# Patient Record
Sex: MALE | Race: ASIAN | NOT HISPANIC OR LATINO
[De-identification: names, ages, dates, MRNs, and addresses within clinical notes are randomized per-mention and may not be internally consistent; named-entity substitution may affect disease eponyms.]

---

## 2019-09-23 ENCOUNTER — APPOINTMENT (OUTPATIENT)
Dept: NEUROSURGERY | Facility: CLINIC | Age: 69
End: 2019-09-23
Payer: COMMERCIAL

## 2019-09-23 ENCOUNTER — APPOINTMENT (OUTPATIENT)
Dept: NEUROLOGY | Facility: CLINIC | Age: 69
End: 2019-09-23
Payer: COMMERCIAL

## 2019-09-23 DIAGNOSIS — C71.9 MALIGNANT NEOPLASM OF BRAIN, UNSPECIFIED: ICD-10-CM

## 2019-09-23 PROCEDURE — 99245 OFF/OP CONSLTJ NEW/EST HI 55: CPT

## 2019-09-23 PROCEDURE — 99203 OFFICE O/P NEW LOW 30 MIN: CPT

## 2019-09-23 NOTE — REVIEW OF SYSTEMS
[As Noted in HPI] : as noted in HPI [Memory Lapses or Loss] : memory loss [Decr. Concentrating Ability] : decreased concentrating ability [Negative] : Heme/Lymph

## 2019-09-23 NOTE — DATA REVIEWED
[de-identified] : I personally reviewed MR imaging dated\par 9/13/19			\par \par In reviewing these images, I find a mass-like hyperintensity involving the splenium of the callosum on the T2 weighted images, with signal change likely representing an admixture of cerebral edema and neoplasm. There is also DWI hyperintensity in this region, which may represent T2 shine through effects.\par I am concerned about subtle confluent hyperintensity extending anteriorly to involve bilateral medial temporal lobes and suggesting tracking along whit matter pathways to both hippocampal structures.\par There are no contrast enhanced images.\par Overall I find the images to be most c/w glioma, but potentially representing PCNSL. \par Selected imaging was provided to the patient, along with a detailed verbal explanation of the issues at hand as outlined above.\par

## 2019-09-23 NOTE — PHYSICAL EXAM
[General Appearance - Alert] : alert [General Appearance - In No Acute Distress] : in no acute distress [Oriented To Time, Place, And Person] : oriented to person, place, and time [Impaired Insight] : insight and judgment were intact [Affect] : the affect was normal [Person] : oriented to person [Place] : oriented to place [Time] : oriented to time [Concentration Intact] : normal concentrating ability [Visual Intact] : visual attention was ~T not ~L decreased [Naming Objects] : no difficulty naming common objects [Repeating Phrases] : no difficulty repeating a phrase [Writing A Sentence] : no difficulty writing a sentence [Fluency] : fluency intact [Comprehension] : comprehension intact [Reading] : reading intact [Past History] : adequate knowledge of personal past history [Cranial Nerves Optic (II)] : visual acuity intact bilaterally,  visual fields full to confrontation, pupils equal round and reactive to light [Cranial Nerves Oculomotor (III)] : extraocular motion intact [Cranial Nerves Trigeminal (V)] : facial sensation intact symmetrically [Cranial Nerves Facial (VII)] : face symmetrical [Cranial Nerves Vestibulocochlear (VIII)] : hearing was intact bilaterally [Cranial Nerves Glossopharyngeal (IX)] : tongue and palate midline [Cranial Nerves Accessory (XI - Cranial And Spinal)] : head turning and shoulder shrug symmetric [Motor Tone] : muscle tone was normal in all four extremities [Cranial Nerves Hypoglossal (XII)] : there was no tongue deviation with protrusion [Motor Strength] : muscle strength was normal in all four extremities [No Muscle Atrophy] : normal bulk in all four extremities [Motor Handedness Right-Handed] : the patient is right hand dominant [Sensation Tactile Decrease] : light touch was intact [Abnormal Walk] : normal gait [Balance] : balance was intact [2+] : Ankle jerk left 2+ [Paresis Pronator Drift Right-Sided] : no pronator drift on the right [Paresis Pronator Drift Left-Sided] : no pronator drift on the left [Motor Strength Upper Extremities Bilaterally] : strength was normal in both upper extremities [Past-pointing] : there was no past-pointing [Motor Strength Lower Extremities Bilaterally] : strength was normal in both lower extremities [Tremor] : no tremor present [Plantar Reflex Right Only] : normal on the right [Plantar Reflex Left Only] : normal on the left [Primitive Reflexes] : primitive reflexes were absent [FreeTextEntry4] : while he is remarkably intact, he is clearly bothered by his cognitive performance, which may well be less than usual. [FreeTextEntry5] : he extinguishes left sided visual stimuli on double simultaneous stimulation.  [FreeTextEntry6] : He does not extinguish left sided tactile stimuli on double simultaneous stimulation.

## 2019-09-23 NOTE — DISCUSSION/SUMMARY
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 I think performing this procedure within the next week or two would be valuable in delineating the underlying etiology, despite his remarkable stability over the last few months. I instructed him to stop taking the baby ASA, which he has not yet taken today.\par \par NEOPLASM -- I encouraged him that brain lymphomas are curable, but cautioned that gliomas are not, although both are treatable. I deferred detailed discussion regarding treatment options until we know what kind of neoplasm this represents.\par \par LOW B12 LEVEL -- I instructed him to take 5000mcg B12 sub-lingual preparation on a daily basis.\par \par MEMORY LOSS -- hopefully related to diffuse brain disease seen in PCNSL. If related to glioma infiltration of hippocampii bilaterally, then likely to get worse over time.\par \par LEFT NEGLECT -- likely parietal deficit. I am not sure why it is confined to visual stimuli, perhaps because it is not severe at this time.\par \par DISPO -- Although I did not arrange for follow-up immediately, I would like to see him after any procedure is performed. 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

## 2019-09-24 PROBLEM — C71.9 MALIGNANT NEOPLASM OF BRAIN, UNSPECIFIED LOCATION: Status: ACTIVE | Noted: 2019-09-24

## 2019-09-25 ENCOUNTER — OUTPATIENT (OUTPATIENT)
Dept: OUTPATIENT SERVICES | Facility: HOSPITAL | Age: 69
LOS: 1 days | End: 2019-09-25
Payer: COMMERCIAL

## 2019-09-25 ENCOUNTER — APPOINTMENT (OUTPATIENT)
Dept: MRI IMAGING | Facility: IMAGING CENTER | Age: 69
End: 2019-09-25
Payer: COMMERCIAL

## 2019-09-25 ENCOUNTER — TRANSCRIPTION ENCOUNTER (OUTPATIENT)
Age: 69
End: 2019-09-25

## 2019-09-25 VITALS
OXYGEN SATURATION: 99 % | DIASTOLIC BLOOD PRESSURE: 76 MMHG | RESPIRATION RATE: 17 BRPM | HEART RATE: 65 BPM | HEIGHT: 70 IN | TEMPERATURE: 98 F | SYSTOLIC BLOOD PRESSURE: 132 MMHG | WEIGHT: 149.03 LBS

## 2019-09-25 DIAGNOSIS — D49.6 NEOPLASM OF UNSPECIFIED BEHAVIOR OF BRAIN: ICD-10-CM

## 2019-09-25 DIAGNOSIS — Z98.890 OTHER SPECIFIED POSTPROCEDURAL STATES: Chronic | ICD-10-CM

## 2019-09-25 DIAGNOSIS — Z01.818 ENCOUNTER FOR OTHER PREPROCEDURAL EXAMINATION: ICD-10-CM

## 2019-09-25 DIAGNOSIS — Z29.9 ENCOUNTER FOR PROPHYLACTIC MEASURES, UNSPECIFIED: ICD-10-CM

## 2019-09-25 DIAGNOSIS — C71.8 MALIGNANT NEOPLASM OF OVERLAPPING SITES OF BRAIN: ICD-10-CM

## 2019-09-25 DIAGNOSIS — I10 ESSENTIAL (PRIMARY) HYPERTENSION: ICD-10-CM

## 2019-09-25 DIAGNOSIS — C71.9 MALIGNANT NEOPLASM OF BRAIN, UNSPECIFIED: ICD-10-CM

## 2019-09-25 LAB
ANION GAP SERPL CALC-SCNC: 10 MMOL/L — SIGNIFICANT CHANGE UP (ref 5–17)
BLD GP AB SCN SERPL QL: NEGATIVE — SIGNIFICANT CHANGE UP
BUN SERPL-MCNC: 22 MG/DL — SIGNIFICANT CHANGE UP (ref 7–23)
CALCIUM SERPL-MCNC: 9.3 MG/DL — SIGNIFICANT CHANGE UP (ref 8.4–10.5)
CHLORIDE SERPL-SCNC: 100 MMOL/L — SIGNIFICANT CHANGE UP (ref 96–108)
CO2 SERPL-SCNC: 29 MMOL/L — SIGNIFICANT CHANGE UP (ref 22–31)
CREAT SERPL-MCNC: 0.88 MG/DL — SIGNIFICANT CHANGE UP (ref 0.5–1.3)
GLUCOSE SERPL-MCNC: 121 MG/DL — HIGH (ref 70–99)
HCT VFR BLD CALC: 41.3 % — SIGNIFICANT CHANGE UP (ref 39–50)
HGB BLD-MCNC: 13.4 G/DL — SIGNIFICANT CHANGE UP (ref 13–17)
MCHC RBC-ENTMCNC: 28.9 PG — SIGNIFICANT CHANGE UP (ref 27–34)
MCHC RBC-ENTMCNC: 32.4 GM/DL — SIGNIFICANT CHANGE UP (ref 32–36)
MCV RBC AUTO: 89 FL — SIGNIFICANT CHANGE UP (ref 80–100)
MRSA PCR RESULT.: SIGNIFICANT CHANGE UP
PLATELET # BLD AUTO: 141 K/UL — LOW (ref 150–400)
POTASSIUM SERPL-MCNC: 4.9 MMOL/L — SIGNIFICANT CHANGE UP (ref 3.5–5.3)
POTASSIUM SERPL-SCNC: 4.9 MMOL/L — SIGNIFICANT CHANGE UP (ref 3.5–5.3)
RBC # BLD: 4.64 M/UL — SIGNIFICANT CHANGE UP (ref 4.2–5.8)
RBC # FLD: 12.8 % — SIGNIFICANT CHANGE UP (ref 10.3–14.5)
RH IG SCN BLD-IMP: POSITIVE — SIGNIFICANT CHANGE UP
S AUREUS DNA NOSE QL NAA+PROBE: SIGNIFICANT CHANGE UP
SODIUM SERPL-SCNC: 139 MMOL/L — SIGNIFICANT CHANGE UP (ref 135–145)
WBC # BLD: 9.1 K/UL — SIGNIFICANT CHANGE UP (ref 3.8–10.5)
WBC # FLD AUTO: 9.1 K/UL — SIGNIFICANT CHANGE UP (ref 3.8–10.5)

## 2019-09-25 PROCEDURE — 87641 MR-STAPH DNA AMP PROBE: CPT

## 2019-09-25 PROCEDURE — 86900 BLOOD TYPING SEROLOGIC ABO: CPT

## 2019-09-25 PROCEDURE — 70553 MRI BRAIN STEM W/O & W/DYE: CPT

## 2019-09-25 PROCEDURE — A9585: CPT

## 2019-09-25 PROCEDURE — G0463: CPT

## 2019-09-25 PROCEDURE — 86850 RBC ANTIBODY SCREEN: CPT

## 2019-09-25 PROCEDURE — 87640 STAPH A DNA AMP PROBE: CPT

## 2019-09-25 PROCEDURE — 70553 MRI BRAIN STEM W/O & W/DYE: CPT | Mod: 26

## 2019-09-25 PROCEDURE — 85027 COMPLETE CBC AUTOMATED: CPT

## 2019-09-25 PROCEDURE — 80048 BASIC METABOLIC PNL TOTAL CA: CPT

## 2019-09-25 PROCEDURE — 86901 BLOOD TYPING SEROLOGIC RH(D): CPT

## 2019-09-25 RX ORDER — CEFAZOLIN SODIUM 1 G
2000 VIAL (EA) INJECTION ONCE
Refills: 0 | Status: DISCONTINUED | OUTPATIENT
Start: 2019-09-27 | End: 2019-09-28

## 2019-09-25 NOTE — H&P PST ADULT - NSICDXPASTMEDICALHX_GEN_ALL_CORE_FT
PAST MEDICAL HISTORY:  Hyperlipidemia     Hypertension     Neoplasm of unspecified behavior of brain

## 2019-09-25 NOTE — H&P PST ADULT - HISTORY OF PRESENT ILLNESS
69 year old male with h/o HTN, hyperlipidemia, presents to preadmission testing for scheduled right occipital stereotactic biopsy for neoplasm of unspecified behavior of brain on 9/27/2019.

## 2019-09-25 NOTE — H&P PST ADULT - ASSESSMENT
CAPRINI SCORE [CLOT updated 18]    AGE RELATED RISK FACTORS                                                       MOBILITY RELATED FACTORS  [ ] Age 41-60 years                                            (1 Point)                    [ ] Bed rest                                                        (1 Point)  [ 2] Age: 61-74 years                                           (2 Points)                  [ ] Plaster cast                                                   (2 Points)  [ ] Age= 75 years                                              (3 Points)                    [ ] Bed bound for more than 72 hours                 (2 Points)    DISEASE RELATED RISK FACTORS                                               GENDER SPECIFIC FACTORS  [ ] Edema in the lower extremities                       (1 Point)              [ ] Pregnancy                                                     (1 Point)  [ ] Varicose veins                                               (1 Point)                     [ ] Post-partum < 6 weeks                                   (1 Point)             [ ] BMI > 25 Kg/m2                                            (1 Point)                     [ ] Hormonal therapy  or oral contraception          (1 Point)                 [ ] Sepsis (in the previous month)                        (1 Point)               [ ] History of pregnancy complications                 (1 point)  [ ] Pneumonia or serious lung disease                                               [ ] Unexplained or recurrent                     (1 Point)           (in the previous month)                               (1 Point)  [ ] Abnormal pulmonary function test                     (1 Point)                 SURGERY RELATED RISK FACTORS  [ ] Acute myocardial infarction                              (1 Point)               [ ]  Section                                             (1 Point)  [ ] Congestive heart failure (in the previous month)  (1 Point)      [ ] Minor surgery                                                  (1 Point)   [ ] Inflammatory bowel disease                             (1 Point)               [ ] Arthroscopic surgery                                        (2 Points)  [ ] Central venous access                                      (2 Points)                [2 ] General surgery lasting more than 45 minutes (2 points)  [ ] Present or previous malignancy                     (2 Points)                [ ] Elective arthroplasty                                         (5 points)    [ ] Stroke (in the previous month)                          (5 Points)                                                                                                                                                           HEMATOLOGY RELATED FACTORS                                                 TRAUMA RELATED RISK FACTORS  [ ] Prior episodes of VTE                                     (3 Points)                [ ] Fracture of the hip, pelvis, or leg                       (5 Points)  [ ] Positive family history for VTE                         (3 Points)             [ ] Acute spinal cord injury (in the previous month)  (5 Points)  [ ] Prothrombin 79045 A                                     (3 Points)               [ ] Paralysis  (less than 1 month)                             (5 Points)  [ ] Factor V Leiden                                             (3 Points)                  [ ] Multiple Trauma within 1 month                        (5 Points)  [ ] Lupus anticoagulants                                     (3 Points)                                                           [ ] Anticardiolipin antibodies                               (3 Points)                                                       [ ] High homocysteine in the blood                      (3 Points)                                             [ ] Other congenital or acquired thrombophilia      (3 Points)                                                [ ] Heparin induced thrombocytopenia                  (3 Points)                                     Total Score [4 ]

## 2019-09-25 NOTE — H&P PST ADULT - ATTENDING COMMENTS
The patient has a heterogeneously enhancing splenial mass. Symptoms: memory loss. DDx: Glioma > lymphoma. Plan: right stereotactic biopsy. I have discussed the risks, benefits, and alternatives to surgery with the patient and his son. In particular, the risks of bleeding, infection, and no diagnosis being made were discussed.

## 2019-09-25 NOTE — H&P PST ADULT - NSANTHOSAYNRD_GEN_A_CORE
No. SUMIT screening performed.  STOP BANG Legend: 0-2 = LOW Risk; 3-4 = INTERMEDIATE Risk; 5-8 = HIGH Risk

## 2019-09-25 NOTE — H&P PST ADULT - NSICDXPROBLEM_GEN_ALL_CORE_FT
PROBLEM DIAGNOSES  Problem: Neoplasm of unspecified behavior of brain  Assessment and Plan: scheduled for right occipital stereotatic biopsy  preop instruction given, all questions answered     Problem: Hypertension  Assessment and Plan: instructed to continue antihypertensive medication pily-op    Problem: Need for prophylactic measure  Assessment and Plan: The Caprini score indicates this patient is at risk for a VTE event (score 3-5).  Most surgical patients in this group would benefit from pharmacologic prophylaxis.  The surgical team will determine the balance between VTE risk and bleeding risk

## 2019-09-26 ENCOUNTER — TRANSCRIPTION ENCOUNTER (OUTPATIENT)
Age: 69
End: 2019-09-26

## 2019-09-27 ENCOUNTER — APPOINTMENT (OUTPATIENT)
Dept: NEUROSURGERY | Facility: HOSPITAL | Age: 69
End: 2019-09-27

## 2019-09-27 ENCOUNTER — INPATIENT (INPATIENT)
Facility: HOSPITAL | Age: 69
LOS: 0 days | Discharge: HOME | DRG: 27 | End: 2019-09-28
Attending: NEUROLOGICAL SURGERY | Admitting: NEUROLOGICAL SURGERY
Payer: COMMERCIAL

## 2019-09-27 ENCOUNTER — RESULT REVIEW (OUTPATIENT)
Age: 69
End: 2019-09-27

## 2019-09-27 VITALS
WEIGHT: 149.03 LBS | DIASTOLIC BLOOD PRESSURE: 77 MMHG | TEMPERATURE: 99 F | HEIGHT: 70 IN | RESPIRATION RATE: 18 BRPM | HEART RATE: 70 BPM | SYSTOLIC BLOOD PRESSURE: 125 MMHG | OXYGEN SATURATION: 100 %

## 2019-09-27 DIAGNOSIS — D49.6 NEOPLASM OF UNSPECIFIED BEHAVIOR OF BRAIN: ICD-10-CM

## 2019-09-27 DIAGNOSIS — Z98.890 OTHER SPECIFIED POSTPROCEDURAL STATES: Chronic | ICD-10-CM

## 2019-09-27 DIAGNOSIS — C71.8 MALIGNANT NEOPLASM OF OVERLAPPING SITES OF BRAIN: ICD-10-CM

## 2019-09-27 LAB
ANION GAP SERPL CALC-SCNC: 10 MMOL/L — SIGNIFICANT CHANGE UP (ref 5–17)
BASOPHILS # BLD AUTO: 0 K/UL — SIGNIFICANT CHANGE UP (ref 0–0.2)
BASOPHILS NFR BLD AUTO: 0.1 % — SIGNIFICANT CHANGE UP (ref 0–2)
BUN SERPL-MCNC: 15 MG/DL — SIGNIFICANT CHANGE UP (ref 7–23)
CALCIUM SERPL-MCNC: 8.5 MG/DL — SIGNIFICANT CHANGE UP (ref 8.4–10.5)
CHLORIDE SERPL-SCNC: 100 MMOL/L — SIGNIFICANT CHANGE UP (ref 96–108)
CO2 SERPL-SCNC: 27 MMOL/L — SIGNIFICANT CHANGE UP (ref 22–31)
CREAT SERPL-MCNC: 0.91 MG/DL — SIGNIFICANT CHANGE UP (ref 0.5–1.3)
EOSINOPHIL # BLD AUTO: 0 K/UL — SIGNIFICANT CHANGE UP (ref 0–0.5)
EOSINOPHIL NFR BLD AUTO: 0.1 % — SIGNIFICANT CHANGE UP (ref 0–6)
GLUCOSE SERPL-MCNC: 187 MG/DL — HIGH (ref 70–99)
HCT VFR BLD CALC: 39.1 % — SIGNIFICANT CHANGE UP (ref 39–50)
HGB BLD-MCNC: 12.9 G/DL — LOW (ref 13–17)
LYMPHOCYTES # BLD AUTO: 1 K/UL — SIGNIFICANT CHANGE UP (ref 1–3.3)
LYMPHOCYTES # BLD AUTO: 10.9 % — LOW (ref 13–44)
MCHC RBC-ENTMCNC: 28.7 PG — SIGNIFICANT CHANGE UP (ref 27–34)
MCHC RBC-ENTMCNC: 33 GM/DL — SIGNIFICANT CHANGE UP (ref 32–36)
MCV RBC AUTO: 87 FL — SIGNIFICANT CHANGE UP (ref 80–100)
MONOCYTES # BLD AUTO: 0.1 K/UL — SIGNIFICANT CHANGE UP (ref 0–0.9)
MONOCYTES NFR BLD AUTO: 0.9 % — LOW (ref 2–14)
NEUTROPHILS # BLD AUTO: 8.3 K/UL — HIGH (ref 1.8–7.4)
NEUTROPHILS NFR BLD AUTO: 87.9 % — HIGH (ref 43–77)
PLATELET # BLD AUTO: 117 K/UL — LOW (ref 150–400)
POTASSIUM SERPL-MCNC: 4 MMOL/L — SIGNIFICANT CHANGE UP (ref 3.5–5.3)
POTASSIUM SERPL-SCNC: 4 MMOL/L — SIGNIFICANT CHANGE UP (ref 3.5–5.3)
RBC # BLD: 4.5 M/UL — SIGNIFICANT CHANGE UP (ref 4.2–5.8)
RBC # FLD: 11.6 % — SIGNIFICANT CHANGE UP (ref 10.3–14.5)
RH IG SCN BLD-IMP: POSITIVE — SIGNIFICANT CHANGE UP
SODIUM SERPL-SCNC: 137 MMOL/L — SIGNIFICANT CHANGE UP (ref 135–145)
WBC # BLD: 9.5 K/UL — SIGNIFICANT CHANGE UP (ref 3.8–10.5)
WBC # FLD AUTO: 9.5 K/UL — SIGNIFICANT CHANGE UP (ref 3.8–10.5)

## 2019-09-27 PROCEDURE — 88342 IMHCHEM/IMCYTCHM 1ST ANTB: CPT | Mod: 26,59

## 2019-09-27 PROCEDURE — 88334 PATH CONSLTJ SURG CYTO XM EA: CPT | Mod: 26,59

## 2019-09-27 PROCEDURE — 88307 TISSUE EXAM BY PATHOLOGIST: CPT | Mod: 26

## 2019-09-27 PROCEDURE — 61510 CRNEC TREPH EXC BRN TUM STTL: CPT

## 2019-09-27 PROCEDURE — 88341 IMHCHEM/IMCYTCHM EA ADD ANTB: CPT | Mod: 26,59

## 2019-09-27 PROCEDURE — 88312 SPECIAL STAINS GROUP 1: CPT | Mod: 26

## 2019-09-27 PROCEDURE — 88360 TUMOR IMMUNOHISTOCHEM/MANUAL: CPT | Mod: 26

## 2019-09-27 PROCEDURE — 70450 CT HEAD/BRAIN W/O DYE: CPT | Mod: 26

## 2019-09-27 PROCEDURE — 88331 PATH CONSLTJ SURG 1 BLK 1SPC: CPT | Mod: 26

## 2019-09-27 RX ORDER — LOSARTAN POTASSIUM 100 MG/1
50 TABLET, FILM COATED ORAL DAILY
Refills: 0 | Status: DISCONTINUED | OUTPATIENT
Start: 2019-09-27 | End: 2019-09-28

## 2019-09-27 RX ORDER — ATENOLOL 25 MG/1
50 TABLET ORAL DAILY
Refills: 0 | Status: DISCONTINUED | OUTPATIENT
Start: 2019-09-27 | End: 2019-09-28

## 2019-09-27 RX ORDER — NAFCILLIN 10 G/100ML
1 INJECTION, POWDER, FOR SOLUTION INTRAVENOUS EVERY 4 HOURS
Refills: 0 | Status: COMPLETED | OUTPATIENT
Start: 2019-09-27 | End: 2019-09-28

## 2019-09-27 RX ORDER — DEXAMETHASONE 0.5 MG/5ML
4 ELIXIR ORAL EVERY 12 HOURS
Refills: 0 | Status: DISCONTINUED | OUTPATIENT
Start: 2019-09-27 | End: 2019-09-28

## 2019-09-27 RX ORDER — LIDOCAINE HCL 20 MG/ML
0.2 VIAL (ML) INJECTION ONCE
Refills: 0 | Status: DISCONTINUED | OUTPATIENT
Start: 2019-09-27 | End: 2019-09-27

## 2019-09-27 RX ORDER — ATORVASTATIN CALCIUM 80 MG/1
40 TABLET, FILM COATED ORAL AT BEDTIME
Refills: 0 | Status: DISCONTINUED | OUTPATIENT
Start: 2019-09-27 | End: 2019-09-28

## 2019-09-27 RX ORDER — HYDROMORPHONE HYDROCHLORIDE 2 MG/ML
0.25 INJECTION INTRAMUSCULAR; INTRAVENOUS; SUBCUTANEOUS
Refills: 0 | Status: DISCONTINUED | OUTPATIENT
Start: 2019-09-27 | End: 2019-09-27

## 2019-09-27 RX ORDER — DEXTROSE MONOHYDRATE, SODIUM CHLORIDE, AND POTASSIUM CHLORIDE 50; .745; 4.5 G/1000ML; G/1000ML; G/1000ML
1000 INJECTION, SOLUTION INTRAVENOUS
Refills: 0 | Status: DISCONTINUED | OUTPATIENT
Start: 2019-09-27 | End: 2019-09-28

## 2019-09-27 RX ORDER — CHLORHEXIDINE GLUCONATE 213 G/1000ML
1 SOLUTION TOPICAL ONCE
Refills: 0 | Status: DISCONTINUED | OUTPATIENT
Start: 2019-09-27 | End: 2019-09-27

## 2019-09-27 RX ORDER — INFLUENZA VIRUS VACCINE 15; 15; 15; 15 UG/.5ML; UG/.5ML; UG/.5ML; UG/.5ML
0.5 SUSPENSION INTRAMUSCULAR ONCE
Refills: 0 | Status: DISCONTINUED | OUTPATIENT
Start: 2019-09-27 | End: 2019-09-28

## 2019-09-27 RX ORDER — ACETAMINOPHEN 500 MG
650 TABLET ORAL EVERY 6 HOURS
Refills: 0 | Status: DISCONTINUED | OUTPATIENT
Start: 2019-09-27 | End: 2019-09-28

## 2019-09-27 RX ORDER — ONDANSETRON 8 MG/1
4 TABLET, FILM COATED ORAL ONCE
Refills: 0 | Status: DISCONTINUED | OUTPATIENT
Start: 2019-09-27 | End: 2019-09-27

## 2019-09-27 RX ORDER — SODIUM CHLORIDE 9 MG/ML
3 INJECTION INTRAMUSCULAR; INTRAVENOUS; SUBCUTANEOUS EVERY 8 HOURS
Refills: 0 | Status: DISCONTINUED | OUTPATIENT
Start: 2019-09-27 | End: 2019-09-27

## 2019-09-27 RX ORDER — ONDANSETRON 8 MG/1
4 TABLET, FILM COATED ORAL EVERY 6 HOURS
Refills: 0 | Status: DISCONTINUED | OUTPATIENT
Start: 2019-09-27 | End: 2019-09-28

## 2019-09-27 RX ORDER — FAMOTIDINE 10 MG/ML
20 INJECTION INTRAVENOUS EVERY 12 HOURS
Refills: 0 | Status: DISCONTINUED | OUTPATIENT
Start: 2019-09-27 | End: 2019-09-28

## 2019-09-27 RX ADMIN — NAFCILLIN 200 GRAM(S): 10 INJECTION, POWDER, FOR SOLUTION INTRAVENOUS at 23:54

## 2019-09-27 RX ADMIN — DEXTROSE MONOHYDRATE, SODIUM CHLORIDE, AND POTASSIUM CHLORIDE 75 MILLILITER(S): 50; .745; 4.5 INJECTION, SOLUTION INTRAVENOUS at 16:36

## 2019-09-27 RX ADMIN — ATORVASTATIN CALCIUM 40 MILLIGRAM(S): 80 TABLET, FILM COATED ORAL at 21:14

## 2019-09-27 RX ADMIN — NAFCILLIN 200 GRAM(S): 10 INJECTION, POWDER, FOR SOLUTION INTRAVENOUS at 20:35

## 2019-09-27 RX ADMIN — NAFCILLIN 200 GRAM(S): 10 INJECTION, POWDER, FOR SOLUTION INTRAVENOUS at 16:38

## 2019-09-27 RX ADMIN — Medication 4 MILLIGRAM(S): at 18:16

## 2019-09-27 NOTE — BRIEF OPERATIVE NOTE - OPERATION/FINDINGS
Stereotactic needle biopsy of right parietal lobe mass, frozen section: high cellularity favor glioma.

## 2019-09-27 NOTE — PROGRESS NOTE ADULT - SUBJECTIVE AND OBJECTIVE BOX
Patient seen and examined at bedside.    --Anticoagulation--    T(C): 36.9 (09-27-19 @ 17:45), Max: 37 (09-27-19 @ 05:38)  HR: 79 (09-27-19 @ 17:45) (70 - 87)  BP: 124/82 (09-27-19 @ 17:45) (117/60 - 130/67)  RR: 16 (09-27-19 @ 17:45) (14 - 18)  SpO2: 99% (09-27-19 @ 17:45) (96% - 100%)  Wt(kg): --    Exam: AAOx3, FC, full strength

## 2019-09-28 ENCOUNTER — TRANSCRIPTION ENCOUNTER (OUTPATIENT)
Age: 69
End: 2019-09-28

## 2019-09-28 VITALS
TEMPERATURE: 99 F | OXYGEN SATURATION: 100 % | RESPIRATION RATE: 18 BRPM | HEART RATE: 87 BPM | DIASTOLIC BLOOD PRESSURE: 69 MMHG | SYSTOLIC BLOOD PRESSURE: 127 MMHG

## 2019-09-28 PROCEDURE — 88307 TISSUE EXAM BY PATHOLOGIST: CPT

## 2019-09-28 PROCEDURE — 88341 IMHCHEM/IMCYTCHM EA ADD ANTB: CPT

## 2019-09-28 PROCEDURE — 85027 COMPLETE CBC AUTOMATED: CPT

## 2019-09-28 PROCEDURE — 88334 PATH CONSLTJ SURG CYTO XM EA: CPT

## 2019-09-28 PROCEDURE — 88333 PATH CONSLTJ SURG CYTO XM 1: CPT

## 2019-09-28 PROCEDURE — C1713: CPT

## 2019-09-28 PROCEDURE — 80048 BASIC METABOLIC PNL TOTAL CA: CPT

## 2019-09-28 PROCEDURE — 70450 CT HEAD/BRAIN W/O DYE: CPT | Mod: 26

## 2019-09-28 PROCEDURE — 88331 PATH CONSLTJ SURG 1 BLK 1SPC: CPT

## 2019-09-28 PROCEDURE — C1889: CPT

## 2019-09-28 PROCEDURE — 86901 BLOOD TYPING SEROLOGIC RH(D): CPT

## 2019-09-28 PROCEDURE — 70450 CT HEAD/BRAIN W/O DYE: CPT

## 2019-09-28 PROCEDURE — 88312 SPECIAL STAINS GROUP 1: CPT

## 2019-09-28 PROCEDURE — 86900 BLOOD TYPING SEROLOGIC ABO: CPT

## 2019-09-28 PROCEDURE — 88360 TUMOR IMMUNOHISTOCHEM/MANUAL: CPT

## 2019-09-28 PROCEDURE — 88342 IMHCHEM/IMCYTCHM 1ST ANTB: CPT

## 2019-09-28 RX ORDER — LEVETIRACETAM 250 MG/1
1 TABLET, FILM COATED ORAL
Qty: 0 | Refills: 0 | DISCHARGE
Start: 2019-09-28

## 2019-09-28 RX ORDER — DEXAMETHASONE 0.5 MG/5ML
2 ELIXIR ORAL
Refills: 0 | Status: DISCONTINUED | OUTPATIENT
Start: 2019-09-28 | End: 2019-09-28

## 2019-09-28 RX ORDER — ACETAMINOPHEN 500 MG
2 TABLET ORAL
Qty: 0 | Refills: 0 | DISCHARGE
Start: 2019-09-28

## 2019-09-28 RX ORDER — LEVETIRACETAM 250 MG/1
500 TABLET, FILM COATED ORAL
Refills: 0 | Status: DISCONTINUED | OUTPATIENT
Start: 2019-09-28 | End: 2019-09-28

## 2019-09-28 RX ORDER — LEVETIRACETAM 250 MG/1
1 TABLET, FILM COATED ORAL
Qty: 60 | Refills: 0
Start: 2019-09-28

## 2019-09-28 RX ORDER — DEXAMETHASONE 0.5 MG/5ML
1 ELIXIR ORAL
Qty: 0 | Refills: 0 | DISCHARGE
Start: 2019-09-28

## 2019-09-28 RX ORDER — DEXAMETHASONE 0.5 MG/5ML
2 ELIXIR ORAL
Qty: 0 | Refills: 0 | DISCHARGE
Start: 2019-09-28

## 2019-09-28 RX ORDER — DEXAMETHASONE 0.5 MG/5ML
1 ELIXIR ORAL
Qty: 60 | Refills: 0
Start: 2019-09-28

## 2019-09-28 RX ADMIN — NAFCILLIN 200 GRAM(S): 10 INJECTION, POWDER, FOR SOLUTION INTRAVENOUS at 10:03

## 2019-09-28 RX ADMIN — Medication 4 MILLIGRAM(S): at 05:10

## 2019-09-28 RX ADMIN — ATENOLOL 50 MILLIGRAM(S): 25 TABLET ORAL at 05:10

## 2019-09-28 RX ADMIN — NAFCILLIN 200 GRAM(S): 10 INJECTION, POWDER, FOR SOLUTION INTRAVENOUS at 05:10

## 2019-09-28 RX ADMIN — LOSARTAN POTASSIUM 50 MILLIGRAM(S): 100 TABLET, FILM COATED ORAL at 05:10

## 2019-09-28 NOTE — DISCHARGE NOTE PROVIDER - CARE PROVIDER_API CALL
Juanito Roy)  Neurological Surgery  [address] [place] [address]  Phone: [phone]  Fax: [phone]  Follow Up Time:

## 2019-09-28 NOTE — DISCHARGE NOTE PROVIDER - NSDCCPCAREPLAN_GEN_ALL_CORE_FT
PRINCIPAL DISCHARGE DIAGNOSIS  Diagnosis: Neoplasm of unspecified behavior of brain  Assessment and Plan of Treatment: Please make follow up appointment with Dr. Roy in office      SECONDARY DISCHARGE DIAGNOSES  Diagnosis: Hypertension  Assessment and Plan of Treatment: Please make follow up appointment with PMD within 2 weeks on discharge

## 2019-09-28 NOTE — CHART NOTE - NSCHARTNOTEFT_GEN_A_CORE
CAPRINI SCORE [CLOT] Score on Admission for     AGE RELATED RISK FACTORS                                                       MOBILITY RELATED FACTORS  [ ] Age 41-60 years                                            (1 Point)                  [ ] Bed rest                                                        (1 Point)  [X ] Age: 61-74 years                                           (2 Points)                 [ ] Plaster cast                                                   (2 Points)  [ ] Age= 75 years                                              (3 Points)                 [ ] Bed bound for more than 72 hours                 (2 Points)    DISEASE RELATED RISK FACTORS                                               GENDER SPECIFIC FACTORS  [ ] Edema in the lower extremities                       (1 Point)                  [ ] Pregnancy                                                     (1 Point)  [ ] Varicose veins                                               (1 Point)                  [ ] Post-partum < 6 weeks                                   (1 Point)             [ X] BMI > 25 Kg/m2                                            (1 Point)                  [ ] Hormonal therapy  or oral contraception          (1 Point)                 [ ] Sepsis (in the previous month)                        (1 Point)                  [ ] History of pregnancy complications                 (1 point)  [ ] Pneumonia or serious lung disease                                               [ ] Unexplained or recurrent                     (1 Point)           (in the previous month)                               (1 Point)  [ ] Abnormal pulmonary function test                     (1 Point)                 SURGERY RELATED RISK FACTORS (include planned surgeries)  [ ] Acute myocardial infarction                              (1 Point)                 [ ]  Section                                             (1 Point)  [ ] Congestive heart failure (in the previous month)  (1 Point)         [ ] Minor surgery                                                  (1 Point)   [ ] Inflammatory bowel disease                             (1 Point)                 [ ] Arthroscopic surgery                                        (2 Points)  [ ] Central venous access                                      (2 Points)                [ X] General surgery lasting more than 45 minutes   (2 Points)       [ ] Stroke (in the previous month)                          (5 Points)               [ ] Elective arthroplasty                                         (5 Points)            [ ] current or past malignancy                              (2 Points)                                                                                                       HEMATOLOGY RELATED FACTORS                                                 TRAUMA RELATED RISK FACTORS  [ ] Prior episodes of VTE                                     (3 Points)                [ ] Fracture of the hip, pelvis, or leg                       (5 Points)  [ ] Positive family history for VTE                         (3 Points)                 [ ] Acute spinal cord injury (in the previous month)  (5 Points)  [ ] Prothrombin 60331 A                                     (3 Points)                 [ ] Paralysis  (less than 1 month)                             (5 Points)  [ ] Factor V Leiden                                             (3 Points)                  [ ] Multiple Trauma within 1 month                        (5 Points)  [ ] Lupus anticoagulants                                     (3 Points)                                                           [ ] Anticardiolipin antibodies                               (3 Points)                                                       [ ] High homocysteine in the blood                      (3 Points)                                             [ ] Other congenital or acquired thrombophilia      (3 Points)                                                [ ] Heparin induced thrombocytopenia                  (3 Points)                                          Total Score [    5      ]    Risk:  Very low 0   Low 1 to 2   Moderate 3 to 4   High =5       VTE Prophylasix Recommednations:  [X ] mechanical pneumatic compression devices                                      [ ] contraindicated: _____________________  [ ] chemo prophylasix                                                                                   [ X] contraindicated __post op heme___________________    **** HIGH LIKELIHOOD DVT PRESENT ON ADMISSION  [ ] (please order LE dopplers within 24 hours of admission)

## 2019-09-28 NOTE — DISCHARGE NOTE NURSING/CASE MANAGEMENT/SOCIAL WORK - PATIENT PORTAL LINK FT
You can access the FollowMyHealth Patient Portal offered by [facility] by registering at the following website: [email or web address]. By joining [facility]’s FollowMyHealth portal, you will also be able to view your health information using other applications (apps) compatible with our system.

## 2019-09-28 NOTE — DISCHARGE NOTE PROVIDER - HOSPITAL COURSE
69 year old male with history of hypertension and hyperlipidemia, presented with several months history of memory loss.  Found to have a heterogeneously enhancing splenial mass on workup.  On 9/27, patient underwent stereotactic biopsy of lesion.  There was a small track hemorrhage noted on post op CT which was stable on repeat CT head.  Will discharge patient home today per Dr. Roy.  Family instructed to make follow up appointment with Dr. Roy in office.

## 2019-09-28 NOTE — DISCHARGE NOTE PROVIDER - CARE PROVIDERS DIRECT ADDRESSES
[email or web address]

## 2019-09-28 NOTE — DISCHARGE NOTE PROVIDER - NSDCACTIVITY_GEN_ALL_CORE
Walking - Outdoors allowed/Stairs allowed/Do not make important decisions/Do not drive or operate machinery/Walking - Indoors allowed/No heavy lifting/straining/Showering allowed

## 2019-09-28 NOTE — PROGRESS NOTE ADULT - ASSESSMENT
69 year old male with h/o HTN, hyperlipidemia, presented with memory loss.  Found to have a heterogeneously enhancing splenial mass.     PROCEDURE: 9/27 S/P Stereotactic needle of lesion     POD# 1    PLAN:  NEURO: Analgesics prn, Rpt CT head with stable heme, will discharge patient home.  Instructed to make f/up appointment with Dr. Roy.  PULM: room air, incentive spirometry  CV: HLD: cont atorvastatin, HTN: cont atenolol and losartan  HEME/ONC:  H/H stable             DVT ppx: [] SQL [] SQH [X] Venodynes bilaterally   RENAL: IVL, voiding  ID: afebrile  GI: regular diet, bowel regimen  DISCHARGE PLANNING: Will discharge patient home today.  To follow up with Dr. Roy in office.      Assessment:  Please Check When Present   []  GCS  E   V  M     Heart Failure: []Acute, [] acute on chronic , []chronic  Heart Failure:  [] Diastolic (HFpEF), [] Systolic (HFrEF), []Combined (HFpEF and HFrEF), [] RHF, [] Pulm HTN, [] Other    [] ANNA, [] ATN, [] AIN, [] other  [] CKD1, [] CKD2, [] CKD 3, [] CKD 4, [] CKD 5, []ESRD    Encephalopathy: [] Metabolic, [] Hepatic, [] toxic, [] Neurological, [] Other    Abnormal Nurtitional Status: [] malnurtition (see nutrition note), [ ]underweight: BMI < 19, [] morbid obesity: BMI >40, [] Cachexia    [] Sepsis  [] hypovolemic shock,[] cardiogenic shock, [] hemorrhagic shock, [] neuogenic shock  [] Acute Respiratory Failure  []Cerebral edema, [] Brain compression/ herniation,   [] Functional quadriplegia  [] Acute blood loss anemia    Will discuss plan with Dr. Roy  Spectralink # [id number]

## 2019-09-28 NOTE — DISCHARGE NOTE PROVIDER - NSDCCPTREATMENT_GEN_ALL_CORE_FT
PRINCIPAL PROCEDURE  Procedure: Needle biopsy, brain, stereotactic  Findings and Treatment: Please keep wound clean and dry  Please make follow up appointment with Dr. Roy in office  Please call Dr. Roy's office if any worsening headaches, nausea or weakness

## 2019-09-28 NOTE — PROGRESS NOTE ADULT - SUBJECTIVE AND OBJECTIVE BOX
SUBJECTIVE: Patient seen and examined at bedside with patient's son present.  Complains of very mild headaches "1/10", no nausea.  Tolerating diet well, ambulating well.    CHIEF COMPLAINT: memory loss, brain mass    OVERNIGHT EVENTS: post op CT head showed small heme    Vital Signs Last 24 Hrs  T(C): 36.8 (28 Sep 2019 05:05), Max: 36.9 (27 Sep 2019 17:45)  T(F): 98.3 (28 Sep 2019 05:05), Max: 98.5 (27 Sep 2019 17:45)  HR: 86 (28 Sep 2019 05:16) (79 - 92)  BP: 125/66 (28 Sep 2019 05:16) (98/54 - 130/67)  BP(mean): 83 (27 Sep 2019 16:00) (82 - 87)  RR: 18 (28 Sep 2019 05:05) (14 - 18)  SpO2: 98% (28 Sep 2019 05:05) (96% - 99%)    PHYSICAL EXAM:    General: No Acute Distress     Neurological: Alert and oriented to self, place and birthday- not today's date.  Speech clear, follows commands, MULLIGAN well.    Pulmonary: Clear to Auscultation, No Rales, No Rhonchi, No Wheezes     Cardiovascular: S1, S2, Regular Rate and Rhythm     Gastrointestinal: Soft, Nontender, Nondistended     Incision: +staples, clean and dry    LABS:                        12.9   9.5   )-----------( 117      ( 27 Sep 2019 13:23 )             39.1    09-27    137  |  100  |  15  ----------------------------<  187<H>  4.0   |  27  |  0.91    Ca    8.5      27 Sep 2019 13:23          09-27 @ 07:01  -  09-28 @ 07:00  --------------------------------------------------------  IN: 835 mL / OUT: 450 mL / NET: 385 mL      MEDICATIONS:  Antibiotics:  ceFAZolin   IVPB 2000 milliGRAM(s) IV Intermittent once    Neuro:  acetaminophen   Tablet .. 650 milliGRAM(s) Oral every 6 hours PRN Temp greater or equal to 38C (100.4F), Mild Pain (1 - 3)  ondansetron Injectable 4 milliGRAM(s) IV Push every 6 hours PRN Nausea    Cardiac:  ATENolol  Tablet 50 milliGRAM(s) Oral daily  losartan 50 milliGRAM(s) Oral daily    Pulm:    GI/:  famotidine    Tablet 20 milliGRAM(s) Oral every 12 hours PRN Dyspepsia    Other:   atorvastatin 40 milliGRAM(s) Oral at bedtime  dexamethasone  Injectable 4 milliGRAM(s) IV Push every 12 hours  influenza   Vaccine 0.5 milliLiter(s) IntraMuscular once  sodium chloride 0.9% with potassium chloride 20 mEq/L 1000 milliLiter(s) IV Continuous <Continuous>    DIET: [X] Regular [] CCD [] Renal [] Puree [] Dysphagia [] Tube Feeds:     IMAGING: < from: CT Head No Cont (09.27.19 @ 15:54) >  There is a small right parietal craniotomy defect. There is a linear   tract of hemorrhage from the calvarium to the splenium of the corpus   callosum where there is a small amount of postoperative air and   hemorrhage related to the previous recent biopsy. There is no change in   mass in the splenium of the corpus callosum splaying the posterior bodies   of the lateral ventricles. A small amount of air is also identified in   the left parietal lobe adjacent to the posterior body of left lateral   ventricle.    IMPRESSION: Postoperative changes with air and a small amount of   hemorrhage in the splenium of the corpus callosum after biopsy of the   splenial corpus callosum mass.    < end of copied text >

## 2019-09-30 PROBLEM — I10 ESSENTIAL (PRIMARY) HYPERTENSION: Chronic | Status: ACTIVE | Noted: 2019-09-25

## 2019-09-30 PROBLEM — E78.5 HYPERLIPIDEMIA, UNSPECIFIED: Chronic | Status: ACTIVE | Noted: 2019-09-25

## 2019-10-03 ENCOUNTER — APPOINTMENT (OUTPATIENT)
Dept: NEUROLOGY | Facility: CLINIC | Age: 69
End: 2019-10-03
Payer: COMMERCIAL

## 2019-10-03 VITALS
HEIGHT: 70 IN | SYSTOLIC BLOOD PRESSURE: 142 MMHG | BODY MASS INDEX: 21.19 KG/M2 | RESPIRATION RATE: 16 BRPM | OXYGEN SATURATION: 99 % | DIASTOLIC BLOOD PRESSURE: 80 MMHG | WEIGHT: 148 LBS | HEART RATE: 63 BPM

## 2019-10-03 DIAGNOSIS — Z86.79 PERSONAL HISTORY OF OTHER DISEASES OF THE CIRCULATORY SYSTEM: ICD-10-CM

## 2019-10-03 PROCEDURE — 99215 OFFICE O/P EST HI 40 MIN: CPT

## 2019-10-03 RX ORDER — ONDANSETRON 4 MG/1
4 TABLET ORAL
Qty: 60 | Refills: 3 | Status: ACTIVE | COMMUNITY
Start: 2019-10-03 | End: 1900-01-01

## 2019-10-03 RX ORDER — CHOLECALCIFEROL (VITAMIN D3) 50 MCG
5000 TABLET ORAL
Refills: 0 | Status: ACTIVE | COMMUNITY
Start: 2019-10-03

## 2019-10-03 RX ORDER — TEMOZOLOMIDE 140 MG/1
140 CAPSULE ORAL
Qty: 42 | Refills: 0 | Status: ACTIVE | COMMUNITY
Start: 2019-10-03 | End: 1900-01-01

## 2019-10-03 RX ORDER — LEVETIRACETAM 500 MG/1
500 TABLET, FILM COATED ORAL
Qty: 60 | Refills: 5 | Status: ACTIVE | COMMUNITY
Start: 2019-10-03

## 2019-10-03 RX ORDER — ATORVASTATIN CALCIUM 40 MG/1
40 TABLET, FILM COATED ORAL
Refills: 0 | Status: ACTIVE | COMMUNITY
Start: 2019-10-03

## 2019-10-03 RX ORDER — DEXAMETHASONE 2 MG/1
2 TABLET ORAL
Qty: 60 | Refills: 2 | Status: ACTIVE | COMMUNITY
Start: 2019-10-03

## 2019-10-03 RX ORDER — LOSARTAN POTASSIUM 50 MG/1
50 TABLET, FILM COATED ORAL
Refills: 0 | Status: ACTIVE | COMMUNITY
Start: 2019-10-03

## 2019-10-03 NOTE — HISTORY OF PRESENT ILLNESS
[FreeTextEntry1] : Ronald Rider is a pleasant 70 year old right handed man with a past medical history of hypertension and hypercholesterolemia, now found to have a mass lesion of the posterior (splenium of) corpus callosum. \par \par Briefly, he presented to neurology on 8/29/19 with complaints of memory loss. An MRI was performed on 9/13/19 demonstrating a 4cm mass involving the splenium of the callosum and bilateral parenchymal cortical involvement.\par \par He underwent stereotactic biopsy by Dr. Roy on 9/27/19. Final pathology pending, likely glioma, does not appear to be a lymphoma on path (or on contrast-enhanced imaging). \par \par He presents today for follow-up after surgery, accompanied by his son. \par \par He recovered well from surgery and is feeling well. \par No headaches, no n/v, no diplopia. \par No seizures or seizure-like symptoms. \par Main complaint is continued short-term memory difficulty, which he feels is the same or slightly worse since surgery. \par Son notes his mood is irritable. \par Currently on keppra 500mg BID and decadron 2mg BID.

## 2019-10-03 NOTE — DATA REVIEWED
[de-identified] : I personally reviewed MR imaging dated\par 9/28/19 (CT)	9/25/19 (MRI +)	9/13/19 (MRI -)	\par \par In reviewing these images, I find no significant change in the splenial hyperintensity on the T2 weighted images, with signal change likely representing neoplasm. \par I am concerned about subtle subependymal or perhaps even intraventricular enhancement which may represent CSF dissemination of neoplasm.\par On the contrast enhanced images, there is heterogenous, mostly necrotic appearing enhancement within the T2 FLAIR hyperintensity that looks more like glioma than lymphoma in character.\par Overall I find the images to be stable.\par The post-biopsy CT demonstrates a tract directly into the neoplasm. \par Selected imaging was provided to the patient, along with a detailed verbal explanation of the issues at hand as outlined above.\par  [de-identified] : I looked at the pathology slides with the neuropathologist. Immunostains are pending. There are not large lymphocytes infiltrating the neoplasm, which appears astrocyti to my eye. There may be small T-cell lymphocytic infiltration, but this does not have the appearance of PCNSL.

## 2019-10-03 NOTE — PHYSICAL EXAM
[General Appearance - Alert] : alert [General Appearance - In No Acute Distress] : in no acute distress [Affect] : the affect was normal [Impaired Insight] : insight and judgment were intact [Person] : oriented to person [Place] : oriented to place [Remote Intact] : remote memory intact [Span Intact] : the attention span was normal [Concentration Intact] : normal concentrating ability [Visual Intact] : visual attention was ~T not ~L decreased [Repeating Phrases] : no difficulty repeating a phrase [Naming Objects] : no difficulty naming common objects [Fluency] : fluency intact [Comprehension] : comprehension intact [Reading] : reading intact [Past History] : adequate knowledge of personal past history [Cranial Nerves Oculomotor (III)] : extraocular motion intact [Cranial Nerves Trigeminal (V)] : facial sensation intact symmetrically [Cranial Nerves Facial (VII)] : face symmetrical [Cranial Nerves Glossopharyngeal (IX)] : tongue and palate midline [Cranial Nerves Vestibulocochlear (VIII)] : hearing was intact bilaterally [Cranial Nerves Accessory (XI - Cranial And Spinal)] : head turning and shoulder shrug symmetric [Cranial Nerves Hypoglossal (XII)] : there was no tongue deviation with protrusion [Motor Tone] : muscle tone was normal in all four extremities [Motor Strength] : muscle strength was normal in all four extremities [Involuntary Movements] : no involuntary movements were seen [No Muscle Atrophy] : normal bulk in all four extremities [Abnormal Walk] : normal gait [Sensation Tactile Decrease] : light touch was intact [Balance] : balance was intact [Sclera] : the sclera and conjunctiva were normal [Extraocular Movements] : extraocular movements were intact [Respiration, Rhythm And Depth] : normal respiratory rhythm and effort [Oropharynx] : the oropharynx was normal [Edema] : there was no peripheral edema [Time] : disoriented to time [Short Term Intact] : short term memory impaired [Registration Intact] : recent registration memory impaired [Past-pointing] : there was no past-pointing [Tremor] : no tremor present [Dysdiadochokinesia Bilaterally] : not present [Coordination - Dysmetria Impaired Finger-to-Nose Bilateral] : not present [FreeTextEntry7] : no tactile extinction [FreeTextEntry5] : visual extinction on left on double simultaneous stimulation

## 2019-10-03 NOTE — DISCUSSION/SUMMARY
[FreeTextEntry1] : Cerebral edema - No s&s. Advised to reduce decadron to 2mg daily x 7 days, then stop if no issues.\par \par Seizures - None. Instructions provided to taper off Keppra. He is not driving. Seizure first aid reviewed. \par \par Brain tumor - Final path is pending, although this is a glioma rather than lymphoma. We reviewed standard of care treatment of 6 weeks of chemotherapy with concurrent radiation. We reviewed med administration and potential side effects, and will review in further detail at a chemo teaching visit. \par They understand that he is not a candidate for surgical resection due to the location of the tumor. \par We discussed clinical trial options, and will set them up to see Dr. Velarde. They will also likely explore trial options at [facility] and [facility].\par Will schedule consult with rad onc.  \par We also discussed treatment with the Optune device, which would begin about 1 month after completion of chemoradiation. \par \par DISPO - All questions answered. Follow-up for chemo teaching visit prior to starting chemoRT.

## 2019-10-04 LAB — SURGICAL PATHOLOGY STUDY: SIGNIFICANT CHANGE UP

## 2019-10-07 ENCOUNTER — APPOINTMENT (OUTPATIENT)
Dept: NEUROSURGERY | Facility: CLINIC | Age: 69
End: 2019-10-07
Payer: COMMERCIAL

## 2019-10-07 PROCEDURE — 99024 POSTOP FOLLOW-UP VISIT: CPT

## 2019-10-08 ENCOUNTER — CHART COPY (OUTPATIENT)
Age: 69
End: 2019-10-08

## 2019-10-09 ENCOUNTER — EMERGENCY (EMERGENCY)
Facility: HOSPITAL | Age: 69
LOS: 1 days | Discharge: HOME | End: 2019-10-09
Attending: EMERGENCY MEDICINE | Admitting: EMERGENCY MEDICINE
Payer: COMMERCIAL

## 2019-10-09 VITALS
SYSTOLIC BLOOD PRESSURE: 110 MMHG | TEMPERATURE: 97 F | HEIGHT: 70 IN | RESPIRATION RATE: 16 BRPM | WEIGHT: 145.95 LBS | HEART RATE: 57 BPM | OXYGEN SATURATION: 100 % | DIASTOLIC BLOOD PRESSURE: 59 MMHG

## 2019-10-09 DIAGNOSIS — Z98.890 OTHER SPECIFIED POSTPROCEDURAL STATES: Chronic | ICD-10-CM

## 2019-10-09 DIAGNOSIS — D49.6 NEOPLASM OF UNSPECIFIED BEHAVIOR OF BRAIN: ICD-10-CM

## 2019-10-09 LAB
ALBUMIN SERPL ELPH-MCNC: 3.9 G/DL — SIGNIFICANT CHANGE UP (ref 3.3–5)
ALP SERPL-CCNC: 46 U/L — SIGNIFICANT CHANGE UP (ref 40–120)
ALT FLD-CCNC: SIGNIFICANT CHANGE UP U/L (ref 4–41)
ANION GAP SERPL CALC-SCNC: 15 MMO/L — HIGH (ref 7–14)
AST SERPL-CCNC: SIGNIFICANT CHANGE UP U/L (ref 4–40)
BASOPHILS # BLD AUTO: 0.01 K/UL — SIGNIFICANT CHANGE UP (ref 0–0.2)
BASOPHILS NFR BLD AUTO: 0.1 % — SIGNIFICANT CHANGE UP (ref 0–2)
BILIRUB SERPL-MCNC: 0.4 MG/DL — SIGNIFICANT CHANGE UP (ref 0.2–1.2)
BUN SERPL-MCNC: 20 MG/DL — SIGNIFICANT CHANGE UP (ref 7–23)
CALCIUM SERPL-MCNC: 9.5 MG/DL — SIGNIFICANT CHANGE UP (ref 8.4–10.5)
CHLORIDE SERPL-SCNC: 98 MMOL/L — SIGNIFICANT CHANGE UP (ref 98–107)
CO2 SERPL-SCNC: 21 MMOL/L — LOW (ref 22–31)
CREAT SERPL-MCNC: 0.75 MG/DL — SIGNIFICANT CHANGE UP (ref 0.5–1.3)
EOSINOPHIL # BLD AUTO: 0 K/UL — SIGNIFICANT CHANGE UP (ref 0–0.5)
EOSINOPHIL NFR BLD AUTO: 0 % — SIGNIFICANT CHANGE UP (ref 0–6)
GLUCOSE SERPL-MCNC: 137 MG/DL — HIGH (ref 70–99)
HCT VFR BLD CALC: 40.8 % — SIGNIFICANT CHANGE UP (ref 39–50)
HGB BLD-MCNC: 13.4 G/DL — SIGNIFICANT CHANGE UP (ref 13–17)
IMM GRANULOCYTES NFR BLD AUTO: 0.4 % — SIGNIFICANT CHANGE UP (ref 0–1.5)
LYMPHOCYTES # BLD AUTO: 1.7 K/UL — SIGNIFICANT CHANGE UP (ref 1–3.3)
LYMPHOCYTES # BLD AUTO: 13.1 % — SIGNIFICANT CHANGE UP (ref 13–44)
MCHC RBC-ENTMCNC: 28 PG — SIGNIFICANT CHANGE UP (ref 27–34)
MCHC RBC-ENTMCNC: 32.8 % — SIGNIFICANT CHANGE UP (ref 32–36)
MCV RBC AUTO: 85.2 FL — SIGNIFICANT CHANGE UP (ref 80–100)
MONOCYTES # BLD AUTO: 0.45 K/UL — SIGNIFICANT CHANGE UP (ref 0–0.9)
MONOCYTES NFR BLD AUTO: 3.5 % — SIGNIFICANT CHANGE UP (ref 2–14)
NEUTROPHILS # BLD AUTO: 10.75 K/UL — HIGH (ref 1.8–7.4)
NEUTROPHILS NFR BLD AUTO: 82.9 % — HIGH (ref 43–77)
NRBC # FLD: 0 K/UL — SIGNIFICANT CHANGE UP (ref 0–0)
PLATELET # BLD AUTO: 168 K/UL — SIGNIFICANT CHANGE UP (ref 150–400)
PMV BLD: 12.9 FL — SIGNIFICANT CHANGE UP (ref 7–13)
POTASSIUM SERPL-MCNC: SIGNIFICANT CHANGE UP MMOL/L (ref 3.5–5.3)
POTASSIUM SERPL-SCNC: SIGNIFICANT CHANGE UP MMOL/L (ref 3.5–5.3)
PROT SERPL-MCNC: SIGNIFICANT CHANGE UP G/DL (ref 6–8.3)
RBC # BLD: 4.79 M/UL — SIGNIFICANT CHANGE UP (ref 4.2–5.8)
RBC # FLD: 12.5 % — SIGNIFICANT CHANGE UP (ref 10.3–14.5)
SODIUM SERPL-SCNC: 134 MMOL/L — LOW (ref 135–145)
WBC # BLD: 12.96 K/UL — HIGH (ref 3.8–10.5)
WBC # FLD AUTO: 12.96 K/UL — HIGH (ref 3.8–10.5)

## 2019-10-09 PROCEDURE — 99218: CPT

## 2019-10-09 PROCEDURE — 99285 EMERGENCY DEPT VISIT HI MDM: CPT

## 2019-10-09 RX ORDER — ATENOLOL 25 MG/1
50 TABLET ORAL DAILY
Refills: 0 | Status: DISCONTINUED | OUTPATIENT
Start: 2019-10-09 | End: 2019-10-20

## 2019-10-09 RX ORDER — LOSARTAN POTASSIUM 100 MG/1
50 TABLET, FILM COATED ORAL DAILY
Refills: 0 | Status: DISCONTINUED | OUTPATIENT
Start: 2019-10-09 | End: 2019-10-20

## 2019-10-09 RX ORDER — ATORVASTATIN CALCIUM 80 MG/1
40 TABLET, FILM COATED ORAL AT BEDTIME
Refills: 0 | Status: DISCONTINUED | OUTPATIENT
Start: 2019-10-09 | End: 2019-10-20

## 2019-10-09 RX ORDER — DEXAMETHASONE 0.5 MG/5ML
4 ELIXIR ORAL THREE TIMES A DAY
Refills: 0 | Status: DISCONTINUED | OUTPATIENT
Start: 2019-10-09 | End: 2019-10-20

## 2019-10-09 RX ORDER — ATORVASTATIN CALCIUM 80 MG/1
1 TABLET, FILM COATED ORAL
Qty: 0 | Refills: 0 | DISCHARGE

## 2019-10-09 RX ADMIN — Medication 4 MILLIGRAM(S): at 21:46

## 2019-10-09 RX ADMIN — ATORVASTATIN CALCIUM 40 MILLIGRAM(S): 80 TABLET, FILM COATED ORAL at 21:46

## 2019-10-09 NOTE — ED ADULT NURSE NOTE - OBJECTIVE STATEMENT
patient alert ox3 came in c/o having afoot drop in left foot since Sunday. denies having head ache/fever. breathing even and unlabored. NAD.. connected  to CM shows NSR. labs done as ordered. awaiting results and re eval.

## 2019-10-09 NOTE — ED PROVIDER NOTE - CARE PLAN
Principal Discharge DX:	Neoplasm of unspecified behavior of brain  Goal:	Evaluate new foot drop  Assessment and plan of treatment:	MRI of cervical, thoracic and lumbar spine

## 2019-10-09 NOTE — ED PROVIDER NOTE - ATTENDING CONTRIBUTION TO CARE
Dr. Barros: I have personally performed a face to face bedside history and physical examination of this patient. I have discussed the history, examination, review of systems, assessment and plan of management with the resident. I have reviewed the electronic medical record and amended it to reflect my history, review of systems, physical exam, assessment and plan.     Attending Exam - Dr. Barros: GEN: well appearing, NAD  HEENT: +PERRL, EOMI  RESP: CTAB, no signs of respiratory distress CV: s1s2 RRR ABD: soft/non tender/non distended  MSK: no deformities / swelling, normal range of motion, spine grossly normal NEURO: alert, L foot drop but otherwise normal neurologic exam, SKIN: normal color / temperature / condition.

## 2019-10-09 NOTE — CONSULT NOTE ADULT - SUBJECTIVE AND OBJECTIVE BOX
NEUROSURGERY CONSULT  ADDIS SCHWAB   10-09-19 @ 16:43    HPI: 69y Male with h/o HTN, hyperlipidemia, s/p Stereotactic needle biopsy of right parietal lobe mass, frozen section: high cellularity favor glioma on 9/27/19 with dr. roy at [facility]. Patient saw Dr. Roy in office 2 days ago and complained of lower extremity weakness. He was given a script for outpatient MRI and instead came to ED today for a lumbar MRI. Per Dr. Roy's note 2 days ago, there will be no further surgical intervention, instead will be treated with radiation.     PHYSICAL EXAM:  Vital Signs Last 24 Hrs  T(C): 36.4 (09 Oct 2019 16:19), Max: 36.4 (09 Oct 2019 16:19)  T(F): 97.6 (09 Oct 2019 16:19), Max: 97.6 (09 Oct 2019 16:19)  HR: 58 (09 Oct 2019 16:19) (57 - 58)  BP: 125/69 (09 Oct 2019 16:19) (110/59 - 125/69)  RR: 17 (09 Oct 2019 16:19) (16 - 17)  SpO2: 100% (09 Oct 2019 16:19) (100% - 100%)    AAOx3  EOMI, PERRL NEUROSURGERY CONSULT  ADDIS SCHWAB   10-09-19 @ 16:43    HPI: 69y Male with h/o HTN, hyperlipidemia, s/p Stereotactic needle biopsy of right parietal lobe mass, frozen section: high cellularity favor glioma on 9/27/19 with dr. roy at [facility]. Patient saw Dr. Roy in office 2 days ago and complained of lower extremity weakness. He was given a script for outpatient MRI and instead came to ED today for a lumbar MRI. Per Dr. Roy's note 2 days ago, there will be no further surgical intervention, instead will be treated with radiation.     PHYSICAL EXAM:  Vital Signs Last 24 Hrs  T(C): 36.4 (09 Oct 2019 16:19), Max: 36.4 (09 Oct 2019 16:19)  T(F): 97.6 (09 Oct 2019 16:19), Max: 97.6 (09 Oct 2019 16:19)  HR: 58 (09 Oct 2019 16:19) (57 - 58)  BP: 125/69 (09 Oct 2019 16:19) (110/59 - 125/69)  RR: 17 (09 Oct 2019 16:19) (16 - 17)  SpO2: 100% (09 Oct 2019 16:19) (100% - 100%)    AAOx3  EOMI, PERRL  MULLIGAN x 4 with good strength, however L dorsi flexion 1/5     < from: MR Lumbar Spine w/wo IV Cont (10.10.19 @ 05:21) >    L1-2: Normal    L2-3: DISC BULGE AND BILATERAL HYPERTROPHIC FACET JOINT CHANGES ARE SEEN.   MODERATE NARROWING of the SPINAL CANAL is SEEN.    L3-4: DISC BULGE AND BILATERAL HYPERTROPHIC FACET JOINT CHANGES ARE SEEN.   MILD NARROWING of the SPINAL CANAL is SEEN.    L4-5: DISC BULGE AND BILATERAL HYPERTROPHIC FACET JOINT CHANGES are SEEN.   HYPERTROPHIC CHANGE is SEEN INVOLVING BOTH LIGAMENTUM FLAVUM. MODERATE TO   SEVERE NARROWING of the SPINAL CANAL.     L5-S1: DISC BULGE AND BILATERAL HYPERTROPHIC FACET JOINT CHANGES are   SEEN. NO SIGNIFICANT COMPROMISE OF THE SPINAL CANAL OR EITHER NEURAL   FORAMEN    THE CONUS ENDS AT L2 AND APPEARS NORMAL    EVALUATION OF THE PARASPINAL SOFT TISSUES APPEAR NORMAL    IMPRESSION: DEGENERATIVE CHANGES AS DESCRIBED ABOVE.

## 2019-10-09 NOTE — ED CDU PROVIDER INITIAL DAY NOTE - OBJECTIVE STATEMENT
ED note: 69yoM PMHx HTN, HLD recent dx of High Grade Glioma p/w new onset left foot drop for the past three days. Patient recent had a brain bx on 9/27 at [facility] which a dx of High Grade Glioma was made. At this time patient denies any headaches, fevers, chills, N/V, CP, SOB, abdominal pain, constipation, diarrhea or dysuria    Nsx note: 69y Male with h/o HTN, hyperlipidemia, s/p Stereotactic needle biopsy of right parietal lobe mass, frozen section: high cellularity favor glioma on 9/27/19 with dr. roy at [facility]. Patient saw Dr. Roy in office 2 days ago and complained of lower extremity weakness. He was given a script for outpatient MRI and instead came to ED today for a lumbar MRI. Per Dr. Roy's note 2 days ago, there will be no further surgical intervention, instead will be treated with radiation.     In the ED, patient was evaluated by neurosurgery who recommended MRI C-T-L spine with and without contrast.

## 2019-10-09 NOTE — ED PROVIDER NOTE - PHYSICAL EXAMINATION
GEN: NAD; well appearing; A+O x3   HEAD: NC/AT   EYES/NOSE: PERRL & EOMI b/l  THROAT: airway patent, moist mucous membranes  NECK: Neck supple, no masses  PULMONARY: CTA b/l, symmetric breath sounds.   CARDIAC: s1s2, regular rhythm, no Murmur  ABDOMEN:  ND, NT, soft, no guarding, no rebound, no masses   BACK: no CVA tenderness, Normal  spine   EXTREMITIES: symmetric pulses, 2+ dp & radial pulses, capillary refill < 2 seconds, no cyanosis, no edema   SKIN: no rash or bruising   NEUROLOGIC: alert, CN 2-12 intact, moves all 4 extremities, Decreased strength of dorsiflexion of left foot.  PSYCH: insight and judgment nl, memory nl, affect nl, thought nl

## 2019-10-09 NOTE — ED PROVIDER NOTE - OBJECTIVE STATEMENT
69yoM PMHx HTN, HLD recent dx of High Grade Glioma p/w new onset left foot drop for the past three days. Patient recent had a brain bx on 9/27 at [facility] which a dx of High Grade Glioma was made. At this time patient denies any headaches, fevers, chills, N/V, CP, SOB, abdominal pain, constipation, diarrhea or dysuria

## 2019-10-09 NOTE — ED PROVIDER NOTE - NS ED ROS FT
GENERAL: no fever, chills  HEENT: no throat pain, cough, congestion, dysphagia  CARDIAC: no chest pain, palpitations  PULM: no shortness of breath, cough, wheezing   GI: no abdominal pain, nausea, vomiting, diarrhea, constipation  : no dysuria, frequency  NEURO: LLE foot drop but no headache, lightheadedness  MSK: no joint or muscle pain  SKIN: no rashes  HEME: no active bleeding

## 2019-10-09 NOTE — ED CDU PROVIDER INITIAL DAY NOTE - MEDICAL DECISION MAKING DETAILS
recent dx of High Grade Glioma, here for left foot drop since Sunday (4 days ago), plan for MRI of C-T-L spine with and without contrast. Neurosurgery following.

## 2019-10-09 NOTE — ED ADULT NURSE NOTE - NSIMPLEMENTINTERV_GEN_ALL_ED
Implemented All Universal Safety Interventions:  [place] to call system. Call bell, personal items and telephone within reach. Instruct patient to call for assistance. Room bathroom lighting operational. Non-slip footwear when patient is off stretcher. Physically safe environment: no spills, clutter or unnecessary equipment. Stretcher in lowest position, wheels locked, appropriate side rails in place.

## 2019-10-10 ENCOUNTER — OUTPATIENT (OUTPATIENT)
Dept: OUTPATIENT SERVICES | Facility: HOSPITAL | Age: 69
LOS: 1 days | Discharge: HOME | End: 2019-10-10

## 2019-10-10 VITALS
TEMPERATURE: 98 F | HEART RATE: 56 BPM | SYSTOLIC BLOOD PRESSURE: 109 MMHG | DIASTOLIC BLOOD PRESSURE: 64 MMHG | OXYGEN SATURATION: 96 % | RESPIRATION RATE: 16 BRPM

## 2019-10-10 DIAGNOSIS — Z98.890 OTHER SPECIFIED POSTPROCEDURAL STATES: Chronic | ICD-10-CM

## 2019-10-10 PROCEDURE — 72158 MRI LUMBAR SPINE W/O & W/DYE: CPT | Mod: 26

## 2019-10-10 PROCEDURE — 99217: CPT

## 2019-10-10 PROCEDURE — 72156 MRI NECK SPINE W/O & W/DYE: CPT | Mod: 26

## 2019-10-10 PROCEDURE — 72157 MRI CHEST SPINE W/O & W/DYE: CPT | Mod: 26

## 2019-10-10 RX ADMIN — Medication 4 MILLIGRAM(S): at 05:53

## 2019-10-10 NOTE — ED CDU PROVIDER SUBSEQUENT DAY NOTE - PHYSICAL EXAMINATION
Gen: AAOx3, non-toxic  Head: NCAT  HEENT: EOMI, oral mucosa moist, normal conjunctiva  Lung: CTAB, no respiratory distress, no wheezes/rhonchi/rales B/L, speaking in full sentences  CV: RRR, no murmurs, rubs or gallops  Abd: soft, NTND, no guarding  MSK: no visible deformities  Neuro: L foot drop, no sensory deficits  Skin: Warm, well perfused, no rash  Psych: normal affect.   ~Edilberto Bailey M.D. Resident

## 2019-10-10 NOTE — ED CDU PROVIDER DISPOSITION NOTE - CARE PROVIDER_API CALL
Juanito Roy)  Neurological Surgery  [address] [place] [address]  Phone: [phone]  Fax: [phone]  Follow Up Time:

## 2019-10-10 NOTE — ED CDU PROVIDER SUBSEQUENT DAY NOTE - HISTORY
70 yo M with recent needle biopsy of brain mass showing high grade glioma p/w 2 days of L lower ext weakness, placed in observation for MRI

## 2019-10-10 NOTE — ED CDU PROVIDER SUBSEQUENT DAY NOTE - ATTENDING CONTRIBUTION TO CARE
I, Jennifer Cabot, MD, have performed a history and physical exam of the patient and discussed their management with the resident. I reviewed the resident's note and agree with the documented findings and plan of care. My medical decision making and observations are found above.    Cabot: 69M with PMH of HTN, hyperlipidemia, s/p stereotactic needle biopsy of right parietal glioma on 9/27/19, who comes in with 3d of L foot drop.  CT head stable from prior, MR spine shows just DJD.  On exam, HDS, NAD, MMM, eyes clear, lungs CTAB, heart sounds normal, abd soft, NT, ND, no CVAT, LEs without edema, wwp, skin normal temperature and color, neuro: AAOx3, PERRLA, EOMIB, CN 2-12 intact, 5/5 strength aside from 4/5 strength with dorsiflexion L foot, SILT.  Pending NS recs.

## 2019-10-10 NOTE — ED CDU PROVIDER SUBSEQUENT DAY NOTE - PROGRESS NOTE DETAILS
Cabot: 69M with PMH of HTN, hyperlipidemia, s/p stereotactic needle biopsy of right parietal glioma on 9/27/19, who comes in with 3d of L foot drop.  CT head stable from prior, MR spine shows just DJD.  On exam, HDS, NAD, MMM, eyes clear, lungs CTAB, heart sounds normal, abd soft, NT, ND, no CVAT, LEs without edema, wwp, skin normal temperature and color, neuro: AAOx3, PERRLA, EOMIB, CN 2-12 intact, 5/5 strength aside from 4/5 strength with dorsiflexion L foot, SILT.  Pending NS recs. [facility] MRI with degenerative joint dz but no signs of spinal cord compression, metastatic dz, sx stable, still with L foot drop but unchanged, no new neuro sx, seen by NSG team who examined pt and reviewed MRI, stable for d/c with f/u with Dr. Roy

## 2019-10-10 NOTE — ED CDU PROVIDER DISPOSITION NOTE - CLINICAL COURSE
Cabot: 69M with PMH of HTN, hyperlipidemia, s/p stereotactic needle biopsy of right parietal glioma on 9/27/19, who comes in with 3d of L foot drop.  CT head stable from prior, MR spine shows just DJD.  NS recs follow up as an outpatient, no intervention acutely.  On exam, HDS, NAD, MMM, eyes clear, lungs CTAB, heart sounds normal, abd soft, NT, ND, no CVAT, LEs without edema, wwp, skin normal temperature and color, neuro: AAOx3, PERRLA, EOMIB, CN 2-12 intact, 5/5 strength aside from 4/5 strength with dorsiflexion L foot, SILT. Will discharge home.

## 2019-10-10 NOTE — ED CDU PROVIDER SUBSEQUENT DAY NOTE - NS ED ROS FT
GENERAL: No fever or chills, EYES: no change in vision, HEENT: no trouble swallowing or speaking, CARDIAC: no chest pain, PULMONARY: no cough or SOB, GI: no abdominal pain, no nausea, no vomiting, no diarrhea or constipation, : No changes in urination, SKIN: no rashes, NEURO: no headache, +L foot weakness, MSK: No joint pain ~Edilberto Bailey M.D. Resident

## 2019-10-10 NOTE — ED CDU PROVIDER DISPOSITION NOTE - ATTENDING CONTRIBUTION TO CARE
I, Jennifer Cabot, MD, have performed a history and physical exam of the patient and discussed their management with the resident. I reviewed the resident's note and agree with the documented findings and plan of care. My medical decision making and observations are found above.    Cabot: 69M with PMH of HTN, hyperlipidemia, s/p stereotactic needle biopsy of right parietal glioma on 9/27/19, who comes in with 3d of L foot drop.  CT head stable from prior, MR spine shows just DJD.  NS recs follow up as an outpatient, no intervention acutely.  On exam, HDS, NAD, MMM, eyes clear, lungs CTAB, heart sounds normal, abd soft, NT, ND, no CVAT, LEs without edema, wwp, skin normal temperature and color, neuro: AAOx3, PERRLA, EOMIB, CN 2-12 intact, 5/5 strength aside from 4/5 strength with dorsiflexion L foot, SILT. Will discharge home.

## 2019-10-10 NOTE — ED CDU PROVIDER DISPOSITION NOTE - PATIENT PORTAL LINK FT
You can access the FollowMyHealth Patient Portal offered by [facility] by registering at the following website: [email or web address]. By joining [employer]’s FollowMyHealth portal, you will also be able to view your health information using other applications (apps) compatible with our system.

## 2019-10-10 NOTE — ED CDU PROVIDER DISPOSITION NOTE - NSFOLLOWUPINSTRUCTIONS_ED_ALL_ED_FT
The MRI did not reveal any cause for your symptoms.     There was arthritis/joint disease but no metastatic lesions or spinal cord compression.    The neurosurgery team would like you to follow with Dr. Roy.    Return to ER for new or concerning symptoms.

## 2019-10-15 ENCOUNTER — APPOINTMENT (OUTPATIENT)
Dept: RADIATION ONCOLOGY | Facility: CLINIC | Age: 69
End: 2019-10-15

## 2019-10-18 ENCOUNTER — INBOUND DOCUMENT (OUTPATIENT)
Age: 69
End: 2019-10-18

## 2019-10-23 ENCOUNTER — APPOINTMENT (OUTPATIENT)
Dept: NEUROLOGY | Facility: CLINIC | Age: 69
End: 2019-10-23

## 2019-11-08 ENCOUNTER — APPOINTMENT (OUTPATIENT)
Dept: MRI IMAGING | Facility: CLINIC | Age: 69
End: 2019-11-08

## 2019-12-16 NOTE — H&P PST ADULT - NSANTHOBSERVEDRD_ENT_A_CORE
No Continue to hold digoxin due to elevated dig level of 2.5  Coumadin dosed daily Remains in afib  Continue to hold digoxin due to elevated dig level of 2.5  Coumadin dosed daily

## 2019-12-28 ENCOUNTER — INPATIENT (INPATIENT)
Facility: HOSPITAL | Age: 69
LOS: 1 days | Discharge: HOME HEALTH | End: 2019-12-30
Attending: HOSPITALIST | Admitting: HOSPITALIST
Payer: COMMERCIAL

## 2019-12-28 VITALS
RESPIRATION RATE: 18 BRPM | HEART RATE: 92 BPM | OXYGEN SATURATION: 100 % | SYSTOLIC BLOOD PRESSURE: 107 MMHG | TEMPERATURE: 99 F | DIASTOLIC BLOOD PRESSURE: 56 MMHG

## 2019-12-28 DIAGNOSIS — I10 ESSENTIAL (PRIMARY) HYPERTENSION: ICD-10-CM

## 2019-12-28 DIAGNOSIS — C71.9 MALIGNANT NEOPLASM OF BRAIN, UNSPECIFIED: ICD-10-CM

## 2019-12-28 DIAGNOSIS — Z29.9 ENCOUNTER FOR PROPHYLACTIC MEASURES, UNSPECIFIED: ICD-10-CM

## 2019-12-28 DIAGNOSIS — F41.9 ANXIETY DISORDER, UNSPECIFIED: ICD-10-CM

## 2019-12-28 DIAGNOSIS — Z98.890 OTHER SPECIFIED POSTPROCEDURAL STATES: Chronic | ICD-10-CM

## 2019-12-28 DIAGNOSIS — E87.1 HYPO-OSMOLALITY AND HYPONATREMIA: ICD-10-CM

## 2019-12-28 DIAGNOSIS — D61.818 OTHER PANCYTOPENIA: ICD-10-CM

## 2019-12-28 LAB
ALBUMIN SERPL ELPH-MCNC: 3 G/DL — LOW (ref 3.3–5)
ALP SERPL-CCNC: 104 U/L — SIGNIFICANT CHANGE UP (ref 40–120)
ALT FLD-CCNC: 156 U/L — HIGH (ref 4–41)
ANION GAP SERPL CALC-SCNC: 14 MMO/L — SIGNIFICANT CHANGE UP (ref 7–14)
ANION GAP SERPL CALC-SCNC: 18 MMO/L — HIGH (ref 7–14)
APPEARANCE UR: CLEAR — SIGNIFICANT CHANGE UP
AST SERPL-CCNC: 43 U/L — HIGH (ref 4–40)
BASOPHILS # BLD AUTO: 0.01 K/UL — SIGNIFICANT CHANGE UP (ref 0–0.2)
BASOPHILS NFR BLD AUTO: 0.6 % — SIGNIFICANT CHANGE UP (ref 0–2)
BILIRUB SERPL-MCNC: 0.4 MG/DL — SIGNIFICANT CHANGE UP (ref 0.2–1.2)
BILIRUB UR-MCNC: NEGATIVE — SIGNIFICANT CHANGE UP
BLOOD UR QL VISUAL: NEGATIVE — SIGNIFICANT CHANGE UP
BUN SERPL-MCNC: 23 MG/DL — SIGNIFICANT CHANGE UP (ref 7–23)
BUN SERPL-MCNC: 23 MG/DL — SIGNIFICANT CHANGE UP (ref 7–23)
CALCIUM SERPL-MCNC: 8 MG/DL — LOW (ref 8.4–10.5)
CALCIUM SERPL-MCNC: 8.2 MG/DL — LOW (ref 8.4–10.5)
CHLORIDE SERPL-SCNC: 89 MMOL/L — LOW (ref 98–107)
CHLORIDE SERPL-SCNC: 91 MMOL/L — LOW (ref 98–107)
CHLORIDE UR-SCNC: 60 MMOL/L — SIGNIFICANT CHANGE UP
CO2 SERPL-SCNC: 20 MMOL/L — LOW (ref 22–31)
CO2 SERPL-SCNC: 24 MMOL/L — SIGNIFICANT CHANGE UP (ref 22–31)
COLOR SPEC: SIGNIFICANT CHANGE UP
CORTIS SERPL-MCNC: 0.3 UG/DL — LOW (ref 2.7–18.4)
CREAT ?TM UR-MCNC: 30.6 MG/DL — SIGNIFICANT CHANGE UP
CREAT SERPL-MCNC: 0.58 MG/DL — SIGNIFICANT CHANGE UP (ref 0.5–1.3)
CREAT SERPL-MCNC: 0.64 MG/DL — SIGNIFICANT CHANGE UP (ref 0.5–1.3)
EOSINOPHIL # BLD AUTO: 0.01 K/UL — SIGNIFICANT CHANGE UP (ref 0–0.5)
EOSINOPHIL NFR BLD AUTO: 0.6 % — SIGNIFICANT CHANGE UP (ref 0–6)
GLUCOSE BLDC GLUCOMTR-MCNC: 330 MG/DL — HIGH (ref 70–99)
GLUCOSE SERPL-MCNC: 225 MG/DL — HIGH (ref 70–99)
GLUCOSE SERPL-MCNC: 255 MG/DL — HIGH (ref 70–99)
GLUCOSE UR-MCNC: 500 — HIGH
HCT VFR BLD CALC: 29 % — LOW (ref 39–50)
HCT VFR BLD CALC: 31.8 % — LOW (ref 39–50)
HCT VFR BLD CALC: 31.8 % — LOW (ref 39–50)
HGB BLD-MCNC: 10.2 G/DL — LOW (ref 13–17)
HGB BLD-MCNC: 11 G/DL — LOW (ref 13–17)
HGB BLD-MCNC: 11 G/DL — LOW (ref 13–17)
IMM GRANULOCYTES NFR BLD AUTO: 1.2 % — SIGNIFICANT CHANGE UP (ref 0–1.5)
KETONES UR-MCNC: SIGNIFICANT CHANGE UP
LEUKOCYTE ESTERASE UR-ACNC: NEGATIVE — SIGNIFICANT CHANGE UP
LYMPHOCYTES # BLD AUTO: 0.66 K/UL — LOW (ref 1–3.3)
LYMPHOCYTES # BLD AUTO: 38.4 % — SIGNIFICANT CHANGE UP (ref 13–44)
MAGNESIUM SERPL-MCNC: 1.9 MG/DL — SIGNIFICANT CHANGE UP (ref 1.6–2.6)
MCHC RBC-ENTMCNC: 31 PG — SIGNIFICANT CHANGE UP (ref 27–34)
MCHC RBC-ENTMCNC: 31 PG — SIGNIFICANT CHANGE UP (ref 27–34)
MCHC RBC-ENTMCNC: 31.2 PG — SIGNIFICANT CHANGE UP (ref 27–34)
MCHC RBC-ENTMCNC: 34.6 % — SIGNIFICANT CHANGE UP (ref 32–36)
MCHC RBC-ENTMCNC: 34.6 % — SIGNIFICANT CHANGE UP (ref 32–36)
MCHC RBC-ENTMCNC: 35.2 % — SIGNIFICANT CHANGE UP (ref 32–36)
MCV RBC AUTO: 88.7 FL — SIGNIFICANT CHANGE UP (ref 80–100)
MCV RBC AUTO: 89.6 FL — SIGNIFICANT CHANGE UP (ref 80–100)
MCV RBC AUTO: 89.6 FL — SIGNIFICANT CHANGE UP (ref 80–100)
MONOCYTES # BLD AUTO: 0.12 K/UL — SIGNIFICANT CHANGE UP (ref 0–0.9)
MONOCYTES NFR BLD AUTO: 7 % — SIGNIFICANT CHANGE UP (ref 2–14)
NEUTROPHILS # BLD AUTO: 0.9 K/UL — LOW (ref 1.8–7.4)
NEUTROPHILS NFR BLD AUTO: 52.2 % — SIGNIFICANT CHANGE UP (ref 43–77)
NITRITE UR-MCNC: NEGATIVE — SIGNIFICANT CHANGE UP
NRBC # FLD: 0 K/UL — SIGNIFICANT CHANGE UP (ref 0–0)
NRBC # FLD: 0 K/UL — SIGNIFICANT CHANGE UP (ref 0–0)
NRBC # FLD: 0.02 K/UL — SIGNIFICANT CHANGE UP (ref 0–0)
NRBC FLD-RTO: 1 — SIGNIFICANT CHANGE UP
OSMOLALITY UR: 436 MOSMO/KG — SIGNIFICANT CHANGE UP (ref 50–1200)
PH UR: 6.5 — SIGNIFICANT CHANGE UP (ref 5–8)
PHOSPHATE SERPL-MCNC: 2.8 MG/DL — SIGNIFICANT CHANGE UP (ref 2.5–4.5)
PLATELET # BLD AUTO: 25 K/UL — LOW (ref 150–400)
PLATELET # BLD AUTO: 28 K/UL — LOW (ref 150–400)
PLATELET # BLD AUTO: 28 K/UL — LOW (ref 150–400)
PMV BLD: 10.8 FL — SIGNIFICANT CHANGE UP (ref 7–13)
PMV BLD: 9.5 FL — SIGNIFICANT CHANGE UP (ref 7–13)
PMV BLD: 9.5 FL — SIGNIFICANT CHANGE UP (ref 7–13)
POTASSIUM SERPL-MCNC: 4.1 MMOL/L — SIGNIFICANT CHANGE UP (ref 3.5–5.3)
POTASSIUM SERPL-MCNC: 4.3 MMOL/L — SIGNIFICANT CHANGE UP (ref 3.5–5.3)
POTASSIUM SERPL-MCNC: 5.4 MMOL/L — HIGH (ref 3.5–5.3)
POTASSIUM SERPL-SCNC: 4.1 MMOL/L — SIGNIFICANT CHANGE UP (ref 3.5–5.3)
POTASSIUM SERPL-SCNC: 4.3 MMOL/L — SIGNIFICANT CHANGE UP (ref 3.5–5.3)
POTASSIUM SERPL-SCNC: 5.4 MMOL/L — HIGH (ref 3.5–5.3)
POTASSIUM UR-SCNC: 16.2 MMOL/L — SIGNIFICANT CHANGE UP
PROT SERPL-MCNC: 5.7 G/DL — LOW (ref 6–8.3)
PROT UR-MCNC: NEGATIVE — SIGNIFICANT CHANGE UP
RBC # BLD: 3.27 M/UL — LOW (ref 4.2–5.8)
RBC # BLD: 3.55 M/UL — LOW (ref 4.2–5.8)
RBC # BLD: 3.55 M/UL — LOW (ref 4.2–5.8)
RBC # FLD: 17.2 % — HIGH (ref 10.3–14.5)
RBC # FLD: 17.4 % — HIGH (ref 10.3–14.5)
RBC # FLD: 17.4 % — HIGH (ref 10.3–14.5)
SODIUM SERPL-SCNC: 127 MMOL/L — LOW (ref 135–145)
SODIUM SERPL-SCNC: 129 MMOL/L — LOW (ref 135–145)
SODIUM UR-SCNC: 73 MMOL/L — SIGNIFICANT CHANGE UP
SP GR SPEC: 1.01 — SIGNIFICANT CHANGE UP (ref 1–1.04)
TSH SERPL-MCNC: 0.5 UIU/ML — SIGNIFICANT CHANGE UP (ref 0.27–4.2)
URATE SERPL-MCNC: 2.6 MG/DL — LOW (ref 3.4–8.8)
UROBILINOGEN FLD QL: NORMAL — SIGNIFICANT CHANGE UP
WBC # BLD: 1.69 K/UL — LOW (ref 3.8–10.5)
WBC # BLD: 1.69 K/UL — LOW (ref 3.8–10.5)
WBC # BLD: 1.96 K/UL — LOW (ref 3.8–10.5)
WBC # FLD AUTO: 1.69 K/UL — LOW (ref 3.8–10.5)
WBC # FLD AUTO: 1.69 K/UL — LOW (ref 3.8–10.5)
WBC # FLD AUTO: 1.96 K/UL — LOW (ref 3.8–10.5)

## 2019-12-28 PROCEDURE — 99223 1ST HOSP IP/OBS HIGH 75: CPT | Mod: GC

## 2019-12-28 PROCEDURE — 99233 SBSQ HOSP IP/OBS HIGH 50: CPT

## 2019-12-28 PROCEDURE — 70450 CT HEAD/BRAIN W/O DYE: CPT | Mod: 26

## 2019-12-28 PROCEDURE — 71046 X-RAY EXAM CHEST 2 VIEWS: CPT | Mod: 26

## 2019-12-28 RX ORDER — DEXTROSE 50 % IN WATER 50 %
15 SYRINGE (ML) INTRAVENOUS ONCE
Refills: 0 | Status: DISCONTINUED | OUTPATIENT
Start: 2019-12-28 | End: 2019-12-30

## 2019-12-28 RX ORDER — PREGABALIN 225 MG/1
1 CAPSULE ORAL
Qty: 0 | Refills: 0 | DISCHARGE

## 2019-12-28 RX ORDER — INFLUENZA VIRUS VACCINE 15; 15; 15; 15 UG/.5ML; UG/.5ML; UG/.5ML; UG/.5ML
0.5 SUSPENSION INTRAMUSCULAR ONCE
Refills: 0 | Status: DISCONTINUED | OUTPATIENT
Start: 2019-12-28 | End: 2019-12-30

## 2019-12-28 RX ORDER — DEXTROSE 50 % IN WATER 50 %
12.5 SYRINGE (ML) INTRAVENOUS ONCE
Refills: 0 | Status: DISCONTINUED | OUTPATIENT
Start: 2019-12-28 | End: 2019-12-30

## 2019-12-28 RX ORDER — DEXTROSE 50 % IN WATER 50 %
25 SYRINGE (ML) INTRAVENOUS ONCE
Refills: 0 | Status: DISCONTINUED | OUTPATIENT
Start: 2019-12-28 | End: 2019-12-30

## 2019-12-28 RX ORDER — DEXAMETHASONE 0.5 MG/5ML
4 ELIXIR ORAL ONCE
Refills: 0 | Status: COMPLETED | OUTPATIENT
Start: 2019-12-28 | End: 2019-12-28

## 2019-12-28 RX ORDER — PANTOPRAZOLE SODIUM 20 MG/1
40 TABLET, DELAYED RELEASE ORAL
Refills: 0 | Status: DISCONTINUED | OUTPATIENT
Start: 2019-12-28 | End: 2019-12-30

## 2019-12-28 RX ORDER — INSULIN LISPRO 100/ML
VIAL (ML) SUBCUTANEOUS
Refills: 0 | Status: DISCONTINUED | OUTPATIENT
Start: 2019-12-28 | End: 2019-12-30

## 2019-12-28 RX ORDER — ALPRAZOLAM 0.25 MG
0.25 TABLET ORAL EVERY 8 HOURS
Refills: 0 | Status: DISCONTINUED | OUTPATIENT
Start: 2019-12-28 | End: 2019-12-30

## 2019-12-28 RX ORDER — ATORVASTATIN CALCIUM 80 MG/1
1 TABLET, FILM COATED ORAL
Qty: 0 | Refills: 0 | DISCHARGE

## 2019-12-28 RX ORDER — DEXAMETHASONE 0.5 MG/5ML
4 ELIXIR ORAL THREE TIMES A DAY
Refills: 0 | Status: DISCONTINUED | OUTPATIENT
Start: 2019-12-28 | End: 2019-12-30

## 2019-12-28 RX ORDER — LOSARTAN POTASSIUM 100 MG/1
1 TABLET, FILM COATED ORAL
Qty: 0 | Refills: 0 | DISCHARGE

## 2019-12-28 RX ORDER — GLUCAGON INJECTION, SOLUTION 0.5 MG/.1ML
1 INJECTION, SOLUTION SUBCUTANEOUS ONCE
Refills: 0 | Status: DISCONTINUED | OUTPATIENT
Start: 2019-12-28 | End: 2019-12-30

## 2019-12-28 RX ORDER — INSULIN LISPRO 100/ML
VIAL (ML) SUBCUTANEOUS AT BEDTIME
Refills: 0 | Status: DISCONTINUED | OUTPATIENT
Start: 2019-12-28 | End: 2019-12-30

## 2019-12-28 RX ORDER — SODIUM CHLORIDE 9 MG/ML
1000 INJECTION, SOLUTION INTRAVENOUS
Refills: 0 | Status: DISCONTINUED | OUTPATIENT
Start: 2019-12-28 | End: 2019-12-30

## 2019-12-28 RX ORDER — ATENOLOL 25 MG/1
1 TABLET ORAL
Qty: 0 | Refills: 0 | DISCHARGE

## 2019-12-28 RX ADMIN — Medication 2: at 22:21

## 2019-12-28 RX ADMIN — Medication 4 MILLIGRAM(S): at 22:22

## 2019-12-28 RX ADMIN — Medication 4 MILLIGRAM(S): at 16:53

## 2019-12-28 NOTE — H&P ADULT - ASSESSMENT
69M with GBM, operated on by Dr Roy in Sept 2019, currently undergoing radiation therapy at [facility] presenting with hyponatremia and lethargy.

## 2019-12-28 NOTE — H&P ADULT - ATTENDING COMMENTS
Patient seen and examined on 12/28 PM. 69M with GBM, operated on by Dr Roy in Sept 2019, currently undergoing radiation therapy at [facility] presenting with hyponatremia and lethargy, found to have a correct Na of 129 on labs. 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

## 2019-12-28 NOTE — CONSULT NOTE ADULT - SUBJECTIVE AND OBJECTIVE BOX
HPI:    69y male operated on by Dr Roy in Sept 2019 biopsy of corpus callosum lesion currently undergoing radiation therapy at [facility] presenting with hyponatremia and lethargy per daughter.   PAtient had chemotherapy last month and could not tolerate it so it was stopped, started radiation treatments. Currently on steroids.     PAST MEDICAL & SURGICAL HISTORY:  Hyperlipidemia  Neoplasm of unspecified behavior of brain  Hypertension  History of laryngoscopy  H/O colonoscopy    Allergies    No Known Allergies        SOCIAL HISTORY:  FAMILY HISTORY:  No pertinent family history in first degree relatives    Vital Signs Last 24 Hrs  T(C): 37.2 (28 Dec 2019 10:36), Max: 37.2 (28 Dec 2019 10:36)  T(F): 99 (28 Dec 2019 10:36), Max: 99 (28 Dec 2019 10:36)  HR: 92 (28 Dec 2019 10:36) (92 - 92)  BP: 107/56 (28 Dec 2019 10:36) (107/56 - 107/56)  RR: 18 (28 Dec 2019 10:36) (18 - 18)  SpO2: 100% (28 Dec 2019 10:36) (100% - 100%)    PHYSICAL EXAM:  Awake Alert Attentive Affect appropriate  PERRL  Motor- Moving all extremities well        LABS:                          11.0   1.69  )-----------( 28       ( 28 Dec 2019 11:30 )             31.8     12-28    127<L>  |  89<L>  |  23  ----------------------------<  225<H>  4.3   |  24  |  0.58    Ca    8.0<L>      28 Dec 2019 11:30    TPro  5.7<L>  /  Alb  3.0<L>  /  TBili  0.4  /  DBili  x   /  AST  43<H>  /  ALT  156<H>  /  AlkPhos  104  12-28          RADIOLOGY & ADDITIONAL STUDIES:    ct head- pending

## 2019-12-28 NOTE — ED PROVIDER NOTE - OBJECTIVE STATEMENT
70 y/o male presents to the ED with several months of workup of glioblastoma. He had a biopsy in October and saw his neurosurgeon and later had radiation which is now finished. Patient has a history of low sodium in the 30s and was seen in [facility] one week ago for low sodium an lethargy. Patient was later discharged with improved symptom. He had a blood test yesterday and was sent to ED today for low sodium. Wife at bedside states the patient is very lethargic and experiences mild seizures with a little memory loss. Daughter also reports the same thing happened at [facility]. Patient takes decadron 3 times daily. Denies fever, chills, vomiting, focal weakness or numbness or any other complaints.

## 2019-12-28 NOTE — ED PROVIDER NOTE - NEUROLOGICAL, MLM
crainal nerves 2-12 intact. Pt has mild short term memory difficulty 2/3 objects recalled. Family states this is not his baseline memory.

## 2019-12-28 NOTE — ED PROVIDER NOTE - CLINICAL SUMMARY MEDICAL DECISION MAKING FREE TEXT BOX
70 y/o male with history of glioblastoma. Repeat soduim, urine lytes and brain imaging. 68 y/o male with history of glioblastoma. Repeat sodium, urine lytes and brain imaging.

## 2019-12-28 NOTE — H&P ADULT - NSHPREVIEWOFSYSTEMS_GEN_ALL_CORE
CONSTITUTIONAL:  see HPI  HEENT:  Eyes:  No visual loss, blurred vision, double vision   Ears, Nose, Throat:  No sneezing, congestion, runny nose or dysphagia.  SKIN:  No rash or itching.  CARDIOVASCULAR:  No chest pain, chest pressure or chest discomfort. No palpitations.  RESPIRATORY:  see HPI  GASTROINTESTINAL:  No anorexia, nausea, vomiting or diarrhea. No abdominal pain or blood.  GENITOURINARY:  No hematuria, dysuria.   NEUROLOGICAL:  No headache, syncope, numbness or tingling in the extremities. No change in bowel or bladder control. +foot drop  MUSCULOSKELETAL:  No muscle, back pain, joint pain or stiffness.  HEMATOLOGIC:  No bleeding or bruising.  PSYCHIATRIC:  +anxiety  ENDOCRINOLOGIC:  No reports of sweating, cold or heat intolerance. No polyuria or polydipsia.

## 2019-12-28 NOTE — H&P ADULT - NSHPLABSRESULTS_GEN_ALL_CORE
LABS:                        11.0   1.69  )-----------( 28       ( 28 Dec 2019 11:30 )             31.8     28 Dec 2019 11:30    127    |  89     |  23     ----------------------------<  225    4.3     |  24     |  0.58     Ca    8.0        28 Dec 2019 11:30    TPro  5.7    /  Alb  3.0    /  TBili  0.4    /  DBili  x      /  AST  43     /  ALT  156    /  AlkPhos  104    28 Dec 2019 11:30      < from: CT Head No Cont (12.28.19 @ 14:41) >    FINDINGS:     Right parietal craniotomy changes are again noted.    Evolving postoperative changes are noted status post biopsy of corpus callosum mass. The high density hemorrhagic products have resolved over the time interval. No new areas of acute hemorrhage are present. A surgical tract is again noted in the right parietal lobe.    An expansile mass with associated hypodensity is again noted involving the corpus callosum with extension into the bilateral parietal and temporal lobes. Evaluation is limited by lack of intravenous contrast. A small hypodensity involves the right frontal periventricular region, corresponding to an enhancing focus on the 09/25/2019 MRI study.    No new areas of edema are noted over the time interval.    The sulci andventricles are slightly larger compared to the prior study which may reflect increasing cerebral volume loss or post steroid change.    IMPRESSION:    Evolving postbiopsy changes are noted in the region of the known corpus callosum mass. No superimposed acute intracranial abnormality is noted. If the patient has new and persistent symptoms, consider follow-up brain MRI with and without contrast for further evaluation if clinically warranted.    < end of copied text >

## 2019-12-28 NOTE — H&P ADULT - PROBLEM SELECTOR PLAN 5
- per daughter, thrombocytopenia is not new  - currently no s/s of bleeding  - will continue to monitor  - also with no active signs of infection, afebrile - per daughter, thrombocytopenia is not new  - currently no s/s of bleeding  - will continue to monitor  - also with no active signs of infection, afebrile  - will send anemia w/u given no labs in system  -f/u iron panel, retic, ferritin, b12, folate

## 2019-12-28 NOTE — ED ADULT NURSE NOTE - CHIEF COMPLAINT QUOTE
Patient has a h/o a Glioblastoma brought in by his daughter to have his sodium checked. As per his daughter the patients sodium was 127 at his last MD visit .

## 2019-12-28 NOTE — H&P ADULT - PROBLEM SELECTOR PLAN 2
- s/p chemotherapy and radiation  - currently on dexamethasone, per family not on keppra  - will continue with decadron at home dose  - Neurosurg evaluated in ED - consider MRI if none done recently  - CTH upon admission largely unremarkable - s/p chemotherapy and radiation  - currently on dexamethasone, per family not on keppra  - will continue with decadron at home dose  - Neurosurg evaluated in ED - can consider MRI if none done recently  - CTH upon admission largely unremarkable, can f/u outpatient as previously scheduled - s/p chemotherapy and radiation  - currently on dexamethasone, per family not on keppra  - will continue with decadron at home dose - ISS and protonix  - Neurosurg evaluated in ED - can consider MRI if none done recently  - CTH upon admission largely unremarkable, can f/u outpatient as previously scheduled

## 2019-12-28 NOTE — ED ADULT TRIAGE NOTE - CHIEF COMPLAINT QUOTE
Patient has a h/o a Glioblastoma brought in by his daughter for a low sodium. Patient has a h/o a Glioblastoma brought in by his daughter to have his sodium checked. As per his daughter the patients sodium was 127 at his last MD visit .

## 2019-12-28 NOTE — H&P ADULT - PROBLEM SELECTOR PLAN 1
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

## 2019-12-28 NOTE — ED ADULT NURSE NOTE - OBJECTIVE STATEMENT
Patient called and stated she is getting a big mask leak in the front of her mask. Patient did notice a part come but she was able to click it back in. Patient works at a new job and is unable to get away. Patient will call tomorrow and try and schedule a time to have her mask looked at.   
Pt brought in by daughter for increased weakness and lethargy for a few days and low sodium level.  Pt on chemo and radiation.  Labs obtained and sent as ordered.  #20g SL R AC placed.  MD at bedside.  Awaiting CT

## 2019-12-28 NOTE — H&P ADULT - HISTORY OF PRESENT ILLNESS
69M with GBM, operated on by Dr Roy in Sept 2019, [place] [facility] [place]on[place]remia and lethargy. 69M with GBM, operated on by Dr Roy in Sept 2019, currently undergoing [facility] presenting with hyponatremia and lethargy. History obtained from patient and daughter at bedside. Daughter states patient was recently in [place] visiting his other daughter and kept falling asleep, was very lethargic. They brought him to the hospital where he was noted to have a sodium level of 121 and was admitted to the ICU. She states they did not tell her the cause of the low sodium level. Yesterday he was noted to have low BP at home to 80s/40s and he went to his radiation oncologist. Labs were drawn there and the daughter reports they called with the results today and again the sodium was low so she brought him here.    He reports he feels lightheaded when he walks, +HOLT, hungry all the time - has been eating a lot and over weak and fatigued. He denies any HA, changes in vision, CP, palpitations, fevers, chills, weight changes, abdominal pain, N/V, changes in urination or BM.     In ED, vitals include 69M with GBM, operated on by Dr Roy in Sept 2019, HTN, HLD currently undergoing radiation therapy at [facility] [place] presenting with hyponatremia and lethargy. History obtained from patient and daughter at bedside. Daughter states patient was recently in [place] visiting his other daughter and kept falling asleep, was very lethargic. They brought him to the hospital where he was noted to have a sodium level of 121 and was admitted to the ICU. She states they did not tell her the cause of the low sodium level. Yesterday he was noted to have low BP at home to 80s/40s and he went to his radiation oncologist. Labs were drawn there and the daughter reports they called with the results today and again the sodium was low so she brought him here.    He reports he feels lightheaded when he walks, +HOLT, hungry all the time - has been eating a lot and over weak and fatigued. He denies any HA, changes in vision, CP, palpitations, fevers, chills, weight changes, abdominal pain, N/V, changes in urination or BM.     In ED, vitals include 107/56, HR

## 2019-12-28 NOTE — H&P ADULT - NSHPPHYSICALEXAM_GEN_ALL_CORE
Vital Signs Last 24 Hrs  T(C): 36.8 (28 Dec 2019 18:14), Max: 37.2 (28 Dec 2019 10:36)  T(F): 98.3 (28 Dec 2019 18:14), Max: 99 (28 Dec 2019 10:36)  HR: 77 (28 Dec 2019 18:14) (77 - 92)  BP: 111/71 (28 Dec 2019 18:14) (107/56 - 125/65)  BP(mean): --  RR: 18 (28 Dec 2019 18:14) (18 - 18)  SpO2: 100% (28 Dec 2019 18:14) (99% - 100%)    GENERAL: NAD, elderly male, appears fatigued  HEAD:  Atraumatic, Normocephalic  EYES: EOMI, PERRLA, conjunctiva and sclera clear  NECK: Supple  CHEST/LUNG: Clear to auscultation bilaterally; No wheezes or rales  HEART: Regular rate and rhythm; No murmurs, rubs, or gallops  ABDOMEN: Soft, Nontender, distended; Bowel sounds present  EXTREMITIES:  2+ Peripheral Pulses, No clubbing, cyanosis, 1+ b/l LE pitting edema to mid shin  PSYCH: AAOx3  NEUROLOGY: non-focal  SKIN: No rashes or lesions

## 2019-12-28 NOTE — ED PROVIDER NOTE - ATTESTATION, MLM
Advance Care Planning       Other Legally Authorized Decision Maker would be wife as SDM     For My patients who has currently great Decision Making Capacity:     Patient is on no presence of family member stated that he wants to be not DNR at this time,  he likes to be a full code individual,  he states that he has a lot of will to live, he wants to be tried very hard to be resuscitated for 1 week , thereafter,  his care should be focused more on comfort and the quality of life than on length of life, if recovery is not expected, after 1-2 weeks of resuscitation. Pt was given the form, he will sign and bring us a copy on the later date.
I have reviewed and confirmed nurses' notes for patient's medications, allergies, medical history, and surgical history.

## 2019-12-28 NOTE — H&P ADULT - PROBLEM SELECTOR PLAN 3
- - on losartan 50mg at home  - not on thiazide diuretic  - will hold for now given reported low BP at home and currently within acceptable range

## 2019-12-28 NOTE — ED ADULT NURSE NOTE - NSIMPLEMENTINTERV_GEN_ALL_ED
Implemented All Fall with Harm Risk Interventions:  [place] to call system. Call bell, personal items and telephone within reach. Instruct patient to call for assistance. Room bathroom lighting operational. Non-slip footwear when patient is off stretcher. Physically safe environment: no spills, clutter or unnecessary equipment. Stretcher in lowest position, wheels locked, appropriate side rails in place. Provide visual cue, wrist band, yellow gown, etc. Monitor gait and stability. Monitor for mental status changes and reorient to person, place, and time. Review medications for side effects contributing to fall risk. Reinforce activity limits and safety measures with patient and family. Provide visual clues: red socks.

## 2019-12-29 ENCOUNTER — TRANSCRIPTION ENCOUNTER (OUTPATIENT)
Age: 69
End: 2019-12-29

## 2019-12-29 DIAGNOSIS — R73.9 HYPERGLYCEMIA, UNSPECIFIED: ICD-10-CM

## 2019-12-29 LAB
ANION GAP SERPL CALC-SCNC: 11 MMO/L — SIGNIFICANT CHANGE UP (ref 7–14)
ANION GAP SERPL CALC-SCNC: 13 MMO/L — SIGNIFICANT CHANGE UP (ref 7–14)
ANISOCYTOSIS BLD QL: SLIGHT — SIGNIFICANT CHANGE UP
ANISOCYTOSIS BLD QL: SLIGHT — SIGNIFICANT CHANGE UP
BASOPHILS # BLD AUTO: 0.01 K/UL — SIGNIFICANT CHANGE UP (ref 0–0.2)
BASOPHILS NFR BLD AUTO: 0.6 % — SIGNIFICANT CHANGE UP (ref 0–2)
BASOPHILS NFR SPEC: 0 % — SIGNIFICANT CHANGE UP (ref 0–2)
BASOPHILS NFR SPEC: 0 % — SIGNIFICANT CHANGE UP (ref 0–2)
BLASTS # FLD: 0 % — SIGNIFICANT CHANGE UP (ref 0–0)
BLASTS # FLD: 0 % — SIGNIFICANT CHANGE UP (ref 0–0)
BUN SERPL-MCNC: 17 MG/DL — SIGNIFICANT CHANGE UP (ref 7–23)
BUN SERPL-MCNC: 20 MG/DL — SIGNIFICANT CHANGE UP (ref 7–23)
CALCIUM SERPL-MCNC: 8 MG/DL — LOW (ref 8.4–10.5)
CALCIUM SERPL-MCNC: 8.2 MG/DL — LOW (ref 8.4–10.5)
CHLORIDE SERPL-SCNC: 90 MMOL/L — LOW (ref 98–107)
CHLORIDE SERPL-SCNC: 93 MMOL/L — LOW (ref 98–107)
CO2 SERPL-SCNC: 22 MMOL/L — SIGNIFICANT CHANGE UP (ref 22–31)
CO2 SERPL-SCNC: 23 MMOL/L — SIGNIFICANT CHANGE UP (ref 22–31)
CREAT SERPL-MCNC: 0.48 MG/DL — LOW (ref 0.5–1.3)
CREAT SERPL-MCNC: 0.55 MG/DL — SIGNIFICANT CHANGE UP (ref 0.5–1.3)
EOSINOPHIL # BLD AUTO: 0 K/UL — SIGNIFICANT CHANGE UP (ref 0–0.5)
EOSINOPHIL NFR BLD AUTO: 0 % — SIGNIFICANT CHANGE UP (ref 0–6)
EOSINOPHIL NFR FLD: 0 % — SIGNIFICANT CHANGE UP (ref 0–6)
EOSINOPHIL NFR FLD: 0 % — SIGNIFICANT CHANGE UP (ref 0–6)
FERRITIN SERPL-MCNC: 2258 NG/ML — HIGH (ref 30–400)
FOLATE SERPL-MCNC: 6.3 NG/ML — SIGNIFICANT CHANGE UP (ref 4.7–20)
GLUCOSE BLDC GLUCOMTR-MCNC: 224 MG/DL — HIGH (ref 70–99)
GLUCOSE BLDC GLUCOMTR-MCNC: 227 MG/DL — HIGH (ref 70–99)
GLUCOSE BLDC GLUCOMTR-MCNC: 250 MG/DL — HIGH (ref 70–99)
GLUCOSE BLDC GLUCOMTR-MCNC: 276 MG/DL — HIGH (ref 70–99)
GLUCOSE SERPL-MCNC: 224 MG/DL — HIGH (ref 70–99)
GLUCOSE SERPL-MCNC: 278 MG/DL — HIGH (ref 70–99)
HBA1C BLD-MCNC: 7.1 % — HIGH (ref 4–5.6)
HCT VFR BLD CALC: 28.3 % — LOW (ref 39–50)
HCT VFR BLD CALC: 28.3 % — LOW (ref 39–50)
HCV AB S/CO SERPL IA: 0.06 S/CO — SIGNIFICANT CHANGE UP (ref 0–0.99)
HCV AB SERPL-IMP: SIGNIFICANT CHANGE UP
HGB BLD-MCNC: 9.8 G/DL — LOW (ref 13–17)
HGB BLD-MCNC: 9.8 G/DL — LOW (ref 13–17)
IMM GRANULOCYTES NFR BLD AUTO: 1.3 % — SIGNIFICANT CHANGE UP (ref 0–1.5)
IRON SATN MFR SERPL: 226 UG/DL — SIGNIFICANT CHANGE UP (ref 155–535)
IRON SATN MFR SERPL: 95 UG/DL — SIGNIFICANT CHANGE UP (ref 45–165)
LYMPHOCYTES # BLD AUTO: 0.59 K/UL — LOW (ref 1–3.3)
LYMPHOCYTES # BLD AUTO: 37.3 % — SIGNIFICANT CHANGE UP (ref 13–44)
LYMPHOCYTES NFR SPEC AUTO: 23.4 % — SIGNIFICANT CHANGE UP (ref 13–44)
LYMPHOCYTES NFR SPEC AUTO: 23.4 % — SIGNIFICANT CHANGE UP (ref 13–44)
MAGNESIUM SERPL-MCNC: 1.9 MG/DL — SIGNIFICANT CHANGE UP (ref 1.6–2.6)
MANUAL SMEAR VERIFICATION: SIGNIFICANT CHANGE UP
MCHC RBC-ENTMCNC: 31.3 PG — SIGNIFICANT CHANGE UP (ref 27–34)
MCHC RBC-ENTMCNC: 31.3 PG — SIGNIFICANT CHANGE UP (ref 27–34)
MCHC RBC-ENTMCNC: 34.6 % — SIGNIFICANT CHANGE UP (ref 32–36)
MCHC RBC-ENTMCNC: 34.6 % — SIGNIFICANT CHANGE UP (ref 32–36)
MCV RBC AUTO: 90.4 FL — SIGNIFICANT CHANGE UP (ref 80–100)
MCV RBC AUTO: 90.4 FL — SIGNIFICANT CHANGE UP (ref 80–100)
METAMYELOCYTES # FLD: 0 % — SIGNIFICANT CHANGE UP (ref 0–1)
METAMYELOCYTES # FLD: 0 % — SIGNIFICANT CHANGE UP (ref 0–1)
MICROCYTES BLD QL: SLIGHT — SIGNIFICANT CHANGE UP
MICROCYTES BLD QL: SLIGHT — SIGNIFICANT CHANGE UP
MONOCYTES # BLD AUTO: 0.11 K/UL — SIGNIFICANT CHANGE UP (ref 0–0.9)
MONOCYTES NFR BLD AUTO: 7 % — SIGNIFICANT CHANGE UP (ref 2–14)
MONOCYTES NFR BLD: 9.3 % — HIGH (ref 2–9)
MONOCYTES NFR BLD: 9.3 % — HIGH (ref 2–9)
MYELOCYTES NFR BLD: 0 % — SIGNIFICANT CHANGE UP (ref 0–0)
MYELOCYTES NFR BLD: 0 % — SIGNIFICANT CHANGE UP (ref 0–0)
NEUTROPHIL AB SER-ACNC: 56.1 % — SIGNIFICANT CHANGE UP (ref 43–77)
NEUTROPHIL AB SER-ACNC: 56.1 % — SIGNIFICANT CHANGE UP (ref 43–77)
NEUTROPHILS # BLD AUTO: 0.85 K/UL — LOW (ref 1.8–7.4)
NEUTROPHILS NFR BLD AUTO: 53.8 % — SIGNIFICANT CHANGE UP (ref 43–77)
NEUTS BAND # BLD: 8.4 % — HIGH (ref 0–6)
NEUTS BAND # BLD: 8.4 % — HIGH (ref 0–6)
NRBC # BLD: 3 /100WBC — SIGNIFICANT CHANGE UP
NRBC # BLD: 3 /100WBC — SIGNIFICANT CHANGE UP
NRBC # FLD: 0 K/UL — SIGNIFICANT CHANGE UP (ref 0–0)
NRBC # FLD: 0 K/UL — SIGNIFICANT CHANGE UP (ref 0–0)
OSMOLALITY UR: 714 MOSMO/KG — SIGNIFICANT CHANGE UP (ref 50–1200)
OTHER - HEMATOLOGY %: 0 — SIGNIFICANT CHANGE UP
OTHER - HEMATOLOGY %: 0 — SIGNIFICANT CHANGE UP
OVALOCYTES BLD QL SMEAR: SLIGHT — SIGNIFICANT CHANGE UP
OVALOCYTES BLD QL SMEAR: SLIGHT — SIGNIFICANT CHANGE UP
PHOSPHATE SERPL-MCNC: 3.4 MG/DL — SIGNIFICANT CHANGE UP (ref 2.5–4.5)
PLATELET # BLD AUTO: 24 K/UL — LOW (ref 150–400)
PLATELET # BLD AUTO: 24 K/UL — LOW (ref 150–400)
PLATELET COUNT - ESTIMATE: SIGNIFICANT CHANGE UP
PLATELET COUNT - ESTIMATE: SIGNIFICANT CHANGE UP
PMV BLD: 11.6 FL — SIGNIFICANT CHANGE UP (ref 7–13)
PMV BLD: 11.6 FL — SIGNIFICANT CHANGE UP (ref 7–13)
POIKILOCYTOSIS BLD QL AUTO: SLIGHT — SIGNIFICANT CHANGE UP
POIKILOCYTOSIS BLD QL AUTO: SLIGHT — SIGNIFICANT CHANGE UP
POTASSIUM SERPL-MCNC: 4.2 MMOL/L — SIGNIFICANT CHANGE UP (ref 3.5–5.3)
POTASSIUM SERPL-MCNC: 4.3 MMOL/L — SIGNIFICANT CHANGE UP (ref 3.5–5.3)
POTASSIUM SERPL-SCNC: 4.2 MMOL/L — SIGNIFICANT CHANGE UP (ref 3.5–5.3)
POTASSIUM SERPL-SCNC: 4.3 MMOL/L — SIGNIFICANT CHANGE UP (ref 3.5–5.3)
PROMYELOCYTES # FLD: 0 % — SIGNIFICANT CHANGE UP (ref 0–0)
PROMYELOCYTES # FLD: 0 % — SIGNIFICANT CHANGE UP (ref 0–0)
RBC # BLD: 3.13 M/UL — LOW (ref 4.2–5.8)
RBC # BLD: 3.13 M/UL — LOW (ref 4.2–5.8)
RBC # FLD: 17.2 % — HIGH (ref 10.3–14.5)
RBC # FLD: 17.2 % — HIGH (ref 10.3–14.5)
RETICS #: 65 K/UL — SIGNIFICANT CHANGE UP (ref 25–125)
RETICS/RBC NFR: 2.1 % — SIGNIFICANT CHANGE UP (ref 0.5–2.5)
SMUDGE CELLS # BLD: PRESENT — SIGNIFICANT CHANGE UP
SMUDGE CELLS # BLD: PRESENT — SIGNIFICANT CHANGE UP
SODIUM SERPL-SCNC: 125 MMOL/L — LOW (ref 135–145)
SODIUM SERPL-SCNC: 127 MMOL/L — LOW (ref 135–145)
SODIUM UR-SCNC: 119 MMOL/L — SIGNIFICANT CHANGE UP
UIBC SERPL-MCNC: 130.8 UG/DL — SIGNIFICANT CHANGE UP (ref 110–370)
VARIANT LYMPHS # BLD: 2.8 % — SIGNIFICANT CHANGE UP
VARIANT LYMPHS # BLD: 2.8 % — SIGNIFICANT CHANGE UP
VIT B12 SERPL-MCNC: 1154 PG/ML — HIGH (ref 200–900)
WBC # BLD: 1.6 K/UL — LOW (ref 3.8–10.5)
WBC # BLD: 1.6 K/UL — LOW (ref 3.8–10.5)
WBC # FLD AUTO: 1.6 K/UL — LOW (ref 3.8–10.5)
WBC # FLD AUTO: 1.6 K/UL — LOW (ref 3.8–10.5)

## 2019-12-29 PROCEDURE — 99233 SBSQ HOSP IP/OBS HIGH 50: CPT

## 2019-12-29 RX ORDER — BENZOCAINE AND MENTHOL 5; 1 G/100ML; G/100ML
1 LIQUID ORAL
Refills: 0 | Status: DISCONTINUED | OUTPATIENT
Start: 2019-12-29 | End: 2019-12-30

## 2019-12-29 RX ORDER — SODIUM CHLORIDE 9 MG/ML
1 INJECTION INTRAMUSCULAR; INTRAVENOUS; SUBCUTANEOUS EVERY 8 HOURS
Refills: 0 | Status: DISCONTINUED | OUTPATIENT
Start: 2019-12-29 | End: 2019-12-30

## 2019-12-29 RX ORDER — SODIUM CHLORIDE 9 MG/ML
1000 INJECTION INTRAMUSCULAR; INTRAVENOUS; SUBCUTANEOUS
Refills: 0 | Status: DISCONTINUED | OUTPATIENT
Start: 2019-12-29 | End: 2019-12-29

## 2019-12-29 RX ORDER — SODIUM CHLORIDE 9 MG/ML
1 INJECTION INTRAMUSCULAR; INTRAVENOUS; SUBCUTANEOUS
Refills: 0 | Status: DISCONTINUED | OUTPATIENT
Start: 2019-12-29 | End: 2019-12-29

## 2019-12-29 RX ADMIN — SODIUM CHLORIDE 1 GRAM(S): 9 INJECTION INTRAMUSCULAR; INTRAVENOUS; SUBCUTANEOUS at 22:14

## 2019-12-29 RX ADMIN — SODIUM CHLORIDE 60 MILLILITER(S): 9 INJECTION INTRAMUSCULAR; INTRAVENOUS; SUBCUTANEOUS at 12:35

## 2019-12-29 RX ADMIN — Medication 2: at 17:39

## 2019-12-29 RX ADMIN — Medication 1: at 22:14

## 2019-12-29 RX ADMIN — Medication 2: at 08:27

## 2019-12-29 RX ADMIN — Medication 4 MILLIGRAM(S): at 05:58

## 2019-12-29 RX ADMIN — Medication 4 MILLIGRAM(S): at 22:14

## 2019-12-29 RX ADMIN — PANTOPRAZOLE SODIUM 40 MILLIGRAM(S): 20 TABLET, DELAYED RELEASE ORAL at 06:00

## 2019-12-29 RX ADMIN — SODIUM CHLORIDE 1 GRAM(S): 9 INJECTION INTRAMUSCULAR; INTRAVENOUS; SUBCUTANEOUS at 14:16

## 2019-12-29 RX ADMIN — Medication 2: at 12:45

## 2019-12-29 RX ADMIN — Medication 4 MILLIGRAM(S): at 13:02

## 2019-12-29 NOTE — PHYSICAL THERAPY INITIAL EVALUATION ADULT - PERTINENT HX OF CURRENT PROBLEM, REHAB EVAL
Patient is 69 year old male admitted with history of GBM, HTN, HLD presents with hyponatremia and lethargy.

## 2019-12-29 NOTE — DISCHARGE NOTE PROVIDER - NSDCCPCAREPLAN_GEN_ALL_CORE_FT
PRINCIPAL DISCHARGE DIAGNOSIS  Diagnosis: Glioblastoma  Assessment and Plan of Treatment: Outpatient follow up with your oncologist and neurosurgery.      SECONDARY DISCHARGE DIAGNOSES  Diagnosis: Hyponatremia  Assessment and Plan of Treatment: Continue salt tablets as prescribed and please have your sodium level checked within 3-4 days of discharged. Your serum sodium was 127 on 12/30. It is important that you follow a fluid restricted diet, 1L per day.    Diagnosis: Hyperglycemia  Assessment and Plan of Treatment: Your A1c is 7.1. You were started on Metformin 500mg twice per day. Continue consistent carbohydrate diet.  Monitor blood glucose levels throughout the day before meals and at bedtime.  Record blood sugars and bring to outpatient providers appointment in order to be reviewed by your doctor for management modifications.  Be aware of diabetes management symptoms including feeling cool and clammy may be related to low glucose levels.  Feeling hot and dry may indicate high glucose levels.  If  you feel these symptoms, check your blood sugar.  Make regular podiatry appointments in order to have feet checked for wounds and toe nails cut by a doctor to prevent infections.    Diagnosis: Hypertension  Assessment and Plan of Treatment: Your blood pressure was within normal range without your home medication. Losartan was discontinued. Monitor for any visual changes, headaches or dizziness.  Monitor blood pressure regularly.  Follow up with your PCP for further management for high blood pressure, please call to make appointment within 1 week of discharge    Diagnosis: Pancytopenia  Assessment and Plan of Treatment: Please have your lab work (complete blood count) checked within 1 week of discharge from hospital. Follow-up with your outpatient provider if further treatment is warranted. Monitor for signs/symptoms indicating worsening of disease, such as, easy bleeding/bruising, pale skin, fatigue, dizziness, increased heart rate, or chest pain.

## 2019-12-29 NOTE — PROGRESS NOTE ADULT - PROBLEM SELECTOR PLAN 1
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

## 2019-12-29 NOTE — PROGRESS NOTE ADULT - PROBLEM SELECTOR PLAN 2
- s/p chemotherapy and radiation  - currently on dexamethasone, per family not on keppra  - will continue with decadron at home dose - ISS and protonix  - Neurosurg evaluated in ED - can consider MRI if none done recently  - CTH upon admission largely unremarkable, can f/u outpatient as previously scheduled - s/p chemotherapy and radiation  - currently on dexamethasone, per family not on keppra  - will continue with decadron at home dose - ISS and protonix  Patient is followed by Dr Lyle Patton a- Onc at [facility] [place]  - Neurosurg evaluated in ED - can consider MRI if none done recently  - CTH upon admission largely unremarkable, can f/u outpatient as previously scheduled

## 2019-12-29 NOTE — DISCHARGE NOTE PROVIDER - CARE PROVIDERS DIRECT ADDRESSES
,DirectAddress_Unknown ,[email or web address],DirectAddress_Unknown,DirectAddress_Unknown

## 2019-12-29 NOTE — PROGRESS NOTE ADULT - PROBLEM SELECTOR PLAN 3
- on losartan 50mg at home  - not on thiazide diuretic  - will hold for now given reported low BP at home and currently within acceptable range - on losartan 50mg at home  - not on thiazide diuretic  - will hold for now given reported low BP at home and currently within acceptable range  Will start at losartan 25 mg qd- hold for SBP <100

## 2019-12-29 NOTE — DISCHARGE NOTE PROVIDER - CARE PROVIDER_API CALL
[facility]  Dr Lyle Krueger, please see on Monday  Phone: (   )    -  Fax: (   )    -  Follow Up Time: Juanito Roy)  Neurological Surgery  [address] [place] [address]  Phone: [phone]  Fax: [phone]  Follow Up Time: Routine    Primary care provider,   within 1 week of discharge from hospital  Phone: (   )    -  Fax: (   )    -  Follow Up Time: 1-3 days    Dr. Cahvis   [facility] oncologist  Phone: (   )    -  Fax: (   )    -  Follow Up Time: 1 week

## 2019-12-29 NOTE — DISCHARGE NOTE PROVIDER - NSDCMRMEDTOKEN_GEN_ALL_CORE_FT
ALPRAZolam 0.5 mg oral tablet: 1 tab(s) orally 3 times a day, As Needed  atorvastatin 10 mg oral tablet: 1 tab(s) orally once a day (FIlled Oct/2019 x 3 months)  dexamethasone 4 mg oral tablet: 1 tab(s) orally 3 times a day  losartan 50 mg oral tablet: 1 tab(s) orally once a day  Protonix 40 mg oral delayed release tablet: 1 tab(s) orally once a day  Vitamin B12 500 mcg oral tablet: 1 tab(s) orally once a day ALPRAZolam 0.5 mg oral tablet: 1 tab(s) orally 3 times a day, As Needed for anxiety   atorvastatin 10 mg oral tablet: 1 tab(s) orally once a day (FIlled Oct/2019 x 3 months)  dexamethasone 4 mg oral tablet: 1 tab(s) orally 3 times a day  metFORMIN 500 mg oral tablet: 1 tab(s) orally 2 times a day   Protonix 40 mg oral delayed release tablet: 1 tab(s) orally once a day  Sodium Chloride 1 g oral tablet: 2 tab(s) orally 3 times a day   Vitamin B12 500 mcg oral tablet: 1 tab(s) orally once a day

## 2019-12-29 NOTE — PROGRESS NOTE ADULT - ASSESSMENT
69M with GBM, operated on by Dr Roy in Sept 2019, currently undergoing radiation therapy at [facility] [place] presenting with hyponatremia and lethargy.   Na 127    Pancytopenic most likely sec to GBM 69M with GBM, operated on by Dr Roy in Sept 2019, HTN, HLD currently undergoing radiation therapy at [facility] [place] presenting with hyponatremia and lethargy. History obtained from patient and daughter at bedside. Daughter states patient was recently in [place] visiting his other daughter and kept falling asleep, was very lethargic. They brought him to the hospital where he was noted to have a sodium level of 121 and was admitted to the ICU. She states they did not tell her the cause of the low sodium level. Yesterday he was noted to have low BP at home to 80s/40s and he went to his radiation oncologist. Labs were drawn there and the daughter reports they called with the results today and again the sodium was low so she brought him here.    He reports he feels lightheaded when he walks, +HOLT, hungry all the time - has been eating a lot and over weak and fatigued. He denies any HA, changes in vision, CP, palpitations, fevers, chills, weight changes, abdominal pain, N/V, changes in urination or BM.  69M with GBM, operated on by Dr Roy in Sept 2019, currently undergoing radiation therapy at [facility] [place] presenting with hyponatremia and lethargy.   Na 127    Pancytopenic most likely sec to GBM 69M with GBM, operated on by Dr Roy in Sept 2019, HTN, HLD currently undergoing radiation therapy at [facility] presenting with hyponatremia and lethargy. History obtained from patient and daughter at bedside. Daughter states patient was recently in [place] visiting his other daughter and kept falling asleep, was very lethargic. They brought him to the hospital where he was noted to have a sodium level of 121 and was admitted to the ICU. She states they did not tell him the cause of the low sodium level. Yesterday he was noted to have low BP at home to 80s/40s and he went to his radiation oncologist. Labs were drawn there and the daughter reports they called with the results today and again the sodium was low so she brought him here.  He reports he feels lightheaded when he walks, +HOLT, hungry all the time - has been eating a lot and over weak and fatigued. He denies any HA, changes in vision, CP, palpitations, fevers, chills, weight changes, abdominal pain, N/V, changes in urination or BM.    69M with GBM, operated on by Dr Roy in Sept 2019, currently undergoing radiation therapy at [facility] presenting with hyponatremia and lethargy.   Na 127- same on Friday labs at [facility]- Hyponatremia is multifactorial but most likely sec to SIADH but with Urine lytes 89 will not resp to fluid restriction IV, consider Nacl tabs- await off Renal consult    Pancytopenic most likely sec to GBM  Gen weakness- ? sec to GBM with hyponatremia- pT eval for ambulation 69M with GBM, operated on by Dr Roy in Sept 2019, HTN, HLD currently undergoing [place] at [facility] [place] with hyponatremia and lethargy. History obtained from patient and daughter at bedside. Daughter states patient was recently in [place] visiting his other daughter and kept falling asleep, was very lethargic. They brought him to the hospital where he was noted to have a sodium level of 121 and was admitted to the ICU. She states they did not tell him the cause of the low sodium level. Yesterday he was noted to have low BP at home to 80s/40s and he went to his radiation oncologist. Labs were drawn there and the daughter reports they called with the results today and again the sodium was low so she brought him here.  He reports he feels lightheaded when he walks, +HOLT, hungry all the time - has been eating a lot and over weak and fatigued. He denies any HA, changes in vision, CP, palpitations, fevers, chills, weight changes, abdominal pain, N/V, changes in urination or BM.    69M with GBM, operated on by Dr Roy in Sept 2019, currently undergoing radiation therapy at [facility] [place] presenting with hyponatremia and lethargy.   Na 127- same on Friday labs at [facility] [place]- Hyponatremia is multifactorial but most likely sec to SIADH but with Urine lytes 89 will not resp to fluid restriction IV, consider Nacl tabs- await off Renal consult    Pancytopenic most likely sec to GBM- platelet 24- no bleeding- observe- [place] for DVT proph  Gen weakness- ? sec to GBM with hyponatremia- pT eval for ambulation

## 2019-12-29 NOTE — CONSULT NOTE ADULT - SUBJECTIVE AND OBJECTIVE BOX
[facility] DIVISION OF KIDNEY DISEASES AND HYPERTENSION -- INITIAL CONSULT NOTE  --------------------------------------------------------------------------------  HPI: 68 yo M with GBM, chronic hyponatremia, and HTN [place] [facility]onatremia and lethargy. [place]. [place] [facility] which showed hyponatremia. Pt. also was more lethargic at home and was brought in by family for evaluation. On admission (12/28/19) serum sodium was low at 127 (corrected 2/2 hyperglycemia to 129). Pt. also found to have urine sodium of 73 and elevated urine osm of 436 yesterday. Pt. with stable serum sodium this AM of 127 (again, 129 after correction). Pt. currently on IVFs and salt tablets. Nephrology team consulted for hyponatremia.    Pt. seen and examined at bedside with son (Hospitalist in [place], [place]) present. Medical information gathered from son. Pt. was first diagnosed with GBM in September 2019 and was found to have hyponatremia starting in October 2019. He had one hospitalization during this time for hyponatremia which was treated in an ICU setting. Pt. has good appetite, and son denies excessive oral hydration. On review of [facility] HIE/Impact, pt. with normal serum sodium of 137 on 9/27/19, with last serum sodium decreased to 134 on 10/9/19. Pt. states that he is able to eat and drink without complaints, and has no urinary complaints (no dysuria or polyuria). Denies thirst. Pt. appears mildly confused, however is at baseline mental function per son. Pt. denies CP, SOB, N/V/F/C.    PAST HISTORY  --------------------------------------------------------------------------------  PAST MEDICAL & SURGICAL HISTORY:  Hyperlipidemia  Neoplasm of unspecified behavior of brain  Hypertension  History of laryngoscopy  H/O colonoscopy    FAMILY HISTORY:  FH: myocardial infarction  Family history of kidney disease and hyponatremia denied    PAST SOCIAL HISTORY:  Denies alcohol/tobacco use    ALLERGIES & MEDICATIONS  --------------------------------------------------------------------------------  Allergies    No Known Allergies    Intolerances    Standing Inpatient Medications  dexAMETHasone     Tablet 4 milliGRAM(s) Oral three times a day  dextrose 5%. 1000 milliLiter(s) IV Continuous <Continuous>  dextrose 50% Injectable 12.5 Gram(s) IV Push once  dextrose 50% Injectable 25 Gram(s) IV Push once  dextrose 50% Injectable 25 Gram(s) IV Push once  influenza   Vaccine 0.5 milliLiter(s) IntraMuscular once  insulin lispro (HumaLOG) corrective regimen sliding scale   SubCutaneous three times a day before meals  insulin lispro (HumaLOG) corrective regimen sliding scale   SubCutaneous at bedtime  pantoprazole    Tablet 40 milliGRAM(s) Oral before breakfast  sodium chloride 1 Gram(s) Oral two times a day  sodium chloride 0.9%. 1000 milliLiter(s) IV Continuous <Continuous>    REVIEW OF SYSTEMS  --------------------------------------------------------------------------------  Gen: + mild fatigue  Respiratory: No dyspnea  CV: No chest pain  GI: No abdominal pain  MSK: No LE edema  Neuro: No dizziness  Heme: No bleeding  Psych: + mildly confused/forgetful  Skin: No rashes    All other systems were reviewed and are negative, except as noted.    VITALS/PHYSICAL EXAM  --------------------------------------------------------------------------------  T(C): 37.2 (12-29-19 @ 06:05), Max: 37.2 (12-29-19 @ 06:05)  HR: 74 (12-29-19 @ 06:05) (74 - 89)  BP: 131/77 (12-29-19 @ 06:05) (106/66 - 131/77)  RR: 18 (12-29-19 @ 06:05) (18 - 18)  SpO2: 100% (12-29-19 @ 06:05) (99% - 100%)  Wt(kg): --  Height (cm): 177.8 (12-28-19 @ 18:14)  Weight (kg): 68.4 (12-28-19 @ 18:14)  BMI (kg/m2): 21.6 (12-28-19 @ 18:14)  BSA (m2): 1.85 (12-28-19 @ 18:14)    12-28-19 @ 07:01  -  12-29-19 @ 07:00  --------------------------------------------------------  IN: 0 mL / OUT: 400 mL / NET: -400 mL    Physical Exam:  	Gen: NAD  	HEENT: supple neck  	Pulm: CTA B/L  	CV: RRR, S1S2; no rub  	Abd: +BS, soft, nontender/nondistended  	: No suprapubic tenderness  	LE: No edema b/l  	Skin: Warm, without rashes  	Psych: + mild confusion (reportedly baseline mental function per son)    LABS/STUDIES  --------------------------------------------------------------------------------              9.8    1.60  >-----------<  24       [12-29-19 @ 06:33]              28.3     127  |  93  |  20  ----------------------------<  224      [12-29-19 @ 06:33]  4.2   |  23  |  0.48        Ca     8.0     [12-29-19 @ 06:33]      Mg     1.9     [12-29-19 @ 06:33]      Phos  3.4     [12-29-19 @ 06:33]    Uric acid 2.6      [12-28-19 @ 11:30]  Serum Osmolality 276      [12-28-19 @ 11:30]    Creatinine Trend:  SCr 0.48 [12-29 @ 06:33]  SCr 0.64 [12-28 @ 18:50]  SCr 0.58 [12-28 @ 11:30]    Urinalysis - [12-28-19 @ 19:00]      Color LIGHT YELLOW / Appearance CLEAR / SG 1.013 / pH 6.5      Gluc 500 / Ketone TRACE  / Bili NEGATIVE / Urobili NORMAL       Blood NEGATIVE / Protein NEGATIVE / Leuk Est NEGATIVE / Nitrite NEGATIVE      RBC  / WBC  / Hyaline  / Gran  / Sq Epi  / Non Sq Epi  / Bacteria     Urine Creatinine 30.60      [12-28-19 @ 19:00]  Urine Sodium 73      [12-28-19 @ 19:00]  Urine Potassium 16.2      [12-28-19 @ 19:00]  Urine Chloride 60      [12-28-19 @ 19:00]  Urine Osmolality 436      [12-28-19 @ 19:00]

## 2019-12-29 NOTE — PROGRESS NOTE ADULT - PROBLEM SELECTOR PLAN 5
- per daughter, thrombocytopenia is not new  - currently no s/s of bleeding  - will continue to monitor  - also with no active signs of infection, afebrile  - will send anemia w/u given no labs in system  -f/u iron panel, retic, ferritin, b12, folate

## 2019-12-29 NOTE — DISCHARGE NOTE PROVIDER - HOSPITAL COURSE
69M with GBM, operated on by Dr Roy in Sept 2019, HTN, HLD currently undergoing radiation therapy at [facility] [place] presenting with hyponatremia and lethargy. History obtained from patient and daughter at bedside. Daughter states patient was recently in [place] visiting his other daughter and kept falling asleep, was very lethargic. They brought him to the hospital where he was noted to have a sodium level of 121 and was admitted to the ICU. She states they did not tell her the cause of the low sodium level. Yesterday he was noted to have low BP at home to 80s/40s and he went to his radiation oncologist. Labs were drawn there and the daughter reports they called with the results today and again the sodium was low so she brought him here.        He reports he feels lightheaded when he walks, +HOLT, hungry all the time - has been eating a lot and over weak and fatigued. He denies any HA, changes in vision, CP, palpitations, fevers, chills, weight changes, abdominal pain, N/V, changes in urination or BM        HOSPITAL COURSE:    Patient was admitted and Na monitored.    Ct of head-    CXR-    LycrP4X 7.1    Weakness- seen by PT-Home with home PT 69M with GBM, operated on by Dr Roy in Sept 2019, HTN, HLD currently [place] [facility] with hyponatremia and lethargy. History obtained from patient and daughter at bedside. Daughter states patient was recently in [place] visiting his other daughter and kept falling asleep, was very lethargic. They brought him to the hospital where he was noted to have a sodium level of 121 and was admitted to the ICU. She states they did not tell her the cause of the low sodium level. Yesterday he was noted to have low BP at home to 80s/40s and he went to his radiation oncologist. Labs were drawn there and the daughter reports they called with the results today and again the sodium was low so she brought him here.        He reports he feels lightheaded when he walks, +HOLT, hungry all the time - has been eating a lot and over weak and fatigued. He denies any HA, changes in vision, CP, palpitations, fevers, chills, weight changes, abdominal pain, N/V, changes in urination or BM        HOSPITAL COURSE:    68 yo with GBM, operated on by Dr Roy in Sept 2019, currently undergoing radiation therapy at [facility] presenting with hyponatremia and lethargy.     Na 127- same on Friday labs at [facility]- Hyponatremia is multifactorial but most likely sec to SIADH but with Urine lytes 89 will not resp to fluid restriction IV, consider Nacl tabs- await off Renal consult- rec Na cl tabs tid and monitor Na         Pancytopenic most likely sec to GBM- platelet 24- no bleeding- observe- urban for DVT proph    Gen weakness- ? sec to GBM with hyponatremia- pT eval for ambulation    Pancytopenia with thrombocytopenia - platelet 24- stable- observe    Ct of head-    CXR-    JzetP3T 7.1- rec start metformin 500 mg bid    Weakness- seen by PT-Home with home PT    rec home when Na >130 with PT at [facility] and out patient f/u with MD at [facility] in 24 hours. 69M with GBM, operated on by Dr Roy in Sept 2019, HTN, HLD currently undergoing [place] [facility] [place] with hyponatremia and lethargy. History obtained from patient and daughter at bedside. Daughter states patient was recently in [place] visiting his other daughter and kept falling asleep, was very lethargic. They brought him to the hospital where he was noted to have a sodium level of 121 and was admitted to the ICU. She states they did not tell her the cause of the low sodium level. Yesterday he was noted to have low BP at home to 80s/40s and he went to his radiation oncologist. Labs were drawn there and the daughter reports they called with the results today and again the sodium was low so she brought him here.        He reports he feels lightheaded when he walks, +HOLT, hungry all the time - has been eating a lot and over weak and fatigued. He denies any HA, changes in vision, CP, palpitations, fevers, chills, weight changes, abdominal pain, N/V, changes in urination or BM        HOSPITAL COURSE:    68 yo with GBM, operated on by Dr Roy in Sept 2019, currently undergoing radiation therapy at [facility] [place] presenting with hyponatremia and lethargy.     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 sec to GBM with hyponatremia- pT eval for ambulation--> rec home PT.     Pancytopenia with thrombocytopenia - platelet 24- stable- observe    Ct of head-Evolving postbiopsy changes are noted in the region of the known corpus callosum mass. No superimposed acute intracranial abnormality is noted.     CXR-Clear lungs. No pleural effusions or pneumothorax.    [id number] 7.1- rec start metformin 500 mg bid    Weakness- seen by PT-Home with home PT    rec home when Na >130 with PT at home and out patient f/u with MD at [facility] in 24 hours.        12/30: Na 127- per nephro rec continuing salt tabs TID and fluid restriction. Pt's family wants him to go home--> Per discussion with Dr. Green will send pt with 1 week supply of salt tablets and rec repeat bmp and cbc some time this week. 69M with GBM, operated on by Dr Roy in Sept 2019, HTN, HLD currently undergoing radiation therapy at [facility] [place] presenting with hyponatremia and lethargy. History obtained from patient and daughter at bedside. Daughter states patient was recently in [place] visiting his other daughter and kept falling asleep, was very lethargic. They brought him to the hospital where he was noted to have a sodium level of 121 and was admitted to the ICU. She states they did not tell her the cause of the low sodium level. Yesterday he was noted to have low BP at home to 80s/40s and he went to his radiation oncologist. Labs were drawn there and the daughter reports they called with the results today and again the sodium was low so she brought him here.        He reports he feels lightheaded when he walks, +HOLT, hungry all the time - has been eating a lot and over weak and fatigued. He denies any HA, changes in vision, CP, palpitations, fevers, chills, weight changes, abdominal pain, N/V, changes in urination or BM        HOSPITAL COURSE:    68 yo with GBM, operated on by Dr Roy in Sept 2019, currently undergoing radiation therapy at [facility] [place] [place] presenting with hyponatremia and lethargy.     Na 127- same on Friday labs at [facility] [place] [place]- Hyponatremia is multifactorial but most likely sec to SIADH but with Urine lytes 89 will not resp to fluid restriction IV, consider Nacl tabs- await off Renal consult- rec Na cl tabs tid and monitor Na         Pancytopenic most likely sec to GBM- platelet 24- no bleeding- observe- urban for DVT proph    Gen weakness- ? sec to GBM with hyponatremia- pT eval for ambulation--> rec home PT.     Pancytopenia with thrombocytopenia - platelet 24- stable- observe    Ct of head-Evolving postbiopsy changes are noted in the region of the known corpus callosum mass. No superimposed acute intracranial abnormality is noted.     CXR-Clear lungs. No pleural effusions or pneumothorax.    [id number] 7.1- rec start metformin 500 mg bid    Weakness- seen by PT-Home with home PT    rec home when Na >130 with PT at home and out patient f/u with MD at [facility] [place] [place] in 24 hours.        12/30: Na 127- per nephro rec continuing salt tabs TID and fluid restriction. Pt's family wants him to go home--> Per discussion with Dr. Green will send pt with 1 week supply of salt tablets and rec repeat bmp and cbc this week.         Dispo: Pt medically stable for d/c home per Dr. Green.

## 2019-12-29 NOTE — PHYSICAL THERAPY INITIAL EVALUATION ADULT - ACTIVE RANGE OF MOTION EXAMINATION, REHAB EVAL
bilateral  lower extremity Active ROM was WFL (within functional limits)/bilateral upper extremity Active ROM was WFL (within functional limits)/except left ankle drop

## 2019-12-29 NOTE — CONSULT NOTE ADULT - ATTENDING COMMENTS
Patient seen and evaluated by me this morning 12/30.  the patient has an asymptomatic euvolemic hyponatremia.  recommend increasing salt tabs to 2 b tid.  fluid restriction at 1 L.  Recommend sodium 130 to 132 prior to discharge.  Discussed with primary team.

## 2019-12-29 NOTE — PROGRESS NOTE ADULT - ATTENDING COMMENTS
renal consult for hyponatremia   Son at bedside is hospitalist and wants patient to go home. renal consult for hyponatremia- Son at bedside is hospitalist and wants patient to go home.  Renal consult called  DM II - steroid induce- start metformin 500 mg bid if son agrees  PT to asses ambulation renal consult for hyponatremia- Son at bedside is hospitalist and wants patient to go home.  Renal consult called  DM II - steroid induce- start metformin 500 mg bid if son agrees  Hyponatremia Na cl tabs bid with monitoring NA at [facility] 12/30/19  PT to asses ambulation- rec home PT- Son will get MD at [facility] to arrange for HOME PT?? renal consult for hyponatremia- Son at bedside is hospitalist and wants patient to go home.  Renal consult called  DM II - steroid induce- start metformin 500 mg bid if son agrees  Hyponatremia Na cl tabs bid with monitoring NA at [facility] 12/30/19  PT to asses ambulation- rec home PT- [place] [facility] [place]??  With pancytopenia at platelet 24 - patient is worried for bleed if he falls- Son who is an MD most consider all     Addendum 3:10 PM  Son not in room  Called son Dr Thomas Rider [phone]- [phone] - no answer  it is optimum if home PT is arranged and Na >130 for home. Also need to monitor platelets- if patient to stay -will get heme consult.

## 2019-12-29 NOTE — CONSULT NOTE ADULT - PROBLEM SELECTOR RECOMMENDATION 9
Pt. 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 On review of [facility] HIE/[place], pt. with normal serum sodium of 137 on 9/27/19, with last serum sodium decreased to 134 on 10/9/19.  Pt. with known history of hyponatremia (per son) since October 2019. On admission (12/28/19) serum sodium was low at 127 (corrected 2/2 hyperglycemia to 129). Pt. also found to have urine sodium of 73 and elevated urine osm of 436 yesterday. Pt. with stable serum sodium this AM of 127 (again, 129 after correction). Pt. currently on IVFs and salt tablets. Recommend fluid restriction to 1.5 L daily. Stop IVFs. Encourage PO intake of protein/solute. Continue salt tablets (can increase to 1 G TID). If patient is to remain admitted, recheck urine sodium and urine osm daily. Monitor serum sodium.    Plan reviewed with on-call nephrology attending, Dr. Akers

## 2019-12-29 NOTE — DISCHARGE NOTE PROVIDER - PROVIDER TOKENS
FREE:[LAST:[[facility]  Dr Lyle Krueger],FIRST:[please see on Monday],PHONE:[(   )    -],FAX:[(   )    -]] PROVIDER:[TOKEN:[[id number]],FOLLOWUP:[Routine]],FREE:[LAST:[Primary care provider],PHONE:[(   )    -],FAX:[(   )    -],ADDRESS:[within 1 week of discharge from hospital],FOLLOWUP:[1-3 days]],FREE:[LAST:[Dr. Chavis],PHONE:[(   )    -],FAX:[(   )    -],ADDRESS:[[facility] oncologist],FOLLOWUP:[1 week]]

## 2019-12-29 NOTE — PROGRESS NOTE ADULT - SUBJECTIVE AND OBJECTIVE BOX
Patient is a 69y old  Male who presents with a chief complaint of sent in for abn labs (28 Dec 2019 17:38)      SUBJECTIVE / OVERNIGHT EVENTS:  Admitted for lethargy and hyponatremia     MEDICATIONS  (STANDING):  dexAMETHasone     Tablet 4 milliGRAM(s) Oral three times a day  dextrose 5%. 1000 milliLiter(s) (50 mL/Hr) IV Continuous <Continuous>  dextrose 50% Injectable 12.5 Gram(s) IV Push once  dextrose 50% Injectable 25 Gram(s) IV Push once  dextrose 50% Injectable 25 Gram(s) IV Push once  influenza   Vaccine 0.5 milliLiter(s) IntraMuscular once  insulin lispro (HumaLOG) corrective regimen sliding scale   SubCutaneous three times a day before meals  insulin lispro (HumaLOG) corrective regimen sliding scale   SubCutaneous at bedtime  pantoprazole    Tablet 40 milliGRAM(s) Oral before breakfast    MEDICATIONS  (PRN):  ALPRAZolam 0.25 milliGRAM(s) Oral every 8 hours PRN anxiety  dextrose 40% Gel 15 Gram(s) Oral once PRN Blood Glucose LESS THAN 70 milliGRAM(s)/deciliter  glucagon  Injectable 1 milliGRAM(s) IntraMuscular once PRN Glucose LESS THAN 70 milligrams/deciliter        CAPILLARY BLOOD GLUCOSE      POCT Blood Glucose.: 224 mg/dL (29 Dec 2019 08:22)  POCT Blood Glucose.: 330 mg/dL (28 Dec 2019 22:18)    I&O's Summary    28 Dec 2019 07:01  -  29 Dec 2019 07:00  --------------------------------------------------------  IN: 0 mL / OUT: 400 mL / NET: -400 mL    Vital Signs Last 24 Hrs  T(C): 37.2 (29 Dec 2019 06:05), Max: 37.2 (29 Dec 2019 06:05)  T(F): 98.9 (29 Dec 2019 06:05), Max: 98.9 (29 Dec 2019 06:05)  HR: 74 (29 Dec 2019 06:05) (74 - 89)  BP: 131/77 (29 Dec 2019 06:05) (106/66 - 131/77)  BP(mean): --  RR: 18 (29 Dec 2019 06:05) (18 - 18)  SpO2: 100% (29 Dec 2019 06:05) (99% - 100%)    PHYSICAL EXAM:  GENERAL: NAD, well-developed  HEAD:  Atraumatic, Normocephalic  EYES: EOMI, PERRLA, conjunctiva and sclera clear  NECK: Supple, No JVD  CHEST/LUNG: Clear to auscultation bilaterally; No wheeze  HEART: Regular rate and rhythm; No murmurs, rubs, or gallops  ABDOMEN: Soft, Nontender, Nondistended; Bowel sounds present  EXTREMITIES:  2+ Peripheral Pulses, No clubbing, cyanosis, or edema  PSYCH: AAOx3  NEUROLOGY: non-focal  SKIN: No rashes or lesions    LABS:                        9.8    1.60  )-----------( 24       ( 29 Dec 2019 06:33 )             28.3     12    127<L>  |  93<L>  |  20  ----------------------------<  224<H>  4.2   |  23  |  0.48<L>    Ca    8.0<L>      29 Dec 2019 06:33  Phos  3.4       Mg     1.9         TPro  5.7<L>  /  Alb  3.0<L>  /  TBili  0.4  /  DBili  x   /  AST  43<H>  /  ALT  156<H>  /  AlkPhos  104            Urinalysis Basic - ( 28 Dec 2019 19:00 )    Color: LIGHT YELLOW / Appearance: CLEAR / S.013 / pH: 6.5  Gluc: 500 / Ketone: TRACE  / Bili: NEGATIVE / Urobili: NORMAL   Blood: NEGATIVE / Protein: NEGATIVE / Nitrite: NEGATIVE   Leuk Esterase: NEGATIVE / RBC: x / WBC x   Sq Epi: x / Non Sq Epi: x / Bacteria: x        RADIOLOGY & ADDITIONAL TESTS:    Imaging Personally Reviewed:    Consultant(s) Notes Reviewed:      Care Discussed with Consultants/Other Providers: Patient is a 69y old  Male who presents with a chief complaint of sent in for abn labs (28 Dec 2019 17:38)      SUBJECTIVE / OVERNIGHT EVENTS:  Admitted for lethargy and hyponatremia     MEDICATIONS  (STANDING):  dexAMETHasone     Tablet 4 milliGRAM(s) Oral three times a day  dextrose 5%. 1000 milliLiter(s) (50 mL/Hr) IV Continuous <Continuous>  dextrose 50% Injectable 12.5 Gram(s) IV Push once  dextrose 50% Injectable 25 Gram(s) IV Push once  dextrose 50% Injectable 25 Gram(s) IV Push once  influenza   Vaccine 0.5 milliLiter(s) IntraMuscular once  insulin lispro (HumaLOG) corrective regimen sliding scale   SubCutaneous three times a day before meals  insulin lispro (HumaLOG) corrective regimen sliding scale   SubCutaneous at bedtime  pantoprazole    Tablet 40 milliGRAM(s) Oral before breakfast    MEDICATIONS  (PRN):  ALPRAZolam 0.25 milliGRAM(s) Oral every 8 hours PRN anxiety  dextrose 40% Gel 15 Gram(s) Oral once PRN Blood Glucose LESS THAN 70 milliGRAM(s)/deciliter  glucagon  Injectable 1 milliGRAM(s) IntraMuscular once PRN Glucose LESS THAN 70 milligrams/deciliter        CAPILLARY BLOOD GLUCOSE      POCT Blood Glucose.: 224 mg/dL (29 Dec 2019 08:22)  POCT Blood Glucose.: 330 mg/dL (28 Dec 2019 22:18)    I&O's Summary    28 Dec 2019 07:01  -  29 Dec 2019 07:00  --------------------------------------------------------  IN: 0 mL / OUT: 400 mL / NET: -400 mL    Vital Signs Last 24 Hrs  T(C): 37.2 (29 Dec 2019 06:05), Max: 37.2 (29 Dec 2019 06:05)  T(F): 98.9 (29 Dec 2019 06:05), Max: 98.9 (29 Dec 2019 06:05)  HR: 74 (29 Dec 2019 06:05) (74 - 89)  BP: 131/77 (29 Dec 2019 06:05) (106/66 - 131/77)  BP(mean): --  RR: 18 (29 Dec 2019 06:05) (18 - 18)  SpO2: 100% (29 Dec 2019 06:05) (99% - 100%)    PHYSICAL EXAM:  GENERAL: NAD, well-developed  HEAD:  Atraumatic, Normocephalic  EYES: EOMI, PERRLA, conjunctiva and sclera clear  NECK: Supple, No JVD  CHEST/LUNG: Clear to auscultation bilaterally; No wheeze  HEART: Regular rate and rhythm; No murmurs, rubs, or gallops  ABDOMEN: Soft, Nontender, Nondistended; Bowel sounds present  EXTREMITIES:  2+ Peripheral Pulses, No clubbing, cyanosis, or edema  PSYCH: AAOx3  NEUROLOGY: non-focal  SKIN: No rashes or lesions    LABS:                        9.8    1.60  )-----------( 24       ( 29 Dec 2019 06:33 )             28.3         127<L>  |  93<L>  |  20  ----------------------------<  224<H>  4.2   |  23  |  0.48<L>    Ca    8.0<L>      29 Dec 2019 06:33  Phos  3.4       Mg     1.9         TPro  5.7<L>  /  Alb  3.0<L>  /  TBili  0.4  /  DBili  x   /  AST  43<H>  /  ALT  156<H>  /  AlkPhos  104            Urinalysis Basic - ( 28 Dec 2019 19:00 )    Color: LIGHT YELLOW / Appearance: CLEAR / S.013 / pH: 6.5  Gluc: 500 / Ketone: TRACE  / Bili: NEGATIVE / Urobili: NORMAL   Blood: NEGATIVE / Protein: NEGATIVE / Nitrite: NEGATIVE   Leuk Esterase: NEGATIVE / RBC: x / WBC x   Sq Epi: x / Non Sq Epi: x / Bacteria: x        RADIOLOGY & ADDITIONAL TESTS:    < from: CT Head No Cont (19 @ 14:41) >  IMPRESSION:  Evolving postbiopsy changes are noted in the region of the known corpus callosum mass. No superimposed acute intracranial abnormality is noted. If the patient has new and persistent symptoms, consider follow-up brain MRI with and without contrast for further evaluation if clinically warranted.    < from: Xray Chest 2 Views PA/Lat (19 @ 13:37) >  IMPRESSION:  Clear lungs. No pleural effusions or pneumothorax.  Cardiac and mediastinal silhouettes within normal limits.  Trachea midline.  Generalized osteopenia. Mild spinal degenerative change.          Care Discussed with Consultants/Other Providers: Patient is a 69y old  Male who presents with a chief complaint of sent in for abn labs (28 Dec 2019 17:38)      SUBJECTIVE / OVERNIGHT EVENTS:  Admitted for lethargy and hyponatremia patient seen with son     MEDICATIONS  (STANDING):  dexAMETHasone     Tablet 4 milliGRAM(s) Oral three times a day  dextrose 5%. 1000 milliLiter(s) (50 mL/Hr) IV Continuous <Continuous>  dextrose 50% Injectable 12.5 Gram(s) IV Push once  dextrose 50% Injectable 25 Gram(s) IV Push once  dextrose 50% Injectable 25 Gram(s) IV Push once  influenza   Vaccine 0.5 milliLiter(s) IntraMuscular once  insulin lispro (HumaLOG) corrective regimen sliding scale   SubCutaneous three times a day before meals  insulin lispro (HumaLOG) corrective regimen sliding scale   SubCutaneous at bedtime  pantoprazole    Tablet 40 milliGRAM(s) Oral before breakfast    MEDICATIONS  (PRN):  ALPRAZolam 0.25 milliGRAM(s) Oral every 8 hours PRN anxiety  dextrose 40% Gel 15 Gram(s) Oral once PRN Blood Glucose LESS THAN 70 milliGRAM(s)/deciliter  glucagon  Injectable 1 milliGRAM(s) IntraMuscular once PRN Glucose LESS THAN 70 milligrams/deciliter        CAPILLARY BLOOD GLUCOSE      POCT Blood Glucose.: 224 mg/dL (29 Dec 2019 08:22)  POCT Blood Glucose.: 330 mg/dL (28 Dec 2019 22:18)    I&O's Summary    28 Dec 2019 07:01  -  29 Dec 2019 07:00  --------------------------------------------------------  IN: 0 mL / OUT: 400 mL / NET: -400 mL    Vital Signs Last 24 Hrs  T(C): 37.2 (29 Dec 2019 06:05), Max: 37.2 (29 Dec 2019 06:05)  T(F): 98.9 (29 Dec 2019 06:05), Max: 98.9 (29 Dec 2019 06:05)  HR: 74 (29 Dec 2019 06:05) (74 - 89)  BP: 131/77 (29 Dec 2019 06:05) (106/66 - 131/77)  BP(mean): --  RR: 18 (29 Dec 2019 06:05) (18 - 18)  SpO2: 100% (29 Dec 2019 06:05) (99% - 100%)    PHYSICAL EXAM:  GENERAL: NAD, well-developed  HEAD:  Atraumatic, Normocephalic  EYES: EOMI, PERRLA, conjunctiva and sclera clear  NECK: Supple, No JVD  CHEST/LUNG: Clear to auscultation bilaterally; No wheeze  HEART: Regular rate and rhythm; No murmurs, rubs, or gallops  ABDOMEN: Soft, Nontender, Nondistended; Bowel sounds present  EXTREMITIES:  2+ Peripheral Pulses, No clubbing, cyanosis, or edema  PSYCH: AAOx3  NEUROLOGY: non-focal  SKIN: No rashes or lesions    LABS:                        9.8    1.60  )-----------( 24       ( 29 Dec 2019 06:33 )             28.3     12    127<L>  |  93<L>  |  20  ----------------------------<  224<H>  4.2   |  23  |  0.48<L>    Ca    8.0<L>      29 Dec 2019 06:33  Phos  3.4       Mg     1.9         TPro  5.7<L>  /  Alb  3.0<L>  /  TBili  0.4  /  DBili  x   /  AST  43<H>  /  ALT  156<H>  /  AlkPhos  104            Urinalysis Basic - ( 28 Dec 2019 19:00 )    Color: LIGHT YELLOW / Appearance: CLEAR / S.013 / pH: 6.5  Gluc: 500 / Ketone: TRACE  / Bili: NEGATIVE / Urobili: NORMAL   Blood: NEGATIVE / Protein: NEGATIVE / Nitrite: NEGATIVE   Leuk Esterase: NEGATIVE / RBC: x / WBC x   Sq Epi: x / Non Sq Epi: x / Bacteria: x        RADIOLOGY & ADDITIONAL TESTS:    < from: CT Head No Cont (19 @ 14:41) >  IMPRESSION:  Evolving postbiopsy changes are noted in the region of the known corpus callosum mass. No superimposed acute intracranial abnormality is noted. If the patient has new and persistent symptoms, consider follow-up brain MRI with and without contrast for further evaluation if clinically warranted.    < from: Xray Chest 2 Views PA/Lat (12.28.19 @ 13:37) >  IMPRESSION:  Clear lungs. No pleural effusions or pneumothorax.  Cardiac and mediastinal silhouettes within normal limits.  Trachea midline.  Generalized osteopenia. Mild spinal degenerative change.          Care Discussed with Consultants/Other Providers: Patient is a 69y old  Male who presents with a chief complaint of sent in for abn labs (28 Dec 2019 17:38)      SUBJECTIVE / OVERNIGHT EVENTS:  Admitted for lethargy and hyponatremia patient seen with son Dr Thomas Rider- a hospitalist  at bedside- he wants to take patient home  Son reported father was hospitalized in another Stated with na 120 and was d/c Na 133.  he had labs at [facility] on Friday Na 127- father came to ED due to low NA.    MEDICATIONS  (STANDING):  dexAMETHasone     Tablet 4 milliGRAM(s) Oral three times a day  dextrose 5%. 1000 milliLiter(s) (50 mL/Hr) IV Continuous <Continuous>  dextrose 50% Injectable 12.5 Gram(s) IV Push once  dextrose 50% Injectable 25 Gram(s) IV Push once  dextrose 50% Injectable 25 Gram(s) IV Push once  influenza   Vaccine 0.5 milliLiter(s) IntraMuscular once  insulin lispro (HumaLOG) corrective regimen sliding scale   SubCutaneous three times a day before meals  insulin lispro (HumaLOG) corrective regimen sliding scale   SubCutaneous at bedtime  pantoprazole    Tablet 40 milliGRAM(s) Oral before breakfast    MEDICATIONS  (PRN):  ALPRAZolam 0.25 milliGRAM(s) Oral every 8 hours PRN anxiety  dextrose 40% Gel 15 Gram(s) Oral once PRN Blood Glucose LESS THAN 70 milliGRAM(s)/deciliter  glucagon  Injectable 1 milliGRAM(s) IntraMuscular once PRN Glucose LESS THAN 70 milligrams/deciliter          POCT Blood Glucose.: 224 mg/dL (29 Dec 2019 08:22)  POCT Blood Glucose.: 330 mg/dL (28 Dec 2019 22:18)    I&O's Summary    28 Dec 2019 07:01  -  29 Dec 2019 07:00  --------------------------------------------------------  IN: 0 mL / OUT: 400 mL / NET: -400 mL    Vital Signs Last 24 Hrs  T(C): 37.2 (29 Dec 2019 06:05), Max: 37.2 (29 Dec 2019 06:05)  T(F): 98.9 (29 Dec 2019 06:05), Max: 98.9 (29 Dec 2019 06:05)  HR: 74 (29 Dec 2019 06:05) (74 - 89)  BP: 131/77 (29 Dec 2019 06:05) (106/66 - 131/77)  BP(mean): --  RR: 18 (29 Dec 2019 06:05) (18 - 18)  SpO2: 100% (29 Dec 2019 06:05) (99% - 100%)    PHYSICAL EXAM:  GENERAL: NAD, well-developed  HEAD:  Atraumatic, Normocephalic  EYES: EOMI, PERRLA, conjunctiva and sclera clear  NECK: Supple, No JVD  CHEST/LUNG: Clear to auscultation bilaterally; No wheeze  HEART: Regular rate and rhythm; No murmurs, rubs, or gallops  ABDOMEN: Soft, Nontender, Nondistended; Bowel sounds present  EXTREMITIES:  2+ Peripheral Pulses, No clubbing, cyanosis, or edema  PSYCH: AAOx3  NEUROLOGY: non-focal  SKIN: No rashes or lesions    LABS:                        9.8    1.60  )-----------( 24       ( 29 Dec 2019 06:33 )             28.3     12    127<L>  |  93<L>  |  20  ----------------------------<  224<H>  4.2   |  23  |  0.48<L>    Ca    8.0<L>      29 Dec 2019 06:33  Phos  3.4       Mg     1.9         TPro  5.7<L>  /  Alb  3.0<L>  /  TBili  0.4  /  DBili  x   /  AST  43<H>  /  ALT  156<H>  /  AlkPhos  104  12          Urinalysis Basic - ( 28 Dec 2019 19:00 )    Color: LIGHT YELLOW / Appearance: CLEAR / S.013 / pH: 6.5  Gluc: 500 / Ketone: TRACE  / Bili: NEGATIVE / Urobili: NORMAL   Blood: NEGATIVE / Protein: NEGATIVE / Nitrite: NEGATIVE   Leuk Esterase: NEGATIVE / RBC: x / WBC x   Sq Epi: x / Non Sq Epi: x / Bacteria: x        RADIOLOGY & ADDITIONAL TESTS:    < from: CT Head No Cont (19 @ 14:41) >  IMPRESSION:  Evolving postbiopsy changes are noted in the region of the known corpus callosum mass. No superimposed acute intracranial abnormality is noted. If the patient has new and persistent symptoms, consider follow-up brain MRI with and without contrast for further evaluation if clinically warranted.    < from: Xray Chest 2 Views PA/Lat (12.28.19 @ 13:37) >  IMPRESSION:  Clear lungs. No pleural effusions or pneumothorax.  Cardiac and mediastinal silhouettes within normal limits.  Trachea midline.  Generalized osteopenia. Mild spinal degenerative change.          Care Discussed with Consultants/Other Providers:  Renal consult called Patient is a 69y old  Male who presents with a chief complaint of sent in for abn labs (28 Dec 2019 17:38)      SUBJECTIVE / OVERNIGHT EVENTS:  Admitted for lethargy and hyponatremia patient seen with son Dr Thomas Rider- a hospitalist  at bedside- he wants to take patient home  Son reported father was hospitalized in another Stated with na 120 and was d/c Na 133.  he had labs at [facility] on Friday Na 127- father came to ED due to low NA.  Patient noted no bleeding despite platelet  of 24- observe    MEDICATIONS  (STANDING):  dexAMETHasone     Tablet 4 milliGRAM(s) Oral three times a day  dextrose 5%. 1000 milliLiter(s) (50 mL/Hr) IV Continuous <Continuous>  dextrose 50% Injectable 12.5 Gram(s) IV Push once  dextrose 50% Injectable 25 Gram(s) IV Push once  dextrose 50% Injectable 25 Gram(s) IV Push once  influenza   Vaccine 0.5 milliLiter(s) IntraMuscular once  insulin lispro (HumaLOG) corrective regimen sliding scale   SubCutaneous three times a day before meals  insulin lispro (HumaLOG) corrective regimen sliding scale   SubCutaneous at bedtime  pantoprazole    Tablet 40 milliGRAM(s) Oral before breakfast    MEDICATIONS  (PRN):  ALPRAZolam 0.25 milliGRAM(s) Oral every 8 hours PRN anxiety  dextrose 40% Gel 15 Gram(s) Oral once PRN Blood Glucose LESS THAN 70 milliGRAM(s)/deciliter  glucagon  Injectable 1 milliGRAM(s) IntraMuscular once PRN Glucose LESS THAN 70 milligrams/deciliter          POCT Blood Glucose.: 224 mg/dL (29 Dec 2019 08:22)  POCT Blood Glucose.: 330 mg/dL (28 Dec 2019 22:18)    I&O's Summary    28 Dec 2019 07:01  -  29 Dec 2019 07:00  --------------------------------------------------------  IN: 0 mL / OUT: 400 mL / NET: -400 mL    Vital Signs Last 24 Hrs  T(C): 37.2 (29 Dec 2019 06:05), Max: 37.2 (29 Dec 2019 06:05)  T(F): 98.9 (29 Dec 2019 06:05), Max: 98.9 (29 Dec 2019 06:05)  HR: 74 (29 Dec 2019 06:05) (74 - 89)  BP: 131/77 (29 Dec 2019 06:05) (106/66 - 131/77)  BP(mean): --  RR: 18 (29 Dec 2019 06:05) (18 - 18)  SpO2: 100% (29 Dec 2019 06:05) (99% - 100%)    PHYSICAL EXAM:  GENERAL: NAD, well-developed  HEAD:  Atraumatic, Normocephalic  EYES: EOMI, PERRLA, conjunctiva and sclera clear  NECK: Supple, No JVD  CHEST/LUNG: Clear to auscultation bilaterally; No wheeze  HEART: Regular rate and rhythm; No murmurs, rubs, or gallops  ABDOMEN: Soft, Nontender, Nondistended; Bowel sounds present  EXTREMITIES:  2+ Peripheral Pulses, No clubbing, cyanosis, or edema  PSYCH: AAOx3  NEUROLOGY: non-focal  SKIN: No rashes or lesions    LABS:                        9.8    1.60  )-----------( 24       ( 29 Dec 2019 06:33 )             28.3     12-    127<L>  |  93<L>  |  20  ----------------------------<  224<H>  4.2   |  23  |  0.48<L>    Ca    8.0<L>      29 Dec 2019 06:33  Phos  3.4       Mg     1.9         TPro  5.7<L>  /  Alb  3.0<L>  /  TBili  0.4  /  DBili  x   /  AST  43<H>  /  ALT  156<H>  /  AlkPhos  104  12          Urinalysis Basic - ( 28 Dec 2019 19:00 )    Color: LIGHT YELLOW / Appearance: CLEAR / S.013 / pH: 6.5  Gluc: 500 / Ketone: TRACE  / Bili: NEGATIVE / Urobili: NORMAL   Blood: NEGATIVE / Protein: NEGATIVE / Nitrite: NEGATIVE   Leuk Esterase: NEGATIVE / RBC: x / WBC x   Sq Epi: x / Non Sq Epi: x / Bacteria: x        RADIOLOGY & ADDITIONAL TESTS:    < from: CT Head No Cont (19 @ 14:41) >  IMPRESSION:  Evolving postbiopsy changes are noted in the region of the known corpus callosum mass. No superimposed acute intracranial abnormality is noted. If the patient has new and persistent symptoms, consider follow-up brain MRI with and without contrast for further evaluation if clinically warranted.    < from: Xray Chest 2 Views PA/Lat (19 @ 13:37) >  IMPRESSION:  Clear lungs. No pleural effusions or pneumothorax.  Cardiac and mediastinal silhouettes within normal limits.  Trachea midline.  Generalized osteopenia. Mild spinal degenerative change.          Care Discussed with Consultants/Other Providers:  Renal consult called

## 2019-12-30 ENCOUNTER — TRANSCRIPTION ENCOUNTER (OUTPATIENT)
Age: 69
End: 2019-12-30

## 2019-12-30 VITALS
HEART RATE: 87 BPM | TEMPERATURE: 99 F | OXYGEN SATURATION: 100 % | RESPIRATION RATE: 18 BRPM | SYSTOLIC BLOOD PRESSURE: 114 MMHG | DIASTOLIC BLOOD PRESSURE: 65 MMHG

## 2019-12-30 LAB
ALBUMIN SERPL ELPH-MCNC: 2.9 G/DL — LOW (ref 3.3–5)
ALP SERPL-CCNC: 94 U/L — SIGNIFICANT CHANGE UP (ref 40–120)
ALT FLD-CCNC: 123 U/L — HIGH (ref 4–41)
ANION GAP SERPL CALC-SCNC: 11 MMO/L — SIGNIFICANT CHANGE UP (ref 7–14)
ANION GAP SERPL CALC-SCNC: 11 MMO/L — SIGNIFICANT CHANGE UP (ref 7–14)
AST SERPL-CCNC: 31 U/L — SIGNIFICANT CHANGE UP (ref 4–40)
BASOPHILS # BLD AUTO: 0 K/UL — SIGNIFICANT CHANGE UP (ref 0–0.2)
BASOPHILS NFR BLD AUTO: 0 % — SIGNIFICANT CHANGE UP (ref 0–2)
BILIRUB SERPL-MCNC: 0.4 MG/DL — SIGNIFICANT CHANGE UP (ref 0.2–1.2)
BUN SERPL-MCNC: 17 MG/DL — SIGNIFICANT CHANGE UP (ref 7–23)
BUN SERPL-MCNC: 17 MG/DL — SIGNIFICANT CHANGE UP (ref 7–23)
CALCIUM SERPL-MCNC: 8.5 MG/DL — SIGNIFICANT CHANGE UP (ref 8.4–10.5)
CALCIUM SERPL-MCNC: 8.5 MG/DL — SIGNIFICANT CHANGE UP (ref 8.4–10.5)
CHLORIDE SERPL-SCNC: 92 MMOL/L — LOW (ref 98–107)
CHLORIDE SERPL-SCNC: 92 MMOL/L — LOW (ref 98–107)
CO2 SERPL-SCNC: 24 MMOL/L — SIGNIFICANT CHANGE UP (ref 22–31)
CO2 SERPL-SCNC: 24 MMOL/L — SIGNIFICANT CHANGE UP (ref 22–31)
CREAT SERPL-MCNC: 0.48 MG/DL — LOW (ref 0.5–1.3)
CREAT SERPL-MCNC: 0.48 MG/DL — LOW (ref 0.5–1.3)
EOSINOPHIL # BLD AUTO: 0 K/UL — SIGNIFICANT CHANGE UP (ref 0–0.5)
EOSINOPHIL NFR BLD AUTO: 0 % — SIGNIFICANT CHANGE UP (ref 0–6)
GLUCOSE BLDC GLUCOMTR-MCNC: 234 MG/DL — HIGH (ref 70–99)
GLUCOSE SERPL-MCNC: 193 MG/DL — HIGH (ref 70–99)
GLUCOSE SERPL-MCNC: 193 MG/DL — HIGH (ref 70–99)
HCT VFR BLD CALC: 32 % — LOW (ref 39–50)
HCT VFR BLD CALC: 32 % — LOW (ref 39–50)
HGB BLD-MCNC: 10.9 G/DL — LOW (ref 13–17)
HGB BLD-MCNC: 10.9 G/DL — LOW (ref 13–17)
IMM GRANULOCYTES NFR BLD AUTO: 1.4 % — SIGNIFICANT CHANGE UP (ref 0–1.5)
LDH SERPL L TO P-CCNC: 334 U/L — HIGH (ref 135–225)
LYMPHOCYTES # BLD AUTO: 0.52 K/UL — LOW (ref 1–3.3)
LYMPHOCYTES # BLD AUTO: 36.4 % — SIGNIFICANT CHANGE UP (ref 13–44)
MAGNESIUM SERPL-MCNC: 1.9 MG/DL — SIGNIFICANT CHANGE UP (ref 1.6–2.6)
MANUAL SMEAR VERIFICATION: SIGNIFICANT CHANGE UP
MANUAL SMEAR VERIFICATION: SIGNIFICANT CHANGE UP
MCHC RBC-ENTMCNC: 30.4 PG — SIGNIFICANT CHANGE UP (ref 27–34)
MCHC RBC-ENTMCNC: 30.4 PG — SIGNIFICANT CHANGE UP (ref 27–34)
MCHC RBC-ENTMCNC: 34.1 % — SIGNIFICANT CHANGE UP (ref 32–36)
MCHC RBC-ENTMCNC: 34.1 % — SIGNIFICANT CHANGE UP (ref 32–36)
MCV RBC AUTO: 89.4 FL — SIGNIFICANT CHANGE UP (ref 80–100)
MCV RBC AUTO: 89.4 FL — SIGNIFICANT CHANGE UP (ref 80–100)
MONOCYTES # BLD AUTO: 0.1 K/UL — SIGNIFICANT CHANGE UP (ref 0–0.9)
MONOCYTES NFR BLD AUTO: 7 % — SIGNIFICANT CHANGE UP (ref 2–14)
NEUTROPHILS # BLD AUTO: 0.79 K/UL — LOW (ref 1.8–7.4)
NEUTROPHILS NFR BLD AUTO: 55.2 % — SIGNIFICANT CHANGE UP (ref 43–77)
NRBC # FLD: 0.03 K/UL — SIGNIFICANT CHANGE UP (ref 0–0)
NRBC # FLD: 0.03 K/UL — SIGNIFICANT CHANGE UP (ref 0–0)
NRBC FLD-RTO: 2.1 — SIGNIFICANT CHANGE UP
NRBC FLD-RTO: 2.1 — SIGNIFICANT CHANGE UP
PHOSPHATE SERPL-MCNC: 3.6 MG/DL — SIGNIFICANT CHANGE UP (ref 2.5–4.5)
PLATELET # BLD AUTO: 31 K/UL — LOW (ref 150–400)
PLATELET # BLD AUTO: 31 K/UL — LOW (ref 150–400)
PMV BLD: 10.9 FL — SIGNIFICANT CHANGE UP (ref 7–13)
PMV BLD: 10.9 FL — SIGNIFICANT CHANGE UP (ref 7–13)
POTASSIUM SERPL-MCNC: 4.5 MMOL/L — SIGNIFICANT CHANGE UP (ref 3.5–5.3)
POTASSIUM SERPL-MCNC: 4.5 MMOL/L — SIGNIFICANT CHANGE UP (ref 3.5–5.3)
POTASSIUM SERPL-SCNC: 4.5 MMOL/L — SIGNIFICANT CHANGE UP (ref 3.5–5.3)
POTASSIUM SERPL-SCNC: 4.5 MMOL/L — SIGNIFICANT CHANGE UP (ref 3.5–5.3)
PROT SERPL-MCNC: 5.7 G/DL — LOW (ref 6–8.3)
RBC # BLD: 3.58 M/UL — LOW (ref 4.2–5.8)
RBC # BLD: 3.58 M/UL — LOW (ref 4.2–5.8)
RBC # FLD: 17.2 % — HIGH (ref 10.3–14.5)
RBC # FLD: 17.2 % — HIGH (ref 10.3–14.5)
SODIUM SERPL-SCNC: 127 MMOL/L — LOW (ref 135–145)
SODIUM SERPL-SCNC: 127 MMOL/L — LOW (ref 135–145)
WBC # BLD: 1.43 K/UL — LOW (ref 3.8–10.5)
WBC # BLD: 1.43 K/UL — LOW (ref 3.8–10.5)
WBC # FLD AUTO: 1.43 K/UL — LOW (ref 3.8–10.5)
WBC # FLD AUTO: 1.43 K/UL — LOW (ref 3.8–10.5)

## 2019-12-30 PROCEDURE — 99239 HOSP IP/OBS DSCHRG MGMT >30: CPT

## 2019-12-30 PROCEDURE — 99233 SBSQ HOSP IP/OBS HIGH 50: CPT

## 2019-12-30 PROCEDURE — 99254 IP/OBS CNSLTJ NEW/EST MOD 60: CPT | Mod: GC

## 2019-12-30 RX ORDER — SODIUM CHLORIDE 9 MG/ML
2 INJECTION INTRAMUSCULAR; INTRAVENOUS; SUBCUTANEOUS
Qty: 42 | Refills: 0
Start: 2019-12-30 | End: 2020-01-05

## 2019-12-30 RX ORDER — METFORMIN HYDROCHLORIDE 850 MG/1
1 TABLET ORAL
Qty: 60 | Refills: 0
Start: 2019-12-30 | End: 2020-01-28

## 2019-12-30 RX ORDER — ALPRAZOLAM 0.25 MG
1 TABLET ORAL
Qty: 0 | Refills: 0 | DISCHARGE

## 2019-12-30 RX ORDER — LOSARTAN POTASSIUM 100 MG/1
1 TABLET, FILM COATED ORAL
Qty: 0 | Refills: 0 | DISCHARGE

## 2019-12-30 RX ADMIN — Medication 3: at 13:17

## 2019-12-30 RX ADMIN — Medication 2: at 08:50

## 2019-12-30 RX ADMIN — Medication 4: at 17:55

## 2019-12-30 RX ADMIN — SODIUM CHLORIDE 1 GRAM(S): 9 INJECTION INTRAMUSCULAR; INTRAVENOUS; SUBCUTANEOUS at 13:16

## 2019-12-30 RX ADMIN — Medication 4 MILLIGRAM(S): at 13:16

## 2019-12-30 RX ADMIN — SODIUM CHLORIDE 1 GRAM(S): 9 INJECTION INTRAMUSCULAR; INTRAVENOUS; SUBCUTANEOUS at 06:15

## 2019-12-30 RX ADMIN — PANTOPRAZOLE SODIUM 40 MILLIGRAM(S): 20 TABLET, DELAYED RELEASE ORAL at 06:15

## 2019-12-30 RX ADMIN — Medication 4 MILLIGRAM(S): at 06:15

## 2019-12-30 NOTE — PROGRESS NOTE ADULT - PROBLEM SELECTOR PLAN 1
Improved/stable on NaCl tabs and fluid restriction. Renal following  ongoing lethargy and fatigue likely 2/2 underlying condition and not mild hyponatremia

## 2019-12-30 NOTE — PROGRESS NOTE ADULT - PROBLEM SELECTOR PLAN 7
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  Start metformin 500 mg bid on DC
Sec to steroids.  Hgb aic 7.1- c/w DM induced by steroids.  Consider metformin 500 mg bid

## 2019-12-30 NOTE — PROGRESS NOTE ADULT - ASSESSMENT
69M with GBM, operated on by Dr Roy in Sept 2019, HTN, HLD [place] [facility] [place] and [place]argy.  Hyponatremia is multifactorial but most likely sec to SIADH

## 2019-12-30 NOTE — PROGRESS NOTE ADULT - SUBJECTIVE AND OBJECTIVE BOX
Imaging reviewed with dr. lizarraga. Stable CT for post op patient. Had recent MRI at [facility] [place] and was stable per daughter. Appears majority of issues was due to hyponatremia. Pt should continue to follow up with Dr. Roy as advised. Cont current tx. No neurosurgical intervention     < from: CT Head No Cont (12.28.19 @ 14:41) >    Right parietal craniotomy changes are again noted.    Evolving postoperative changes are noted status post biopsy of corpus callosum mass. The high density hemorrhagic products have resolved over the time interval. No new areas of acute hemorrhage are present. A surgical tract is again noted in the right parietal lobe.    An expansile mass with associated hypodensity is again noted involving the corpus callosum with extension into the bilateral parietal and temporal lobes. Evaluation is limited by lack of intravenous contrast. A small hypodensity involves the right frontal periventricular region, corresponding to an enhancing focus on the 09/25/2019 MRI study.    No new areas of edema are noted over the time interval.    The sulci andventricles are slightly larger compared to the prior study which may reflect increasing cerebral volume loss or post steroid change.    IMPRESSION:    Evolving postbiopsy changes are noted in the region of the known corpus callosum mass. No superimposed acute intracranial abnormality is noted. If the patient has new and persistent symptoms, consider follow-up brain MRI with and without contrast for further evaluation if clinically warranted.

## 2019-12-30 NOTE — DISCHARGE NOTE NURSING/CASE MANAGEMENT/SOCIAL WORK - NSSCNAMETXT_GEN_ALL_CORE
[facility] . Initial visit will be day after discharge home. A nurse will call prior to home visit

## 2019-12-30 NOTE — PROGRESS NOTE ADULT - PROBLEM SELECTOR PLAN 2
- s/p chemotherapy and radiation on dexamethasone, per family not on keppra  - will continue with decadron at home dose - ISS and protonix  Patient is followed by Dr Lyle Patton a- Onc at [facility] upon admission largely unremarkable, can f/u outpatient as previously scheduled  No neurosurgical intervention per neurosx

## 2019-12-30 NOTE — PROGRESS NOTE ADULT - SUBJECTIVE AND OBJECTIVE BOX
[facility] Medicine  Pati Green DO  Pager (JOSE F-F, 5F-2I): [phone]      Patient is a 69y old  Male who presents with a chief complaint of sent in for abn labs (29 Dec 2019 14:37)      SUBJECTIVE / OVERNIGHT EVENTS: No complaints of pain or SOB. Feels hungry.    MEDICATIONS  (STANDING):  dexAMETHasone     Tablet 4 milliGRAM(s) Oral three times a day  dextrose 5%. 1000 milliLiter(s) (50 mL/Hr) IV Continuous <Continuous>  dextrose 50% Injectable 12.5 Gram(s) IV Push once  dextrose 50% Injectable 25 Gram(s) IV Push once  dextrose 50% Injectable 25 Gram(s) IV Push once  influenza   Vaccine 0.5 milliLiter(s) IntraMuscular once  insulin lispro (HumaLOG) corrective regimen sliding scale   SubCutaneous three times a day before meals  insulin lispro (HumaLOG) corrective regimen sliding scale   SubCutaneous at bedtime  pantoprazole    Tablet 40 milliGRAM(s) Oral before breakfast  sodium chloride 1 Gram(s) Oral every 8 hours    MEDICATIONS  (PRN):  ALPRAZolam 0.25 milliGRAM(s) Oral every 8 hours PRN anxiety  benzocaine 15 mG/menthol 3.6 mG (Sugar-Free) Lozenge 1 Lozenge Oral four times a day PRN Sore Throat  dextrose 40% Gel 15 Gram(s) Oral once PRN Blood Glucose LESS THAN 70 milliGRAM(s)/deciliter  glucagon  Injectable 1 milliGRAM(s) IntraMuscular once PRN Glucose LESS THAN 70 milligrams/deciliter      CAPILLARY BLOOD GLUCOSE      POCT Blood Glucose.: 234 mg/dL (30 Dec 2019 08:45)  POCT Blood Glucose.: 276 mg/dL (29 Dec 2019 22:00)  POCT Blood Glucose.: 250 mg/dL (29 Dec 2019 17:28)  POCT Blood Glucose.: 227 mg/dL (29 Dec 2019 12:43)    I&O's Summary      PHYSICAL EXAM:  Vital Signs Last 24 Hrs  T(C): 36.7 (30 Dec 2019 05:00), Max: 36.9 (29 Dec 2019 21:23)  T(F): 98.1 (30 Dec 2019 05:00), Max: 98.4 (29 Dec 2019 21:23)  HR: 71 (30 Dec 2019 05:00) (71 - 82)  BP: 132/75 (30 Dec 2019 05:00) (125/61 - 134/75)  BP(mean): 98 (29 Dec 2019 14:22) (98 - 98)  RR: 18 (30 Dec 2019 05:00) (17 - 18)  SpO2: 100% (30 Dec 2019 05:00) (100% - 100%)    CONSTITUTIONAL: NAD, on room air, seated in bedside chair  HEENT: sclera clear  RESPIRATORY: Normal respiratory effort; lungs are clear to auscultation bilaterally  CARDIOVASCULAR: S1/S2, Regular rate and rhythm, no murmurs appreciated; No lower extremity edema  ABDOMEN: soft, Nontender, nondistended, normoactive bowel sounds, no rebound/guarding  PSYCH: calm, cooperative  NEUROLOGY: awake, alert, no gross deficit  SKIN: No rashes; no palpable lesions    LABS:                        10.9   1.43  )-----------( 31       ( 30 Dec 2019 07:50 )             32.0     12-30    127<L>  |  92<L>  |  17  ----------------------------<  193<H>  4.5   |  24  |  0.48<L>    Ca    8.5      30 Dec 2019 07:50  Phos  3.6     12-30  Mg     1.9     12-30    TPro  5.7<L>  /  Alb  2.9<L>  /  TBili  0.4  /  DBili  x   /  AST  31  /  ALT  123<H>  /  AlkPhos  94  12-30          Urinalysis Basic - ( 28 Dec 2019 19:00 )    Color: LIGHT YELLOW / Appearance: CLEAR / S.013 / pH: 6.5  Gluc: 500 / Ketone: TRACE  / Bili: NEGATIVE / Urobili: NORMAL   Blood: NEGATIVE / Protein: NEGATIVE / Nitrite: NEGATIVE   Leuk Esterase: NEGATIVE / RBC: x / WBC x   Sq Epi: x / Non Sq Epi: x / Bacteria: x          RADIOLOGY & ADDITIONAL TESTS:  Results Reviewed:   Imaging Personally Reviewed:  Electrocardiogram Personally Reviewed:    COORDINATION OF CARE:  Care Discussed with Consultants/Other Providers [Y/N]:  Prior or Outpatient Records Reviewed [Y/N]:

## 2019-12-30 NOTE — DISCHARGE NOTE NURSING/CASE MANAGEMENT/SOCIAL WORK - PATIENT PORTAL LINK FT
You can access the FollowMyHealth Patient Portal offered by [facility] by registering at the following website: [email or web address]. By joining [employer]’s FollowMyHealth portal, you will also be able to view your health information using other applications (apps) compatible with our system.

## 2019-12-30 NOTE — PROGRESS NOTE ADULT - ATTENDING COMMENTS
Dispo: pending improvement in Na>130 and improvement in platelets    son Dr Thomas Rider [phone]

## 2019-12-30 NOTE — PROGRESS NOTE ADULT - PROBLEM SELECTOR PLAN 5
- per daughter, thrombocytopenia is not new. Platelets stable for now.   Baseline unclear  - currently no s/s of bleeding, no active signs of infection, afebrile  iron panel, retic, ferritin, b12, folate unremarkable. Check LDH/hapto

## 2019-12-30 NOTE — PROGRESS NOTE ADULT - SUBJECTIVE AND OBJECTIVE BOX
[facility] Division of Kidney Diseases & Hypertension  FOLLOW UP NOTE  [phone]--------------------------------------------------------------------------------  Chief Complaint:Malignant neoplasm of brain      24 hour events/subjective:        PAST HISTORY  --------------------------------------------------------------------------------  No significant changes to PMH, PSH, FHx, SHx, unless otherwise noted    ALLERGIES & MEDICATIONS  --------------------------------------------------------------------------------  Allergies    No Known Allergies    Intolerances      Standing Inpatient Medications  dexAMETHasone     Tablet 4 milliGRAM(s) Oral three times a day  dextrose 5%. 1000 milliLiter(s) IV Continuous <Continuous>  dextrose 50% Injectable 12.5 Gram(s) IV Push once  dextrose 50% Injectable 25 Gram(s) IV Push once  dextrose 50% Injectable 25 Gram(s) IV Push once  influenza   Vaccine 0.5 milliLiter(s) IntraMuscular once  insulin lispro (HumaLOG) corrective regimen sliding scale   SubCutaneous three times a day before meals  insulin lispro (HumaLOG) corrective regimen sliding scale   SubCutaneous at bedtime  pantoprazole    Tablet 40 milliGRAM(s) Oral before breakfast  sodium chloride 1 Gram(s) Oral every 8 hours    PRN Inpatient Medications  ALPRAZolam 0.25 milliGRAM(s) Oral every 8 hours PRN  benzocaine 15 mG/menthol 3.6 mG (Sugar-Free) Lozenge 1 Lozenge Oral four times a day PRN  dextrose 40% Gel 15 Gram(s) Oral once PRN  glucagon  Injectable 1 milliGRAM(s) IntraMuscular once PRN      REVIEW OF SYSTEMS  --------------------------------------------------------------------------------  Gen: No  fevers/chills  Skin: No rashes  Head/Eyes/Ears/Mouth: No headache; Normal hearing; Normal vision w/o blurriness  Respiratory: No dyspnea, cough, wheezing, hemoptysis  CV: No chest pain, PND, orthopnea  GI: No abdominal pain, diarrhea, constipation, nausea, vomiting  : No increased frequency, dysuria, hematuria, nocturia  MSK: No joint pain/swelling; no back pain; no edema  Neuro: No dizziness/lightheadedness, weakness, seizures, numbness, tingling      All other systems were reviewed and are negative, except as noted.    VITALS/PHYSICAL EXAM  --------------------------------------------------------------------------------  T(C): 36.7 (12-30-19 @ 05:00), Max: 36.9 (12-29-19 @ 21:23)  HR: 71 (12-30-19 @ 05:00) (71 - 82)  BP: 132/75 (12-30-19 @ 05:00) (125/61 - 134/75)  RR: 18 (12-30-19 @ 05:00) (17 - 18)  SpO2: 100% (12-30-19 @ 05:00) (100% - 100%)  Wt(kg): --  Height (cm): 177.8 (12-28-19 @ 18:14)  Weight (kg): 68.4 (12-28-19 @ 18:14)  BMI (kg/m2): 21.6 (12-28-19 @ 18:14)  BSA (m2): 1.85 (12-28-19 @ 18:14)      Physical Exam:  	Gen: NAD, well-appearing  	HEENT: PERRL, supple neck, clear oropharynx  	Pulm: CTA B/L  	CV: RRR, S1S2;  	Back: No spinal or CVA tenderness  	Abd: +BS, soft, nontender/nondistended  	: No suprapubic tenderness                      Extremities: no bilateral LE edema noted.                       Neuro: No focal deficits, intact gait  	Skin: Warm, without rashes  	Vascular access:    LABS/STUDIES  --------------------------------------------------------------------------------              10.9   1.43  >-----------<  31       [12-30-19 @ 07:50]              32.0     127  |  92  |  17  ----------------------------<  193      [12-30-19 @ 07:50]  4.5   |  24  |  0.48        Ca     8.5     [12-30-19 @ 07:50]      Mg     1.9     [12-30-19 @ 07:50]      Phos  3.6     [12-30-19 @ 07:50]    TPro  5.7  /  Alb  2.9  /  TBili  0.4  /  DBili  x   /  AST  31  /  ALT  123  /  AlkPhos  94  [12-30-19 @ 07:50]          Creatinine Trend:  SCr 0.48 [12-30 @ 07:50]  SCr 0.55 [12-29 @ 16:50]  SCr 0.48 [12-29 @ 06:33]  SCr 0.64 [12-28 @ 18:50]  SCr 0.58 [12-28 @ 11:30]    Urinalysis - [12-28-19 @ 19:00]      Color LIGHT YELLOW / Appearance CLEAR / SG 1.013 / pH 6.5      Gluc 500 / Ketone TRACE  / Bili NEGATIVE / Urobili NORMAL       Blood NEGATIVE / Protein NEGATIVE / Leuk Est NEGATIVE / Nitrite NEGATIVE      RBC  / WBC  / Hyaline  / Gran  / Sq Epi  / Non Sq Epi  / Bacteria     Urine Creatinine 30.60      [12-28-19 @ 19:00]  Urine Sodium 119      [12-29-19 @ 21:00]  Urine Potassium 16.2      [12-28-19 @ 19:00]  Urine Chloride 60      [12-28-19 @ 19:00]  Urine Osmolality 714      [12-29-19 @ 21:00]    Iron 95, TIBC 226, %sat --      [12-29-19 @ 06:33]  Ferritin 2258      [12-29-19 @ 06:33]  HbA1c 7.1      [12-29-19 @ 06:33]  TSH 0.50      [12-28-19 @ 11:30]    HCV 0.06, Nonreactive Hepatitis C AB  S/CO Ratio                        Interpretation  < 1.00                                   Non-Reactive  1.00 - 4.99                         Weakly-Reactive  >= 5.00                                Reactive  Non-Reactive: Aperson with a non-reactive HCV antibody  result is considered uninfected.  No further action is  needed unless recent infection is suspected.  In these  cases, consider repeat testing later to detect  seroconversion..  Weakly-Reactive: HCV antibody test is abnormal, HCV RNA  Qualitative test will follow.  Reactive: HCV antibody test is abnormal, HCV RNA  Qualitative test will follow.  Note: HCV antibody testing is performed on the [facility] system.      [12-29-19 @ 06:33]

## 2020-01-07 ENCOUNTER — INPATIENT (INPATIENT)
Facility: HOSPITAL | Age: 70
LOS: 8 days | Discharge: NURSING, REHAB OR LONG-TERM CARE | End: 2020-01-16
Attending: HOSPITALIST | Admitting: HOSPITALIST
Payer: COMMERCIAL

## 2020-01-07 VITALS
SYSTOLIC BLOOD PRESSURE: 121 MMHG | HEIGHT: 70 IN | DIASTOLIC BLOOD PRESSURE: 75 MMHG | RESPIRATION RATE: 16 BRPM | OXYGEN SATURATION: 99 % | HEART RATE: 82 BPM | TEMPERATURE: 98 F

## 2020-01-07 DIAGNOSIS — E11.65 TYPE 2 DIABETES MELLITUS WITH HYPERGLYCEMIA: ICD-10-CM

## 2020-01-07 DIAGNOSIS — C71.9 MALIGNANT NEOPLASM OF BRAIN, UNSPECIFIED: ICD-10-CM

## 2020-01-07 DIAGNOSIS — Z98.890 OTHER SPECIFIED POSTPROCEDURAL STATES: Chronic | ICD-10-CM

## 2020-01-07 DIAGNOSIS — D61.810 ANTINEOPLASTIC CHEMOTHERAPY INDUCED PANCYTOPENIA: ICD-10-CM

## 2020-01-07 DIAGNOSIS — E86.0 DEHYDRATION: ICD-10-CM

## 2020-01-07 DIAGNOSIS — R56.9 UNSPECIFIED CONVULSIONS: ICD-10-CM

## 2020-01-07 DIAGNOSIS — Z02.9 ENCOUNTER FOR ADMINISTRATIVE EXAMINATIONS, UNSPECIFIED: ICD-10-CM

## 2020-01-07 DIAGNOSIS — E87.1 HYPO-OSMOLALITY AND HYPONATREMIA: ICD-10-CM

## 2020-01-07 DIAGNOSIS — E78.5 HYPERLIPIDEMIA, UNSPECIFIED: ICD-10-CM

## 2020-01-07 DIAGNOSIS — I10 ESSENTIAL (PRIMARY) HYPERTENSION: ICD-10-CM

## 2020-01-07 DIAGNOSIS — Z29.9 ENCOUNTER FOR PROPHYLACTIC MEASURES, UNSPECIFIED: ICD-10-CM

## 2020-01-07 LAB
ALBUMIN SERPL ELPH-MCNC: 3 G/DL — LOW (ref 3.3–5)
ALP SERPL-CCNC: 67 U/L — SIGNIFICANT CHANGE UP (ref 40–120)
ALT FLD-CCNC: 48 U/L — HIGH (ref 4–41)
ANION GAP SERPL CALC-SCNC: 12 MMO/L — SIGNIFICANT CHANGE UP (ref 7–14)
ANION GAP SERPL CALC-SCNC: 13 MMO/L — SIGNIFICANT CHANGE UP (ref 7–14)
ANISOCYTOSIS BLD QL: SIGNIFICANT CHANGE UP
APPEARANCE UR: CLEAR — SIGNIFICANT CHANGE UP
AST SERPL-CCNC: 19 U/L — SIGNIFICANT CHANGE UP (ref 4–40)
BASE EXCESS BLDV CALC-SCNC: -0.1 MMOL/L — SIGNIFICANT CHANGE UP
BASE EXCESS BLDV CALC-SCNC: 0.3 MMOL/L — SIGNIFICANT CHANGE UP
BASOPHILS # BLD AUTO: 0.01 K/UL — SIGNIFICANT CHANGE UP (ref 0–0.2)
BASOPHILS NFR BLD AUTO: 0.5 % — SIGNIFICANT CHANGE UP (ref 0–2)
BASOPHILS NFR SPEC: 0 % — SIGNIFICANT CHANGE UP (ref 0–2)
BILIRUB SERPL-MCNC: 0.4 MG/DL — SIGNIFICANT CHANGE UP (ref 0.2–1.2)
BILIRUB UR-MCNC: NEGATIVE — SIGNIFICANT CHANGE UP
BLASTS # FLD: 0 % — SIGNIFICANT CHANGE UP (ref 0–0)
BLOOD GAS VENOUS - CREATININE: 0.43 MG/DL — LOW (ref 0.5–1.3)
BLOOD GAS VENOUS - CREATININE: < 0.36 MG/DL — LOW (ref 0.5–1.3)
BLOOD GAS VENOUS - FIO2: 21 — SIGNIFICANT CHANGE UP
BLOOD GAS VENOUS - FIO2: 21 — SIGNIFICANT CHANGE UP
BLOOD UR QL VISUAL: NEGATIVE — SIGNIFICANT CHANGE UP
BUN SERPL-MCNC: 18 MG/DL — SIGNIFICANT CHANGE UP (ref 7–23)
BUN SERPL-MCNC: 24 MG/DL — HIGH (ref 7–23)
CALCIUM SERPL-MCNC: 8.5 MG/DL — SIGNIFICANT CHANGE UP (ref 8.4–10.5)
CALCIUM SERPL-MCNC: 8.6 MG/DL — SIGNIFICANT CHANGE UP (ref 8.4–10.5)
CHLORIDE BLDV-SCNC: 100 MMOL/L — SIGNIFICANT CHANGE UP (ref 96–108)
CHLORIDE BLDV-SCNC: 99 MMOL/L — SIGNIFICANT CHANGE UP (ref 96–108)
CHLORIDE SERPL-SCNC: 93 MMOL/L — LOW (ref 98–107)
CHLORIDE SERPL-SCNC: 96 MMOL/L — LOW (ref 98–107)
CO2 SERPL-SCNC: 22 MMOL/L — SIGNIFICANT CHANGE UP (ref 22–31)
CO2 SERPL-SCNC: 23 MMOL/L — SIGNIFICANT CHANGE UP (ref 22–31)
COLOR SPEC: YELLOW — SIGNIFICANT CHANGE UP
CREAT SERPL-MCNC: 0.41 MG/DL — LOW (ref 0.5–1.3)
CREAT SERPL-MCNC: 0.45 MG/DL — LOW (ref 0.5–1.3)
EOSINOPHIL # BLD AUTO: 0 K/UL — SIGNIFICANT CHANGE UP (ref 0–0.5)
EOSINOPHIL NFR BLD AUTO: 0 % — SIGNIFICANT CHANGE UP (ref 0–6)
EOSINOPHIL NFR FLD: 0 % — SIGNIFICANT CHANGE UP (ref 0–6)
GAS PNL BLDV: 129 MMOL/L — LOW (ref 136–146)
GAS PNL BLDV: 130 MMOL/L — LOW (ref 136–146)
GLUCOSE BLDC GLUCOMTR-MCNC: 179 MG/DL — HIGH (ref 70–99)
GLUCOSE BLDV-MCNC: 206 MG/DL — HIGH (ref 70–99)
GLUCOSE BLDV-MCNC: 274 MG/DL — HIGH (ref 70–99)
GLUCOSE SERPL-MCNC: 172 MG/DL — HIGH (ref 70–99)
GLUCOSE SERPL-MCNC: 279 MG/DL — HIGH (ref 70–99)
GLUCOSE UR-MCNC: 500 — HIGH
HCO3 BLDV-SCNC: 24 MMOL/L — SIGNIFICANT CHANGE UP (ref 20–27)
HCO3 BLDV-SCNC: 25 MMOL/L — SIGNIFICANT CHANGE UP (ref 20–27)
HCT VFR BLD CALC: 28.4 % — LOW (ref 39–50)
HCT VFR BLDV CALC: 28.3 % — LOW (ref 39–51)
HCT VFR BLDV CALC: 30.9 % — LOW (ref 39–51)
HGB BLD-MCNC: 9.8 G/DL — LOW (ref 13–17)
HGB BLDV-MCNC: 10 G/DL — LOW (ref 13–17)
HGB BLDV-MCNC: 9.1 G/DL — LOW (ref 13–17)
IMM GRANULOCYTES NFR BLD AUTO: 2.5 % — HIGH (ref 0–1.5)
KETONES UR-MCNC: SIGNIFICANT CHANGE UP
LACTATE BLDV-MCNC: 3.2 MMOL/L — HIGH (ref 0.5–2)
LACTATE BLDV-MCNC: 4 MMOL/L — CRITICAL HIGH (ref 0.5–2)
LACTATE BLDV-MCNC: 4.3 MMOL/L — CRITICAL HIGH (ref 0.5–2)
LEUKOCYTE ESTERASE UR-ACNC: NEGATIVE — SIGNIFICANT CHANGE UP
LYMPHOCYTES # BLD AUTO: 0.68 K/UL — LOW (ref 1–3.3)
LYMPHOCYTES # BLD AUTO: 33.5 % — SIGNIFICANT CHANGE UP (ref 13–44)
LYMPHOCYTES NFR SPEC AUTO: 11.7 % — LOW (ref 13–44)
MACROCYTES BLD QL: SIGNIFICANT CHANGE UP
MCHC RBC-ENTMCNC: 31.5 PG — SIGNIFICANT CHANGE UP (ref 27–34)
MCHC RBC-ENTMCNC: 34.5 % — SIGNIFICANT CHANGE UP (ref 32–36)
MCV RBC AUTO: 91.3 FL — SIGNIFICANT CHANGE UP (ref 80–100)
METAMYELOCYTES # FLD: 0 % — SIGNIFICANT CHANGE UP (ref 0–1)
MICROCYTES BLD QL: SLIGHT — SIGNIFICANT CHANGE UP
MONOCYTES # BLD AUTO: 0.18 K/UL — SIGNIFICANT CHANGE UP (ref 0–0.9)
MONOCYTES NFR BLD AUTO: 8.9 % — SIGNIFICANT CHANGE UP (ref 2–14)
MONOCYTES NFR BLD: 9.9 % — HIGH (ref 2–9)
MYELOCYTES NFR BLD: 0.9 % — HIGH (ref 0–0)
NEUTROPHIL AB SER-ACNC: 58.6 % — SIGNIFICANT CHANGE UP (ref 43–77)
NEUTROPHILS # BLD AUTO: 1.11 K/UL — LOW (ref 1.8–7.4)
NEUTROPHILS NFR BLD AUTO: 54.6 % — SIGNIFICANT CHANGE UP (ref 43–77)
NEUTS BAND # BLD: 3.6 % — SIGNIFICANT CHANGE UP (ref 0–6)
NITRITE UR-MCNC: NEGATIVE — SIGNIFICANT CHANGE UP
NRBC # BLD: 3 /100WBC — SIGNIFICANT CHANGE UP
NRBC # FLD: 0.05 K/UL — SIGNIFICANT CHANGE UP (ref 0–0)
NRBC FLD-RTO: 2.5 — SIGNIFICANT CHANGE UP
OTHER - HEMATOLOGY %: 0 — SIGNIFICANT CHANGE UP
OVALOCYTES BLD QL SMEAR: SLIGHT — SIGNIFICANT CHANGE UP
PCO2 BLDV: 32 MMHG — LOW (ref 41–51)
PCO2 BLDV: 37 MMHG — LOW (ref 41–51)
PH BLDV: 7.43 PH — SIGNIFICANT CHANGE UP (ref 7.32–7.43)
PH BLDV: 7.47 PH — HIGH (ref 7.32–7.43)
PH UR: 6 — SIGNIFICANT CHANGE UP (ref 5–8)
PLATELET # BLD AUTO: 24 K/UL — LOW (ref 150–400)
PLATELET COUNT - ESTIMATE: SIGNIFICANT CHANGE UP
PMV BLD: 10.1 FL — SIGNIFICANT CHANGE UP (ref 7–13)
PO2 BLDV: 36 MMHG — SIGNIFICANT CHANGE UP (ref 35–40)
PO2 BLDV: 59 MMHG — HIGH (ref 35–40)
POIKILOCYTOSIS BLD QL AUTO: SLIGHT — SIGNIFICANT CHANGE UP
POLYCHROMASIA BLD QL SMEAR: SLIGHT — SIGNIFICANT CHANGE UP
POTASSIUM BLDV-SCNC: 3.9 MMOL/L — SIGNIFICANT CHANGE UP (ref 3.4–4.5)
POTASSIUM BLDV-SCNC: 4.4 MMOL/L — SIGNIFICANT CHANGE UP (ref 3.4–4.5)
POTASSIUM SERPL-MCNC: 4.2 MMOL/L — SIGNIFICANT CHANGE UP (ref 3.5–5.3)
POTASSIUM SERPL-MCNC: 4.6 MMOL/L — SIGNIFICANT CHANGE UP (ref 3.5–5.3)
POTASSIUM SERPL-SCNC: 4.2 MMOL/L — SIGNIFICANT CHANGE UP (ref 3.5–5.3)
POTASSIUM SERPL-SCNC: 4.6 MMOL/L — SIGNIFICANT CHANGE UP (ref 3.5–5.3)
PROMYELOCYTES # FLD: 0 % — SIGNIFICANT CHANGE UP (ref 0–0)
PROT SERPL-MCNC: 5.6 G/DL — LOW (ref 6–8.3)
PROT UR-MCNC: NEGATIVE — SIGNIFICANT CHANGE UP
RBC # BLD: 3.11 M/UL — LOW (ref 4.2–5.8)
RBC # FLD: 17.2 % — HIGH (ref 10.3–14.5)
SAO2 % BLDV: 68.8 % — SIGNIFICANT CHANGE UP (ref 60–85)
SAO2 % BLDV: 90.6 % — HIGH (ref 60–85)
SMUDGE CELLS # BLD: PRESENT — SIGNIFICANT CHANGE UP
SODIUM SERPL-SCNC: 129 MMOL/L — LOW (ref 135–145)
SODIUM SERPL-SCNC: 130 MMOL/L — LOW (ref 135–145)
SODIUM SERPL-SCNC: 131 MMOL/L — LOW (ref 135–145)
SP GR SPEC: 1.03 — SIGNIFICANT CHANGE UP (ref 1–1.04)
UROBILINOGEN FLD QL: NORMAL — SIGNIFICANT CHANGE UP
VARIANT LYMPHS # BLD: 15.3 % — SIGNIFICANT CHANGE UP
WBC # BLD: 2.03 K/UL — LOW (ref 3.8–10.5)
WBC # FLD AUTO: 2.03 K/UL — LOW (ref 3.8–10.5)

## 2020-01-07 PROCEDURE — 71045 X-RAY EXAM CHEST 1 VIEW: CPT | Mod: 26

## 2020-01-07 PROCEDURE — 99223 1ST HOSP IP/OBS HIGH 75: CPT | Mod: GC

## 2020-01-07 PROCEDURE — 93010 ELECTROCARDIOGRAM REPORT: CPT

## 2020-01-07 PROCEDURE — 70450 CT HEAD/BRAIN W/O DYE: CPT | Mod: 26

## 2020-01-07 RX ORDER — GLUCAGON INJECTION, SOLUTION 0.5 MG/.1ML
1 INJECTION, SOLUTION SUBCUTANEOUS ONCE
Refills: 0 | Status: DISCONTINUED | OUTPATIENT
Start: 2020-01-07 | End: 2020-01-14

## 2020-01-07 RX ORDER — LEVETIRACETAM 250 MG/1
500 TABLET, FILM COATED ORAL EVERY 12 HOURS
Refills: 0 | Status: DISCONTINUED | OUTPATIENT
Start: 2020-01-07 | End: 2020-01-10

## 2020-01-07 RX ORDER — LEVETIRACETAM 250 MG/1
1000 TABLET, FILM COATED ORAL ONCE
Refills: 0 | Status: COMPLETED | OUTPATIENT
Start: 2020-01-07 | End: 2020-01-07

## 2020-01-07 RX ORDER — SODIUM CHLORIDE 9 MG/ML
1000 INJECTION INTRAMUSCULAR; INTRAVENOUS; SUBCUTANEOUS ONCE
Refills: 0 | Status: COMPLETED | OUTPATIENT
Start: 2020-01-07 | End: 2020-01-07

## 2020-01-07 RX ORDER — DEXAMETHASONE 0.5 MG/5ML
4 ELIXIR ORAL THREE TIMES A DAY
Refills: 0 | Status: DISCONTINUED | OUTPATIENT
Start: 2020-01-07 | End: 2020-01-10

## 2020-01-07 RX ORDER — SODIUM CHLORIDE 9 MG/ML
500 INJECTION INTRAMUSCULAR; INTRAVENOUS; SUBCUTANEOUS ONCE
Refills: 0 | Status: COMPLETED | OUTPATIENT
Start: 2020-01-07 | End: 2020-01-07

## 2020-01-07 RX ORDER — DEXTROSE 50 % IN WATER 50 %
15 SYRINGE (ML) INTRAVENOUS ONCE
Refills: 0 | Status: DISCONTINUED | OUTPATIENT
Start: 2020-01-07 | End: 2020-01-16

## 2020-01-07 RX ORDER — INSULIN LISPRO 100/ML
VIAL (ML) SUBCUTANEOUS EVERY 6 HOURS
Refills: 0 | Status: DISCONTINUED | OUTPATIENT
Start: 2020-01-07 | End: 2020-01-08

## 2020-01-07 RX ORDER — DEXTROSE 50 % IN WATER 50 %
25 SYRINGE (ML) INTRAVENOUS ONCE
Refills: 0 | Status: DISCONTINUED | OUTPATIENT
Start: 2020-01-07 | End: 2020-01-16

## 2020-01-07 RX ORDER — DEXTROSE 50 % IN WATER 50 %
12.5 SYRINGE (ML) INTRAVENOUS ONCE
Refills: 0 | Status: DISCONTINUED | OUTPATIENT
Start: 2020-01-07 | End: 2020-01-16

## 2020-01-07 RX ORDER — SODIUM CHLORIDE 9 MG/ML
1000 INJECTION, SOLUTION INTRAVENOUS
Refills: 0 | Status: DISCONTINUED | OUTPATIENT
Start: 2020-01-07 | End: 2020-01-14

## 2020-01-07 RX ORDER — PANTOPRAZOLE SODIUM 20 MG/1
40 TABLET, DELAYED RELEASE ORAL DAILY
Refills: 0 | Status: DISCONTINUED | OUTPATIENT
Start: 2020-01-07 | End: 2020-01-13

## 2020-01-07 RX ORDER — INSULIN GLARGINE 100 [IU]/ML
8 INJECTION, SOLUTION SUBCUTANEOUS ONCE
Refills: 0 | Status: COMPLETED | OUTPATIENT
Start: 2020-01-07 | End: 2020-01-07

## 2020-01-07 RX ADMIN — Medication 1 MILLIGRAM(S): at 18:00

## 2020-01-07 RX ADMIN — Medication 2 MILLIGRAM(S): at 13:23

## 2020-01-07 RX ADMIN — Medication 4 MILLIGRAM(S): at 22:14

## 2020-01-07 RX ADMIN — SODIUM CHLORIDE 500 MILLILITER(S): 9 INJECTION INTRAMUSCULAR; INTRAVENOUS; SUBCUTANEOUS at 12:20

## 2020-01-07 RX ADMIN — LEVETIRACETAM 400 MILLIGRAM(S): 250 TABLET, FILM COATED ORAL at 13:30

## 2020-01-07 RX ADMIN — LEVETIRACETAM 1000 MILLIGRAM(S): 250 TABLET, FILM COATED ORAL at 13:45

## 2020-01-07 RX ADMIN — SODIUM CHLORIDE 1000 MILLILITER(S): 9 INJECTION INTRAMUSCULAR; INTRAVENOUS; SUBCUTANEOUS at 04:21

## 2020-01-07 RX ADMIN — Medication 1: at 22:20

## 2020-01-07 RX ADMIN — SODIUM CHLORIDE 1000 MILLILITER(S): 9 INJECTION INTRAMUSCULAR; INTRAVENOUS; SUBCUTANEOUS at 03:24

## 2020-01-07 RX ADMIN — SODIUM CHLORIDE 500 MILLILITER(S): 9 INJECTION INTRAMUSCULAR; INTRAVENOUS; SUBCUTANEOUS at 11:19

## 2020-01-07 RX ADMIN — INSULIN GLARGINE 8 UNIT(S): 100 INJECTION, SOLUTION SUBCUTANEOUS at 22:14

## 2020-01-07 NOTE — H&P ADULT - PROBLEM SELECTOR PLAN 5
As per wife patient on RT and chemo. Chemo has been on hold due to thrombocytopenia last chemo session about 3 weeks ago.  - Continue Decadron 4mg TID IV  - Follow up with neurology recs  - Consider Speech and swallow evaluation As per wife patient on RT and chemo. Chemo has been on hold due to thrombocytopenia last chemo session about 3 weeks ago.  - Continue Decadron 4mg TID IV  - Follow up with neurology recs  - Consider Speech and swallow evaluation once patient's mental status improves and can cooperate BUN elevated with a lactate elevation to 4.3 -> 3.2 s/p 2.5L of NS  - Holding off on IVF hydration will check BMP for Na level

## 2020-01-07 NOTE — H&P ADULT - PROBLEM SELECTOR PLAN 2
Hyperglycemia 200-300s. On Metformin at home. Last A1C in 12/19 7.1. Hyperglycemia likely in the setting of steroids.  - Low ISS Q6H while NPO  - If fingersticks continue to be elevated may need to use steroid induced hyperglycemia protocol to adjust basal and bolus coverage Hyperglycemia 200-300s. On Metformin at home. Last A1C in 12/19 7.1. Hyperglycemia likely in the setting of steroids.  - Low ISS Q6H while NPO  - Goal fs 120 - 180   - If fingersticks continue to be elevated may need to use steroid induced hyperglycemia protocol to adjust basal and bolus coverage Hyperglycemia 200-300s. On Metformin at home. Last A1C in 12/19 7.1. Hyperglycemia likely in the setting of steroids.  - Low ISS Q6H while NPO  - Will give 8U of Lantus now   - Goal fs 120 - 180   - If fingersticks continue to be elevated may need to use steroid induced hyperglycemia protocol to adjust basal and bolus coverage Hyperglycemia 200-300s. On Metformin at home. Last A1C in 12/19 7.1. Hyperglycemia likely in the setting of steroids.  - Low ISS Q6H while NPO  - Will give 8U of Lantus now   - May need to be on Lantus while taking steroids   - Goal fs 120 - 180   - If fingersticks continue to be elevated may need to use steroid induced hyperglycemia protocol to adjust basal and bolus coverage

## 2020-01-07 NOTE — ED PROVIDER NOTE - ATTENDING CONTRIBUTION TO CARE
Attending Attestation: Dr. Kenney  I have personally performed a history and physical examination of the patient and discussed management with the resident as well as the patient.  I reviewed the resident's note and agree with the documented findings and plan of care.  I have authored and modified critical sections of the Provider Note, including but not limited to HPI, Physical Exam and MDM. 69M hx of GBM, on chronic dexamethasone, HTN HLD recent admission for hyponatremia presents with a cc of elevated blood sugar per family at bedside had elevated episodes of blood sugar today otherwise is acting as normally, no other complaints exam vss non toxic non focal exam will check labs eval for dka/hhs hydrate as indicated, reassess.

## 2020-01-07 NOTE — H&P ADULT - PROBLEM SELECTOR PLAN 10
#10 Prophylactic Measure  DVT PPx: Thrombocytopenic so SCDs  Diet: NPO    PT eval    Transitions of Care Status:  1.  Name of PCP: Dr. Hinson  2.  PCP Contacted on Admission: [ ] Y    [X ] N    3.  PCP contacted at Discharge: [ ] Y    [ ] N    [ ] N/A  4.  Post-Discharge Appointment Date and Location:  5.  Summary of Handoff given to PCP:

## 2020-01-07 NOTE — H&P ADULT - PROBLEM SELECTOR PLAN 6
- Not on any current BP meds, SBPs mostly in 110s to 120s had an episode in 180s possibly in the setting of seizure? - Consider Speech and swallow evaluation once patient's mental status improves (post-ictal state resolves) and can cooperate

## 2020-01-07 NOTE — H&P ADULT - PROBLEM SELECTOR PLAN 3
Likely in the setting of chemo and malignancy  - Plt 24 transfuse with Plt < 10  - H/H 9.8, continue to monitor with daily CBCs

## 2020-01-07 NOTE — H&P ADULT - HISTORY OF PRESENT ILLNESS
Mr. Rider is a 69 year old Ukrainian-speaking male with a PMH of GBM diagnosed in 9/19, HTN, HLD [place] [facility] [place].    Of note patient was hospitalized between 12/28/19 to 12/29/19 for hyponatremia. Renal was consulted with plan for fluid restriction and salt tabs. Was discharged with home PT.    In the ED:  Vitals: 98.2F /75 HR 82 RR 16 SpO2 99% on RA  Events: Had a witnessed GTC seizure   Labs: WBC 2.03 H/H 9.8/28.4 Plt 24 Na 130 BUN 24 ALT 48 Lactate 4.3  glu I  Imaging: CTH shows decreased attenuation, heterogeneity along the corpus callosum splenium and biparietal and right temporal lobes and left frontal lobe. CXR negative.   Interventions: 2.5L of NS, Ativan 2mg x 1, Ativan 1mg x 1 Mr. Rider is a 69 year old Czech-speaking male with a PMH of GBM (chemo on hold due to low platelets) diagnosed in 9/19, HTN, HLD currently undergoing radiation therapy at [facility] [place] presenting with hyperglycemia. Patient is A/O x 2 and drowsy from Ativan medication in the ER. Wife at bedside who is able to provide history. Reports for the past 2 weeks patient has been hyperglycemic between 200s to 300s which she believes its due to the Decadron. Reports has been on Decadron for some time. Most recent fingerstick was 352 which urged them to come to the ER last night. Otherwise has had no fevers/chills, cough, rhinorrhea, abdominal pain, nausea, vomiting or diarrhea. Has been physically declining the last couple weeks requiring to use his cane. Wife reports while in the ED had an episode of eyes rolling in the back of his head and generalized tonic-clonic like movements and was told patient had a seizure. Of note as per [employer] review patient was on Keppra 500mg BID but patient's neurologist recommended tapering off Keppra in October 2019. Patient eats regular food however sometimes when he drinks water as per wife patient says sometimes it gets "stuck in his throat".     Of note patient was hospitalized between 12/28/19 to 12/29/19 for hyponatremia. Renal was consulted with plan for fluid restriction and salt tabs. Was discharged with home PT.     In the ED:  Vitals: 98.2F /75 HR 82 RR 16 SpO2 99% on RA  Events: Had a witnessed GTC seizure given Ativan and then a few hours later had another brief episode of seizure requiring Ativan    Labs: WBC 2.03 H/H 9.8/28.4 Plt 24 Na 130 BUN 24 ALT 48 Lactate 4.3  glu I  Imaging: CTH shows decreased attenuation, heterogeneity along the corpus callosum splenium and biparietal and right temporal lobes and left frontal lobe. CXR negative.   Interventions: 2.5L of NS, Ativan 2mg x 1, Ativan 1mg x 1 Mr. Rider is a 69 year old Bengali-speaking male with MHx of GBM (chemo on hold due to low platelets) diagnosed in 9/19, HTN, HLD currently undergoing radiation therapy at [facility] presenting with hyperglycemia. Patient is A/O x 2 and drowsy from Ativan medication in the ER. Wife at bedside who is able to provide history. Reports for the past 2 weeks patient has been hyperglycemic between 200s to 300s which she believes its due to the Decadron. Reports has been on Decadron for some time. Most recent fingerstick was 352 which urged them to come to the ER last night. Otherwise has had no fevers/chills, cough, rhinorrhea, abdominal pain, nausea, vomiting or diarrhea. Has been physically declining the last couple weeks requiring to use his cane. Wife reports while in the ED had an episode of eyes rolling in the back of his head and generalized tonic-clonic like movements and was told patient had a seizure. Of note as per [employer] review patient was on Keppra 500mg BID but patient's neurologist recommended tapering off Keppra in October 2019. Patient eats regular food however sometimes when he drinks water as per wife patient says sometimes it gets "stuck in his throat".     Of note patient was hospitalized between 12/28/19 to 12/29/19 for hyponatremia. Renal was consulted with plan for fluid restriction and salt tabs. Was discharged with home PT.     In the ED:  Vitals: 98.2F /75 HR 82 RR 16 SpO2 99% on RA  Events: Had a witnessed GTC seizure given Ativan and then a few hours later had another brief episode of seizure requiring Ativan    Labs: WBC 2.03 H/H 9.8/28.4 Plt 24 Na 130 BUN 24 ALT 48 Lactate 4.3  glu I  Imaging: CTH shows decreased attenuation, heterogeneity along the corpus callosum splenium and biparietal and right temporal lobes and left frontal lobe. CXR negative.   Interventions: 2.5L of NS, Ativan 2mg x 1, Ativan 1mg x 1

## 2020-01-07 NOTE — ED PROVIDER NOTE - CLINICAL SUMMARY MEDICAL DECISION MAKING FREE TEXT BOX
Feroz PGY3: 69M hx of GBM HTN HLD recent admission for hyponatremia presents with a cc of elevated blood sugar per family at bedside had elevated episodes of blood sugar today otherwise is acting as normally, no other complaints exam vss non toxic non focal exam will check labs eval for dka/hhs hydrate as indicated, reassess 69M hx of GBM, on chronic dexamethasone, HTN HLD recent admission for hyponatremia presents with a cc of elevated blood sugar per family at bedside had elevated episodes of blood sugar today otherwise is acting as normally, no other complaints exam vss non toxic non focal exam will check labs eval for dka/hhs hydrate as indicated, reassess.

## 2020-01-07 NOTE — H&P ADULT - NSHPLABSRESULTS_GEN_ALL_CORE
EKG, 1/7, nsr 74bpm, qtc 439, occasional PACs, isolated Tw in III, no acute Tw or ST changes - my reading EKG, 1/7, nsr 74bpm, qtc 439, occasional PACs, isolated Tw in III, no acute Tw or ST changes - my reading    CXR -  clear lungs, no pleural effusions  - my reading     CT BRAIN   PROCEDURE DATE: Jan 7 2020   COMPARISON: Prior head CT dated 12/28/2019, MRI brain 9/25/2019, head CT,   9/28/2019.   FINDINGS:   Limited study secondary to motion artifact. Redemonstration right parietal   teri hole and right parietal surgical tract unchanged. There is   redemonstration of an expansile low attenuation mass mass in the corpus   callosum predominantly the posterior body and splenium with heterogeneity   extending to the ventricular ependymal margins. There is slight increased   attenuation extending toward the right hippocampal tail body and head,   redemonstration of foci of increased attenuation in the left periatrial   white matter. In patient with known GBM and seizures, MRI with gadolinium   may be more sensitive.   Additional foci of decreased attenuation is noted in the left left frontal   white matter, (5:10), unchanged from prior, (2:11) although new compared to   more remote study of 9/28/2019. Incidental kesha-cisterna magna unchanged.   Redemonstration of mild enlargement of ventricles and sulci consistent with   volume loss left interval change. There is no new large extra-axial   hemorrhage or midline shift. Basal cisterns remain unchanged.     Absent orbital lenses with previous cataract surgery, sinuses and mastoids   remain unremarkable.     IMPRESSION: Motion limited study, with redemonstration sequela previous   right parietal biopsy, and continued decreased attenuation, heterogeneity   along the corpus callosum splenium and biparietal and right temporal lobes   and left frontal lobe, with GBM and seizures, MRI with gadolinium may be   more sensitive and obtained for improved assessment as clinically warranted,   and no contraindications.

## 2020-01-07 NOTE — ED ADULT NURSE NOTE - OBJECTIVE STATEMENT
Patient is awake, A&O x 3, NAD, presents to ED for hyperglycemia.   at home and 299 in triage.  Daughter states, she brought the patient to the hospital to start him on insulin.  He currently doesn't take insulin.  Denies abdominal pain, nausea and vomiting.  History of brain tumor and memory loss.  20g IV right AC, labs drawn and sent, vitals taken and will continue to monitor patient.

## 2020-01-07 NOTE — H&P ADULT - ASSESSMENT
69 year old Thai-speaking male with a PMH of GBM diagnosed in 9/19, HTN, HLD currently undergoing radiation therapy at [facility] [place] presenting with hyperglycemia. 69 year old Tajik-speaking male with a PMH of GBM diagnosed in 9/19, HTN, HLD currently undergoing radiation therapy at [facility] [place] presenting with hyperglycemia likely 2/2 to steroid-induced from Decadron. Found to have two episodes of seizures. 69 year old Khmer-speaking male with a PMH of GBM diagnosed in 9/19, HTN, HLD currently undergoing [place] [facility] [place] hyperglycemia likely 2/2 to steroid-induced from Decadron. Found to have two episodes of seizures.

## 2020-01-07 NOTE — H&P ADULT - LYMPHATIC
posterior cervical L/supraclavicular R/anterior cervical R/anterior cervical L/supraclavicular L/posterior cervical R

## 2020-01-07 NOTE — H&P ADULT - PROBLEM SELECTOR PLAN 1
Likely 2/2 to hx of GBM. On Keppra in the past but was tapered. Seen by Neurology. CTH no significant changes shows post-biopsy changes.  - Continue Keppra IV 500mg BID  - MRI head w/w/o  - VEEG   - Further neuro recs  - Aspiration precautions  - Seizure precautions  - Keep NPO given mental status   - EKG ordered Likely 2/2 to hx of GBM. On Keppra in the past but was tapered. Seen by Neurology. CTH no significant changes shows post-biopsy changes. Likely cause of lactate elevation  - Continue Keppra IV 500mg BID  - MRI head w/w/o  - VEEG   - Further neuro recs  - Aspiration precautions  - Seizure precautions  - Keep NPO given mental status   - EKG ordered  - Will check lactate in AM Likely 2/2 to hx of GBM. On Keppra in the past but was tapered. Seen by Neurology. CTH no significant changes shows post-biopsy changes. Likely cause of lactate elevation  - Continue Keppra IV 500mg BID  - MRI head w/w/o  - Neuro checks Q4H   - VEEG   - Further neuro recs  - Aspiration precautions  - Seizure precautions  - Keep NPO given mental status   - EKG ordered  - Will check lactate in AM Likely 2/2 to hx of GBM and possibly hyperglycemic state. On Keppra in the past but was tapered. Seen by Neurology. CTH no significant changes shows post-biopsy changes. Likely cause of lactate elevation  - Continue Keppra IV 500mg BID  - MRI head w/w/o  - Neuro checks Q4H   - VEEG   - Further neuro recs  - Aspiration precautions  - Seizure precautions  - Keep NPO given mental status   - EKG: no acute changes   - Will check lactate in AM

## 2020-01-07 NOTE — H&P ADULT - PROBLEM SELECTOR PLAN 4
Na 130 -> 131. 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 Recently admitted for hyponatremia with renal recommending fluid restriction and salt tabs   - Will check BMP now  - S/p 2.5L of NS

## 2020-01-07 NOTE — ED ADULT NURSE REASSESSMENT NOTE - NS ED NURSE REASSESS COMMENT FT1
Pt postictal resting comfortably.  Pt family at bedside.  Pt placed on 3 L O2 NC. Vital signs noted.  Will continue to monitor

## 2020-01-07 NOTE — H&P ADULT - PROBLEM SELECTOR PLAN 7
- On Lipitor 20mg QD holding in the setting of NPO As per wife patient on RT and chemo. Chemo has been on hold due to thrombocytopenia last chemo session about 3 weeks ago.  - Continue Decadron 4mg TID IV  - Follow up with neurology recs As per wife patient on RT and chemo. Chemo has been on hold due to thrombocytopenia last chemo session about 3 weeks ago.  - Continue Decadron 4mg TID IV  - 40 IV Protonix QD for GI PPx   - Follow up with neurology recs

## 2020-01-07 NOTE — H&P ADULT - NSICDXPASTSURGICALHX_GEN_ALL_CORE_FT
PAST SURGICAL HISTORY:  H/O colonoscopy     History of laryngoscopy PAST SURGICAL HISTORY:  H/O colonoscopy     History of laryngoscopy     Status post stereotactic brain biopsy

## 2020-01-07 NOTE — H&P ADULT - PROBLEM SELECTOR PLAN 8
DVT PPx: Thrombocytopenic so SCDs  Diet: NPO    PT eval - Not on any current BP meds, SBPs mostly in 110s to 120s had an episode in 180s possibly in the setting of seizure? - Not on any current BP meds, SBPs mostly in 110s-120s mmHg, had an episode in 180s possibly in the setting of seizure?

## 2020-01-07 NOTE — H&P ADULT - NSHPPHYSICALEXAM_GEN_ALL_CORE
PHYSICAL EXAM:  GENERAL: NAD, well-groomed, well-developed  HEAD: Atraumatic, Normocephalic  EYES: EOMI, PERRLA, conjunctiva and sclera clear  ENMT: No tonsillar erythema, exudates, or enlargement; Moist mucous membranes  NECK: Supple, No JVD, Normal Thyroid  NERVOUS SYSTEM: Alert & Oriented X3, Good concentration; Motor Strength 5/5 B/L upper and lower extremities  CHEST/LUNG: Clear to auscultation bilaterally; No rales, rhonchi, wheezing, or rubs  HEART: Regular rate and rhythm; S1 and S2; No murmurs, rubs, or gallops  ABDOMEN: Soft, Nontender, Nondistended: Bowel sounds present  EXTREMITIES: 2+ Peripheral Pulses, No clubbing, cyanosis, or edema  LYMPH: No lymphadenopathy noted  SKIN: No rashes or lesions PHYSICAL EXAM:  GENERAL: NAD, somnolent A/O x 2  HEAD: Atraumatic, Normocephalic  EYES: EOMI, PERRLA, conjunctiva and sclera clear  ENMT: No tonsillar erythema, exudates, or enlargement; Dry mucous membranes  NECK: Supple  NERVOUS SYSTEM: Alert & Oriented X2 (name and place), somnolent 5/5 strength in UE bilaterally and 4/5 strength in LE bilaterally  CHEST/LUNG: Clear to auscultation bilaterally; No rales, rhonchi, wheezing  HEART: Regular rate and rhythm; S1 and S2; No murmurs  ABDOMEN: Soft, Nontender, distended: Bowel sounds present  EXTREMITIES: 2+ Peripheral Pulses, No edema  LYMPH: No lymphadenopathy noted  SKIN: Dry scaly skin in LE bilaterally  PSYCH: Calm

## 2020-01-07 NOTE — ED PROVIDER NOTE - SHIFT CHANGE DETAILS
I have signed over this patient to the above attending physician. Pertinent history, physical exam findings and workup thus far in the ED have been discussed. The pending tests and plan, including CT head were signed over.  All questions from the above attending physician have been answered.

## 2020-01-07 NOTE — ED ADULT TRIAGE NOTE - CHIEF COMPLAINT QUOTE
pt arrives w/ c/o elevated GL. pt states readings at home was 352. pt hx of brain tumor and suffers from memory loss. pt denies any abdominal pain, nausea, vomiting.

## 2020-01-07 NOTE — ED PROVIDER NOTE - PROGRESS NOTE DETAILS
Josef: pt sohail dout to me by DR Kenney pending urine and repeat lactate. pt lactate downtrending but then had a witnessed generalized tonic clonic seizure. Pt has a h/o GBM but no seizure history. Pt given ativan and keppra load. CT head ordered. will continue to reassess.

## 2020-01-07 NOTE — ED PROVIDER NOTE - OBJECTIVE STATEMENT
69M hx of GBM HTN HLD recent admission for hyponatremia presents with a cc of elevated blood sugar per family at bedside had elevated episodes of blood sugar today otherwise is acting as normally, no other complaints. Denies n/v/f/c/cp/sob. Denies headache, syncope, lightheadedness, dizziness. Denies chest palpitations, abdominal pain. Denies dysuria, hematuria, hematochezia, BRBPR, tarry stools, diarrhea, constipation. 69M hx of GBM, on dexamethasone, HTN HLD recent admission for hyponatremia presents with a cc of elevated blood sugar per family at bedside had elevated episodes of blood sugar today otherwise is acting as normally, no other complaints. Denies n/v/f/c/cp. Has SOB at baseline, unchanged per daughter. Denies headache, syncope, lightheadedness, dizziness. Denies chest palpitations, abdominal pain. Denies dysuria, hematuria, hematochezia, BRBPR, tarry stools, diarrhea, constipation.    Hx supplemented by daughter Hailey.

## 2020-01-07 NOTE — ED ADULT NURSE REASSESSMENT NOTE - NS ED NURSE REASSESS COMMENT FT1
Pt a&ox4 and ambulatory. During assessment pt states he doesn't feel well and doesn't know why.  Had witnessed seizure.  Eye vesiculations with ridged extremities lasting approximately 1 min. MD Bahena notified and aware and at bedside for eval.  Pt medicated 2 mg IV ativan as per orders.  Pt post ictal with 3 L nasal canula applied O2 at 98%.  Vital signs as noted.  Will continue to monitor.

## 2020-01-07 NOTE — H&P ADULT - PROBLEM SELECTOR PLAN 9
Transitions of Care Status:  1.  Name of PCP: Dr. Hinson  2.  PCP Contacted on Admission: [ ] Y    [X ] N    3.  PCP contacted at Discharge: [ ] Y    [ ] N    [ ] N/A  4.  Post-Discharge Appointment Date and Location:  5.  Summary of Handoff given to PCP: - On Lipitor 20mg QD holding in the setting of NPO

## 2020-01-07 NOTE — H&P ADULT - NSHPREVIEWOFSYSTEMS_GEN_ALL_CORE

## 2020-01-07 NOTE — CONSULT NOTE ADULT - ATTENDING COMMENTS
The patient was presented on rounds and examined at the bedside.  Agree with above history and physical examination.  On today’s examination he is wakeful with clotted consciousness.  His speech is hypophonic and dysarthric.  He can follow simple commands with the assistance of a .  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  His mental status has improved in the last 24 hours and may be a postictal phenomenon.  Suggest video EEG monitoring and continue with Keppra 500 mg twice a day.  We will follow clinically

## 2020-01-07 NOTE — CONSULT NOTE ADULT - SUBJECTIVE AND OBJECTIVE BOX
Neurology  Consult Note  01-07-20    Name:  ADDIS SCHWAB; 69y (1950)    Reason for Admission: Convulsions    Chief Complaint:  Seizures    HPI:  68yo  man with a PMHx of GBM diagnosed 9/19 currently undergoing radiation therapy at [facility] presented to the ED w/ hyperglycemia, no other complaints. In the ED, patient experienced GTC, witnessed by providers, described as patient stating he felt 'strange' before having his eyes roll back, followed by stiffening of the upper extremities, then by generalized convulsion, lasting <30 seconds. Ativan administered. Patient woke, seeming to return to baseline, before experiencing a second episode. Ativan was administered again during aura phase, and GTC was avoided. Patient was loaded with Keppra 1g IV.   On examination, patient was sedated, and difficult to arouse. Primary historian is wife. Translation provided by daughter over the phone who is a physician.     Of note patient was hospitalized between 12/28/19 to 12/29/19 for hyponatremia. Renal was consulted with plan for fluid restriction and salt tabs. Was discharged with home PT.    In the ED:  Vitals: 98.2F /75 HR 82 RR 16 SpO2 99% on RA  Events: Had a witnessed GTC seizure   Labs: WBC 2.03 H/H 9.8/28.4 Plt 24 Na 130 BUN 24 ALT 48 Lactate 4.3  glu I  Imaging: CTH shows decreased attenuation, heterogeneity along the corpus callosum splenium and biparietal and right temporal lobes and left frontal lobe. CXR negative.     Review of Systems:  As states in HPI.    PMHx:   Hyperlipidemia  Neoplasm of unspecified behavior of brain  Hypertension  No pertinent past medical history    PFHx:   FH: myocardial infarction  No pertinent family history in first degree relatives    PSuHx:   History of laryngoscopy  H/O colonoscopy  History of hyperlipidemia    Medications:  MEDICATIONS  (STANDING):    MEDICATIONS  (PRN):    Vitals:  T(C): 36.7 (01-07-20 @ 20:03), Max: 37 (01-07-20 @ 09:16)  HR: 77 (01-07-20 @ 20:03) (62 - 82)  BP: 108/67 (01-07-20 @ 20:03) (108/61 - 180/82)  RR: 15 (01-07-20 @ 20:03) (15 - 18)  SpO2: 100% (01-07-20 @ 20:03) (99% - 100%)    Labs:                        9.8    2.03  )-----------( 24       ( 07 Jan 2020 03:30 )             28.4     01-07    131<L>  |  x   |  x   ----------------------------<  x   x    |  x   |  x     Ca    8.6      07 Jan 2020 03:30    TPro  5.6<L>  /  Alb  3.0<L>  /  TBili  0.4  /  DBili  x   /  AST  19  /  ALT  48<H>  /  AlkPhos  67  01-07    CAPILLARY BLOOD GLUCOSE      POCT Blood Glucose.: 217 mg/dL (07 Jan 2020 13:12)    LIVER FUNCTIONS - ( 07 Jan 2020 03:30 )  Alb: 3.0 g/dL / Pro: 5.6 g/dL / ALK PHOS: 67 u/L / ALT: 48 u/L / AST: 19 u/L / GGT: x           PHYSICAL EXAMINATION:  General: Well-developed, well nourished, in no acute distress.  Eyes: Conjunctiva and sclera clear.  Neurologic:  - Mental Status: Asleep, unable to around w/ verbal stimuli. Arousable briefly to noxious stimuli before promptly falling back asleep.   - Cranial Nerves II-XII:    II:  Visual fields are full to confrontation; Pupils are equal, round, and reactive to light;   III, IV, VI:  Extraocular movements are intact  VII:  Face is symmetric with normal eye closure and smile  - Motor/Sensory: Moves all 4 extremities equally.   - Reflexes:  2+ and symmetric at the biceps, triceps, brachioradialis, knees, and ankles.  Plantar responses flexor.    Radiology:  < from: CT Head No Cont (01.07.20 @ 17:32) >  IMPRESSION: Motion limited study, with redemonstration sequela previous right parietal biopsy, and continued decreased attenuation, heterogeneity along the corpus callosum splenium and biparietal and right temporal lobes and left frontal lobe, with GBM and seizures, MRI with gadolinium may be more sensitive and obtained for improved assessment as clinically warranted, and no contraindications.    MARS RUEDA M.D., RADIOLOGY RESIDENT  This document has been electronically signed.  ISIDRO MATA M.D., ATTENDING RADIOLOGIST  This document has been electronically signed. Jan 7 2020  6:04PM  < end of copied text >

## 2020-01-07 NOTE — ED ADULT NURSE REASSESSMENT NOTE - NS ED NURSE REASSESS COMMENT FT1
Pt resting in stretcher set up by himself. c/o "feeling weird" and would not elaborate. Pt appeared similar to prior episode of seizure.  pt medicated as per orders.  no active seizure witness. Vital signs as noted. Will continue to monitor.

## 2020-01-08 LAB
ALBUMIN SERPL ELPH-MCNC: 2.8 G/DL — LOW (ref 3.3–5)
ALP SERPL-CCNC: 64 U/L — SIGNIFICANT CHANGE UP (ref 40–120)
ALT FLD-CCNC: 42 U/L — HIGH (ref 4–41)
ANION GAP SERPL CALC-SCNC: 14 MMO/L — SIGNIFICANT CHANGE UP (ref 7–14)
APTT BLD: 23 SEC — LOW (ref 27.5–36.3)
AST SERPL-CCNC: 21 U/L — SIGNIFICANT CHANGE UP (ref 4–40)
BACTERIA UR CULT: SIGNIFICANT CHANGE UP
BASOPHILS # BLD AUTO: 0 K/UL — SIGNIFICANT CHANGE UP (ref 0–0.2)
BASOPHILS NFR BLD AUTO: 0 % — SIGNIFICANT CHANGE UP (ref 0–2)
BILIRUB SERPL-MCNC: 0.5 MG/DL — SIGNIFICANT CHANGE UP (ref 0.2–1.2)
BUN SERPL-MCNC: 15 MG/DL — SIGNIFICANT CHANGE UP (ref 7–23)
CALCIUM SERPL-MCNC: 8.6 MG/DL — SIGNIFICANT CHANGE UP (ref 8.4–10.5)
CHLORIDE SERPL-SCNC: 94 MMOL/L — LOW (ref 98–107)
CO2 SERPL-SCNC: 22 MMOL/L — SIGNIFICANT CHANGE UP (ref 22–31)
CREAT SERPL-MCNC: 0.42 MG/DL — LOW (ref 0.5–1.3)
EOSINOPHIL # BLD AUTO: 0 K/UL — SIGNIFICANT CHANGE UP (ref 0–0.5)
EOSINOPHIL NFR BLD AUTO: 0 % — SIGNIFICANT CHANGE UP (ref 0–6)
GLUCOSE BLDC GLUCOMTR-MCNC: 127 MG/DL — HIGH (ref 70–99)
GLUCOSE BLDC GLUCOMTR-MCNC: 145 MG/DL — HIGH (ref 70–99)
GLUCOSE BLDC GLUCOMTR-MCNC: 147 MG/DL — HIGH (ref 70–99)
GLUCOSE BLDC GLUCOMTR-MCNC: 154 MG/DL — HIGH (ref 70–99)
GLUCOSE BLDC GLUCOMTR-MCNC: 186 MG/DL — HIGH (ref 70–99)
GLUCOSE SERPL-MCNC: 148 MG/DL — HIGH (ref 70–99)
HCT VFR BLD CALC: 29.8 % — LOW (ref 39–50)
HGB BLD-MCNC: 10.4 G/DL — LOW (ref 13–17)
IMM GRANULOCYTES NFR BLD AUTO: 1.7 % — HIGH (ref 0–1.5)
INR BLD: 0.93 — SIGNIFICANT CHANGE UP (ref 0.88–1.17)
LACTATE SERPL-SCNC: 2.6 MMOL/L — HIGH (ref 0.5–2)
LYMPHOCYTES # BLD AUTO: 0.57 K/UL — LOW (ref 1–3.3)
LYMPHOCYTES # BLD AUTO: 32.8 % — SIGNIFICANT CHANGE UP (ref 13–44)
MAGNESIUM SERPL-MCNC: 1.8 MG/DL — SIGNIFICANT CHANGE UP (ref 1.6–2.6)
MANUAL SMEAR VERIFICATION: SIGNIFICANT CHANGE UP
MCHC RBC-ENTMCNC: 31.2 PG — SIGNIFICANT CHANGE UP (ref 27–34)
MCHC RBC-ENTMCNC: 34.9 % — SIGNIFICANT CHANGE UP (ref 32–36)
MCV RBC AUTO: 89.5 FL — SIGNIFICANT CHANGE UP (ref 80–100)
MONOCYTES # BLD AUTO: 0.13 K/UL — SIGNIFICANT CHANGE UP (ref 0–0.9)
MONOCYTES NFR BLD AUTO: 7.5 % — SIGNIFICANT CHANGE UP (ref 2–14)
NEUTROPHILS # BLD AUTO: 1.01 K/UL — LOW (ref 1.8–7.4)
NEUTROPHILS NFR BLD AUTO: 58 % — SIGNIFICANT CHANGE UP (ref 43–77)
NRBC # FLD: 0.04 K/UL — SIGNIFICANT CHANGE UP (ref 0–0)
NRBC FLD-RTO: 2.3 — SIGNIFICANT CHANGE UP
PHOSPHATE SERPL-MCNC: 4.1 MG/DL — SIGNIFICANT CHANGE UP (ref 2.5–4.5)
PLATELET # BLD AUTO: 25 K/UL — LOW (ref 150–400)
PMV BLD: 11 FL — SIGNIFICANT CHANGE UP (ref 7–13)
POTASSIUM SERPL-MCNC: 3.8 MMOL/L — SIGNIFICANT CHANGE UP (ref 3.5–5.3)
POTASSIUM SERPL-SCNC: 3.8 MMOL/L — SIGNIFICANT CHANGE UP (ref 3.5–5.3)
PROT SERPL-MCNC: 5.3 G/DL — LOW (ref 6–8.3)
PROTHROM AB SERPL-ACNC: 10.6 SEC — SIGNIFICANT CHANGE UP (ref 9.8–13.1)
RBC # BLD: 3.33 M/UL — LOW (ref 4.2–5.8)
RBC # FLD: 16.9 % — HIGH (ref 10.3–14.5)
SODIUM SERPL-SCNC: 130 MMOL/L — LOW (ref 135–145)
SPECIMEN SOURCE: SIGNIFICANT CHANGE UP
WBC # BLD: 1.74 K/UL — LOW (ref 3.8–10.5)
WBC # FLD AUTO: 1.74 K/UL — LOW (ref 3.8–10.5)

## 2020-01-08 PROCEDURE — 99233 SBSQ HOSP IP/OBS HIGH 50: CPT | Mod: GC

## 2020-01-08 PROCEDURE — 95819 EEG AWAKE AND ASLEEP: CPT | Mod: 26

## 2020-01-08 RX ORDER — INSULIN LISPRO 100/ML
VIAL (ML) SUBCUTANEOUS EVERY 6 HOURS
Refills: 0 | Status: DISCONTINUED | OUTPATIENT
Start: 2020-01-08 | End: 2020-01-09

## 2020-01-08 RX ORDER — SODIUM CHLORIDE 9 MG/ML
1 INJECTION INTRAMUSCULAR; INTRAVENOUS; SUBCUTANEOUS THREE TIMES A DAY
Refills: 0 | Status: DISCONTINUED | OUTPATIENT
Start: 2020-01-08 | End: 2020-01-10

## 2020-01-08 RX ORDER — INSULIN GLARGINE 100 [IU]/ML
6 INJECTION, SOLUTION SUBCUTANEOUS AT BEDTIME
Refills: 0 | Status: DISCONTINUED | OUTPATIENT
Start: 2020-01-08 | End: 2020-01-08

## 2020-01-08 RX ORDER — SODIUM CHLORIDE 9 MG/ML
1000 INJECTION INTRAMUSCULAR; INTRAVENOUS; SUBCUTANEOUS
Refills: 0 | Status: DISCONTINUED | OUTPATIENT
Start: 2020-01-08 | End: 2020-01-09

## 2020-01-08 RX ADMIN — LEVETIRACETAM 400 MILLIGRAM(S): 250 TABLET, FILM COATED ORAL at 12:51

## 2020-01-08 RX ADMIN — Medication 4 MILLIGRAM(S): at 14:03

## 2020-01-08 RX ADMIN — LEVETIRACETAM 400 MILLIGRAM(S): 250 TABLET, FILM COATED ORAL at 01:37

## 2020-01-08 RX ADMIN — Medication 4 MILLIGRAM(S): at 07:10

## 2020-01-08 RX ADMIN — SODIUM CHLORIDE 60 MILLILITER(S): 9 INJECTION INTRAMUSCULAR; INTRAVENOUS; SUBCUTANEOUS at 18:50

## 2020-01-08 RX ADMIN — PANTOPRAZOLE SODIUM 40 MILLIGRAM(S): 20 TABLET, DELAYED RELEASE ORAL at 12:33

## 2020-01-08 RX ADMIN — Medication 1: at 04:09

## 2020-01-08 RX ADMIN — Medication 4 MILLIGRAM(S): at 21:19

## 2020-01-08 NOTE — PROGRESS NOTE ADULT - PROBLEM SELECTOR PLAN 5
BUN elevated with a lactate elevation to 4.3 -> 3.2 s/p 2.5L of NS - Consider Speech and swallow evaluation once patient's mental status improves (post-ictal state resolves) and can cooperate

## 2020-01-08 NOTE — PROGRESS NOTE ADULT - PROBLEM SELECTOR PLAN 10
#10 Prophylactic Measure  DVT PPx: Thrombocytopenic so SCDs  Diet: NPO  Dispo: Pending PT eval and clinical improvement    Transitions of Care Status:  1.  Name of PCP: Dr. Hinson  2.  PCP Contacted on Admission: [ ] Y    [X ] N    3.  PCP contacted at Discharge: [ ] Y    [ ] N    [ ] N/A  4.  Post-Discharge Appointment Date and Location:  5.  Summary of Handoff given to PCP:

## 2020-01-08 NOTE — PROGRESS NOTE ADULT - PROBLEM SELECTOR PLAN 6
- Consider Speech and swallow evaluation once patient's mental status improves (post-ictal state resolves) and can cooperate As per wife patient on RT and chemo. Chemo has been on hold due to thrombocytopenia last chemo session about 3 weeks ago.  - Continue Decadron 4mg TID IV  - 40 IV Protonix QD for GI PPx   - Neuro following, recs appreciated  - Reached out to patient's Neuro-oncologist at [facility]

## 2020-01-08 NOTE — PROGRESS NOTE ADULT - PROBLEM SELECTOR PLAN 4
- Na 130 -> 131. 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 Recently admitted for hyponatremia with renal recommending fluid restriction and salt tabs   - S/p 2.5L of NS  - Fluid restrict 2/2 ?SiADH  - Monitor w/ daily BMP, and consider salt tabs if continues to decrease

## 2020-01-08 NOTE — PROGRESS NOTE ADULT - PROBLEM SELECTOR PLAN 7
As per wife patient on RT and chemo. Chemo has been on hold due to thrombocytopenia last chemo session about 3 weeks ago.  - Continue Decadron 4mg TID IV  - 40 IV Protonix QD for GI PPx   - Neuro following, recs appreciated - Not on any current BP meds, SBPs mostly in 110s-120s mmHg, had an episode in 180s possibly in the setting of ?seizure

## 2020-01-08 NOTE — PROGRESS NOTE ADULT - PROBLEM SELECTOR PLAN 1
- Likely 2/2 to hx of GBM and possibly hyperglycemic state. On Keppra in the past but was tapered  - CTH no significant changes shows post-biopsy changes. Likely cause of lactate elevation  - EKG: no acute changes   - Neuro checks Q4H   - Aspiration precautions  - Seizure precautions  - Keep NPO given mental status   - MRI head w/w/o  - VEEG  - Continue Keppra IV 500mg BID  - Neuro following, recs appreciated - Likely 2/2 to hx of GBM and possibly lowered threshold 2/2 hyperglycemic state. On Keppra in the past but was tapered  - CTH no significant changes shows post-biopsy changes. Likely cause of lactate elevation  - EKG: no acute changes   - Neuro checks Q4H   - Aspiration precautions  - Seizure precautions  - Keep NPO given mental status   - MRI head w/w/o  - VEEG  - Continue Keppra IV 500mg BID  - Neuro following, recs appreciated

## 2020-01-08 NOTE — PROGRESS NOTE ADULT - PROBLEM SELECTOR PLAN 8
- Not on any current BP meds, SBPs mostly in 110s-120s mmHg, had an episode in 180s possibly in the setting of ?seizure - On Lipitor 20mg QD holding in the setting of NPO

## 2020-01-08 NOTE — EEG REPORT - NS EEG TEXT BOX
ADDIS SCHWAB [id number]     Study Date: 		01-08-20    ROUTINE EEG    Technical Information:			  		  Placement and Labeling of Electrodes:  The EEG was performed utilizing 20 channels referential EEG connections (coronal over temporal over parasagittal montage) using all standard 10-20 electrode placements with EKG.  Recording was at a sampling rate of 256 samples per second per channel.  Time synchronized digital video recording was done simultaneously with EEG recording.  A low light infrared camera was used for low light recording.  Luis and seizure detection algorithms were utilized.    CSA Technical Component:  Quantitative EEG analysis using a separate Compressed Spectral Array (CSA) software package was conducted in real-time and run at bedside after set up by the technician, digitally displaying the power of electrographic frequencies included in the 1-30Hz band using a graded color map.  This data was reviewed and interpreted independently, and is reported in a separate section below.    --------------------------------------------------------------------------------------------------  History:  CC/ HPI Patient is a 69y old  Male who presents with a chief complaint of Seizures and Hyperglycemia (08 Jan 2020 11:42)    MEDICATIONS  (STANDING):  dexAMETHasone  Injectable 4 milliGRAM(s) IV Push three times a day  dextrose 5%. 1000 milliLiter(s) (50 mL/Hr) IV Continuous <Continuous>  dextrose 50% Injectable 12.5 Gram(s) IV Push once  dextrose 50% Injectable 25 Gram(s) IV Push once  dextrose 50% Injectable 25 Gram(s) IV Push once  insulin lispro (HumaLOG) corrective regimen sliding scale   SubCutaneous every 6 hours  levETIRAcetam  IVPB 500 milliGRAM(s) IV Intermittent every 12 hours  pantoprazole  Injectable 40 milliGRAM(s) IV Push daily  sodium chloride 1 Gram(s) Oral three times a day  sodium chloride 0.9%. 1000 milliLiter(s) (60 mL/Hr) IV Continuous <Continuous>    --------------------------------------------------------------------------------------------------  Study Interpretation:    [[[Abbreviation Key:  PDR=alpha rhythm/posterior dominant rhythm. A-P=anterior posterior gradient.  Amplitude: ‘very low’:<20; ‘low’:20-50; ‘medium’:; ‘high’:>200uV.  Persistence for periodic/rhythmic patterns (% of epoch) ‘rare’:<1%; ‘occasional’:1-10%; ‘frequent’:10-50%; ‘abundant’:50-90%; ‘continuous’:>90%.  Persistence for sporadic discharges: ‘rare’:<1/hr; ‘occasional’:1/min-1/hr; ‘frequent’:>1/min; ‘abundant’:>1/10 sec.  GRDA=generalized rhythmic delta activity, LRDA=lateralized rhythmic delta activity, TIRDA=temporal intermittent rhythmic delta activity, FIRDA=frontal intermittent rhythmic activity. LPD=PLED=lateralized periodic discharges, GPD=generalized periodic discharges, BiPDs=BiPLEDs=bilateral independent periodic epileptiform discharges, SIRPID=stimulus induced rhythmic, periodic, or ictal appearing discharges.  Modifiers: +F=with fast component, +S=with spike component, +R=with rhythmic component.  S-B=burst suppression pattern.  Max=maximal. N1-drowsy, N2-stage II sleep, N3-slow wave sleep.  HV=hyperventilation, PS=photic stimulation]]]    FINDINGS:  The background was continuous, spontaneously variable and reactive.  During wakefulness, the posteriorly dominant rhythm was near 8-8.5hz  There was diffuse irregular theta and delta activity present.    Sleep Background:  Drowsiness was characterized by fragmentation, attenuation, and slowing of the background activity.    Stage II sleep transients were not recorded.    Epileptiform Activity:   No epileptiform discharges were present.    Events:  No clinical events were recorded.  No seizures were recorded.    Activation Procedures:   -Hyperventilation was not performed.    -Photic stimulation was not performed.    Artifacts:  Intermittent myogenic and movement artifacts were noted.    ECG:  The heart rate on single channel ECG at baseline was predominantly near BPM = 60-70  -----------------------------------------------------------------------------------------------------    EEG Classification / Summary:  Abnormal EEG study  Moderate background slowing    -----------------------------------------------------------------------------------------------------    Clinical Impression:  Mild diffuse or multifocal cerebral dysfunction, not specific as to etiology.  There were no epileptiform abnormalities recorded.      -------------------------------------------------------------------------------------------------------  Mane Collins M.D.   of Neurology, [facility]ile[facility] [place] ADDIS SCHWAB [id number]     Study Date: 		01-08-20    ROUTINE EEG    Technical Information:			  		  Placement and Labeling of Electrodes:  The EEG was performed utilizing 20 channels referential EEG connections (coronal over temporal over parasagittal montage) using all standard 10-20 electrode placements with EKG.  Recording was at a sampling rate of 256 samples per second per channel.  Time synchronized digital video recording was done simultaneously with EEG recording.  A low light infrared camera was used for low light recording.  Luis and seizure detection algorithms were utilized.    CSA Technical Component:  Quantitative EEG analysis using a separate Compressed Spectral Array (CSA) software package was conducted in real-time and run at bedside after set up by the technician, digitally displaying the power of electrographic frequencies included in the 1-30Hz band using a graded color map.  This data was reviewed and interpreted independently, and is reported in a separate section below.    --------------------------------------------------------------------------------------------------  History:  CC/ HPI Patient is a 69y old  Male who presents with a chief complaint of Seizures and Hyperglycemia (08 Jan 2020 11:42)    MEDICATIONS  (STANDING):  dexAMETHasone  Injectable 4 milliGRAM(s) IV Push three times a day  dextrose 5%. 1000 milliLiter(s) (50 mL/Hr) IV Continuous <Continuous>  dextrose 50% Injectable 12.5 Gram(s) IV Push once  dextrose 50% Injectable 25 Gram(s) IV Push once  dextrose 50% Injectable 25 Gram(s) IV Push once  insulin lispro (HumaLOG) corrective regimen sliding scale   SubCutaneous every 6 hours  levETIRAcetam  IVPB 500 milliGRAM(s) IV Intermittent every 12 hours  pantoprazole  Injectable 40 milliGRAM(s) IV Push daily  sodium chloride 1 Gram(s) Oral three times a day  sodium chloride 0.9%. 1000 milliLiter(s) (60 mL/Hr) IV Continuous <Continuous>    --------------------------------------------------------------------------------------------------  Study Interpretation:    [[[Abbreviation Key:  PDR=alpha rhythm/posterior dominant rhythm. A-P=anterior posterior gradient.  Amplitude: ‘very low’:<20; ‘low’:20-50; ‘medium’:; ‘high’:>200uV.  Persistence for periodic/rhythmic patterns (% of epoch) ‘rare’:<1%; ‘occasional’:1-10%; ‘frequent’:10-50%; ‘abundant’:50-90%; ‘continuous’:>90%.  Persistence for sporadic discharges: ‘rare’:<1/hr; ‘occasional’:1/min-1/hr; ‘frequent’:>1/min; ‘abundant’:>1/10 sec.  GRDA=generalized rhythmic delta activity, LRDA=lateralized rhythmic delta activity, TIRDA=temporal intermittent rhythmic delta activity, FIRDA=frontal intermittent rhythmic activity. LPD=PLED=lateralized periodic discharges, GPD=generalized periodic discharges, BiPDs=BiPLEDs=bilateral independent periodic epileptiform discharges, SIRPID=stimulus induced rhythmic, periodic, or ictal appearing discharges.  Modifiers: +F=with fast component, +S=with spike component, +R=with rhythmic component.  S-B=burst suppression pattern.  Max=maximal. N1-drowsy, N2-stage II sleep, N3-slow wave sleep.  HV=hyperventilation, PS=photic stimulation]]]    FINDINGS:  The background was continuous, spontaneously variable and reactive.  During wakefulness, the posteriorly dominant rhythm was near 8-8.5hz  There was diffuse irregular theta and delta activity present.    Sleep Background:  Drowsiness was characterized by fragmentation, attenuation, and slowing of the background activity.    Stage II sleep transients were not recorded.    Epileptiform Activity:   No epileptiform discharges were present.    Events:  No clinical events were recorded.  No seizures were recorded.    Activation Procedures:   -Hyperventilation was not performed.    -Photic stimulation was  performed and did elicit abnormalities.    Artifacts:  Intermittent myogenic and movement artifacts were noted.    ECG:  The heart rate on single channel ECG at baseline was predominantly near BPM = 60-70  -----------------------------------------------------------------------------------------------------    EEG Classification / Summary:  Abnormal EEG study  Mild background slowing    -----------------------------------------------------------------------------------------------------    Clinical Impression:  Mild diffuse or multifocal cerebral dysfunction, not specific as to etiology.  There were no epileptiform abnormalities recorded.      -------------------------------------------------------------------------------------------------------  Mane Collins M.D.   of Neurology, [facility] [place]

## 2020-01-08 NOTE — PROGRESS NOTE ADULT - PROBLEM SELECTOR PLAN 9
- On Lipitor 20mg QD holding in the setting of NPO #10 Prophylactic Measure  DVT PPx: Thrombocytopenic so SCDs  Diet: NPO  Dispo: Pending PT eval and clinical improvement    Transitions of Care Status:  1.  Name of PCP: Dr. Hinson  2.  PCP Contacted on Admission: [ ] Y    [X ] N    3.  PCP contacted at Discharge: [ ] Y    [ ] N    [ ] N/A  4.  Post-Discharge Appointment Date and Location:  5.  Summary of Handoff given to PCP:

## 2020-01-08 NOTE — PROGRESS NOTE ADULT - SUBJECTIVE AND OBJECTIVE BOX
Radha Oshea, PGY-1  Internal Medicine  Pager: [phone] (NS)/ [phone] (FIDE)    PROGRESS NOTE:     Patient is a 69y old Slovak speaking Male who presents with a chief complaint of Seizures and Hyperglycemia (2020 20:17)    SUBJECTIVE / OVERNIGHT EVENTS:    Overnight pt continues to be arousable and able to follow verbal commands but continues to fall asleep during interview.    REVIEW OF SYSTEMS:  Unable to assess 2/2 pt mental status    MEDICATIONS  (STANDING):  dexAMETHasone  Injectable 4 milliGRAM(s) IV Push three times a day  dextrose 5%. 1000 milliLiter(s) (50 mL/Hr) IV Continuous <Continuous>  dextrose 50% Injectable 12.5 Gram(s) IV Push once  dextrose 50% Injectable 25 Gram(s) IV Push once  dextrose 50% Injectable 25 Gram(s) IV Push once  insulin lispro (HumaLOG) corrective regimen sliding scale   SubCutaneous every 6 hours  levETIRAcetam  IVPB 500 milliGRAM(s) IV Intermittent every 12 hours  pantoprazole  Injectable 40 milliGRAM(s) IV Push daily    MEDICATIONS  (PRN):  dextrose 40% Gel 15 Gram(s) Oral once PRN Blood Glucose LESS THAN 70 milliGRAM(s)/deciliter  glucagon  Injectable 1 milliGRAM(s) IntraMuscular once PRN Glucose LESS THAN 70 milligrams/deciliter    CAPILLARY BLOOD GLUCOSE  POCT Blood Glucose.: 127 mg/dL (2020 08:44)  POCT Blood Glucose.: 186 mg/dL (2020 04:07)  POCT Blood Glucose.: 179 mg/dL (2020 22:12)  POCT Blood Glucose.: 217 mg/dL (2020 13:12)    PHYSICAL EXAM:  Vital Signs Last 24 Hrs  T(C): 36.7 (2020 10:51), Max: 36.9 (2020 13:30)  T(F): 98 (2020 10:51), Max: 98.5 (2020 14:18)  HR: 67 (2020 10:51) (62 - 77)  BP: 121/64 (2020 10:51) (108/67 - 180/82)  BP(mean): --  RR: 16 (2020 10:51) (15 - 18)  SpO2: 99% (2020 10:51) (99% - 100%)    GENERAL: NAD, somnolent A/O x 2  HEAD: Atraumatic, Normocephalic  EYES: EOMI, PERRLA, conjunctiva and sclera clear  ENMT: No tonsillar erythema, exudates, or enlargement; Dry mucous membranes  NECK: Supple  NERVOUS SYSTEM: Alert & Oriented X2 (name and place), slightly somnolent 5/5 strength in UE bilaterally and 4/5 strength in LE bilaterally  CHEST/LUNG: CTABL; No rales, rhonchi, wheezing  HEART: RRR, no M/R/G  ABDOMEN: Soft, NT, ND, normoactive bowel sounds  EXTREMITIES: 2+ Peripheral Pulses, No edema  LYMPH: No lymphadenopathy noted  SKIN: Dry scaly skin in LE bilaterally  PSYCH: Slightly somnolent     LABS:             10.4   1.74  )-----------( 25       ( 2020 06:13 )             29.8     130<L>  |  94<L>  |  15  ----------------------------<  148<H>  3.8   |  22  |  0.42<L>    Ca    8.6      2020 06:13  Phos  4.1     01-08  Mg     1.8     01-08    TPro  5.3<L>  /  Alb  2.8<L>  /  TBili  0.5  /  DBili  x   /  AST  21  /  ALT  42<H>  /  AlkPhos  64  01-08    PT/INR - ( 2020 06:13 )   PT: 10.6 SEC;   INR: 0.93     PTT - ( 2020 06:13 )  PTT:23.0 SEC    Urinalysis Basic - ( 2020 09:45 )  Color: YELLOW / Appearance: CLEAR / S.026 / pH: 6.0  Gluc: 500 / Ketone: TRACE  / Bili: NEGATIVE / Urobili: NORMAL   Blood: NEGATIVE / Protein: NEGATIVE / Nitrite: NEGATIVE   Leuk Esterase: NEGATIVE / RBC: x / WBC x   Sq Epi: x / Non Sq Epi: x / Bacteria: x    Culture - Urine (collected 2020 10:53)  Source: URINE MIDSTREAM  Final Report (2020 10:39):    GNR^Gram Neg Rods    COLONY COUNT: LESS THAN 10,000 CFU/ML    CT Head No Cont (20 @ 17:32)  IMPRESSION: Motion limited study, with redemonstration sequela previous right parietal biopsy, and continued decreased attenuation, heterogeneity along the corpus callosum splenium and biparietal and right temporal lobes and left frontal lobe, with GBM and seizures, MRI with gadolinium may be more sensitive and obtained for improved assessment as clinically warranted, and no contraindications.    Neurology notes reviewed

## 2020-01-08 NOTE — PROGRESS NOTE ADULT - ATTENDING COMMENTS
I have personally seen, examined, and participated in the care of this patient, I have reviewed all pertinent clinical information, including history, physical exam, plan, and the above resident's note. The case and plan have been discussed with resident, above note edited where appropriate. Son updated at bedside.   Sarah Church MD

## 2020-01-08 NOTE — PROGRESS NOTE ADULT - PROBLEM SELECTOR PLAN 2
- Hyperglycemia 200-300s. On Metformin at home. Last A1C in 12/19 7.1 (however pt w/ anemia making A1c less reliable)  - s/p 8U Lantus   - May need to be on Lantus while taking steroids   - If fingersticks continue to be elevated may need to use steroid induced hyperglycemia protocol to adjust basal and bolus coverage  - Goal  - 180   - Low ISS Q6H while NPO - Hyperglycemia 200-300s. On Metformin at home. Last A1C in 12/19 7.1 (however pt w/ anemia making A1c less reliable)  - s/p 8U Lantus   - May need to be on Lantus while taking steroids   - If fingersticks continue to be elevated may need to use steroid induced hyperglycemia protocol to adjust basal and bolus coverage  - Goal FS <180   - Low ISS Q6H while NPO

## 2020-01-08 NOTE — PROGRESS NOTE ADULT - ASSESSMENT
70 yo Maltese-speaking M w/ PMH of GBM diagnosed in 9/19, HTN, HLD [place] [facility] [place] likely 2/2 to steroid-induced from Decadron. Found to have two episodes of seizures. 70 yo Maltese-speaking M w/ PMH of GBM diagnosed in 9/19, HTN, HLD currently undergoing radiation therapy at [facility] [place] a/w hyperglycemia likely 2/2 to steroid-induced from Decadron c/b new onset seizures in ED x2.

## 2020-01-09 ENCOUNTER — TRANSCRIPTION ENCOUNTER (OUTPATIENT)
Age: 70
End: 2020-01-09

## 2020-01-09 LAB
ALBUMIN SERPL ELPH-MCNC: 2.9 G/DL — LOW (ref 3.3–5)
ALP SERPL-CCNC: 64 U/L — SIGNIFICANT CHANGE UP (ref 40–120)
ALT FLD-CCNC: 40 U/L — SIGNIFICANT CHANGE UP (ref 4–41)
ANION GAP SERPL CALC-SCNC: 10 MMO/L — SIGNIFICANT CHANGE UP (ref 7–14)
AST SERPL-CCNC: 21 U/L — SIGNIFICANT CHANGE UP (ref 4–40)
BILIRUB SERPL-MCNC: 0.6 MG/DL — SIGNIFICANT CHANGE UP (ref 0.2–1.2)
BLD GP AB SCN SERPL QL: NEGATIVE — SIGNIFICANT CHANGE UP
BUN SERPL-MCNC: 19 MG/DL — SIGNIFICANT CHANGE UP (ref 7–23)
CALCIUM SERPL-MCNC: 8.1 MG/DL — LOW (ref 8.4–10.5)
CHLORIDE SERPL-SCNC: 95 MMOL/L — LOW (ref 98–107)
CO2 SERPL-SCNC: 25 MMOL/L — SIGNIFICANT CHANGE UP (ref 22–31)
CREAT SERPL-MCNC: 0.54 MG/DL — SIGNIFICANT CHANGE UP (ref 0.5–1.3)
GLUCOSE BLDC GLUCOMTR-MCNC: 125 MG/DL — HIGH (ref 70–99)
GLUCOSE BLDC GLUCOMTR-MCNC: 134 MG/DL — HIGH (ref 70–99)
GLUCOSE BLDC GLUCOMTR-MCNC: 145 MG/DL — HIGH (ref 70–99)
GLUCOSE BLDC GLUCOMTR-MCNC: 190 MG/DL — HIGH (ref 70–99)
GLUCOSE BLDC GLUCOMTR-MCNC: 249 MG/DL — HIGH (ref 70–99)
GLUCOSE SERPL-MCNC: 143 MG/DL — HIGH (ref 70–99)
HCT VFR BLD CALC: 28.9 % — LOW (ref 39–50)
HGB BLD-MCNC: 9.9 G/DL — LOW (ref 13–17)
LACTATE SERPL-SCNC: 1.1 MMOL/L — SIGNIFICANT CHANGE UP (ref 0.5–2)
MAGNESIUM SERPL-MCNC: 1.9 MG/DL — SIGNIFICANT CHANGE UP (ref 1.6–2.6)
MCHC RBC-ENTMCNC: 30.7 PG — SIGNIFICANT CHANGE UP (ref 27–34)
MCHC RBC-ENTMCNC: 34.3 % — SIGNIFICANT CHANGE UP (ref 32–36)
MCV RBC AUTO: 89.8 FL — SIGNIFICANT CHANGE UP (ref 80–100)
NRBC # FLD: 0 K/UL — SIGNIFICANT CHANGE UP (ref 0–0)
PHOSPHATE SERPL-MCNC: 4.9 MG/DL — HIGH (ref 2.5–4.5)
PLATELET # BLD AUTO: 24 K/UL — LOW (ref 150–400)
PMV BLD: 10.4 FL — SIGNIFICANT CHANGE UP (ref 7–13)
POTASSIUM SERPL-MCNC: 3.7 MMOL/L — SIGNIFICANT CHANGE UP (ref 3.5–5.3)
POTASSIUM SERPL-SCNC: 3.7 MMOL/L — SIGNIFICANT CHANGE UP (ref 3.5–5.3)
PROT SERPL-MCNC: 5.3 G/DL — LOW (ref 6–8.3)
RBC # BLD: 3.22 M/UL — LOW (ref 4.2–5.8)
RBC # FLD: 16.6 % — HIGH (ref 10.3–14.5)
RH IG SCN BLD-IMP: POSITIVE — SIGNIFICANT CHANGE UP
SODIUM SERPL-SCNC: 130 MMOL/L — LOW (ref 135–145)
WBC # BLD: 1.48 K/UL — LOW (ref 3.8–10.5)
WBC # FLD AUTO: 1.48 K/UL — LOW (ref 3.8–10.5)

## 2020-01-09 PROCEDURE — 99233 SBSQ HOSP IP/OBS HIGH 50: CPT | Mod: GC

## 2020-01-09 PROCEDURE — 95720 EEG PHY/QHP EA INCR W/VEEG: CPT

## 2020-01-09 PROCEDURE — 95718 EEG PHYS/QHP 2-12 HR W/VEEG: CPT

## 2020-01-09 RX ORDER — INSULIN LISPRO 100/ML
VIAL (ML) SUBCUTANEOUS
Refills: 0 | Status: DISCONTINUED | OUTPATIENT
Start: 2020-01-09 | End: 2020-01-16

## 2020-01-09 RX ORDER — INSULIN LISPRO 100/ML
VIAL (ML) SUBCUTANEOUS AT BEDTIME
Refills: 0 | Status: DISCONTINUED | OUTPATIENT
Start: 2020-01-09 | End: 2020-01-16

## 2020-01-09 RX ADMIN — Medication 4 MILLIGRAM(S): at 13:39

## 2020-01-09 RX ADMIN — Medication 2: at 18:26

## 2020-01-09 RX ADMIN — Medication 4 MILLIGRAM(S): at 21:54

## 2020-01-09 RX ADMIN — PANTOPRAZOLE SODIUM 40 MILLIGRAM(S): 20 TABLET, DELAYED RELEASE ORAL at 13:38

## 2020-01-09 RX ADMIN — SODIUM CHLORIDE 1 GRAM(S): 9 INJECTION INTRAMUSCULAR; INTRAVENOUS; SUBCUTANEOUS at 21:54

## 2020-01-09 RX ADMIN — LEVETIRACETAM 400 MILLIGRAM(S): 250 TABLET, FILM COATED ORAL at 13:39

## 2020-01-09 RX ADMIN — LEVETIRACETAM 400 MILLIGRAM(S): 250 TABLET, FILM COATED ORAL at 01:37

## 2020-01-09 RX ADMIN — Medication 4 MILLIGRAM(S): at 05:57

## 2020-01-09 RX ADMIN — SODIUM CHLORIDE 60 MILLILITER(S): 9 INJECTION INTRAMUSCULAR; INTRAVENOUS; SUBCUTANEOUS at 06:00

## 2020-01-09 RX ADMIN — SODIUM CHLORIDE 1 GRAM(S): 9 INJECTION INTRAMUSCULAR; INTRAVENOUS; SUBCUTANEOUS at 13:39

## 2020-01-09 RX ADMIN — SODIUM CHLORIDE 60 MILLILITER(S): 9 INJECTION INTRAMUSCULAR; INTRAVENOUS; SUBCUTANEOUS at 13:40

## 2020-01-09 NOTE — PROGRESS NOTE ADULT - SUBJECTIVE AND OBJECTIVE BOX
SUBJECTIVE: Patient seen and examined at the bedside by the neurology team. Patient had no acute complaints.     PAST MEDICAL & SURGICAL HISTORY:  Hyperlipidemia  Neoplasm of unspecified behavior of brain  Hypertension  Status post stereotactic brain biopsy  History of laryngoscopy  H/O colonoscopy    FAMILY HISTORY:  FH: myocardial infarction    SOCIAL HISTORY:   T/E/D:   Occupation:   Lives with:     MEDICATIONS (HOME):  Home Medications:  ALPRAZolam 0.5 mg oral tablet: 1 tab(s) orally 3 times a day, As Needed for anxiety  (07 Jan 2020 20:56)  atorvastatin 10 mg oral tablet: 1 tab(s) orally once a day (FIlled Oct/2019 x 3 months) (07 Jan 2020 20:56)  dexamethasone 4 mg oral tablet: 1 tab(s) orally 3 times a day (07 Jan 2020 20:56)  Protonix 40 mg oral delayed release tablet: 1 tab(s) orally once a day (07 Jan 2020 20:56)  Vitamin B12 500 mcg oral tablet: 1 tab(s) orally once a day (07 Jan 2020 20:56)    MEDICATIONS  (STANDING):  dexAMETHasone  Injectable 4 milliGRAM(s) IV Push three times a day  dextrose 5%. 1000 milliLiter(s) (50 mL/Hr) IV Continuous <Continuous>  dextrose 50% Injectable 12.5 Gram(s) IV Push once  dextrose 50% Injectable 25 Gram(s) IV Push once  dextrose 50% Injectable 25 Gram(s) IV Push once  insulin lispro (HumaLOG) corrective regimen sliding scale   SubCutaneous every 6 hours  levETIRAcetam  IVPB 500 milliGRAM(s) IV Intermittent every 12 hours  pantoprazole  Injectable 40 milliGRAM(s) IV Push daily  sodium chloride 1 Gram(s) Oral three times a day  sodium chloride 0.9%. 1000 milliLiter(s) (60 mL/Hr) IV Continuous <Continuous>    MEDICATIONS  (PRN):  dextrose 40% Gel 15 Gram(s) Oral once PRN Blood Glucose LESS THAN 70 milliGRAM(s)/deciliter  glucagon  Injectable 1 milliGRAM(s) IntraMuscular once PRN Glucose LESS THAN 70 milligrams/deciliter    ALLERGIES/INTOLERANCES:  Allergies  No Known Allergies    Intolerances    VITALS & EXAMINATION:  Vital Signs Last 24 Hrs  T(C): 36.5 (09 Jan 2020 05:49), Max: 36.8 (08 Jan 2020 15:06)  T(F): 97.7 (09 Jan 2020 05:49), Max: 98.2 (08 Jan 2020 15:06)  HR: 81 (09 Jan 2020 05:49) (64 - 81)  BP: 140/84 (09 Jan 2020 05:49) (113/67 - 140/84)  BP(mean): --  RR: 18 (09 Jan 2020 05:49) (16 - 18)  SpO2: 100% (09 Jan 2020 05:49) (99% - 100%)    General: Male, appears stated age, in no apparent distress including pain    Neurological (>12):  MS: Awake, alert, oriented to person and time only. Follows all commands. (more awake than prior exam)    Language: Speech is very mildly dysarthric, fluent    CNs: EOMI no nystagmus. No facial asymmetry b/l    Fundoscopic: deferred    Motor: No pronator drift  No motor drift in the upper extremities	   RLE: some effort against gravity falls before 5 seconds, but likely effort related  LLE: motor drift, but likely effort related    Sensation: Intact to LT b/l throughout.     Cortical: Extinction on DSS (neglect): deferred    Reflexes: deferred    Coordination: deferred    Gait: deferred     LABORATORY:  CBC                       9.9    1.48  )-----------( 24       ( 09 Jan 2020 06:00 )             28.9     Chem 01-09    130<L>  |  95<L>  |  19  ----------------------------<  143<H>  3.7   |  25  |  0.54    Ca    8.1<L>      09 Jan 2020 06:00  Phos  4.9     01-09  Mg     1.9     01-09    TPro  5.3<L>  /  Alb  2.9<L>  /  TBili  0.6  /  DBili  x   /  AST  21  /  ALT  40  /  AlkPhos  64  01-09    LFTs LIVER FUNCTIONS - ( 09 Jan 2020 06:00 )  Alb: 2.9 g/dL / Pro: 5.3 g/dL / ALK PHOS: 64 u/L / ALT: 40 u/L / AST: 21 u/L / GGT: x           Coagulopathy PT/INR - ( 08 Jan 2020 06:13 )   PT: 10.6 SEC;   INR: 0.93          PTT - ( 08 Jan 2020 06:13 )  PTT:23.0 SEC  Lipid Panel   A1c 12-29 [id number] 7.1    Cardiac enzymes     U/A   CSF  Immunological  Other    STUDIES & IMAGING:  Studies (EKG, EEG, EMG, etc):   EEG Classification / Summary:  Abnormal EEG study  Mild background slowing    -----------------------------------------------------------------------------------------------------    Clinical Impression:  Mild diffuse or multifocal cerebral dysfunction, not specific as to etiology.  There were no epileptiform abnormalities recorded.    Radiology (XR, CT, MR, U/S, TTE/VARINDER):

## 2020-01-09 NOTE — PROGRESS NOTE ADULT - PROBLEM/PLAN-7
DISPLAY PLAN FREE TEXT PROBLEM DIAGNOSES  Problem: Cholecystitis, unspecified  Assessment and Plan: Patient is scheduled for laparoscopic cholecystectomy, possible intraoperative cholangiogram, possible open today with Dr. Patricia. Patient reports last meal at around 1920, she skipped oral hypoglycemics and insulin this mornin. she only took lisinopril 10mg tab with a sip of water this morning

## 2020-01-09 NOTE — PROGRESS NOTE ADULT - ATTENDING COMMENTS
I have personally seen, examined, and participated in the care of this patient, I have reviewed all pertinent clinical information, including history, physical exam, plan, and the above resident's note. The case and plan have been discussed with resident, above note edited where appropriate.   Sarah Church MD

## 2020-01-09 NOTE — DISCHARGE NOTE PROVIDER - PROVIDER TOKENS
PROVIDER:[TOKEN:[[id number]],FOLLOWUP:[1 week],ESTABLISHEDPATIENT:[T]] PROVIDER:[TOKEN:[[id number]],FOLLOWUP:[Routine],ESTABLISHEDPATIENT:[T]],PROVIDER:[TOKEN:[[id number]]],PROVIDER:[TOKEN:[[id number]]]

## 2020-01-09 NOTE — PROGRESS NOTE ADULT - PROBLEM SELECTOR PLAN 4
- Na 130 -> 131. 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

## 2020-01-09 NOTE — PROGRESS NOTE ADULT - PROBLEM SELECTOR PLAN 7
- Not on any current BP meds, SBPs mostly in 110s-120s mmHg, had an episode in 180s possibly in the setting of ?seizure

## 2020-01-09 NOTE — DISCHARGE NOTE PROVIDER - NSDCFUADDAPPT_GEN_ALL_CORE_FT
- Please [place] up w/ neuro[place] [facility] [place]  - Please follow up w/ hematology on Monday 01/13/20 for further pancytopenia workup - Please follow up w/ neuro-oncologist Dr. Lyle Chavis at [facility] [place] after discharge from rehab  - Please follow up w/ hematology after discharge from rehab

## 2020-01-09 NOTE — PHYSICAL THERAPY INITIAL EVALUATION ADULT - GENERAL OBSERVATIONS, REHAB EVAL
Patient found supine in bed in NAD; lethargic; son at bedside; understands some English, prefers Malay that son can translate.

## 2020-01-09 NOTE — PROGRESS NOTE ADULT - PROBLEM SELECTOR PLAN 9
#10 Prophylactic Measure  DVT PPx: Thrombocytopenic so SCDs  Diet: NPO  Dispo: Pending PT eval and clinical improvement    Transitions of Care Status:  1.  Name of PCP: Dr. Hinson  2.  PCP Contacted on Admission: [ ] Y    [X ] N    3.  PCP contacted at Discharge: [ ] Y    [ ] N    [ ] N/A  4.  Post-Discharge Appointment Date and Location:  5.  Summary of Handoff given to PCP: #10 Prophylactic Measure  DVT PPx: Thrombocytopenic so SCDs  Diet: Advance to consistent carb diet as tolerated   Dispo: Pending PT eval and clinical improvement    Transitions of Care Status:  1.  Name of PCP: Dr. Hinson  2.  PCP Contacted on Admission: [ ] Y    [X ] N    3.  PCP contacted at Discharge: [ ] Y    [ ] N    [ ] N/A  4.  Post-Discharge Appointment Date and Location:  5.  Summary of Handoff given to PCP:

## 2020-01-09 NOTE — PROGRESS NOTE ADULT - PROBLEM SELECTOR PLAN 5
- Consider Speech and swallow evaluation once patient's mental status improves (post-ictal state resolves) and can cooperate

## 2020-01-09 NOTE — DISCHARGE NOTE PROVIDER - NSDCFUADDINST_GEN_ALL_CORE_FT
1) Please given next dose of NPH at 6 pm   2) Please recheck CMV PCR on 1/21, last level was 115,000, we anticipate that it will slowly drop    3) Please continue with NPH 5 BID, and taper down as needed

## 2020-01-09 NOTE — DISCHARGE NOTE PROVIDER - HOSPITAL COURSE
70 yo Vietnamese-speaking M w/ PMH of GBM diagnosed in 9/19 s/p chemotherapy which was discontinued on 11/29 2/2 pancytopenia and RT which completed on December 4th, 2019 now on Decadron, HTN, HLD who presented w/ hyperglycemia likely 2/2 to steroid-induced from Decadron c/b new onset seizures in ED x2 s/p Ativan. During his hospitalization, pt was seen by neurology and was restarted on Keppra 500mg BID and was continued on Decadron 4mg TID w/ moderate scale insulin sliding scale with average FS of 140. He underwent a 24 hour video EEG which was negative for any seizure activity and underwent an MRI w/w/o contrast which showed................................................. As per discussion w/ pt primary [place] [facility] [place] was supposed to be on Decadron 2mg BID at home. Pt is thus stable for discharge home on Keppra 500mg BID, Protonix 40mg QD, Salt Tabs of 1g TID, as well as Decadron tapered down to 4mg BID with Metformin 1000mg BID as well as Humalog for sliding scale for elevated blood glucose levels at home while on steroid therapy. He is to follow up w/ [place]ology [place] [facility] [place]/13/20 for pancytopenia despite cessation of chemotherapy and w/ Dr. Chavis early next week for further GBM follow up. 68 yo Chinese-speaking M w/ PMH of GBM diagnosed in 9/19 s/p chemotherapy which was discontinued on 11/29 2/2 pancytopenia and RT which completed on December 4th, 2019 now on Decadron, HTN, HLD who presented w/ hyperglycemia likely 2/2 to steroid-induced from Decadron c/b new onset seizures in ED x2 s/p Ativan. During his hospitalization, pt was seen by neurology and was restarted on Keppra 500mg BID and was continued Decadron was decreased from 4mg TID to 4mg BID as pt is supposed to be on 2mg BID outpatient. He was started on a moderate scale insulin sliding scale with average FS of 140 w/ addition of NPH insulin. He underwent a 24 hour video EEG which was negative for any seizure activity and underwent an MRI w/w/o contrast which showed increased enhancement of the brain tumors likely secondary to cessation of RT (Changes can be seen within 1-3 months after discontinuation of RT). During this hospitalization pt was hyponatremic to 126 at the lowest and was thus continued on his home 2g TID dose of salt tabs and was fluid restricted. He remained hyponatremic during this hospitalization, but however stable. He was also pancytopenic from prior to discharge. As pt is following w/ hematology at [facility] [place], workup was deferred and numbers remained stable during hospitalization.        Pt was seen by PT who recommended restorative rehab. Because of patient's condition and lack of resources at home, family elected to discharge to rehab and to follow up at [facility] [place] for further GBM and pancytopenia workup after discharge from rehab.         Pt is thus stable for discharge on Keppra 500mg BID, Protonix 40mg QD, Salt Tabs of 2g TID, as well as Decadron tapered down to 4mg BID with (SUGAR CONTROL).  He is to follow up w/ hematology at [facility] [place] on Monday 01/13/20 for pancytopenia despite cessation of chemotherapy and w/ Dr. Chavis early next week for further GBM follow up. 70 yo Occitan-speaking M w/ PMH of GBM diagnosed in 9/19 s/p chemotherapy which was discontinued on 11/29 2/2 pancytopenia and RT which completed on December 4th, 2019 now on Decadron, HTN, HLD who presented w/ hyperglycemia likely 2/2 to steroid-induced from Decadron c/b new onset seizures in ED x2 s/p Ativan. During his hospitalization, pt was seen by neurology and was restarted on Keppra 500mg BID and Decadron was decreased to 2mg BID from 4mg TID as per discussion w/ neuro-oncologist at [facility]. His blood glucose levels were managed w/ NPH insulin BID. He underwent a 24 hour video EEG which was negative for any seizure activity and underwent an MRI w/w/o contrast which showed increased enhancement of the brain tumors likely secondary to cessation of RT (Changes can be seen within 1-3 months after discontinuation of RT).         During this hospitalization pt was hyponatremic to 126 at the lowest and was thus restarted on his home 2g TID dose of salt tabs and was fluid restricted to 1.2L w/ resolution of his hyponatremia to 132 upon discharge (his baseline).        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 He was started on Valcyte 900mg BID w/ recheck of CMV PCR on 01/21/20.        Pt was seen by PT who recommended restorative rehab. Because of patient's condition and lack of resources at home, family elected to discharge to rehab and to follow up at [facility] [place] for further management after discharge from rehab.         Pt is thus stable for discharge on Keppra 500mg BID, Decadron 2mg BID, Protonix 40mg QD, Salt Tabs of 2g TID, NPH insulin 5U BID, and Valcyte 900mg BID (until 01/27/20). He is to follow up w/ hematology at [facility] [place] w/ Dr. Chavis for further care once discharged from rehab.

## 2020-01-09 NOTE — PROGRESS NOTE ADULT - ATTENDING COMMENTS
The patient was presented on rounds and examined at the bedside.  The patient is much more awake, alert and cooperative.  The neurological examination is limited however there is no focal weakness, visual field cut or reflex asymmetry.  He has been seizure free while on Keppra 500 mg twice a day.  Video EEG is in progress.  If negative for epileptiform activity, he is neurologically cleared for discharge.

## 2020-01-09 NOTE — PHYSICAL THERAPY INITIAL EVALUATION ADULT - ACTIVE RANGE OF MOTION EXAMINATION, REHAB EVAL
except left ankle df/pf 0-3 degrees/bilateral upper extremity Active ROM was WFL (within functional limits)/bilateral  lower extremity Active ROM was WFL (within functional limits)

## 2020-01-09 NOTE — PHYSICAL THERAPY INITIAL EVALUATION ADULT - PERTINENT HX OF CURRENT PROBLEM, REHAB EVAL
69 year old Italian-speaking male with MHx of GBM, diagnosed in 9/19, currently [place] [facility] [place] with hyperglycemia. Wife reports while in the ED had an episode of eyes rolling in the back of his head and generalized tonic-clonic like movements and was told patient had a seizure. CTH shows decreased attenuation, heterogeneity along the corpus callosum splenium and biparietal and right temporal lobes and left frontal lobe. CXR negative.

## 2020-01-09 NOTE — PROGRESS NOTE ADULT - ASSESSMENT
68 yo Welsh-speaking M w/ PMH of GBM diagnosed in 9/19, HTN, HLD currently undergoing radiation therapy at [facility] [place] a/w hyperglycemia likely 2/2 to steroid-induced from Decadron c/b new onset seizures in ED x2.

## 2020-01-09 NOTE — PROGRESS NOTE ADULT - ASSESSMENT
Assessment: 69 year old M with a PMH of GBM diagnosed 9/19 currently undergoing radiation therapy at [facility] [place] presented to the ED w/ hyperglycemia, no other complaints. In the ED, patient experienced GTC, witnessed by providers, described as patient stating he felt 'strange' before having his eyes roll back, followed by stiffening of the upper extremities, then by generalized convulsion, lasting <30 seconds. Ativan administered. Patient woke, seeming to return to baseline, before experiencing a second episode. Ativan was administered again during aura phase, and GTC was avoided. Patient was loaded with Keppra 1g IV. EEG showed mild diffuse or multifocal cerebral dysfunction. Impression. Seizure was most likely provoked by hyperglycemia in a patient with a known glioma    Plan:  Continue keppra 500 mg BID  No neurologic contraindication to discharge    Case discussed with neurology attending, Dr. Martins.

## 2020-01-09 NOTE — PROGRESS NOTE ADULT - SUBJECTIVE AND OBJECTIVE BOX
Radha Oshea, PGY-1  Internal Medicine  Pager: [phone] (NS)/ [phone] (FIDE)    PROGRESS NOTE:     Patient is a 69y old French speaking Male who presents with a chief complaint of Seizures and Hyperglycemia (07 Jan 2020 20:17)    SUBJECTIVE / OVERNIGHT EVENTS:      REVIEW OF SYSTEMS:  Unable to assess 2/2 pt mental status    MEDICATIONS  (STANDING):  dexAMETHasone  Injectable 4 milliGRAM(s) IV Push three times a day  dextrose 5%. 1000 milliLiter(s) (50 mL/Hr) IV Continuous <Continuous>  dextrose 50% Injectable 12.5 Gram(s) IV Push once  dextrose 50% Injectable 25 Gram(s) IV Push once  dextrose 50% Injectable 25 Gram(s) IV Push once  insulin lispro (HumaLOG) corrective regimen sliding scale   SubCutaneous every 6 hours  levETIRAcetam  IVPB 500 milliGRAM(s) IV Intermittent every 12 hours  pantoprazole  Injectable 40 milliGRAM(s) IV Push daily  sodium chloride 1 Gram(s) Oral three times a day  sodium chloride 0.9%. 1000 milliLiter(s) (60 mL/Hr) IV Continuous <Continuous>    MEDICATIONS  (PRN):  dextrose 40% Gel 15 Gram(s) Oral once PRN Blood Glucose LESS THAN 70 milliGRAM(s)/deciliter  glucagon  Injectable 1 milliGRAM(s) IntraMuscular once PRN Glucose LESS THAN 70 milligrams/deciliter    CAPILLARY BLOOD GLUCOSE  POCT: 145mg/dL 1/9    PHYSICAL EXAM:  Vital Signs Last 24 Hrs  T(C): 36.5 (09 Jan 2020 05:49), Max: 36.8 (08 Jan 2020 15:06)  T(F): 97.7 (09 Jan 2020 05:49), Max: 98.2 (08 Jan 2020 15:06)  HR: 81 (09 Jan 2020 05:49) (64 - 81)  BP: 140/84 (09 Jan 2020 05:49) (113/67 - 140/84)  BP(mean): --  RR: 18 (09 Jan 2020 05:49) (16 - 18)  SpO2: 100% (09 Jan 2020 05:49) (99% - 100%)    GENERAL: NAD, somnolent A/O x 2  HEAD: Atraumatic, Normocephalic  EYES: EOMI, PERRLA, conjunctiva and sclera clear  ENMT: No tonsillar erythema, exudates, or enlargement; Dry mucous membranes  NECK: Supple  NERVOUS SYSTEM: Alert & Oriented X2 (name and place), slightly somnolent 5/5 strength in UE bilaterally and 4/5 strength in LE bilaterally  CHEST/LUNG: CTABL; No rales, rhonchi, wheezing  HEART: RRR, no M/R/G  ABDOMEN: Soft, NT, ND, normoactive bowel sounds  EXTREMITIES: 2+ Peripheral Pulses, No edema  LYMPH: No lymphadenopathy noted  SKIN: Dry scaly skin in LE bilaterally  PSYCH: Slightly somnolent     LABS:        EEG Clinical Impression:  Mild diffuse or multifocal cerebral dysfunction, not specific as to etiology.  There were no epileptiform abnormalities recorded.        CT Head No Cont (01.07.20 @ 17:32)  IMPRESSION: Motion limited study, with redemonstration sequela previous right parietal biopsy, and continued decreased attenuation, heterogeneity along the corpus callosum splenium and biparietal and right temporal lobes and left frontal lobe, with GBM and seizures, MRI with gadolinium may be more sensitive and obtained for improved assessment as clinically warranted, and no contraindications.    Neurology notes reviewed Radha Oshea, PGY-1  Internal Medicine  Pager: [phone] (NS)/ [phone] (FIDE)    PROGRESS NOTE:     Patient is a 69y old Ukrainian speaking Male who presents with a chief complaint of Seizures and Hyperglycemia (07 Jan 2020 20:17)    SUBJECTIVE / OVERNIGHT EVENTS:    Pt is more arousable this AM compared to yesterday. Laying comfortably with EEG leads in place upon encounter. Denies any CP, SOB, Abd pain, N/V, HA.    REVIEW OF SYSTEMS:  Unable to assess 2/2 pt mental status    MEDICATIONS  (STANDING):  dexAMETHasone  Injectable 4 milliGRAM(s) IV Push three times a day  dextrose 5%. 1000 milliLiter(s) (50 mL/Hr) IV Continuous <Continuous>  dextrose 50% Injectable 12.5 Gram(s) IV Push once  dextrose 50% Injectable 25 Gram(s) IV Push once  dextrose 50% Injectable 25 Gram(s) IV Push once  insulin lispro (HumaLOG) corrective regimen sliding scale   SubCutaneous every 6 hours  levETIRAcetam  IVPB 500 milliGRAM(s) IV Intermittent every 12 hours  pantoprazole  Injectable 40 milliGRAM(s) IV Push daily  sodium chloride 1 Gram(s) Oral three times a day  sodium chloride 0.9%. 1000 milliLiter(s) (60 mL/Hr) IV Continuous <Continuous>    MEDICATIONS  (PRN):  dextrose 40% Gel 15 Gram(s) Oral once PRN Blood Glucose LESS THAN 70 milliGRAM(s)/deciliter  glucagon  Injectable 1 milliGRAM(s) IntraMuscular once PRN Glucose LESS THAN 70 milligrams/deciliter    CAPILLARY BLOOD GLUCOSE  POCT: 145mg/dL 1/9    PHYSICAL EXAM:  Vital Signs Last 24 Hrs  T(C): 36.5 (09 Jan 2020 05:49), Max: 36.8 (08 Jan 2020 15:06)  T(F): 97.7 (09 Jan 2020 05:49), Max: 98.2 (08 Jan 2020 15:06)  HR: 81 (09 Jan 2020 05:49) (64 - 81)  BP: 140/84 (09 Jan 2020 05:49) (113/67 - 140/84)  BP(mean): --  RR: 18 (09 Jan 2020 05:49) (16 - 18)  SpO2: 100% (09 Jan 2020 05:49) (99% - 100%)    GENERAL: NAD, somnolent A/O x 2  HEAD: Atraumatic, Normocephalic  EYES: EOMI, PERRLA, conjunctiva and sclera clear  ENMT: No tonsillar erythema, exudates, or enlargement; Dry mucous membranes  NECK: Supple  NERVOUS SYSTEM: Alert & Oriented X2 (name and place), slightly somnolent 5/5 strength in UE bilaterally and 4/5 strength in LE bilaterally  CHEST/LUNG: CTABL; No rales, rhonchi, wheezing  HEART: RRR, no M/R/G  ABDOMEN: Soft, NT, ND, normoactive bowel sounds  EXTREMITIES: 2+ Peripheral Pulses, No edema  LYMPH: No lymphadenopathy noted  SKIN: Dry scaly skin in LE bilaterally  PSYCH: Slightly somnolent     LABS:                  9.9    1.48  )-----------( 24       ( 09 Jan 2020 06:00 )             28.9     01-09    130<L>  |  95<L>  |  19  ----------------------------<  143<H>  3.7   |  25  |  0.54    Ca    8.1<L>      09 Jan 2020 06:00  Phos  4.9     01-09  Mg     1.9     01-09    TPro  5.3<L>  /  Alb  2.9<L>  /  TBili  0.6  /  DBili  x   /  AST  21  /  ALT  40  /  AlkPhos  64  01-09    LIVER FUNCTIONS - ( 09 Jan 2020 06:00 )  Alb: 2.9 g/dL / Pro: 5.3 g/dL / ALK PHOS: 64 u/L / ALT: 40 u/L / AST: 21 u/L / GGT: x           PT/INR - ( 08 Jan 2020 06:13 )   PT: 10.6 SEC;   INR: 0.93     PTT - ( 08 Jan 2020 06:13 )  PTT:23.0 SEC      EEG Clinical Impression:  Mild diffuse or multifocal cerebral dysfunction, not specific as to etiology.  There were no epileptiform abnormalities recorded.        CT Head No Cont (01.07.20 @ 17:32)  IMPRESSION: Motion limited study, with redemonstration sequela previous right parietal biopsy, and continued decreased attenuation, heterogeneity along the corpus callosum splenium and biparietal and right temporal lobes and left frontal lobe, with GBM and seizures, MRI with gadolinium may be more sensitive and obtained for improved assessment as clinically warranted, and no contraindications.    Neurology notes reviewed Radha Oshea, PGY-1  Internal Medicine  Pager: [phone] (NS)/ [phone] (FIDE)    PROGRESS NOTE:     Patient is a 69y old Portuguese speaking Male who presents with a chief complaint of Seizures and Hyperglycemia (07 Jan 2020 20:17)    SUBJECTIVE / OVERNIGHT EVENTS:    Pt is more arousable this AM compared to yesterday. Laying comfortably with EEG leads in place upon encounter. Denies any CP, SOB, Abd pain, N/V, HA.    REVIEW OF SYSTEMS:  Unable to assess 2/2 pt mental status    MEDICATIONS  (STANDING):  dexAMETHasone  Injectable 4 milliGRAM(s) IV Push three times a day  dextrose 5%. 1000 milliLiter(s) (50 mL/Hr) IV Continuous <Continuous>  dextrose 50% Injectable 12.5 Gram(s) IV Push once  dextrose 50% Injectable 25 Gram(s) IV Push once  dextrose 50% Injectable 25 Gram(s) IV Push once  insulin lispro (HumaLOG) corrective regimen sliding scale   SubCutaneous every 6 hours  levETIRAcetam  IVPB 500 milliGRAM(s) IV Intermittent every 12 hours  pantoprazole  Injectable 40 milliGRAM(s) IV Push daily  sodium chloride 1 Gram(s) Oral three times a day  sodium chloride 0.9%. 1000 milliLiter(s) (60 mL/Hr) IV Continuous <Continuous>    MEDICATIONS  (PRN):  dextrose 40% Gel 15 Gram(s) Oral once PRN Blood Glucose LESS THAN 70 milliGRAM(s)/deciliter  glucagon  Injectable 1 milliGRAM(s) IntraMuscular once PRN Glucose LESS THAN 70 milligrams/deciliter    CAPILLARY BLOOD GLUCOSE  POCT: 145mg/dL 1/9    PHYSICAL EXAM:  Vital Signs Last 24 Hrs  T(C): 36.5 (09 Jan 2020 05:49), Max: 36.8 (08 Jan 2020 15:06)  T(F): 97.7 (09 Jan 2020 05:49), Max: 98.2 (08 Jan 2020 15:06)  HR: 81 (09 Jan 2020 05:49) (64 - 81)  BP: 140/84 (09 Jan 2020 05:49) (113/67 - 140/84)  BP(mean): --  RR: 18 (09 Jan 2020 05:49) (16 - 18)  SpO2: 100% (09 Jan 2020 05:49) (99% - 100%)    GENERAL: NAD, arousable, A/O x 2  HEAD: Atraumatic, Normocephalic, EEG leads in place  EYES: EOMI, PERRLA, conjunctiva and sclera clear  ENMT: No tonsillar erythema, exudates, or enlargement; Dry mucous membranes  NECK: Supple  NERVOUS SYSTEM: A&O x2 (name and place), more arousable since yesterday. 5/5 strength in UE bilaterally and 4/5 strength in LE bilaterally  CHEST/LUNG: CTABL; No rales, rhonchi, wheezing  HEART: RRR, no M/R/G  ABDOMEN: Soft, NT, ND, normoactive bowel sounds  EXTREMITIES: 2+ Peripheral Pulses, No edema  LYMPH: No lymphadenopathy noted  SKIN: Dry scaly skin in LE bilaterally  PSYCH: Arousable and communicative    LABS:                  9.9    1.48  )-----------( 24       ( 09 Jan 2020 06:00 )             28.9     01-09    130<L>  |  95<L>  |  19  ----------------------------<  143<H>  3.7   |  25  |  0.54    Ca    8.1<L>      09 Jan 2020 06:00  Phos  4.9     01-09  Mg     1.9     01-09    TPro  5.3<L>  /  Alb  2.9<L>  /  TBili  0.6  /  DBili  x   /  AST  21  /  ALT  40  /  AlkPhos  64  01-09    LIVER FUNCTIONS - ( 09 Jan 2020 06:00 )  Alb: 2.9 g/dL / Pro: 5.3 g/dL / ALK PHOS: 64 u/L / ALT: 40 u/L / AST: 21 u/L / GGT: x           PT/INR - ( 08 Jan 2020 06:13 )   PT: 10.6 SEC;   INR: 0.93     PTT - ( 08 Jan 2020 06:13 )  PTT:23.0 SEC      EEG Clinical Impression:  Mild diffuse or multifocal cerebral dysfunction, not specific as to etiology.  There were no epileptiform abnormalities recorded.        CT Head No Cont (01.07.20 @ 17:32)  IMPRESSION: Motion limited study, with redemonstration sequela previous right parietal biopsy, and continued decreased attenuation, heterogeneity along the corpus callosum splenium and biparietal and right temporal lobes and left frontal lobe, with GBM and seizures, MRI with gadolinium may be more sensitive and obtained for improved assessment as clinically warranted, and no contraindications.    Neurology notes reviewed

## 2020-01-09 NOTE — PROGRESS NOTE ADULT - PROBLEM SELECTOR PLAN 3
- Likely in the setting of chemo and malignancy  - Plt 24 transfuse with Plt < 10  - H/H 9.8, continue to monitor with daily CBCs  - If continues to decrease will consider hematology consult as pt has been off chemotherapy for >2 months w/ no continued decrease in PLT count and WBC

## 2020-01-09 NOTE — DISCHARGE NOTE PROVIDER - NSDCCPCAREPLAN_GEN_ALL_CORE_FT
PRINCIPAL DISCHARGE DIAGNOSIS  Diagnosis: Seizure  Assessment and Plan of Treatment: You presented to the Emergency room w/ elevated blood glucose levels and had 2 seizures while in the ED likely in the setting of hyperglycemia w/ baseline GBM and long awake hours. You were treated w/ Ativan x2 in the ED and started on Keppra 500mg twice a day. You underwent seizure workup with an EEG and MRI which were both unremarkable. You were also seen by neurology who determined Keppra 500mg BID.      SECONDARY DISCHARGE DIAGNOSES  Diagnosis: Hyperglycemia  Assessment and Plan of Treatment: You are on Decadron, a medication that can cause elevated blood glucose levels. Because of this you will be discharged on metformin 1000mg twice a day which will help control your blood sugar levels. You will also be discharged w/ Humalog which is a short acting insulin that will help decrease the blood sugar levels when needed. Please be cautious w/ this medication as it can cause hypoglycemia or low blood sugars which can lead to fainting or seizures. Please take the Insulin 2 units if blood sugar checks are between 151-22, 4 units for glucose 201-250, 6 units for blood glucose 251-300, 8 units for blood glucose 301-350, and 10 units for blood glucose 351-400. If blood glucose 400 or above please take 12 units of insulin and contact your primary care provider for further management. PRINCIPAL DISCHARGE DIAGNOSIS  Diagnosis: Seizure  Assessment and Plan of Treatment: You presented to the Emergency room w/ elevated blood glucose levels and had 2 seizures while in the ED likely in the setting of hyperglycemia w/ baseline GBM and long awake hours. You were treated w/ Ativan x2 in the ED and started on Keppra 500mg twice a day. You underwent seizure workup with an EEG and MRI which were both unremarkable. You were also seen by neurology are to be discharged on Keppra 500mg twice a day. You are to continue w/ this medication indefinetly.      SECONDARY DISCHARGE DIAGNOSES  Diagnosis: Hyperglycemia  Assessment and Plan of Treatment: You are on Decadron, a medication that can cause elevated blood glucose levels. Because of this you will be discharged on NPH insulin injection 5U twice a day which will help control your blood sugar levels.  Please be cautious w/ this medication as it can cause hypoglycemia or low blood sugars which can lead to fainting or seizures.

## 2020-01-09 NOTE — DISCHARGE NOTE PROVIDER - NSDCMRMEDTOKEN_GEN_ALL_CORE_FT
ALPRAZolam 0.5 mg oral tablet: 1 tab(s) orally 3 times a day, As Needed for anxiety   atorvastatin 10 mg oral tablet: 1 tab(s) orally once a day (FIlled Oct/2019 x 3 months)  dexamethasone 4 mg oral tablet: 1 tab(s) orally 3 times a day  metFORMIN 500 mg oral tablet: 1 tab(s) orally 2 times a day   Protonix 40 mg oral delayed release tablet: 1 tab(s) orally once a day  Sodium Chloride 1 g oral tablet: 2 tab(s) orally 3 times a day   Vitamin B12 500 mcg oral tablet: 1 tab(s) orally once a day atorvastatin 10 mg oral tablet: 1 tab(s) orally once a day (FIlled Oct/2019 x 3 months)  bisacodyl 10 mg rectal suppository: 1 suppository(ies) rectal once a day, As needed, Constipation  dexamethasone 2 mg oral tablet: 1 tab(s) orally 2 times a day  HumuLIN N 100 units/mL subcutaneous suspension: 5 unit(s) subcutaneous 2 times a day  levETIRAcetam 500 mg oral tablet: 1 tab(s) orally 2 times a day  melatonin 3 mg oral tablet: 1 tab(s) orally once a day (at bedtime)  polyethylene glycol 3350 oral powder for reconstitution: 17 gram(s) orally once a day  Protonix 40 mg oral delayed release tablet: 1 tab(s) orally once a day  senna oral tablet: 2 tab(s) orally once a day (at bedtime)  Sodium Chloride 1 g oral tablet: 2 tab(s) orally 3 times a day   valGANciclovir 450 mg oral tablet: 2 tab(s) orally 2 times a day  Vitamin B12 500 mcg oral tablet: 1 tab(s) orally once a day

## 2020-01-09 NOTE — EEG REPORT - NS EEG TEXT BOX
ADDIS SCHWAB [id number]     Study Date: 		01-09-20    ROUTINE EEG    Technical Information:			  		  Placement and Labeling of Electrodes:  The EEG was performed utilizing 20 channels referential EEG connections (coronal over temporal over parasagittal montage) using all standard 10-20 electrode placements with EKG.  Recording was at a sampling rate of 256 samples per second per channel.  Time synchronized digital video recording was done simultaneously with EEG recording.  A low light infrared camera was used for low light recording.  Luis and seizure detection algorithms were utilized.    CSA Technical Component:  Quantitative EEG analysis using a separate Compressed Spectral Array (CSA) software package was conducted in real-time and run at bedside after set up by the technician, digitally displaying the power of electrographic frequencies included in the 1-30Hz band using a graded color map.  This data was reviewed and interpreted independently, and is reported in a separate section below.    --------------------------------------------------------------------------------------------------  History:  CC/ HPI Patient is a 69y old  Male who presents with a chief complaint of Seizures and Hyperglycemia (09 Jan 2020 12:53)    MEDICATIONS  (STANDING):  dexAMETHasone  Injectable 4 milliGRAM(s) IV Push three times a day  dextrose 5%. 1000 milliLiter(s) (50 mL/Hr) IV Continuous <Continuous>  dextrose 50% Injectable 12.5 Gram(s) IV Push once  dextrose 50% Injectable 25 Gram(s) IV Push once  dextrose 50% Injectable 25 Gram(s) IV Push once  insulin lispro (HumaLOG) corrective regimen sliding scale   SubCutaneous every 6 hours  levETIRAcetam  IVPB 500 milliGRAM(s) IV Intermittent every 12 hours  pantoprazole  Injectable 40 milliGRAM(s) IV Push daily  sodium chloride 1 Gram(s) Oral three times a day  sodium chloride 0.9%. 1000 milliLiter(s) (60 mL/Hr) IV Continuous <Continuous>    --------------------------------------------------------------------------------------------------  Study Interpretation:    [[[Abbreviation Key:  PDR=alpha rhythm/posterior dominant rhythm. A-P=anterior posterior gradient.  Amplitude: ‘very low’:<20; ‘low’:20-50; ‘medium’:; ‘high’:>200uV.  Persistence for periodic/rhythmic patterns (% of epoch) ‘rare’:<1%; ‘occasional’:1-10%; ‘frequent’:10-50%; ‘abundant’:50-90%; ‘continuous’:>90%.  Persistence for sporadic discharges: ‘rare’:<1/hr; ‘occasional’:1/min-1/hr; ‘frequent’:>1/min; ‘abundant’:>1/10 sec.  GRDA=generalized rhythmic delta activity, LRDA=lateralized rhythmic delta activity, TIRDA=temporal intermittent rhythmic delta activity, FIRDA=frontal intermittent rhythmic activity. LPD=PLED=lateralized periodic discharges, GPD=generalized periodic discharges, BiPDs=BiPLEDs=bilateral independent periodic epileptiform discharges, SIRPID=stimulus induced rhythmic, periodic, or ictal appearing discharges.  Modifiers: +F=with fast component, +S=with spike component, +R=with rhythmic component.  S-B=burst suppression pattern.  Max=maximal. N1-drowsy, N2-stage II sleep, N3-slow wave sleep.  HV=hyperventilation, PS=photic stimulation]]]    FINDINGS:  The background was continuous, spontaneously variable and reactive.  During wakefulness, the posteriorly dominant rhythm was poorly modulated near 7hz 30uV.    There was diffuse irregular theta and delta activity present.    Sleep Background:  Drowsiness was characterized by fragmentation, attenuation, and slowing of the background activity.    Sleep was characterized by the presence of vertex waves, symmetric spindles, and K-complexes.    Epileptiform Activity:   No epileptiform discharges were present.    Events:  No clinical events were recorded.  No seizures were recorded.    Activation Procedures:   -Hyperventilation was not performed.    -Photic stimulation was not performed.    Artifacts:  Intermittent myogenic and movement artifacts were noted.    ECG:  The heart rate on single channel ECG at baseline was predominantly near BPM = 60-70  -----------------------------------------------------------------------------------------------------    EEG Classification / Summary:  Abnormal EEG study  Moderate background slowing    -----------------------------------------------------------------------------------------------------    Clinical Impression:  Moderate diffuse or multifocal cerebral dysfunction, not specific as to etiology.  There were no epileptiform abnormalities recorded.      -------------------------------------------------------------------------------------------------------  Mane Collins M.D.   of Neurology, [facility] [place] ADDIS SCHWAB [id number]     Study Date: 		  01-08-20 16:00  01-09-20 08:00      --------------------------------------------------------------------------------------------------  History:  CC/ HPI Patient is a 69y old  Male who presents with a chief complaint of Seizures and Hyperglycemia (09 Jan 2020 12:53)    MEDICATIONS  (STANDING):  dexAMETHasone  Injectable 4 milliGRAM(s) IV Push three times a day  insulin lispro (HumaLOG) corrective regimen sliding scale   SubCutaneous every 6 hours  levETIRAcetam  IVPB 500 milliGRAM(s) IV Intermittent every 12 hours  pantoprazole  Injectable 40 milliGRAM(s) IV Push daily  sodium chloride 1 Gram(s) Oral three times a day  sodium chloride 0.9%. 1000 milliLiter(s) (60 mL/Hr) IV Continuous <Continuous>    --------------------------------------------------------------------------------------------------  Study Interpretation:    [[[Abbreviation Key:  PDR=alpha rhythm/posterior dominant rhythm. A-P=anterior posterior gradient.  Amplitude: ‘very low’:<20; ‘low’:20-50; ‘medium’:; ‘high’:>200uV.  Persistence for periodic/rhythmic patterns (% of epoch) ‘rare’:<1%; ‘occasional’:1-10%; ‘frequent’:10-50%; ‘abundant’:50-90%; ‘continuous’:>90%.  Persistence for sporadic discharges: ‘rare’:<1/hr; ‘occasional’:1/min-1/hr; ‘frequent’:>1/min; ‘abundant’:>1/10 sec.  GRDA=generalized rhythmic delta activity, LRDA=lateralized rhythmic delta activity, TIRDA=temporal intermittent rhythmic delta activity, FIRDA=frontal intermittent rhythmic activity. LPD=PLED=lateralized periodic discharges, GPD=generalized periodic discharges, BiPDs=BiPLEDs=bilateral independent periodic epileptiform discharges, SIRPID=stimulus induced rhythmic, periodic, or ictal appearing discharges.  Modifiers: +F=with fast component, +S=with spike component, +R=with rhythmic component.  S-B=burst suppression pattern.  Max=maximal. N1-drowsy, N2-stage II sleep, N3-slow wave sleep.  HV=hyperventilation, PS=photic stimulation]]]    FINDINGS:  The background was continuous, spontaneously variable and reactive.  During wakefulness, the posteriorly dominant rhythm was poorly modulated near 7hz 30uV.    There was diffuse irregular theta and delta activity present.    Sleep Background:  Drowsiness was characterized by fragmentation, attenuation, and slowing of the background activity.    Sleep was characterized by the presence of vertex waves, symmetric spindles, and K-complexes.    Epileptiform Activity:   No epileptiform discharges were present.    Events:  No clinical events were recorded.  No seizures were recorded.    Activation Procedures:   -Hyperventilation was not performed.    -Photic stimulation was not performed.    Artifacts:  Intermittent myogenic and movement artifacts were noted.    ECG:  The heart rate on single channel ECG at baseline was predominantly near BPM = 60-70  -----------------------------------------------------------------------------------------------------    EEG Classification / Summary:  Abnormal EEG study  Moderate background slowing    -----------------------------------------------------------------------------------------------------    Clinical Impression:  Moderate diffuse or multifocal cerebral dysfunction, not specific as to etiology.  There were no epileptiform abnormalities recorded.      -------------------------------------------------------------------------------------------------------  Mane Collins M.D.   of Neurology, [facility] [place]

## 2020-01-09 NOTE — DISCHARGE NOTE PROVIDER - CARE PROVIDER_API CALL
Lyle Chavis  Established Patient  Follow Up Time: 1 week Lyle Chavis  Established Patient  Follow Up Time: Camron Felipe; MBBS)  Infectious Disease; Internal Medicine  [address] [place] [id number]  Phone: [phone]  Fax: [phone]  Follow Up Time:     Galdino Brennan)  Hematology; Internal Medicine; Medical Oncology  [address] [place] [address]  Phone: [phone]  Fax: [phone]  Follow Up Time:

## 2020-01-09 NOTE — PROGRESS NOTE ADULT - PROBLEM SELECTOR PLAN 1
- Likely 2/2 to hx of GBM and possibly lowered threshold 2/2 hyperglycemic state. On Keppra in the past but was tapered  - CTH no significant changes shows post-biopsy changes. Likely cause of lactate elevation  - EKG: no acute changes   - Neuro checks Q4H   - Aspiration precautions  - Seizure precautions  - Keep NPO given mental status   - MRI head w/w/o results pending  - VEEG pending  - Continue Keppra IV 500mg BID  - Neuro following, recs appreciated - Likely 2/2 to hx of GBM and possibly lowered threshold 2/2 hyperglycemic state. On Keppra in the past but was tapered  - CTH no significant changes shows post-biopsy changes. Likely cause of lactate elevation  - EKG: no acute changes   - VEEG negative for any seizure activity  - Neuro checks Q4H   - Aspiration precautions  - Seizure precautions  - MRI head w/w/o results pending  - Continue Keppra IV 500mg BID  - Neuro recs appreciated

## 2020-01-09 NOTE — DISCHARGE NOTE PROVIDER - CARE PROVIDERS DIRECT ADDRESSES
,DirectAddress_Unknown ,DirectAddress_Unknown,[email or web address]

## 2020-01-09 NOTE — PROGRESS NOTE ADULT - PROBLEM SELECTOR PLAN 2
- Hyperglycemia 200-300s. On Metformin at home. Last A1C in 12/19 7.1 (however pt w/ anemia making A1c less reliable)  - s/p 8U Lantus in ED  - May need to be on Lantus while taking steroids   - If fingersticks continue to be elevated may need to use steroid induced hyperglycemia protocol to adjust basal and bolus coverage  - Goal FS <180   - Mod ISS Q6H while NPO - Hyperglycemia 200-300s. On Metformin at home. Last A1C in 12/19 7.1 (however pt w/ anemia making A1c less reliable)  - s/p 8U Lantus in ED  - May need to be on Lantus while taking steroids   - If fingersticks continue to be elevated may need to use steroid induced hyperglycemia protocol to adjust basal and bolus coverage  - Goal FS <180   - Mod ISS TID and bedtime  - Will plan to d/c on Metformin 1000mg BID and Humalog mod sliding scale

## 2020-01-09 NOTE — PROGRESS NOTE ADULT - PROBLEM SELECTOR PLAN 6
As per wife patient on RT and chemo. Chemo has been on hold due to thrombocytopenia last chemo session about 3 weeks ago.  - Continue Decadron 4mg TID IV  - 40 IV Protonix QD for GI PPx   - Neuro following, recs appreciated  - Reached out to patient's Neuro-oncologist at [facility] [place] per wife patient on RT and chemo. Chemo has been on hold due to thrombocytopenia last chemo session about 3 weeks ago.  - Per Neruo-oncologist at [facility] pt was supposed to be on Decadron 2mg BID. If MRI unchanged, will decrease to 4mg BID for discharge and follow up at [facility] for further tappering  - Continue Decadron 4mg TID IV  - 40 IV Protonix QD for GI PPx   - Neuro recs appreciated

## 2020-01-09 NOTE — PHYSICAL THERAPY INITIAL EVALUATION ADULT - PLANNED THERAPY INTERVENTIONS, PT EVAL
gait training/strengthening/bed mobility training/balance training/transfer training/Patient left supine in bed in NAD, call bell in reach, all lines intact.

## 2020-01-10 LAB
ANION GAP SERPL CALC-SCNC: 13 MMO/L — SIGNIFICANT CHANGE UP (ref 7–14)
ANION GAP SERPL CALC-SCNC: 9 MMO/L — SIGNIFICANT CHANGE UP (ref 7–14)
BUN SERPL-MCNC: 24 MG/DL — HIGH (ref 7–23)
BUN SERPL-MCNC: 27 MG/DL — HIGH (ref 7–23)
CALCIUM SERPL-MCNC: 7.8 MG/DL — LOW (ref 8.4–10.5)
CALCIUM SERPL-MCNC: 8 MG/DL — LOW (ref 8.4–10.5)
CHLORIDE SERPL-SCNC: 95 MMOL/L — LOW (ref 98–107)
CHLORIDE SERPL-SCNC: 98 MMOL/L — SIGNIFICANT CHANGE UP (ref 98–107)
CO2 SERPL-SCNC: 21 MMOL/L — LOW (ref 22–31)
CO2 SERPL-SCNC: 22 MMOL/L — SIGNIFICANT CHANGE UP (ref 22–31)
CREAT SERPL-MCNC: 0.49 MG/DL — LOW (ref 0.5–1.3)
CREAT SERPL-MCNC: 0.51 MG/DL — SIGNIFICANT CHANGE UP (ref 0.5–1.3)
GLUCOSE BLDC GLUCOMTR-MCNC: 203 MG/DL — HIGH (ref 70–99)
GLUCOSE BLDC GLUCOMTR-MCNC: 220 MG/DL — HIGH (ref 70–99)
GLUCOSE BLDC GLUCOMTR-MCNC: 253 MG/DL — HIGH (ref 70–99)
GLUCOSE BLDC GLUCOMTR-MCNC: 261 MG/DL — HIGH (ref 70–99)
GLUCOSE BLDC GLUCOMTR-MCNC: 266 MG/DL — HIGH (ref 70–99)
GLUCOSE SERPL-MCNC: 224 MG/DL — HIGH (ref 70–99)
GLUCOSE SERPL-MCNC: 306 MG/DL — HIGH (ref 70–99)
HCT VFR BLD CALC: 28.5 % — LOW (ref 39–50)
HGB BLD-MCNC: 9.9 G/DL — LOW (ref 13–17)
MAGNESIUM SERPL-MCNC: 1.9 MG/DL — SIGNIFICANT CHANGE UP (ref 1.6–2.6)
MCHC RBC-ENTMCNC: 31.2 PG — SIGNIFICANT CHANGE UP (ref 27–34)
MCHC RBC-ENTMCNC: 34.7 % — SIGNIFICANT CHANGE UP (ref 32–36)
MCV RBC AUTO: 89.9 FL — SIGNIFICANT CHANGE UP (ref 80–100)
NRBC # FLD: 0 K/UL — SIGNIFICANT CHANGE UP (ref 0–0)
PHOSPHATE SERPL-MCNC: 3.1 MG/DL — SIGNIFICANT CHANGE UP (ref 2.5–4.5)
PLATELET # BLD AUTO: 25 K/UL — LOW (ref 150–400)
PMV BLD: 10.8 FL — SIGNIFICANT CHANGE UP (ref 7–13)
POTASSIUM SERPL-MCNC: 3.9 MMOL/L — SIGNIFICANT CHANGE UP (ref 3.5–5.3)
POTASSIUM SERPL-MCNC: 4 MMOL/L — SIGNIFICANT CHANGE UP (ref 3.5–5.3)
POTASSIUM SERPL-SCNC: 3.9 MMOL/L — SIGNIFICANT CHANGE UP (ref 3.5–5.3)
POTASSIUM SERPL-SCNC: 4 MMOL/L — SIGNIFICANT CHANGE UP (ref 3.5–5.3)
RBC # BLD: 3.17 M/UL — LOW (ref 4.2–5.8)
RBC # FLD: 16.4 % — HIGH (ref 10.3–14.5)
SODIUM SERPL-SCNC: 126 MMOL/L — LOW (ref 135–145)
SODIUM SERPL-SCNC: 132 MMOL/L — LOW (ref 135–145)
WBC # BLD: 1.66 K/UL — LOW (ref 3.8–10.5)
WBC # FLD AUTO: 1.66 K/UL — LOW (ref 3.8–10.5)

## 2020-01-10 PROCEDURE — 99233 SBSQ HOSP IP/OBS HIGH 50: CPT | Mod: GC

## 2020-01-10 PROCEDURE — 70553 MRI BRAIN STEM W/O & W/DYE: CPT | Mod: 26

## 2020-01-10 RX ORDER — LEVETIRACETAM 250 MG/1
500 TABLET, FILM COATED ORAL
Refills: 0 | Status: DISCONTINUED | OUTPATIENT
Start: 2020-01-11 | End: 2020-01-16

## 2020-01-10 RX ORDER — ATORVASTATIN CALCIUM 80 MG/1
10 TABLET, FILM COATED ORAL AT BEDTIME
Refills: 0 | Status: DISCONTINUED | OUTPATIENT
Start: 2020-01-10 | End: 2020-01-16

## 2020-01-10 RX ORDER — DEXAMETHASONE 0.5 MG/5ML
4 ELIXIR ORAL
Refills: 0 | Status: DISCONTINUED | OUTPATIENT
Start: 2020-01-10 | End: 2020-01-11

## 2020-01-10 RX ORDER — HUMAN INSULIN 100 [IU]/ML
5 INJECTION, SUSPENSION SUBCUTANEOUS ONCE
Refills: 0 | Status: COMPLETED | OUTPATIENT
Start: 2020-01-10 | End: 2020-01-10

## 2020-01-10 RX ORDER — SENNA PLUS 8.6 MG/1
2 TABLET ORAL AT BEDTIME
Refills: 0 | Status: DISCONTINUED | OUTPATIENT
Start: 2020-01-10 | End: 2020-01-16

## 2020-01-10 RX ORDER — SODIUM CHLORIDE 9 MG/ML
2 INJECTION INTRAMUSCULAR; INTRAVENOUS; SUBCUTANEOUS THREE TIMES A DAY
Refills: 0 | Status: DISCONTINUED | OUTPATIENT
Start: 2020-01-10 | End: 2020-01-16

## 2020-01-10 RX ORDER — LANOLIN ALCOHOL/MO/W.PET/CERES
3 CREAM (GRAM) TOPICAL AT BEDTIME
Refills: 0 | Status: DISCONTINUED | OUTPATIENT
Start: 2020-01-10 | End: 2020-01-16

## 2020-01-10 RX ORDER — POLYETHYLENE GLYCOL 3350 17 G/17G
17 POWDER, FOR SOLUTION ORAL DAILY
Refills: 0 | Status: DISCONTINUED | OUTPATIENT
Start: 2020-01-10 | End: 2020-01-16

## 2020-01-10 RX ADMIN — POLYETHYLENE GLYCOL 3350 17 GRAM(S): 17 POWDER, FOR SOLUTION ORAL at 22:17

## 2020-01-10 RX ADMIN — Medication 4: at 18:02

## 2020-01-10 RX ADMIN — Medication 6: at 12:08

## 2020-01-10 RX ADMIN — LEVETIRACETAM 400 MILLIGRAM(S): 250 TABLET, FILM COATED ORAL at 12:08

## 2020-01-10 RX ADMIN — SENNA PLUS 2 TABLET(S): 8.6 TABLET ORAL at 21:48

## 2020-01-10 RX ADMIN — Medication 6: at 10:00

## 2020-01-10 RX ADMIN — ATORVASTATIN CALCIUM 10 MILLIGRAM(S): 80 TABLET, FILM COATED ORAL at 21:21

## 2020-01-10 RX ADMIN — SODIUM CHLORIDE 2 GRAM(S): 9 INJECTION INTRAMUSCULAR; INTRAVENOUS; SUBCUTANEOUS at 14:32

## 2020-01-10 RX ADMIN — Medication 3 MILLIGRAM(S): at 21:47

## 2020-01-10 RX ADMIN — Medication 4 MILLIGRAM(S): at 18:02

## 2020-01-10 RX ADMIN — LEVETIRACETAM 400 MILLIGRAM(S): 250 TABLET, FILM COATED ORAL at 02:55

## 2020-01-10 RX ADMIN — HUMAN INSULIN 5 UNIT(S): 100 INJECTION, SUSPENSION SUBCUTANEOUS at 14:32

## 2020-01-10 RX ADMIN — SODIUM CHLORIDE 2 GRAM(S): 9 INJECTION INTRAMUSCULAR; INTRAVENOUS; SUBCUTANEOUS at 21:21

## 2020-01-10 RX ADMIN — PANTOPRAZOLE SODIUM 40 MILLIGRAM(S): 20 TABLET, DELAYED RELEASE ORAL at 12:09

## 2020-01-10 RX ADMIN — Medication 4 MILLIGRAM(S): at 05:21

## 2020-01-10 RX ADMIN — SODIUM CHLORIDE 1 GRAM(S): 9 INJECTION INTRAMUSCULAR; INTRAVENOUS; SUBCUTANEOUS at 05:21

## 2020-01-10 NOTE — PROGRESS NOTE ADULT - PROBLEM SELECTOR PLAN 3
- Likely in the setting of chemo and malignancy  - Plt 24 transfuse with Plt < 10  - H/H 9.8, continue to monitor with daily CBCs  - If continues to decrease will consider hematology consult as pt has been off chemotherapy for >2 months w/ no continued decrease in PLT count and WBC but has however remained relatively steady since admission - Likely in the setting of chemo and malignancy  - Plt 24 transfuse with Plt < 10  - H/H 9.8, continue to monitor with daily CBCs  - off chemotherapy for >2 months w/ no continued decrease in PLT count and WBC but has however remained relatively steady since admission. Findings may relate to genetic variation in pt which causes such a response to his previous chemo therapy. Pancytopenia should continue to improve within next month. Will recommend f/u w/ hematology after rehab if pt decides to discharge to rehab

## 2020-01-10 NOTE — PROGRESS NOTE ADULT - SUBJECTIVE AND OBJECTIVE BOX
Radha Oshea, PGY-1  Internal Medicine  Pager: [phone] (NS)/ [phone] (FIDE)    PROGRESS NOTE:     Patient is a 69y old Kyrgyz speaking Male who presents with a chief complaint of Seizures and Hyperglycemia (07 Jan 2020 20:17)    SUBJECTIVE / OVERNIGHT EVENTS:    Pt is awake upon encounter this AM laying comfortably in bed. Denies any CP, SOB, Abd pain, N/V, HA.    REVIEW OF SYSTEMS:  Unable to assess 2/2 pt mental status    MEDICATIONS  (STANDING):  dexAMETHasone  Injectable 4 milliGRAM(s) IV Push three times a day  dextrose 5%. 1000 milliLiter(s) (50 mL/Hr) IV Continuous <Continuous>  dextrose 50% Injectable 12.5 Gram(s) IV Push once  dextrose 50% Injectable 25 Gram(s) IV Push once  dextrose 50% Injectable 25 Gram(s) IV Push once  insulin lispro (HumaLOG) corrective regimen sliding scale   SubCutaneous three times a day before meals  insulin lispro (HumaLOG) corrective regimen sliding scale   SubCutaneous at bedtime  levETIRAcetam  IVPB 500 milliGRAM(s) IV Intermittent every 12 hours  pantoprazole  Injectable 40 milliGRAM(s) IV Push daily  sodium chloride 1 Gram(s) Oral three times a day    MEDICATIONS  (PRN):  dextrose 40% Gel 15 Gram(s) Oral once PRN Blood Glucose LESS THAN 70 milliGRAM(s)/deciliter  glucagon  Injectable 1 milliGRAM(s) IntraMuscular once PRN Glucose LESS THAN 70 milligrams/deciliter    CAPILLARY BLOOD GLUCOSE LEVELS:  POCT Blood Glucose.: 249: RN Notified Readback mg/dL (01.09.20 @ 22:01)  POCT Blood Glucose.: 190: RN Notified Readback mg/dL (01.09.20 @ 18:12)    PHYSICAL EXAM:  Vital Signs Last 24 Hrs  T(C): 36.7 (10 Charles 2020 05:16), Max: 36.9 (09 Jan 2020 18:30)  T(F): 98 (10 Charles 2020 05:16), Max: 98.4 (09 Jan 2020 18:30)  HR: 98 (10 Charles 2020 05:16) (68 - 98)  BP: 140/70 (10 Charles 2020 05:16) (108/71 - 140/70)  BP(mean): --  RR: 18 (10 Charles 2020 05:16) (17 - 18)  SpO2: 99% (10 Charles 2020 05:16) (98% - 99%)    GENERAL: NAD, arousable, A/O x 2  HEAD: Atraumatic, Normocephalic  EYES: EOMI, PERRLA, conjunctiva and sclera clear  ENMT: No tonsillar erythema, exudates, or enlargement; Dry mucous membranes  NECK: Supple  NERVOUS SYSTEM: A&O x2 (name and place), more arousable since yesterday. 5/5 strength in UE bilaterally and 4/5 strength in LE bilaterally  CHEST/LUNG: CTABL; No rales, rhonchi, wheezing  HEART: RRR, no M/R/G  ABDOMEN: Soft, NT, ND, normoactive bowel sounds  EXTREMITIES: 2+ Peripheral Pulses, No edema  LYMPH: No lymphadenopathy noted  SKIN: Dry scaly skin in LE bilaterally  PSYCH: Arousable and communicative    LABS:                           9.9    1.66  )-----------( 25       ( 10 Charles 2020 06:45 )             28.5     01-10    126<L>  |  95<L>  |  24<H>  ----------------------------<  306<H>  4.0   |  22  |  0.51    Ca    7.8<L>      10 Charles 2020 06:45  Phos  3.1     01-10  Mg     1.9     01-10    TPro  5.3<L>  /  Alb  2.9<L>  /  TBili  0.6  /  DBili  x   /  AST  21  /  ALT  40  /  AlkPhos  64  01-09    LIVER FUNCTIONS - ( 09 Jan 2020 06:00 )  Alb: 2.9 g/dL / Pro: 5.3 g/dL / ALK PHOS: 64 u/L / ALT: 40 u/L / AST: 21 u/L / GGT: x             EEG Clinical Impression:  Mild diffuse or multifocal cerebral dysfunction, not specific as to etiology.  There were no epileptiform abnormalities recorded.        CT Head No Cont (01.07.20 @ 17:32)  IMPRESSION: Motion limited study, with redemonstration sequela previous right parietal biopsy, and continued decreased attenuation, heterogeneity along the corpus callosum splenium and biparietal and right temporal lobes and left frontal lobe, with GBM and seizures, MRI with gadolinium may be more sensitive and obtained for improved assessment as clinically warranted, and no contraindications.      Case discussed w/ pt's neuro-oncologist at [facility] [place] Radha Oshea, PGY-1  Internal Medicine  Pager: [phone] (NS)/ [phone] (FIDE)    PROGRESS NOTE:     Patient is a 69y old Kazakh speaking Male who presents with a chief complaint of Seizures and Hyperglycemia (07 Jan 2020 20:17)    SUBJECTIVE / OVERNIGHT EVENTS:    Pt is awake upon encounter this AM laying comfortably in bed. Denies any CP, SOB, Abd pain, N/V, HA.     REVIEW OF SYSTEMS:  Unable to assess 2/2 pt mental status    MEDICATIONS  (STANDING):  dexAMETHasone  Injectable 4 milliGRAM(s) IV Push three times a day  dextrose 5%. 1000 milliLiter(s) (50 mL/Hr) IV Continuous <Continuous>  dextrose 50% Injectable 12.5 Gram(s) IV Push once  dextrose 50% Injectable 25 Gram(s) IV Push once  dextrose 50% Injectable 25 Gram(s) IV Push once  insulin lispro (HumaLOG) corrective regimen sliding scale   SubCutaneous three times a day before meals  insulin lispro (HumaLOG) corrective regimen sliding scale   SubCutaneous at bedtime  levETIRAcetam  IVPB 500 milliGRAM(s) IV Intermittent every 12 hours  pantoprazole  Injectable 40 milliGRAM(s) IV Push daily  sodium chloride 1 Gram(s) Oral three times a day    MEDICATIONS  (PRN):  dextrose 40% Gel 15 Gram(s) Oral once PRN Blood Glucose LESS THAN 70 milliGRAM(s)/deciliter  glucagon  Injectable 1 milliGRAM(s) IntraMuscular once PRN Glucose LESS THAN 70 milligrams/deciliter    CAPILLARY BLOOD GLUCOSE LEVELS:  POCT Blood Glucose.: 249: RN Notified Readback mg/dL (01.09.20 @ 22:01)  POCT Blood Glucose.: 190: RN Notified Readback mg/dL (01.09.20 @ 18:12)    PHYSICAL EXAM:  Vital Signs Last 24 Hrs  T(C): 36.7 (10 Charles 2020 05:16), Max: 36.9 (09 Jan 2020 18:30)  T(F): 98 (10 Charles 2020 05:16), Max: 98.4 (09 Jan 2020 18:30)  HR: 98 (10 Charles 2020 05:16) (68 - 98)  BP: 140/70 (10 Charles 2020 05:16) (108/71 - 140/70)  BP(mean): --  RR: 18 (10 Charles 2020 05:16) (17 - 18)  SpO2: 99% (10 Charles 2020 05:16) (98% - 99%)    GENERAL: NAD, arousable, A/O x 2  HEAD: Atraumatic, Normocephalic  EYES: EOMI, PERRLA, conjunctiva and sclera clear  ENMT: No tonsillar erythema, exudates, or enlargement; Dry mucous membranes  NECK: Supple  NERVOUS SYSTEM: A&O x2 (name and place), more arousable since yesterday. 5/5 strength in UE bilaterally and 4/5 strength in LE bilaterally  CHEST/LUNG: CTABL; No rales, rhonchi, wheezing  HEART: RRR, no M/R/G  ABDOMEN: Soft, NT, ND, normoactive bowel sounds  EXTREMITIES: 2+ Peripheral Pulses, No edema  LYMPH: No lymphadenopathy noted  SKIN: Dry scaly skin in LE bilaterally  PSYCH: Arousable and communicative    LABS:                           9.9    1.66  )-----------( 25       ( 10 Charles 2020 06:45 )             28.5     01-10    126<L>  |  95<L>  |  24<H>  ----------------------------<  306<H>  4.0   |  22  |  0.51    Ca    7.8<L>      10 Charles 2020 06:45  Phos  3.1     01-10  Mg     1.9     01-10    TPro  5.3<L>  /  Alb  2.9<L>  /  TBili  0.6  /  DBili  x   /  AST  21  /  ALT  40  /  AlkPhos  64  01-09    LIVER FUNCTIONS - ( 09 Jan 2020 06:00 )  Alb: 2.9 g/dL / Pro: 5.3 g/dL / ALK PHOS: 64 u/L / ALT: 40 u/L / AST: 21 u/L / GGT: x             EEG Clinical Impression:  Mild diffuse or multifocal cerebral dysfunction, not specific as to etiology.  There were no epileptiform abnormalities recorded.        CT Head No Cont (01.07.20 @ 17:32)  IMPRESSION: Motion limited study, with redemonstration sequela previous right parietal biopsy, and continued decreased attenuation, heterogeneity along the corpus callosum splenium and biparietal and right temporal lobes and left frontal lobe, with GBM and seizures, MRI with gadolinium may be more sensitive and obtained for improved assessment as clinically warranted, and no contraindications.      Case discussed w/ pt's neuro-oncologist at [facility] [place] Radha Oshea, PGY-1  Internal Medicine  Pager: [phone] (NS)/ [phone] (FIDE)    PROGRESS NOTE:     Patient is a 69y old Albanian speaking Male who presents with a chief complaint of Seizures and Hyperglycemia (07 Jan 2020 20:17)    SUBJECTIVE / OVERNIGHT EVENTS:    Pt is awake upon encounter this AM laying comfortably in bed. Denies any CP, SOB, Abd pain, N/V, HA. Eating well w/ no complaints    MEDICATIONS  (STANDING):  dexAMETHasone  Injectable 4 milliGRAM(s) IV Push two times a day  dextrose 5%. 1000 milliLiter(s) (50 mL/Hr) IV Continuous <Continuous>  dextrose 50% Injectable 12.5 Gram(s) IV Push once  dextrose 50% Injectable 25 Gram(s) IV Push once  dextrose 50% Injectable 25 Gram(s) IV Push once  insulin lispro (HumaLOG) corrective regimen sliding scale   SubCutaneous three times a day before meals  insulin lispro (HumaLOG) corrective regimen sliding scale   SubCutaneous at bedtime  insulin NPH human recombinant 5 Unit(s) SubCutaneous once  pantoprazole  Injectable 40 milliGRAM(s) IV Push daily  sodium chloride 2 Gram(s) Oral three times a day    MEDICATIONS  (PRN):  dextrose 40% Gel 15 Gram(s) Oral once PRN Blood Glucose LESS THAN 70 milliGRAM(s)/deciliter  glucagon  Injectable 1 milliGRAM(s) IntraMuscular once PRN Glucose LESS THAN 70 milligrams/deciliter    CAPILLARY BLOOD GLUCOSE LEVELS:  POCT Blood Glucose.: 249: RN Notified Readback mg/dL (01.09.20 @ 22:01)  POCT Blood Glucose.: 190: RN Notified Readback mg/dL (01.09.20 @ 18:12)    PHYSICAL EXAM:  Vital Signs Last 24 Hrs  T(C): 36.7 (10 Charles 2020 05:16), Max: 36.9 (09 Jan 2020 18:30)  T(F): 98 (10 Charles 2020 05:16), Max: 98.4 (09 Jan 2020 18:30)  HR: 98 (10 Charles 2020 05:16) (68 - 98)  BP: 140/70 (10 Charles 2020 05:16) (108/71 - 140/70)  BP(mean): --  RR: 18 (10 Charles 2020 05:16) (17 - 18)  SpO2: 99% (10 Charles 2020 05:16) (98% - 99%)    GENERAL: NAD, A/O x 2  HEAD: Atraumatic, Normocephalic  EYES: EOMI, PERRLA, conjunctiva and sclera clear  ENMT: No tonsillar erythema, exudates, or enlargement; Moist mucous membranes  NECK: Supple  NERVOUS SYSTEM: A&O x2 (name and place). 5/5 strength in UE bilaterally and 4/5 strength in LE bilaterally  CHEST/LUNG: CTABL; No rales, rhonchi, wheezing  HEART: RRR, no M/R/G  ABDOMEN: Soft, NT, ND, normoactive bowel sounds  EXTREMITIES: 2+ Peripheral Pulses, No edema  LYMPH: No lymphadenopathy noted  SKIN: Dry scaly skin in LE bilaterally  PSYCH: Arousable and communicative    LABS:                           9.9    1.66  )-----------( 25       ( 10 Charles 2020 06:45 )             28.5     01-10    126<L>  |  95<L>  |  24<H>  ----------------------------<  306<H>  4.0   |  22  |  0.51    Ca    7.8<L>      10 Charles 2020 06:45  Phos  3.1     01-10  Mg     1.9     01-10    TPro  5.3<L>  /  Alb  2.9<L>  /  TBili  0.6  /  DBili  x   /  AST  21  /  ALT  40  /  AlkPhos  64  01-09    LIVER FUNCTIONS - ( 09 Jan 2020 06:00 )  Alb: 2.9 g/dL / Pro: 5.3 g/dL / ALK PHOS: 64 u/L / ALT: 40 u/L / AST: 21 u/L / GGT: x           MR Head w/wo IV Cont (01.10.20 @ 09:22)  IMPRESSION:    Heterogeneously enhancing partially necrotic infiltrative mass within the splenium of the corpus callosum without significant interval change from the prior exam with associated subependymoma spread of tumor.    Areas of tumor infiltration above the left temporal horn and within the left frontal lobe which have increased in size. Tumor within the mesial left parietal lobe is slightly more prominent and demonstrates increased enhancement at this time.    EEG Clinical Impression:  Mild diffuse or multifocal cerebral dysfunction, not specific as to etiology.  There were no epileptiform abnormalities recorded.        CT Head No Cont (01.07.20 @ 17:32)  IMPRESSION: Motion limited study, with redemonstration sequela previous right parietal biopsy, and continued decreased attenuation, heterogeneity along the corpus callosum splenium and biparietal and right temporal lobes and left frontal lobe, with GBM and seizures, MRI with gadolinium may be more sensitive and obtained for improved assessment as clinically warranted, and no contraindications.      Case discussed w/ pt's neuro-oncologist at [facility] [place]

## 2020-01-10 NOTE — PROGRESS NOTE ADULT - PROBLEM SELECTOR PLAN 2
- Hyperglycemia 200-300s. On Metformin at home. Last A1C in 12/19 7.1 (however pt w/ anemia making A1c less reliable)  - s/p 8U Lantus in ED  - May need to be on Lantus while taking steroids   - If fingersticks continue to be elevated may need to use steroid induced hyperglycemia protocol to adjust basal and bolus coverage  - Goal FS <180   - Mod ISS TID and bedtime  - Will plan to d/c on Metformin 1000mg BID and Humalog mod sliding scale - Hyperglycemia 200-300s. On Metformin at home. Last A1C in 12/19 7.1 (however pt w/ anemia making A1c less reliable)  - s/p 8U Lantus in ED  - Goal FS <180   - Mod ISS TID and bedtime  - 5U NPH at 13:00 as pt continues to have readings in 200's after initiation of PO intake

## 2020-01-10 NOTE — PROGRESS NOTE ADULT - PROBLEM SELECTOR PLAN 8
- On Lipitor 20mg QD holding in the setting of NPO #10 Prophylactic Measure  DVT PPx: Thrombocytopenic so SCDs  Diet: Advance to consistent carb diet as tolerated   Dispo: PT recommending restorative rehab    Transitions of Care Status:  1.  Name of PCP: Dr. Hinson  2.  PCP Contacted on Admission: [ ] Y    [X ] N    3.  PCP contacted at Discharge: [ ] Y    [ ] N    [ ] N/A  4.  Post-Discharge Appointment Date and Location:  5.  Summary of Handoff given to PCP:

## 2020-01-10 NOTE — PROGRESS NOTE ADULT - PROBLEM SELECTOR PLAN 6
As per wife patient on RT and chemo. Chemo has been on hold due to thrombocytopenia last chemo session about 3 weeks ago.  - Per Neruo-oncologist at [facility] [place] pt was supposed to be on Decadron 2mg BID. If MRI unchanged, will decrease to 4mg BID for discharge and follow up at [facility] [place] for further tappering  - Continue Decadron 4mg TID IV  - 40 IV Protonix QD for GI PPx   - Neuro recs appreciated As per wife patient on RT and chemo. Chemo has been on hold due to thrombocytopenia last chemo session about 3 weeks ago.  - Per Neruo-oncologist at [facility] [place] pt was supposed to be on Decadron 2mg BID  - Decreased to 4mg BID to begin taper down to proper dosage, per [facility]  - 40 IV Protonix QD for GI PPx   - Neuro recs appreciated - Not on any current BP meds, SBPs mostly in 110s-120s mmHg, had an episode in 180s possibly in the setting of ?seizure

## 2020-01-10 NOTE — PROGRESS NOTE ADULT - ASSESSMENT
70 yo Bengali-speaking M w/ PMH of GBM diagnosed in 9/19, HTN, HLD currently undergoing radiation therapy at [facility] [place] a/w hyperglycemia likely 2/2 to steroid-induced from Decadron c/b new onset seizures in ED x2

## 2020-01-10 NOTE — PROGRESS NOTE ADULT - PROBLEM SELECTOR PLAN 5
- Consider Speech and swallow evaluation once patient's mental status improves (post-ictal state resolves) and can cooperate As per wife patient on RT and chemo. Chemo has been on hold due to thrombocytopenia last chemo session about 3 weeks ago.  - Per Neruo-oncologist at [facility] [place] pt was supposed to be on Decadron 2mg BID  - Decreased to 4mg BID to begin taper down to proper dosage, per [facility]  - 40 IV Protonix QD for GI PPx   - Neuro recs appreciated

## 2020-01-10 NOTE — PROGRESS NOTE ADULT - PROBLEM SELECTOR PLAN 9
#10 Prophylactic Measure  DVT PPx: Thrombocytopenic so SCDs  Diet: Advance to consistent carb diet as tolerated   Dispo: Pending PT eval and clinical improvement    Transitions of Care Status:  1.  Name of PCP: Dr. Hinson  2.  PCP Contacted on Admission: [ ] Y    [X ] N    3.  PCP contacted at Discharge: [ ] Y    [ ] N    [ ] N/A  4.  Post-Discharge Appointment Date and Location:  5.  Summary of Handoff given to PCP:

## 2020-01-10 NOTE — PROGRESS NOTE ADULT - PROBLEM SELECTOR PLAN 4
- Na 130 -> 131 now 126. 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

## 2020-01-10 NOTE — PROGRESS NOTE ADULT - PROBLEM SELECTOR PLAN 7
- Not on any current BP meds, SBPs mostly in 110s-120s mmHg, had an episode in 180s possibly in the setting of ?seizure - plan to resume Lipitor 20mg QD (held initially while NPO/somnolent) - Resume Lipitor 10mg qHS

## 2020-01-10 NOTE — PROGRESS NOTE ADULT - PROBLEM SELECTOR PLAN 1
- Likely 2/2 to hx of GBM and possibly lowered threshold 2/2 hyperglycemic state. On Keppra in the past but was tapered  - CTH no significant changes shows post-biopsy changes. Likely cause of lactate elevation  - EKG: no acute changes   - VEEG negative for any seizure activity  - Neuro checks Q4H   - Aspiration precautions  - Seizure precautions  - MRI head w/w/o results pending  - Continue Keppra IV 500mg BID  - Neuro recs appreciated - Likely 2/2 to hx of GBM and possibly lowered threshold 2/2 hyperglycemic state. - - Was previously on Keppra for prophylaxis but was tapered off in Oct  - CTH no significant changes shows post-biopsy changes. Likely cause of lactate elevation  - EKG: no acute changes   - VEEG negative for any seizure activity  - MRI showing some tumor enhancement which is typical change seen on imaging within 2-3 months after stopping RT   - Neuro checks Q4H   - Aspiration precautions  - Seizure precautions  - Continue Keppra IV 500mg BID  - Neuro recs appreciated - Likely 2/2 to hx of GBM and possibly lowered threshold 2/2 hyperglycemic state  - Was previously on Keppra for prophylaxis but was tapered off in Oct  - CTH no significant changes shows post-biopsy changes. Likely cause of lactate elevation  - EKG: no acute changes   - VEEG negative for any seizure activity  - MRI showing some tumor enhancement which is typical change seen on imaging within 2-3 months after stopping RT   - Neuro checks Q4H   - Aspiration precautions  - Seizure precautions  - Continue Keppra (transition IV to PO today) 500mg BID  - Neuro recs appreciated

## 2020-01-11 LAB
ALBUMIN SERPL ELPH-MCNC: 2.8 G/DL — LOW (ref 3.3–5)
ALP SERPL-CCNC: 60 U/L — SIGNIFICANT CHANGE UP (ref 40–120)
ALT FLD-CCNC: 32 U/L — SIGNIFICANT CHANGE UP (ref 4–41)
ANION GAP SERPL CALC-SCNC: 11 MMO/L — SIGNIFICANT CHANGE UP (ref 7–14)
ANISOCYTOSIS BLD QL: SLIGHT — SIGNIFICANT CHANGE UP
AST SERPL-CCNC: 16 U/L — SIGNIFICANT CHANGE UP (ref 4–40)
BASOPHILS # BLD AUTO: 0.01 K/UL — SIGNIFICANT CHANGE UP (ref 0–0.2)
BASOPHILS NFR BLD AUTO: 0.6 % — SIGNIFICANT CHANGE UP (ref 0–2)
BASOPHILS NFR SPEC: 0 % — SIGNIFICANT CHANGE UP (ref 0–2)
BILIRUB SERPL-MCNC: 0.4 MG/DL — SIGNIFICANT CHANGE UP (ref 0.2–1.2)
BUN SERPL-MCNC: 25 MG/DL — HIGH (ref 7–23)
CALCIUM SERPL-MCNC: 8 MG/DL — LOW (ref 8.4–10.5)
CHLORIDE SERPL-SCNC: 97 MMOL/L — LOW (ref 98–107)
CO2 SERPL-SCNC: 22 MMOL/L — SIGNIFICANT CHANGE UP (ref 22–31)
CREAT SERPL-MCNC: 0.46 MG/DL — LOW (ref 0.5–1.3)
EOSINOPHIL # BLD AUTO: 0.01 K/UL — SIGNIFICANT CHANGE UP (ref 0–0.5)
EOSINOPHIL NFR BLD AUTO: 0.6 % — SIGNIFICANT CHANGE UP (ref 0–6)
EOSINOPHIL NFR FLD: 0 % — SIGNIFICANT CHANGE UP (ref 0–6)
GLUCOSE BLDC GLUCOMTR-MCNC: 152 MG/DL — HIGH (ref 70–99)
GLUCOSE BLDC GLUCOMTR-MCNC: 251 MG/DL — HIGH (ref 70–99)
GLUCOSE BLDC GLUCOMTR-MCNC: 278 MG/DL — HIGH (ref 70–99)
GLUCOSE BLDC GLUCOMTR-MCNC: 317 MG/DL — HIGH (ref 70–99)
GLUCOSE SERPL-MCNC: 140 MG/DL — HIGH (ref 70–99)
HCT VFR BLD CALC: 28.5 % — LOW (ref 39–50)
HGB BLD-MCNC: 10 G/DL — LOW (ref 13–17)
IMM GRANULOCYTES NFR BLD AUTO: 1.8 % — HIGH (ref 0–1.5)
LYMPHOCYTES # BLD AUTO: 0.63 K/UL — LOW (ref 1–3.3)
LYMPHOCYTES # BLD AUTO: 36.8 % — SIGNIFICANT CHANGE UP (ref 13–44)
LYMPHOCYTES NFR SPEC AUTO: 38 % — SIGNIFICANT CHANGE UP (ref 13–44)
MAGNESIUM SERPL-MCNC: 1.8 MG/DL — SIGNIFICANT CHANGE UP (ref 1.6–2.6)
MANUAL SMEAR VERIFICATION: SIGNIFICANT CHANGE UP
MCHC RBC-ENTMCNC: 31.6 PG — SIGNIFICANT CHANGE UP (ref 27–34)
MCHC RBC-ENTMCNC: 35.1 % — SIGNIFICANT CHANGE UP (ref 32–36)
MCV RBC AUTO: 90.2 FL — SIGNIFICANT CHANGE UP (ref 80–100)
MONOCYTES # BLD AUTO: 0.2 K/UL — SIGNIFICANT CHANGE UP (ref 0–0.9)
MONOCYTES NFR BLD AUTO: 11.7 % — SIGNIFICANT CHANGE UP (ref 2–14)
MONOCYTES NFR BLD: 8 % — SIGNIFICANT CHANGE UP (ref 2–9)
NEUTROPHIL AB SER-ACNC: 48 % — SIGNIFICANT CHANGE UP (ref 43–77)
NEUTROPHILS # BLD AUTO: 0.83 K/UL — LOW (ref 1.8–7.4)
NEUTROPHILS NFR BLD AUTO: 48.5 % — SIGNIFICANT CHANGE UP (ref 43–77)
NEUTS BAND # BLD: 2 % — SIGNIFICANT CHANGE UP (ref 0–6)
NRBC # BLD: 0 /100WBC — SIGNIFICANT CHANGE UP
NRBC # FLD: 0.02 K/UL — SIGNIFICANT CHANGE UP (ref 0–0)
NRBC FLD-RTO: 1.2 — SIGNIFICANT CHANGE UP
OVALOCYTES BLD QL SMEAR: SLIGHT — SIGNIFICANT CHANGE UP
PHOSPHATE SERPL-MCNC: 2.7 MG/DL — SIGNIFICANT CHANGE UP (ref 2.5–4.5)
PLATELET # BLD AUTO: 20 K/UL — CRITICAL LOW (ref 150–400)
PLATELET COUNT - ESTIMATE: SIGNIFICANT CHANGE UP
PMV BLD: SIGNIFICANT CHANGE UP FL (ref 7–13)
POIKILOCYTOSIS BLD QL AUTO: SLIGHT — SIGNIFICANT CHANGE UP
POTASSIUM SERPL-MCNC: 3.7 MMOL/L — SIGNIFICANT CHANGE UP (ref 3.5–5.3)
POTASSIUM SERPL-SCNC: 3.7 MMOL/L — SIGNIFICANT CHANGE UP (ref 3.5–5.3)
PROT SERPL-MCNC: 5 G/DL — LOW (ref 6–8.3)
RBC # BLD: 3.16 M/UL — LOW (ref 4.2–5.8)
RBC # FLD: 16.5 % — HIGH (ref 10.3–14.5)
REVIEW TO FOLLOW: YES — SIGNIFICANT CHANGE UP
SODIUM SERPL-SCNC: 130 MMOL/L — LOW (ref 135–145)
VARIANT LYMPHS # BLD: 4 % — SIGNIFICANT CHANGE UP
WBC # BLD: 1.71 K/UL — LOW (ref 3.8–10.5)
WBC # FLD AUTO: 1.71 K/UL — LOW (ref 3.8–10.5)

## 2020-01-11 PROCEDURE — 99223 1ST HOSP IP/OBS HIGH 75: CPT | Mod: GC

## 2020-01-11 PROCEDURE — 99233 SBSQ HOSP IP/OBS HIGH 50: CPT | Mod: GC

## 2020-01-11 RX ORDER — LANOLIN ALCOHOL/MO/W.PET/CERES
3 CREAM (GRAM) TOPICAL ONCE
Refills: 0 | Status: COMPLETED | OUTPATIENT
Start: 2020-01-11 | End: 2020-01-11

## 2020-01-11 RX ORDER — DEXAMETHASONE 0.5 MG/5ML
4 ELIXIR ORAL
Refills: 0 | Status: COMPLETED | OUTPATIENT
Start: 2020-01-11 | End: 2020-01-14

## 2020-01-11 RX ORDER — HUMAN INSULIN 100 [IU]/ML
3 INJECTION, SUSPENSION SUBCUTANEOUS
Refills: 0 | Status: DISCONTINUED | OUTPATIENT
Start: 2020-01-11 | End: 2020-01-12

## 2020-01-11 RX ADMIN — SODIUM CHLORIDE 2 GRAM(S): 9 INJECTION INTRAMUSCULAR; INTRAVENOUS; SUBCUTANEOUS at 13:10

## 2020-01-11 RX ADMIN — SENNA PLUS 2 TABLET(S): 8.6 TABLET ORAL at 21:22

## 2020-01-11 RX ADMIN — Medication 4 MILLIGRAM(S): at 05:48

## 2020-01-11 RX ADMIN — LEVETIRACETAM 500 MILLIGRAM(S): 250 TABLET, FILM COATED ORAL at 05:49

## 2020-01-11 RX ADMIN — Medication 3 MILLIGRAM(S): at 21:22

## 2020-01-11 RX ADMIN — HUMAN INSULIN 3 UNIT(S): 100 INJECTION, SUSPENSION SUBCUTANEOUS at 22:12

## 2020-01-11 RX ADMIN — Medication 8: at 13:09

## 2020-01-11 RX ADMIN — Medication 3 MILLIGRAM(S): at 02:32

## 2020-01-11 RX ADMIN — Medication 6: at 18:44

## 2020-01-11 RX ADMIN — POLYETHYLENE GLYCOL 3350 17 GRAM(S): 17 POWDER, FOR SOLUTION ORAL at 12:20

## 2020-01-11 RX ADMIN — ATORVASTATIN CALCIUM 10 MILLIGRAM(S): 80 TABLET, FILM COATED ORAL at 21:22

## 2020-01-11 RX ADMIN — Medication 2: at 09:13

## 2020-01-11 RX ADMIN — PANTOPRAZOLE SODIUM 40 MILLIGRAM(S): 20 TABLET, DELAYED RELEASE ORAL at 12:20

## 2020-01-11 RX ADMIN — Medication 4 MILLIGRAM(S): at 18:50

## 2020-01-11 RX ADMIN — SODIUM CHLORIDE 2 GRAM(S): 9 INJECTION INTRAMUSCULAR; INTRAVENOUS; SUBCUTANEOUS at 21:22

## 2020-01-11 RX ADMIN — Medication 2: at 22:18

## 2020-01-11 RX ADMIN — SODIUM CHLORIDE 2 GRAM(S): 9 INJECTION INTRAMUSCULAR; INTRAVENOUS; SUBCUTANEOUS at 05:49

## 2020-01-11 RX ADMIN — LEVETIRACETAM 500 MILLIGRAM(S): 250 TABLET, FILM COATED ORAL at 18:45

## 2020-01-11 NOTE — PROGRESS NOTE ADULT - PROBLEM SELECTOR PLAN 2
- Hyperglycemia 200-300s. On Metformin at home. Last A1C in 12/19 7.1 (however pt w/ anemia making A1c less reliable)  - s/p 8U Lantus in ED  - Goal FS <180   - Mod ISS TID and bedtime  - 5U NPH at 13:00 as pt continues to have readings in 200's after initiation of PO intake - steroid induced hyperglycemia compounded by T2DM  - will start 3 units NPH BID in addition to SSI given improved response with 5 units of NPH  - as above, will decide final insulin regimen pending steroid taper regimen

## 2020-01-11 NOTE — PROGRESS NOTE ADULT - SUBJECTIVE AND OBJECTIVE BOX
Patient is a 69y old  Male who presents with a chief complaint of Seizures and Hyperglycemia (10 Charles 2020 08:11)        SUBJECTIVE / OVERNIGHT EVENTS:      MEDICATIONS  (STANDING):  atorvastatin 10 milliGRAM(s) Oral at bedtime  dexAMETHasone  Injectable 4 milliGRAM(s) IV Push two times a day  dextrose 5%. 1000 milliLiter(s) (50 mL/Hr) IV Continuous <Continuous>  dextrose 50% Injectable 12.5 Gram(s) IV Push once  dextrose 50% Injectable 25 Gram(s) IV Push once  dextrose 50% Injectable 25 Gram(s) IV Push once  insulin lispro (HumaLOG) corrective regimen sliding scale   SubCutaneous three times a day before meals  insulin lispro (HumaLOG) corrective regimen sliding scale   SubCutaneous at bedtime  levETIRAcetam 500 milliGRAM(s) Oral two times a day  melatonin 3 milliGRAM(s) Oral at bedtime  pantoprazole  Injectable 40 milliGRAM(s) IV Push daily  polyethylene glycol 3350 17 Gram(s) Oral daily  senna 2 Tablet(s) Oral at bedtime  sodium chloride 2 Gram(s) Oral three times a day    MEDICATIONS  (PRN):  bisacodyl Suppository 10 milliGRAM(s) Rectal daily PRN Constipation  dextrose 40% Gel 15 Gram(s) Oral once PRN Blood Glucose LESS THAN 70 milliGRAM(s)/deciliter  glucagon  Injectable 1 milliGRAM(s) IntraMuscular once PRN Glucose LESS THAN 70 milligrams/deciliter        CAPILLARY BLOOD GLUCOSE      POCT Blood Glucose.: 203 mg/dL (10 Charles 2020 21:42)  POCT Blood Glucose.: 220 mg/dL (10 Charles 2020 17:41)  POCT Blood Glucose.: 266 mg/dL (10 Charles 2020 14:29)  POCT Blood Glucose.: 261 mg/dL (10 Charles 2020 11:56)  POCT Blood Glucose.: 253 mg/dL (10 Charles 2020 09:42)    I&O's Summary    10 Charles 2020 07:01  -  11 Jan 2020 07:00  --------------------------------------------------------  IN: 250 mL / OUT: 300 mL / NET: -50 mL        PHYSICAL EXAM  GENERAL: NAD, well-developed  HEAD:  Atraumatic, Normocephalic  EYES: EOMI, PERRLA, conjunctiva and sclera clear  NECK: Supple, No JVD  CHEST/LUNG: Clear to auscultation bilaterally; No wheeze  HEART: Regular rate and rhythm; No murmurs, rubs, or gallops  ABDOMEN: Soft, Nontender, Nondistended; Bowel sounds present  EXTREMITIES:  2+ Peripheral Pulses, No clubbing, cyanosis, or edema  PSYCH: AAOx3  SKIN: No rashes or lesions    LABS:                        9.9    1.66  )-----------( 25       ( 10 Charles 2020 06:45 )             28.5     01-11    130<L>  |  97<L>  |  25<H>  ----------------------------<  140<H>  3.7   |  22  |  0.46<L>    Ca    8.0<L>      11 Jan 2020 06:04  Phos  2.7     01-11  Mg     1.8     01-11    TPro  5.0<L>  /  Alb  2.8<L>  /  TBili  0.4  /  DBili  x   /  AST  16  /  ALT  32  /  AlkPhos  60  01-11              RADIOLOGY & ADDITIONAL TESTS:    Imaging Personally Reviewed:  Consultant(s) Notes Reviewed:    Care Discussed with Consultants/Other Providers: Patient is a 69y old  Male who presents with a chief complaint of Seizures and Hyperglycemia (10 Charles 2020 08:11)    SUBJECTIVE / OVERNIGHT EVENTS:  Overnight, patient was a little disoriented and was complaining of constipation - - was started on bowel regimen. This morning - - no acute complaints or concerns.     MEDICATIONS  (STANDING):  atorvastatin 10 milliGRAM(s) Oral at bedtime  dexAMETHasone  Injectable 4 milliGRAM(s) IV Push two times a day  dextrose 5%. 1000 milliLiter(s) (50 mL/Hr) IV Continuous <Continuous>  dextrose 50% Injectable 12.5 Gram(s) IV Push once  dextrose 50% Injectable 25 Gram(s) IV Push once  dextrose 50% Injectable 25 Gram(s) IV Push once  insulin lispro (HumaLOG) corrective regimen sliding scale   SubCutaneous three times a day before meals  insulin lispro (HumaLOG) corrective regimen sliding scale   SubCutaneous at bedtime  levETIRAcetam 500 milliGRAM(s) Oral two times a day  melatonin 3 milliGRAM(s) Oral at bedtime  pantoprazole  Injectable 40 milliGRAM(s) IV Push daily  polyethylene glycol 3350 17 Gram(s) Oral daily  senna 2 Tablet(s) Oral at bedtime  sodium chloride 2 Gram(s) Oral three times a day    MEDICATIONS  (PRN):  bisacodyl Suppository 10 milliGRAM(s) Rectal daily PRN Constipation  dextrose 40% Gel 15 Gram(s) Oral once PRN Blood Glucose LESS THAN 70 milliGRAM(s)/deciliter  glucagon  Injectable 1 milliGRAM(s) IntraMuscular once PRN Glucose LESS THAN 70 milligrams/deciliter        CAPILLARY BLOOD GLUCOSE      POCT Blood Glucose.: 203 mg/dL (10 Charles 2020 21:42)  POCT Blood Glucose.: 220 mg/dL (10 Charles 2020 17:41)  POCT Blood Glucose.: 266 mg/dL (10 Charles 2020 14:29)  POCT Blood Glucose.: 261 mg/dL (10 Charles 2020 11:56)  POCT Blood Glucose.: 253 mg/dL (10 Charles 2020 09:42)    I&O's Summary    10 Charles 2020 07:01  -  11 Jan 2020 07:00  --------------------------------------------------------  IN: 250 mL / OUT: 300 mL / NET: -50 mL        PHYSICAL EXAM  GENERAL: NAD, well-developed elderly male   HEAD:  Atraumatic, Normocephalic  EYES: EOMI, PERRL  NECK: Supple, No JVD  CHEST/LUNG: Clear to auscultation bilaterally; No wheezes, rales or rhonchi   HEART: Regular rate and rhythm; No murmur  ABDOMEN: Soft, Nontender, Nondistended; Bowel sounds present  EXTREMITIES:  2+ Peripheral Pulses, No clubbing, cyanosis, or edema  PSYCH: AAOx3  SKIN: No rashes or lesions  Neuro: Alert to self and place. pupils are both reactive to light. 5/5 strength in UE and moving lower extremities with ease against gravity -- not entirely cooperative with strength testing.     LABS:                        9.9    1.66  )-----------( 25       ( 10 Charles 2020 06:45 )             28.5     01-11    130<L>  |  97<L>  |  25<H>  ----------------------------<  140<H>  3.7   |  22  |  0.46<L>    Ca    8.0<L>      11 Jan 2020 06:04  Phos  2.7     01-11  Mg     1.8     01-11    TPro  5.0<L>  /  Alb  2.8<L>  /  TBili  0.4  /  DBili  x   /  AST  16  /  ALT  32  /  AlkPhos  60  01-11    RADIOLOGY & ADDITIONAL TESTS:  < from: MR Head w/wo IV Cont (01.10.20 @ 09:22) >  FINDINGS:    VENTRICLES AND SULCI:  Overall increased in size compatible with progression of cerebral volume loss.    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 Surrounding T2 hyperintensity and internal hemorrhage is again seen with increased susceptibility artifact since the prior exam likely due to increased hemosiderin deposition possibly from interval surgery. There is subependymal tumor spread extending along the occipital horn on the right and along the medial aspect of the right temporal horn as seen previously. Enhancement of the lateral wall of the temporal horn and right frontal horn has mildly decreased from the prior exam.    A satellite mass is seen along the superior aspect of the left temporal horn which has enlarged measuring 2 x 1.4 cm as compared with 1.5 x 0.7 cm. A mass within the left frontal lobe below the frontal horn is larger measuring 1.7 x 1.5 cm as compared with 0.5 x 0.5 cm, in retrospect. An area of gyral swelling and increased T2 signal within the mesial left parietal lobe is mildly more prominent likely representing increasing tumor. Increased enhancement is seen in this area this time.    EXTRA-AXIAL:  No mass or collection.  VISUALIZED SINUSES:  Normal.  VISUALIZED MASTOIDS:  Clear.  CALVARIUM:  Postoperative changes on the right.  CAROTID FLOW VOIDS:  Present.  MISCELLANEOUS:  Intraocular lens implants    IMPRESSION:    Heterogeneously enhancing partially necrotic infiltrative mass within the splenium of the corpus callosum without significant interval change from the prior exam with associated subependymoma spread of tumor.    Areas of tumor infiltration above the left temporal horn and within the left frontal lobe which have increased in size. Tumor within the mesial left parietal lobe is slightly more prominent and demonstrates increased enhancement at this time.    < end of copied text >    Imaging Personally Reviewed:  Consultant(s) Notes Reviewed:    Care Discussed with Consultants/Other Providers:

## 2020-01-11 NOTE — PROGRESS NOTE ADULT - PROBLEM SELECTOR PLAN 4
- Na 130 -> 131 now 126. 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

## 2020-01-11 NOTE — PROGRESS NOTE ADULT - PROBLEM SELECTOR PLAN 1
- Likely 2/2 to hx of GBM and possibly lowered threshold 2/2 hyperglycemic state  - Was previously on Keppra for prophylaxis but was tapered off in Oct  - CTH no significant changes shows post-biopsy changes. Likely cause of lactate elevation  - EKG: no acute changes   - VEEG negative for any seizure activity  - MRI showing some tumor enhancement which is typical change seen on imaging within 2-3 months after stopping RT   - Neuro checks Q4H   - Aspiration precautions  - Seizure precautions  - Continue Keppra (transition IV to PO today) 500mg BID  - Neuro recs appreciated - likely related to GBM vs hyperglycemic state further compounded by tapered of AEDs  - VEEG: no epileptogenic foci  - MRI: see report above, some enhancement in certain regions with redemonstration of mass - - > discussed with [facility] neurooncology fellow --> likely related to post radiation  - c/w keppra 500 BID and will transition decadron to oral BID   - re: taper, patient was supposed to be on 2 BID dosing prior to admission -- > will rediscuss with neurooncology regarding taper closer to discharge

## 2020-01-11 NOTE — CONSULT NOTE ADULT - ATTENDING COMMENTS
Patient has prolonged pancytopenia after temozolomide and radiation for GBM.  Of note, patient also was on Bactrim, as per protocol to prevent PCP due to lymphopenia suppression.  Pancytopenia is a relatively uncommon complication of such therapy, but encountered in practice.  Risk factors are not clear.   Although unlikely, it is important to rule out other secondary causes, such as autoimmune, viral, aplastic anemia, drug induced, and marrow failure syndromes (eg MDS or PNH).  We will request to send some peripheral blood tests to rule out some of these.  Peripheral blood morphology appears normal.  A Bone marrow biopsy and aspirate should be done 1/14/20 to more expeditiously and decisively rule out some of the above.   Case reviewed with son, who is a medicine hospitalist. Patient has prolonged pancytopenia after temozolomide and radiation for GBM.  Of note, patient also was on Bactrim, as per protocol to prevent PCP due to lymphopenia suppression.  Pancytopenia is a relatively uncommon complication of such therapy, but encountered in practice.  Risk factors are not clear.   Although unlikely, it is important to rule out other secondary causes, such as autoimmune, viral, aplastic anemia, drug induced, and marrow failure syndromes (eg MDS or PNH).  We will request to send some peripheral blood tests to rule out some of these.  Of note, his RPI (reticulocyte production index) is 0.8.   This is very low (RPI <2) and indicates in inadequate marrow response to anemia.  Peripheral blood morphology appears normal.  A Bone marrow biopsy and aspirate should be done 1/14/20 to more expeditiously and decisively rule out some of the above.   Case reviewed with son, who is a medicine hospitalist.

## 2020-01-11 NOTE — CHART NOTE - NSCHARTNOTEFT_GEN_A_CORE
Notified by RN that patient agitated. Patient seen and evaluated at bedside. Patient lying in bed with lower extremities hanging out over edge of bed, calling out, saying that he wants to wake up, A/o x 1 (to self), moving all extremities spontaneously. Per RN, patient had been sleeping comfortably earlier and then awoke more agitated, not aggressive however keeps placing lower extremities over edge of bed. Patient reportedly constipated x 5 days per family, per RN has not yet had BM here. Attempted to reorient patient however patient still calling out.    Plan:  -Continuing reorienting patient as needed  -Melatonin  -Bowel regimen  -If patient continues to be agitated and/or more aggressive, will consider 1-to-1 or low-dose Seroquel    Holly Pratt, PGY-1  Internal Medicine  Pager #: [phone] / [facility] [phone] Notified by RN that patient agitated. Patient seen and evaluated at bedside. Patient lying in bed with lower extremities hanging out over edge of bed, calling out, saying that he wants to wake up, A/o x 1 (to self), moving all extremities spontaneously. Per RN, patient had been sleeping comfortably earlier and then awoke more agitated, not aggressive however keeps placing lower extremities over edge of bed. Patient reportedly constipated x 5 days per family, per RN has not yet had BM here. Attempted to reorient patient however patient still calling out.    Plan:  -Continuing reorienting patient as needed  -Melatonin  -Bowel regimen  -If patient continues to be agitated and/or more aggressive, consider 1-to-1 or low-dose Seroquel (EKG with QTc 439)    Holly Pratt, PGY-1  Internal Medicine  Pager #: [phone] / [facility] [phone]

## 2020-01-11 NOTE — PROGRESS NOTE ADULT - PROBLEM SELECTOR PLAN 5
As per wife patient on RT and chemo. Chemo has been on hold due to thrombocytopenia last chemo session about 3 weeks ago.  - Per Neruo-oncologist at [facility] [place] was supposed to be on Decadron 2mg BID  - Decreased to 4mg BID to begin taper down to proper dosage, per [facility]  - 40 IV Protonix QD for GI PPx   - Neuro recs appreciated - pt diagnosed in 9/2019 s/p stereotactic biopsy and ?resection at [facility] [place], s/p temodar and radiation which has been finished  - MRI performed on this admission with several areas of enhancement which as per discussion with neuro-onc is likely related to RT    - Per Neruo-oncologist at [facility] [place], patient was to be on Decadron 2mg BID  - c/w 4mg BID to begin taper down to proper dosage, per [facility] [place]  - 40 IV Protonix QD for GI PPx   - Neuro recs appreciated

## 2020-01-11 NOTE — PROGRESS NOTE ADULT - PROBLEM SELECTOR PLAN 8
#10 Prophylactic Measure  DVT PPx: Thrombocytopenic so SCDs  Diet: Advance to consistent carb diet as tolerated   Dispo: PT recommending restorative rehab    Transitions of Care Status:  1.  Name of PCP: Dr. Hinson  2.  PCP Contacted on Admission: [ ] Y    [X ] N    3.  PCP contacted at Discharge: [ ] Y    [ ] N    [ ] N/A  4.  Post-Discharge Appointment Date and Location:  5.  Summary of Handoff given to PCP:

## 2020-01-11 NOTE — PROGRESS NOTE ADULT - ASSESSMENT
70 yo German-speaking M w/ PMH of GBM diagnosed in 9/19, HTN, HLD currently undergoing [place] therapy at [facility] [place] a/w hyperglycemia likely 2/2 to steroid-induced from Decadron c/b new onset seizures in ED x2

## 2020-01-11 NOTE — CONSULT NOTE ADULT - SUBJECTIVE AND OBJECTIVE BOX
HPI:  Mr. Rider is a 69 year old Sami-speaking male with MHx of GBM (Dx 9/19, s/p RT last dose 12/4, and Temozolomide 11/27, follows with neuro/onc in [facility]), chemo on hold due to low platelets, HTN, HLD presented with hyperglycemia. Then had a   GTC in ED, witnessed by providers.  EEG showed mild diffuse or multifocal cerebral dysfunction. Impression. Seizure was most likely provoked by hyperglycemia in a patient with a known glioma, Currently on Keppra, hematology consulted for pancytopenia        REVIEW OF SYSTEMS:    CONSTITUTIONAL: No weakness, fevers or chills  EYES/ENT: No visual changes, no throat pain   RESPIRATORY: No cough, wheezing, hemoptysis; No shortness of breath  CARDIOVASCULAR: No chest pain or palpitations  GASTROINTESTINAL: No abdominal pain, nausea, vomiting, or hematemesis; No diarrhea or constipation. No melena or hematochezia.  GENITOURINARY: No dysuria, frequency or hematuria  MUSCULOSKELETAL: No joint pain.  NEUROLOGICAL: No dizziness, numbness, or weakness  SKIN: No itching, burning, rashes, or lesions   All other review of systems is negative unless indicated above.  Allergies    No Known Allergies    Intolerances        PAST MEDICAL & SURGICAL HISTORY:  Hyperlipidemia  Neoplasm of unspecified behavior of brain  Hypertension  Status post stereotactic brain biopsy  History of laryngoscopy  H/O colonoscopy      FAMILY HISTORY:  FH: myocardial infarction      Social History:          VITAL SIGNS:  Vital Signs Last 24 Hrs  T(C): 36.6 (11 Jan 2020 14:00), Max: 36.7 (11 Jan 2020 01:54)  T(F): 97.8 (11 Jan 2020 14:00), Max: 98.1 (11 Jan 2020 06:00)  HR: 97 (11 Jan 2020 14:00) (75 - 97)  BP: 100/66 (11 Jan 2020 14:00) (100/66 - 119/71)  BP(mean): --  RR: 18 (11 Jan 2020 14:00) (17 - 18)  SpO2: 100% (11 Jan 2020 14:00) (98% - 100%)      PHYSICAL EXAM:     GENERAL: no acute distress  HEENT: EOMI, neck supple  RESPIRATORY: LCTAB/L, no rhonchi, rales, or wheezing  CARDIOVASCULAR: RRR, no murmurs, gallops, rubs  ABDOMINAL: soft, non-tender, non-distended, positive bowel sounds   EXTREMITIES: no clubbing, cyanosis, or edema  NEUROLOGICAL: alert and oriented x 3, non-focal  SKIN: no rashes or lesions   MUSCULOSKELETAL: no gross joint deformity                          10.0   1.71  )-----------( 20       ( 11 Jan 2020 06:04 )             28.5     01-11    130<L>  |  97<L>  |  25<H>  ----------------------------<  140<H>  3.7   |  22  |  0.46<L>    Ca    8.0<L>      11 Jan 2020 06:04  Phos  2.7     01-11  Mg     1.8     01-11    TPro  5.0<L>  /  Alb  2.8<L>  /  TBili  0.4  /  DBili  x   /  AST  16  /  ALT  32  /  AlkPhos  60  01-11      MEDICATIONS  (STANDING):  atorvastatin 10 milliGRAM(s) Oral at bedtime  dexAMETHasone     Tablet 4 milliGRAM(s) Oral two times a day  dextrose 5%. 1000 milliLiter(s) (50 mL/Hr) IV Continuous <Continuous>  dextrose 50% Injectable 12.5 Gram(s) IV Push once  dextrose 50% Injectable 25 Gram(s) IV Push once  dextrose 50% Injectable 25 Gram(s) IV Push once  insulin lispro (HumaLOG) corrective regimen sliding scale   SubCutaneous three times a day before meals  insulin lispro (HumaLOG) corrective regimen sliding scale   SubCutaneous at bedtime  insulin NPH human recombinant 3 Unit(s) SubCutaneous two times a day  levETIRAcetam 500 milliGRAM(s) Oral two times a day  melatonin 3 milliGRAM(s) Oral at bedtime  pantoprazole  Injectable 40 milliGRAM(s) IV Push daily  polyethylene glycol 3350 17 Gram(s) Oral daily  senna 2 Tablet(s) Oral at bedtime  sodium chloride 2 Gram(s) Oral three times a day HPI:  Mr. Rider is a 69 year old Pashto-speaking male with MHx of GBM (Dx 9/19, s/p RT last dose 12/4, and Temozolomide 11/27, follows with neuro/onc in [facility]), chemo on hold due to low platelets, HTN, HLD presented with hyperglycemia. Then had a GTC in ED, witnessed by providers.  EEG showed mild diffuse or multifocal cerebral dysfunction. Seizure was most likely provoked by hyperglycemia in a patient with a known glioma, Currently on Keppra, hematology consulted for pancytopenia, patient WBC 1.7, Hb 10, , PLT 20.  Patient AO x 2 at baseline, reports no acute complaint.        REVIEW OF SYSTEMS:    CONSTITUTIONAL: No weakness, fevers or chills  EYES/ENT: No visual changes, no throat pain   RESPIRATORY: No cough, wheezing, hemoptysis; No shortness of breath  CARDIOVASCULAR: No chest pain or palpitations  GASTROINTESTINAL: No abdominal pain, nausea, vomiting, or hematemesis; No diarrhea or constipation. No melena or hematochezia.  GENITOURINARY: No dysuria, frequency or hematuria  MUSCULOSKELETAL: No joint pain.  NEUROLOGICAL: No dizziness, numbness, or weakness  SKIN: No itching, burning, rashes, or lesions   All other review of systems is negative unless indicated above.  Allergies    No Known Allergies    Intolerances        PAST MEDICAL & SURGICAL HISTORY:  Hyperlipidemia  Neoplasm of unspecified behavior of brain  Hypertension  Status post stereotactic brain biopsy  History of laryngoscopy  H/O colonoscopy      FAMILY HISTORY:  FH: myocardial infarction      VITAL SIGNS:  Vital Signs Last 24 Hrs  T(C): 36.6 (11 Jan 2020 14:00), Max: 36.7 (11 Jan 2020 01:54)  T(F): 97.8 (11 Jan 2020 14:00), Max: 98.1 (11 Jan 2020 06:00)  HR: 97 (11 Jan 2020 14:00) (75 - 97)  BP: 100/66 (11 Jan 2020 14:00) (100/66 - 119/71)  BP(mean): --  RR: 18 (11 Jan 2020 14:00) (17 - 18)  SpO2: 100% (11 Jan 2020 14:00) (98% - 100%)      PHYSICAL EXAM:     GENERAL: NAD, well-developed elderly male   HEAD:  Atraumatic, Normocephalic  EYES: EOMI, PERRL  NECK: Supple, No JVD  CHEST/LUNG: Clear to auscultation bilaterally; No wheezes, rales or rhonchi   HEART: Regular rate and rhythm; No murmur  ABDOMEN: Soft, Nontender, Nondistended; Bowel sounds present  EXTREMITIES:  2+ Peripheral Pulses, No clubbing, cyanosis, or edema  PSYCH: AAOx3  SKIN: No rashes or lesions  Neuro: Alert to self and place. pupils are both reactive to light. 5/5 strength in UE and moving lower extremities with ease against gravity -- not entirely cooperative with strength testing.                         10.0   1.71  )-----------( 20       ( 11 Jan 2020 06:04 )             28.5     01-11    130<L>  |  97<L>  |  25<H>  ----------------------------<  140<H>  3.7   |  22  |  0.46<L>    Ca    8.0<L>      11 Jan 2020 06:04  Phos  2.7     01-11  Mg     1.8     01-11    TPro  5.0<L>  /  Alb  2.8<L>  /  TBili  0.4  /  DBili  x   /  AST  16  /  ALT  32  /  AlkPhos  60  01-11      MEDICATIONS  (STANDING):  atorvastatin 10 milliGRAM(s) Oral at bedtime  dexAMETHasone     Tablet 4 milliGRAM(s) Oral two times a day  dextrose 5%. 1000 milliLiter(s) (50 mL/Hr) IV Continuous <Continuous>  dextrose 50% Injectable 12.5 Gram(s) IV Push once  dextrose 50% Injectable 25 Gram(s) IV Push once  dextrose 50% Injectable 25 Gram(s) IV Push once  insulin lispro (HumaLOG) corrective regimen sliding scale   SubCutaneous three times a day before meals  insulin lispro (HumaLOG) corrective regimen sliding scale   SubCutaneous at bedtime  insulin NPH human recombinant 3 Unit(s) SubCutaneous two times a day  levETIRAcetam 500 milliGRAM(s) Oral two times a day  melatonin 3 milliGRAM(s) Oral at bedtime  pantoprazole  Injectable 40 milliGRAM(s) IV Push daily  polyethylene glycol 3350 17 Gram(s) Oral daily  senna 2 Tablet(s) Oral at bedtime  sodium chloride 2 Gram(s) Oral three times a day

## 2020-01-11 NOTE — PROGRESS NOTE ADULT - PROBLEM SELECTOR PLAN 3
- Likely in the setting of chemo and malignancy  - Plt 24 transfuse with Plt < 10  - H/H 9.8, continue to monitor with daily CBCs  - off chemotherapy for >2 months w/ no continued decrease in PLT count and WBC but has however remained relatively steady since admission. Findings may relate to genetic variation in pt which causes such a response to his previous chemo therapy. Pancytopenia should continue to improve within next month. Will recommend f/u w/ hematology after rehab if pt decides to discharge to rehab - patient with pancytopenia for >6weeks without requiring transfusional support   - as per discussion with son, last dose of temodar was 6 weeks ago, and has been off bactrim and aspirin without response  - patient was supposed to be set up with hematology/oncology as an o/p --> consulted hematology for recommendations (possible BM bx and role of neupogen prior to dc to rehab)

## 2020-01-12 LAB
ALBUMIN SERPL ELPH-MCNC: 2.5 G/DL — LOW (ref 3.3–5)
ALP SERPL-CCNC: 57 U/L — SIGNIFICANT CHANGE UP (ref 40–120)
ALT FLD-CCNC: 32 U/L — SIGNIFICANT CHANGE UP (ref 4–41)
ANION GAP SERPL CALC-SCNC: 12 MMO/L — SIGNIFICANT CHANGE UP (ref 7–14)
APTT BLD: 23.8 SEC — LOW (ref 27.5–36.3)
AST SERPL-CCNC: 16 U/L — SIGNIFICANT CHANGE UP (ref 4–40)
BILIRUB SERPL-MCNC: 0.4 MG/DL — SIGNIFICANT CHANGE UP (ref 0.2–1.2)
BLD GP AB SCN SERPL QL: NEGATIVE — SIGNIFICANT CHANGE UP
BUN SERPL-MCNC: 22 MG/DL — SIGNIFICANT CHANGE UP (ref 7–23)
CALCIUM SERPL-MCNC: 8 MG/DL — LOW (ref 8.4–10.5)
CHLORIDE SERPL-SCNC: 98 MMOL/L — SIGNIFICANT CHANGE UP (ref 98–107)
CO2 SERPL-SCNC: 21 MMOL/L — LOW (ref 22–31)
CREAT SERPL-MCNC: 0.39 MG/DL — LOW (ref 0.5–1.3)
GLUCOSE BLDC GLUCOMTR-MCNC: 182 MG/DL — HIGH (ref 70–99)
GLUCOSE BLDC GLUCOMTR-MCNC: 210 MG/DL — HIGH (ref 70–99)
GLUCOSE BLDC GLUCOMTR-MCNC: 218 MG/DL — HIGH (ref 70–99)
GLUCOSE BLDC GLUCOMTR-MCNC: 255 MG/DL — HIGH (ref 70–99)
GLUCOSE BLDC GLUCOMTR-MCNC: 317 MG/DL — HIGH (ref 70–99)
GLUCOSE SERPL-MCNC: 211 MG/DL — HIGH (ref 70–99)
HAV IGM SER-ACNC: NONREACTIVE — SIGNIFICANT CHANGE UP
HBV CORE IGM SER-ACNC: NONREACTIVE — SIGNIFICANT CHANGE UP
HBV SURFACE AB SER-ACNC: REACTIVE — SIGNIFICANT CHANGE UP
HBV SURFACE AG SER-ACNC: NONREACTIVE — SIGNIFICANT CHANGE UP
HCT VFR BLD CALC: 27 % — LOW (ref 39–50)
HCV AB S/CO SERPL IA: 0.06 S/CO — SIGNIFICANT CHANGE UP (ref 0–0.99)
HCV AB SERPL-IMP: SIGNIFICANT CHANGE UP
HGB BLD-MCNC: 9.3 G/DL — LOW (ref 13–17)
INR BLD: 0.98 — SIGNIFICANT CHANGE UP (ref 0.88–1.17)
MAGNESIUM SERPL-MCNC: 1.9 MG/DL — SIGNIFICANT CHANGE UP (ref 1.6–2.6)
MCHC RBC-ENTMCNC: 31.3 PG — SIGNIFICANT CHANGE UP (ref 27–34)
MCHC RBC-ENTMCNC: 34.4 % — SIGNIFICANT CHANGE UP (ref 32–36)
MCV RBC AUTO: 90.9 FL — SIGNIFICANT CHANGE UP (ref 80–100)
NRBC # FLD: 0 K/UL — SIGNIFICANT CHANGE UP (ref 0–0)
PHOSPHATE SERPL-MCNC: 3.1 MG/DL — SIGNIFICANT CHANGE UP (ref 2.5–4.5)
PLATELET # BLD AUTO: 24 K/UL — LOW (ref 150–400)
PMV BLD: 10.3 FL — SIGNIFICANT CHANGE UP (ref 7–13)
POTASSIUM SERPL-MCNC: 4.1 MMOL/L — SIGNIFICANT CHANGE UP (ref 3.5–5.3)
POTASSIUM SERPL-SCNC: 4.1 MMOL/L — SIGNIFICANT CHANGE UP (ref 3.5–5.3)
PROT SERPL-MCNC: 5 G/DL — LOW (ref 6–8.3)
PROTHROM AB SERPL-ACNC: 11.2 SEC — SIGNIFICANT CHANGE UP (ref 9.8–13.1)
RBC # BLD: 2.97 M/UL — LOW (ref 4.2–5.8)
RBC # FLD: 16.6 % — HIGH (ref 10.3–14.5)
RETICS #: 82 K/UL — SIGNIFICANT CHANGE UP (ref 25–125)
RETICS/RBC NFR: 2.8 % — HIGH (ref 0.5–2.5)
RH IG SCN BLD-IMP: POSITIVE — SIGNIFICANT CHANGE UP
RHEUMATOID FACT SERPL-ACNC: SIGNIFICANT CHANGE UP IU/ML (ref 0–13)
SODIUM SERPL-SCNC: 131 MMOL/L — LOW (ref 135–145)
WBC # BLD: 1.9 K/UL — LOW (ref 3.8–10.5)
WBC # FLD AUTO: 1.9 K/UL — LOW (ref 3.8–10.5)

## 2020-01-12 PROCEDURE — 99233 SBSQ HOSP IP/OBS HIGH 50: CPT | Mod: GC

## 2020-01-12 RX ORDER — HUMAN INSULIN 100 [IU]/ML
5 INJECTION, SUSPENSION SUBCUTANEOUS
Refills: 0 | Status: DISCONTINUED | OUTPATIENT
Start: 2020-01-12 | End: 2020-01-13

## 2020-01-12 RX ORDER — ACETAMINOPHEN 500 MG
650 TABLET ORAL ONCE
Refills: 0 | Status: COMPLETED | OUTPATIENT
Start: 2020-01-12 | End: 2020-01-12

## 2020-01-12 RX ADMIN — SODIUM CHLORIDE 2 GRAM(S): 9 INJECTION INTRAMUSCULAR; INTRAVENOUS; SUBCUTANEOUS at 06:02

## 2020-01-12 RX ADMIN — Medication 3 MILLIGRAM(S): at 21:52

## 2020-01-12 RX ADMIN — Medication 650 MILLIGRAM(S): at 00:56

## 2020-01-12 RX ADMIN — LEVETIRACETAM 500 MILLIGRAM(S): 250 TABLET, FILM COATED ORAL at 18:10

## 2020-01-12 RX ADMIN — HUMAN INSULIN 5 UNIT(S): 100 INJECTION, SUSPENSION SUBCUTANEOUS at 18:10

## 2020-01-12 RX ADMIN — LEVETIRACETAM 500 MILLIGRAM(S): 250 TABLET, FILM COATED ORAL at 06:02

## 2020-01-12 RX ADMIN — Medication 10 MILLIGRAM(S): at 21:53

## 2020-01-12 RX ADMIN — SENNA PLUS 2 TABLET(S): 8.6 TABLET ORAL at 21:52

## 2020-01-12 RX ADMIN — POLYETHYLENE GLYCOL 3350 17 GRAM(S): 17 POWDER, FOR SOLUTION ORAL at 13:09

## 2020-01-12 RX ADMIN — SODIUM CHLORIDE 2 GRAM(S): 9 INJECTION INTRAMUSCULAR; INTRAVENOUS; SUBCUTANEOUS at 13:09

## 2020-01-12 RX ADMIN — Medication 650 MILLIGRAM(S): at 01:26

## 2020-01-12 RX ADMIN — Medication 4 MILLIGRAM(S): at 06:02

## 2020-01-12 RX ADMIN — HUMAN INSULIN 3 UNIT(S): 100 INJECTION, SUSPENSION SUBCUTANEOUS at 09:15

## 2020-01-12 RX ADMIN — Medication 2: at 23:03

## 2020-01-12 RX ADMIN — PANTOPRAZOLE SODIUM 40 MILLIGRAM(S): 20 TABLET, DELAYED RELEASE ORAL at 13:09

## 2020-01-12 RX ADMIN — Medication 2: at 09:15

## 2020-01-12 RX ADMIN — SODIUM CHLORIDE 2 GRAM(S): 9 INJECTION INTRAMUSCULAR; INTRAVENOUS; SUBCUTANEOUS at 21:52

## 2020-01-12 RX ADMIN — Medication 4 MILLIGRAM(S): at 18:10

## 2020-01-12 RX ADMIN — Medication 8: at 13:08

## 2020-01-12 RX ADMIN — ATORVASTATIN CALCIUM 10 MILLIGRAM(S): 80 TABLET, FILM COATED ORAL at 21:52

## 2020-01-12 RX ADMIN — Medication 4: at 18:10

## 2020-01-12 NOTE — PROGRESS NOTE ADULT - ATTENDING COMMENTS
I have personally seen, examined, and participated in the care of this patient, I have reviewed all pertinent clinical information, including history, physical exam, plan, and the above note by Dr. Oshea. The case and plan have been discussed with resident, above note edited where appropriate.   Sarah Church MD

## 2020-01-12 NOTE — PROGRESS NOTE ADULT - SUBJECTIVE AND OBJECTIVE BOX
Radha Oshea, PGY-1  Internal Medicine  Pager: [phone] (NS) / [phone] ([facility])    Patient is a 69y old  Male who presents with a chief complaint of Seizures and Hyperglycemia (10 Charles 2020 08:11)    SUBJECTIVE / OVERNIGHT EVENTS:      MEDICATIONS  (STANDING):  atorvastatin 10 milliGRAM(s) Oral at bedtime  dexAMETHasone     Tablet 4 milliGRAM(s) Oral two times a day  dextrose 5%. 1000 milliLiter(s) (50 mL/Hr) IV Continuous <Continuous>  dextrose 50% Injectable 12.5 Gram(s) IV Push once  dextrose 50% Injectable 25 Gram(s) IV Push once  dextrose 50% Injectable 25 Gram(s) IV Push once  insulin lispro (HumaLOG) corrective regimen sliding scale   SubCutaneous three times a day before meals  insulin lispro (HumaLOG) corrective regimen sliding scale   SubCutaneous at bedtime  insulin NPH human recombinant 3 Unit(s) SubCutaneous two times a day  levETIRAcetam 500 milliGRAM(s) Oral two times a day  melatonin 3 milliGRAM(s) Oral at bedtime  pantoprazole  Injectable 40 milliGRAM(s) IV Push daily  polyethylene glycol 3350 17 Gram(s) Oral daily  senna 2 Tablet(s) Oral at bedtime  sodium chloride 2 Gram(s) Oral three times a day    MEDICATIONS  (PRN):  bisacodyl Suppository 10 milliGRAM(s) Rectal daily PRN Constipation  dextrose 40% Gel 15 Gram(s) Oral once PRN Blood Glucose LESS THAN 70 milliGRAM(s)/deciliter  glucagon  Injectable 1 milliGRAM(s) IntraMuscular once PRN Glucose LESS THAN 70 milligrams/deciliter      CAPILLARY BLOOD GLUCOSE  POCT Blood Glucose.: 210 mg/dL (12 Jan 2020 06:15)  POCT Blood Glucose.: 251 mg/dL (11 Jan 2020 22:02)  POCT Blood Glucose.: 278 mg/dL (11 Jan 2020 17:59)  POCT Blood Glucose.: 317 mg/dL (11 Jan 2020 13:03)  POCT Blood Glucose.: 152 mg/dL (11 Jan 2020 08:48)    I&O's Summary    11 Jan 2020 07:01  -  12 Jan 2020 07:00  --------------------------------------------------------  IN: 520 mL / OUT: 850 mL / NET: -330 mL    PHYSICAL EXAM  GENERAL: NAD, well-developed elderly male   HEAD:  Atraumatic, Normocephalic  EYES: EOMI, PERRL  NECK: Supple, No JVD  CHEST/LUNG: Clear to auscultation bilaterally; No wheezes, rales or rhonchi   HEART: Regular rate and rhythm; No murmur  ABDOMEN: Soft, Nontender, Nondistended; Bowel sounds present  EXTREMITIES:  2+ Peripheral Pulses, No clubbing, cyanosis, or edema  PSYCH: AAOx3  SKIN: No rashes or lesions  Neuro: Alert to self and place. pupils are both reactive to light. 5/5 strength in UE and moving lower extremities with ease against gravity -- not entirely cooperative with strength testing.     LABS:             9.3    1.90  )-----------( 24       ( 12 Jan 2020 06:15 )             27.0     01-12    131<L>  |  98  |  22  ----------------------------<  211<H>  4.1   |  21<L>  |  0.39<L>    Ca    8.0<L>      12 Jan 2020 06:15  Phos  3.1     01-12  Mg     1.9     01-12    TPro  5.0<L>  /  Alb  2.5<L>  /  TBili  0.4  /  DBili  x   /  AST  16  /  ALT  32  /  AlkPhos  57  01-12    LIVER FUNCTIONS - ( 12 Jan 2020 06:15 )  Alb: 2.5 g/dL / Pro: 5.0 g/dL / ALK PHOS: 57 u/L / ALT: 32 u/L / AST: 16 u/L / GGT: x           PT/INR - ( 12 Jan 2020 06:15 )   PT: 11.2 SEC;   INR: 0.98     PTT - ( 12 Jan 2020 06:15 )  PTT:23.8 SEC    RADIOLOGY & ADDITIONAL TESTS:    MR Head w/wo IV Cont (01.10.20 @ 09:22)  FINDINGS:    VENTRICLES AND SULCI:  Overall increased in size compatible with progression of cerebral volume loss.    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 Surrounding T2 hyperintensity and internal hemorrhage is again seen with increased susceptibility artifact since the prior exam likely due to increased hemosiderin deposition possibly from interval surgery. There is subependymal tumor spread extending along the occipital horn on the right and along the medial aspect of the right temporal horn as seen previously. Enhancement of the lateral wall of the temporal horn and right frontal horn has mildly decreased from the prior exam.    A satellite mass is seen along the superior aspect of the left temporal horn which has enlarged measuring 2 x 1.4 cm as compared with 1.5 x 0.7 cm. A mass within the left frontal lobe below the frontal horn is larger measuring 1.7 x 1.5 cm as compared with 0.5 x 0.5 cm, in retrospect. An area of gyral swelling and increased T2 signal within the mesial left parietal lobe is mildly more prominent likely representing increasing tumor. Increased enhancement is seen in this area this time.    EXTRA-AXIAL:  No mass or collection.  VISUALIZED SINUSES:  Normal.  VISUALIZED MASTOIDS:  Clear.  CALVARIUM:  Postoperative changes on the right.  CAROTID FLOW VOIDS:  Present.  MISCELLANEOUS:  Intraocular lens implants    IMPRESSION:    Heterogeneously enhancing partially necrotic infiltrative mass within the splenium of the corpus callosum without significant interval change from the prior exam with associated subependymoma spread of tumor.    Areas of tumor infiltration above the left temporal horn and within the left frontal lobe which have increased in size. Tumor within the mesial left parietal lobe is slightly more prominent and demonstrates increased enhancement at this time.    24 hr VEEG  Clinical Impression:  Moderate diffuse or multifocal cerebral dysfunction, not specific as to etiology.  There were no epileptiform abnormalities recorded. Radha Oshea, PGY-1  Internal Medicine  Pager: [phone] (NS) / [phone] ([facility])    Patient is a 69y old  Male who presents with a chief complaint of Seizures and Hyperglycemia (10 Charles 2020 08:11)    SUBJECTIVE / OVERNIGHT EVENTS:    ON no complaints. Denies any HA, changes in mental status or weakness. No constipation or diarrhea     MEDICATIONS  (STANDING):  atorvastatin 10 milliGRAM(s) Oral at bedtime  dexAMETHasone     Tablet 4 milliGRAM(s) Oral two times a day  dextrose 5%. 1000 milliLiter(s) (50 mL/Hr) IV Continuous <Continuous>  dextrose 50% Injectable 12.5 Gram(s) IV Push once  dextrose 50% Injectable 25 Gram(s) IV Push once  dextrose 50% Injectable 25 Gram(s) IV Push once  insulin lispro (HumaLOG) corrective regimen sliding scale   SubCutaneous three times a day before meals  insulin lispro (HumaLOG) corrective regimen sliding scale   SubCutaneous at bedtime  insulin NPH human recombinant 3 Unit(s) SubCutaneous two times a day  levETIRAcetam 500 milliGRAM(s) Oral two times a day  melatonin 3 milliGRAM(s) Oral at bedtime  pantoprazole  Injectable 40 milliGRAM(s) IV Push daily  polyethylene glycol 3350 17 Gram(s) Oral daily  senna 2 Tablet(s) Oral at bedtime  sodium chloride 2 Gram(s) Oral three times a day    MEDICATIONS  (PRN):  bisacodyl Suppository 10 milliGRAM(s) Rectal daily PRN Constipation  dextrose 40% Gel 15 Gram(s) Oral once PRN Blood Glucose LESS THAN 70 milliGRAM(s)/deciliter  glucagon  Injectable 1 milliGRAM(s) IntraMuscular once PRN Glucose LESS THAN 70 milligrams/deciliter    CAPILLARY BLOOD GLUCOSE  POCT Blood Glucose.: 210 mg/dL (12 Jan 2020 06:15)  POCT Blood Glucose.: 251 mg/dL (11 Jan 2020 22:02)  POCT Blood Glucose.: 278 mg/dL (11 Jan 2020 17:59)  POCT Blood Glucose.: 317 mg/dL (11 Jan 2020 13:03)  POCT Blood Glucose.: 152 mg/dL (11 Jan 2020 08:48)    I&O's Summary    11 Jan 2020 07:01  -  12 Jan 2020 07:00  --------------------------------------------------------  IN: 520 mL / OUT: 850 mL / NET: -330 mL    PHYSICAL EXAM  GENERAL: NAD, well-developed elderly male   HEAD:  Atraumatic, Normocephalic  EYES: EOMI, PERRL  NECK: Supple, No JVD  CHEST/LUNG: Clear to auscultation bilaterally; No wheezes, rales or rhonchi   HEART: Regular rate and rhythm; No murmur  ABDOMEN: Soft, Nontender, Nondistended; Bowel sounds present  EXTREMITIES:  2+ Peripheral Pulses, No clubbing, cyanosis, or edema  PSYCH: AAOx3  SKIN: No rashes or lesions  Neuro: Alert to self and place. pupils are both reactive to light. 5/5 strength in UE and moving lower extremities with ease against gravity -- not entirely cooperative with strength testing.     LABS:             9.3    1.90  )-----------( 24       ( 12 Jan 2020 06:15 )             27.0     01-12    131<L>  |  98  |  22  ----------------------------<  211<H>  4.1   |  21<L>  |  0.39<L>    Ca    8.0<L>      12 Jan 2020 06:15  Phos  3.1     01-12  Mg     1.9     01-12    TPro  5.0<L>  /  Alb  2.5<L>  /  TBili  0.4  /  DBili  x   /  AST  16  /  ALT  32  /  AlkPhos  57  01-12    LIVER FUNCTIONS - ( 12 Jan 2020 06:15 )  Alb: 2.5 g/dL / Pro: 5.0 g/dL / ALK PHOS: 57 u/L / ALT: 32 u/L / AST: 16 u/L / GGT: x           PT/INR - ( 12 Jan 2020 06:15 )   PT: 11.2 SEC;   INR: 0.98     PTT - ( 12 Jan 2020 06:15 )  PTT:23.8 SEC    RADIOLOGY & ADDITIONAL TESTS:    MR Head w/wo IV Cont (01.10.20 @ 09:22)  FINDINGS:    VENTRICLES AND SULCI:  Overall increased in size compatible with progression of cerebral volume loss.    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 Surrounding T2 hyperintensity and internal hemorrhage is again seen with increased susceptibility artifact since the prior exam likely due to increased hemosiderin deposition possibly from interval surgery. There is subependymal tumor spread extending along the occipital horn on the right and along the medial aspect of the right temporal horn as seen previously. Enhancement of the lateral wall of the temporal horn and right frontal horn has mildly decreased from the prior exam.    A satellite mass is seen along the superior aspect of the left temporal horn which has enlarged measuring 2 x 1.4 cm as compared with 1.5 x 0.7 cm. A mass within the left frontal lobe below the frontal horn is larger measuring 1.7 x 1.5 cm as compared with 0.5 x 0.5 cm, in retrospect. An area of gyral swelling and increased T2 signal within the mesial left parietal lobe is mildly more prominent likely representing increasing tumor. Increased enhancement is seen in this area this time.    EXTRA-AXIAL:  No mass or collection.  VISUALIZED SINUSES:  Normal.  VISUALIZED MASTOIDS:  Clear.  CALVARIUM:  Postoperative changes on the right.  CAROTID FLOW VOIDS:  Present.  MISCELLANEOUS:  Intraocular lens implants    IMPRESSION:    Heterogeneously enhancing partially necrotic infiltrative mass within the splenium of the corpus callosum without significant interval change from the prior exam with associated subependymoma spread of tumor.    Areas of tumor infiltration above the left temporal horn and within the left frontal lobe which have increased in size. Tumor within the mesial left parietal lobe is slightly more prominent and demonstrates increased enhancement at this time.    24 hr VEEG  Clinical Impression:  Moderate diffuse or multifocal cerebral dysfunction, not specific as to etiology.  There were no epileptiform abnormalities recorded.

## 2020-01-12 NOTE — PROGRESS NOTE ADULT - PROBLEM SELECTOR PLAN 3
- Pancytopenia for >6weeks without requiring transfusional support   - As per discussion with son, last dose of Temodar was 6 weeks ago, and has been off bactrim and ASA w/out response  - Retic elevated indicating BM response  - Pt was planned for hematology/oncology as an o/p -> in house heme consulted, recs appreciated  - No indication for Neupogen at this point  - PNH, CMV PCR, HIV, Hepatitis panel, KIKI, RF pending  -If all the workups negative next step will be doing bone marrow biopsy - Pancytopenia for >6weeks without requiring transfusional support   - As per discussion with son, last dose of Temodar was 6 weeks ago, and has been off bactrim and ASA w/out response  - Retic elevated indicating BM response  - Pt was planned for hematology/oncology as an o/p -> in house heme consulted, recs appreciated  - No indication for Neupogen at this point  - PNH, CMV PCR, HIV, Hepatitis panel, KIKI, RF pending  - If all the workups negative next step will be doing bone marrow biopsy, tentatively this week, f/u lab results and heme recs

## 2020-01-12 NOTE — PROGRESS NOTE ADULT - PROBLEM SELECTOR PLAN 2
- steroid induced hyperglycemia compounded by T2DM  - c/w 3U NPH BID in addition to SSI given improved response with 5 units of NPH  - As above, will decide final insulin regimen pending steroid taper regimen - steroid induced hyperglycemia compounded by T2DM  - Uptitrated NPH from 3U to 5U BID in addition to SSI given improved response with 5 units of NPH  - As above, will decide final insulin regimen pending steroid taper regimen

## 2020-01-12 NOTE — PROGRESS NOTE ADULT - PROBLEM SELECTOR PLAN 4
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

## 2020-01-12 NOTE — PROGRESS NOTE ADULT - PROBLEM SELECTOR PLAN 1
- likely related to GBM vs hyperglycemic state further compounded by tapered of AEDs  - VEEG: no epileptogenic foci  - MRI: see report above, some enhancement in certain regions with re-demonstration of mass -> discussed with [facility] neurooncology fellow -> likely related to post radiation therapy  - c/w keppra 500 BID   - c/w Decadron 4mg PO BID   - re: taper, patient was supposed to be on 2 BID dosing prior to admission -> will re-discuss with neurooncology regarding taper closer to discharge

## 2020-01-12 NOTE — PROGRESS NOTE ADULT - PROBLEM SELECTOR PLAN 5
- Pt diagnosed in 9/2019 s/p stereotactic biopsy and ?resection at [facility] [place], s/p Temodar and RT which has been finished  - MRI performed on this admission with several areas of enhancement which as per discussion with neuro-onc is likely related to RT    - Per Neruo-oncologist at [facility] [place], patient was to be on Decadron 2mg BID  - c/w 4mg BID to begin taper down to proper dosage, per [facility] [place]  - 40 IV Protonix QD for GI PPx   - Neuro recs appreciated

## 2020-01-12 NOTE — PROGRESS NOTE ADULT - ASSESSMENT
68 yo Bulgarian-speaking M w/ PMH of GBM diagnosed in 9/19, HTN, HLD currently undergoing radiation therapy at [facility] [place] a/w hyperglycemia likely 2/2 to steroid-induced from Decadron c/b new onset seizures in ED x2

## 2020-01-13 LAB
ALBUMIN SERPL ELPH-MCNC: 2.7 G/DL — LOW (ref 3.3–5)
ALP SERPL-CCNC: 58 U/L — SIGNIFICANT CHANGE UP (ref 40–120)
ALT FLD-CCNC: 31 U/L — SIGNIFICANT CHANGE UP (ref 4–41)
ANION GAP SERPL CALC-SCNC: 12 MMO/L — SIGNIFICANT CHANGE UP (ref 7–14)
AST SERPL-CCNC: 17 U/L — SIGNIFICANT CHANGE UP (ref 4–40)
BASOPHILS # BLD AUTO: 0.01 K/UL — SIGNIFICANT CHANGE UP (ref 0–0.2)
BASOPHILS NFR BLD AUTO: 0.5 % — SIGNIFICANT CHANGE UP (ref 0–2)
BILIRUB SERPL-MCNC: 0.4 MG/DL — SIGNIFICANT CHANGE UP (ref 0.2–1.2)
BUN SERPL-MCNC: 20 MG/DL — SIGNIFICANT CHANGE UP (ref 7–23)
CALCIUM SERPL-MCNC: 8.3 MG/DL — LOW (ref 8.4–10.5)
CHLORIDE SERPL-SCNC: 98 MMOL/L — SIGNIFICANT CHANGE UP (ref 98–107)
CMV DNA CSF QL NAA+PROBE: HIGH IU/ML
CMV DNA SPEC NAA+PROBE-LOG#: 5.06 — HIGH
CO2 SERPL-SCNC: 22 MMOL/L — SIGNIFICANT CHANGE UP (ref 22–31)
CREAT SERPL-MCNC: 0.42 MG/DL — LOW (ref 0.5–1.3)
EOSINOPHIL # BLD AUTO: 0 K/UL — SIGNIFICANT CHANGE UP (ref 0–0.5)
EOSINOPHIL NFR BLD AUTO: 0 % — SIGNIFICANT CHANGE UP (ref 0–6)
GLUCOSE BLDC GLUCOMTR-MCNC: 171 MG/DL — HIGH (ref 70–99)
GLUCOSE BLDC GLUCOMTR-MCNC: 230 MG/DL — HIGH (ref 70–99)
GLUCOSE SERPL-MCNC: 186 MG/DL — HIGH (ref 70–99)
HCT VFR BLD CALC: 26.7 % — LOW (ref 39–50)
HGB BLD-MCNC: 9.5 G/DL — LOW (ref 13–17)
IMM GRANULOCYTES NFR BLD AUTO: 2.9 % — HIGH (ref 0–1.5)
LYMPHOCYTES # BLD AUTO: 0.77 K/UL — LOW (ref 1–3.3)
LYMPHOCYTES # BLD AUTO: 36.7 % — SIGNIFICANT CHANGE UP (ref 13–44)
MAGNESIUM SERPL-MCNC: 1.8 MG/DL — SIGNIFICANT CHANGE UP (ref 1.6–2.6)
MANUAL SMEAR VERIFICATION: SIGNIFICANT CHANGE UP
MCHC RBC-ENTMCNC: 31.9 PG — SIGNIFICANT CHANGE UP (ref 27–34)
MCHC RBC-ENTMCNC: 35.6 % — SIGNIFICANT CHANGE UP (ref 32–36)
MCV RBC AUTO: 89.6 FL — SIGNIFICANT CHANGE UP (ref 80–100)
MONOCYTES # BLD AUTO: 0.27 K/UL — SIGNIFICANT CHANGE UP (ref 0–0.9)
MONOCYTES NFR BLD AUTO: 12.9 % — SIGNIFICANT CHANGE UP (ref 2–14)
NEUTROPHILS # BLD AUTO: 0.99 K/UL — LOW (ref 1.8–7.4)
NEUTROPHILS NFR BLD AUTO: 47 % — SIGNIFICANT CHANGE UP (ref 43–77)
NRBC # FLD: 0.02 K/UL — SIGNIFICANT CHANGE UP (ref 0–0)
NRBC FLD-RTO: 1 — SIGNIFICANT CHANGE UP
PHOSPHATE SERPL-MCNC: 3 MG/DL — SIGNIFICANT CHANGE UP (ref 2.5–4.5)
PLATELET # BLD AUTO: 28 K/UL — LOW (ref 150–400)
PMV BLD: 10.2 FL — SIGNIFICANT CHANGE UP (ref 7–13)
POTASSIUM SERPL-MCNC: 4 MMOL/L — SIGNIFICANT CHANGE UP (ref 3.5–5.3)
POTASSIUM SERPL-SCNC: 4 MMOL/L — SIGNIFICANT CHANGE UP (ref 3.5–5.3)
PROT SERPL-MCNC: 5.2 G/DL — LOW (ref 6–8.3)
RBC # BLD: 2.98 M/UL — LOW (ref 4.2–5.8)
RBC # FLD: 16.5 % — HIGH (ref 10.3–14.5)
SODIUM SERPL-SCNC: 132 MMOL/L — LOW (ref 135–145)
WBC # BLD: 2.1 K/UL — LOW (ref 3.8–10.5)
WBC # FLD AUTO: 2.1 K/UL — LOW (ref 3.8–10.5)

## 2020-01-13 PROCEDURE — 99233 SBSQ HOSP IP/OBS HIGH 50: CPT | Mod: GC

## 2020-01-13 RX ORDER — PANTOPRAZOLE SODIUM 20 MG/1
40 TABLET, DELAYED RELEASE ORAL
Refills: 0 | Status: DISCONTINUED | OUTPATIENT
Start: 2020-01-13 | End: 2020-01-16

## 2020-01-13 RX ORDER — HUMAN INSULIN 100 [IU]/ML
7 INJECTION, SUSPENSION SUBCUTANEOUS
Refills: 0 | Status: DISCONTINUED | OUTPATIENT
Start: 2020-01-13 | End: 2020-01-16

## 2020-01-13 RX ADMIN — Medication 2: at 22:29

## 2020-01-13 RX ADMIN — Medication 2: at 09:16

## 2020-01-13 RX ADMIN — ATORVASTATIN CALCIUM 10 MILLIGRAM(S): 80 TABLET, FILM COATED ORAL at 21:31

## 2020-01-13 RX ADMIN — Medication 6: at 13:11

## 2020-01-13 RX ADMIN — HUMAN INSULIN 5 UNIT(S): 100 INJECTION, SUSPENSION SUBCUTANEOUS at 05:51

## 2020-01-13 RX ADMIN — Medication 3 MILLIGRAM(S): at 21:31

## 2020-01-13 RX ADMIN — Medication 4: at 18:50

## 2020-01-13 RX ADMIN — SODIUM CHLORIDE 2 GRAM(S): 9 INJECTION INTRAMUSCULAR; INTRAVENOUS; SUBCUTANEOUS at 21:31

## 2020-01-13 RX ADMIN — PANTOPRAZOLE SODIUM 40 MILLIGRAM(S): 20 TABLET, DELAYED RELEASE ORAL at 14:56

## 2020-01-13 RX ADMIN — SODIUM CHLORIDE 2 GRAM(S): 9 INJECTION INTRAMUSCULAR; INTRAVENOUS; SUBCUTANEOUS at 05:49

## 2020-01-13 RX ADMIN — LEVETIRACETAM 500 MILLIGRAM(S): 250 TABLET, FILM COATED ORAL at 18:51

## 2020-01-13 RX ADMIN — LEVETIRACETAM 500 MILLIGRAM(S): 250 TABLET, FILM COATED ORAL at 05:49

## 2020-01-13 RX ADMIN — Medication 4 MILLIGRAM(S): at 05:48

## 2020-01-13 RX ADMIN — HUMAN INSULIN 7 UNIT(S): 100 INJECTION, SUSPENSION SUBCUTANEOUS at 18:51

## 2020-01-13 RX ADMIN — Medication 4 MILLIGRAM(S): at 18:51

## 2020-01-13 RX ADMIN — SODIUM CHLORIDE 2 GRAM(S): 9 INJECTION INTRAMUSCULAR; INTRAVENOUS; SUBCUTANEOUS at 14:56

## 2020-01-13 NOTE — PROGRESS NOTE ADULT - SUBJECTIVE AND OBJECTIVE BOX
Radha Oshea, PGY-1  Internal Medicine  Pager: [phone] (NS) / [phone] (FIDE)    Patient is a 69y old  Male who presents with a chief complaint of Seizures and Hyperglycemia (10 Charles 2020 08:11)    SUBJECTIVE / OVERNIGHT EVENTS:      MEDICATIONS  (STANDING):  atorvastatin 10 milliGRAM(s) Oral at bedtime  dexAMETHasone     Tablet 4 milliGRAM(s) Oral two times a day  dextrose 5%. 1000 milliLiter(s) (50 mL/Hr) IV Continuous <Continuous>  dextrose 50% Injectable 12.5 Gram(s) IV Push once  dextrose 50% Injectable 25 Gram(s) IV Push once  dextrose 50% Injectable 25 Gram(s) IV Push once  insulin lispro (HumaLOG) corrective regimen sliding scale   SubCutaneous three times a day before meals  insulin lispro (HumaLOG) corrective regimen sliding scale   SubCutaneous at bedtime  insulin NPH human recombinant 5 Unit(s) SubCutaneous two times a day  levETIRAcetam 500 milliGRAM(s) Oral two times a day  melatonin 3 milliGRAM(s) Oral at bedtime  pantoprazole  Injectable 40 milliGRAM(s) IV Push daily  polyethylene glycol 3350 17 Gram(s) Oral daily  senna 2 Tablet(s) Oral at bedtime  sodium chloride 2 Gram(s) Oral three times a day    MEDICATIONS  (PRN):  bisacodyl Suppository 10 milliGRAM(s) Rectal daily PRN Constipation  dextrose 40% Gel 15 Gram(s) Oral once PRN Blood Glucose LESS THAN 70 milliGRAM(s)/deciliter  glucagon  Injectable 1 milliGRAM(s) IntraMuscular once PRN Glucose LESS THAN 70 milligrams/deciliter    CAPILLARY BLOOD GLUCOSE  POCT Blood Glucose.: 230 mg/dL (13 Jan 2020 02:06)  POCT Blood Glucose.: 255 mg/dL (12 Jan 2020 22:31)  POCT Blood Glucose.: 218 mg/dL (12 Jan 2020 17:57)  POCT Blood Glucose.: 317 mg/dL (12 Jan 2020 13:00)  POCT Blood Glucose.: 182 mg/dL (12 Jan 2020 09:00)    I&O's Summary  12 Jan 2020 07:01  -  13 Jan 2020 07:00  --------------------------------------------------------  IN: 1150 mL / OUT: 1500 mL / NET: -350 mL    PHYSICAL EXAM  GENERAL: NAD, well-developed elderly male   HEAD:  Atraumatic, Normocephalic  EYES: EOMI, PERRL  NECK: Supple, No JVD  CHEST/LUNG: Clear to auscultation bilaterally; No wheezes, rales or rhonchi   HEART: Regular rate and rhythm; No murmur  ABDOMEN: Soft, Nontender, Nondistended; Bowel sounds present  EXTREMITIES:  2+ Peripheral Pulses, No clubbing, cyanosis, or edema  PSYCH: AAOx3  SKIN: No rashes or lesions  Neuro: Alert to self and place. pupils are both reactive to light. 5/5 strength in UE and moving lower extremities with ease against gravity -- not entirely cooperative with strength testing.     LABS:      RADIOLOGY & ADDITIONAL TESTS:    MR Head w/wo IV Cont (01.10.20 @ 09:22)  FINDINGS:    VENTRICLES AND SULCI:  Overall increased in size compatible with progression of cerebral volume loss.    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 Surrounding T2 hyperintensity and internal hemorrhage is again seen with increased susceptibility artifact since the prior exam likely due to increased hemosiderin deposition possibly from interval surgery. There is subependymal tumor spread extending along the occipital horn on the right and along the medial aspect of the right temporal horn as seen previously. Enhancement of the lateral wall of the temporal horn and right frontal horn has mildly decreased from the prior exam.    A satellite mass is seen along the superior aspect of the left temporal horn which has enlarged measuring 2 x 1.4 cm as compared with 1.5 x 0.7 cm. A mass within the left frontal lobe below the frontal horn is larger measuring 1.7 x 1.5 cm as compared with 0.5 x 0.5 cm, in retrospect. An area of gyral swelling and increased T2 signal within the mesial left parietal lobe is mildly more prominent likely representing increasing tumor. Increased enhancement is seen in this area this time.    EXTRA-AXIAL:  No mass or collection.  VISUALIZED SINUSES:  Normal.  VISUALIZED MASTOIDS:  Clear.  CALVARIUM:  Postoperative changes on the right.  CAROTID FLOW VOIDS:  Present.  MISCELLANEOUS:  Intraocular lens implants    IMPRESSION:    Heterogeneously enhancing partially necrotic infiltrative mass within the splenium of the corpus callosum without significant interval change from the prior exam with associated subependymoma spread of tumor.    Areas of tumor infiltration above the left temporal horn and within the left frontal lobe which have increased in size. Tumor within the mesial left parietal lobe is slightly more prominent and demonstrates increased enhancement at this time.    24 hr VEEG  Clinical Impression:  Moderate diffuse or multifocal cerebral dysfunction, not specific as to etiology.  There were no epileptiform abnormalities recorded. Radha Oshea, PGY-1  Internal Medicine  Pager: [phone] (NS) / [phone] (FIDE)    Patient is a 69y old  Male who presents with a chief complaint of Seizures and Hyperglycemia (10 Charles 2020 08:11)    SUBJECTIVE / OVERNIGHT EVENTS:    This AM pt seen and examined at bedside. Denies any HA, changes in mental status or abd pain. Had a BM on Saturday.    MEDICATIONS  (STANDING):  atorvastatin 10 milliGRAM(s) Oral at bedtime  dexAMETHasone     Tablet 4 milliGRAM(s) Oral two times a day  dextrose 5%. 1000 milliLiter(s) (50 mL/Hr) IV Continuous <Continuous>  dextrose 50% Injectable 12.5 Gram(s) IV Push once  dextrose 50% Injectable 25 Gram(s) IV Push once  dextrose 50% Injectable 25 Gram(s) IV Push once  insulin lispro (HumaLOG) corrective regimen sliding scale   SubCutaneous three times a day before meals  insulin lispro (HumaLOG) corrective regimen sliding scale   SubCutaneous at bedtime  insulin NPH human recombinant 5 Unit(s) SubCutaneous two times a day  levETIRAcetam 500 milliGRAM(s) Oral two times a day  melatonin 3 milliGRAM(s) Oral at bedtime  pantoprazole  Injectable 40 milliGRAM(s) IV Push daily  polyethylene glycol 3350 17 Gram(s) Oral daily  senna 2 Tablet(s) Oral at bedtime  sodium chloride 2 Gram(s) Oral three times a day    MEDICATIONS  (PRN):  bisacodyl Suppository 10 milliGRAM(s) Rectal daily PRN Constipation  dextrose 40% Gel 15 Gram(s) Oral once PRN Blood Glucose LESS THAN 70 milliGRAM(s)/deciliter  glucagon  Injectable 1 milliGRAM(s) IntraMuscular once PRN Glucose LESS THAN 70 milligrams/deciliter    CAPILLARY BLOOD GLUCOSE  POCT Blood Glucose.: 230 mg/dL (13 Jan 2020 02:06)  POCT Blood Glucose.: 255 mg/dL (12 Jan 2020 22:31)  POCT Blood Glucose.: 218 mg/dL (12 Jan 2020 17:57)  POCT Blood Glucose.: 317 mg/dL (12 Jan 2020 13:00)  POCT Blood Glucose.: 182 mg/dL (12 Jan 2020 09:00)    I&O's Summary  12 Jan 2020 07:01  -  13 Jan 2020 07:00  --------------------------------------------------------  IN: 1150 mL / OUT: 1500 mL / NET: -350 mL    PHYSICAL EXAM  GENERAL: NAD, well-developed elderly male   HEAD:  Atraumatic, Normocephalic  EYES: EOMI, PERRL  NECK: Supple, No JVD  CHEST/LUNG: Clear to auscultation bilaterally; No wheezes, rales or rhonchi   HEART: Regular rate and rhythm; No murmur  ABDOMEN: Soft, Nontender, Nondistended; Bowel sounds present  EXTREMITIES:  2+ Peripheral Pulses, No clubbing, cyanosis, or edema  PSYCH: AAOx3  SKIN: No rashes or lesions  Neuro: Alert to self and place. pupils are both reactive to light. 5/5 strength in UE and moving lower extremities with ease against gravity -- not entirely cooperative with strength testing.     LABS:             9.5    2.10  )-----------( 28       ( 13 Jan 2020 06:38 )             26.7     01-13    132<L>  |  98  |  20  ----------------------------<  186<H>  4.0   |  22  |  0.42<L>    Ca    8.3<L>      13 Jan 2020 06:38  Phos  3.0     01-13  Mg     1.8     01-13    TPro  5.2<L>  /  Alb  2.7<L>  /  TBili  0.4  /  DBili  x   /  AST  17  /  ALT  31  /  AlkPhos  58  01-13    LIVER FUNCTIONS - ( 13 Jan 2020 06:38 )  Alb: 2.7 g/dL / Pro: 5.2 g/dL / ALK PHOS: 58 u/L / ALT: 31 u/L / AST: 17 u/L / GGT: x           PT/INR - ( 12 Jan 2020 06:15 )   PT: 11.2 SEC;   INR: 0.98     PTT - ( 12 Jan 2020 06:15 )  PTT:23.8 SEC    RADIOLOGY & ADDITIONAL TESTS:    MR Head w/wo IV Cont (01.10.20 @ 09:22)  FINDINGS:    VENTRICLES AND SULCI:  Overall increased in size compatible with progression of cerebral volume loss.    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 Surrounding T2 hyperintensity and internal hemorrhage is again seen with increased susceptibility artifact since the prior exam likely due to increased hemosiderin deposition possibly from interval surgery. There is subependymal tumor spread extending along the occipital horn on the right and along the medial aspect of the right temporal horn as seen previously. Enhancement of the lateral wall of the temporal horn and right frontal horn has mildly decreased from the prior exam.    A satellite mass is seen along the superior aspect of the left temporal horn which has enlarged measuring 2 x 1.4 cm as compared with 1.5 x 0.7 cm. A mass within the left frontal lobe below the frontal horn is larger measuring 1.7 x 1.5 cm as compared with 0.5 x 0.5 cm, in retrospect. An area of gyral swelling and increased T2 signal within the mesial left parietal lobe is mildly more prominent likely representing increasing tumor. Increased enhancement is seen in this area this time.    EXTRA-AXIAL:  No mass or collection.  VISUALIZED SINUSES:  Normal.  VISUALIZED MASTOIDS:  Clear.  CALVARIUM:  Postoperative changes on the right.  CAROTID FLOW VOIDS:  Present.  MISCELLANEOUS:  Intraocular lens implants    IMPRESSION:    Heterogeneously enhancing partially necrotic infiltrative mass within the splenium of the corpus callosum without significant interval change from the prior exam with associated subependymoma spread of tumor.    Areas of tumor infiltration above the left temporal horn and within the left frontal lobe which have increased in size. Tumor within the mesial left parietal lobe is slightly more prominent and demonstrates increased enhancement at this time.    24 hr VEEG  Clinical Impression:  Moderate diffuse or multifocal cerebral dysfunction, not specific as to etiology.  There were no epileptiform abnormalities recorded.

## 2020-01-13 NOTE — PROGRESS NOTE ADULT - PROBLEM SELECTOR PLAN 8
#10 Prophylactic Measure  DVT PPx: Thrombocytopenic so SCDs  Diet: Advance to consistent carb diet as tolerated   Dispo: PT recommending restorative rehab    Transitions of Care Status:  1.  Name of PCP: Dr. Hinson  2.  PCP Contacted on Admission: [ ] Y    [X ] N    3.  PCP contacted at Discharge: [ ] Y    [ ] N    [ ] N/A  4.  Post-Discharge Appointment Date and Location:  5.  Summary of Handoff given to PCP: #10 Prophylactic Measure  DVT PPx: Thrombocytopenic so SCDs  Diet: consistent carb diet as tolerated   Dispo: PT recommending restorative rehab    Transitions of Care Status:  1.  Name of PCP: Dr. Hinson  2.  PCP Contacted on Admission: [ ] Y    [X ] N    3.  PCP contacted at Discharge: [ ] Y    [ ] N    [ ] N/A  4.  Post-Discharge Appointment Date and Location:  5.  Summary of Handoff given to PCP:

## 2020-01-13 NOTE — PROGRESS NOTE ADULT - PROBLEM SELECTOR PLAN 3
- Pancytopenia for >6weeks without requiring transfusional support   - As per discussion with son, last dose of Temodar was 6 weeks ago, and has been off bactrim and ASA w/out response  - Retic elevated indicating BM response  - Pt was planned for hematology/oncology as an o/p -> in house heme consulted, recs appreciated  - No indication for Neupogen at this point  - PNH, CMV PCR, HIV, Hepatitis panel, KIKI, RF pending  - If all the workups negative next step will be doing bone marrow biopsy, tentatively this week, f/u lab results and heme recs - likely related to GBM vs hyperglycemic state further compounded by tapered of AEDs  - VEEG: no epileptogenic foci  - MRI: see report above, some enhancement in certain regions with re-demonstration of mass -> discussed with [facility] neurooncology fellow -> likely related to post radiation therapy  - c/w keppra 500 BID   - c/w Decadron 4mg PO BID   - re: taper, patient was supposed to be on 2 BID dosing prior to admission -> will re-discuss with neurooncology regarding taper closer to discharge

## 2020-01-13 NOTE — PROGRESS NOTE ADULT - PROBLEM SELECTOR PLAN 5
- Pt diagnosed in 9/2019 [place] [facility] [place]p [place]ar and RT which has been finished  - MRI performed on this admission with several areas of enhancement which as per discussion with neuro-onc is likely related to RT    - Per [place] [facility], [place] was to be on Decadron 2mg BID  - c/w 4mg BID to begin taper down to proper dosage, per [facility]  - 40 IV Protonix QD for GI PPx   - Neuro recs appreciated

## 2020-01-13 NOTE — PROGRESS NOTE ADULT - PROBLEM SELECTOR PLAN 1
- likely related to GBM vs hyperglycemic state further compounded by tapered of AEDs  - VEEG: no epileptogenic foci  - MRI: see report above, some enhancement in certain regions with re-demonstration of mass -> discussed with [facility] neurooncology fellow -> likely related to post radiation therapy  - c/w keppra 500 BID   - c/w Decadron 4mg PO BID   - re: taper, patient was supposed to be on 2 BID dosing prior to admission -> will re-discuss with neurooncology regarding taper closer to discharge - Steroid induced hyperglycemia compounded by T2DM  - Uptitrated NPH from 3U to 5U BID in addition to mod SSI. Will plan to increase NPH dose as pt continuing to have FS in the mid 200s w/ goal <180  - As below, will decide final insulin regimen pending steroid taper regimen

## 2020-01-13 NOTE — PROGRESS NOTE ADULT - ATTENDING COMMENTS
Patient seen and examined, care d/w residents    70 yo M with GBM s/p chemo and RT, RT completed, could not tolerate chemo 2/2 cytopenias now held for some time with persistent cytopenia, no fevers, no bleeding etc, no need for transfusion.  Son reports [place] [facility] [place] amber erwin, amber saw here and were planning for BM bx on 1/14 however son now stating doesn't want to do that if it "wont  and only cause pain".   Son reports wants father to go to [facility] and has given 2 choices.   Advanced care planning discussed given diagnosis and prognosis given underlying malignancy, son states father expressed wishes to "do everything" and they have no rediscussed and do not have plans re: overall plans re: feeding tubes etc should need arise, currently want father to be full code.  17 min spent at bedside with patient and son.   Also requesting to d/w amber why he cannot just get neupogen as patients brother is a hematologist and that's what he would recommend.

## 2020-01-13 NOTE — CHART NOTE - NSCHARTNOTEFT_GEN_A_CORE
Discussed with son at bedside regarding blood counts.  WBC slightly improving, hgb stable and thrombocytopenia is chronic.  Unclear if this is a medication side effect from Bactrim or Temodar vs underlying BM disease.  Viral studies pending.  Son (hospitalist) would like to hold off on BM biopsy until all other workup is negative.  Will review counts tomorrow and if downtrending, will rediscuss BM biopsy.      Maryann Medel  Hematology Fellow  [phone]

## 2020-01-13 NOTE — PROGRESS NOTE ADULT - ASSESSMENT
68 yo Nepali-speaking M w/ PMH of GBM diagnosed in 9/19, HTN, HLD currently undergoing radiation therapy at [facility] a/w hyperglycemia likely 2/2 to steroid-induced from Decadron c/b new onset seizures in ED x2

## 2020-01-13 NOTE — PROGRESS NOTE ADULT - PROBLEM SELECTOR PLAN 2
- Steroid induced hyperglycemia compounded by T2DM  - Uptitrated NPH from 3U to 5U BID in addition to mod SSI. Will plan to increase NPH dose as pt continuing to have FS in the mid 200s w/ goal <180  - As above, will decide final insulin regimen pending steroid taper regimen - Pancytopenia for >6weeks without requiring transfusional support   - As per discussion with son, last dose of Temodar was 6 weeks ago, and has been off bactrim and ASA w/out response  - Retic elevated indicating BM response  - Pt was planned for hematology/oncology as an o/p -> in house heme consulted, recs appreciated  - No indication for Neupogen at this point  - Hepatitis panel negative  - KIKI, RF, PNH, CMV PCR, HIV pending   - If all the workups negative next step will be doing bone marrow biopsy, tentatively this week, f/u lab results and heme recs

## 2020-01-14 DIAGNOSIS — D72.820 LYMPHOCYTOSIS (SYMPTOMATIC): ICD-10-CM

## 2020-01-14 DIAGNOSIS — B25.9 CYTOMEGALOVIRAL DISEASE, UNSPECIFIED: ICD-10-CM

## 2020-01-14 DIAGNOSIS — D61.818 OTHER PANCYTOPENIA: ICD-10-CM

## 2020-01-14 LAB
ANION GAP SERPL CALC-SCNC: 11 MMO/L — SIGNIFICANT CHANGE UP (ref 7–14)
ANISOCYTOSIS BLD QL: SIGNIFICANT CHANGE UP
BASOPHILS # BLD AUTO: 0.01 K/UL — SIGNIFICANT CHANGE UP (ref 0–0.2)
BASOPHILS NFR BLD AUTO: 0.5 % — SIGNIFICANT CHANGE UP (ref 0–2)
BASOPHILS NFR SPEC: 0 % — SIGNIFICANT CHANGE UP (ref 0–2)
BLASTS # FLD: 0 % — SIGNIFICANT CHANGE UP (ref 0–0)
BUN SERPL-MCNC: 20 MG/DL — SIGNIFICANT CHANGE UP (ref 7–23)
CALCIUM SERPL-MCNC: 8.1 MG/DL — LOW (ref 8.4–10.5)
CHLORIDE SERPL-SCNC: 98 MMOL/L — SIGNIFICANT CHANGE UP (ref 98–107)
CO2 SERPL-SCNC: 23 MMOL/L — SIGNIFICANT CHANGE UP (ref 22–31)
CREAT SERPL-MCNC: 0.41 MG/DL — LOW (ref 0.5–1.3)
EOSINOPHIL # BLD AUTO: 0 K/UL — SIGNIFICANT CHANGE UP (ref 0–0.5)
EOSINOPHIL NFR BLD AUTO: 0 % — SIGNIFICANT CHANGE UP (ref 0–6)
EOSINOPHIL NFR FLD: 0 % — SIGNIFICANT CHANGE UP (ref 0–6)
GLUCOSE SERPL-MCNC: 228 MG/DL — HIGH (ref 70–99)
HCT VFR BLD CALC: 28.1 % — LOW (ref 39–50)
HGB BLD-MCNC: 9.3 G/DL — LOW (ref 13–17)
HIV COMBO RESULT: SIGNIFICANT CHANGE UP
HIV1+2 AB SPEC QL: SIGNIFICANT CHANGE UP
IMM GRANULOCYTES NFR BLD AUTO: 4.2 % — HIGH (ref 0–1.5)
LYMPHOCYTES # BLD AUTO: 0.62 K/UL — LOW (ref 1–3.3)
LYMPHOCYTES # BLD AUTO: 29.1 % — SIGNIFICANT CHANGE UP (ref 13–44)
LYMPHOCYTES NFR SPEC AUTO: 19.5 % — SIGNIFICANT CHANGE UP (ref 13–44)
MAGNESIUM SERPL-MCNC: 1.8 MG/DL — SIGNIFICANT CHANGE UP (ref 1.6–2.6)
MCHC RBC-ENTMCNC: 30.9 PG — SIGNIFICANT CHANGE UP (ref 27–34)
MCHC RBC-ENTMCNC: 33.1 % — SIGNIFICANT CHANGE UP (ref 32–36)
MCV RBC AUTO: 93.4 FL — SIGNIFICANT CHANGE UP (ref 80–100)
METAMYELOCYTES # FLD: 1.8 % — HIGH (ref 0–1)
MICROCYTES BLD QL: SIGNIFICANT CHANGE UP
MONOCYTES # BLD AUTO: 0.25 K/UL — SIGNIFICANT CHANGE UP (ref 0–0.9)
MONOCYTES NFR BLD AUTO: 11.7 % — SIGNIFICANT CHANGE UP (ref 2–14)
MONOCYTES NFR BLD: 7.1 % — SIGNIFICANT CHANGE UP (ref 2–9)
MYELOCYTES NFR BLD: 1.8 % — HIGH (ref 0–0)
NEUTROPHIL AB SER-ACNC: 56.6 % — SIGNIFICANT CHANGE UP (ref 43–77)
NEUTROPHILS # BLD AUTO: 1.16 K/UL — LOW (ref 1.8–7.4)
NEUTROPHILS NFR BLD AUTO: 54.5 % — SIGNIFICANT CHANGE UP (ref 43–77)
NEUTS BAND # BLD: 8.8 % — HIGH (ref 0–6)
NRBC # FLD: 0.03 K/UL — SIGNIFICANT CHANGE UP (ref 0–0)
NRBC FLD-RTO: 1.4 — SIGNIFICANT CHANGE UP
OTHER - HEMATOLOGY %: 0 — SIGNIFICANT CHANGE UP
OVALOCYTES BLD QL SMEAR: SLIGHT — SIGNIFICANT CHANGE UP
PHOSPHATE SERPL-MCNC: 3.2 MG/DL — SIGNIFICANT CHANGE UP (ref 2.5–4.5)
PLATELET # BLD AUTO: 33 K/UL — LOW (ref 150–400)
PLATELET COUNT - ESTIMATE: SIGNIFICANT CHANGE UP
PMV BLD: 10.3 FL — SIGNIFICANT CHANGE UP (ref 7–13)
POIKILOCYTOSIS BLD QL AUTO: SLIGHT — SIGNIFICANT CHANGE UP
POLYCHROMASIA BLD QL SMEAR: SIGNIFICANT CHANGE UP
POTASSIUM SERPL-MCNC: 3.9 MMOL/L — SIGNIFICANT CHANGE UP (ref 3.5–5.3)
POTASSIUM SERPL-SCNC: 3.9 MMOL/L — SIGNIFICANT CHANGE UP (ref 3.5–5.3)
PROMYELOCYTES # FLD: 0 % — SIGNIFICANT CHANGE UP (ref 0–0)
RBC # BLD: 3.01 M/UL — LOW (ref 4.2–5.8)
RBC # FLD: 16.8 % — HIGH (ref 10.3–14.5)
SMUDGE CELLS # BLD: PRESENT — SIGNIFICANT CHANGE UP
SODIUM SERPL-SCNC: 132 MMOL/L — LOW (ref 135–145)
VARIANT LYMPHS # BLD: 4.4 % — SIGNIFICANT CHANGE UP
WBC # BLD: 2.13 K/UL — LOW (ref 3.8–10.5)
WBC # FLD AUTO: 2.13 K/UL — LOW (ref 3.8–10.5)

## 2020-01-14 PROCEDURE — 99223 1ST HOSP IP/OBS HIGH 75: CPT

## 2020-01-14 PROCEDURE — 99233 SBSQ HOSP IP/OBS HIGH 50: CPT | Mod: GC

## 2020-01-14 RX ORDER — DEXAMETHASONE 0.5 MG/5ML
2 ELIXIR ORAL
Refills: 0 | Status: DISCONTINUED | OUTPATIENT
Start: 2020-01-14 | End: 2020-01-16

## 2020-01-14 RX ORDER — DEXAMETHASONE 0.5 MG/5ML
2 ELIXIR ORAL
Refills: 0 | Status: DISCONTINUED | OUTPATIENT
Start: 2020-01-14 | End: 2020-01-14

## 2020-01-14 RX ORDER — VALGANCICLOVIR 450 MG/1
900 TABLET, FILM COATED ORAL
Refills: 0 | Status: DISCONTINUED | OUTPATIENT
Start: 2020-01-14 | End: 2020-01-16

## 2020-01-14 RX ORDER — DEXAMETHASONE 0.5 MG/5ML
4 ELIXIR ORAL
Refills: 0 | Status: DISCONTINUED | OUTPATIENT
Start: 2020-01-15 | End: 2020-01-16

## 2020-01-14 RX ADMIN — SENNA PLUS 2 TABLET(S): 8.6 TABLET ORAL at 22:55

## 2020-01-14 RX ADMIN — LEVETIRACETAM 500 MILLIGRAM(S): 250 TABLET, FILM COATED ORAL at 06:03

## 2020-01-14 RX ADMIN — HUMAN INSULIN 7 UNIT(S): 100 INJECTION, SUSPENSION SUBCUTANEOUS at 18:07

## 2020-01-14 RX ADMIN — SODIUM CHLORIDE 2 GRAM(S): 9 INJECTION INTRAMUSCULAR; INTRAVENOUS; SUBCUTANEOUS at 06:02

## 2020-01-14 RX ADMIN — SODIUM CHLORIDE 2 GRAM(S): 9 INJECTION INTRAMUSCULAR; INTRAVENOUS; SUBCUTANEOUS at 13:16

## 2020-01-14 RX ADMIN — Medication 4 MILLIGRAM(S): at 18:06

## 2020-01-14 RX ADMIN — Medication 6: at 13:16

## 2020-01-14 RX ADMIN — Medication 3 MILLIGRAM(S): at 22:55

## 2020-01-14 RX ADMIN — VALGANCICLOVIR 900 MILLIGRAM(S): 450 TABLET, FILM COATED ORAL at 18:06

## 2020-01-14 RX ADMIN — Medication 2: at 09:26

## 2020-01-14 RX ADMIN — Medication 4: at 18:35

## 2020-01-14 RX ADMIN — ATORVASTATIN CALCIUM 10 MILLIGRAM(S): 80 TABLET, FILM COATED ORAL at 22:55

## 2020-01-14 RX ADMIN — Medication 4 MILLIGRAM(S): at 06:02

## 2020-01-14 RX ADMIN — SODIUM CHLORIDE 2 GRAM(S): 9 INJECTION INTRAMUSCULAR; INTRAVENOUS; SUBCUTANEOUS at 22:56

## 2020-01-14 RX ADMIN — POLYETHYLENE GLYCOL 3350 17 GRAM(S): 17 POWDER, FOR SOLUTION ORAL at 13:15

## 2020-01-14 RX ADMIN — PANTOPRAZOLE SODIUM 40 MILLIGRAM(S): 20 TABLET, DELAYED RELEASE ORAL at 06:02

## 2020-01-14 RX ADMIN — HUMAN INSULIN 7 UNIT(S): 100 INJECTION, SUSPENSION SUBCUTANEOUS at 06:02

## 2020-01-14 RX ADMIN — LEVETIRACETAM 500 MILLIGRAM(S): 250 TABLET, FILM COATED ORAL at 18:06

## 2020-01-14 NOTE — PROGRESS NOTE ADULT - PROBLEM SELECTOR PLAN 1
- Steroid induced hyperglycemia compounded by T2DM  - Uptitrated NPH to 7U in addition to mod SSI w/ goal <180  - As below, will decide final insulin regimen pending steroid taper regimen

## 2020-01-14 NOTE — PROGRESS NOTE ADULT - PROBLEM SELECTOR PLAN 2
- Pancytopenia for >6weeks without requiring transfusional support   - As per discussion with son, last dose of Temodar was 6 weeks ago, and has been off bactrim and ASA w/out response  - Retic elevated indicating BM response  - Pt was planned for hematology/oncology as an o/p -> in house heme consulted, recs appreciated  - No indication for Neupogen at this point  - Hepatitis panel negative  - KIKI, RF, PNH, CMV PCR, HIV pending   - If all the workups negative next step will be doing bone marrow biopsy, tentatively this week, f/u lab results and heme recs - Pancytopenia for >6weeks without requiring transfusional support   - As per discussion with son, last dose of Temodar was 6 weeks ago, and has been off bactrim and ASA w/out response  - Retic elevated indicating BM response  - Pt was planned for hematology/oncology as an o/p -> in house heme consulted, recs appreciated  - No indication for Neupogen at this point  - Hepatitis panel negative  - KIKI, RF pending   - CMV PCR elevated (Will contact ID for management)   - If all the workups negative next step will be doing bone marrow biopsy, tentatively this week, f/u lab results and heme recs

## 2020-01-14 NOTE — PROGRESS NOTE ADULT - PROBLEM SELECTOR PLAN 7
- c/w Lipitor 10mg qHS - Not on any current BP meds, SBPs mostly in 110s-120s mmHg, had an episode in 180s possibly in the setting of ?seizure

## 2020-01-14 NOTE — CONSULT NOTE ADULT - ASSESSMENT
Impression:    Seizures likely 2/2 GBM    Plan:  - Keppra 500mg BID   - EEG  - MRI Brain w/wo  - Continue to monitor for return to baseline     Follow-up  [] f/u outpatient within 1 week of discharge.    - [facility] @ [address] [place] [address], [address] [place]; [phone]
Mr. Rider is a 69 year old Setswana-speaking male with MHx of GBM (Dx 9/19, s/p RT last dose 12/4, and Temozolomide 11/27, follows with neuro/onc in [facility]), chemo on hold due to low platelets, HTN, HLD presented with hyperglycemia. Then had a GTC in ED, witnessed by providers.  EEG showed mild diffuse or multifocal cerebral dysfunction. Seizure was most likely provoked by hyperglycemia in a patient with a known glioma, Currently on Keppra, hematology consulted for pancytopenia, patient WBC 1.7, Hb 10, , PLT 20. Patient off Bactrim and ASA. Etiology can be aplastic anemia vs autoimmune vs drug reaction.    -Blood smear showed leukopenia, and thrombocytopenia but no other abnormalities.  -Please check retic count, PNH, CMV PCR, HIV, Hepatitis panel, KIKI, RF  -No indication for Neupogen at this point  -If all the workups negative next step will be doing bone marrow biopsy  -Hematology will follow    Candi Edouard PGY4  Heme/Onc fellow
70 yo Polish-speaking M w/ PMH of GBM diagnosed in 9/19, HTN, HLD [place] [facility] [place] likely 2/2 to steroid-induced from Decadron c/b new onset seizures in ED x2 with pancytopenia, CMV viremia, reactive lymphocytosis

## 2020-01-14 NOTE — DIETITIAN INITIAL EVALUATION ADULT. - RD TO REMAIN AVAILABLE
2. Recommend bedside swallow evaluation by SLP to determine the appropriate food and beverage consistency. In the interim, continue Consistent Carbohydrate w/ evening snack, Halal diet. Fluid Restriction per MD discretion. 3. Continue Glucerna Therapeutic Nutrition Shake 240mls 1x daily (220kcals, 10g protein). 4. Please Encourage po intake, assist with meals and menu selections, provide alternatives PRN.

## 2020-01-14 NOTE — PROGRESS NOTE ADULT - PROBLEM SELECTOR PLAN 9
#10 Prophylactic Measure  DVT PPx: Thrombocytopenic so SCDs  Diet: consistent carb diet as tolerated   Dispo: PT recommending restorative rehab    Transitions of Care Status:  1.  Name of PCP: Dr. Hinson  2.  PCP Contacted on Admission: [ ] Y    [X ] N    3.  PCP contacted at Discharge: [ ] Y    [ ] N    [ ] N/A  4.  Post-Discharge Appointment Date and Location:  5.  Summary of Handoff given to PCP:

## 2020-01-14 NOTE — PROGRESS NOTE ADULT - PROBLEM SELECTOR PLAN 8
#10 Prophylactic Measure  DVT PPx: Thrombocytopenic so SCDs  Diet: consistent carb diet as tolerated   Dispo: PT recommending restorative rehab    Transitions of Care Status:  1.  Name of PCP: Dr. Hinson  2.  PCP Contacted on Admission: [ ] Y    [X ] N    3.  PCP contacted at Discharge: [ ] Y    [ ] N    [ ] N/A  4.  Post-Discharge Appointment Date and Location:  5.  Summary of Handoff given to PCP: - c/w Lipitor 10mg qHS

## 2020-01-14 NOTE — CONSULT NOTE ADULT - PROBLEM SELECTOR RECOMMENDATION 9
-elevated cmv noted  -did have reactive lymphocytosis and elevated LFTs on admission   -he has some immunosuppression given his steroid use and uncontrolled DM  -could be causing the pancytopenia  -favor treating given above, Valcyte 900 mg po bid x 2 weeks   -check CMV PCR next week and the following week to document improvement  -DM control  -taper steroids if possible

## 2020-01-14 NOTE — PROGRESS NOTE ADULT - ASSESSMENT
70 yo Kiswahili-speaking M w/ PMH of GBM diagnosed in 9/19, HTN, HLD currently undergoing radiation therapy at [facility] [place] a/w hyperglycemia likely 2/2 to steroid-induced from Decadron c/b new onset seizures in ED x2

## 2020-01-14 NOTE — DIETITIAN INITIAL EVALUATION ADULT. - PHYSICAL APPEARANCE
No pressure ulcers/DTI noted in flowsheets. No edema noted./other (specify) Nutrition focused physical exam conducted - found signs of malnutrition [ ]absent [x]present   Subcutaneous fat loss: [MODERATE] Orbital fat pads region, [MILD]Triceps region, Muscle wasting: [MODERATE]Shoulder region, [MODERATE]thigh region, [MODERATE]Calf region

## 2020-01-14 NOTE — DIETITIAN INITIAL EVALUATION ADULT. - PERTINENT LABORATORY DATA
01-14 Na 132 mmol/L<L> Glu 228 mg/dL<H> K+ 3.9 mmol/L Cr 0.41 mg/dL<L> BUN 20 mg/dL Phos 3.2 mg/dL Alb n/a   PAB n/a   Hgb 9.3 g/dL<L> Hct 28.1 %<L> Hemoglobin A1C, Whole Blood: 7.1 % (12-29-19 @ 06:33)  Glucose, Serum: 228 mg/dL <H>   24Hr FS:279 mg/dL  179 mg/dL  225 mg/dL  299 mg/dL  244 mg/dL

## 2020-01-14 NOTE — CHART NOTE - NSCHARTNOTEFT_GEN_A_CORE
As per discussion with [facility] Physician    -- patient will go on steroid regimen with no planned chemotherapy or radiation therapy while in rehabilitation for his GBM

## 2020-01-14 NOTE — PROGRESS NOTE ADULT - PROBLEM SELECTOR PLAN 5
- Pt diagnosed in 9/2019 s/p stereotactic biopsy and ?resection at [facility] [place], s/p Temodar and RT which has been finished  - MRI performed on this admission with several areas of enhancement which as per discussion with neuro-onc is likely related to RT    - Per Neruo-oncologist at [facility], patient was to be on Decadron 2mg BID  - c/w 4mg BID to begin taper down to proper dosage, per [facility]  - 40 IV Protonix QD for GI PPx   - Neuro recs appreciated - Pt diagnosed in 9/2019 s/p stereotactic biopsy and ?resection at [facility] [place], s/p Temodar and RT which has been finished  - MRI performed on this admission with several areas of enhancement which as per discussion with neuro-onc is likely related to RT    - Per Neruo-oncologist at [facility] [place], patient was to be on Decadron 2mg BID  - Will decrease Decadron to 2mg in AM and 4mg in PM x3 days followed by 2mg BID for duration of rehab until seen outpt at [facility] [place]   - 40 IV Protonix QD for GI PPx   - Neuro recs appreciated - 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

## 2020-01-14 NOTE — PROGRESS NOTE ADULT - ATTENDING COMMENTS
Patient seen and examined, care d/w residents    70 yo M with GBM s/p chemo and RT, RT completed, could not tolerate chemo 2/2 cytopenias now held for some time with persistent cytopenia, no fevers, no bleeding etc, no need for transfusion.  [place] [facility] amber erwin, amber saw here and were planning for BM bx on 1/14 however son now stating doesn't want to do that if it "wont  and only cause pain".     - CMV pcr +, ID yaima appreciated, unclear if playing a role but plan to tx with Valcyte x 2 wks and monitor pcr weekly as OP  - HCP, pt designating Son as primary and Daughter as secondary  - son still wants BRADY  - team called [facility] and decadron to be tapered to 2 mg BID until f/u with them    Now with insurance issue/change  stable for BRADY once insurance confirmed   dispo planning

## 2020-01-14 NOTE — PROGRESS NOTE ADULT - PROBLEM SELECTOR PLAN 6
- Not on any current BP meds, SBPs mostly in 110s-120s mmHg, had an episode in 180s possibly in the setting of ?seizure - Pt diagnosed in 9/2019 s/p stereotactic biopsy and ?resection at [facility] [place], s/p Temodar and RT which has been finished  - MRI performed on this admission with several areas of enhancement which as per discussion with neuro-onc is likely related to RT    - Per Neruo-oncologist at [facility] [place], patient was to be on Decadron 2mg BID  - Will decrease Decadron to 2mg in AM and 4mg in PM x3 days followed by 2mg BID for duration of rehab until seen outpt at [facility] [place]   - 40 IV Protonix QD for GI PPx   - Neuro recs appreciated

## 2020-01-14 NOTE — PROGRESS NOTE ADULT - PROBLEM SELECTOR PLAN 3
- likely related to GBM vs hyperglycemic state further compounded by tapered of AEDs  - VEEG: no epileptogenic foci  - MRI: see report above, some enhancement in certain regions with re-demonstration of mass -> discussed with [facility] neurooncology fellow -> likely related to post radiation therapy  - c/w keppra 500 BID   - c/w Decadron 4mg PO BID   - re: taper, patient was supposed to be on 2 BID dosing prior to admission -> will re-discuss with neurooncology regarding taper closer to discharge - In setting of recent chemo and RT w/ Decadron pt immunocompromised w/ reactive lymphocytes on initial presentation  -  Because pt pancytopenic w/ likely cause as recent chemo, CMV can also cause this decrease  - Will treat w/ Valcyte 900mg BID for 14 days (01/14/20 as day #1)   - Recheck CMV PCR in one week

## 2020-01-14 NOTE — PROGRESS NOTE ADULT - PROBLEM SELECTOR PLAN 4
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

## 2020-01-14 NOTE — CHART NOTE - NSCHARTNOTEFT_GEN_A_CORE
NUTRITION SERVICES     Upon Nutritional Assessment by the Registered Dietitian your patient was determined to meet criteria/ has evidence of the following diagnosis/diagnoses:  [x] Moderate Protein Calorie Malnutrition     Findings as based on:  •  Comprehensive nutritional assessment and consultation    Please refer to Initial Dietitian Evaluation or Nutrition Follow-up via documents section of [facility] for further recommendations. NUTRITION SERVICES     Upon Nutritional Assessment by the Registered Dietitian your patient was determined to meet criteria/ has evidence of the following diagnosis/diagnoses:    [x] Moderate Protein Calorie Malnutrition     Findings as based on:  •  Comprehensive nutritional assessment and consultation    Please refer to Initial Dietitian Evaluation or Nutrition Follow-up via documents section of [facility] for further recommendations.

## 2020-01-14 NOTE — CONSULT NOTE ADULT - ATTENDING COMMENTS
Camron Mcmahon  Attending Physician   Division of Infectious Disease  Pager #[phone]  After 5pm/weekend or no response, call #[phone]

## 2020-01-14 NOTE — DIETITIAN INITIAL EVALUATION ADULT. - OTHER INFO
Per chart review patient with medical history of GBM (chemo on hold due to low platelets) diagnosed in 9/19, HTN, HLD currently undergoing radiation therapy at [facility] [place] presenting with hyperglycemia. Patient noted to be Tajik speaking however, patient declined  service when offered and reports ability to communicate in English. Patient provided limited information, noted to be confused at times. No family at bedside. Spoke to RN. Per chart review patient with medical history of GBM (chemo on hold due to low platelets) diagnosed in 9/19, HTN, HLD currently undergoing radiation therapy at [facility] [place] presenting with hyperglycemia. Patient noted to be Faroese speaking however, patient declined  service when offered and reports ability to communicate in English. Patient provided limited information, noted to be confused at times. No family at bedside. Spoke to RN.    Patient denies any decreased PO intake/appetite prior to admission. NKFA. Per chart, Patient's wife mentioned patient sometimes reports water feels "stuck in throat." Patient denied any current chewing/swallowing issues. RN reports patient ate well at breakfast and lunch and had no swallowing difficulty.     Patient reports possible change in weight however unable to recall UBW. Per EMR patient with weight of 67.7# (9/27/19). Current admit weight 65.1kg (1/07/19). Indicates non-significant 4% weight loss in 4 months. No GI distress (nausea/vomiting/diarrhea/constipation) noted at this time. Patient with elevated Finger sticks in setting of steroid treatment + T2DM. Encouraged PO intake.

## 2020-01-14 NOTE — CONSULT NOTE ADULT - SUBJECTIVE AND OBJECTIVE BOX
ADDIS RIDER 69y Male  MRN-[id number]    Patient is a 69y old  Male who presents with a chief complaint of Seizures and Hyperglycemia (2020 07:56)      HPI:  Mr. Rider is a 69 year old English-speaking male with MHx of GBM (chemo on hold due to low platelets) diagnosed in , HTN, HLD currently [place] [facility] [place] with [place]cemia. Patient is A/O x 2 and drowsy from Ativan medication in the ER. Wife at bedside who is able to provide history. Reports for the past 2 weeks patient has been hyperglycemic between 200s to 300s which she believes its due to the Decadron. Reports has been on Decadron for some time. Most recent fingerstick was 352 which urged them to come to the ER last night. Otherwise has had no fevers/chills, cough, rhinorrhea, abdominal pain, nausea, vomiting or diarrhea. Has been physically declining the last couple weeks requiring to use his cane. Wife reports while in the ED had an episode of eyes rolling in the back of his head and generalized tonic-clonic like movements and was told patient had a seizure. Of note as per [employer] review patient was on Keppra 500mg BID but patient's neurologist recommended tapering off Keppra in 2019. Patient eats regular food however sometimes when he drinks water as per wife patient says sometimes it gets "stuck in his throat".     Of note patient was hospitalized between 19 to 19 for hyponatremia. Renal was consulted with plan for fluid restriction and salt tabs. Was discharged with home PT.     In the ED:  Vitals: 98.2F /75 HR 82 RR 16 SpO2 99% on RA  Events: Had a witnessed GTC seizure given Ativan and then a few hours later had another brief episode of seizure requiring Ativan    Labs: WBC 2.03 H/H 9.8/28.4 Plt 24 Na 130 BUN 24 ALT 48 Lactate 4.3  glu I  Imaging: CTH shows decreased attenuation, heterogeneity along the corpus callosum splenium and biparietal and right temporal lobes and left frontal lobe. CXR negative.   Interventions: 2.5L of NS, Ativan 2mg x 1, Ativan 1mg x 1 (2020 19:15)    Pt was seen by [facility] for pancytopenia issues and CMV PCR sent was positive     PAST MEDICAL & SURGICAL HISTORY:  Hyperlipidemia  Neoplasm of unspecified behavior of brain  Hypertension  Status post stereotactic brain biopsy  History of laryngoscopy  H/O colonoscopy      Allergies    No Known Allergies    Intolerances        ANTIMICROBIALS:  valGANciclovir 900 two times a day      MEDICATIONS  (STANDING):  atorvastatin 10 milliGRAM(s) Oral at bedtime  dexAMETHasone     Tablet 4 milliGRAM(s) Oral two times a day  dextrose 50% Injectable 12.5 Gram(s) IV Push once  dextrose 50% Injectable 25 Gram(s) IV Push once  dextrose 50% Injectable 25 Gram(s) IV Push once  insulin lispro (HumaLOG) corrective regimen sliding scale   SubCutaneous three times a day before meals  insulin lispro (HumaLOG) corrective regimen sliding scale   SubCutaneous at bedtime  insulin NPH human recombinant 7 Unit(s) SubCutaneous two times a day  levETIRAcetam 500 milliGRAM(s) Oral two times a day  melatonin 3 milliGRAM(s) Oral at bedtime  pantoprazole    Tablet 40 milliGRAM(s) Oral before breakfast  polyethylene glycol 3350 17 Gram(s) Oral daily  senna 2 Tablet(s) Oral at bedtime  sodium chloride 2 Gram(s) Oral three times a day  valGANciclovir 900 milliGRAM(s) Oral two times a day      Social History  Smoking: no  Etoh: no  Drug use: no      FAMILY HISTORY:  FH: myocardial infarction      Vital Signs Last 24 Hrs  T(C): 36.5 (2020 15:17), Max: 36.8 (2020 03:20)  T(F): 97.7 (2020 15:17), Max: 98.2 (2020 03:20)  HR: 89 (2020 15:17) (71 - 92)  BP: 113/66 (2020 15:17) (110/70 - 125/80)  BP(mean): --  RR: 17 (2020 15:17) (16 - 18)  SpO2: 100% (2020 15:17) (99% - 100%)    CBC Full  -  ( 2020 06:25 )  WBC Count : 2.13 K/uL  RBC Count : 3.01 M/uL  Hemoglobin : 9.3 g/dL  Hematocrit : 28.1 %  Platelet Count - Automated : 33 K/uL  Mean Cell Volume : 93.4 fL  Mean Cell Hemoglobin : 30.9 pg  Mean Cell Hemoglobin Concentration : 33.1 %  Auto Neutrophil # : 1.16 K/uL  Auto Lymphocyte # : 0.62 K/uL  Auto Monocyte # : 0.25 K/uL  Auto Eosinophil # : 0.00 K/uL  Auto Basophil # : 0.01 K/uL  Auto Neutrophil % : 54.5 %  Auto Lymphocyte % : 29.1 %  Auto Monocyte % : 11.7 %  Auto Eosinophil % : 0.0 %  Auto Basophil % : 0.5 %        132<L>  |  98  |  20  ----------------------------<  228<H>  3.9   |  23  |  0.41<L>    Ca    8.1<L>      2020 06:25  Phos  3.2       Mg     1.8         TPro  5.2<L>  /  Alb  2.7<L>  /  TBili  0.4  /  DBili  x   /  AST  17  /  ALT  31  /  AlkPhos  58      LIVER FUNCTIONS - ( 2020 06:38 )  Alb: 2.7 g/dL / Pro: 5.2 g/dL / ALK PHOS: 58 u/L / ALT: 31 u/L / AST: 17 u/L / GGT: x               MICROBIOLOGY:  URINE MIDSTREAM  20 --  --  --    CMVPCR Lo.06 ( @ 06:15)    HIV-1/2 Ag/Ab Combo (20 @ 06:25)    HIV Combo Result: NonReact: If patient is suspected to be at risk and/or in the  diagnostic window period, then consider subsequent HIV  retesting with new sample.    Cytomegalovirus By PCR (20 @ 06:15)    CMVPCR Lo.06: INFCE Result Units: Mdk32GA/mL  Assay lower limit of detection (LOD):  31.2 IU/mL (1.49 log10/mL)  Assay dynamic range:  50 to 156,000,000 (156M) CMV DNA IU/mL (1.70 log10/mL –  8.19 log10/mL)  Plasma CMV DNA Quantification by PCR using Abbott m2000:  The results of this test should be interpreted with  consideration of all clinical and laboratory findings. In  particular, caution should be used when interpreting low  level positive results when the test is used for  diagnostic purposes. Repeat testing on an additional  sample is recommended to confirm low level positive  results. A result of "Not Detected" does not preclude the  possibility of infection with CMV.  CMV DNA may be present  below the detection limit of assay.      RADIOLOGY  MR Head w/wo IV Cont (01.10.20 @ 09:22) >  Heterogeneously enhancing partially necrotic infiltrative mass within the splenium of the corpus callosum without significant interval change from the prior exam with associated subependymoma spread of tumor.    Areas of tumor infiltration above the left temporal horn and within the left frontal lobe which have increased in size. Tumor within the mesial left parietal lobe is slightly more prominent and demonstrates increased enhancement at this time.      Xray Chest 1 View- PORTABLE-Urgent (20 @ 02:55) >  Suboptimally inflated but otherwise clear lungs. No pleural effusions or pneumothorax.    Heart size inaccurately assessed on this projection. Scant aortic arch calcifications again noted.     Trachea midline.    Unremarkable osseous structures.

## 2020-01-14 NOTE — DIETITIAN INITIAL EVALUATION ADULT. - PERTINENT MEDS FT
atorvastatin  bisacodyl Suppository PRN  dexAMETHasone     Tablet  insulin lispro (HumaLOG) corrective regimen sliding scale  insulin NPH human recombinant  levETIRAcetam  melatonin  pantoprazole    Tablet  polyethylene glycol 3350  senna  sodium chloride  valGANciclovir

## 2020-01-14 NOTE — PROGRESS NOTE ADULT - SUBJECTIVE AND OBJECTIVE BOX
Radha Oshea, PGY-1  Internal Medicine  Pager: [phone] (NS) / [phone] ([facility])    Patient is a 69y old  Male who presents with a chief complaint of Seizures and Hyperglycemia (10 Charles 2020 08:11)    SUBJECTIVE / OVERNIGHT EVENTS:        MEDICATIONS  (STANDING):  atorvastatin 10 milliGRAM(s) Oral at bedtime  dexAMETHasone     Tablet 4 milliGRAM(s) Oral two times a day  dextrose 5%. 1000 milliLiter(s) (50 mL/Hr) IV Continuous <Continuous>  dextrose 50% Injectable 12.5 Gram(s) IV Push once  dextrose 50% Injectable 25 Gram(s) IV Push once  dextrose 50% Injectable 25 Gram(s) IV Push once  insulin lispro (HumaLOG) corrective regimen sliding scale   SubCutaneous three times a day before meals  insulin lispro (HumaLOG) corrective regimen sliding scale   SubCutaneous at bedtime  insulin NPH human recombinant 5 Unit(s) SubCutaneous two times a day  levETIRAcetam 500 milliGRAM(s) Oral two times a day  melatonin 3 milliGRAM(s) Oral at bedtime  pantoprazole  Injectable 40 milliGRAM(s) IV Push daily  polyethylene glycol 3350 17 Gram(s) Oral daily  senna 2 Tablet(s) Oral at bedtime  sodium chloride 2 Gram(s) Oral three times a day    MEDICATIONS  (PRN):  bisacodyl Suppository 10 milliGRAM(s) Rectal daily PRN Constipation  dextrose 40% Gel 15 Gram(s) Oral once PRN Blood Glucose LESS THAN 70 milliGRAM(s)/deciliter  glucagon  Injectable 1 milliGRAM(s) IntraMuscular once PRN Glucose LESS THAN 70 milligrams/deciliter    CAPILLARY BLOOD GLUCOSE  POCT Blood Glucose.: 225 mg/dL (14 Jan 2020 05:49)  POCT Blood Glucose.: 299 mg/dL (13 Jan 2020 22:18)  POCT Blood Glucose.: 244 mg/dL (13 Jan 2020 17:56)  POCT Blood Glucose.: 268 mg/dL (13 Jan 2020 13:06)  POCT Blood Glucose.: 171 mg/dL (13 Jan 2020 09:01)    I&O's Summary  13 Jan 2020 07:01  -  14 Jan 2020 07:00  --------------------------------------------------------  IN: 350 mL / OUT: 350 mL / NET: 0 mL    PHYSICAL EXAM  GENERAL: NAD, well-developed elderly male   HEAD:  Atraumatic, Normocephalic  EYES: EOMI, PERRL  NECK: Supple, No JVD  CHEST/LUNG: Clear to auscultation bilaterally; No wheezes, rales or rhonchi   HEART: Regular rate and rhythm; No murmur  ABDOMEN: Soft, Nontender, Nondistended; Bowel sounds present  EXTREMITIES:  2+ Peripheral Pulses, No clubbing, cyanosis, or edema  PSYCH: AAOx3  SKIN: No rashes or lesions  Neuro: Alert to self and place. pupils are both reactive to light. 5/5 strength in UE and moving lower extremities with ease against gravity -- not entirely cooperative with strength testing.     LABS:      RADIOLOGY & ADDITIONAL TESTS:    MR Head w/wo IV Cont (01.10.20 @ 09:22)  FINDINGS:    VENTRICLES AND SULCI:  Overall increased in size compatible with progression of cerebral volume loss.    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 Surrounding T2 hyperintensity and internal hemorrhage is again seen with increased susceptibility artifact since the prior exam likely due to increased hemosiderin deposition possibly from interval surgery. There is subependymal tumor spread extending along the occipital horn on the right and along the medial aspect of the right temporal horn as seen previously. Enhancement of the lateral wall of the temporal horn and right frontal horn has mildly decreased from the prior exam.    A satellite mass is seen along the superior aspect of the left temporal horn which has enlarged measuring 2 x 1.4 cm as compared with 1.5 x 0.7 cm. A mass within the left frontal lobe below the frontal horn is larger measuring 1.7 x 1.5 cm as compared with 0.5 x 0.5 cm, in retrospect. An area of gyral swelling and increased T2 signal within the mesial left parietal lobe is mildly more prominent likely representing increasing tumor. Increased enhancement is seen in this area this time.    EXTRA-AXIAL:  No mass or collection.  VISUALIZED SINUSES:  Normal.  VISUALIZED MASTOIDS:  Clear.  CALVARIUM:  Postoperative changes on the right.  CAROTID FLOW VOIDS:  Present.  MISCELLANEOUS:  Intraocular lens implants    IMPRESSION:    Heterogeneously enhancing partially necrotic infiltrative mass within the splenium of the corpus callosum without significant interval change from the prior exam with associated subependymoma spread of tumor.    Areas of tumor infiltration above the left temporal horn and within the left frontal lobe which have increased in size. Tumor within the mesial left parietal lobe is slightly more prominent and demonstrates increased enhancement at this time.    24 hr VEEG  Clinical Impression:  Moderate diffuse or multifocal cerebral dysfunction, not specific as to etiology.  There were no epileptiform abnormalities recorded. Radha Oshea, PGY-1  Internal Medicine  Pager: [phone] (NS) / [phone] ([facility])    Patient is a 69y old  Male who presents with a chief complaint of Seizures and Hyperglycemia (10 Charles 2020 08:11)    SUBJECTIVE / OVERNIGHT EVENTS:        MEDICATIONS  (STANDING):  atorvastatin 10 milliGRAM(s) Oral at bedtime  dexAMETHasone     Tablet 4 milliGRAM(s) Oral two times a day  dextrose 5%. 1000 milliLiter(s) (50 mL/Hr) IV Continuous <Continuous>  dextrose 50% Injectable 12.5 Gram(s) IV Push once  dextrose 50% Injectable 25 Gram(s) IV Push once  dextrose 50% Injectable 25 Gram(s) IV Push once  insulin lispro (HumaLOG) corrective regimen sliding scale   SubCutaneous three times a day before meals  insulin lispro (HumaLOG) corrective regimen sliding scale   SubCutaneous at bedtime  insulin NPH human recombinant 5 Unit(s) SubCutaneous two times a day  levETIRAcetam 500 milliGRAM(s) Oral two times a day  melatonin 3 milliGRAM(s) Oral at bedtime  pantoprazole  Injectable 40 milliGRAM(s) IV Push daily  polyethylene glycol 3350 17 Gram(s) Oral daily  senna 2 Tablet(s) Oral at bedtime  sodium chloride 2 Gram(s) Oral three times a day    MEDICATIONS  (PRN):  bisacodyl Suppository 10 milliGRAM(s) Rectal daily PRN Constipation  dextrose 40% Gel 15 Gram(s) Oral once PRN Blood Glucose LESS THAN 70 milliGRAM(s)/deciliter  glucagon  Injectable 1 milliGRAM(s) IntraMuscular once PRN Glucose LESS THAN 70 milligrams/deciliter    CAPILLARY BLOOD GLUCOSE  POCT Blood Glucose.: 225 mg/dL (14 Jan 2020 05:49)  POCT Blood Glucose.: 299 mg/dL (13 Jan 2020 22:18)  POCT Blood Glucose.: 244 mg/dL (13 Jan 2020 17:56)  POCT Blood Glucose.: 268 mg/dL (13 Jan 2020 13:06)  POCT Blood Glucose.: 171 mg/dL (13 Jan 2020 09:01)    I&O's Summary  13 Jan 2020 07:01  -  14 Jan 2020 07:00  --------------------------------------------------------  IN: 350 mL / OUT: 350 mL / NET: 0 mL    PHYSICAL EXAM  GENERAL: NAD, well-developed elderly male   HEAD:  Atraumatic, Normocephalic  EYES: EOMI, PERRL  NECK: Supple, No JVD  CHEST/LUNG: Clear to auscultation bilaterally; No wheezes, rales or rhonchi   HEART: Regular rate and rhythm; No murmur  ABDOMEN: Soft, Nontender, Nondistended; Bowel sounds present  EXTREMITIES:  2+ Peripheral Pulses, No clubbing, cyanosis, or edema  PSYCH: AAOx3  SKIN: No rashes or lesions  Neuro: Alert to self and place. pupils are both reactive to light. 5/5 strength in UE and moving lower extremities with ease against gravity -- not entirely cooperative with strength testing.     LABS:             9.3    2.13  )-----------( 33       ( 14 Jan 2020 06:25 )             28.1     01-14    132<L>  |  98  |  20  ----------------------------<  228<H>  3.9   |  23  |  0.41<L>    Ca    8.1<L>      14 Jan 2020 06:25  Phos  3.2     01-14  Mg     1.8     01-14    TPro  5.2<L>  /  Alb  2.7<L>  /  TBili  0.4  /  DBili  x   /  AST  17  /  ALT  31  /  AlkPhos  58  01-13    LIVER FUNCTIONS - ( 13 Jan 2020 06:38 )  Alb: 2.7 g/dL / Pro: 5.2 g/dL / ALK PHOS: 58 u/L / ALT: 31 u/L / AST: 17 u/L / GGT: x           RADIOLOGY & ADDITIONAL TESTS:    MR Head w/wo IV Cont (01.10.20 @ 09:22)  FINDINGS:    VENTRICLES AND SULCI:  Overall increased in size compatible with progression of cerebral volume loss.    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 Surrounding T2 hyperintensity and internal hemorrhage is again seen with increased susceptibility artifact since the prior exam likely due to increased hemosiderin deposition possibly from interval surgery. There is subependymal tumor spread extending along the occipital horn on the right and along the medial aspect of the right temporal horn as seen previously. Enhancement of the lateral wall of the temporal horn and right frontal horn has mildly decreased from the prior exam.    A satellite mass is seen along the superior aspect of the left temporal horn which has enlarged measuring 2 x 1.4 cm as compared with 1.5 x 0.7 cm. A mass within the left frontal lobe below the frontal horn is larger measuring 1.7 x 1.5 cm as compared with 0.5 x 0.5 cm, in retrospect. An area of gyral swelling and increased T2 signal within the mesial left parietal lobe is mildly more prominent likely representing increasing tumor. Increased enhancement is seen in this area this time.    EXTRA-AXIAL:  No mass or collection.  VISUALIZED SINUSES:  Normal.  VISUALIZED MASTOIDS:  Clear.  CALVARIUM:  Postoperative changes on the right.  CAROTID FLOW VOIDS:  Present.  MISCELLANEOUS:  Intraocular lens implants    IMPRESSION:    Heterogeneously enhancing partially necrotic infiltrative mass within the splenium of the corpus callosum without significant interval change from the prior exam with associated subependymoma spread of tumor.    Areas of tumor infiltration above the left temporal horn and within the left frontal lobe which have increased in size. Tumor within the mesial left parietal lobe is slightly more prominent and demonstrates increased enhancement at this time.    24 hr VEEG  Clinical Impression:  Moderate diffuse or multifocal cerebral dysfunction, not specific as to etiology.  There were no epileptiform abnormalities recorded. Radha Oshea, PGY-1  Internal Medicine  Pager: [phone] (NS) / [phone] ([facility])    Patient is a 69y old  Male who presents with a chief complaint of Seizures and Hyperglycemia (10 Charles 2020 08:11)    SUBJECTIVE / OVERNIGHT EVENTS:    Patient seen and examined at bedside. Denies any c/o HA, abd pain, N/V, constipation or diarrhea. A&Ox2.     MEDICATIONS  (STANDING):  atorvastatin 10 milliGRAM(s) Oral at bedtime  dexAMETHasone     Tablet 4 milliGRAM(s) Oral two times a day  dextrose 5%. 1000 milliLiter(s) (50 mL/Hr) IV Continuous <Continuous>  dextrose 50% Injectable 12.5 Gram(s) IV Push once  dextrose 50% Injectable 25 Gram(s) IV Push once  dextrose 50% Injectable 25 Gram(s) IV Push once  insulin lispro (HumaLOG) corrective regimen sliding scale   SubCutaneous three times a day before meals  insulin lispro (HumaLOG) corrective regimen sliding scale   SubCutaneous at bedtime  insulin NPH human recombinant 5 Unit(s) SubCutaneous two times a day  levETIRAcetam 500 milliGRAM(s) Oral two times a day  melatonin 3 milliGRAM(s) Oral at bedtime  pantoprazole  Injectable 40 milliGRAM(s) IV Push daily  polyethylene glycol 3350 17 Gram(s) Oral daily  senna 2 Tablet(s) Oral at bedtime  sodium chloride 2 Gram(s) Oral three times a day    MEDICATIONS  (PRN):  bisacodyl Suppository 10 milliGRAM(s) Rectal daily PRN Constipation  dextrose 40% Gel 15 Gram(s) Oral once PRN Blood Glucose LESS THAN 70 milliGRAM(s)/deciliter  glucagon  Injectable 1 milliGRAM(s) IntraMuscular once PRN Glucose LESS THAN 70 milligrams/deciliter    CAPILLARY BLOOD GLUCOSE  POCT Blood Glucose.: 225 mg/dL (14 Jan 2020 05:49)  POCT Blood Glucose.: 299 mg/dL (13 Jan 2020 22:18)  POCT Blood Glucose.: 244 mg/dL (13 Jan 2020 17:56)  POCT Blood Glucose.: 268 mg/dL (13 Jan 2020 13:06)  POCT Blood Glucose.: 171 mg/dL (13 Jan 2020 09:01)    I&O's Summary  13 Jan 2020 07:01  -  14 Jan 2020 07:00  --------------------------------------------------------  IN: 350 mL / OUT: 350 mL / NET: 0 mL    PHYSICAL EXAM  GENERAL: NAD, well-developed elderly male   HEAD:  Atraumatic, Normocephalic  EYES: EOMI, PERRL  NECK: Supple, No JVD  CHEST/LUNG: Clear to auscultation bilaterally; No wheezes, rales or rhonchi   HEART: Regular rate and rhythm; No murmur  ABDOMEN: Soft, Nontender, Nondistended; Bowel sounds present  EXTREMITIES:  2+ Peripheral Pulses, No clubbing, cyanosis, or edema  PSYCH: AAOx3  SKIN: No rashes or lesions  Neuro: Alert to self and place. pupils are both reactive to light. 5/5 strength in UE and moving lower extremities with ease against gravity -- not entirely cooperative with strength testing.     LABS:             9.3    2.13  )-----------( 33       ( 14 Jan 2020 06:25 )             28.1     01-14    132<L>  |  98  |  20  ----------------------------<  228<H>  3.9   |  23  |  0.41<L>    Ca    8.1<L>      14 Jan 2020 06:25  Phos  3.2     01-14  Mg     1.8     01-14    TPro  5.2<L>  /  Alb  2.7<L>  /  TBili  0.4  /  DBili  x   /  AST  17  /  ALT  31  /  AlkPhos  58  01-13    LIVER FUNCTIONS - ( 13 Jan 2020 06:38 )  Alb: 2.7 g/dL / Pro: 5.2 g/dL / ALK PHOS: 58 u/L / ALT: 31 u/L / AST: 17 u/L / GGT: x           RADIOLOGY & ADDITIONAL TESTS:    MR Head w/wo IV Cont (01.10.20 @ 09:22)  FINDINGS:    VENTRICLES AND SULCI:  Overall increased in size compatible with progression of cerebral volume loss.    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 Surrounding T2 hyperintensity and internal hemorrhage is again seen with increased susceptibility artifact since the prior exam likely due to increased hemosiderin deposition possibly from interval surgery. There is subependymal tumor spread extending along the occipital horn on the right and along the medial aspect of the right temporal horn as seen previously. Enhancement of the lateral wall of the temporal horn and right frontal horn has mildly decreased from the prior exam.    A satellite mass is seen along the superior aspect of the left temporal horn which has enlarged measuring 2 x 1.4 cm as compared with 1.5 x 0.7 cm. A mass within the left frontal lobe below the frontal horn is larger measuring 1.7 x 1.5 cm as compared with 0.5 x 0.5 cm, in retrospect. An area of gyral swelling and increased T2 signal within the mesial left parietal lobe is mildly more prominent likely representing increasing tumor. Increased enhancement is seen in this area this time.    EXTRA-AXIAL:  No mass or collection.  VISUALIZED SINUSES:  Normal.  VISUALIZED MASTOIDS:  Clear.  CALVARIUM:  Postoperative changes on the right.  CAROTID FLOW VOIDS:  Present.  MISCELLANEOUS:  Intraocular lens implants    IMPRESSION:    Heterogeneously enhancing partially necrotic infiltrative mass within the splenium of the corpus callosum without significant interval change from the prior exam with associated subependymoma spread of tumor.    Areas of tumor infiltration above the left temporal horn and within the left frontal lobe which have increased in size. Tumor within the mesial left parietal lobe is slightly more prominent and demonstrates increased enhancement at this time.    24 hr VEEG  Clinical Impression:  Moderate diffuse or multifocal cerebral dysfunction, not specific as to etiology.  There were no epileptiform abnormalities recorded. Radha Oshea, PGY-1  Internal Medicine  Pager: [phone] (NS) / [phone] ([facility])    Patient is a 69y old  Male who presents with a chief complaint of Seizures and Hyperglycemia (10 Charles 2020 08:11)    SUBJECTIVE / OVERNIGHT EVENTS:    Patient seen and examined at bedside. Denies any c/o HA, abd pain, N/V, constipation or diarrhea. A&Ox2.     MEDICATIONS  (STANDING):  atorvastatin 10 milliGRAM(s) Oral at bedtime  dexAMETHasone     Tablet 4 milliGRAM(s) Oral two times a day  dextrose 5%. 1000 milliLiter(s) (50 mL/Hr) IV Continuous <Continuous>  dextrose 50% Injectable 12.5 Gram(s) IV Push once  dextrose 50% Injectable 25 Gram(s) IV Push once  dextrose 50% Injectable 25 Gram(s) IV Push once  insulin lispro (HumaLOG) corrective regimen sliding scale   SubCutaneous three times a day before meals  insulin lispro (HumaLOG) corrective regimen sliding scale   SubCutaneous at bedtime  insulin NPH human recombinant 5 Unit(s) SubCutaneous two times a day  levETIRAcetam 500 milliGRAM(s) Oral two times a day  melatonin 3 milliGRAM(s) Oral at bedtime  pantoprazole  Injectable 40 milliGRAM(s) IV Push daily  polyethylene glycol 3350 17 Gram(s) Oral daily  senna 2 Tablet(s) Oral at bedtime  sodium chloride 2 Gram(s) Oral three times a day    MEDICATIONS  (PRN):  bisacodyl Suppository 10 milliGRAM(s) Rectal daily PRN Constipation  dextrose 40% Gel 15 Gram(s) Oral once PRN Blood Glucose LESS THAN 70 milliGRAM(s)/deciliter  glucagon  Injectable 1 milliGRAM(s) IntraMuscular once PRN Glucose LESS THAN 70 milligrams/deciliter    CAPILLARY BLOOD GLUCOSE  POCT Blood Glucose.: 225 mg/dL (2020 05:49)  POCT Blood Glucose.: 299 mg/dL (2020 22:18)  POCT Blood Glucose.: 244 mg/dL (2020 17:56)  POCT Blood Glucose.: 268 mg/dL (2020 13:06)  POCT Blood Glucose.: 171 mg/dL (2020 09:01)    I&O's Summary  2020 07:01  -  2020 07:00  --------------------------------------------------------  IN: 350 mL / OUT: 350 mL / NET: 0 mL    PHYSICAL EXAM  GENERAL: NAD, well-developed elderly male   HEAD:  Atraumatic, Normocephalic  EYES: EOMI, PERRL  NECK: Supple, No JVD  CHEST/LUNG: Clear to auscultation bilaterally; No wheezes, rales or rhonchi   HEART: Regular rate and rhythm; No murmur  ABDOMEN: Soft, Nontender, Nondistended; Bowel sounds present  EXTREMITIES:  2+ Peripheral Pulses, No clubbing, cyanosis, or edema  PSYCH: AAOx3  SKIN: No rashes or lesions  Neuro: Alert to self and place. pupils are both reactive to light. 5/5 strength in UE and moving lower extremities with ease against gravity -- not entirely cooperative with strength testing.     LABS:             9.3    2.13  )-----------( 33       ( 2020 06:25 )             28.1         132<L>  |  98  |  20  ----------------------------<  228<H>  3.9   |  23  |  0.41<L>    Ca    8.1<L>      2020 06:25  Phos  3.2       Mg     1.8         TPro  5.2<L>  /  Alb  2.7<L>  /  TBili  0.4  /  DBili  x   /  AST  17  /  ALT  31  /  AlkPhos  58  01-13    LIVER FUNCTIONS - ( 2020 06:38 )  Alb: 2.7 g/dL / Pro: 5.2 g/dL / ALK PHOS: 58 u/L / ALT: 31 u/L / AST: 17 u/L / GGT: x           Cytomegalovirus By PCR (20 @ 06:15)    CMVPCR Lo.06: INFCE Result Units: [id number]/mL  Assay lower limit of detection (LOD):  31.2 IU/mL (1.49 log10/mL)  Assay dynamic range:  50 to 156,000,000 (156M) CMV DNA IU/mL (1.70 log10/mL –  8.19 log10/mL)  Plasma CMV DNA Quantification by PCR using Abbott m2000:  The results of this test should be interpreted with  consideration of all clinical and laboratory findings. In  particular, caution should be used when interpreting low  level positive results when the test is used for  diagnostic purposes. Repeat testing on an additional  sample is recommended to confirm low level positive  results. A result of "Not Detected" does not preclude the  possibility of infection with CMV.  CMV DNA may be present  below the detection limit of assay.    RADIOLOGY & ADDITIONAL TESTS:    MR Head w/wo IV Cont (01.10.20 @ 09:22)  FINDINGS:    VENTRICLES AND SULCI:  Overall increased in size compatible with progression of cerebral volume loss.    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 Surrounding T2 hyperintensity and internal hemorrhage is again seen with increased susceptibility artifact since the prior exam likely due to increased hemosiderin deposition possibly from interval surgery. There is subependymal tumor spread extending along the occipital horn on the right and along the medial aspect of the right temporal horn as seen previously. Enhancement of the lateral wall of the temporal horn and right frontal horn has mildly decreased from the prior exam.    A satellite mass is seen along the superior aspect of the left temporal horn which has enlarged measuring 2 x 1.4 cm as compared with 1.5 x 0.7 cm. A mass within the left frontal lobe below the frontal horn is larger measuring 1.7 x 1.5 cm as compared with 0.5 x 0.5 cm, in retrospect. An area of gyral swelling and increased T2 signal within the mesial left parietal lobe is mildly more prominent likely representing increasing tumor. Increased enhancement is seen in this area this time.    EXTRA-AXIAL:  No mass or collection.  VISUALIZED SINUSES:  Normal.  VISUALIZED MASTOIDS:  Clear.  CALVARIUM:  Postoperative changes on the right.  CAROTID FLOW VOIDS:  Present.  MISCELLANEOUS:  Intraocular lens implants    IMPRESSION:    Heterogeneously enhancing partially necrotic infiltrative mass within the splenium of the corpus callosum without significant interval change from the prior exam with associated subependymoma spread of tumor.    Areas of tumor infiltration above the left temporal horn and within the left frontal lobe which have increased in size. Tumor within the mesial left parietal lobe is slightly more prominent and demonstrates increased enhancement at this time.    24 hr VEEG  Clinical Impression:  Moderate diffuse or multifocal cerebral dysfunction, not specific as to etiology.  There were no epileptiform abnormalities recorded.

## 2020-01-15 LAB
ALBUMIN SERPL ELPH-MCNC: 2.8 G/DL — LOW (ref 3.3–5)
ALP SERPL-CCNC: 60 U/L — SIGNIFICANT CHANGE UP (ref 40–120)
ALT FLD-CCNC: 29 U/L — SIGNIFICANT CHANGE UP (ref 4–41)
ANA TITR SER: NEGATIVE — SIGNIFICANT CHANGE UP
ANION GAP SERPL CALC-SCNC: 12 MMO/L — SIGNIFICANT CHANGE UP (ref 7–14)
AST SERPL-CCNC: 13 U/L — SIGNIFICANT CHANGE UP (ref 4–40)
BASOPHILS # BLD AUTO: 0.01 K/UL — SIGNIFICANT CHANGE UP (ref 0–0.2)
BASOPHILS NFR BLD AUTO: 0.4 % — SIGNIFICANT CHANGE UP (ref 0–2)
BILIRUB SERPL-MCNC: 0.4 MG/DL — SIGNIFICANT CHANGE UP (ref 0.2–1.2)
BUN SERPL-MCNC: 23 MG/DL — SIGNIFICANT CHANGE UP (ref 7–23)
CALCIUM SERPL-MCNC: 8.3 MG/DL — LOW (ref 8.4–10.5)
CHLORIDE SERPL-SCNC: 97 MMOL/L — LOW (ref 98–107)
CO2 SERPL-SCNC: 22 MMOL/L — SIGNIFICANT CHANGE UP (ref 22–31)
CREAT SERPL-MCNC: 0.43 MG/DL — LOW (ref 0.5–1.3)
EOSINOPHIL # BLD AUTO: 0 K/UL — SIGNIFICANT CHANGE UP (ref 0–0.5)
EOSINOPHIL NFR BLD AUTO: 0 % — SIGNIFICANT CHANGE UP (ref 0–6)
GLUCOSE SERPL-MCNC: 202 MG/DL — HIGH (ref 70–99)
HCT VFR BLD CALC: 27.7 % — LOW (ref 39–50)
HGB BLD-MCNC: 9.4 G/DL — LOW (ref 13–17)
IMM GRANULOCYTES NFR BLD AUTO: 6.1 % — HIGH (ref 0–1.5)
LYMPHOCYTES # BLD AUTO: 0.74 K/UL — LOW (ref 1–3.3)
LYMPHOCYTES # BLD AUTO: 30.1 % — SIGNIFICANT CHANGE UP (ref 13–44)
MAGNESIUM SERPL-MCNC: 1.8 MG/DL — SIGNIFICANT CHANGE UP (ref 1.6–2.6)
MANUAL SMEAR VERIFICATION: SIGNIFICANT CHANGE UP
MCHC RBC-ENTMCNC: 31.4 PG — SIGNIFICANT CHANGE UP (ref 27–34)
MCHC RBC-ENTMCNC: 33.9 % — SIGNIFICANT CHANGE UP (ref 32–36)
MCV RBC AUTO: 92.6 FL — SIGNIFICANT CHANGE UP (ref 80–100)
MONOCYTES # BLD AUTO: 0.31 K/UL — SIGNIFICANT CHANGE UP (ref 0–0.9)
MONOCYTES NFR BLD AUTO: 12.6 % — SIGNIFICANT CHANGE UP (ref 2–14)
NEUTROPHILS # BLD AUTO: 1.25 K/UL — LOW (ref 1.8–7.4)
NEUTROPHILS NFR BLD AUTO: 50.8 % — SIGNIFICANT CHANGE UP (ref 43–77)
NRBC # FLD: 0.06 K/UL — SIGNIFICANT CHANGE UP (ref 0–0)
NRBC FLD-RTO: 2.4 — SIGNIFICANT CHANGE UP
PHOSPHATE SERPL-MCNC: 3.4 MG/DL — SIGNIFICANT CHANGE UP (ref 2.5–4.5)
PLATELET # BLD AUTO: 32 K/UL — LOW (ref 150–400)
PMV BLD: 10.3 FL — SIGNIFICANT CHANGE UP (ref 7–13)
POTASSIUM SERPL-MCNC: 4 MMOL/L — SIGNIFICANT CHANGE UP (ref 3.5–5.3)
POTASSIUM SERPL-SCNC: 4 MMOL/L — SIGNIFICANT CHANGE UP (ref 3.5–5.3)
PROT SERPL-MCNC: 5.4 G/DL — LOW (ref 6–8.3)
RBC # BLD: 2.99 M/UL — LOW (ref 4.2–5.8)
RBC # FLD: 16.7 % — HIGH (ref 10.3–14.5)
SODIUM SERPL-SCNC: 131 MMOL/L — LOW (ref 135–145)
WBC # BLD: 2.46 K/UL — LOW (ref 3.8–10.5)
WBC # FLD AUTO: 2.46 K/UL — LOW (ref 3.8–10.5)

## 2020-01-15 PROCEDURE — 99232 SBSQ HOSP IP/OBS MODERATE 35: CPT

## 2020-01-15 PROCEDURE — 99232 SBSQ HOSP IP/OBS MODERATE 35: CPT | Mod: GC

## 2020-01-15 RX ADMIN — LEVETIRACETAM 500 MILLIGRAM(S): 250 TABLET, FILM COATED ORAL at 06:15

## 2020-01-15 RX ADMIN — Medication 3 MILLIGRAM(S): at 21:45

## 2020-01-15 RX ADMIN — HUMAN INSULIN 7 UNIT(S): 100 INJECTION, SUSPENSION SUBCUTANEOUS at 06:15

## 2020-01-15 RX ADMIN — LEVETIRACETAM 500 MILLIGRAM(S): 250 TABLET, FILM COATED ORAL at 18:34

## 2020-01-15 RX ADMIN — VALGANCICLOVIR 900 MILLIGRAM(S): 450 TABLET, FILM COATED ORAL at 06:15

## 2020-01-15 RX ADMIN — HUMAN INSULIN 7 UNIT(S): 100 INJECTION, SUSPENSION SUBCUTANEOUS at 18:36

## 2020-01-15 RX ADMIN — SODIUM CHLORIDE 2 GRAM(S): 9 INJECTION INTRAMUSCULAR; INTRAVENOUS; SUBCUTANEOUS at 13:19

## 2020-01-15 RX ADMIN — PANTOPRAZOLE SODIUM 40 MILLIGRAM(S): 20 TABLET, DELAYED RELEASE ORAL at 06:15

## 2020-01-15 RX ADMIN — VALGANCICLOVIR 900 MILLIGRAM(S): 450 TABLET, FILM COATED ORAL at 18:34

## 2020-01-15 RX ADMIN — Medication 6: at 13:17

## 2020-01-15 RX ADMIN — Medication 6: at 18:37

## 2020-01-15 RX ADMIN — ATORVASTATIN CALCIUM 10 MILLIGRAM(S): 80 TABLET, FILM COATED ORAL at 21:45

## 2020-01-15 RX ADMIN — SODIUM CHLORIDE 2 GRAM(S): 9 INJECTION INTRAMUSCULAR; INTRAVENOUS; SUBCUTANEOUS at 21:45

## 2020-01-15 RX ADMIN — SENNA PLUS 2 TABLET(S): 8.6 TABLET ORAL at 21:45

## 2020-01-15 RX ADMIN — Medication 4 MILLIGRAM(S): at 06:15

## 2020-01-15 RX ADMIN — SODIUM CHLORIDE 2 GRAM(S): 9 INJECTION INTRAMUSCULAR; INTRAVENOUS; SUBCUTANEOUS at 06:15

## 2020-01-15 RX ADMIN — Medication 2 MILLIGRAM(S): at 18:34

## 2020-01-15 NOTE — PROGRESS NOTE ADULT - PROBLEM SELECTOR PLAN 3
- In setting of recent chemo and RT w/ Decadron pt immunocompromised w/ reactive lymphocytes on initial presentation  -  Because pt pancytopenic w/ likely cause as recent chemo, CMV can also cause this decrease  - Will treat w/ Valcyte 900mg BID for 14 days (01/14/20 as day #1)   - Recheck CMV PCR in one week - In setting of recent chemo and RT w/ Decadron pt immunocompromised w/ reactive lymphocytes on initial presentation  - May cause pancytopenia (though pancytopenia may likely relate to chemo)  - c/w Valcyte 900mg BID for 14 days (01/14/20 as day #1)   - Recheck CMV PCR in one week

## 2020-01-15 NOTE — PROGRESS NOTE ADULT - ASSESSMENT
70 yo Serbian-speaking M w/ PMH of GBM diagnosed in 9/19, HTN, HLD currently undergoing radiation therapy at [facility] a/w hyperglycemia likely 2/2 to steroid-induced from Decadron c/b new onset seizures in ED x2

## 2020-01-15 NOTE — PROGRESS NOTE ADULT - PROBLEM SELECTOR PLAN 9
#10 Prophylactic Measure  DVT PPx: Thrombocytopenic so SCDs  Diet: consistent carb diet as tolerated   Dispo: PT recommending rest[facility]ative rehab    Transitions of Care Status:  1.  Name of PCP: Dr. Hinson  2.  PCP Contacted on Admission: [ ] Y    [X ] N    3.  PCP contacted at Discharge: [ ] Y    [ ] N    [ ] N/A  4.  Post-Discharge Appointment Date and Location:  5.  Summary of Handoff given to PCP: #10 Prophylactic Measure  DVT PPx: Thrombocytopenic so SCDs  Diet: consistent carb diet as tolerated   Dispo: [facility] once stays 3 night in hospital as pt was just transitioned to Medicare    Transitions of Care Status:  1.  Name of PCP: Dr. Hinson  2.  PCP Contacted on Admission: [ ] Y    [X ] N    3.  PCP contacted at Discharge: [ ] Y    [ ] N    [ ] N/A  4.  Post-Discharge Appointment Date and Location:  5.  Summary of Handoff given to PCP:

## 2020-01-15 NOTE — PROGRESS NOTE ADULT - SUBJECTIVE AND OBJECTIVE BOX
ADDIS SCHWAB 69y [id number]    Patient is a 69y old  Male who presents with a chief complaint of Seizures and Hyperglycemia (15 Charles 2020 08:29)      Follow Up/CC:  ID following for cmv    Interval History/ROS: no fever    Allergies    No Known Allergies    Intolerances        ANTIMICROBIALS:  valGANciclovir 900 two times a day      MEDICATIONS  (STANDING):  atorvastatin 10 milliGRAM(s) Oral at bedtime  dexAMETHasone     Tablet 4 milliGRAM(s) Oral <User Schedule>  dexAMETHasone     Tablet 2 milliGRAM(s) Oral <User Schedule>  dextrose 50% Injectable 12.5 Gram(s) IV Push once  dextrose 50% Injectable 25 Gram(s) IV Push once  dextrose 50% Injectable 25 Gram(s) IV Push once  insulin lispro (HumaLOG) corrective regimen sliding scale   SubCutaneous three times a day before meals  insulin lispro (HumaLOG) corrective regimen sliding scale   SubCutaneous at bedtime  insulin NPH human recombinant 7 Unit(s) SubCutaneous two times a day  levETIRAcetam 500 milliGRAM(s) Oral two times a day  melatonin 3 milliGRAM(s) Oral at bedtime  pantoprazole    Tablet 40 milliGRAM(s) Oral before breakfast  polyethylene glycol 3350 17 Gram(s) Oral daily  senna 2 Tablet(s) Oral at bedtime  sodium chloride 2 Gram(s) Oral three times a day  valGANciclovir 900 milliGRAM(s) Oral two times a day    MEDICATIONS  (PRN):  bisacodyl Suppository 10 milliGRAM(s) Rectal daily PRN Constipation  dextrose 40% Gel 15 Gram(s) Oral once PRN Blood Glucose LESS THAN 70 milliGRAM(s)/deciliter        Vital Signs Last 24 Hrs  T(C): 36.4 (15 Charles 2020 06:00), Max: 36.6 (2020 18:51)  T(F): 97.5 (15 Charles 2020 06:00), Max: 97.9 (2020 18:51)  HR: 72 (15 Charles 2020 06:00) (72 - 97)  BP: 132/78 (15 Charles 2020 06:00) (102/72 - 132/78)  BP(mean): --  RR: 16 (15 Charles 2020 06:00) (16 - 17)  SpO2: 100% (15 Charles 2020 06:00) (98% - 100%)    CBC Full  -  ( 15 Charles 2020 05:45 )  WBC Count : 2.46 K/uL  RBC Count : 2.99 M/uL  Hemoglobin : 9.4 g/dL  Hematocrit : 27.7 %  Platelet Count - Automated : 32 K/uL  Mean Cell Volume : 92.6 fL  Mean Cell Hemoglobin : 31.4 pg  Mean Cell Hemoglobin Concentration : 33.9 %  Auto Neutrophil # : 1.25 K/uL  Auto Lymphocyte # : 0.74 K/uL  Auto Monocyte # : 0.31 K/uL  Auto Eosinophil # : 0.00 K/uL  Auto Basophil # : 0.01 K/uL  Auto Neutrophil % : 50.8 %  Auto Lymphocyte % : 30.1 %  Auto Monocyte % : 12.6 %  Auto Eosinophil % : 0.0 %  Auto Basophil % : 0.4 %    01-15    131<L>  |  97<L>  |  23  ----------------------------<  202<H>  4.0   |  22  |  0.43<L>    Ca    8.3<L>      15 Charles 2020 05:45  Phos  3.4     01-15  Mg     1.8     01-15    TPro  5.4<L>  /  Alb  2.8<L>  /  TBili  0.4  /  DBili  x   /  AST  13  /  ALT  29  /  AlkPhos  60  01-15    LIVER FUNCTIONS - ( 15 Charles 2020 05:45 )  Alb: 2.8 g/dL / Pro: 5.4 g/dL / ALK PHOS: 60 u/L / ALT: 29 u/L / AST: 13 u/L / GGT: x               MICROBIOLOGY:  URINE MIDSTREAM  20 --  --  --    CMVPCR Lo.06 ( @ 06:15)          RADIOLOGY    < from: MR Head w/wo IV Cont (01.10.20 @ 09:22) >  Heterogeneously enhancing partially necrotic infiltrative mass within the splenium of the corpus callosum without significant interval change from the prior exam with associated subependymoma spread of tumor.    Areas of tumor infiltration above the left temporal horn and within the left frontal lobe which have increased in size. Tumor within the mesial left parietal lobe is slightly more prominent and demonstrates increased enhancement at this time.    < end of copied text >

## 2020-01-15 NOTE — PROGRESS NOTE ADULT - PROBLEM SELECTOR PLAN 4
- likely related to GBM vs hyperglycemic state further compounded by tapered of AEDs  - VEEG: no epileptogenic foci  - MRI: see report above, some enhancement in certain regions with re-demonstration of mass -> discussed with [facility] neurooncology fellow -> likely related to post radiation therapy  - c/w keppra 500 BID   - c/w Decadron 4mg PO BID   - re: taper, patient was supposed to be on 2 BID dosing prior to admission -> will re-discuss with neurooncology regarding taper closer to discharge - likely related to GBM vs hyperglycemic state further compounded by tapered of AEDs  - VEEG: no epileptogenic foci  - MRI: see report above, some enhancement in certain regions with re-demonstration of mass -> discussed with [facility] neurooncology fellow -> likely related to post radiation therapy  - c/w keppra 500 BID   - Pt was supposed to be on Decadron 2 BID dosing prior to admission  - Will taper from 4mg BID to 2mg BID over a three day period (2mg BID on 01/18/20)

## 2020-01-15 NOTE — PROGRESS NOTE ADULT - ATTENDING COMMENTS
Patient seen and examined, care d/w residents    68 yo M with GBM s/p chemo and RT, RT completed, could not tolerate chemo 2/2 cytopenias now held for some time with persistent cytopenia, no fevers, no bleeding etc, no need for transfusion.  Son reports [place] [facility] [place] and was pending amber erwin, amber saw here and were planning for BM bx on 1/14 however son now stating doesn't want to do that if it "wont  and only cause pain".     - CMV pcr +, ID yaima appreciated, unclear if playing a role but plan to tx with Valcyte x 2 wks and monitor pcr weekly as OP  - team called [facility] and decadron to be tapered to 2 mg BID until f/u with them    Now with insurance issue/change, may need 3 night stay under new insurance, SW aware   stable for BRADY once insurance confirmed   care d/w daughter Amparo as son is now working  dispo planning

## 2020-01-15 NOTE — PROGRESS NOTE ADULT - ASSESSMENT
70 yo Occitan-speaking M w/ PMH of GBM diagnosed in 9/19, HTN, HLD currently [place] [facility] [place] a/w hyperglycemia likely 2/2 to steroid-induced from Decadron c/b new onset seizures in ED x2 with pancytopenia, CMV viremia, reactive lymphocytosis

## 2020-01-15 NOTE — PROGRESS NOTE ADULT - PROBLEM SELECTOR PLAN 6
- Pt diagnosed in 9/2019 s/p stereotactic biopsy and [place] [facility] [place], s/p Temodar and RT which has been finished  - MRI performed on this admission with several areas of enhancement which as per discussion with neuro-onc is likely related to RT    - Per Neruo-oncologist at [facility] [place], patient was to be on Decadron 2mg BID  - Will decrease Decadron to 2mg in AM and 4mg in PM x3 days followed by 2mg BID for duration of rehab until seen outpt [place] [facility]   - 40 IV Protonix QD for GI PPx   - Neuro recs appreciated

## 2020-01-15 NOTE — PROGRESS NOTE ADULT - PROBLEM SELECTOR PLAN 2
- Pancytopenia for >6weeks without requiring transfusional support   - As per discussion with son, last dose of Temodar was 6 weeks ago, and has been off bactrim and ASA w/out response  - Retic elevated indicating BM response  - Pt was planned for hematology/oncology as an o/p -> in house heme consulted, recs appreciated  - No indication for Neupogen at this point  - Hepatitis panel negative  - KIKI, RF pending   - If all the workups negative next step will be doing bone marrow biopsy, tentatively this week, f/u lab results and heme recs - Pancytopenia for >6weeks off Temodar w/out requiring transfusional support   - Has been off bactrim and ASA w/out response  - Retic elevated indicating BM response  - No indication for Neupogen at this point  - Hepatitis panel negative  - No indication for bone marrow bx as numbers continue to improve

## 2020-01-15 NOTE — PROGRESS NOTE ADULT - PROBLEM SELECTOR PROBLEM 3
Problem: Potential for Falls  Goal: Patient will remain free of falls  INTERVENTIONS:  - Assess patient frequently for physical needs  -  Identify cognitive and physical deficits and behaviors that affect risk of falls    -  [place] fall precautions as indicated by assessment   - Educate patient/family on patient safety including physical limitations  - Instruct patient to call for assistance with activity based on assessment  - Modify environment to reduce risk of injury  - Consider OT/PT consult to assist with strengthening/mobility   Outcome: Progressing CMV (cytomegalovirus infection)

## 2020-01-15 NOTE — PROGRESS NOTE ADULT - PROBLEM SELECTOR PLAN 5
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

## 2020-01-15 NOTE — PROGRESS NOTE ADULT - PROBLEM SELECTOR PLAN 1
- Steroid induced hyperglycemia compounded by T2DM  - C/w NPH to 7U in addition to mod SSI w/ goal <180  - As below, will decide final insulin regimen pending steroid taper regimen - Steroid induced hyperglycemia compounded by T2DM  - C/w NPH to 7U in addition to mod SSI w/ goal <180 as pt continues to be tapered down on Decadron  - Final insulin regimen dependent on pt FS on 2mg Decadron BID once tapered

## 2020-01-15 NOTE — PROGRESS NOTE ADULT - SUBJECTIVE AND OBJECTIVE BOX
Radha Oshea, PGY-1  Internal Medicine  Pager: [phone] (NS) / [phone] (FIDE)    Patient is a 69y old  Male who presents with a chief complaint of Seizures and Hyperglycemia (10 Charles 2020 08:11)    SUBJECTIVE / OVERNIGHT EVENTS:        MEDICATIONS  (STANDING):  atorvastatin 10 milliGRAM(s) Oral at bedtime  dexAMETHasone     Tablet 4 milliGRAM(s) Oral <User Schedule>  dexAMETHasone     Tablet 2 milliGRAM(s) Oral <User Schedule>  dextrose 50% Injectable 12.5 Gram(s) IV Push once  dextrose 50% Injectable 25 Gram(s) IV Push once  dextrose 50% Injectable 25 Gram(s) IV Push once  insulin lispro (HumaLOG) corrective regimen sliding scale   SubCutaneous three times a day before meals  insulin lispro (HumaLOG) corrective regimen sliding scale   SubCutaneous at bedtime  insulin NPH human recombinant 7 Unit(s) SubCutaneous two times a day  levETIRAcetam 500 milliGRAM(s) Oral two times a day  melatonin 3 milliGRAM(s) Oral at bedtime  pantoprazole    Tablet 40 milliGRAM(s) Oral before breakfast  polyethylene glycol 3350 17 Gram(s) Oral daily  senna 2 Tablet(s) Oral at bedtime  sodium chloride 2 Gram(s) Oral three times a day  valGANciclovir 900 milliGRAM(s) Oral two times a day    MEDICATIONS  (PRN):  bisacodyl Suppository 10 milliGRAM(s) Rectal daily PRN Constipation  dextrose 40% Gel 15 Gram(s) Oral once PRN Blood Glucose LESS THAN 70 milliGRAM(s)/deciliter    CAPILLARY BLOOD GLUCOSE  POCT Blood Glucose.: 192 mg/dL (15 Charles 2020 06:04)  POCT Blood Glucose.: 211 mg/dL (2020 22:23)  POCT Blood Glucose.: 222 mg/dL (2020 18:33)  POCT Blood Glucose.: 279 mg/dL (2020 13:10)  POCT Blood Glucose.: 179 mg/dL (2020 09:10)  IN: 350 mL / OUT: 350 mL / NET: 0 mL    I&O's Summary    2020 07:01  -  15 Charles 2020 07:00  --------------------------------------------------------  IN: 200 mL / OUT: 300 mL / NET: -100 mL    PHYSICAL EXAM  GENERAL: NAD, well-developed elderly male   HEAD:  Atraumatic, Normocephalic  EYES: EOMI, PERRL  NECK: Supple, No JVD  CHEST/LUNG: Clear to auscultation bilaterally; No wheezes, rales or rhonchi   HEART: Regular rate and rhythm; No murmur  ABDOMEN: Soft, Nontender, Nondistended; Bowel sounds present  EXTREMITIES:  2+ Peripheral Pulses, No clubbing, cyanosis, or edema  PSYCH: AAOx3  SKIN: No rashes or lesions  Neuro: Alert to self and place. pupils are both reactive to light. 5/5 strength in UE and moving lower extremities with ease against gravity -- not entirely cooperative with strength testing.     LABS:             9.4    2.46  )-----------( 32       ( 15 Charles 2020 05:45 )             27.7     01-15    131<L>  |  97<L>  |  23  ----------------------------<  202<H>  4.0   |  22  |  0.43<L>    Ca    8.3<L>      15 Charles 2020 05:45  Phos  3.4     01-15  Mg     1.8     01-15    TPro  5.4<L>  /  Alb  2.8<L>  /  TBili  0.4  /  DBili  x   /  AST  13  /  ALT  29  /  AlkPhos  60  01-15    LIVER FUNCTIONS - ( 15 Chrales 2020 05:45 )  Alb: 2.8 g/dL / Pro: 5.4 g/dL / ALK PHOS: 60 u/L / ALT: 29 u/L / AST: 13 u/L / GGT: x           Cytomegalovirus By PCR (20 @ 06:15)    CMVPCR Lo.06: INFCE Result Units: [id number]/mL  Assay lower limit of detection (LOD):  31.2 IU/mL (1.49 log10/mL)  Assay dynamic range:  50 to 156,000,000 (156M) CMV DNA IU/mL (1.70 log10/mL –  8.19 log10/mL)  Plasma CMV DNA Quantification by PCR using Abbott m2000:  The results of this test should be interpreted with  consideration of all clinical and laboratory findings. In  particular, caution should be used when interpreting low  level positive results when the test is used for  diagnostic purposes. Repeat testing on an additional  sample is recommended to confirm low level positive  results. A result of "Not Detected" does not preclude the  possibility of infection with CMV.  CMV DNA may be present  below the detection limit of assay.      RADIOLOGY & ADDITIONAL TESTS:    MR Head w/wo IV Cont (01.10.20 @ 09:22)  FINDINGS:    VENTRICLES AND SULCI:  Overall increased in size compatible with progression of cerebral volume loss.    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 Surrounding T2 hyperintensity and internal hemorrhage is again seen with increased susceptibility artifact since the prior exam likely due to increased hemosiderin deposition possibly from interval surgery. There is subependymal tumor spread extending along the occipital horn on the right and along the medial aspect of the right temporal horn as seen previously. Enhancement of the lateral wall of the temporal horn and right frontal horn has mildly decreased from the prior exam.    A satellite mass is seen along the superior aspect of the left temporal horn which has enlarged measuring 2 x 1.4 cm as compared with 1.5 x 0.7 cm. A mass within the left frontal lobe below the frontal horn is larger measuring 1.7 x 1.5 cm as compared with 0.5 x 0.5 cm, in retrospect. An area of gyral swelling and increased T2 signal within the mesial left parietal lobe is mildly more prominent likely representing increasing tumor. Increased enhancement is seen in this area this time.    EXTRA-AXIAL:  No mass or collection.  VISUALIZED SINUSES:  Normal.  VISUALIZED MASTOIDS:  Clear.  CALVARIUM:  Postoperative changes on the right.  CAROTID FLOW VOIDS:  Present.  MISCELLANEOUS:  Intraocular lens implants    IMPRESSION:    Heterogeneously enhancing partially necrotic infiltrative mass within the splenium of the corpus callosum without significant interval change from the prior exam with associated subependymoma spread of tumor.    Areas of tumor infiltration above the left temporal horn and within the left frontal lobe which have increased in size. Tumor within the mesial left parietal lobe is slightly more prominent and demonstrates increased enhancement at this time.    24 hr VEEG  Clinical Impression:  Moderate diffuse or multifocal cerebral dysfunction, not specific as to etiology.  There were no epileptiform abnormalities recorded. Radha Oshea, PGY-1  Internal Medicine  Pager: [phone] (NS) / [phone] (FIDE)    Patient is a 69y old  Male who presents with a chief complaint of Seizures and Hyperglycemia (10 Charles 2020 08:11)    SUBJECTIVE / OVERNIGHT EVENTS:    Pt seen and examined at bedside. Denies any HA, mental status changes, abd pain, N/V, constipation or diarrhea.     MEDICATIONS  (STANDING):  atorvastatin 10 milliGRAM(s) Oral at bedtime  dexAMETHasone     Tablet 4 milliGRAM(s) Oral <User Schedule>  dexAMETHasone     Tablet 2 milliGRAM(s) Oral <User Schedule>  dextrose 50% Injectable 12.5 Gram(s) IV Push once  dextrose 50% Injectable 25 Gram(s) IV Push once  dextrose 50% Injectable 25 Gram(s) IV Push once  insulin lispro (HumaLOG) corrective regimen sliding scale   SubCutaneous three times a day before meals  insulin lispro (HumaLOG) corrective regimen sliding scale   SubCutaneous at bedtime  insulin NPH human recombinant 7 Unit(s) SubCutaneous two times a day  levETIRAcetam 500 milliGRAM(s) Oral two times a day  melatonin 3 milliGRAM(s) Oral at bedtime  pantoprazole    Tablet 40 milliGRAM(s) Oral before breakfast  polyethylene glycol 3350 17 Gram(s) Oral daily  senna 2 Tablet(s) Oral at bedtime  sodium chloride 2 Gram(s) Oral three times a day  valGANciclovir 900 milliGRAM(s) Oral two times a day    MEDICATIONS  (PRN):  bisacodyl Suppository 10 milliGRAM(s) Rectal daily PRN Constipation  dextrose 40% Gel 15 Gram(s) Oral once PRN Blood Glucose LESS THAN 70 milliGRAM(s)/deciliter    CAPILLARY BLOOD GLUCOSE  POCT Blood Glucose.: 192 mg/dL (15 Charles 2020 06:04)  POCT Blood Glucose.: 211 mg/dL (2020 22:23)  POCT Blood Glucose.: 222 mg/dL (2020 18:33)  POCT Blood Glucose.: 279 mg/dL (2020 13:10)  POCT Blood Glucose.: 179 mg/dL (2020 09:10)  IN: 350 mL / OUT: 350 mL / NET: 0 mL    I&O's Summary  2020 07:01  -  15 Charles 2020 07:00  --------------------------------------------------------  IN: 200 mL / OUT: 300 mL / NET: -100 mL    PHYSICAL EXAM  GENERAL: NAD, well-developed elderly male   HEAD:  Atraumatic, Normocephalic  EYES: EOMI, PERRL  NECK: Supple, No JVD  CHEST/LUNG: Clear to auscultation bilaterally; No wheezes, rales or rhonchi   HEART: Regular rate and rhythm; No murmur  ABDOMEN: Soft, Nontender, Nondistended; Bowel sounds present  EXTREMITIES:  2+ Peripheral Pulses, No clubbing, cyanosis, or edema  PSYCH: AAOx3  SKIN: No rashes or lesions  Neuro: Alert to self and place. pupils are both reactive to light. 5/5 strength in UE and moving lower extremities with ease against gravity -- not entirely cooperative with strength testing.     LABS:             9.4    2.46  )-----------( 32       ( 15 Charles 2020 05:45 )             27.7     01-15    131<L>  |  97<L>  |  23  ----------------------------<  202<H>  4.0   |  22  |  0.43<L>    Ca    8.3<L>      15 Charles 2020 05:45  Phos  3.4     01-15  Mg     1.8     01-15    TPro  5.4<L>  /  Alb  2.8<L>  /  TBili  0.4  /  DBili  x   /  AST  13  /  ALT  29  /  AlkPhos  60  -15    LIVER FUNCTIONS - ( 15 Charles 2020 05:45 )  Alb: 2.8 g/dL / Pro: 5.4 g/dL / ALK PHOS: 60 u/L / ALT: 29 u/L / AST: 13 u/L / GGT: x           Cytomegalovirus By PCR (20 @ 06:15)    CMVPCR Lo.06: INFCE Result Units: Ham44CX/mL  Assay lower limit of detection (LOD):  31.2 IU/mL (1.49 log10/mL)  Assay dynamic range:  50 to 156,000,000 (156M) CMV DNA IU/mL (1.70 log10/mL –  8.19 log10/mL)  Plasma CMV DNA Quantification by PCR using Abbott m2000:  The results of this test should be interpreted with  consideration of all clinical and laboratory findings. In  particular, caution should be used when interpreting low  level positive results when the test is used for  diagnostic purposes. Repeat testing on an additional  sample is recommended to confirm low level positive  results. A result of "Not Detected" does not preclude the  possibility of infection with CMV.  CMV DNA may be present  below the detection limit of assay.      RADIOLOGY & ADDITIONAL TESTS:    MR Head w/wo IV Cont (01.10.20 @ 09:22)  FINDINGS:    VENTRICLES AND SULCI:  Overall increased in size compatible with progression of cerebral volume loss.    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 Surrounding T2 hyperintensity and internal hemorrhage is again seen with increased susceptibility artifact since the prior exam likely due to increased hemosiderin deposition possibly from interval surgery. There is subependymal tumor spread extending along the occipital horn on the right and along the medial aspect of the right temporal horn as seen previously. Enhancement of the lateral wall of the temporal horn and right frontal horn has mildly decreased from the prior exam.    A satellite mass is seen along the superior aspect of the left temporal horn which has enlarged measuring 2 x 1.4 cm as compared with 1.5 x 0.7 cm. A mass within the left frontal lobe below the frontal horn is larger measuring 1.7 x 1.5 cm as compared with 0.5 x 0.5 cm, in retrospect. An area of gyral swelling and increased T2 signal within the mesial left parietal lobe is mildly more prominent likely representing increasing tumor. Increased enhancement is seen in this area this time.    EXTRA-AXIAL:  No mass or collection.  VISUALIZED SINUSES:  Normal.  VISUALIZED MASTOIDS:  Clear.  CALVARIUM:  Postoperative changes on the right.  CAROTID FLOW VOIDS:  Present.  MISCELLANEOUS:  Intraocular lens implants    IMPRESSION:    Heterogeneously enhancing partially necrotic infiltrative mass within the splenium of the corpus callosum without significant interval change from the prior exam with associated subependymoma spread of tumor.    Areas of tumor infiltration above the left temporal horn and within the left frontal lobe which have increased in size. Tumor within the mesial left parietal lobe is slightly more prominent and demonstrates increased enhancement at this time.    24 hr VEEG  Clinical Impression:  Moderate diffuse or multifocal cerebral dysfunction, not specific as to etiology.  There were no epileptiform abnormalities recorded.

## 2020-01-15 NOTE — PROGRESS NOTE ADULT - ATTENDING COMMENTS
Camron Mcmahon  Attending Physician   Division of Infectious Disease  Pager #[phone]  After 5pm/weekend or no response, call #[phone]

## 2020-01-16 ENCOUNTER — TRANSCRIPTION ENCOUNTER (OUTPATIENT)
Age: 70
End: 2020-01-16

## 2020-01-16 ENCOUNTER — INPATIENT (INPATIENT)
Facility: HOSPITAL | Age: 70
LOS: 16 days | Discharge: HOME | End: 2020-02-02
Attending: STUDENT IN AN ORGANIZED HEALTH CARE EDUCATION/TRAINING PROGRAM | Admitting: STUDENT IN AN ORGANIZED HEALTH CARE EDUCATION/TRAINING PROGRAM
Payer: COMMERCIAL

## 2020-01-16 VITALS
OXYGEN SATURATION: 99 % | HEART RATE: 80 BPM | RESPIRATION RATE: 17 BRPM | SYSTOLIC BLOOD PRESSURE: 128 MMHG | DIASTOLIC BLOOD PRESSURE: 78 MMHG | TEMPERATURE: 98 F

## 2020-01-16 VITALS
HEART RATE: 90 BPM | SYSTOLIC BLOOD PRESSURE: 100 MMHG | TEMPERATURE: 97 F | RESPIRATION RATE: 16 BRPM | OXYGEN SATURATION: 98 % | DIASTOLIC BLOOD PRESSURE: 55 MMHG | HEIGHT: 71 IN

## 2020-01-16 DIAGNOSIS — Z98.890 OTHER SPECIFIED POSTPROCEDURAL STATES: Chronic | ICD-10-CM

## 2020-01-16 LAB
ALBUMIN SERPL ELPH-MCNC: 2.7 G/DL — LOW (ref 3.3–5)
ALP SERPL-CCNC: 57 U/L — SIGNIFICANT CHANGE UP (ref 40–120)
ALT FLD-CCNC: 31 U/L — SIGNIFICANT CHANGE UP (ref 4–41)
ANION GAP SERPL CALC-SCNC: 12 MMO/L — SIGNIFICANT CHANGE UP (ref 7–14)
AST SERPL-CCNC: 19 U/L — SIGNIFICANT CHANGE UP (ref 4–40)
BASOPHILS # BLD AUTO: 0.01 K/UL — SIGNIFICANT CHANGE UP (ref 0–0.2)
BASOPHILS NFR BLD AUTO: 0.4 % — SIGNIFICANT CHANGE UP (ref 0–2)
BILIRUB SERPL-MCNC: 0.5 MG/DL — SIGNIFICANT CHANGE UP (ref 0.2–1.2)
BUN SERPL-MCNC: 22 MG/DL — SIGNIFICANT CHANGE UP (ref 7–23)
CALCIUM SERPL-MCNC: 8 MG/DL — LOW (ref 8.4–10.5)
CHLORIDE SERPL-SCNC: 99 MMOL/L — SIGNIFICANT CHANGE UP (ref 98–107)
CO2 SERPL-SCNC: 21 MMOL/L — LOW (ref 22–31)
CREAT SERPL-MCNC: 0.4 MG/DL — LOW (ref 0.5–1.3)
EOSINOPHIL # BLD AUTO: 0.02 K/UL — SIGNIFICANT CHANGE UP (ref 0–0.5)
EOSINOPHIL NFR BLD AUTO: 0.9 % — SIGNIFICANT CHANGE UP (ref 0–6)
GLUCOSE SERPL-MCNC: 131 MG/DL — HIGH (ref 70–99)
HCT VFR BLD CALC: 26.6 % — LOW (ref 39–50)
HGB BLD-MCNC: 9.1 G/DL — LOW (ref 13–17)
IMM GRANULOCYTES NFR BLD AUTO: 6.2 % — HIGH (ref 0–1.5)
LYMPHOCYTES # BLD AUTO: 0.77 K/UL — LOW (ref 1–3.3)
LYMPHOCYTES # BLD AUTO: 34.1 % — SIGNIFICANT CHANGE UP (ref 13–44)
MAGNESIUM SERPL-MCNC: 1.8 MG/DL — SIGNIFICANT CHANGE UP (ref 1.6–2.6)
MANUAL SMEAR VERIFICATION: SIGNIFICANT CHANGE UP
MCHC RBC-ENTMCNC: 31.4 PG — SIGNIFICANT CHANGE UP (ref 27–34)
MCHC RBC-ENTMCNC: 34.2 % — SIGNIFICANT CHANGE UP (ref 32–36)
MCV RBC AUTO: 91.7 FL — SIGNIFICANT CHANGE UP (ref 80–100)
MONOCYTES # BLD AUTO: 0.27 K/UL — SIGNIFICANT CHANGE UP (ref 0–0.9)
MONOCYTES NFR BLD AUTO: 11.9 % — SIGNIFICANT CHANGE UP (ref 2–14)
NEUTROPHILS # BLD AUTO: 1.05 K/UL — LOW (ref 1.8–7.4)
NEUTROPHILS NFR BLD AUTO: 46.5 % — SIGNIFICANT CHANGE UP (ref 43–77)
NRBC # FLD: 0.08 K/UL — SIGNIFICANT CHANGE UP (ref 0–0)
NRBC FLD-RTO: 3.5 — SIGNIFICANT CHANGE UP
PHOSPHATE SERPL-MCNC: 3.4 MG/DL — SIGNIFICANT CHANGE UP (ref 2.5–4.5)
PLATELET # BLD AUTO: 38 K/UL — LOW (ref 150–400)
PMV BLD: 10.6 FL — SIGNIFICANT CHANGE UP (ref 7–13)
POTASSIUM SERPL-MCNC: 3.9 MMOL/L — SIGNIFICANT CHANGE UP (ref 3.5–5.3)
POTASSIUM SERPL-SCNC: 3.9 MMOL/L — SIGNIFICANT CHANGE UP (ref 3.5–5.3)
PROT SERPL-MCNC: 5.2 G/DL — LOW (ref 6–8.3)
RBC # BLD: 2.9 M/UL — LOW (ref 4.2–5.8)
RBC # FLD: 16.7 % — HIGH (ref 10.3–14.5)
SODIUM SERPL-SCNC: 132 MMOL/L — LOW (ref 135–145)
WBC # BLD: 2.26 K/UL — LOW (ref 3.8–10.5)
WBC # FLD AUTO: 2.26 K/UL — LOW (ref 3.8–10.5)

## 2020-01-16 PROCEDURE — 99239 HOSP IP/OBS DSCHRG MGMT >30: CPT | Mod: GC

## 2020-01-16 RX ORDER — SENNA PLUS 8.6 MG/1
2 TABLET ORAL
Qty: 0 | Refills: 0 | DISCHARGE
Start: 2020-01-16

## 2020-01-16 RX ORDER — DEXAMETHASONE 0.5 MG/5ML
2 ELIXIR ORAL
Qty: 0 | Refills: 0 | DISCHARGE
Start: 2020-01-16

## 2020-01-16 RX ORDER — VALGANCICLOVIR 450 MG/1
2 TABLET, FILM COATED ORAL
Qty: 0 | Refills: 0 | DISCHARGE
Start: 2020-01-16

## 2020-01-16 RX ORDER — HUMAN INSULIN 100 [IU]/ML
5 INJECTION, SUSPENSION SUBCUTANEOUS
Qty: 0 | Refills: 0 | DISCHARGE
Start: 2020-01-16

## 2020-01-16 RX ORDER — POLYETHYLENE GLYCOL 3350 17 G/17G
17 POWDER, FOR SOLUTION ORAL
Qty: 0 | Refills: 0 | DISCHARGE
Start: 2020-01-16

## 2020-01-16 RX ORDER — HUMAN INSULIN 100 [IU]/ML
5 INJECTION, SUSPENSION SUBCUTANEOUS
Refills: 0 | Status: DISCONTINUED | OUTPATIENT
Start: 2020-01-16 | End: 2020-01-16

## 2020-01-16 RX ORDER — ALPRAZOLAM 0.25 MG
1 TABLET ORAL
Qty: 0 | Refills: 0 | DISCHARGE

## 2020-01-16 RX ORDER — DEXAMETHASONE 0.5 MG/5ML
2 ELIXIR ORAL
Refills: 0 | Status: DISCONTINUED | OUTPATIENT
Start: 2020-01-16 | End: 2020-01-16

## 2020-01-16 RX ORDER — DEXAMETHASONE 0.5 MG/5ML
1 ELIXIR ORAL
Qty: 0 | Refills: 0 | DISCHARGE
Start: 2020-01-16

## 2020-01-16 RX ORDER — LANOLIN ALCOHOL/MO/W.PET/CERES
1 CREAM (GRAM) TOPICAL
Qty: 0 | Refills: 0 | DISCHARGE
Start: 2020-01-16

## 2020-01-16 RX ORDER — LEVETIRACETAM 250 MG/1
1 TABLET, FILM COATED ORAL
Qty: 0 | Refills: 0 | DISCHARGE
Start: 2020-01-16

## 2020-01-16 RX ADMIN — LEVETIRACETAM 500 MILLIGRAM(S): 250 TABLET, FILM COATED ORAL at 05:36

## 2020-01-16 RX ADMIN — SODIUM CHLORIDE 2 GRAM(S): 9 INJECTION INTRAMUSCULAR; INTRAVENOUS; SUBCUTANEOUS at 05:35

## 2020-01-16 RX ADMIN — VALGANCICLOVIR 900 MILLIGRAM(S): 450 TABLET, FILM COATED ORAL at 05:35

## 2020-01-16 RX ADMIN — Medication 4 MILLIGRAM(S): at 05:35

## 2020-01-16 RX ADMIN — PANTOPRAZOLE SODIUM 40 MILLIGRAM(S): 20 TABLET, DELAYED RELEASE ORAL at 06:20

## 2020-01-16 RX ADMIN — HUMAN INSULIN 7 UNIT(S): 100 INJECTION, SUSPENSION SUBCUTANEOUS at 09:46

## 2020-01-16 NOTE — ED PROVIDER NOTE - ATTENDING CONTRIBUTION TO CARE
69M hx GBM last chemo/radiation early december recently hospitalized and found to have CMV. d/c'd to rehab/nursing home today for persistent weakness although sent back to [facility] because patient was "refusing to wear his mask" Patient is mildly demented but not endorsing any new complaints. 69M hx GBM last chemo/radiation early december recently hospitalized and found to have CMV. d/c'd to rehab/nursing home today for persistent weakness although sent back to [facility] because patient was "refusing to wear his mask" Patient is mildly demented but not endorsing any new complaints. On exam: lethargic, afebrile, lungs clear heart sounds normal abdomen soft, moves all limbs. IMP: Returned after rejection by nursing home/rehab facility. In discussion with hospitalist and  it was determined that patient does not need isolation,  will be placed in CDU, continue DC meds, and send to rehab tomorrow.

## 2020-01-16 NOTE — ED PROVIDER NOTE - OBJECTIVE STATEMENT
69M hx GBM last chemo/radiation early december recently hospitalized and found to have CMV. d/c'd to rehab today for persistent weakness although sent back to [facility] because patient was "refusing to wear his mask" Patient is mildly demented but not endorsing any new complaints. 69M hx GBM last chemo/radiation early december recently hospitalized and found to have CMV. d/c'd to rehab/nursing home today for persistent weakness although sent back to [facility] because patient was "refusing to wear his mask" Patient is mildly demented but not endorsing any new complaints. 69M hx GBM last chemo/radiation early december recently hospitalized and found to have CMV. DM SZ,  d/c'd to rehab/nursing home today for persistent weakness although sent back to [facility] because patient was "refusing to wear his mask" and they thought he needed isolation. Patient is mildly demented but not endorsing any new complaints.  Meds: Keppra 500mg BID, Decadron 2mg BID, Protonix 40mg QD, Salt Tabs of 2g TID, NPH insulin 5U BID, and Valcyte 900mg BID (until 01/27/20).

## 2020-01-16 NOTE — ED PROVIDER NOTE - CLINICAL SUMMARY MEDICAL DECISION MAKING FREE TEXT BOX
Patient is non toxic appearing and vSS. will speak with social work to determine placement requirements. no acute complaints.

## 2020-01-16 NOTE — PROGRESS NOTE ADULT - ASSESSMENT
68 yo Wolof-speaking M w/ PMH of GBM diagnosed in 9/19, HTN, HLD currently undergoing radiation therapy at [facility] [place] a/w hyperglycemia likely 2/2 to steroid-induced from Decadron c/b new onset seizures in ED x2

## 2020-01-16 NOTE — ED ADULT TRIAGE NOTE - CHIEF COMPLAINT QUOTE
Pt discharged this afternoon from this facility to [facility], upon arrival pt was refused admission and sent back to hospital, because pt had CMV.  Past medical history: glioblastoma
Ischemic colitis

## 2020-01-16 NOTE — ED PROVIDER NOTE - CONSTITUTIONAL, MLM
normal... Well appearing, awake, alert, oriented to person, place, but not time and in no apparent distress.

## 2020-01-16 NOTE — PROGRESS NOTE ADULT - ATTENDING COMMENTS
Patient seen and examined, care d/w residents    68 yo M with GBM s/p chemo and RT, RT completed, could not tolerate chemo 2/2 cytopenias now held for some time with persistent cytopenia, no fevers, no bleeding etc, no need for transfusion.  Son reports followed at [facility] and was pending amber erwin, amber saw here and were planning for BM bx on 1/14 however son now stating doesn't want to do that if it "wont  and only cause pain".     - CMV pcr +, ID yaima appreciated, unclear if playing a role but plan to tx with Valcyte x 2 wks and monitor pcr weekly as OP  - c/w decadron 2 mg bID  - c/w insulin     For BRADY today  dispo planning, dispo time 35 min Patient seen and examined, care d/w residents    70 yo M with GBM s/p chemo and RT, RT completed, could not tolerate chemo 2/2 cytopenias now held for some time with persistent cytopenia, no fevers, no bleeding etc, no need for transfusion.  Son reports followed at [facility] and was pending amber erwin, amber saw here and were planning for BM bx on 1/14 however son now stating doesn't want to do that if it "wont  and only cause pain".     - CMV pcr +, ID yaima appreciated, unclear if playing a role but plan to tx with Valcyte x 2 wks and monitor pcr weekly as OP  - cytopenias stable to improved   - c/w decadron 2 mg bID  - c/w insulin     GOC had during admission, HCP is son primary and daughter secondary, they wanted full code not ready to discuss futher  will f/u with [facility] re: GBM    For BRADY today  dispo planning, dispo time 35 min

## 2020-01-16 NOTE — PROGRESS NOTE ADULT - PROBLEM SELECTOR PLAN 6
- Pt diagnosed in 9/2019 s/p stereotactic biopsy and ?resection at [facility], s/p Temodar and RT which has been finished  - MRI performed on this admission with several areas of enhancement which as per discussion with neuro-onc is likely related to RT    - Per Neruo-oncologist at [facility], patient was to be on Decadron 2mg BID  - Will decrease Decadron to 2mg in AM and 4mg in PM x3 days followed by 2mg BID for duration of rehab until seen outpt at [facility]   - 40 IV Protonix QD for GI PPx   - Neuro recs appreciated - Pt diagnosed in 9/2019 s/p [place]otactic [place] [facility] Temodar and RT which has been finished  - MRI performed on this admission with several areas of enhancement which as per discussion with neuro-onc is likely related to RT    - D/c on Decadron 2mg BID throughout rehab until seen by neuro[place]cologist [place] [facility]   - 40 Protonix QD for GI PPx   - Neuro recs appreciated

## 2020-01-16 NOTE — ED ADULT NURSE NOTE - OBJECTIVE STATEMENT
pt send from this facility this afternoon to SNF, refused admission in SNF as pt has CMV and non compliant with wearing mask, pt denies any complaints at this time, spouse at bedside.

## 2020-01-16 NOTE — PROGRESS NOTE ADULT - REASON FOR ADMISSION
Seizures and Hyperglycemia

## 2020-01-16 NOTE — PROGRESS NOTE ADULT - PROBLEM SELECTOR PLAN 4
- likely related to GBM vs hyperglycemic state further compounded by tapered of AEDs  - VEEG: no epileptogenic foci  - MRI: see report above, some enhancement in certain regions with re-demonstration of mass -> discussed with [facility] neurooncology fellow -> likely related to post radiation therapy  - c/w keppra 500 BID   - Pt was supposed to be on Decadron 2 BID dosing prior to admission  - Will taper from 4mg BID to 2mg BID over a three day period (2mg BID on 01/18/20) - likely related to GBM vs hyperglycemic state further compounded by tapered of AEDs  - VEEG: no epileptogenic foci  - MRI: see report above, some enhancement in certain regions with re-demonstration of mass -> discussed with [facility] neurooncology fellow -> likely related to post radiation therapy  - c/w keppra 500 BID   - Decrease Decadron dose to 2mg BID as per [facility] regimen  - continue current dose until seen by neuro-oncologist

## 2020-01-16 NOTE — SWALLOW BEDSIDE ASSESSMENT ADULT - COMMENTS
Patient is a "68 yo M w/ PMH of GBM diagnosed in 9/19, HTN, HLD currently [place] [facility] [place]w hyperglycemia likely 2/2 to steroid-induced from Decadron c/b new onset seizures in ED x2". CXR completed on 1/7 reported "suboptimally inflated but otherwise clear lungs"/ MRI completed on 1/10 reported "Heterogeneously enhancing partially necrotic infiltrative mass within the splenium of the corpus callosum without significant interval change from the prior exam with associated subependymoma spread of tumor. Areas of tumor infiltration above the left temporal horn and within the left frontal lobe which have increased in size. Tumor within the mesial left parietal lobe is slightly more prominent and demonstrates increased enhancement at this time".     Patient seen upright at bedside. Patient was alert/awake and verbally responsive to simple questions. Patient able to follow some simple directions. Patient denies dysphagia symptoms at this time.

## 2020-01-16 NOTE — ED PROVIDER NOTE - PROGRESS NOTE DETAILS
Patient does not require iso precautions on cmv. will touch base with social and rehab facility Rec'd signout on this pt from Dr. Cruz:  Pending CDU eval for obs.  -Dr. Cota spoke with veronique from rehab center - cannot accept patient back tonight 2/2 staffing. will cdu until morning when patient will be able to go back to rehab

## 2020-01-16 NOTE — ED ADULT NURSE NOTE - CHIEF COMPLAINT QUOTE
Pt discharged this afternoon from this facility to [facility], upon arrival pt was refused admission and sent back to hospital, because pt had CMV.  Past medical history: glioblastoma

## 2020-01-16 NOTE — DISCHARGE NOTE NURSING/CASE MANAGEMENT/SOCIAL WORK - PATIENT PORTAL LINK FT
You can access the FollowMyHealth Patient Portal offered by [facility] by registering at the following website: [email or web address]. By joining [employer]’s FollowMyHealth portal, you will also be able to view your health information using other applications (apps) compatible with our system.

## 2020-01-16 NOTE — PROGRESS NOTE ADULT - PROBLEM SELECTOR PLAN 2
- Pancytopenia for >6weeks off Temodar w/out requiring transfusional support   - Has been off bactrim and ASA w/out response  - Retic elevated indicating BM response  - No indication for Neupogen at this point  - Hepatitis panel negative  - No indication for bone marrow bx as numbers continue to improve

## 2020-01-16 NOTE — PROGRESS NOTE ADULT - PROBLEM SELECTOR PLAN 3
- In setting of recent chemo and RT w/ Decadron pt immunocompromised w/ reactive lymphocytes on initial presentation  - May cause pancytopenia (though pancytopenia may likely relate to chemo)  - c/w Valcyte 900mg BID for 14 days (01/14/20 as day #1)   - Recheck CMV PCR in one week - In setting of recent chemo and RT w/ Decadron pt immunocompromised w/ reactive lymphocytes on initial presentation  - May cause pancytopenia (though pancytopenia may likely relate to chemo)  - c/w Valcyte 900mg BID for 14 days (until 01/27/20)   - Recheck CMV PCR in one week (01/21/20)

## 2020-01-16 NOTE — PROGRESS NOTE ADULT - PROBLEM SELECTOR PLAN 9
#10 Prophylactic Measure  DVT PPx: Thrombocytopenic so SCDs  Diet: consistent carb diet as tolerated   [facility] once stays 3 night in hospital as pt was just transitioned to Medicare    Transitions of Care Status:  1.  Name of PCP: Dr. Hinson  2.  PCP Contacted on Admission: [ ] Y    [X ] N    3.  PCP contacted at Discharge: [ ] Y    [ ] N    [ ] N/A  4.  Post-Discharge Appointment Date and Location:  5.  Summary of Handoff given to PCP: #10 Prophylactic Measure  DVT PPx: Thrombocytopenic so SCDs  Diet: consistent carb diet as tolerated   [facility]: [facility]    Transitions of Care Status:  1.  Name of PCP: Dr. Hinson  2.  PCP Contacted on Admission: [ ] Y    [X ] N    3.  PCP contacted at Discharge: [ ] Y    [ ] N    [ ] N/A  4.  Post-Discharge Appointment Date and Location:  5.  Summary of Handoff given to PCP:

## 2020-01-16 NOTE — SWALLOW BEDSIDE ASSESSMENT ADULT - SWALLOW EVAL: DIAGNOSIS
1. Functional oral phase for puree consistency, regular solids and thin liquids marked by adequate mastication for solids, adequate bolus manipulation and functional oral transit time 2. Functional pharyngeal phase for all consistencies presented marked by adequate laryngeal elevation upon digital palpation with NO overt s/s of laryngeal penetration/aspiration noted.

## 2020-01-16 NOTE — DISCHARGE NOTE NURSING/CASE MANAGEMENT/SOCIAL WORK - NSDCFUADDAPPT_GEN_ALL_CORE_FT
- Please follow up w/ neuro-oncologist Dr. Lyle Chavis at [facility] next week  - Please follow up w/ hematology on Monday 01/13/20 for further pancytopenia workup

## 2020-01-16 NOTE — PROGRESS NOTE ADULT - SUBJECTIVE AND OBJECTIVE BOX
Radha Oshea, PGY-1  Internal Medicine  Pager: [phone] (NS) / [phone] (FIDE)    Patient is a 69y old  Male who presents with a chief complaint of Seizures and Hyperglycemia (10 Charles 2020 08:11)    SUBJECTIVE / OVERNIGHT EVENTS:         MEDICATIONS  (STANDING):  atorvastatin 10 milliGRAM(s) Oral at bedtime  dexAMETHasone     Tablet 4 milliGRAM(s) Oral <User Schedule>  dexAMETHasone     Tablet 2 milliGRAM(s) Oral <User Schedule>  dextrose 50% Injectable 12.5 Gram(s) IV Push once  dextrose 50% Injectable 25 Gram(s) IV Push once  dextrose 50% Injectable 25 Gram(s) IV Push once  insulin lispro (HumaLOG) corrective regimen sliding scale   SubCutaneous three times a day before meals  insulin lispro (HumaLOG) corrective regimen sliding scale   SubCutaneous at bedtime  insulin NPH human recombinant 7 Unit(s) SubCutaneous two times a day  levETIRAcetam 500 milliGRAM(s) Oral two times a day  melatonin 3 milliGRAM(s) Oral at bedtime  pantoprazole    Tablet 40 milliGRAM(s) Oral before breakfast  polyethylene glycol 3350 17 Gram(s) Oral daily  senna 2 Tablet(s) Oral at bedtime  sodium chloride 2 Gram(s) Oral three times a day  valGANciclovir 900 milliGRAM(s) Oral two times a day    MEDICATIONS  (PRN):  bisacodyl Suppository 10 milliGRAM(s) Rectal daily PRN Constipation  dextrose 40% Gel 15 Gram(s) Oral once PRN Blood Glucose LESS THAN 70 milliGRAM(s)/deciliter    CAPILLARY BLOOD GLUCOSE  POCT Blood Glucose.: 156 mg/dL (15 Charles 2020 22:17)  POCT Blood Glucose.: 252 mg/dL (15 Charles 2020 18:02)  POCT Blood Glucose.: 283 mg/dL (15 Charles 2020 12:56)  POCT Blood Glucose.: 142 mg/dL (15 Charles 2020 08:50)    I&O's Summary  15 Charles 2020 07:01  -  2020 07:00  --------------------------------------------------------  IN: 0 mL / OUT: 300 mL / NET: -300 mL    PHYSICAL EXAM  GENERAL: NAD, well-developed elderly male   HEAD:  Atraumatic, Normocephalic  EYES: EOMI, PERRL  NECK: Supple, No JVD  CHEST/LUNG: Clear to auscultation bilaterally; No wheezes, rales or rhonchi   HEART: Regular rate and rhythm; No murmur  ABDOMEN: Soft, Nontender, Nondistended; Bowel sounds present  EXTREMITIES:  2+ Peripheral Pulses, No clubbing, cyanosis, or edema  PSYCH: AAOx3  SKIN: No rashes or lesions  Neuro: Alert to self and place. pupils are both reactive to light. 5/5 strength in UE and moving lower extremities with ease against gravity -- not entirely cooperative with strength testing.     LABS:             9.1    2.26  )-----------( 38       ( 2020 06:15 )             26.6     01-    132<L>  |  99  |  22  ----------------------------<  131<H>  3.9   |  21<L>  |  0.40<L>    Ca    8.0<L>      2020 06:15  Phos  3.4     -16  Mg     1.8     -16    TPro  5.2<L>  /  Alb  2.7<L>  /  TBili  0.5  /  DBili  x   /  AST  19  /  ALT  31  /  AlkPhos  57  -16    LIVER FUNCTIONS - ( 2020 06:15 )  Alb: 2.7 g/dL / Pro: 5.2 g/dL / ALK PHOS: 57 u/L / ALT: 31 u/L / AST: 19 u/L / GGT: x           Cytomegalovirus By PCR (20 @ 06:15)    CMVPCR Lo.06: INFCE Result Units: [id number]/mL  Assay lower limit of detection (LOD):  31.2 IU/mL (1.49 log10/mL)  Assay dynamic range:  50 to 156,000,000 (156M) CMV DNA IU/mL (1.70 log10/mL –  8.19 log10/mL)  Plasma CMV DNA Quantification by PCR using Abbott m2000:  The results of this test should be interpreted with  consideration of all clinical and laboratory findings. In  particular, caution should be used when interpreting low  level positive results when the test is used for  diagnostic purposes. Repeat testing on an additional  sample is recommended to confirm low level positive  results. A result of "Not Detected" does not preclude the  possibility of infection with CMV.  CMV DNA may be present  below the detection limit of assay.      RADIOLOGY & ADDITIONAL TESTS:    MR Head w/wo IV Cont (01.10.20 @ 09:22)  FINDINGS:    VENTRICLES AND SULCI:  Overall increased in size compatible with progression of cerebral volume loss.    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 Surrounding T2 hyperintensity and internal hemorrhage is again seen with increased susceptibility artifact since the prior exam likely due to increased hemosiderin deposition possibly from interval surgery. There is subependymal tumor spread extending along the occipital horn on the right and along the medial aspect of the right temporal horn as seen previously. Enhancement of the lateral wall of the temporal horn and right frontal horn has mildly decreased from the prior exam.    A satellite mass is seen along the superior aspect of the left temporal horn which has enlarged measuring 2 x 1.4 cm as compared with 1.5 x 0.7 cm. A mass within the left frontal lobe below the frontal horn is larger measuring 1.7 x 1.5 cm as compared with 0.5 x 0.5 cm, in retrospect. An area of gyral swelling and increased T2 signal within the mesial left parietal lobe is mildly more prominent likely representing increasing tumor. Increased enhancement is seen in this area this time.    EXTRA-AXIAL:  No mass or collection.  VISUALIZED SINUSES:  Normal.  VISUALIZED MASTOIDS:  Clear.  CALVARIUM:  Postoperative changes on the right.  CAROTID FLOW VOIDS:  Present.  MISCELLANEOUS:  Intraocular lens implants    IMPRESSION:    Heterogeneously enhancing partially necrotic infiltrative mass within the splenium of the corpus callosum without significant interval change from the prior exam with associated subependymoma spread of tumor.    Areas of tumor infiltration above the left temporal horn and within the left frontal lobe which have increased in size. Tumor within the mesial left parietal lobe is slightly more prominent and demonstrates increased enhancement at this time.    24 hr VEEG  Clinical Impression:  Moderate diffuse or multifocal cerebral dysfunction, not specific as to etiology.  There were no epileptiform abnormalities recorded. Radha Oshea, PGY-1  Internal Medicine  Pager: [phone] (NS) / [phone] (FIDE)    Patient is a 69y old  Male who presents with a chief complaint of Seizures and Hyperglycemia (10 Charles 2020 08:11)    SUBJECTIVE / OVERNIGHT EVENTS:     Pt seen and examined at bedside. Denies any HA, changes in mental status, CP, SOB, abd pain, constipation or diarrhea. No urinary symptoms    MEDICATIONS  (STANDING):  atorvastatin 10 milliGRAM(s) Oral at bedtime  dexAMETHasone     Tablet 4 milliGRAM(s) Oral <User Schedule>  dexAMETHasone     Tablet 2 milliGRAM(s) Oral <User Schedule>  dextrose 50% Injectable 12.5 Gram(s) IV Push once  dextrose 50% Injectable 25 Gram(s) IV Push once  dextrose 50% Injectable 25 Gram(s) IV Push once  insulin lispro (HumaLOG) corrective regimen sliding scale   SubCutaneous three times a day before meals  insulin lispro (HumaLOG) corrective regimen sliding scale   SubCutaneous at bedtime  insulin NPH human recombinant 7 Unit(s) SubCutaneous two times a day  levETIRAcetam 500 milliGRAM(s) Oral two times a day  melatonin 3 milliGRAM(s) Oral at bedtime  pantoprazole    Tablet 40 milliGRAM(s) Oral before breakfast  polyethylene glycol 3350 17 Gram(s) Oral daily  senna 2 Tablet(s) Oral at bedtime  sodium chloride 2 Gram(s) Oral three times a day  valGANciclovir 900 milliGRAM(s) Oral two times a day    MEDICATIONS  (PRN):  bisacodyl Suppository 10 milliGRAM(s) Rectal daily PRN Constipation  dextrose 40% Gel 15 Gram(s) Oral once PRN Blood Glucose LESS THAN 70 milliGRAM(s)/deciliter    CAPILLARY BLOOD GLUCOSE  POCT Blood Glucose.: 156 mg/dL (15 Charles 2020 22:17)  POCT Blood Glucose.: 252 mg/dL (15 Charles 2020 18:02)  POCT Blood Glucose.: 283 mg/dL (15 Charles 2020 12:56)  POCT Blood Glucose.: 142 mg/dL (15 Charles 2020 08:50)    I&O's Summary  15 Charles 2020 07:01  -  2020 07:00  --------------------------------------------------------  IN: 0 mL / OUT: 300 mL / NET: -300 mL    PHYSICAL EXAM  GENERAL: NAD, well-developed elderly male   HEAD:  Atraumatic, Normocephalic  EYES: EOMI, PERRL  NECK: Supple, No JVD  CHEST/LUNG: Clear to auscultation bilaterally; No wheezes, rales or rhonchi   HEART: Regular rate and rhythm; No murmur  ABDOMEN: Soft, Nontender, Nondistended; Bowel sounds present  EXTREMITIES:  2+ Peripheral Pulses, No clubbing, cyanosis, or edema  PSYCH: AAOx3  SKIN: No rashes or lesions  Neuro: Alert to self and place. pupils are both reactive to light. 5/5 strength in UE and moving lower extremities with ease against gravity -- not entirely cooperative with strength testing.     LABS:             9.1    2.26  )-----------( 38       ( 2020 06:15 )             26.6         132<L>  |  99  |  22  ----------------------------<  131<H>  3.9   |  21<L>  |  0.40<L>    Ca    8.0<L>      2020 06:15  Phos  3.4       Mg     1.8         TPro  5.2<L>  /  Alb  2.7<L>  /  TBili  0.5  /  DBili  x   /  AST  19  /  ALT  31  /  AlkPhos  57  -16    LIVER FUNCTIONS - ( 2020 06:15 )  Alb: 2.7 g/dL / Pro: 5.2 g/dL / ALK PHOS: 57 u/L / ALT: 31 u/L / AST: 19 u/L / GGT: x           Cytomegalovirus By PCR (20 @ 06:15)    CMVPCR Lo.06: INFCE Result Units: [id number]/mL  Assay lower limit of detection (LOD):  31.2 IU/mL (1.49 log10/mL)  Assay dynamic range:  50 to 156,000,000 (156M) CMV DNA IU/mL (1.70 log10/mL –  8.19 log10/mL)  Plasma CMV DNA Quantification by PCR using Abbott m2000:  The results of this test should be interpreted with  consideration of all clinical and laboratory findings. In  particular, caution should be used when interpreting low  level positive results when the test is used for  diagnostic purposes. Repeat testing on an additional  sample is recommended to confirm low level positive  results. A result of "Not Detected" does not preclude the  possibility of infection with CMV.  CMV DNA may be present  below the detection limit of assay.      RADIOLOGY & ADDITIONAL TESTS:    MR Head w/wo IV Cont (01.10.20 @ 09:22)  FINDINGS:    VENTRICLES AND SULCI:  Overall increased in size compatible with progression of cerebral volume loss.    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 Surrounding T2 hyperintensity and internal hemorrhage is again seen with increased susceptibility artifact since the prior exam likely due to increased hemosiderin deposition possibly from interval surgery. There is subependymal tumor spread extending along the occipital horn on the right and along the medial aspect of the right temporal horn as seen previously. Enhancement of the lateral wall of the temporal horn and right frontal horn has mildly decreased from the prior exam.    A satellite mass is seen along the superior aspect of the left temporal horn which has enlarged measuring 2 x 1.4 cm as compared with 1.5 x 0.7 cm. A mass within the left frontal lobe below the frontal horn is larger measuring 1.7 x 1.5 cm as compared with 0.5 x 0.5 cm, in retrospect. An area of gyral swelling and increased T2 signal within the mesial left parietal lobe is mildly more prominent likely representing increasing tumor. Increased enhancement is seen in this area this time.    EXTRA-AXIAL:  No mass or collection.  VISUALIZED SINUSES:  Normal.  VISUALIZED MASTOIDS:  Clear.  CALVARIUM:  Postoperative changes on the right.  CAROTID FLOW VOIDS:  Present.  MISCELLANEOUS:  Intraocular lens implants    IMPRESSION:    Heterogeneously enhancing partially necrotic infiltrative mass within the splenium of the corpus callosum without significant interval change from the prior exam with associated subependymoma spread of tumor.    Areas of tumor infiltration above the left temporal horn and within the left frontal lobe which have increased in size. Tumor within the mesial left parietal lobe is slightly more prominent and demonstrates increased enhancement at this time.    24 hr VEEG  Clinical Impression:  Moderate diffuse or multifocal cerebral dysfunction, not specific as to etiology.  There were no epileptiform abnormalities recorded.

## 2020-01-16 NOTE — PROGRESS NOTE ADULT - PROBLEM SELECTOR PLAN 1
- Steroid induced hyperglycemia compounded by T2DM  - C/w NPH to 7U in addition to mod SSI w/ goal <180 as pt continues to be tapered down on Decadron  - Final insulin regimen dependent on pt FS on 2mg Decadron BID once tapered - Steroid induced hyperglycemia compounded by T2DM  - Will d/c on NPH 3U BID while on Decadron 2mg BID - Steroid induced hyperglycemia compounded by T2DM  - Will d/c on NPH 5U BID while on Decadron 2mg BID

## 2020-01-17 DIAGNOSIS — B25.9 CYTOMEGALOVIRAL DISEASE, UNSPECIFIED: ICD-10-CM

## 2020-01-17 DIAGNOSIS — E87.1 HYPO-OSMOLALITY AND HYPONATREMIA: ICD-10-CM

## 2020-01-17 DIAGNOSIS — I10 ESSENTIAL (PRIMARY) HYPERTENSION: ICD-10-CM

## 2020-01-17 DIAGNOSIS — Z29.9 ENCOUNTER FOR PROPHYLACTIC MEASURES, UNSPECIFIED: ICD-10-CM

## 2020-01-17 DIAGNOSIS — R56.9 UNSPECIFIED CONVULSIONS: ICD-10-CM

## 2020-01-17 DIAGNOSIS — D49.6 NEOPLASM OF UNSPECIFIED BEHAVIOR OF BRAIN: ICD-10-CM

## 2020-01-17 DIAGNOSIS — D61.818 OTHER PANCYTOPENIA: ICD-10-CM

## 2020-01-17 DIAGNOSIS — Z02.9 ENCOUNTER FOR ADMINISTRATIVE EXAMINATIONS, UNSPECIFIED: ICD-10-CM

## 2020-01-17 DIAGNOSIS — C71.9 MALIGNANT NEOPLASM OF BRAIN, UNSPECIFIED: ICD-10-CM

## 2020-01-17 DIAGNOSIS — E11.9 TYPE 2 DIABETES MELLITUS WITHOUT COMPLICATIONS: ICD-10-CM

## 2020-01-17 LAB
ANION GAP SERPL CALC-SCNC: 12 MMO/L — SIGNIFICANT CHANGE UP (ref 7–14)
BASOPHILS # BLD AUTO: 0.02 K/UL — SIGNIFICANT CHANGE UP (ref 0–0.2)
BASOPHILS NFR BLD AUTO: 0.7 % — SIGNIFICANT CHANGE UP (ref 0–2)
BUN SERPL-MCNC: 19 MG/DL — SIGNIFICANT CHANGE UP (ref 7–23)
CALCIUM SERPL-MCNC: 7.9 MG/DL — LOW (ref 8.4–10.5)
CHLORIDE SERPL-SCNC: 95 MMOL/L — LOW (ref 98–107)
CO2 SERPL-SCNC: 18 MMOL/L — LOW (ref 22–31)
CREAT SERPL-MCNC: 0.39 MG/DL — LOW (ref 0.5–1.3)
EOSINOPHIL # BLD AUTO: 0 K/UL — SIGNIFICANT CHANGE UP (ref 0–0.5)
EOSINOPHIL NFR BLD AUTO: 0 % — SIGNIFICANT CHANGE UP (ref 0–6)
GLUCOSE SERPL-MCNC: 196 MG/DL — HIGH (ref 70–99)
HCT VFR BLD CALC: 28.6 % — LOW (ref 39–50)
HGB BLD-MCNC: 10 G/DL — LOW (ref 13–17)
IMM GRANULOCYTES NFR BLD AUTO: 6.5 % — HIGH (ref 0–1.5)
LYMPHOCYTES # BLD AUTO: 0.9 K/UL — LOW (ref 1–3.3)
LYMPHOCYTES # BLD AUTO: 32.3 % — SIGNIFICANT CHANGE UP (ref 13–44)
MANUAL SMEAR VERIFICATION: SIGNIFICANT CHANGE UP
MCHC RBC-ENTMCNC: 31.4 PG — SIGNIFICANT CHANGE UP (ref 27–34)
MCHC RBC-ENTMCNC: 35 % — SIGNIFICANT CHANGE UP (ref 32–36)
MCV RBC AUTO: 89.9 FL — SIGNIFICANT CHANGE UP (ref 80–100)
MONOCYTES # BLD AUTO: 0.25 K/UL — SIGNIFICANT CHANGE UP (ref 0–0.9)
MONOCYTES NFR BLD AUTO: 9 % — SIGNIFICANT CHANGE UP (ref 2–14)
NEUTROPHILS # BLD AUTO: 1.44 K/UL — LOW (ref 1.8–7.4)
NEUTROPHILS NFR BLD AUTO: 51.5 % — SIGNIFICANT CHANGE UP (ref 43–77)
NRBC # FLD: 0.1 K/UL — SIGNIFICANT CHANGE UP (ref 0–0)
NRBC FLD-RTO: 3.6 — SIGNIFICANT CHANGE UP
PLATELET # BLD AUTO: 33 K/UL — LOW (ref 150–400)
PMV BLD: 10 FL — SIGNIFICANT CHANGE UP (ref 7–13)
POTASSIUM SERPL-MCNC: 5.3 MMOL/L — SIGNIFICANT CHANGE UP (ref 3.5–5.3)
POTASSIUM SERPL-SCNC: 5.3 MMOL/L — SIGNIFICANT CHANGE UP (ref 3.5–5.3)
RBC # BLD: 3.18 M/UL — LOW (ref 4.2–5.8)
RBC # FLD: 16.8 % — HIGH (ref 10.3–14.5)
SODIUM SERPL-SCNC: 125 MMOL/L — LOW (ref 135–145)
WBC # BLD: 2.79 K/UL — LOW (ref 3.8–10.5)
WBC # FLD AUTO: 2.79 K/UL — LOW (ref 3.8–10.5)

## 2020-01-17 PROCEDURE — 99223 1ST HOSP IP/OBS HIGH 75: CPT | Mod: GC

## 2020-01-17 RX ORDER — LEVETIRACETAM 250 MG/1
500 TABLET, FILM COATED ORAL
Refills: 0 | Status: DISCONTINUED | OUTPATIENT
Start: 2020-01-17 | End: 2020-02-02

## 2020-01-17 RX ORDER — VALGANCICLOVIR 450 MG/1
900 TABLET, FILM COATED ORAL
Refills: 0 | Status: DISCONTINUED | OUTPATIENT
Start: 2020-01-17 | End: 2020-01-29

## 2020-01-17 RX ORDER — VALGANCICLOVIR 450 MG/1
450 TABLET, FILM COATED ORAL
Refills: 0 | Status: DISCONTINUED | OUTPATIENT
Start: 2020-01-17 | End: 2020-01-17

## 2020-01-17 RX ORDER — PANTOPRAZOLE SODIUM 20 MG/1
40 TABLET, DELAYED RELEASE ORAL
Refills: 0 | Status: DISCONTINUED | OUTPATIENT
Start: 2020-01-17 | End: 2020-02-02

## 2020-01-17 RX ORDER — DEXTROSE 50 % IN WATER 50 %
12.5 SYRINGE (ML) INTRAVENOUS ONCE
Refills: 0 | Status: DISCONTINUED | OUTPATIENT
Start: 2020-01-17 | End: 2020-02-02

## 2020-01-17 RX ORDER — HUMAN INSULIN 100 [IU]/ML
5 INJECTION, SUSPENSION SUBCUTANEOUS
Refills: 0 | Status: DISCONTINUED | OUTPATIENT
Start: 2020-01-17 | End: 2020-02-02

## 2020-01-17 RX ORDER — DEXAMETHASONE 0.5 MG/5ML
2 ELIXIR ORAL
Refills: 0 | Status: DISCONTINUED | OUTPATIENT
Start: 2020-01-17 | End: 2020-01-28

## 2020-01-17 RX ORDER — SODIUM CHLORIDE 9 MG/ML
1000 INJECTION, SOLUTION INTRAVENOUS
Refills: 0 | Status: DISCONTINUED | OUTPATIENT
Start: 2020-01-17 | End: 2020-02-02

## 2020-01-17 RX ORDER — GLUCAGON INJECTION, SOLUTION 0.5 MG/.1ML
1 INJECTION, SOLUTION SUBCUTANEOUS ONCE
Refills: 0 | Status: DISCONTINUED | OUTPATIENT
Start: 2020-01-17 | End: 2020-02-02

## 2020-01-17 RX ORDER — DEXTROSE 50 % IN WATER 50 %
25 SYRINGE (ML) INTRAVENOUS ONCE
Refills: 0 | Status: DISCONTINUED | OUTPATIENT
Start: 2020-01-17 | End: 2020-02-02

## 2020-01-17 RX ORDER — PREGABALIN 225 MG/1
500 CAPSULE ORAL DAILY
Refills: 0 | Status: DISCONTINUED | OUTPATIENT
Start: 2020-01-17 | End: 2020-02-02

## 2020-01-17 RX ORDER — ATORVASTATIN CALCIUM 80 MG/1
10 TABLET, FILM COATED ORAL AT BEDTIME
Refills: 0 | Status: DISCONTINUED | OUTPATIENT
Start: 2020-01-17 | End: 2020-02-02

## 2020-01-17 RX ORDER — SODIUM CHLORIDE 9 MG/ML
2 INJECTION INTRAMUSCULAR; INTRAVENOUS; SUBCUTANEOUS THREE TIMES A DAY
Refills: 0 | Status: DISCONTINUED | OUTPATIENT
Start: 2020-01-17 | End: 2020-02-02

## 2020-01-17 RX ORDER — DEXTROSE 50 % IN WATER 50 %
15 SYRINGE (ML) INTRAVENOUS ONCE
Refills: 0 | Status: DISCONTINUED | OUTPATIENT
Start: 2020-01-17 | End: 2020-02-02

## 2020-01-17 RX ADMIN — Medication 2 MILLIGRAM(S): at 18:15

## 2020-01-17 RX ADMIN — VALGANCICLOVIR 900 MILLIGRAM(S): 450 TABLET, FILM COATED ORAL at 07:21

## 2020-01-17 RX ADMIN — SODIUM CHLORIDE 2 GRAM(S): 9 INJECTION INTRAMUSCULAR; INTRAVENOUS; SUBCUTANEOUS at 23:50

## 2020-01-17 RX ADMIN — ATORVASTATIN CALCIUM 10 MILLIGRAM(S): 80 TABLET, FILM COATED ORAL at 03:02

## 2020-01-17 RX ADMIN — VALGANCICLOVIR 900 MILLIGRAM(S): 450 TABLET, FILM COATED ORAL at 18:15

## 2020-01-17 RX ADMIN — LEVETIRACETAM 500 MILLIGRAM(S): 250 TABLET, FILM COATED ORAL at 03:03

## 2020-01-17 RX ADMIN — HUMAN INSULIN 5 UNIT(S): 100 INJECTION, SUSPENSION SUBCUTANEOUS at 03:03

## 2020-01-17 RX ADMIN — LEVETIRACETAM 500 MILLIGRAM(S): 250 TABLET, FILM COATED ORAL at 18:15

## 2020-01-17 RX ADMIN — HUMAN INSULIN 5 UNIT(S): 100 INJECTION, SUSPENSION SUBCUTANEOUS at 18:44

## 2020-01-17 RX ADMIN — Medication 2 MILLIGRAM(S): at 03:03

## 2020-01-17 NOTE — H&P ADULT - NSHPREVIEWOFSYSTEMS_GEN_ALL_CORE
CONSTITUTIONAL: No weakness, fatigue, malaise, fevers or chills, no weight change, appetite change  EYES: No visual changes; No double vision,  No vertigo, eye pain  Ears: no otalgia, no otorhea, no hearing loss, tinnitus  Nose: no epistaxis, rhinorrhea, post-discharge, sinus pressure  Throat: no throat pain, no oral lesions, tooth pain   NECK: No pain or stiffness  RESPIRATORY: No cough (productive or dry), wheezing, hemoptysis; No shortness of breath, orthnopnea, PND, HOLT, snoring,  CARDIOVASCULAR: No chest pain or palpitations, no leg edema, no claudication    GASTROINTESTINAL: No abdominal or epigastric pain. No nausea, vomiting, or hematemesis; No diarrhea or constipation. No melena or hematochezia.  GENITOURINARY: No dysuria, frequency, urgency or hematuria, no pelvic pain, urinary incontinence, urgency  Muscloskeletal: no joints or muscle pain, no swelling in joints or muscles  NEUROLOGICAL: No numbness or weakness, headache, memory loss, seizures, dizziness, vertigo, syncope, ataxia  SKIN: No pruritis, rashes, lesions or new moles  Psych: No anxiety, sadness, insomnia, suicide thoughts  Endocrine: No Heat or Cold intolerance, polydipsia, polyphagia  Heme/Lymph: no LN enlargement, no easy bruising or bleeding ROS limited by patient lethargy    CONSTITUTIONAL: No weakness, fatigue, malaise, fevers or chills   EYES: No visual changes   Nose: no rhinorrhea  Throat: no throat pain  RESPIRATORY: No cough (productive or dry), SOB, HOLT  CARDIOVASCULAR: No chest pain or palpitations, no leg edema, no claudication    GASTROINTESTINAL: No abdominal or epigastric pain. No nausea, vomiting, or hematemesis; No diarrhea or constipation. No melena or hematochezia.  GENITOURINARY: No dysuria, frequency, urgency or hematuria, no pelvic pain, urinary incontinence, urgency  Muscloskeletal: no joints or muscle pain, no swelling in joints or muscles  NEUROLOGICAL: No numbness or weakness, headache, memory loss, seizures, dizziness, vertigo, syncope, ataxia  SKIN: No pruritis, rashes, lesions or new moles  Psych: No anxiety, sadness, insomnia, suicide thoughts  Endocrine: No Heat or Cold intolerance, polydipsia, polyphagia  Heme/Lymph: no LN enlargement, no easy bruising or bleeding

## 2020-01-17 NOTE — ED CDU PROVIDER INITIAL DAY NOTE - DETAILS
70 y/o M h/o GBM, recently admitted and found to have CMV. D/c to NH, but returned after rejection by nursing home/rehab facility. In discussion with hospitalist and  it was determined that patient does not need isolation,  will be placed in CDU, continue DC meds, and send to rehab tomorrow.

## 2020-01-17 NOTE — H&P ADULT - PROBLEM SELECTOR PLAN 5
Pancytopenia for >6weeks off Temodar w/out requiring transfusional support   - f/u w/ [facility] after rehab Pancytopenia for >6weeks off Temodar w/out requiring transfusional support. May be 2/2 Bactrim or Temodar vs underlying BM disease. BM biopsy was held on recent admission   - monitor daily CBC  - f/u w/ [facility] after rehab

## 2020-01-17 NOTE — ED CDU PROVIDER INITIAL DAY NOTE - OBJECTIVE STATEMENT
69M hx GBM last chemo/radiation early december recently hospitalized and found to have CMV. d/c'd to rehab/nursing home today for persistent weakness although sent back to [facility] because patient was "refusing to wear his mask" Patient is mildly demented but not endorsing any new complaints. On exam: lethargic, afebrile, lungs clear heart sounds normal abdomen soft, moves all limbs. IMP: Returned after rejection by nursing home/rehab facility. In discussion with hospitalist and  it was determined that patient does not need isolation,  will be placed in CDU, continue DC meds, and send to rehab tomorrow. 69M hx GBM last chemo/radiation early december recently hospitalized and found to have CMV. d/c'd to rehab/nursing home today for persistent weakness although sent back to [facility] because patient was "refusing to wear his mask" Patient is mildly demented (pt's baseline per family)but not endorsing any new complaints. On exam: lethargic, afebrile, lungs clear heart sounds normal abdomen soft, moves all limbs. IMP: Returned after rejection by nursing home/rehab facility. In discussion with hospitalist and  it was determined that patient does not need isolation,  will be placed in CDU, continue DC meds, and send to rehab tomorrow.

## 2020-01-17 NOTE — ED CDU PROVIDER INITIAL DAY NOTE - PROGRESS NOTE DETAILS
CDU Att PN: Pablito  Pt sent back to [facility] from rehab d/t requiring mask for CMV.  ED providers have spoken with rehab to convey that CMV does not require droplet or airborne isolation. SW to assess this am and attempt to xfer back.  I have personally performed a face to face evaluation of this patient including review of the history and focused physical exam.  I have discussed the case and reviewed the plan of care with the PA. Called the [facility]  two times over the past hour with no callback. Left voicemail. No acute indication for isolation.

## 2020-01-17 NOTE — H&P ADULT - PROBLEM SELECTOR PLAN 7
DVT ppx: SCDs in setting of thrombocytopenia  Diet: CC w/ 1200 cc fluid restriction, Halal  Dispo: pending authorization to rehab on 1/21

## 2020-01-17 NOTE — H&P ADULT - NSICDXPASTMEDICALHX_GEN_ALL_CORE_FT
PAST MEDICAL HISTORY:  Diabetes     Glioblastoma multiforme     Hyperlipidemia     Hypertension     Seizures PAST MEDICAL HISTORY:  CMV infection     Diabetes     Glioblastoma multiforme     History of SIADH     Hyperlipidemia     Hypertension     Seizures

## 2020-01-17 NOTE — H&P ADULT - NSICDXPASTSURGICALHX_GEN_ALL_CORE_FT
PAST SURGICAL HISTORY:  H/O colonoscopy     History of laryngoscopy     Status post stereotactic brain biopsy

## 2020-01-17 NOTE — PROVIDER CONTACT NOTE (OTHER) - ASSESSMENT
THAO contacted by [facility] Coordinator Philly who informs hospital received call from [facility] sending Pt back because Pt has CMV virus and is non compliant wearing mask.  THAO contacted [facility] [phone] spoke with RN Supervisor Yasmin Long who reports they have sent the Pt back because Pt is putting staff and other Pt's at risk for virus.  THAO informed medical team.
Pt was referred to CM for short term rehab placement, Pt and family are in agreement with Plan, JIMENEZ completed, a copy placed in chart and case was referred to covering SW (Heather) for follow-up.
aware and said to reach out to her on Monday, 1/20/2020 and she will open the case.  [facility] Contact utilization review person is Rosio [phone].  Family is aware and in agreement with the placement.  Daughter Haily can be reached at [phone].  SW will follow up.

## 2020-01-17 NOTE — H&P ADULT - PROBLEM SELECTOR PLAN 1
Pt [place] in 9/2019 [place] [facility] [place] Temodar (last in 11/2020) and RT (last 12/4/2020).  - MRI performed on this admission with several areas of enhancement which as per discussion with neuro-onc is likely related to RT    - D/c on Decadron 2mg BID throughout rehab until [place] [facility] [place]   - 40 Protonix QD for GI PPx   - Neuro recs appreciated. Pt diagnosed in 9/2019 s/p stereotactic biopsy and resection at [facility], s/p Temodar (last in 11/2020) and RT (last 12/4/2020).   - c/w Decadron 2mg BID    - 40 Protonix QD for GI PPx   - [facility] f/u after discharge from rehab

## 2020-01-17 NOTE — H&P ADULT - NSHPPHYSICALEXAM_GEN_ALL_CORE
GENERAL: NAD, well-groomed, well-developed  HEAD:  Atraumatic, Normocephalic  EYES: EOMI, PERRLA, conjunctiva and sclera clear  ENMT: No tonsillar erythema, exudates, or enlargement; Moist mucous membranes  NECK: Supple, No JVD, Normal thyroid  HEART: Regular rate and rhythm; No murmurs, rubs, or gallops  RESPIRATORY: CTA B/L, No W/R/R  ABDOMEN: Soft, Nontender, Nondistended; Bowel sounds present  NEUROLOGY: A&Ox3, nonfocal, moving all extremities  EXTREMITIES:  2+ Peripheral Pulses, No clubbing, cyanosis, or edema  SKIN: warm, dry, normal color, no rash or abnormal lesions GENERAL: NAD, lethargic  HEAD:  Atraumatic, Normocephalic  EYES: EOMI, PERRLA, conjunctiva and sclera clear  ENMT: No tonsillar erythema, exudates, or enlargement; Moist mucous membranes  NECK: Supple   HEART: Regular rate and rhythm; No murmurs, rubs, or gallops  RESPIRATORY: CTA B/L, No W/R/R  ABDOMEN: Soft, Nontender, Nondistended; Bowel sounds present  NEUROLOGY: A&Ox2 (name and location "[facility]"), nonfocal, moving all extremities  EXTREMITIES:  2+ Peripheral Pulses, No clubbing, cyanosis, or edema  SKIN: warm, dry, normal color, no rash

## 2020-01-17 NOTE — H&P ADULT - HISTORY OF PRESENT ILLNESS
Pt is a 68 yo PMHx DM2, HTN, HLD, GBM (diagnosed 9/19, last chemo 11/19, last radiation 12/4), recent hospitalization, seizures presenting from rehab for   Pt was discharge to rehab for persistent weakness, but was then sent back to [facility] because the patient was "refusing to wear his mask" and the facility thought he needed isolation.     Pt admitted 1/7-1/16 for steroid-induced hyperglycemia and new onset seizures. Pt was started on Keppra on that admission. 24 hour EEG was negative for seizure activity. MRI w/w/out  contrast showed increased enhancement of brain tumors. His course was c/b hyponatremia (low of 126) and he was restarted on his home salt tabs and fluid restricted. Pt was also  found to have CMV reactivation w/ high PCR levels. He was started on Valcyte 900 mg bid.      Meds: Keppra 500mg BID, Decadron 2mg BID, Protonix 40mg QD, Salt Tabs of 2g TID, NPH insulin 5U BID, and Valcyte 900mg BID (until 01/27/20).    In the ED:   VS:  Labs: Pt is a 68 yo PMHx DM2, HTN, HLD, GBM (diagnosed 9/19, last chemo 11/19, last radiation 12/4), recent hospitalization, seizures presenting from rehab/SNF.   Pt was discharge to rehab for persistent weakness, but was then sent back to [facility] because the patient was "refusing to wear his mask" and the facility thought he needed isolation. Per U SW note, pt was accepted to [facility] on 1/17 but too late in the day to obtain authorization. The case will be reopened on Monday 1/20. Pt denies CP, SOB, f/c, n/v. No new complaints.    Pt admitted 1/7-1/16 for steroid-induced hyperglycemia and new onset seizures. Pt was started on Keppra on that admission. 24 hour EEG was negative for seizure activity. MRI w/w/out contrast   showed increased enhancement of brain tumors. His course was c/b hyponatremia (low of 126) and he was restarted on his home salt tabs and fluid restricted. Pt was also  found to have CMV reactivation w/ high PCR levels. He was started on Valcyte 900 mg bid.    In the ED:   VS:  Labs: Pt is a 68 yo PMHx DM2, HTN, HLD, GBM (diagnosed 9/19, last chemo 11/19, last radiation 12/4), recent hospitalization for new seizure, presenting from rehab/SNF.   Pt was discharge to rehab for persistent weakness, but was then sent back to [facility] because the patient was "refusing to wear his mask" and the facility thought he needed isolation. Per U SW note, pt was accepted to [facility] on 1/17 but too late in the day to obtain authorization. The case will be reopened on Monday 1/20. Pt denies CP, SOB, f/c, n/v. No new complaints.    Pt admitted 1/7-1/16 for steroid-induced hyperglycemia and new onset seizures. Pt was started on Keppra on that admission. 24 hour EEG was negative for seizure activity. MRI w/w/out contrast   showed increased enhancement of brain tumors. His course was c/b hyponatremia (low of 126) and he was restarted on his home salt tabs and fluid restricted. Pt was also found to have CMV  reactivation w/ high PCR levels. He was started on Valcyte 900 mg bid.    In the ED:   VS:  Labs: Pt is a 70 yo PMHx DM2, HTN, HLD, GBM (diagnosed 9/19, last chemo 11/19, last radiation 12/4), recent hospitalization for new seizure and CMV, SIADH, presenting from rehab/SNF for alternate rehab placement. Pt was discharge to rehab for persistent weakness, but was then sent back to [facility] because the patient was "refusing to wear his mask" and the facility thought he needed isolation. Per U SW note, pt was accepted to [facility] on 1/17 but too late in the day to obtain authorization. The case will be reopened on Monday 1/20. Pt denies CP, SOB, f/c, n/v. No new complaints.    Pt admitted 1/7-1/16 for steroid-induced hyperglycemia and new onset seizures. Pt was started on Keppra on that admission. 24 hour EEG was negative for seizure activity. MRI w/w/out contrast  showed increased enhancement of brain tumors. His course was c/b hyponatremia (low of 126) and he was restarted on his home salt tabs and fluid restricted. Pt was also found to have CMV  reactivation w/ high PCR levels. He was started on Valcyte 900 mg bid.    In the ED:   VS: Temp 36.3, HR 90, /55, RR 16, SpO2 98%  Labs:WBC 2.79, Hgb 10, Plts 33 Pt is a 70 yo PMHx DM2, HTN, HLD, GBM (diagnosed 9/19, last chemo 11/19, last radiation 12/4), recent hospitalization for new seizure and CMV, SIADH, presenting from [facility]) for alternate rehab placement. Pt was discharge to rehab for persistent weakness, but was then sent back to [facility] because the patient was "refusing to wear his mask" and the facility thought he needed isolation. Per U SW note, pt was accepted to [facility] on 1/17 but too late in the day to obtain authorization. The case will be reopened on Monday 1/20. Pt denies CP, SOB, f/c, n/v, abdominal pain, diarrhea. No new complaints.    Pt admitted 1/7-1/16 for steroid-induced hyperglycemia and new onset seizures. Pt was started on Keppra on that admission. 24 hour EEG was negative for seizure activity. MRI w/w/out contrast  showed increased enhancement of brain tumors. His course was c/b hyponatremia (low of 126) and he was restarted on his home salt tabs and fluid restricted. Pt was also found to have CMV   reactivation w/ high PCR levels. He was started on Valcyte 900 mg bid.    In the ED:   VS: Temp 36.3, HR 90, /55, RR 16, SpO2 98%  Labs:WBC 2.79, Hgb 10, Plts 33 Pt is a 68 yo PMHx DM2, HTN, HLD, GBM (diagnosed 9/19, last chemo 11/19, last radiation 12/4), recent hospitalization for new seizure and CMV, SIADH, presenting from [facility] ([facility]) for alternate rehab placement. Pt was discharge to rehab for persistent weakness, but was then sent back to [facility] because the patient was "refusing to wear his mask" and the facility thought he needed isolation. Pt does not remember what led to him returning to the hospital. Per CDU SW note, pt was accepted to [facility] on 1/17 but too late in the day to obtain authorization. The case will be reopened on Monday 1/20. Pt denies CP, SOB, f/c, n/v, abdominal pain, diarrhea. No new complaints.   Pt admitted 1/7-1/16 for steroid-induced hyperglycemia and new onset seizures. Pt was started on Keppra on that admission. 24 hour EEG was negative for seizure activity. MRI w/w/out contrast showed increased enhancement of brain tumors. His course was c/b hyponatremia (low of 126) and he was restarted on his home salt tabs and fluid restricted. Pt was also found to have CMV reactivation w/ high PCR levels. He was started on Valcyte 900 mg bid.    In the ED:   VS: Temp 36.3, HR 90, /55, RR 16, SpO2 98%  Labs:WBC 2.79, Hgb 10, Plts 33

## 2020-01-17 NOTE — ED CDU PROVIDER DISPOSITION NOTE - CLINICAL COURSE
CDU Att Admit Note: Pablito  Pt placed in CDU after he was dumped by his SNF who refused to accept him back d/t conerns over CMV(?).  Despite multiple calls describing his condition, low infectious risk, and involvement c SW all day via CDU we have been unable to ensure a bed in a SNF for this patient.  He will be admitted to allow for further time to identify a safe and appropriate dispo.   I have personally performed a face to face evaluation of this patient including review of the history and focused physical exam.  I have discussed the case and reviewed the plan of care with the PA.

## 2020-01-17 NOTE — H&P ADULT - PROBLEM SELECTOR PLAN 6
In setting of GBM  - c/w Keppra 500 mg bid  - c/w decadron 2 mg bid In setting of GBM.  - c/w Keppra 500 mg bid  - c/w decadron 2 mg bid  - fall, seizure, aspiration precautions

## 2020-01-17 NOTE — H&P ADULT - NSHPLABSRESULTS_GEN_ALL_CORE
LABS:                         10.0   2.79  )-----------( 33       ( 17 Jan 2020 14:15 )             28.6     01-16    132<L>  |  99  |  22  ----------------------------<  131<H>  3.9   |  21<L>  |  0.40<L>    Ca    8.0<L>      16 Jan 2020 06:15  Phos  3.4     01-16  Mg     1.8     01-16    TPro  5.2<L>  /  Alb  2.7<L>  /  TBili  0.5  /  DBili  x   /  AST  19  /  ALT  31  /  AlkPhos  57  01-16        RADIOLOGY, EKG & ADDITIONAL TESTS: Reviewed.

## 2020-01-17 NOTE — H&P ADULT - ATTENDING COMMENTS
68 y/o male HX of DM Type 2, HTN, High Cholesterol, Glioblastoma, DX Sept. 2019, pt was on Chemo which was stopped in Nov. 2019 due to Pancytopenia, Last  R TX in Dec. 2019, Recent Hospitalization for New Onset Seizure, and CMV, HX of SIADH, on Fluid restrict and Sodium  Tabs,  Pt was sent back  from Rehab to [facility] ER for  New Rehab Placement, Pt is Lethargic but  this is  his Baseline, Pt is A+O x 2, No fever, no cough, no N/V, No Constipation,  no Diarrhea, pt is A+O x 2,      Na 125, , WBC 2.79, Hgb 10, Platelet 33,     Continue: Valganciclovir, NPH 5 unit SQ BID. , Lipitor , PO Decadron 2 mg BID, Keppra 500 mg po BID, NaCl Tablet 2 gm PO BID, PO Protonix, Venodyne for DVT Prophylaxis, FS, Sliding scale, Fall/seizure/aspiration precaution, FS, Sliding scale, F/U CBC, CMP,     Case D/W HS , Nursing, and Pt,     Pt was seen by me, Dr. BONNIE Dwyer  on 1/17/2020.

## 2020-01-17 NOTE — H&P ADULT - ASSESSMENT
Pt is a 70 yo PMHx DM2, HTN, HLD, GBM (diagnosed 9/19, last chemo 11/19, last radiation 12/4), recent hospitalization for new seizure and CMV, SIADH, presenting from rehab/SNF for alternate rehab placement.

## 2020-01-17 NOTE — H&P ADULT - PROBLEM SELECTOR PLAN 3
2/2 immune compromise, diagnosed on recent hospitalization  - c/w Valcyte 900mg BID for until 01/27/20 for 2 week course  - Recheck CMV PCR 01/21/20

## 2020-01-17 NOTE — PROVIDER CONTACT NOTE (OTHER) - BACKGROUND
As per Friday 1/17/2020, pt was accepted to [facility] [phone] however no auth was obtained because it was too late in the day.  Bertha from [facility] is

## 2020-01-17 NOTE — ED CDU PROVIDER INITIAL DAY NOTE - ATTENDING CONTRIBUTION TO CARE
Dr Cruz 69M hx GBM last chemo/radiation early december recently hospitalized and found to have CMV. d/c'd to rehab/nursing home today for persistent weakness although sent back to [facility] because patient was "refusing to wear his mask" Patient is mildly demented (pt's baseline per family)but not endorsing any new complaints. On exam: lethargic, afebrile, lungs clear heart sounds normal abdomen soft, moves all limbs. IMP: Returned after rejection by nursing home/rehab facility. In discussion with hospitalist and  it was determined that patient does not need isolation, should not be readmitted if possible,  will be placed in CDU, continue DC meds, and reattempt to send to rehab tomorrow.

## 2020-01-17 NOTE — H&P ADULT - PROBLEM SELECTOR PLAN 2
Pt w/ DM2, hyperglycemia on recent admission related to decadron  - c/w NPH 5U BID while on Decadron 2mg BID

## 2020-01-17 NOTE — H&P ADULT - PROBLEM SELECTOR PLAN 8
Transitions of Care Status:  1.  Name of PCP:   2.  PCP Contacted on Admission: [ ] Y    [x] N    3.  PCP contacted at Discharge: [ ] Y    [ ] N    [ ] N/A  4.  Post-Discharge Appointment Date and Location:  5.  Summary of Handoff given to PCP: Transitions of Care Status:  1.  Name of PCP: NO PCP   2.  PCP Contacted on Admission: [ ] Y    [x] N    3.  PCP contacted at Discharge: [ ] Y    [ ] N    [ ] N/A  4.  Post-Discharge Appointment Date and Location:  5.  Summary of Handoff given to PCP:

## 2020-01-18 LAB
ALBUMIN SERPL ELPH-MCNC: 3.1 G/DL — LOW (ref 3.3–5)
ALP SERPL-CCNC: 63 U/L — SIGNIFICANT CHANGE UP (ref 40–120)
ALT FLD-CCNC: 32 U/L — SIGNIFICANT CHANGE UP (ref 4–41)
ANION GAP SERPL CALC-SCNC: 13 MMO/L — SIGNIFICANT CHANGE UP (ref 7–14)
AST SERPL-CCNC: 17 U/L — SIGNIFICANT CHANGE UP (ref 4–40)
BASOPHILS # BLD AUTO: 0.02 K/UL — SIGNIFICANT CHANGE UP (ref 0–0.2)
BASOPHILS NFR BLD AUTO: 0.7 % — SIGNIFICANT CHANGE UP (ref 0–2)
BILIRUB SERPL-MCNC: 0.4 MG/DL — SIGNIFICANT CHANGE UP (ref 0.2–1.2)
BUN SERPL-MCNC: 15 MG/DL — SIGNIFICANT CHANGE UP (ref 7–23)
CALCIUM SERPL-MCNC: 8.4 MG/DL — SIGNIFICANT CHANGE UP (ref 8.4–10.5)
CHLORIDE SERPL-SCNC: 96 MMOL/L — LOW (ref 98–107)
CO2 SERPL-SCNC: 22 MMOL/L — SIGNIFICANT CHANGE UP (ref 22–31)
CREAT SERPL-MCNC: 0.39 MG/DL — LOW (ref 0.5–1.3)
EOSINOPHIL # BLD AUTO: 0 K/UL — SIGNIFICANT CHANGE UP (ref 0–0.5)
EOSINOPHIL NFR BLD AUTO: 0 % — SIGNIFICANT CHANGE UP (ref 0–6)
GLUCOSE SERPL-MCNC: 162 MG/DL — HIGH (ref 70–99)
HCT VFR BLD CALC: 28.4 % — LOW (ref 39–50)
HGB BLD-MCNC: 9.8 G/DL — LOW (ref 13–17)
IMM GRANULOCYTES NFR BLD AUTO: 7.4 % — HIGH (ref 0–1.5)
LYMPHOCYTES # BLD AUTO: 1.02 K/UL — SIGNIFICANT CHANGE UP (ref 1–3.3)
LYMPHOCYTES # BLD AUTO: 36 % — SIGNIFICANT CHANGE UP (ref 13–44)
MAGNESIUM SERPL-MCNC: 1.9 MG/DL — SIGNIFICANT CHANGE UP (ref 1.6–2.6)
MCHC RBC-ENTMCNC: 31.2 PG — SIGNIFICANT CHANGE UP (ref 27–34)
MCHC RBC-ENTMCNC: 34.5 % — SIGNIFICANT CHANGE UP (ref 32–36)
MCV RBC AUTO: 90.4 FL — SIGNIFICANT CHANGE UP (ref 80–100)
MONOCYTES # BLD AUTO: 0.26 K/UL — SIGNIFICANT CHANGE UP (ref 0–0.9)
MONOCYTES NFR BLD AUTO: 9.2 % — SIGNIFICANT CHANGE UP (ref 2–14)
NEUTROPHILS # BLD AUTO: 1.32 K/UL — LOW (ref 1.8–7.4)
NEUTROPHILS NFR BLD AUTO: 46.7 % — SIGNIFICANT CHANGE UP (ref 43–77)
NRBC # FLD: 0.05 K/UL — SIGNIFICANT CHANGE UP (ref 0–0)
NRBC FLD-RTO: 1.8 — SIGNIFICANT CHANGE UP
PHOSPHATE SERPL-MCNC: 3.4 MG/DL — SIGNIFICANT CHANGE UP (ref 2.5–4.5)
PLATELET # BLD AUTO: 32 K/UL — LOW (ref 150–400)
PMV BLD: 10 FL — SIGNIFICANT CHANGE UP (ref 7–13)
POTASSIUM SERPL-MCNC: 4.1 MMOL/L — SIGNIFICANT CHANGE UP (ref 3.5–5.3)
POTASSIUM SERPL-SCNC: 4.1 MMOL/L — SIGNIFICANT CHANGE UP (ref 3.5–5.3)
PROT SERPL-MCNC: 5.6 G/DL — LOW (ref 6–8.3)
RBC # BLD: 3.14 M/UL — LOW (ref 4.2–5.8)
RBC # FLD: 16.6 % — HIGH (ref 10.3–14.5)
SODIUM SERPL-SCNC: 131 MMOL/L — LOW (ref 135–145)
WBC # BLD: 2.83 K/UL — LOW (ref 3.8–10.5)
WBC # FLD AUTO: 2.83 K/UL — LOW (ref 3.8–10.5)

## 2020-01-18 PROCEDURE — 99233 SBSQ HOSP IP/OBS HIGH 50: CPT

## 2020-01-18 RX ORDER — INSULIN LISPRO 100/ML
VIAL (ML) SUBCUTANEOUS
Refills: 0 | Status: DISCONTINUED | OUTPATIENT
Start: 2020-01-18 | End: 2020-02-02

## 2020-01-18 RX ADMIN — SODIUM CHLORIDE 2 GRAM(S): 9 INJECTION INTRAMUSCULAR; INTRAVENOUS; SUBCUTANEOUS at 06:54

## 2020-01-18 RX ADMIN — SODIUM CHLORIDE 2 GRAM(S): 9 INJECTION INTRAMUSCULAR; INTRAVENOUS; SUBCUTANEOUS at 21:37

## 2020-01-18 RX ADMIN — PREGABALIN 500 MICROGRAM(S): 225 CAPSULE ORAL at 13:48

## 2020-01-18 RX ADMIN — Medication 2 MILLIGRAM(S): at 18:07

## 2020-01-18 RX ADMIN — HUMAN INSULIN 5 UNIT(S): 100 INJECTION, SUSPENSION SUBCUTANEOUS at 07:22

## 2020-01-18 RX ADMIN — Medication 2 MILLIGRAM(S): at 06:54

## 2020-01-18 RX ADMIN — VALGANCICLOVIR 900 MILLIGRAM(S): 450 TABLET, FILM COATED ORAL at 06:54

## 2020-01-18 RX ADMIN — PANTOPRAZOLE SODIUM 40 MILLIGRAM(S): 20 TABLET, DELAYED RELEASE ORAL at 06:54

## 2020-01-18 RX ADMIN — ATORVASTATIN CALCIUM 10 MILLIGRAM(S): 80 TABLET, FILM COATED ORAL at 21:37

## 2020-01-18 RX ADMIN — ATORVASTATIN CALCIUM 10 MILLIGRAM(S): 80 TABLET, FILM COATED ORAL at 00:23

## 2020-01-18 RX ADMIN — LEVETIRACETAM 500 MILLIGRAM(S): 250 TABLET, FILM COATED ORAL at 06:54

## 2020-01-18 RX ADMIN — LEVETIRACETAM 500 MILLIGRAM(S): 250 TABLET, FILM COATED ORAL at 18:06

## 2020-01-18 RX ADMIN — HUMAN INSULIN 5 UNIT(S): 100 INJECTION, SUSPENSION SUBCUTANEOUS at 18:06

## 2020-01-18 RX ADMIN — SODIUM CHLORIDE 2 GRAM(S): 9 INJECTION INTRAMUSCULAR; INTRAVENOUS; SUBCUTANEOUS at 13:49

## 2020-01-18 RX ADMIN — VALGANCICLOVIR 900 MILLIGRAM(S): 450 TABLET, FILM COATED ORAL at 18:06

## 2020-01-18 NOTE — PROGRESS NOTE ADULT - SUBJECTIVE AND OBJECTIVE BOX
Patient is a 69y old  Male who presents with a chief complaint of sent back from [facility], Hyponatremia, SIADH, Glioblastoma, (17 Jan 2020 19:23)      SUBJECTIVE / OVERNIGHT EVENTS: Pt currently without complaint, says he is resting comfortably.    MEDICATIONS  (STANDING):  atorvastatin 10 milliGRAM(s) Oral at bedtime  cyanocobalamin 500 MICROGram(s) Oral daily  dexAMETHasone     Tablet 2 milliGRAM(s) Oral two times a day  dextrose 5%. 1000 milliLiter(s) (50 mL/Hr) IV Continuous <Continuous>  dextrose 50% Injectable 12.5 Gram(s) IV Push once  dextrose 50% Injectable 25 Gram(s) IV Push once  dextrose 50% Injectable 25 Gram(s) IV Push once  insulin lispro (HumaLOG) corrective regimen sliding scale   SubCutaneous Before meals and at bedtime  insulin NPH human recombinant 5 Unit(s) SubCutaneous two times a day  levETIRAcetam 500 milliGRAM(s) Oral two times a day  pantoprazole    Tablet 40 milliGRAM(s) Oral before breakfast  sodium chloride 2 Gram(s) Oral three times a day  valGANciclovir 900 milliGRAM(s) Oral two times a day    MEDICATIONS  (PRN):  dextrose 40% Gel 15 Gram(s) Oral once PRN Blood Glucose LESS THAN 70 milliGRAM(s)/deciliter  glucagon  Injectable 1 milliGRAM(s) IntraMuscular once PRN Glucose LESS THAN 70 milligrams/deciliter      CAPILLARY BLOOD GLUCOSE      POCT Blood Glucose.: 192 mg/dL (18 Jan 2020 12:53)  POCT Blood Glucose.: 143 mg/dL (18 Jan 2020 08:29)  POCT Blood Glucose.: 140 mg/dL (18 Jan 2020 07:19)  POCT Blood Glucose.: 275 mg/dL (17 Jan 2020 21:51)  POCT Blood Glucose.: 128 mg/dL (17 Jan 2020 18:06)    I&O's Summary    17 Jan 2020 07:01  -  18 Jan 2020 07:00  --------------------------------------------------------  IN: 237 mL / OUT: 275 mL / NET: -38 mL        PHYSICAL EXAM:  Vital Signs Last 24 Hrs  T(C): 36.7 (18 Jan 2020 09:03), Max: 36.9 (17 Jan 2020 18:10)  T(F): 98 (18 Jan 2020 09:03), Max: 98.5 (17 Jan 2020 18:10)  HR: 90 (18 Jan 2020 09:03) (85 - 99)  BP: 110/69 (18 Jan 2020 09:03) (103/64 - 118/71)  BP(mean): --  RR: 17 (18 Jan 2020 09:03) (15 - 17)  SpO2: 97% (18 Jan 2020 09:03) (97% - 100%)  CONSTITUTIONAL: NAD, well-developed, well-groomed  EYES: PERRLA; conjunctiva and sclera clear  ENMT: Moist oral mucosa, no pharyngeal injection or exudates; normal dentition  NECK: Supple, no palpable masses; no thyromegaly  RESPIRATORY: Normal respiratory effort; lungs are clear to auscultation bilaterally  CARDIOVASCULAR: Regular rate and rhythm, normal S1 and S2, no murmur/rub/gallop; No lower extremity edema; Peripheral pulses are 2+ bilaterally  ABDOMEN: Nontender to palpation, normoactive bowel sounds, no rebound/guarding; No hepatosplenomegaly    LABS:                        9.8    2.83  )-----------( 32       ( 18 Jan 2020 05:20 )             28.4     01-18    131<L>  |  96<L>  |  15  ----------------------------<  162<H>  4.1   |  22  |  0.39<L>    Ca    8.4      18 Jan 2020 05:20  Phos  3.4     01-18  Mg     1.9     01-18    TPro  5.6<L>  /  Alb  3.1<L>  /  TBili  0.4  /  DBili  x   /  AST  17  /  ALT  32  /  AlkPhos  63  01-18                RADIOLOGY & ADDITIONAL TESTS:  Results Reviewed:   Imaging Personally Reviewed:  Electrocardiogram Personally Reviewed:    COORDINATION OF CARE:  Care Discussed with Consultants/Other Providers [Y/N]:  Prior or Outpatient Records Reviewed [Y/N]:

## 2020-01-19 LAB
ALBUMIN SERPL ELPH-MCNC: 2.9 G/DL — LOW (ref 3.3–5)
ALP SERPL-CCNC: 60 U/L — SIGNIFICANT CHANGE UP (ref 40–120)
ALT FLD-CCNC: 33 U/L — SIGNIFICANT CHANGE UP (ref 4–41)
ANION GAP SERPL CALC-SCNC: 12 MMO/L — SIGNIFICANT CHANGE UP (ref 7–14)
AST SERPL-CCNC: 19 U/L — SIGNIFICANT CHANGE UP (ref 4–40)
BASOPHILS # BLD AUTO: 0.01 K/UL — SIGNIFICANT CHANGE UP (ref 0–0.2)
BASOPHILS NFR BLD AUTO: 0.3 % — SIGNIFICANT CHANGE UP (ref 0–2)
BILIRUB SERPL-MCNC: 0.5 MG/DL — SIGNIFICANT CHANGE UP (ref 0.2–1.2)
BUN SERPL-MCNC: 22 MG/DL — SIGNIFICANT CHANGE UP (ref 7–23)
CALCIUM SERPL-MCNC: 8.2 MG/DL — LOW (ref 8.4–10.5)
CHLORIDE SERPL-SCNC: 96 MMOL/L — LOW (ref 98–107)
CO2 SERPL-SCNC: 23 MMOL/L — SIGNIFICANT CHANGE UP (ref 22–31)
CREAT SERPL-MCNC: 0.53 MG/DL — SIGNIFICANT CHANGE UP (ref 0.5–1.3)
EOSINOPHIL # BLD AUTO: 0 K/UL — SIGNIFICANT CHANGE UP (ref 0–0.5)
EOSINOPHIL NFR BLD AUTO: 0 % — SIGNIFICANT CHANGE UP (ref 0–6)
GLUCOSE SERPL-MCNC: 142 MG/DL — HIGH (ref 70–99)
HCT VFR BLD CALC: 29.2 % — LOW (ref 39–50)
HGB BLD-MCNC: 10 G/DL — LOW (ref 13–17)
IMM GRANULOCYTES NFR BLD AUTO: 6.8 % — HIGH (ref 0–1.5)
LYMPHOCYTES # BLD AUTO: 0.98 K/UL — LOW (ref 1–3.3)
LYMPHOCYTES # BLD AUTO: 31.6 % — SIGNIFICANT CHANGE UP (ref 13–44)
MAGNESIUM SERPL-MCNC: 1.9 MG/DL — SIGNIFICANT CHANGE UP (ref 1.6–2.6)
MANUAL SMEAR VERIFICATION: SIGNIFICANT CHANGE UP
MCHC RBC-ENTMCNC: 31.3 PG — SIGNIFICANT CHANGE UP (ref 27–34)
MCHC RBC-ENTMCNC: 34.2 % — SIGNIFICANT CHANGE UP (ref 32–36)
MCV RBC AUTO: 91.5 FL — SIGNIFICANT CHANGE UP (ref 80–100)
MONOCYTES # BLD AUTO: 0.24 K/UL — SIGNIFICANT CHANGE UP (ref 0–0.9)
MONOCYTES NFR BLD AUTO: 7.7 % — SIGNIFICANT CHANGE UP (ref 2–14)
NEUTROPHILS # BLD AUTO: 1.66 K/UL — LOW (ref 1.8–7.4)
NEUTROPHILS NFR BLD AUTO: 53.6 % — SIGNIFICANT CHANGE UP (ref 43–77)
NRBC # FLD: 0.07 K/UL — SIGNIFICANT CHANGE UP (ref 0–0)
NRBC FLD-RTO: 2.3 — SIGNIFICANT CHANGE UP
PHOSPHATE SERPL-MCNC: 3.7 MG/DL — SIGNIFICANT CHANGE UP (ref 2.5–4.5)
PLATELET # BLD AUTO: 37 K/UL — LOW (ref 150–400)
PMV BLD: 11.1 FL — SIGNIFICANT CHANGE UP (ref 7–13)
POTASSIUM SERPL-MCNC: 3.9 MMOL/L — SIGNIFICANT CHANGE UP (ref 3.5–5.3)
POTASSIUM SERPL-SCNC: 3.9 MMOL/L — SIGNIFICANT CHANGE UP (ref 3.5–5.3)
PROT SERPL-MCNC: 5.6 G/DL — LOW (ref 6–8.3)
RBC # BLD: 3.19 M/UL — LOW (ref 4.2–5.8)
RBC # FLD: 16.7 % — HIGH (ref 10.3–14.5)
SODIUM SERPL-SCNC: 131 MMOL/L — LOW (ref 135–145)
WBC # BLD: 3.1 K/UL — LOW (ref 3.8–10.5)
WBC # FLD AUTO: 3.1 K/UL — LOW (ref 3.8–10.5)

## 2020-01-19 PROCEDURE — 99233 SBSQ HOSP IP/OBS HIGH 50: CPT

## 2020-01-19 RX ADMIN — HUMAN INSULIN 5 UNIT(S): 100 INJECTION, SUSPENSION SUBCUTANEOUS at 09:00

## 2020-01-19 RX ADMIN — VALGANCICLOVIR 900 MILLIGRAM(S): 450 TABLET, FILM COATED ORAL at 06:38

## 2020-01-19 RX ADMIN — LEVETIRACETAM 500 MILLIGRAM(S): 250 TABLET, FILM COATED ORAL at 06:38

## 2020-01-19 RX ADMIN — HUMAN INSULIN 5 UNIT(S): 100 INJECTION, SUSPENSION SUBCUTANEOUS at 17:37

## 2020-01-19 RX ADMIN — Medication 2 MILLIGRAM(S): at 17:38

## 2020-01-19 RX ADMIN — ATORVASTATIN CALCIUM 10 MILLIGRAM(S): 80 TABLET, FILM COATED ORAL at 22:52

## 2020-01-19 RX ADMIN — LEVETIRACETAM 500 MILLIGRAM(S): 250 TABLET, FILM COATED ORAL at 17:38

## 2020-01-19 RX ADMIN — SODIUM CHLORIDE 2 GRAM(S): 9 INJECTION INTRAMUSCULAR; INTRAVENOUS; SUBCUTANEOUS at 14:29

## 2020-01-19 RX ADMIN — Medication 2 MILLIGRAM(S): at 06:39

## 2020-01-19 RX ADMIN — SODIUM CHLORIDE 2 GRAM(S): 9 INJECTION INTRAMUSCULAR; INTRAVENOUS; SUBCUTANEOUS at 06:38

## 2020-01-19 RX ADMIN — VALGANCICLOVIR 900 MILLIGRAM(S): 450 TABLET, FILM COATED ORAL at 17:38

## 2020-01-19 RX ADMIN — PANTOPRAZOLE SODIUM 40 MILLIGRAM(S): 20 TABLET, DELAYED RELEASE ORAL at 06:39

## 2020-01-19 RX ADMIN — SODIUM CHLORIDE 2 GRAM(S): 9 INJECTION INTRAMUSCULAR; INTRAVENOUS; SUBCUTANEOUS at 22:52

## 2020-01-19 RX ADMIN — PREGABALIN 500 MICROGRAM(S): 225 CAPSULE ORAL at 11:43

## 2020-01-19 NOTE — PROGRESS NOTE ADULT - SUBJECTIVE AND OBJECTIVE BOX
Patient is a 69y old  Male who presents with a chief complaint of sent back from [facility], Hyponatremia, SIADH, Glioblastoma, (18 Jan 2020 13:40)      SUBJECTIVE / OVERNIGHT EVENTS: No overnight event. No complaint this morning.    MEDICATIONS  (STANDING):  atorvastatin 10 milliGRAM(s) Oral at bedtime  cyanocobalamin 500 MICROGram(s) Oral daily  dexAMETHasone     Tablet 2 milliGRAM(s) Oral two times a day  dextrose 5%. 1000 milliLiter(s) (50 mL/Hr) IV Continuous <Continuous>  dextrose 50% Injectable 12.5 Gram(s) IV Push once  dextrose 50% Injectable 25 Gram(s) IV Push once  dextrose 50% Injectable 25 Gram(s) IV Push once  insulin lispro (HumaLOG) corrective regimen sliding scale   SubCutaneous Before meals and at bedtime  insulin NPH human recombinant 5 Unit(s) SubCutaneous two times a day  levETIRAcetam 500 milliGRAM(s) Oral two times a day  pantoprazole    Tablet 40 milliGRAM(s) Oral before breakfast  sodium chloride 2 Gram(s) Oral three times a day  valGANciclovir 900 milliGRAM(s) Oral two times a day    MEDICATIONS  (PRN):  dextrose 40% Gel 15 Gram(s) Oral once PRN Blood Glucose LESS THAN 70 milliGRAM(s)/deciliter  glucagon  Injectable 1 milliGRAM(s) IntraMuscular once PRN Glucose LESS THAN 70 milligrams/deciliter      CAPILLARY BLOOD GLUCOSE      POCT Blood Glucose.: 188 mg/dL (19 Jan 2020 12:25)  POCT Blood Glucose.: 111 mg/dL (19 Jan 2020 08:34)  POCT Blood Glucose.: 196 mg/dL (18 Jan 2020 21:54)  POCT Blood Glucose.: 174 mg/dL (18 Jan 2020 17:36)    I&O's Summary      PHYSICAL EXAM:  Vital Signs Last 24 Hrs  T(C): 36.8 (19 Jan 2020 09:40), Max: 36.8 (19 Jan 2020 09:40)  T(F): 98.2 (19 Jan 2020 09:40), Max: 98.2 (19 Jan 2020 09:40)  HR: 72 (19 Jan 2020 09:40) (69 - 90)  BP: 115/68 (19 Jan 2020 09:40) (106/70 - 115/70)  BP(mean): --  RR: 17 (19 Jan 2020 09:40) (14 - 18)  SpO2: 99% (19 Jan 2020 09:40) (97% - 100%)  CONSTITUTIONAL: thin older man, NAD  EYES: PERRLA; conjunctiva and sclera clear  ENMT: Moist oral mucosa, no pharyngeal injection or exudates; normal dentition  RESPIRATORY: Normal respiratory effort; lungs are clear to auscultation bilaterally  CARDIOVASCULAR: Regular rate and rhythm, normal S1 and S2, no murmur/rub/gallop; No lower extremity edema; Peripheral pulses are 2+ bilaterally  ABDOMEN: Nontender to palpation, normoactive bowel sounds, no rebound/guarding; No hepatosplenomegaly    LABS:                        10.0   3.10  )-----------( 37       ( 19 Jan 2020 04:55 )             29.2     01-19    131<L>  |  96<L>  |  22  ----------------------------<  142<H>  3.9   |  23  |  0.53    Ca    8.2<L>      19 Jan 2020 04:55  Phos  3.7     01-19  Mg     1.9     01-19    TPro  5.6<L>  /  Alb  2.9<L>  /  TBili  0.5  /  DBili  x   /  AST  19  /  ALT  33  /  AlkPhos  60  01-19                RADIOLOGY & ADDITIONAL TESTS:  Results Reviewed:   Imaging Personally Reviewed:  Electrocardiogram Personally Reviewed:    COORDINATION OF CARE:  Care Discussed with Consultants/Other Providers [Y/N]:  Prior or Outpatient Records Reviewed [Y/N]:

## 2020-01-20 ENCOUNTER — TRANSCRIPTION ENCOUNTER (OUTPATIENT)
Age: 70
End: 2020-01-20

## 2020-01-20 LAB
ALBUMIN SERPL ELPH-MCNC: 2.8 G/DL — LOW (ref 3.3–5)
ALP SERPL-CCNC: 56 U/L — SIGNIFICANT CHANGE UP (ref 40–120)
ALT FLD-CCNC: 31 U/L — SIGNIFICANT CHANGE UP (ref 4–41)
ANION GAP SERPL CALC-SCNC: 11 MMO/L — SIGNIFICANT CHANGE UP (ref 7–14)
AST SERPL-CCNC: 20 U/L — SIGNIFICANT CHANGE UP (ref 4–40)
BASOPHILS # BLD AUTO: 0 K/UL — SIGNIFICANT CHANGE UP (ref 0–0.2)
BASOPHILS NFR BLD AUTO: 0 % — SIGNIFICANT CHANGE UP (ref 0–2)
BILIRUB SERPL-MCNC: 0.4 MG/DL — SIGNIFICANT CHANGE UP (ref 0.2–1.2)
BUN SERPL-MCNC: 23 MG/DL — SIGNIFICANT CHANGE UP (ref 7–23)
CALCIUM SERPL-MCNC: 8.2 MG/DL — LOW (ref 8.4–10.5)
CHLORIDE SERPL-SCNC: 97 MMOL/L — LOW (ref 98–107)
CO2 SERPL-SCNC: 21 MMOL/L — LOW (ref 22–31)
CREAT SERPL-MCNC: 0.41 MG/DL — LOW (ref 0.5–1.3)
EOSINOPHIL # BLD AUTO: 0 K/UL — SIGNIFICANT CHANGE UP (ref 0–0.5)
EOSINOPHIL NFR BLD AUTO: 0 % — SIGNIFICANT CHANGE UP (ref 0–6)
GLUCOSE SERPL-MCNC: 153 MG/DL — HIGH (ref 70–99)
HCT VFR BLD CALC: 26.2 % — LOW (ref 39–50)
HGB BLD-MCNC: 9 G/DL — LOW (ref 13–17)
IMM GRANULOCYTES NFR BLD AUTO: 5.4 % — HIGH (ref 0–1.5)
LYMPHOCYTES # BLD AUTO: 0.72 K/UL — LOW (ref 1–3.3)
LYMPHOCYTES # BLD AUTO: 28 % — SIGNIFICANT CHANGE UP (ref 13–44)
MAGNESIUM SERPL-MCNC: 1.9 MG/DL — SIGNIFICANT CHANGE UP (ref 1.6–2.6)
MCHC RBC-ENTMCNC: 31.1 PG — SIGNIFICANT CHANGE UP (ref 27–34)
MCHC RBC-ENTMCNC: 34.4 % — SIGNIFICANT CHANGE UP (ref 32–36)
MCV RBC AUTO: 90.7 FL — SIGNIFICANT CHANGE UP (ref 80–100)
MONOCYTES # BLD AUTO: 0.17 K/UL — SIGNIFICANT CHANGE UP (ref 0–0.9)
MONOCYTES NFR BLD AUTO: 6.6 % — SIGNIFICANT CHANGE UP (ref 2–14)
NEUTROPHILS # BLD AUTO: 1.54 K/UL — LOW (ref 1.8–7.4)
NEUTROPHILS NFR BLD AUTO: 60 % — SIGNIFICANT CHANGE UP (ref 43–77)
NRBC # FLD: 0.04 K/UL — SIGNIFICANT CHANGE UP (ref 0–0)
NRBC FLD-RTO: 1.6 — SIGNIFICANT CHANGE UP
PHOSPHATE SERPL-MCNC: 3.5 MG/DL — SIGNIFICANT CHANGE UP (ref 2.5–4.5)
PLATELET # BLD AUTO: 34 K/UL — LOW (ref 150–400)
PMV BLD: 10.4 FL — SIGNIFICANT CHANGE UP (ref 7–13)
POTASSIUM SERPL-MCNC: 4 MMOL/L — SIGNIFICANT CHANGE UP (ref 3.5–5.3)
POTASSIUM SERPL-SCNC: 4 MMOL/L — SIGNIFICANT CHANGE UP (ref 3.5–5.3)
PROT SERPL-MCNC: 5.4 G/DL — LOW (ref 6–8.3)
RBC # BLD: 2.89 M/UL — LOW (ref 4.2–5.8)
RBC # FLD: 16.6 % — HIGH (ref 10.3–14.5)
SODIUM SERPL-SCNC: 129 MMOL/L — LOW (ref 135–145)
WBC # BLD: 2.57 K/UL — LOW (ref 3.8–10.5)
WBC # FLD AUTO: 2.57 K/UL — LOW (ref 3.8–10.5)

## 2020-01-20 PROCEDURE — 99233 SBSQ HOSP IP/OBS HIGH 50: CPT

## 2020-01-20 RX ORDER — POLYETHYLENE GLYCOL 3350 17 G/17G
17 POWDER, FOR SOLUTION ORAL DAILY
Refills: 0 | Status: DISCONTINUED | OUTPATIENT
Start: 2020-01-20 | End: 2020-02-02

## 2020-01-20 RX ORDER — SENNA PLUS 8.6 MG/1
2 TABLET ORAL AT BEDTIME
Refills: 0 | Status: DISCONTINUED | OUTPATIENT
Start: 2020-01-20 | End: 2020-02-02

## 2020-01-20 RX ADMIN — SODIUM CHLORIDE 2 GRAM(S): 9 INJECTION INTRAMUSCULAR; INTRAVENOUS; SUBCUTANEOUS at 13:11

## 2020-01-20 RX ADMIN — SENNA PLUS 2 TABLET(S): 8.6 TABLET ORAL at 22:17

## 2020-01-20 RX ADMIN — SODIUM CHLORIDE 2 GRAM(S): 9 INJECTION INTRAMUSCULAR; INTRAVENOUS; SUBCUTANEOUS at 22:17

## 2020-01-20 RX ADMIN — Medication 2 MILLIGRAM(S): at 17:12

## 2020-01-20 RX ADMIN — Medication 2 MILLIGRAM(S): at 07:14

## 2020-01-20 RX ADMIN — VALGANCICLOVIR 900 MILLIGRAM(S): 450 TABLET, FILM COATED ORAL at 07:14

## 2020-01-20 RX ADMIN — PANTOPRAZOLE SODIUM 40 MILLIGRAM(S): 20 TABLET, DELAYED RELEASE ORAL at 07:14

## 2020-01-20 RX ADMIN — PREGABALIN 500 MICROGRAM(S): 225 CAPSULE ORAL at 11:12

## 2020-01-20 RX ADMIN — LEVETIRACETAM 500 MILLIGRAM(S): 250 TABLET, FILM COATED ORAL at 17:12

## 2020-01-20 RX ADMIN — LEVETIRACETAM 500 MILLIGRAM(S): 250 TABLET, FILM COATED ORAL at 07:14

## 2020-01-20 RX ADMIN — HUMAN INSULIN 5 UNIT(S): 100 INJECTION, SUSPENSION SUBCUTANEOUS at 08:46

## 2020-01-20 RX ADMIN — ATORVASTATIN CALCIUM 10 MILLIGRAM(S): 80 TABLET, FILM COATED ORAL at 22:17

## 2020-01-20 RX ADMIN — HUMAN INSULIN 5 UNIT(S): 100 INJECTION, SUSPENSION SUBCUTANEOUS at 17:37

## 2020-01-20 RX ADMIN — VALGANCICLOVIR 900 MILLIGRAM(S): 450 TABLET, FILM COATED ORAL at 17:12

## 2020-01-20 RX ADMIN — POLYETHYLENE GLYCOL 3350 17 GRAM(S): 17 POWDER, FOR SOLUTION ORAL at 17:38

## 2020-01-20 RX ADMIN — SODIUM CHLORIDE 2 GRAM(S): 9 INJECTION INTRAMUSCULAR; INTRAVENOUS; SUBCUTANEOUS at 07:14

## 2020-01-20 NOTE — DISCHARGE NOTE PROVIDER - HOSPITAL COURSE
68 yo PMHx DM2, HTN, HLD, GBM (diagnosed 9/19, last chemo 11/19, last radiation 12/4), recent hospitalization for new seizure and CMV, SIADH, presenting from rehab/SNF for alternate rehab placement.        Glioblastoma multiforme.     Pt diagnosed in 9/2019 s/p stereotactic biopsy and [place] [facility] [place], s/p Temodar (last in 11/2020) and RT (last 12/4/2020).     - c/w Decadron 2mg BID      - 40 Protonix QD for GI PPx     - [facility] f/u after discharge from rehab        Diabetes mellitus, type II.    Pt w/ DM2, hyperglycemia on recent admission related to decadron    - c/w NPH 5U BID while on Decadron 2mg BID.         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         Hyponatremia.     Pt w/ hyponatremia 2/2 SIADH in setting of GBM on recent admission. Na 134 at time of discharge on 1/16    - c/w Salt tabs 2g TID w/ 1.2L fluid restriction        Pancytopenia.      Pancytopenia for >6weeks off Temodar w/out requiring transfusional support. May be 2/2 Bactrim or Temodar vs underlying BM disease. BM biopsy was held on recent admission     - monitor daily CBC    - f/u w/ [facility] after rehab.         Seizures    In setting of GBM.    - c/w Keppra 500 mg bid    - c/w decadron 2 mg bid    - fall, seizure, aspiration precautions.        Need for prophylactic measure    -Plan: DVT ppx: SCDs in setting of thrombocytopenia    -Diet: CC w/ 1200 cc fluid restriction, Halal        Dispo: pending authorization to rehab on 1/21. 68 yo PMHx DM2, HTN, HLD, GBM (diagnosed 9/19, last chemo 11/19, last radiation 12/4), recent hospitalization for new seizure and CMV, SIADH, presenting from rehab/SNF for alternate rehab placement.        Glioblastoma multiforme.     Pt diagnosed in 9/2019 s/p stereotactic biopsy and resection at [facility] [place], s/p Temodar (last in 11/2020) and RT (last 12/4/2020).     - c/w Decadron 2mg BID      - 40 Protonix QD for GI PPx     - [facility] f/u after discharge from rehab        Diabetes mellitus, type II.    Pt w/ DM2, hyperglycemia on recent admission related to decadron    - c/w NPH 5U BID while on Decadron 2mg BID.         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 : Much improved        Hyponatremia.     Pt w/ hyponatremia 2/2 SIADH in setting of GBM on recent admission. Na 134 at time of discharge on 1/16    - c/w Salt tabs 2g TID w/ 1.2L fluid restriction        Pancytopenia.      Pancytopenia for >6weeks off Temodar w/out requiring transfusional support. May be 2/2 Bactrim or Temodar vs underlying BM disease. BM biopsy was held on recent admission     - monitor daily CBC    - f/u w/ [facility] after rehab.         Seizures    In setting of GBM.    - c/w Keppra 500 mg bid    - c/w decadron 2 mg bid    - fall, seizure, aspiration precautions.        Need for prophylactic measure    -Plan: DVT ppx: SCDs in setting of thrombocytopenia    -Diet: CC w/ 1200 cc fluid restriction, Halal        Dispo: pending rehab    On_________, discussed with __________, patient is medically cleared and optimized for discharge today. All medications were reviewed with attending, and sent to mutually agreed upon pharmacy. 70 yo PMHx DM2, HTN, HLD, GBM (diagnosed 9/19, last chemo 11/19, last radiation 12/4), recent hospitalization for new seizure and CMV, SIADH, presenting from rehab/SNF for alternate rehab placement.        Glioblastoma multiforme.     [place] in 9[place] [facility] [place]p Temodar (last in 11/2020) and RT (last 12/4/2020).     - c/w Decadron 2mg BID      - 40 Protonix QD for GI PPx     - [facility] f/u after discharge from rehab        Diabetes mellitus, type II.    Pt w/ DM2, hyperglycemia on recent admission related to decadron    - c/w NPH 5U BID while on Decadron 2mg BID.         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 : Much improved    - Valcyte d/c'ed as per ID, no need for repeat        Hyponatremia.     Pt w/ hyponatremia 2/2 SIADH in setting of GBM on recent admission. Na 134 at time of discharge on 1/16    - c/w Salt tabs 2g TID w/ 1.2L fluid restriction        Pancytopenia.      Pancytopenia for >6weeks off Temodar w/out requiring transfusional support. May be 2/2 Bactrim or Temodar vs underlying BM disease. BM biopsy was held on recent admission     - monitor daily CBC    - f/u w/ [facility] after rehab.         Seizures    In setting of GBM.    - c/w Keppra 500 mg bid    - c/w decadron 2 mg bid    - fall, seizure, aspiration precautions.        Need for prophylactic measure    -Plan: DVT ppx: SCDs in setting of thrombocytopenia    -Diet: CC w/ 1200 cc fluid restriction, Halal        Dispo: pending rehab    On_________, discussed with __________, patient is medically cleared and optimized for discharge today. All medications were reviewed with attending, and sent to mutually agreed upon pharmacy. 70 yo PMHx DM2, HTN, HLD, GBM (diagnosed 9/19, last chemo 11/19, last radiation 12/4), recent hospitalization for new seizure and CMV, SIADH, presenting from rehab/SNF for alternate rehab placement.        Glioblastoma multiforme.     Pt diagnosed in 9/2019 s/p stereotactic biopsy and resection at [facility] [place], s/p Temodar (last in 11/2020) and RT (last 12/4/2020).     - c/w Decadron 2mg BID      - 40 Protonix QD for GI PPx     - [facility] f/u after discharge from rehab        Diabetes mellitus, type II.    Pt w/ DM2, hyperglycemia on recent admission related to decadron    - c/w NPH 5U BID while on Decadron 2mg BID.         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 : Much improved    - Valcyte d/c'ed as per ID, no need for repeat        Hyponatremia.     Pt w/ hyponatremia 2/2 SIADH in setting of GBM on recent admission. Na 134 at time of discharge on 1/16    - c/w Salt tabs 2g TID w/ 1.2L fluid restriction    -Sodium normalized        Pancytopenia.      Pancytopenia for >6weeks off Temodar w/out requiring transfusional support. May be 2/2 Bactrim or Temodar vs underlying BM disease. BM biopsy was held on recent admission     - monitor daily CBC    - f/u w/ [facility] after rehab.         Seizures    In setting of GBM.    - c/w Keppra 500 mg bid    - c/w decadron 2 mg bid    - fall, seizure, aspiration precautions.        Need for prophylactic measure    -Plan: DVT ppx: SCDs in setting of thrombocytopenia    -Diet: CC w/ 1200 cc fluid restriction, Halal        Dispo: pending rehab        On discussed with Dr. Olmstead, patient is medically cleared and optimized for discharge today. All medications were reviewed with attending, and sent to mutually agreed upon pharmacy. 70 yo PMHx DM2, HTN, HLD, GBM (diagnosed 9/19, last chemo 11/19, last radiation 12/4), recent hospitalization for new seizure and CMV, SIADH, presenting from rehab/SNF for alternate rehab placement.        Glioblastoma multiforme.     Pt diagnosed in 9/2019 s/p stereot[place] [facility] [place], [place]/p Temodar (last in 11/2020) and RT (last 12/4/2020).     - c/w Decadron 2mg BID      - 40 Protonix QD for GI PPx     - [facility] f/u after discharge from rehab    - neurosurgery: no acute intervention, no need for repeat head CT     -1/28 s/p 24mg of decadron, continue with 8mg BID x 3 days then to reamain on maintenance dose of 4 mg BID     - 1/29: s/p IR LP - opening pressure 11, protein elevated at 149.2, all other values wnl    -MRI Head-->Multifocal areas of tumor are again noted with distribution as described. The postcontrast series are markedly limited by motion substantially limiting comparison to the prior study. The left temporal lobe edema appears mildly progressed.            Diabetes mellitus, type II.    Pt w/ DM2, hyperglycemia on recent admission related to decadron    - c/w NPH 5U BID while on Decadron 2mg BID.         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 : Much improved    - Valcyte d/c'ed as per ID, no need for repeat        Hyponatremia.     Pt w/ hyponatremia 2/2 SIADH in setting of GBM on recent admission. Na 134 at time of discharge on 1/16    - c/w Salt tabs 2g TID w/ 1.2L fluid restriction    -Sodium normalized        Pancytopenia.      Pancytopenia for >6weeks off Temodar w/out requiring transfusional support. May be 2/2 Bactrim or Temodar vs underlying BM disease. BM biopsy was held on recent admission     - monitor daily CBC    -D-dimer elevated, fibrinogen normal.     - f/u w/ [facility] after rehab.         Seizures    In setting of GBM.    - c/w Keppra 500 mg bid    - c/w decadron 2 mg bid    - fall, seizure, aspiration precautions.        Agitation    -Psych consulted     -Melatonin 3mg QHS    -Haldol as need 0.25 mg q 8 hrs prn            Need for prophylactic measure    -Plan: DVT ppx: SCDs in setting of thrombocytopenia    -Diet: CC w/ 1200 cc fluid restriction, Halal        Dispo: pending rehab        On discussed with Dr. Olmstead, patient is medically cleared and optimized for discharge today. All medications were reviewed with attending, and sent to mutually agreed upon pharmacy.

## 2020-01-20 NOTE — DISCHARGE NOTE PROVIDER - NSDCMRMEDTOKEN_GEN_ALL_CORE_FT
atorvastatin 10 mg oral tablet: 1 tab(s) orally once a day (FIlled Oct/2019 x 3 months)  bisacodyl 10 mg rectal suppository: 1 suppository(ies) rectal once a day, As needed, Constipation  dexamethasone 2 mg oral tablet: 1 tab(s) orally 2 times a day  HumuLIN N 100 units/mL subcutaneous suspension: 5 unit(s) subcutaneous 2 times a day  levETIRAcetam 500 mg oral tablet: 1 tab(s) orally 2 times a day  melatonin 3 mg oral tablet: 1 tab(s) orally once a day (at bedtime)  polyethylene glycol 3350 oral powder for reconstitution: 17 gram(s) orally once a day  Protonix 40 mg oral delayed release tablet: 1 tab(s) orally once a day  senna oral tablet: 2 tab(s) orally once a day (at bedtime)  Sodium Chloride 1 g oral tablet: 2 tab(s) orally 3 times a day   valGANciclovir 450 mg oral tablet: 2 tab(s) orally 2 times a day  Vitamin B12 500 mcg oral tablet: 1 tab(s) orally once a day atorvastatin 10 mg oral tablet: 1 tab(s) orally once a day (FIlled Oct/2019 x 3 months)  bisacodyl 10 mg rectal suppository: 1 suppository(ies) rectal once a day, As needed, Constipation  dexamethasone 2 mg oral tablet: 2 tab(s) orally 2 times a day  HumuLIN N 100 units/mL subcutaneous suspension: 5 unit(s) subcutaneous 2 times a day  levETIRAcetam 500 mg oral tablet: 1 tab(s) orally 2 times a day  melatonin 3 mg oral tablet: 1 tab(s) orally once a day (at bedtime)  polyethylene glycol 3350 oral powder for reconstitution: 17 gram(s) orally once a day  Protonix 40 mg oral delayed release tablet: 1 tab(s) orally once a day  senna oral tablet: 2 tab(s) orally once a day (at bedtime)  Sodium Chloride 1 g oral tablet: 2 tab(s) orally 3 times a day   Vitamin B12 500 mcg oral tablet: 1 tab(s) orally once a day

## 2020-01-20 NOTE — PROGRESS NOTE ADULT - SUBJECTIVE AND OBJECTIVE BOX
Patient is a 69y old  Male who presents with a chief complaint of sent back from [facility], Hyponatremia, SIADH, Glioblastoma, (19 Jan 2020 14:11)      SUBJECTIVE / OVERNIGHT EVENTS: No overnight event. No complaint today.    MEDICATIONS  (STANDING):  atorvastatin 10 milliGRAM(s) Oral at bedtime  cyanocobalamin 500 MICROGram(s) Oral daily  dexAMETHasone     Tablet 2 milliGRAM(s) Oral two times a day  dextrose 5%. 1000 milliLiter(s) (50 mL/Hr) IV Continuous <Continuous>  dextrose 50% Injectable 12.5 Gram(s) IV Push once  dextrose 50% Injectable 25 Gram(s) IV Push once  dextrose 50% Injectable 25 Gram(s) IV Push once  insulin lispro (HumaLOG) corrective regimen sliding scale   SubCutaneous Before meals and at bedtime  insulin NPH human recombinant 5 Unit(s) SubCutaneous two times a day  levETIRAcetam 500 milliGRAM(s) Oral two times a day  pantoprazole    Tablet 40 milliGRAM(s) Oral before breakfast  sodium chloride 2 Gram(s) Oral three times a day  valGANciclovir 900 milliGRAM(s) Oral two times a day    MEDICATIONS  (PRN):  dextrose 40% Gel 15 Gram(s) Oral once PRN Blood Glucose LESS THAN 70 milliGRAM(s)/deciliter  glucagon  Injectable 1 milliGRAM(s) IntraMuscular once PRN Glucose LESS THAN 70 milligrams/deciliter      CAPILLARY BLOOD GLUCOSE      POCT Blood Glucose.: 173 mg/dL (20 Jan 2020 12:37)  POCT Blood Glucose.: 139 mg/dL (20 Jan 2020 08:38)  POCT Blood Glucose.: 212 mg/dL (19 Jan 2020 21:56)  POCT Blood Glucose.: 181 mg/dL (19 Jan 2020 17:31)    I&O's Summary    19 Jan 2020 07:01  -  20 Jan 2020 07:00  --------------------------------------------------------  IN: 0 mL / OUT: 350 mL / NET: -350 mL        PHYSICAL EXAM:  Vital Signs Last 24 Hrs  T(C): 36.6 (20 Jan 2020 04:30), Max: 36.7 (19 Jan 2020 15:31)  T(F): 97.8 (20 Jan 2020 04:30), Max: 98 (19 Jan 2020 15:31)  HR: 77 (20 Jan 2020 04:30) (68 - 82)  BP: 105/67 (20 Jan 2020 04:30) (100/60 - 124/68)  BP(mean): --  RR: 16 (20 Jan 2020 04:30) (14 - 18)  SpO2: 99% (20 Jan 2020 04:30) (99% - 100%)  CONSTITUTIONAL: thin older man, NAD  EYES: PERRLA; conjunctiva and sclera clear  ENMT: Moist oral mucosa, no pharyngeal injection or exudates; normal dentition  NECK: Supple, no palpable masses; no thyromegaly  RESPIRATORY: Normal respiratory effort; lungs are clear to auscultation bilaterally  CARDIOVASCULAR: Regular rate and rhythm, normal S1 and S2, no murmur/rub/gallop; No lower extremity edema; Peripheral pulses are 2+ bilaterally  ABDOMEN: Nontender to palpation, normoactive bowel sounds, no rebound/guarding; No hepatosplenomegaly    LABS:                        9.0    2.57  )-----------( 34       ( 20 Jan 2020 05:15 )             26.2     01-20    129<L>  |  97<L>  |  23  ----------------------------<  153<H>  4.0   |  21<L>  |  0.41<L>    Ca    8.2<L>      20 Jan 2020 05:15  Phos  3.5     01-20  Mg     1.9     01-20    TPro  5.4<L>  /  Alb  2.8<L>  /  TBili  0.4  /  DBili  x   /  AST  20  /  ALT  31  /  AlkPhos  56  01-20                RADIOLOGY & ADDITIONAL TESTS:  Results Reviewed:   Imaging Personally Reviewed:  Electrocardiogram Personally Reviewed:    COORDINATION OF CARE:  Care Discussed with Consultants/Other Providers [Y/N]:  Prior or Outpatient Records Reviewed [Y/N]:

## 2020-01-20 NOTE — DISCHARGE NOTE PROVIDER - NSDCCPCAREPLAN_GEN_ALL_CORE_FT
PRINCIPAL DISCHARGE DIAGNOSIS  Diagnosis: Glioblastoma multiforme  Assessment and Plan of Treatment: Outpatient follow up with your oncologist at [facility] [place]      SECONDARY DISCHARGE DIAGNOSES  Diagnosis: Weakness  Assessment and Plan of Treatment: Maintain a safe environment. Do not change positions quickly. Participate in program recommended by physical therapy    Diagnosis: Pancytopenia  Assessment and Plan of Treatment: Follow-up with your outpatient provider if further treatment is warranted. Monitor for signs/symptoms indicating worsening of disease, such as, easy bleeding/bruising, pale skin, fatigue, dizziness, increased heart rate, or chest pain.    Diagnosis: Hyponatremia  Assessment and Plan of Treatment: Continue salt tabs and fluid restricted diet 1.2L per day.    Diagnosis: CMV (cytomegalovirus infection)  Assessment and Plan of Treatment: Continue antiviral through 1/27/2020 PRINCIPAL DISCHARGE DIAGNOSIS  Diagnosis: Glioblastoma multiforme  Assessment and Plan of Treatment: [place] [facility] [place]      SECONDARY DISCHARGE DIAGNOSES  Diagnosis: Pancytopenia  Assessment and Plan of Treatment: Follow-up with your outpatient provider if further treatment is warranted. Monitor for signs/symptoms indicating worsening of disease, such as, easy bleeding/bruising, pale skin, fatigue, dizziness, increased heart rate, or chest pain.    Diagnosis: CMV (cytomegalovirus infection)  Assessment and Plan of Treatment: Continue antiviral through 1/27/2020    Diagnosis: Hyponatremia  Assessment and Plan of Treatment: Continue salt tabs and fluid restricted diet 1.2L per day.    Diagnosis: Weakness  Assessment and Plan of Treatment: Maintain a safe environment. Do not change positions quickly. Participate in program recommended by physical therapy PRINCIPAL DISCHARGE DIAGNOSIS  Diagnosis: Glioblastoma multiforme  Assessment and Plan of Treatment: [place] [facility] [place]      SECONDARY DISCHARGE DIAGNOSES  Diagnosis: Pancytopenia  Assessment and Plan of Treatment: Follow-up with your outpatient provider if further treatment is warranted. Monitor for signs/symptoms indicating worsening of disease, such as, easy bleeding/bruising, pale skin, fatigue, dizziness, increased heart rate, or chest pain.    Diagnosis: CMV (cytomegalovirus infection)  Assessment and Plan of Treatment: Completed antiviral on 1/27/2020    Diagnosis: Hyponatremia  Assessment and Plan of Treatment: Continue salt tabs and fluid restricted diet 1.2L per day.    Diagnosis: Weakness  Assessment and Plan of Treatment: Maintain a safe environment. Do not change positions quickly. Participate in program recommended by physical therapy

## 2020-01-21 DIAGNOSIS — G93.40 ENCEPHALOPATHY, UNSPECIFIED: ICD-10-CM

## 2020-01-21 LAB
ALBUMIN SERPL ELPH-MCNC: 3.1 G/DL — LOW (ref 3.3–5)
ALP SERPL-CCNC: 57 U/L — SIGNIFICANT CHANGE UP (ref 40–120)
ALT FLD-CCNC: 33 U/L — SIGNIFICANT CHANGE UP (ref 4–41)
ANION GAP SERPL CALC-SCNC: 12 MMO/L — SIGNIFICANT CHANGE UP (ref 7–14)
AST SERPL-CCNC: 16 U/L — SIGNIFICANT CHANGE UP (ref 4–40)
BASOPHILS # BLD AUTO: 0.01 K/UL — SIGNIFICANT CHANGE UP (ref 0–0.2)
BASOPHILS NFR BLD AUTO: 0.4 % — SIGNIFICANT CHANGE UP (ref 0–2)
BILIRUB SERPL-MCNC: 0.5 MG/DL — SIGNIFICANT CHANGE UP (ref 0.2–1.2)
BUN SERPL-MCNC: 22 MG/DL — SIGNIFICANT CHANGE UP (ref 7–23)
CALCIUM SERPL-MCNC: 8.6 MG/DL — SIGNIFICANT CHANGE UP (ref 8.4–10.5)
CHLORIDE SERPL-SCNC: 98 MMOL/L — SIGNIFICANT CHANGE UP (ref 98–107)
CO2 SERPL-SCNC: 21 MMOL/L — LOW (ref 22–31)
CREAT SERPL-MCNC: 0.46 MG/DL — LOW (ref 0.5–1.3)
EOSINOPHIL # BLD AUTO: 0 K/UL — SIGNIFICANT CHANGE UP (ref 0–0.5)
EOSINOPHIL NFR BLD AUTO: 0 % — SIGNIFICANT CHANGE UP (ref 0–6)
GLUCOSE BLDC GLUCOMTR-MCNC: 208 MG/DL — HIGH (ref 70–99)
GLUCOSE BLDC GLUCOMTR-MCNC: 218 MG/DL — HIGH (ref 70–99)
GLUCOSE SERPL-MCNC: 137 MG/DL — HIGH (ref 70–99)
HCT VFR BLD CALC: 29.7 % — LOW (ref 39–50)
HGB BLD-MCNC: 10.1 G/DL — LOW (ref 13–17)
IMM GRANULOCYTES NFR BLD AUTO: 5.1 % — HIGH (ref 0–1.5)
LYMPHOCYTES # BLD AUTO: 0.83 K/UL — LOW (ref 1–3.3)
LYMPHOCYTES # BLD AUTO: 30.3 % — SIGNIFICANT CHANGE UP (ref 13–44)
MAGNESIUM SERPL-MCNC: 1.9 MG/DL — SIGNIFICANT CHANGE UP (ref 1.6–2.6)
MCHC RBC-ENTMCNC: 32.1 PG — SIGNIFICANT CHANGE UP (ref 27–34)
MCHC RBC-ENTMCNC: 34 % — SIGNIFICANT CHANGE UP (ref 32–36)
MCV RBC AUTO: 94.3 FL — SIGNIFICANT CHANGE UP (ref 80–100)
MONOCYTES # BLD AUTO: 0.18 K/UL — SIGNIFICANT CHANGE UP (ref 0–0.9)
MONOCYTES NFR BLD AUTO: 6.6 % — SIGNIFICANT CHANGE UP (ref 2–14)
NEUTROPHILS # BLD AUTO: 1.58 K/UL — LOW (ref 1.8–7.4)
NEUTROPHILS NFR BLD AUTO: 57.6 % — SIGNIFICANT CHANGE UP (ref 43–77)
NRBC # FLD: 0.05 K/UL — SIGNIFICANT CHANGE UP (ref 0–0)
NRBC FLD-RTO: 1.8 — SIGNIFICANT CHANGE UP
PHOSPHATE SERPL-MCNC: 3.8 MG/DL — SIGNIFICANT CHANGE UP (ref 2.5–4.5)
PLATELET # BLD AUTO: 34 K/UL — LOW (ref 150–400)
PMV BLD: 10.9 FL — SIGNIFICANT CHANGE UP (ref 7–13)
POTASSIUM SERPL-MCNC: 3.9 MMOL/L — SIGNIFICANT CHANGE UP (ref 3.5–5.3)
POTASSIUM SERPL-SCNC: 3.9 MMOL/L — SIGNIFICANT CHANGE UP (ref 3.5–5.3)
PROT SERPL-MCNC: 5.6 G/DL — LOW (ref 6–8.3)
RBC # BLD: 3.15 M/UL — LOW (ref 4.2–5.8)
RBC # FLD: 17 % — HIGH (ref 10.3–14.5)
SODIUM SERPL-SCNC: 131 MMOL/L — LOW (ref 135–145)
WBC # BLD: 2.74 K/UL — LOW (ref 3.8–10.5)
WBC # FLD AUTO: 2.74 K/UL — LOW (ref 3.8–10.5)

## 2020-01-21 PROCEDURE — 93010 ELECTROCARDIOGRAM REPORT: CPT

## 2020-01-21 PROCEDURE — 99233 SBSQ HOSP IP/OBS HIGH 50: CPT

## 2020-01-21 PROCEDURE — 90792 PSYCH DIAG EVAL W/MED SRVCS: CPT

## 2020-01-21 RX ORDER — LANOLIN ALCOHOL/MO/W.PET/CERES
3 CREAM (GRAM) TOPICAL
Refills: 0 | Status: DISCONTINUED | OUTPATIENT
Start: 2020-01-21 | End: 2020-02-02

## 2020-01-21 RX ADMIN — PREGABALIN 500 MICROGRAM(S): 225 CAPSULE ORAL at 18:20

## 2020-01-21 RX ADMIN — VALGANCICLOVIR 900 MILLIGRAM(S): 450 TABLET, FILM COATED ORAL at 05:50

## 2020-01-21 RX ADMIN — ATORVASTATIN CALCIUM 10 MILLIGRAM(S): 80 TABLET, FILM COATED ORAL at 23:18

## 2020-01-21 RX ADMIN — LEVETIRACETAM 500 MILLIGRAM(S): 250 TABLET, FILM COATED ORAL at 18:22

## 2020-01-21 RX ADMIN — VALGANCICLOVIR 900 MILLIGRAM(S): 450 TABLET, FILM COATED ORAL at 18:22

## 2020-01-21 RX ADMIN — PANTOPRAZOLE SODIUM 40 MILLIGRAM(S): 20 TABLET, DELAYED RELEASE ORAL at 05:50

## 2020-01-21 RX ADMIN — HUMAN INSULIN 5 UNIT(S): 100 INJECTION, SUSPENSION SUBCUTANEOUS at 10:07

## 2020-01-21 RX ADMIN — Medication 2 MILLIGRAM(S): at 18:21

## 2020-01-21 RX ADMIN — SENNA PLUS 2 TABLET(S): 8.6 TABLET ORAL at 23:20

## 2020-01-21 RX ADMIN — LEVETIRACETAM 500 MILLIGRAM(S): 250 TABLET, FILM COATED ORAL at 05:50

## 2020-01-21 RX ADMIN — SODIUM CHLORIDE 2 GRAM(S): 9 INJECTION INTRAMUSCULAR; INTRAVENOUS; SUBCUTANEOUS at 23:18

## 2020-01-21 RX ADMIN — SODIUM CHLORIDE 2 GRAM(S): 9 INJECTION INTRAMUSCULAR; INTRAVENOUS; SUBCUTANEOUS at 05:50

## 2020-01-21 RX ADMIN — Medication 2 MILLIGRAM(S): at 05:52

## 2020-01-21 RX ADMIN — Medication 3 MILLIGRAM(S): at 23:20

## 2020-01-21 RX ADMIN — HUMAN INSULIN 5 UNIT(S): 100 INJECTION, SUSPENSION SUBCUTANEOUS at 18:21

## 2020-01-21 RX ADMIN — SODIUM CHLORIDE 2 GRAM(S): 9 INJECTION INTRAMUSCULAR; INTRAVENOUS; SUBCUTANEOUS at 13:24

## 2020-01-21 NOTE — BEHAVIORAL HEALTH ASSESSMENT NOTE - SUMMARY
The patient is a 69 year old male, , lives at home with wife, with a PMHx DM2, HTN, HLD, GBM (diagnosed 9/19, last chemo 11/19, last radiation 12/4), recent hospitalization for new seizure and CMV, SIADH, presenting from rehab/SNF for alternate rehab placement. According to team he has been intermittently agitated. No prn needs though. Psychiatry has been consulted for agitation management.    Based on assessment done today, suspecting a delirious process with unsure cognitive decline if any at baseline. Lethargic, confused, disoriented, with intermittent agitation. No si or hi, no apparent psychosis    Recommendations  - No indication for a psychiatric 1:1 CO  - Recheck qtc.   - Collaborate care with daughter who is also a physician  - Melatonin 3mg q 8pm po-for sleep wake reversal associated with delirium  - No other standing medications as of now.  - AGGRESSION NOTE RESPONDING TO VERBAL REDIRECTION- Haldol 0.25mg q 8hrs prn po/im/iv- IF QTC<500

## 2020-01-21 NOTE — BEHAVIORAL HEALTH ASSESSMENT NOTE - HPI (INCLUDE ILLNESS QUALITY, SEVERITY, DURATION, TIMING, CONTEXT, MODIFYING FACTORS, ASSOCIATED SIGNS AND SYMPTOMS)
The patient is a 69 year old male, , lives at home with wife, with a PMHx DM2, HTN, HLD, GBM (diagnosed 9/19, last chemo 11/19, last radiation 12/4), recent hospitalization for new seizure and CMV, SIADH, presenting from rehab/SNF for alternate rehab placement. According to team he has been intermittently agitated. No prn needs though. Psychiatry has been consulted for agitation management.    Met with the patient. Conversed in [place]- this MD speaks the language. Wife at bedside. Patient noted to be lethargic, falls as sleep midway during conversation. Noted to confused, disoriented. Wife states that patient is intermittently restless, and 'hyper'. No history of mental health issues.     Care coordinated with daughter Radha who is also a physician ([phone])- below impression and plan discussed and she is in agreement

## 2020-01-21 NOTE — PROGRESS NOTE ADULT - PROBLEM SELECTOR PLAN 7
DVT ppx: SCDs in setting of thrombocytopenia  Diet: CC w/ 1200 cc fluid restriction, Halal  Dispo: pending authorization to rehab on 1/21  Appears to be at same functional status as was on d/c  Plan discussed with patient and family.   Case d/w Case management, social work and nursing on IDRs

## 2020-01-21 NOTE — PROGRESS NOTE ADULT - SUBJECTIVE AND OBJECTIVE BOX
Patient is a 69y old  Male who presents with a chief complaint of sent back from SNF, Hyponatremia, SIADH, Glioblastoma, (20 Jan 2020 17:58)      SUBJECTIVE / OVERNIGHT EVENTS:  Patient seen and examined.  No acute events overnight.  No current complaints.  Patient/family refused translation services - preferred to have patient's brother translate, Hemant who is an oncologist.  Per wife, patient has had some intermittent agitation.      MEDICATIONS  (STANDING):  atorvastatin 10 milliGRAM(s) Oral at bedtime  cyanocobalamin 500 MICROGram(s) Oral daily  dexAMETHasone     Tablet 2 milliGRAM(s) Oral two times a day  dextrose 5%. 1000 milliLiter(s) (50 mL/Hr) IV Continuous <Continuous>  dextrose 50% Injectable 12.5 Gram(s) IV Push once  dextrose 50% Injectable 25 Gram(s) IV Push once  dextrose 50% Injectable 25 Gram(s) IV Push once  insulin lispro (HumaLOG) corrective regimen sliding scale   SubCutaneous Before meals and at bedtime  insulin NPH human recombinant 5 Unit(s) SubCutaneous two times a day  levETIRAcetam 500 milliGRAM(s) Oral two times a day  pantoprazole    Tablet 40 milliGRAM(s) Oral before breakfast  polyethylene glycol 3350 17 Gram(s) Oral daily  senna 2 Tablet(s) Oral at bedtime  sodium chloride 2 Gram(s) Oral three times a day  valGANciclovir 900 milliGRAM(s) Oral two times a day    MEDICATIONS  (PRN):  dextrose 40% Gel 15 Gram(s) Oral once PRN Blood Glucose LESS THAN 70 milliGRAM(s)/deciliter  glucagon  Injectable 1 milliGRAM(s) IntraMuscular once PRN Glucose LESS THAN 70 milligrams/deciliter      Vital Signs Last 24 Hrs  T(C): 36.3 (21 Jan 2020 13:51), Max: 36.8 (20 Jan 2020 21:55)  T(F): 97.3 (21 Jan 2020 13:51), Max: 98.3 (20 Jan 2020 21:55)  HR: 87 (21 Jan 2020 13:51) (76 - 87)  BP: 100/64 (21 Jan 2020 13:51) (100/64 - 126/66)  BP(mean): --  RR: 16 (21 Jan 2020 13:51) (16 - 17)  SpO2: 100% (21 Jan 2020 13:51) (99% - 100%)  CAPILLARY BLOOD GLUCOSE      POCT Blood Glucose.: 222 mg/dL (21 Jan 2020 12:33)  POCT Blood Glucose.: 134 mg/dL (21 Jan 2020 08:34)  POCT Blood Glucose.: 185 mg/dL (20 Jan 2020 21:54)  POCT Blood Glucose.: 172 mg/dL (20 Jan 2020 17:36)    I&O's Summary    20 Jan 2020 07:01  -  21 Jan 2020 07:00  --------------------------------------------------------  IN: 0 mL / OUT: 500 mL / NET: -500 mL        PHYSICAL EXAM:  GENERAL: NAD, well-developed  HEENT: MMM, conjunctiva and sclera clear  NECK: Supple, No JVD  CHEST/LUNG: Clear to auscultation bilaterally; No wheeze  HEART: Regular rate and rhythm; No murmurs, rubs, or gallops  ABDOMEN: Soft, Nontender, Nondistended; Bowel sounds present  EXTREMITIES:  2+ Peripheral Pulses, No clubbing, cyanosis, or edema  PSYCH: AAOx3  NEUROLOGY: non-focal  SKIN: No rashes or lesions    LABS:                        10.1   2.74  )-----------( 34       ( 21 Jan 2020 06:30 )             29.7     01-21    131<L>  |  98  |  22  ----------------------------<  137<H>  3.9   |  21<L>  |  0.46<L>    Ca    8.6      21 Jan 2020 06:30  Phos  3.8     01-21  Mg     1.9     01-21    TPro  5.6<L>  /  Alb  3.1<L>  /  TBili  0.5  /  DBili  x   /  AST  16  /  ALT  33  /  AlkPhos  57  01-21

## 2020-01-21 NOTE — BEHAVIORAL HEALTH ASSESSMENT NOTE - NSBHCHARTREVIEWLAB_PSY_A_CORE FT
CBC Full  -  ( 21 Jan 2020 06:30 )  WBC Count : 2.74 K/uL  RBC Count : 3.15 M/uL  Hemoglobin : 10.1 g/dL  Hematocrit : 29.7 %  Platelet Count - Automated : 34 K/uL  Mean Cell Volume : 94.3 fL  Mean Cell Hemoglobin : 32.1 pg  Mean Cell Hemoglobin Concentration : 34.0 %  Auto Neutrophil # : 1.58 K/uL  Auto Lymphocyte # : 0.83 K/uL  Auto Monocyte # : 0.18 K/uL  Auto Eosinophil # : 0.00 K/uL  Auto Basophil # : 0.01 K/uL  Auto Neutrophil % : 57.6 %  Auto Lymphocyte % : 30.3 %  Auto Monocyte % : 6.6 %  Auto Eosinophil % : 0.0 %  Auto Basophil % : 0.4 %  01-21    131<L>  |  98  |  22  ----------------------------<  137<H>  3.9   |  21<L>  |  0.46<L>    Ca    8.6      21 Jan 2020 06:30  Phos  3.8     01-21  Mg     1.9     01-21    TPro  5.6<L>  /  Alb  3.1<L>  /  TBili  0.5  /  DBili  x   /  AST  16  /  ALT  33  /  AlkPhos  57  01-21

## 2020-01-21 NOTE — BEHAVIORAL HEALTH ASSESSMENT NOTE - SUICIDE PROTECTIVE FACTORS
Hoahaoism beliefs/Responsibility to family and others/Supportive social network of family or friends

## 2020-01-21 NOTE — BEHAVIORAL HEALTH ASSESSMENT NOTE - NSBHCHARTREVIEWVS_PSY_A_CORE FT
Vital Signs Last 24 Hrs  T(C): 36.3 (21 Jan 2020 13:51), Max: 36.8 (20 Jan 2020 21:55)  T(F): 97.3 (21 Jan 2020 13:51), Max: 98.3 (20 Jan 2020 21:55)  HR: 87 (21 Jan 2020 13:51) (76 - 87)  BP: 100/64 (21 Jan 2020 13:51) (100/64 - 126/66)  BP(mean): --  RR: 16 (21 Jan 2020 13:51) (16 - 17)  SpO2: 100% (21 Jan 2020 13:51) (99% - 100%)

## 2020-01-22 LAB
ALBUMIN SERPL ELPH-MCNC: 2.9 G/DL — LOW (ref 3.3–5)
ALP SERPL-CCNC: 54 U/L — SIGNIFICANT CHANGE UP (ref 40–120)
ALT FLD-CCNC: 28 U/L — SIGNIFICANT CHANGE UP (ref 4–41)
ANION GAP SERPL CALC-SCNC: 10 MMO/L — SIGNIFICANT CHANGE UP (ref 7–14)
AST SERPL-CCNC: 17 U/L — SIGNIFICANT CHANGE UP (ref 4–40)
BASOPHILS # BLD AUTO: 0.01 K/UL — SIGNIFICANT CHANGE UP (ref 0–0.2)
BASOPHILS NFR BLD AUTO: 0.4 % — SIGNIFICANT CHANGE UP (ref 0–2)
BILIRUB SERPL-MCNC: 0.4 MG/DL — SIGNIFICANT CHANGE UP (ref 0.2–1.2)
BUN SERPL-MCNC: 21 MG/DL — SIGNIFICANT CHANGE UP (ref 7–23)
CALCIUM SERPL-MCNC: 8.1 MG/DL — LOW (ref 8.4–10.5)
CHLORIDE SERPL-SCNC: 101 MMOL/L — SIGNIFICANT CHANGE UP (ref 98–107)
CMV DNA CSF QL NAA+PROBE: 3688 IU/ML — HIGH
CMV DNA SPEC NAA+PROBE-LOG#: 3.57 — HIGH
CO2 SERPL-SCNC: 22 MMOL/L — SIGNIFICANT CHANGE UP (ref 22–31)
CREAT SERPL-MCNC: 0.44 MG/DL — LOW (ref 0.5–1.3)
EOSINOPHIL # BLD AUTO: 0 K/UL — SIGNIFICANT CHANGE UP (ref 0–0.5)
EOSINOPHIL NFR BLD AUTO: 0 % — SIGNIFICANT CHANGE UP (ref 0–6)
GLUCOSE BLDC GLUCOMTR-MCNC: 128 MG/DL — HIGH (ref 70–99)
GLUCOSE BLDC GLUCOMTR-MCNC: 143 MG/DL — HIGH (ref 70–99)
GLUCOSE BLDC GLUCOMTR-MCNC: 161 MG/DL — HIGH (ref 70–99)
GLUCOSE BLDC GLUCOMTR-MCNC: 236 MG/DL — HIGH (ref 70–99)
GLUCOSE BLDC GLUCOMTR-MCNC: 242 MG/DL — HIGH (ref 70–99)
GLUCOSE SERPL-MCNC: 151 MG/DL — HIGH (ref 70–99)
HCT VFR BLD CALC: 28 % — LOW (ref 39–50)
HGB BLD-MCNC: 9.3 G/DL — LOW (ref 13–17)
IMM GRANULOCYTES NFR BLD AUTO: 4.2 % — HIGH (ref 0–1.5)
LYMPHOCYTES # BLD AUTO: 0.75 K/UL — LOW (ref 1–3.3)
LYMPHOCYTES # BLD AUTO: 28.6 % — SIGNIFICANT CHANGE UP (ref 13–44)
MAGNESIUM SERPL-MCNC: 2 MG/DL — SIGNIFICANT CHANGE UP (ref 1.6–2.6)
MCHC RBC-ENTMCNC: 31.8 PG — SIGNIFICANT CHANGE UP (ref 27–34)
MCHC RBC-ENTMCNC: 33.2 % — SIGNIFICANT CHANGE UP (ref 32–36)
MCV RBC AUTO: 95.9 FL — SIGNIFICANT CHANGE UP (ref 80–100)
MONOCYTES # BLD AUTO: 0.19 K/UL — SIGNIFICANT CHANGE UP (ref 0–0.9)
MONOCYTES NFR BLD AUTO: 7.3 % — SIGNIFICANT CHANGE UP (ref 2–14)
NEUTROPHILS # BLD AUTO: 1.56 K/UL — LOW (ref 1.8–7.4)
NEUTROPHILS NFR BLD AUTO: 59.5 % — SIGNIFICANT CHANGE UP (ref 43–77)
NRBC # FLD: 0.11 K/UL — SIGNIFICANT CHANGE UP (ref 0–0)
NRBC FLD-RTO: 4.2 — SIGNIFICANT CHANGE UP
PHOSPHATE SERPL-MCNC: 3.5 MG/DL — SIGNIFICANT CHANGE UP (ref 2.5–4.5)
PLATELET # BLD AUTO: 33 K/UL — LOW (ref 150–400)
PMV BLD: 10.4 FL — SIGNIFICANT CHANGE UP (ref 7–13)
POTASSIUM SERPL-MCNC: 4.2 MMOL/L — SIGNIFICANT CHANGE UP (ref 3.5–5.3)
POTASSIUM SERPL-SCNC: 4.2 MMOL/L — SIGNIFICANT CHANGE UP (ref 3.5–5.3)
PROT SERPL-MCNC: 5.3 G/DL — LOW (ref 6–8.3)
RBC # BLD: 2.92 M/UL — LOW (ref 4.2–5.8)
RBC # FLD: 17.2 % — HIGH (ref 10.3–14.5)
SODIUM SERPL-SCNC: 133 MMOL/L — LOW (ref 135–145)
WBC # BLD: 2.62 K/UL — LOW (ref 3.8–10.5)
WBC # FLD AUTO: 2.62 K/UL — LOW (ref 3.8–10.5)

## 2020-01-22 PROCEDURE — 99233 SBSQ HOSP IP/OBS HIGH 50: CPT

## 2020-01-22 RX ADMIN — Medication 2 MILLIGRAM(S): at 05:58

## 2020-01-22 RX ADMIN — HUMAN INSULIN 5 UNIT(S): 100 INJECTION, SUSPENSION SUBCUTANEOUS at 18:03

## 2020-01-22 RX ADMIN — LEVETIRACETAM 500 MILLIGRAM(S): 250 TABLET, FILM COATED ORAL at 18:02

## 2020-01-22 RX ADMIN — SODIUM CHLORIDE 2 GRAM(S): 9 INJECTION INTRAMUSCULAR; INTRAVENOUS; SUBCUTANEOUS at 12:23

## 2020-01-22 RX ADMIN — PREGABALIN 500 MICROGRAM(S): 225 CAPSULE ORAL at 12:22

## 2020-01-22 RX ADMIN — Medication 2 MILLIGRAM(S): at 18:02

## 2020-01-22 RX ADMIN — POLYETHYLENE GLYCOL 3350 17 GRAM(S): 17 POWDER, FOR SOLUTION ORAL at 12:22

## 2020-01-22 RX ADMIN — PANTOPRAZOLE SODIUM 40 MILLIGRAM(S): 20 TABLET, DELAYED RELEASE ORAL at 07:02

## 2020-01-22 RX ADMIN — SENNA PLUS 2 TABLET(S): 8.6 TABLET ORAL at 21:00

## 2020-01-22 RX ADMIN — SODIUM CHLORIDE 2 GRAM(S): 9 INJECTION INTRAMUSCULAR; INTRAVENOUS; SUBCUTANEOUS at 05:58

## 2020-01-22 RX ADMIN — VALGANCICLOVIR 900 MILLIGRAM(S): 450 TABLET, FILM COATED ORAL at 18:03

## 2020-01-22 RX ADMIN — Medication 3 MILLIGRAM(S): at 20:59

## 2020-01-22 RX ADMIN — LEVETIRACETAM 500 MILLIGRAM(S): 250 TABLET, FILM COATED ORAL at 05:58

## 2020-01-22 RX ADMIN — ATORVASTATIN CALCIUM 10 MILLIGRAM(S): 80 TABLET, FILM COATED ORAL at 20:59

## 2020-01-22 RX ADMIN — VALGANCICLOVIR 900 MILLIGRAM(S): 450 TABLET, FILM COATED ORAL at 05:58

## 2020-01-22 RX ADMIN — HUMAN INSULIN 5 UNIT(S): 100 INJECTION, SUSPENSION SUBCUTANEOUS at 08:59

## 2020-01-22 RX ADMIN — SODIUM CHLORIDE 2 GRAM(S): 9 INJECTION INTRAMUSCULAR; INTRAVENOUS; SUBCUTANEOUS at 20:59

## 2020-01-22 NOTE — PROGRESS NOTE ADULT - PROBLEM SELECTOR PLAN 7
DVT ppx: SCDs in setting of thrombocytopenia  Diet: CC w/ 1200 cc fluid restriction, Halal diet  Dispo: pending authorization to rehab on 1/21  Appears to be at same functional status as was on d/c  Case d/w Case management, social work and nursing on IDRs

## 2020-01-22 NOTE — PHYSICAL THERAPY INITIAL EVALUATION ADULT - PERTINENT HX OF CURRENT PROBLEM, REHAB EVAL
Patient is a 69 year old male admitted to [facility] from rehab for hyponatremia. PMH includes CMV infecion, glioblastoma, seizures, DM HTN, HLD

## 2020-01-22 NOTE — PHYSICAL THERAPY INITIAL EVALUATION ADULT - CRITERIA FOR SKILLED THERAPEUTIC INTERVENTIONS
impairments found/functional limitations in following categories/risk reduction/prevention/therapy frequency/rehab potential/anticipated discharge recommendation/predicted duration of therapy intervention

## 2020-01-22 NOTE — PHYSICAL THERAPY INITIAL EVALUATION ADULT - RANGE OF MOTION EXAMINATION, REHAB EVAL
bilateral lower extremity ROM was WFL (within functional limits)/bilateral upper extremity ROM was WFL (within functional limits)/left ankle active ROM dorsiflexion absent

## 2020-01-22 NOTE — PHYSICAL THERAPY INITIAL EVALUATION ADULT - GAIT DEVIATIONS NOTED, PT EVAL
decreased swing-to-stance ratio/increased time in double stance/footdrop/decreased velocity of limb motion/decreased stride length/decreased bravo/decreased step length/increased stride width/decreased weight-shifting ability

## 2020-01-22 NOTE — PHYSICAL THERAPY INITIAL EVALUATION ADULT - ADDITIONAL COMMENTS
patient ambulates with straight cane and a brace for left foot drop at baseline "a couple weeks ago" according to son at bedside

## 2020-01-22 NOTE — PROGRESS NOTE ADULT - SUBJECTIVE AND OBJECTIVE BOX
Patient is a 69y old  Male who presents with a chief complaint of sent back from SNF, Hyponatremia, SIADH, Glioblastoma, (21 Jan 2020 17:07)      SUBJECTIVE / OVERNIGHT EVENTS:  Interviewed with Upper sor[place]  ID #[id number]  Patient seen and examined.  No acute events overnight.  No current complaints.     MEDICATIONS  (STANDING):  atorvastatin 10 milliGRAM(s) Oral at bedtime  cyanocobalamin 500 MICROGram(s) Oral daily  dexAMETHasone     Tablet 2 milliGRAM(s) Oral two times a day  dextrose 5%. 1000 milliLiter(s) (50 mL/Hr) IV Continuous <Continuous>  dextrose 50% Injectable 12.5 Gram(s) IV Push once  dextrose 50% Injectable 25 Gram(s) IV Push once  dextrose 50% Injectable 25 Gram(s) IV Push once  insulin lispro (HumaLOG) corrective regimen sliding scale   SubCutaneous Before meals and at bedtime  insulin NPH human recombinant 5 Unit(s) SubCutaneous two times a day  levETIRAcetam 500 milliGRAM(s) Oral two times a day  melatonin 3 milliGRAM(s) Oral <User Schedule>  pantoprazole    Tablet 40 milliGRAM(s) Oral before breakfast  polyethylene glycol 3350 17 Gram(s) Oral daily  senna 2 Tablet(s) Oral at bedtime  sodium chloride 2 Gram(s) Oral three times a day  valGANciclovir 900 milliGRAM(s) Oral two times a day    MEDICATIONS  (PRN):  dextrose 40% Gel 15 Gram(s) Oral once PRN Blood Glucose LESS THAN 70 milliGRAM(s)/deciliter  glucagon  Injectable 1 milliGRAM(s) IntraMuscular once PRN Glucose LESS THAN 70 milligrams/deciliter      Vital Signs Last 24 Hrs  T(C): 36.3 (22 Jan 2020 14:06), Max: 36.7 (21 Jan 2020 20:38)  T(F): 97.4 (22 Jan 2020 14:06), Max: 98 (21 Jan 2020 20:38)  HR: 87 (22 Jan 2020 14:06) (81 - 87)  BP: 108/72 (22 Jan 2020 14:06) (108/72 - 116/72)  BP(mean): --  RR: 16 (22 Jan 2020 14:06) (16 - 17)  SpO2: 100% (22 Jan 2020 14:06) (100% - 100%)  CAPILLARY BLOOD GLUCOSE      POCT Blood Glucose.: 236 mg/dL (22 Jan 2020 12:51)  POCT Blood Glucose.: 128 mg/dL (22 Jan 2020 08:32)  POCT Blood Glucose.: 143 mg/dL (22 Jan 2020 06:49)  POCT Blood Glucose.: 218 mg/dL (21 Jan 2020 22:26)  POCT Blood Glucose.: 208 mg/dL (21 Jan 2020 17:41)    I&O's Summary    21 Jan 2020 07:01  -  22 Jan 2020 07:00  --------------------------------------------------------  IN: 0 mL / OUT: 400 mL / NET: -400 mL    22 Jan 2020 07:01  -  22 Jan 2020 17:03  --------------------------------------------------------  IN: 200 mL / OUT: 600 mL / NET: -400 mL    PHYSICAL EXAM:  GENERAL: NAD, well-developed  HEENT: MMM, conjunctiva and sclera clear  NECK: Supple, No JVD  CHEST/LUNG: Clear to auscultation bilaterally; No wheeze  HEART: Regular rate and rhythm; No murmurs, rubs, or gallops  ABDOMEN: Soft, Nontender, Nondistended; Bowel sounds present  EXTREMITIES:  2+ Peripheral Pulses, No clubbing, cyanosis, or edema  PSYCH: AAOx3  NEUROLOGY: non-focal  SKIN: No rashes or lesions      LABS:                        9.3    2.62  )-----------( 33       ( 22 Jan 2020 06:00 )             28.0     01-22    133<L>  |  101  |  21  ----------------------------<  151<H>  4.2   |  22  |  0.44<L>    Ca    8.1<L>      22 Jan 2020 06:00  Phos  3.5     01-22  Mg     2.0     01-22    TPro  5.3<L>  /  Alb  2.9<L>  /  TBili  0.4  /  DBili  x   /  AST  17  /  ALT  28  /  AlkPhos  54  01-22

## 2020-01-23 LAB
ANION GAP SERPL CALC-SCNC: 10 MMO/L — SIGNIFICANT CHANGE UP (ref 7–14)
BASOPHILS # BLD AUTO: 0 K/UL — SIGNIFICANT CHANGE UP (ref 0–0.2)
BASOPHILS NFR BLD AUTO: 0 % — SIGNIFICANT CHANGE UP (ref 0–2)
BUN SERPL-MCNC: 24 MG/DL — HIGH (ref 7–23)
CALCIUM SERPL-MCNC: 8.5 MG/DL — SIGNIFICANT CHANGE UP (ref 8.4–10.5)
CHLORIDE SERPL-SCNC: 101 MMOL/L — SIGNIFICANT CHANGE UP (ref 98–107)
CO2 SERPL-SCNC: 24 MMOL/L — SIGNIFICANT CHANGE UP (ref 22–31)
CREAT SERPL-MCNC: 0.43 MG/DL — LOW (ref 0.5–1.3)
EOSINOPHIL # BLD AUTO: 0.01 K/UL — SIGNIFICANT CHANGE UP (ref 0–0.5)
EOSINOPHIL NFR BLD AUTO: 0.4 % — SIGNIFICANT CHANGE UP (ref 0–6)
GLUCOSE BLDC GLUCOMTR-MCNC: 119 MG/DL — HIGH (ref 70–99)
GLUCOSE BLDC GLUCOMTR-MCNC: 136 MG/DL — HIGH (ref 70–99)
GLUCOSE BLDC GLUCOMTR-MCNC: 165 MG/DL — HIGH (ref 70–99)
GLUCOSE BLDC GLUCOMTR-MCNC: 207 MG/DL — HIGH (ref 70–99)
GLUCOSE BLDC GLUCOMTR-MCNC: 209 MG/DL — HIGH (ref 70–99)
GLUCOSE SERPL-MCNC: 128 MG/DL — HIGH (ref 70–99)
HCT VFR BLD CALC: 30.8 % — LOW (ref 39–50)
HCT VFR BLD CALC: 30.8 % — LOW (ref 39–50)
HGB BLD-MCNC: 10.2 G/DL — LOW (ref 13–17)
HGB BLD-MCNC: 10.2 G/DL — LOW (ref 13–17)
IMM GRANULOCYTES NFR BLD AUTO: 4.8 % — HIGH (ref 0–1.5)
LYMPHOCYTES # BLD AUTO: 0.84 K/UL — LOW (ref 1–3.3)
LYMPHOCYTES # BLD AUTO: 31 % — SIGNIFICANT CHANGE UP (ref 13–44)
MAGNESIUM SERPL-MCNC: 2 MG/DL — SIGNIFICANT CHANGE UP (ref 1.6–2.6)
MCHC RBC-ENTMCNC: 31.7 PG — SIGNIFICANT CHANGE UP (ref 27–34)
MCHC RBC-ENTMCNC: 31.7 PG — SIGNIFICANT CHANGE UP (ref 27–34)
MCHC RBC-ENTMCNC: 33.1 % — SIGNIFICANT CHANGE UP (ref 32–36)
MCHC RBC-ENTMCNC: 33.1 % — SIGNIFICANT CHANGE UP (ref 32–36)
MCV RBC AUTO: 95.7 FL — SIGNIFICANT CHANGE UP (ref 80–100)
MCV RBC AUTO: 95.7 FL — SIGNIFICANT CHANGE UP (ref 80–100)
MONOCYTES # BLD AUTO: 0.18 K/UL — SIGNIFICANT CHANGE UP (ref 0–0.9)
MONOCYTES NFR BLD AUTO: 6.6 % — SIGNIFICANT CHANGE UP (ref 2–14)
NEUTROPHILS # BLD AUTO: 1.55 K/UL — LOW (ref 1.8–7.4)
NEUTROPHILS NFR BLD AUTO: 57.2 % — SIGNIFICANT CHANGE UP (ref 43–77)
NRBC # FLD: 0.06 K/UL — SIGNIFICANT CHANGE UP (ref 0–0)
NRBC # FLD: 0.06 K/UL — SIGNIFICANT CHANGE UP (ref 0–0)
NRBC FLD-RTO: 2.2 — SIGNIFICANT CHANGE UP
NRBC FLD-RTO: 2.2 — SIGNIFICANT CHANGE UP
PHOSPHATE SERPL-MCNC: 3.5 MG/DL — SIGNIFICANT CHANGE UP (ref 2.5–4.5)
PLATELET # BLD AUTO: 32 K/UL — LOW (ref 150–400)
PLATELET # BLD AUTO: 32 K/UL — LOW (ref 150–400)
PMV BLD: 10.6 FL — SIGNIFICANT CHANGE UP (ref 7–13)
PMV BLD: 10.6 FL — SIGNIFICANT CHANGE UP (ref 7–13)
POTASSIUM SERPL-MCNC: 3.7 MMOL/L — SIGNIFICANT CHANGE UP (ref 3.5–5.3)
POTASSIUM SERPL-SCNC: 3.7 MMOL/L — SIGNIFICANT CHANGE UP (ref 3.5–5.3)
RBC # BLD: 3.22 M/UL — LOW (ref 4.2–5.8)
RBC # BLD: 3.22 M/UL — LOW (ref 4.2–5.8)
RBC # FLD: 17.2 % — HIGH (ref 10.3–14.5)
RBC # FLD: 17.2 % — HIGH (ref 10.3–14.5)
RETICS #: 143 K/UL — HIGH (ref 25–125)
RETICS/RBC NFR: 4.5 % — HIGH (ref 0.5–2.5)
SODIUM SERPL-SCNC: 135 MMOL/L — SIGNIFICANT CHANGE UP (ref 135–145)
WBC # BLD: 2.71 K/UL — LOW (ref 3.8–10.5)
WBC # BLD: 2.71 K/UL — LOW (ref 3.8–10.5)
WBC # FLD AUTO: 2.71 K/UL — LOW (ref 3.8–10.5)
WBC # FLD AUTO: 2.71 K/UL — LOW (ref 3.8–10.5)

## 2020-01-23 PROCEDURE — 99233 SBSQ HOSP IP/OBS HIGH 50: CPT | Mod: GC

## 2020-01-23 PROCEDURE — 99233 SBSQ HOSP IP/OBS HIGH 50: CPT

## 2020-01-23 RX ADMIN — Medication 0: at 12:58

## 2020-01-23 RX ADMIN — POLYETHYLENE GLYCOL 3350 17 GRAM(S): 17 POWDER, FOR SOLUTION ORAL at 12:41

## 2020-01-23 RX ADMIN — HUMAN INSULIN 5 UNIT(S): 100 INJECTION, SUSPENSION SUBCUTANEOUS at 09:49

## 2020-01-23 RX ADMIN — Medication 2 MILLIGRAM(S): at 06:59

## 2020-01-23 RX ADMIN — LEVETIRACETAM 500 MILLIGRAM(S): 250 TABLET, FILM COATED ORAL at 06:59

## 2020-01-23 RX ADMIN — PANTOPRAZOLE SODIUM 40 MILLIGRAM(S): 20 TABLET, DELAYED RELEASE ORAL at 06:59

## 2020-01-23 RX ADMIN — Medication 2 MILLIGRAM(S): at 17:51

## 2020-01-23 RX ADMIN — HUMAN INSULIN 5 UNIT(S): 100 INJECTION, SUSPENSION SUBCUTANEOUS at 18:06

## 2020-01-23 RX ADMIN — ATORVASTATIN CALCIUM 10 MILLIGRAM(S): 80 TABLET, FILM COATED ORAL at 21:56

## 2020-01-23 RX ADMIN — SODIUM CHLORIDE 2 GRAM(S): 9 INJECTION INTRAMUSCULAR; INTRAVENOUS; SUBCUTANEOUS at 06:59

## 2020-01-23 RX ADMIN — PREGABALIN 500 MICROGRAM(S): 225 CAPSULE ORAL at 12:41

## 2020-01-23 RX ADMIN — VALGANCICLOVIR 900 MILLIGRAM(S): 450 TABLET, FILM COATED ORAL at 06:59

## 2020-01-23 RX ADMIN — Medication 0: at 18:05

## 2020-01-23 RX ADMIN — Medication 3 MILLIGRAM(S): at 21:56

## 2020-01-23 RX ADMIN — LEVETIRACETAM 500 MILLIGRAM(S): 250 TABLET, FILM COATED ORAL at 17:51

## 2020-01-23 RX ADMIN — VALGANCICLOVIR 900 MILLIGRAM(S): 450 TABLET, FILM COATED ORAL at 17:51

## 2020-01-23 RX ADMIN — SODIUM CHLORIDE 2 GRAM(S): 9 INJECTION INTRAMUSCULAR; INTRAVENOUS; SUBCUTANEOUS at 13:00

## 2020-01-23 RX ADMIN — SODIUM CHLORIDE 2 GRAM(S): 9 INJECTION INTRAMUSCULAR; INTRAVENOUS; SUBCUTANEOUS at 21:55

## 2020-01-23 NOTE — PROGRESS NOTE ADULT - SUBJECTIVE AND OBJECTIVE BOX
Patient is a 69y old  Male who presents with a chief complaint of sent back from SNF, Hyponatremia, SIADH, Glioblastoma, (23 Jan 2020 10:37)      SUBJECTIVE / OVERNIGHT EVENTS:  Interviewed with Chinese  ID # [id number]  Patient seen and examined.  No acute events overnight.  No current complaints.     MEDICATIONS  (STANDING):  atorvastatin 10 milliGRAM(s) Oral at bedtime  cyanocobalamin 500 MICROGram(s) Oral daily  dexAMETHasone     Tablet 2 milliGRAM(s) Oral two times a day  dextrose 5%. 1000 milliLiter(s) (50 mL/Hr) IV Continuous <Continuous>  dextrose 50% Injectable 12.5 Gram(s) IV Push once  dextrose 50% Injectable 25 Gram(s) IV Push once  dextrose 50% Injectable 25 Gram(s) IV Push once  insulin lispro (HumaLOG) corrective regimen sliding scale   SubCutaneous Before meals and at bedtime  insulin NPH human recombinant 5 Unit(s) SubCutaneous two times a day  levETIRAcetam 500 milliGRAM(s) Oral two times a day  melatonin 3 milliGRAM(s) Oral <User Schedule>  pantoprazole    Tablet 40 milliGRAM(s) Oral before breakfast  polyethylene glycol 3350 17 Gram(s) Oral daily  senna 2 Tablet(s) Oral at bedtime  sodium chloride 2 Gram(s) Oral three times a day  valGANciclovir 900 milliGRAM(s) Oral two times a day    MEDICATIONS  (PRN):  dextrose 40% Gel 15 Gram(s) Oral once PRN Blood Glucose LESS THAN 70 milliGRAM(s)/deciliter  glucagon  Injectable 1 milliGRAM(s) IntraMuscular once PRN Glucose LESS THAN 70 milligrams/deciliter      Vital Signs Last 24 Hrs  T(C): 36.7 (23 Jan 2020 14:42), Max: 37.2 (22 Jan 2020 20:09)  T(F): 98 (23 Jan 2020 14:42), Max: 99 (22 Jan 2020 20:09)  HR: 80 (23 Jan 2020 14:42) (72 - 103)  BP: 105/63 (23 Jan 2020 14:42) (105/63 - 124/77)  BP(mean): --  RR: 18 (23 Jan 2020 14:42) (17 - 18)  SpO2: 100% (23 Jan 2020 14:42) (100% - 100%)  CAPILLARY BLOOD GLUCOSE      POCT Blood Glucose.: 207 mg/dL (23 Jan 2020 12:41)  POCT Blood Glucose.: 136 mg/dL (23 Jan 2020 09:49)  POCT Blood Glucose.: 119 mg/dL (23 Jan 2020 08:24)  POCT Blood Glucose.: 242 mg/dL (22 Jan 2020 22:09)  POCT Blood Glucose.: 161 mg/dL (22 Jan 2020 17:51)    I&O's Summary    22 Jan 2020 07:01  -  23 Jan 2020 07:00  --------------------------------------------------------  IN: 200 mL / OUT: 700 mL / NET: -500 mL      PHYSICAL EXAM:  GENERAL: NAD, well-developed  HEENT: MMM, conjunctiva and sclera clear  NECK: Supple, No JVD  CHEST/LUNG: Clear to auscultation bilaterally; No wheeze  HEART: Regular rate and rhythm; No murmurs, rubs, or gallops  ABDOMEN: Soft, Nontender, Nondistended; Bowel sounds present  EXTREMITIES:  2+ Peripheral Pulses, No clubbing, cyanosis, or edema  PSYCH: AAOx3  NEUROLOGY: non-focal  SKIN: No rashes or lesions    LABS:                        10.2   2.71  )-----------( 32       ( 23 Jan 2020 06:00 )             30.8     01-23    135  |  101  |  24<H>  ----------------------------<  128<H>  3.7   |  24  |  0.43<L>    Ca    8.5      23 Jan 2020 06:00  Phos  3.5     01-23  Mg     2.0     01-23    TPro  5.3<L>  /  Alb  2.9<L>  /  TBili  0.4  /  DBili  x   /  AST  17  /  ALT  28  /  AlkPhos  54  01-22

## 2020-01-23 NOTE — CONSULT NOTE ADULT - ASSESSMENT
Pt is a 70 yo PMHx GBM (diagnosed 9/19, last chemo 11/19, last radiation 12/4, following at [facility] recent hospitalization 1/7-1/16 for steroid-induced hyperglycemia and new onset seizures, workup otherwise negative, SIADH, recent CMV viremia started on Valcyte last admission presenting [facility] previous [facility] for [facility] rehab placement.    Hematology was re-consulted for pancytopenia.      -Labs were wnl until October and then in December after starting treatment with temodar and RT, pt was noted to have wbc 1.69, , hgb 11, plts 28.   -Plts have been in the 30's since, attributed to Temodar which could take months or more to recover.    -However, the leukopenia and neutropenia and anemia was new.    -workup was negative HIV, hepatitis A/B/C, KIKI, RF.  B12 was 1154, folate wnl, anemia workup consistent with AOCD.  Retic was inappropriately low consistent with BM suppression or underlying BM process.  -he was found to have CMV viral load came back elevated to 115K, suspected to be the cause of his pancytopenia and was started on Valcyte 900mg BID.  BM biopsy was deferred last admission after discussion with the son (hospitalist in [place]) and CMV finding.  Counts improving and now stable.  CMV viral load improving. -recommend to continue to check cbc with differential daily.  If stable and improving on treatment, pt to follow up outpatient hematology.  If downtrending despite improving CMV viral load, will recommend inpatient biopsy. Son is in agreement.   -per primary team, pt would like to transfer his neuro-onc care to [facility].  Recommend to reach out to Dr. Giron to establish care for further management of his glioblastoma.   -we will make a referral to our [facility] upon discharge for hematology follow up.    Maryann Medel  Hematology Fellow  [phone] Pt is a 70 yo PMHx GBM (diagnosed 9/19, last chemo 11/19, last radiation 12/4, [place] [facility] recent hospitalization 1/7-1/16 for steroid-induced hyperglycemia and new onset seizures, workup otherwise negative, SIADH, recent CMV viremia started on Valcyte last admission presenting [facility] for [facility] rehab placement.    Hematology was re-consulted for pancytopenia.      -Labs were wnl until October and then in December after starting treatment with temodar and RT, pt was noted to have wbc 1.69, , hgb 11, plts 28.   -Plts have been in the 30's since, attributed to Temodar which could take months or more to recover.    -However, the leukopenia and neutropenia and anemia was new.    -workup was negative HIV, hepatitis A/B/C, KIKI, RF.  B12 was 1154, folate wnl, anemia workup consistent with AOCD.  Retic was inappropriately low consistent with BM suppression or underlying BM process.  -he was found to have CMV viral load came back elevated to 115K, suspected to be the cause of his pancytopenia and was started on Valcyte 900mg BID.  BM biopsy was deferred last admission after discussion with the son (hospitalist in [place]) and CMV finding.  Counts improving and now stable.  CMV viral load improving. -recommend to continue to check cbc with differential daily.  If stable and improving on treatment, pt to follow up outpatient hematology.  If downtrending despite improving CMV viral load, will recommend inpatient biopsy. Son is in agreement.   -retic is improving with RPI >2 now indicating BM recovery  -per primary team, pt would like to transfer his neuro-onc care to [facility].  Recommended to reach out to Dr. Giron to establish care for further management of his glioblastoma.   -we will make a referral to our [facility] upon discharge for hematology follow up.    Maryann Medel  Hematology Fellow  [phone] Pt is a 68 yo PMHx GBM (diagnosed 9/19, last chemo 11/19, last radiation 12/4, following at [facility]), recent hospitalization 1/7-1/16 for steroid-induced hyperglycemia and new onset seizures, workup otherwise negative, SIADH, recent CMV viremia started on Valcyte last admission presenting from previous [facility] ([facility]) for alternate rehab placement.    Hematology was re-consulted for pancytopenia.      -Labs were wnl until October and then in December after starting treatment with temodar and RT, pt was noted to have wbc 1.69, , hgb 11, plts 28.   -Plts have been in the 30's since, attributed to Temodar which could take months or more to recover.    -However, the leukopenia and neutropenia and anemia were new.    -workup was negative HIV, hepatitis A/B/C, KIKI, RF.  B12 was 1154, folate wnl, anemia workup consistent with AOCD.  Retic was inappropriately low consistent with BM suppression or underlying BM process.  -he was found to have CMV viral load came back elevated to 115K, suspected to be the cause of his pancytopenia and was started on Valcyte 900mg BID.  BM biopsy was deferred last admission after discussion with the son (hospitalist in [place]) and CMV finding.  Counts improving and now stable.  CMV viral load improving.   -recommend to continue to check cbc with differential daily.  If stable and improving on treatment, pt to follow up outpatient hematology.  If downtrending despite improving CMV viral load, will recommend inpatient biopsy. Son is in agreement.   -retic is improving with RPI >2 now indicating BM recovery, anticipate ongoing count recovery  -per primary team, pt would like to transfer his neuro-onc care to [facility].  Recommended to reach out to Dr. Giron to establish care for further management of his glioblastoma.   -we will make a referral to our [facility] upon discharge for hematology follow up.    Maryann Medel  Hematology Fellow  [phone]

## 2020-01-23 NOTE — CONSULT NOTE ADULT - SUBJECTIVE AND OBJECTIVE BOX
HPI:  Pt is a 68 yo PMHx GBM (diagnosed 9/19, last chemo 11/19, last [place]4, [place] [facility] [place] hospitalization 1/7-1/16 for steroid-induced hyperglycemia and new onset seizures, workup otherwise negative, SIADH, recent CMV viremia started on Valcyte last admission presenting from [facility] for [facility] rehab placement.     Pt was discharge to rehab for persistent weakness, but was then sent back to [facility] because the patient was "refusing to wear his mask" and the facility thought he needed isolation. Pt does not remember what led to him returning to the hospital. Per U SW note, pt was accepted to [facility] on 1/17 but too late in the day to obtain authorization. The case will be reopened on Monday 1/20.  Pt denies CP, SOB, f/c, n/v, abdominal pain, diarrhea. No new complaints.     Hematology consulted for pancytopenia and was also seen last admission. MRI w/w/out contrast showed increased enhancement of brain tumors.        Allergies  No Known Allergies    MEDICATIONS  (STANDING):  atorvastatin 10 milliGRAM(s) Oral at bedtime  cyanocobalamin 500 MICROGram(s) Oral daily  dexAMETHasone     Tablet 2 milliGRAM(s) Oral two times a day  dextrose 5%. 1000 milliLiter(s) (50 mL/Hr) IV Continuous <Continuous>  dextrose 50% Injectable 12.5 Gram(s) IV Push once  dextrose 50% Injectable 25 Gram(s) IV Push once  dextrose 50% Injectable 25 Gram(s) IV Push once  insulin lispro (HumaLOG) corrective regimen sliding scale   SubCutaneous Before meals and at bedtime  insulin NPH human recombinant 5 Unit(s) SubCutaneous two times a day  levETIRAcetam 500 milliGRAM(s) Oral two times a day  melatonin 3 milliGRAM(s) Oral <User Schedule>  pantoprazole    Tablet 40 milliGRAM(s) Oral before breakfast  polyethylene glycol 3350 17 Gram(s) Oral daily  senna 2 Tablet(s) Oral at bedtime  sodium chloride 2 Gram(s) Oral three times a day  valGANciclovir 900 milliGRAM(s) Oral two times a day    MEDICATIONS  (PRN):  dextrose 40% Gel 15 Gram(s) Oral once PRN Blood Glucose LESS THAN 70 milliGRAM(s)/deciliter  glucagon  Injectable 1 milliGRAM(s) IntraMuscular once PRN Glucose LESS THAN 70 milligrams/deciliter      PAST MEDICAL & SURGICAL HISTORY:  History of SIADH  CMV infection  Glioblastoma multiforme  Diabetes  Seizures  Hyperlipidemia  Hypertension  Status post stereotactic brain biopsy  History of laryngoscopy  H/O colonoscopy      FAMILY HISTORY:  FH: myocardial infarction      SOCIAL HISTORY: No EtOH, no tobacco    REVIEW OF SYSTEMS:    CONSTITUTIONAL: No weakness, fevers or chills  EYES/ENT: No visual changes;  No vertigo or throat pain   NECK: No pain or stiffness  RESPIRATORY: No cough, wheezing, hemoptysis; No shortness of breath  CARDIOVASCULAR: No chest pain or palpitations  GASTROINTESTINAL: No abdominal or epigastric pain. No nausea, vomiting, or hematemesis; No diarrhea or constipation. No melena or hematochezia.  GENITOURINARY: No dysuria, frequency or hematuria  NEUROLOGICAL: No numbness or weakness  SKIN: No itching, burning, rashes, or lesions   All other review of systems is negative unless indicated above.        T(F): 98.2 (01-23-20 @ 06:56), Max: 99 (01-22-20 @ 20:09)  HR: 72 (01-23-20 @ 06:56)  BP: 124/77 (01-23-20 @ 06:56)  RR: 18 (01-23-20 @ 06:56)  SpO2: 100% (01-23-20 @ 06:56)  Wt(kg): --    GENERAL: NAD, well-developed  HEAD:  Atraumatic, Normocephalic  EYES: EOMI, PERRLA, conjunctiva and sclera clear  NECK: Supple, No JVD  CHEST/LUNG: Clear to auscultation bilaterally; No wheeze  HEART: Regular rate and rhythm; No murmurs, rubs, or gallops  ABDOMEN: Soft, Nontender, Nondistended; Bowel sounds present  EXTREMITIES:  2+ Peripheral Pulses, No clubbing, cyanosis, or edema  NEUROLOGY: non-focal  SKIN: No rashes or lesions                          10.2   2.71  )-----------( 32       ( 23 Jan 2020 06:00 )             30.8       01-23    135  |  101  |  24<H>  ----------------------------<  128<H>  3.7   |  24  |  0.43<L>    Ca    8.5      23 Jan 2020 06:00  Phos  3.5     01-23  Mg     2.0     01-23    TPro  5.3<L>  /  Alb  2.9<L>  /  TBili  0.4  /  DBili  x   /  AST  17  /  ALT  28  /  AlkPhos  54  01-22      Phosphorus Level, Serum: 3.5 mg/dL (01-23 @ 06:00)  Magnesium, Serum: 2.0 mg/dL (01-23 @ 06:00) HPI:  Pt is a 68 yo PMHx GBM (diagnosed 9/19, last chemo 11/19, last radiation 12/4, [place] [facility] [place] recent hospitalization 1/7-1/16 for steroid-induced hyperglycemia and new onset seizures, workup otherwise negative, SIADH, recent CMV viremia started on Valcyte last admission presenting from [facility] for alternate rehab placement.    Hematology was re-consulted for pancytopenia.  Labs were wnl until October and then in December after starting treatment with temodar and RT, pt was noted to have wbc 1.69, , hgb 11, plts 28. Plts have been in the 30's since, attributed to Temodar.  The leukopenia and neutropenia was new.  He was seen by our service during his last admission, with negative workup for HIV, hepatitis A/B/C, KIKI, RF.  B12 was 1154, folate wnl, anemia workup consistent with AOCD.  Retic was inappropriately low.  Ultimately, his CMV viral load came back elevated to 115K and was started on Valcyte 900mg BID.  BM biopsy was deferred until treatment was completed for CMV as this could be a reason for his pancytopenia.     Of note, MRI w/w/out contrast showed increased enhancement of brain tumors, unclear if RT effect or progression of disease.  Pt was planned to follow up Dr. Lyle Chavis neuro-onc [place] [facility] [place]. Currently not on any treatment for the glioblastoma.     Allergies  No Known Allergies    MEDICATIONS  (STANDING):  atorvastatin 10 milliGRAM(s) Oral at bedtime  cyanocobalamin 500 MICROGram(s) Oral daily  dexAMETHasone     Tablet 2 milliGRAM(s) Oral two times a day  dextrose 5%. 1000 milliLiter(s) (50 mL/Hr) IV Continuous <Continuous>  dextrose 50% Injectable 12.5 Gram(s) IV Push once  dextrose 50% Injectable 25 Gram(s) IV Push once  dextrose 50% Injectable 25 Gram(s) IV Push once  insulin lispro (HumaLOG) corrective regimen sliding scale   SubCutaneous Before meals and at bedtime  insulin NPH human recombinant 5 Unit(s) SubCutaneous two times a day  levETIRAcetam 500 milliGRAM(s) Oral two times a day  melatonin 3 milliGRAM(s) Oral <User Schedule>  pantoprazole    Tablet 40 milliGRAM(s) Oral before breakfast  polyethylene glycol 3350 17 Gram(s) Oral daily  senna 2 Tablet(s) Oral at bedtime  sodium chloride 2 Gram(s) Oral three times a day  valGANciclovir 900 milliGRAM(s) Oral two times a day    MEDICATIONS  (PRN):  dextrose 40% Gel 15 Gram(s) Oral once PRN Blood Glucose LESS THAN 70 milliGRAM(s)/deciliter  glucagon  Injectable 1 milliGRAM(s) IntraMuscular once PRN Glucose LESS THAN 70 milligrams/deciliter    PAST MEDICAL & SURGICAL HISTORY:  History of SIADH  CMV infection  Glioblastoma multiforme  Diabetes  Seizures  Hyperlipidemia  Hypertension  Status post stereotactic brain biopsy  History of laryngoscopy  H/O colonoscopy    FAMILY HISTORY:  FH: myocardial infarction    SOCIAL HISTORY: No EtOH, no tobacco    REVIEW OF SYSTEMS:  All other review of systems is negative unless indicated above.    T(F): 98.2 (01-23-20 @ 06:56), Max: 99 (01-22-20 @ 20:09)  HR: 72 (01-23-20 @ 06:56)  BP: 124/77 (01-23-20 @ 06:56)  RR: 18 (01-23-20 @ 06:56)  SpO2: 100% (01-23-20 @ 06:56)  Wt(kg): --    GENERAL: NAD  HEAD:  Atraumatic, Normocephalic  EYES: EOMI, PERRLA, conjunctiva and sclera clear  NECK: Supple, No JVD  CHEST/LUNG: Clear to auscultation bilaterally; No wheeze  HEART: Regular rate and rhythm; No murmurs, rubs, or gallops  ABDOMEN: Soft, Nontender, Nondistended; Bowel sounds present  EXTREMITIES:  2+ Peripheral Pulses, No clubbing, cyanosis, or edema  NEUROLOGY: non-focal, AAO x 3  SKIN: No rashes or lesions                          10.2   2.71  )-----------( 32       ( 23 Jan 2020 06:00 )             30.8       01-23    135  |  101  |  24<H>  ----------------------------<  128<H>  3.7   |  24  |  0.43<L>    Ca    8.5      23 Jan 2020 06:00  Phos  3.5     01-23  Mg     2.0     01-23    TPro  5.3<L>  /  Alb  2.9<L>  /  TBili  0.4  /  DBili  x   /  AST  17  /  ALT  28  /  AlkPhos  54  01-22      Phosphorus Level, Serum: 3.5 mg/dL (01-23 @ 06:00)  Magnesium, Serum: 2.0 mg/dL (01-23 @ 06:00) HPI:  Pt is a 70 yo PMHx GBM (diagnosed 9/19, last chemo 11/19, last radiation 12/4, [place] [facility] [place] recent hospitalization 1/7-1/16 for steroid-induced hyperglycemia and new onset seizures, workup otherwise negative, SIADH, recent CMV viremia started on Valcyte last admission presenting from [facility] ([facility]) for alternate rehab placement.  Pt feels well and has no complaints.    Hematology was re-consulted for pancytopenia.  Labs were wnl until October and then in December after starting treatment with temodar and RT, pt was noted to have wbc 1.69, , hgb 11, plts 28. Plts have been in the 30's since, attributed to Temodar.  The leukopenia and neutropenia were new.  He was seen by our service during his last admission, with negative workup for HIV, hepatitis A/B/C, KIKI, RF.  B12 was 1154, folate wnl, anemia workup consistent with AOCD.  Retic was inappropriately low.  Ultimately, his CMV viral load came back elevated to 115K and was started on Valcyte 900mg BID.  BM biopsy was deferred until treatment was completed for CMV as this could be a reason for his pancytopenia.     Of note, MRI w/w/out contrast showed increased enhancement of brain tumors, unclear if RT effect or progression of disease.  Pt was planned to follow up Dr. Lyle Chavis neuro-onc from [facility] [place]. Currently not on any treatment for the glioblastoma.     Allergies  No Known Allergies    MEDICATIONS  (STANDING):  atorvastatin 10 milliGRAM(s) Oral at bedtime  cyanocobalamin 500 MICROGram(s) Oral daily  dexAMETHasone     Tablet 2 milliGRAM(s) Oral two times a day  dextrose 5%. 1000 milliLiter(s) (50 mL/Hr) IV Continuous <Continuous>  dextrose 50% Injectable 12.5 Gram(s) IV Push once  dextrose 50% Injectable 25 Gram(s) IV Push once  dextrose 50% Injectable 25 Gram(s) IV Push once  insulin lispro (HumaLOG) corrective regimen sliding scale   SubCutaneous Before meals and at bedtime  insulin NPH human recombinant 5 Unit(s) SubCutaneous two times a day  levETIRAcetam 500 milliGRAM(s) Oral two times a day  melatonin 3 milliGRAM(s) Oral <User Schedule>  pantoprazole    Tablet 40 milliGRAM(s) Oral before breakfast  polyethylene glycol 3350 17 Gram(s) Oral daily  senna 2 Tablet(s) Oral at bedtime  sodium chloride 2 Gram(s) Oral three times a day  valGANciclovir 900 milliGRAM(s) Oral two times a day    MEDICATIONS  (PRN):  dextrose 40% Gel 15 Gram(s) Oral once PRN Blood Glucose LESS THAN 70 milliGRAM(s)/deciliter  glucagon  Injectable 1 milliGRAM(s) IntraMuscular once PRN Glucose LESS THAN 70 milligrams/deciliter    PAST MEDICAL & SURGICAL HISTORY:  History of SIADH  CMV infection  Glioblastoma multiforme  Diabetes  Seizures  Hyperlipidemia  Hypertension  Status post stereotactic brain biopsy  History of laryngoscopy  H/O colonoscopy    FAMILY HISTORY:  FH: myocardial infarction    SOCIAL HISTORY: No EtOH, no tobacco    REVIEW OF SYSTEMS:  All other review of systems is negative unless indicated above.    T(F): 98.2 (01-23-20 @ 06:56), Max: 99 (01-22-20 @ 20:09)  HR: 72 (01-23-20 @ 06:56)  BP: 124/77 (01-23-20 @ 06:56)  RR: 18 (01-23-20 @ 06:56)  SpO2: 100% (01-23-20 @ 06:56)  Wt(kg): --    GENERAL: NAD  HEAD:  Atraumatic, Normocephalic  EYES: EOMI, PERRLA, conjunctiva and sclera clear  NECK: Supple, No JVD  CHEST/LUNG: Clear to auscultation bilaterally; No wheeze  HEART: Regular rate and rhythm; No murmurs, rubs, or gallops  ABDOMEN: Soft, Nontender, Nondistended; Bowel sounds present  EXTREMITIES:  2+ Peripheral Pulses, No clubbing, cyanosis, or edema  NEUROLOGY: non-focal, AAO x 3  SKIN: No rashes or lesions                          10.2   2.71  )-----------( 32       ( 23 Jan 2020 06:00 )             30.8       01-23    135  |  101  |  24<H>  ----------------------------<  128<H>  3.7   |  24  |  0.43<L>    Ca    8.5      23 Jan 2020 06:00  Phos  3.5     01-23  Mg     2.0     01-23    TPro  5.3<L>  /  Alb  2.9<L>  /  TBili  0.4  /  DBili  x   /  AST  17  /  ALT  28  /  AlkPhos  54  01-22      Phosphorus Level, Serum: 3.5 mg/dL (01-23 @ 06:00)  Magnesium, Serum: 2.0 mg/dL (01-23 @ 06:00)

## 2020-01-23 NOTE — PROGRESS NOTE ADULT - PROBLEM SELECTOR PLAN 7
Called patient's son - no answer - left voicemail  DVT ppx: SCDs in setting of thrombocytopenia  Diet: CC w/ 1200 cc fluid restriction, Halal diet  Dispo: pending authorization to rehab on 1/21  Appears to be at same functional status as was on d/c  Case d/w Case management, social work and nursing on IDRs

## 2020-01-24 LAB
ALBUMIN SERPL ELPH-MCNC: 2.9 G/DL — LOW (ref 3.3–5)
ALP SERPL-CCNC: 54 U/L — SIGNIFICANT CHANGE UP (ref 40–120)
ALT FLD-CCNC: 31 U/L — SIGNIFICANT CHANGE UP (ref 4–41)
ANION GAP SERPL CALC-SCNC: 14 MMO/L — SIGNIFICANT CHANGE UP (ref 7–14)
AST SERPL-CCNC: 16 U/L — SIGNIFICANT CHANGE UP (ref 4–40)
BASOPHILS # BLD AUTO: 0.01 K/UL — SIGNIFICANT CHANGE UP (ref 0–0.2)
BASOPHILS NFR BLD AUTO: 0.3 % — SIGNIFICANT CHANGE UP (ref 0–2)
BILIRUB SERPL-MCNC: 0.4 MG/DL — SIGNIFICANT CHANGE UP (ref 0.2–1.2)
BUN SERPL-MCNC: 26 MG/DL — HIGH (ref 7–23)
CALCIUM SERPL-MCNC: 8.6 MG/DL — SIGNIFICANT CHANGE UP (ref 8.4–10.5)
CHLORIDE SERPL-SCNC: 104 MMOL/L — SIGNIFICANT CHANGE UP (ref 98–107)
CO2 SERPL-SCNC: 21 MMOL/L — LOW (ref 22–31)
CREAT SERPL-MCNC: 0.46 MG/DL — LOW (ref 0.5–1.3)
EOSINOPHIL # BLD AUTO: 0 K/UL — SIGNIFICANT CHANGE UP (ref 0–0.5)
EOSINOPHIL NFR BLD AUTO: 0 % — SIGNIFICANT CHANGE UP (ref 0–6)
GLUCOSE BLDC GLUCOMTR-MCNC: 109 MG/DL — HIGH (ref 70–99)
GLUCOSE BLDC GLUCOMTR-MCNC: 123 MG/DL — HIGH (ref 70–99)
GLUCOSE BLDC GLUCOMTR-MCNC: 151 MG/DL — HIGH (ref 70–99)
GLUCOSE BLDC GLUCOMTR-MCNC: 185 MG/DL — HIGH (ref 70–99)
GLUCOSE BLDC GLUCOMTR-MCNC: 201 MG/DL — HIGH (ref 70–99)
GLUCOSE SERPL-MCNC: 169 MG/DL — HIGH (ref 70–99)
HCT VFR BLD CALC: 27 % — LOW (ref 39–50)
HGB BLD-MCNC: 9.1 G/DL — LOW (ref 13–17)
IMM GRANULOCYTES NFR BLD AUTO: 3.8 % — HIGH (ref 0–1.5)
LYMPHOCYTES # BLD AUTO: 0.91 K/UL — LOW (ref 1–3.3)
LYMPHOCYTES # BLD AUTO: 31.2 % — SIGNIFICANT CHANGE UP (ref 13–44)
MAGNESIUM SERPL-MCNC: 2.1 MG/DL — SIGNIFICANT CHANGE UP (ref 1.6–2.6)
MCHC RBC-ENTMCNC: 32.5 PG — SIGNIFICANT CHANGE UP (ref 27–34)
MCHC RBC-ENTMCNC: 33.7 % — SIGNIFICANT CHANGE UP (ref 32–36)
MCV RBC AUTO: 96.4 FL — SIGNIFICANT CHANGE UP (ref 80–100)
MONOCYTES # BLD AUTO: 0.13 K/UL — SIGNIFICANT CHANGE UP (ref 0–0.9)
MONOCYTES NFR BLD AUTO: 4.5 % — SIGNIFICANT CHANGE UP (ref 2–14)
NEUTROPHILS # BLD AUTO: 1.76 K/UL — LOW (ref 1.8–7.4)
NEUTROPHILS NFR BLD AUTO: 60.2 % — SIGNIFICANT CHANGE UP (ref 43–77)
NRBC # FLD: 0.04 K/UL — SIGNIFICANT CHANGE UP (ref 0–0)
NRBC FLD-RTO: 1.4 — SIGNIFICANT CHANGE UP
PHOSPHATE SERPL-MCNC: 3.9 MG/DL — SIGNIFICANT CHANGE UP (ref 2.5–4.5)
PLATELET # BLD AUTO: 36 K/UL — LOW (ref 150–400)
PMV BLD: 10.1 FL — SIGNIFICANT CHANGE UP (ref 7–13)
POTASSIUM SERPL-MCNC: 4.1 MMOL/L — SIGNIFICANT CHANGE UP (ref 3.5–5.3)
POTASSIUM SERPL-SCNC: 4.1 MMOL/L — SIGNIFICANT CHANGE UP (ref 3.5–5.3)
PROT SERPL-MCNC: 5.6 G/DL — LOW (ref 6–8.3)
RBC # BLD: 2.8 M/UL — LOW (ref 4.2–5.8)
RBC # FLD: 17.3 % — HIGH (ref 10.3–14.5)
SODIUM SERPL-SCNC: 139 MMOL/L — SIGNIFICANT CHANGE UP (ref 135–145)
WBC # BLD: 2.92 K/UL — LOW (ref 3.8–10.5)
WBC # FLD AUTO: 2.92 K/UL — LOW (ref 3.8–10.5)

## 2020-01-24 PROCEDURE — 99233 SBSQ HOSP IP/OBS HIGH 50: CPT

## 2020-01-24 PROCEDURE — 99232 SBSQ HOSP IP/OBS MODERATE 35: CPT

## 2020-01-24 RX ADMIN — SODIUM CHLORIDE 2 GRAM(S): 9 INJECTION INTRAMUSCULAR; INTRAVENOUS; SUBCUTANEOUS at 22:23

## 2020-01-24 RX ADMIN — SENNA PLUS 2 TABLET(S): 8.6 TABLET ORAL at 22:24

## 2020-01-24 RX ADMIN — POLYETHYLENE GLYCOL 3350 17 GRAM(S): 17 POWDER, FOR SOLUTION ORAL at 13:20

## 2020-01-24 RX ADMIN — ATORVASTATIN CALCIUM 10 MILLIGRAM(S): 80 TABLET, FILM COATED ORAL at 22:23

## 2020-01-24 RX ADMIN — PANTOPRAZOLE SODIUM 40 MILLIGRAM(S): 20 TABLET, DELAYED RELEASE ORAL at 06:02

## 2020-01-24 RX ADMIN — PREGABALIN 500 MICROGRAM(S): 225 CAPSULE ORAL at 13:20

## 2020-01-24 RX ADMIN — Medication 2 MILLIGRAM(S): at 17:48

## 2020-01-24 RX ADMIN — LEVETIRACETAM 500 MILLIGRAM(S): 250 TABLET, FILM COATED ORAL at 17:48

## 2020-01-24 RX ADMIN — SODIUM CHLORIDE 2 GRAM(S): 9 INJECTION INTRAMUSCULAR; INTRAVENOUS; SUBCUTANEOUS at 13:20

## 2020-01-24 RX ADMIN — Medication 2 MILLIGRAM(S): at 06:02

## 2020-01-24 RX ADMIN — HUMAN INSULIN 5 UNIT(S): 100 INJECTION, SUSPENSION SUBCUTANEOUS at 17:48

## 2020-01-24 RX ADMIN — LEVETIRACETAM 500 MILLIGRAM(S): 250 TABLET, FILM COATED ORAL at 06:01

## 2020-01-24 RX ADMIN — SODIUM CHLORIDE 2 GRAM(S): 9 INJECTION INTRAMUSCULAR; INTRAVENOUS; SUBCUTANEOUS at 06:01

## 2020-01-24 RX ADMIN — VALGANCICLOVIR 900 MILLIGRAM(S): 450 TABLET, FILM COATED ORAL at 06:01

## 2020-01-24 RX ADMIN — HUMAN INSULIN 5 UNIT(S): 100 INJECTION, SUSPENSION SUBCUTANEOUS at 06:07

## 2020-01-24 RX ADMIN — VALGANCICLOVIR 900 MILLIGRAM(S): 450 TABLET, FILM COATED ORAL at 17:48

## 2020-01-24 NOTE — PROGRESS NOTE ADULT - PROBLEM SELECTOR PLAN 7
d/w patient's son over the phone  DVT ppx: SCDs in setting of thrombocytopenia  Diet: CC w/ 1200 cc fluid restriction, Halal diet  Dispo: pending authorization to rehab on 1/21  Appears to be at same functional status as was on d/c  Case d/w Case management, social work and nursing on IDRs  Working on d/c

## 2020-01-24 NOTE — DIETITIAN INITIAL EVALUATION ADULT. - PROBLEM SELECTOR PLAN 5
Pancytopenia for >6weeks off Temodar w/out requiring transfusional support. May be 2/2 Bactrim or Temodar vs underlying BM disease. BM biopsy was held on recent admission   - monitor daily CBC  - f/u w/ [facility] after rehab

## 2020-01-24 NOTE — DIETITIAN INITIAL EVALUATION ADULT. - PROBLEM SELECTOR PLAN 8
Transitions of Care Status:  1.  Name of PCP: NO PCP   2.  PCP Contacted on Admission: [ ] Y    [x] N    3.  PCP contacted at Discharge: [ ] Y    [ ] N    [ ] N/A  4.  Post-Discharge Appointment Date and Location:  5.  Summary of Handoff given to PCP:

## 2020-01-24 NOTE — CONSULT NOTE ADULT - SUBJECTIVE AND OBJECTIVE BOX
HPI:  Pt is a 70 yo PMHx DM2, HTN, HLD, GBM (diagnosed 9/19, last chemo 11/19, last radiation 12/4), recent hospitalization for new seizure and CMV, SIADH, presenting from [facility]) for alternate rehab placement. Neurology consulted for any further neuro-onc recommendations that might be need before discharge. Otherwise pt is at baseline from prior admission without any new acute changes.      MEDICATIONS  (STANDING):  atorvastatin 10 milliGRAM(s) Oral at bedtime  cyanocobalamin 500 MICROGram(s) Oral daily  dexAMETHasone     Tablet 2 milliGRAM(s) Oral two times a day  dextrose 5%. 1000 milliLiter(s) (50 mL/Hr) IV Continuous <Continuous>  dextrose 50% Injectable 12.5 Gram(s) IV Push once  dextrose 50% Injectable 25 Gram(s) IV Push once  dextrose 50% Injectable 25 Gram(s) IV Push once  insulin lispro (HumaLOG) corrective regimen sliding scale   SubCutaneous Before meals and at bedtime  insulin NPH human recombinant 5 Unit(s) SubCutaneous two times a day  levETIRAcetam 500 milliGRAM(s) Oral two times a day  melatonin 3 milliGRAM(s) Oral <User Schedule>  pantoprazole    Tablet 40 milliGRAM(s) Oral before breakfast  polyethylene glycol 3350 17 Gram(s) Oral daily  senna 2 Tablet(s) Oral at bedtime  sodium chloride 2 Gram(s) Oral three times a day  valGANciclovir 900 milliGRAM(s) Oral two times a day    MEDICATIONS  (PRN):  dextrose 40% Gel 15 Gram(s) Oral once PRN Blood Glucose LESS THAN 70 milliGRAM(s)/deciliter  glucagon  Injectable 1 milliGRAM(s) IntraMuscular once PRN Glucose LESS THAN 70 milligrams/deciliter    PAST MEDICAL & SURGICAL HISTORY:  History of SIADH  CMV infection  Glioblastoma multiforme  Diabetes  Seizures  Hyperlipidemia  Hypertension  Status post stereotactic brain biopsy  History of laryngoscopy  H/O colonoscopy    FAMILY HISTORY:  FH: myocardial infarction    Allergies    No Known Allergies    Intolerances    SHx - No smoking, No ETOH, No drug abuse    Review of Systems:  See HPI.    Vital Signs Last 24 Hrs  T(C): 36.5 (24 Jan 2020 13:40), Max: 36.7 (23 Jan 2020 14:42)  T(F): 97.7 (24 Jan 2020 13:40), Max: 98 (23 Jan 2020 14:42)  HR: 63 (24 Jan 2020 13:40) (63 - 81)  BP: 116/71 (24 Jan 2020 13:40) (105/63 - 138/83)  BP(mean): --  RR: 18 (24 Jan 2020 13:40) (17 - 18)  SpO2: 100% (24 Jan 2020 13:40) (97% - 100%)      General: Male, in no apparent distress including pain though frail    Neurological (>12):  MS: Awake, alert, oriented to person and time only. Follows all commands.    Language: Speech is very mildly dysarthric, fluent    CNs: EOMI no nystagmus. No facial asymmetry b/l    Motor: No pronator drift  No motor drift in the upper extremities	   b/l motor drift in lower extremities    Sensation: Intact to LT b/l throughout.       01-24    139  |  104  |  26<H>  ----------------------------<  169<H>  4.1   |  21<L>  |  0.46<L>    Ca    8.6      24 Jan 2020 06:20  Phos  3.9     01-24  Mg     2.1     01-24    TPro  5.6<L>  /  Alb  2.9<L>  /  TBili  0.4  /  DBili  x   /  AST  16  /  ALT  31  /  AlkPhos  54  01-24               9.1    2.92  )-----------( 36       ( 24 Jan 2020 06:20 )             27.0

## 2020-01-24 NOTE — CONSULT NOTE ADULT - REASON FOR ADMISSION
sent back from SNF, Hyponatremia, SIADH, Glioblastoma,

## 2020-01-24 NOTE — DIETITIAN INITIAL EVALUATION ADULT. - PHYSICAL APPEARANCE
other (specify) Unable to complete Nutrition Focus Physical Exam at this time. Will re-attempt at later time as able.

## 2020-01-24 NOTE — DIETITIAN INITIAL EVALUATION ADULT. - OTHER INFO
70 y/o with medical history inclusive of DM type 2 (HbA1c  7.1% 12/29), HTN, HLD, GBM (diagnosed 9/19, last chemo 11/19, last radiation 12/4), recently hospitalized for new seizure and CMV, SIADH, presenting from rehab/SNF for alternate rehab placement per chart review. Patient noted to be Hebrew speaking however, remained sleeping and unable to awake.  Patient also noted to be confused, disoriented per chart-same confirmed with RN. No family at bedside. Collateral obtained from chart review, RN and PCA.    Per nursing staff, patient endorses good appetite and PO intake >75%-100%. Breakfast completed 100% today. NKFA. No GI distress (nausea/vomiting/diarrhea/constipation) or difficulty chewing and swallowing reported. Recent swallow bedside assessment on 1/16/20 last admission, recommended to continue Regular diet and thin liquids. Unable to obtain weight related history from patient at this time. Per HIE, patient with weight of 67.8lbs (9/27/19) and last admission weight 65.1kg (1/07/19) suggestive of non-significant 4% weight loss in 4 months. No weight documented this admission-RN to obtain new weight. Continue good PO intake and mealtime assistance as needed encouraged. RDN remains available, staff made aware.

## 2020-01-24 NOTE — DIETITIAN INITIAL EVALUATION ADULT. - PERTINENT MEDS FT
MEDICATIONS  (STANDING):  atorvastatin 10 milliGRAM(s) Oral at bedtime  cyanocobalamin 500 MICROGram(s) Oral daily  dexAMETHasone     Tablet 2 milliGRAM(s) Oral two times a day  dextrose 5%. 1000 milliLiter(s) (50 mL/Hr) IV Continuous <Continuous>  dextrose 50% Injectable 12.5 Gram(s) IV Push once  dextrose 50% Injectable 25 Gram(s) IV Push once  dextrose 50% Injectable 25 Gram(s) IV Push once  insulin lispro (HumaLOG) corrective regimen sliding scale   SubCutaneous Before meals and at bedtime  insulin NPH human recombinant 5 Unit(s) SubCutaneous two times a day  levETIRAcetam 500 milliGRAM(s) Oral two times a day  melatonin 3 milliGRAM(s) Oral <User Schedule>  pantoprazole    Tablet 40 milliGRAM(s) Oral before breakfast  polyethylene glycol 3350 17 Gram(s) Oral daily  senna 2 Tablet(s) Oral at bedtime  sodium chloride 2 Gram(s) Oral three times a day  valGANciclovir 900 milliGRAM(s) Oral two times a day    MEDICATIONS  (PRN):  dextrose 40% Gel 15 Gram(s) Oral once PRN Blood Glucose LESS THAN 70 milliGRAM(s)/deciliter  glucagon  Injectable 1 milliGRAM(s) IntraMuscular once PRN Glucose LESS THAN 70 milligrams/deciliter

## 2020-01-24 NOTE — DIETITIAN INITIAL EVALUATION ADULT. - PROBLEM SELECTOR PLAN 4
Pt w/ hyponatremia 2/2 SIADH in setting of GBM on recent admission. Na 134 at time of discharge on 1/16  - c/w Salt tabs 2g TID w/ 1.2L fluid restriction  - f/u am CMP

## 2020-01-24 NOTE — CHART NOTE - NSCHARTNOTEFT_GEN_A_CORE
Patient placed on 1:1 due to patient out of bed without assistance, gait unstable- high risk for fall. Placed on 1:1 for safety. Discussed with Dr. Alcaraz, in agreement with plan. Also discussed with primary rn.

## 2020-01-24 NOTE — CONSULT NOTE ADULT - ASSESSMENT
Pt is a 68 yo PMHx DM2, HTN, HLD, GBM (diagnosed 9/19, last chemo 11/19, last radiation 12/4), recent hospitalization for new seizure and CMV, SIADH, [facility]. Neurology consulted for any further neuro-onc recommendations that might be need before discharge. Exam is at baseline compared to last admission.    Recommendations:  - c/w Keppra 500mg BID  - c/w Decadron 2mg BID  - Neurosurgery re-eval to determine need for shunting given increase in vent size from prior MRI, might aid in his PT  - Case discussed and reviewed with Dr. Wilkins, will attempt to see pt if still inpatient. Pt can follow up for further management as outpatient with Dr. Wlikins's office.  - Otherwise no contraindication for d/c to rehab from neuro perspective

## 2020-01-24 NOTE — DIETITIAN INITIAL EVALUATION ADULT. - PROBLEM SELECTOR PLAN 1
Pt diagnosed in 9/2019 s/p stereotactic biopsy and resection at [facility], s/p Temodar (last in 11/2020) and RT (last 12/4/2020).   - c/w Decadron 2mg BID    - 40 Protonix QD for GI PPx   - [facility] f/u after discharge from rehab

## 2020-01-24 NOTE — CONSULT NOTE ADULT - ATTENDING COMMENTS
I personally reviewed neuroimaging dated  1/10/20	1/7/20	12/28/19	9/28/19    In reviewing these images, I find INTERVAL ENLARGING TEMPORAL HORNS ANTERIORLY AND OVERALL EXAMPLANSION OF THE VENTRICLES OVER TIME, WITH ROUNDED FRONTAL AND OCCIPITAL HORNS, MOST C/W HYDROCEPHALUS.     I discussed these findings with the patient’s son, the Neurology housestaff, and the neurosurgical team. I think a VPS may resolve the hydrocephalus, which may be symptomatic and underly his reason for admission and prolonged hospitalization.
Hematology was re-consulted for pancytopenia.      -Labs were wnl until October and then in December after starting treatment with temodar and RT, pt was noted to have wbc 1.69, , hgb 11, plts 28.   -Plts have been in the 30's since, attributed to Temodar which could take months or more to recover.    -However, the leukopenia and neutropenia and anemia were new.    -workup was negative HIV, hepatitis A/B/C, KIKI, RF.  B12 was 1154, folate wnl, anemia workup consistent with AOCD.  Retic was inappropriately low consistent with BM suppression or underlying BM process.  -he was found to have CMV viral load came back elevated to 115K, suspected to be the cause of his pancytopenia and was started on Valcyte 900mg BID.  BM biopsy was deferred last admission after discussion with the son (hospitalist in [place]) and CMV finding.  Counts improving and now stable.  CMV viral load improving.   -recommend to continue to check cbc with differential daily.  If stable and improving on treatment, pt to follow up outpatient hematology.  If downtrending despite improving CMV viral load, will recommend inpatient biopsy. Son is in agreement.   -retic is improving with RPI >2 now indicating BM recovery, anticipate ongoing count recovery  -per primary team, pt would like to transfer his neuro-onc care to [facility].  Recommended to reach out to Dr. Giron to establish care for further management of his glioblastoma.   -we will make a referral to our [facility] upon discharge for hematology follow up.

## 2020-01-24 NOTE — CONSULT NOTE ADULT - SUBJECTIVE AND OBJECTIVE BOX
NEUROSURGERY CONSULT  ADDIS SCHWAB   01-24-20 @ 16:23    HPI: Pt is a 68 yo PMHx DM2, HTN, HLD, GBM (diagnosed 9/2019, last chemo 11/2019, last radiation 12/4/2019), recent hospitalization for new seizure and CMV, SIADH, presenting from [facility] ([facility]) for alternate rehab placement. Pt was discharge to rehab for persistent weakness, but was then sent back to [facility] because the patient was "refusing to wear his mask" and the facility thought he needed isolation. Pt does not remember what led to him returning to the hospital. Per U SW note, pt was accepted to [facility] on 1/17 but too late in the day to obtain authorization. The case will be reopened on Monday 1/20. Pt denies CP, SOB, f/c, n/v, abdominal pain, diarrhea. No new complaints.   Pt admitted 1/7-1/16 for steroid-induced hyperglycemia and new onset seizures. Pt was started on Keppra on that admission. 24 hour EEG was negative for seizure activity. MRI w/w/out contrast showed increased enhancement of brain tumors. His course was c/b hyponatremia (low of 126) and he was restarted on his home salt tabs and fluid restricted. Pt was also found to have CMV reactivation w/ high PCR levels. He was started on Valcyte 900 mg bid.    neurosurgery called for further intervention, if any.     RADIOLOGY:     < from: MR Head w/wo IV Cont (01.10.20 @ 09:22) >  VENTRICLES AND SULCI:  Overall increased in size compatible with progression of cerebral volume loss.    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 Surrounding T2 hyperintensity and internal hemorrhage is again seen with increased susceptibility artifact since the prior exam likely due to increased hemosiderin deposition possibly from interval surgery. There is subependymal tumor spread extending along the occipital horn on the right and along the medial aspect of the right temporal horn as seen previously. Enhancement of the lateral wall of the temporal horn and right frontal horn has mildly decreased from the prior exam.    A satellite mass is seen along the superior aspect of the left temporal horn which has enlarged measuring 2 x 1.4 cm as compared with 1.5 x 0.7 cm. A mass within the left frontal lobe below the frontal horn is larger measuring 1.7 x 1.5 cm as compared with 0.5 x 0.5 cm, in retrospect. An area of gyral swelling and increased T2 signal within the mesial left parietal lobe is mildly more prominent likely representing increasing tumor. Increased enhancement is seen in this area this time.    EXTRA-AXIAL:  No mass or collection.  VISUALIZED SINUSES:  Normal.  VISUALIZED MASTOIDS:  Clear.  CALVARIUM:  Postoperative changes on the right.  CAROTID FLOW VOIDS:  Present.  MISCELLANEOUS:  Intraocular lens implants      IMPRESSION:    Heterogeneously enhancing partially necrotic infiltrative mass within the splenium of the corpus callosum without significant interval change from the prior exam with associated subependymoma spread of tumor.    Areas of tumor infiltration above the left temporal horn and within the left frontal lobe which have increased in size. Tumor within the mesial left parietal lobe is slightly more prominent and demonstrates increased enhancement at this time.    MEDS:  atorvastatin 10 milliGRAM(s) Oral at bedtime  cyanocobalamin 500 MICROGram(s) Oral daily  dexAMETHasone     Tablet 2 milliGRAM(s) Oral two times a day  dextrose 40% Gel 15 Gram(s) Oral once PRN  dextrose 5%. 1000 milliLiter(s) IV Continuous <Continuous>  dextrose 50% Injectable 12.5 Gram(s) IV Push once  dextrose 50% Injectable 25 Gram(s) IV Push once  dextrose 50% Injectable 25 Gram(s) IV Push once  glucagon  Injectable 1 milliGRAM(s) IntraMuscular once PRN  insulin lispro (HumaLOG) corrective regimen sliding scale   SubCutaneous Before meals and at bedtime  insulin NPH human recombinant 5 Unit(s) SubCutaneous two times a day  levETIRAcetam 500 milliGRAM(s) Oral two times a day  melatonin 3 milliGRAM(s) Oral <User Schedule>  pantoprazole    Tablet 40 milliGRAM(s) Oral before breakfast  polyethylene glycol 3350 17 Gram(s) Oral daily  senna 2 Tablet(s) Oral at bedtime  sodium chloride 2 Gram(s) Oral three times a day  valGANciclovir 900 milliGRAM(s) Oral two times a day      PHYSICAL EXAM:  Vital Signs Last 24 Hrs  T(C): 36.5 (24 Jan 2020 13:40), Max: 36.7 (23 Jan 2020 21:48)  T(F): 97.7 (24 Jan 2020 13:40), Max: 98 (23 Jan 2020 21:48)  HR: 63 (24 Jan 2020 13:40) (63 - 81)  BP: 116/71 (24 Jan 2020 13:40) (116/71 - 138/83)  BP(mean): --  RR: 18 (24 Jan 2020 13:40) (17 - 18)  SpO2: 100% (24 Jan 2020 13:40) (97% - 100%)    LABS:                        9.1    2.92  )-----------( 36       ( 24 Jan 2020 06:20 )             27.0     01-24    139  |  104  |  26<H>  ----------------------------<  169<H>  4.1   |  21<L>  |  0.46<L>    Ca    8.6      24 Jan 2020 06:20  Phos  3.9     01-24  Mg     2.1     01-24    TPro  5.6<L>  /  Alb  2.9<L>  /  TBili  0.4  /  DBili  x   /  AST  16  /  ALT  31  /  AlkPhos  54  01-24 NEUROSURGERY CONSULT  ADDIS SCHWAB   01-24-20 @ 16:23    HPI: Pt is a 70 yo PMHx DM2, HTN, HLD, GBM (diagnosed 9/2019, last chemo 11/2019, last radiation 12/4/2019), recent hospitalization for new seizure and CMV, SIADH, presenting from [facility] ([facility]) for alternate rehab placement. Pt was discharge to rehab for persistent weakness, but was then sent back to [facility] because the patient was "refusing to wear his mask" and the facility thought he needed isolation. Pt does not remember what led to him returning to the hospital. Per U SW note, pt was accepted to [facility] on 1/17 but too late in the day to obtain authorization. The case will be reopened on Monday 1/20. Pt denies CP, SOB, f/c, n/v, abdominal pain, diarrhea. No new complaints.   Pt admitted 1/7-1/16 for steroid-induced hyperglycemia and new onset seizures. Pt was started on Keppra on that admission. 24 hour EEG was negative for seizure activity. MRI w/w/out contrast showed increased enhancement of brain tumors. His course was c/b hyponatremia (low of 126) and he was restarted on his home salt tabs and fluid restricted. Pt was also found to have CMV reactivation w/ high PCR levels. He was started on Valcyte 900 mg bid.    neurosurgery called for further intervention, if any.     RADIOLOGY:     < from: MR Head w/wo IV Cont (01.10.20 @ 09:22) >  VENTRICLES AND SULCI:  Overall increased in size compatible with progression of cerebral volume loss.    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 Surrounding T2 hyperintensity and internal hemorrhage is again seen with increased susceptibility artifact since the prior exam likely due to increased hemosiderin deposition possibly from interval surgery. There is subependymal tumor spread extending along the occipital horn on the right and along the medial aspect of the right temporal horn as seen previously. Enhancement of the lateral wall of the temporal horn and right frontal horn has mildly decreased from the prior exam.    A satellite mass is seen along the superior aspect of the left temporal horn which has enlarged measuring 2 x 1.4 cm as compared with 1.5 x 0.7 cm. A mass within the left frontal lobe below the frontal horn is larger measuring 1.7 x 1.5 cm as compared with 0.5 x 0.5 cm, in retrospect. An area of gyral swelling and increased T2 signal within the mesial left parietal lobe is mildly more prominent likely representing increasing tumor. Increased enhancement is seen in this area this time.    EXTRA-AXIAL:  No mass or collection.  VISUALIZED SINUSES:  Normal.  VISUALIZED MASTOIDS:  Clear.  CALVARIUM:  Postoperative changes on the right.  CAROTID FLOW VOIDS:  Present.  MISCELLANEOUS:  Intraocular lens implants      IMPRESSION:    Heterogeneously enhancing partially necrotic infiltrative mass within the splenium of the corpus callosum without significant interval change from the prior exam with associated subependymoma spread of tumor.    Areas of tumor infiltration above the left temporal horn and within the left frontal lobe which have increased in size. Tumor within the mesial left parietal lobe is slightly more prominent and demonstrates increased enhancement at this time.    MEDS:  atorvastatin 10 milliGRAM(s) Oral at bedtime  cyanocobalamin 500 MICROGram(s) Oral daily  dexAMETHasone     Tablet 2 milliGRAM(s) Oral two times a day  dextrose 40% Gel 15 Gram(s) Oral once PRN  dextrose 5%. 1000 milliLiter(s) IV Continuous <Continuous>  dextrose 50% Injectable 12.5 Gram(s) IV Push once  dextrose 50% Injectable 25 Gram(s) IV Push once  dextrose 50% Injectable 25 Gram(s) IV Push once  glucagon  Injectable 1 milliGRAM(s) IntraMuscular once PRN  insulin lispro (HumaLOG) corrective regimen sliding scale   SubCutaneous Before meals and at bedtime  insulin NPH human recombinant 5 Unit(s) SubCutaneous two times a day  levETIRAcetam 500 milliGRAM(s) Oral two times a day  melatonin 3 milliGRAM(s) Oral <User Schedule>  pantoprazole    Tablet 40 milliGRAM(s) Oral before breakfast  polyethylene glycol 3350 17 Gram(s) Oral daily  senna 2 Tablet(s) Oral at bedtime  sodium chloride 2 Gram(s) Oral three times a day  valGANciclovir 900 milliGRAM(s) Oral two times a day      PHYSICAL EXAM:  Vital Signs Last 24 Hrs  T(C): 36.5 (24 Jan 2020 13:40), Max: 36.7 (23 Jan 2020 21:48)  T(F): 97.7 (24 Jan 2020 13:40), Max: 98 (23 Jan 2020 21:48)  HR: 63 (24 Jan 2020 13:40) (63 - 81)  BP: 116/71 (24 Jan 2020 13:40) (116/71 - 138/83)  RR: 18 (24 Jan 2020 13:40) (17 - 18)  SpO2: 100% (24 Jan 2020 13:40) (97% - 100%)    awake and alert, speech not fluent   EOMI  PERRL  MAEx4 to command and spontaneously   no drift     LABS:                        9.1    2.92  )-----------( 36       ( 24 Jan 2020 06:20 )             27.0     01-24    139  |  104  |  26<H>  ----------------------------<  169<H>  4.1   |  21<L>  |  0.46<L>    Ca    8.6      24 Jan 2020 06:20  Phos  3.9     01-24  Mg     2.1     01-24    TPro  5.6<L>  /  Alb  2.9<L>  /  TBili  0.4  /  DBili  x   /  AST  16  /  ALT  31  /  AlkPhos  54  01-24

## 2020-01-24 NOTE — PROGRESS NOTE ADULT - SUBJECTIVE AND OBJECTIVE BOX
Patient is a 69y old  Male who presents with a chief complaint of sent back from SNF, Hyponatremia, SIADH, Glioblastoma, (24 Jan 2020 16:22)      SUBJECTIVE / OVERNIGHT EVENTS:  Interviewed with Sera  ID #[id number]  Feels well  No complaints    MEDICATIONS  (STANDING):  atorvastatin 10 milliGRAM(s) Oral at bedtime  cyanocobalamin 500 MICROGram(s) Oral daily  dexAMETHasone     Tablet 2 milliGRAM(s) Oral two times a day  dextrose 5%. 1000 milliLiter(s) (50 mL/Hr) IV Continuous <Continuous>  dextrose 50% Injectable 12.5 Gram(s) IV Push once  dextrose 50% Injectable 25 Gram(s) IV Push once  dextrose 50% Injectable 25 Gram(s) IV Push once  insulin lispro (HumaLOG) corrective regimen sliding scale   SubCutaneous Before meals and at bedtime  insulin NPH human recombinant 5 Unit(s) SubCutaneous two times a day  levETIRAcetam 500 milliGRAM(s) Oral two times a day  melatonin 3 milliGRAM(s) Oral <User Schedule>  pantoprazole    Tablet 40 milliGRAM(s) Oral before breakfast  polyethylene glycol 3350 17 Gram(s) Oral daily  senna 2 Tablet(s) Oral at bedtime  sodium chloride 2 Gram(s) Oral three times a day  valGANciclovir 900 milliGRAM(s) Oral two times a day    MEDICATIONS  (PRN):  dextrose 40% Gel 15 Gram(s) Oral once PRN Blood Glucose LESS THAN 70 milliGRAM(s)/deciliter  glucagon  Injectable 1 milliGRAM(s) IntraMuscular once PRN Glucose LESS THAN 70 milligrams/deciliter      Vital Signs Last 24 Hrs  T(C): 36.5 (24 Jan 2020 13:40), Max: 36.7 (23 Jan 2020 21:48)  T(F): 97.7 (24 Jan 2020 13:40), Max: 98 (23 Jan 2020 21:48)  HR: 63 (24 Jan 2020 13:40) (63 - 81)  BP: 116/71 (24 Jan 2020 13:40) (116/71 - 138/83)  BP(mean): --  RR: 18 (24 Jan 2020 13:40) (17 - 18)  SpO2: 100% (24 Jan 2020 13:40) (97% - 100%)  CAPILLARY BLOOD GLUCOSE      POCT Blood Glucose.: 201 mg/dL (24 Jan 2020 12:48)  POCT Blood Glucose.: 123 mg/dL (24 Jan 2020 08:45)  POCT Blood Glucose.: 185 mg/dL (24 Jan 2020 06:06)  POCT Blood Glucose.: 209 mg/dL (23 Jan 2020 21:59)  POCT Blood Glucose.: 165 mg/dL (23 Jan 2020 17:42)    I&O's Summary    24 Jan 2020 07:01  -  24 Jan 2020 17:10  --------------------------------------------------------  IN: 474 mL / OUT: 0 mL / NET: 474 mL        PHYSICAL EXAM:  GENERAL: NAD, well-developed  HEENT: MMM, conjunctiva and sclera clear  NECK: Supple, No JVD  CHEST/LUNG: Clear to auscultation bilaterally; No wheeze  HEART: Regular rate and rhythm; No murmurs, rubs, or gallops  ABDOMEN: Soft, Nontender, Nondistended; Bowel sounds present  EXTREMITIES:  2+ Peripheral Pulses, No clubbing, cyanosis, or edema  PSYCH: AAOx3  NEUROLOGY: non-focal  SKIN: No rashes or lesions    LABS:                        9.1    2.92  )-----------( 36       ( 24 Jan 2020 06:20 )             27.0     01-24    139  |  104  |  26<H>  ----------------------------<  169<H>  4.1   |  21<L>  |  0.46<L>    Ca    8.6      24 Jan 2020 06:20  Phos  3.9     01-24  Mg     2.1     01-24    TPro  5.6<L>  /  Alb  2.9<L>  /  TBili  0.4  /  DBili  x   /  AST  16  /  ALT  31  /  AlkPhos  54  01-24

## 2020-01-24 NOTE — CONSULT NOTE ADULT - ASSESSMENT
70yo hx GBM resected by Dr. Roy in Sept 2019, here since 1/7 with MRI on 1/10 showing increased vents but due to cerebral volume loss, not true hydrocephalus.     PLAN:   - no neurosurg intervention, no shunting   - may obtain CT head for more updated imaging 68yo hx GBM biopsied by Dr. Roy in Sept 2019, here since 1/7 with MRI on 1/10 showing increased vents but due to cerebral volume loss, not true hydrocephalus.     PLAN:   - no neurosurg intervention, no shunting   - may obtain CT head for more updated imaging 68yo hx GBM biopsied by Dr. Roy in Sept 2019, here since 1/7 with MRI on 1/10 showing increased vents but due to cerebral volume loss, not true hydrocephalus. Here for rehab placement.     PLAN:   - no neurosurg intervention, no shunting   - no need for CTH, patient stable clinically

## 2020-01-24 NOTE — DIETITIAN INITIAL EVALUATION ADULT. - PROBLEM SELECTOR PLAN 6
In setting of GBM.  - c/w Keppra 500 mg bid  - c/w decadron 2 mg bid  - fall, seizure, aspiration precautions

## 2020-01-24 NOTE — DIETITIAN INITIAL EVALUATION ADULT. - CONTINUE CURRENT NUTRITION CARE PLAN
1. Continue Consistent Carbohydrate w/ evening snack, Halal diet. Fluid Restriction per MD discretion. 2. Continue Glucerna Therapeutic Nutrition Shake 240mls 1x daily (220kcals, 10g protein). 3. Please Encourage po intake, assist with meals and menu selections, provide alternatives PRN.

## 2020-01-25 LAB
ALBUMIN SERPL ELPH-MCNC: 3.1 G/DL — LOW (ref 3.3–5)
ALP SERPL-CCNC: 61 U/L — SIGNIFICANT CHANGE UP (ref 40–120)
ALT FLD-CCNC: 33 U/L — SIGNIFICANT CHANGE UP (ref 4–41)
ANION GAP SERPL CALC-SCNC: 12 MMO/L — SIGNIFICANT CHANGE UP (ref 7–14)
AST SERPL-CCNC: 21 U/L — SIGNIFICANT CHANGE UP (ref 4–40)
BASOPHILS # BLD AUTO: 0 K/UL — SIGNIFICANT CHANGE UP (ref 0–0.2)
BASOPHILS NFR BLD AUTO: 0 % — SIGNIFICANT CHANGE UP (ref 0–2)
BILIRUB SERPL-MCNC: 0.6 MG/DL — SIGNIFICANT CHANGE UP (ref 0.2–1.2)
BUN SERPL-MCNC: 27 MG/DL — HIGH (ref 7–23)
CALCIUM SERPL-MCNC: 8.8 MG/DL — SIGNIFICANT CHANGE UP (ref 8.4–10.5)
CHLORIDE SERPL-SCNC: 101 MMOL/L — SIGNIFICANT CHANGE UP (ref 98–107)
CO2 SERPL-SCNC: 22 MMOL/L — SIGNIFICANT CHANGE UP (ref 22–31)
CREAT SERPL-MCNC: 0.42 MG/DL — LOW (ref 0.5–1.3)
EOSINOPHIL # BLD AUTO: 0 K/UL — SIGNIFICANT CHANGE UP (ref 0–0.5)
EOSINOPHIL NFR BLD AUTO: 0 % — SIGNIFICANT CHANGE UP (ref 0–6)
GLUCOSE BLDC GLUCOMTR-MCNC: 123 MG/DL — HIGH (ref 70–99)
GLUCOSE BLDC GLUCOMTR-MCNC: 162 MG/DL — HIGH (ref 70–99)
GLUCOSE BLDC GLUCOMTR-MCNC: 164 MG/DL — HIGH (ref 70–99)
GLUCOSE BLDC GLUCOMTR-MCNC: 207 MG/DL — HIGH (ref 70–99)
GLUCOSE BLDC GLUCOMTR-MCNC: 217 MG/DL — HIGH (ref 70–99)
GLUCOSE SERPL-MCNC: 115 MG/DL — HIGH (ref 70–99)
HCT VFR BLD CALC: 29.6 % — LOW (ref 39–50)
HGB BLD-MCNC: 10.1 G/DL — LOW (ref 13–17)
IMM GRANULOCYTES NFR BLD AUTO: 2.8 % — HIGH (ref 0–1.5)
LYMPHOCYTES # BLD AUTO: 0.99 K/UL — LOW (ref 1–3.3)
LYMPHOCYTES # BLD AUTO: 28.2 % — SIGNIFICANT CHANGE UP (ref 13–44)
MAGNESIUM SERPL-MCNC: 2 MG/DL — SIGNIFICANT CHANGE UP (ref 1.6–2.6)
MCHC RBC-ENTMCNC: 32.1 PG — SIGNIFICANT CHANGE UP (ref 27–34)
MCHC RBC-ENTMCNC: 34.1 % — SIGNIFICANT CHANGE UP (ref 32–36)
MCV RBC AUTO: 94 FL — SIGNIFICANT CHANGE UP (ref 80–100)
MONOCYTES # BLD AUTO: 0.14 K/UL — SIGNIFICANT CHANGE UP (ref 0–0.9)
MONOCYTES NFR BLD AUTO: 4 % — SIGNIFICANT CHANGE UP (ref 2–14)
NEUTROPHILS # BLD AUTO: 2.28 K/UL — SIGNIFICANT CHANGE UP (ref 1.8–7.4)
NEUTROPHILS NFR BLD AUTO: 65 % — SIGNIFICANT CHANGE UP (ref 43–77)
NRBC # FLD: 0.03 K/UL — SIGNIFICANT CHANGE UP (ref 0–0)
PHOSPHATE SERPL-MCNC: 3.8 MG/DL — SIGNIFICANT CHANGE UP (ref 2.5–4.5)
PLATELET # BLD AUTO: 40 K/UL — LOW (ref 150–400)
PMV BLD: 10.6 FL — SIGNIFICANT CHANGE UP (ref 7–13)
POTASSIUM SERPL-MCNC: 3.5 MMOL/L — SIGNIFICANT CHANGE UP (ref 3.5–5.3)
POTASSIUM SERPL-SCNC: 3.5 MMOL/L — SIGNIFICANT CHANGE UP (ref 3.5–5.3)
PROT SERPL-MCNC: 6 G/DL — SIGNIFICANT CHANGE UP (ref 6–8.3)
RBC # BLD: 3.15 M/UL — LOW (ref 4.2–5.8)
RBC # FLD: 17.2 % — HIGH (ref 10.3–14.5)
SODIUM SERPL-SCNC: 135 MMOL/L — SIGNIFICANT CHANGE UP (ref 135–145)
WBC # BLD: 3.51 K/UL — LOW (ref 3.8–10.5)
WBC # FLD AUTO: 3.51 K/UL — LOW (ref 3.8–10.5)

## 2020-01-25 PROCEDURE — 99233 SBSQ HOSP IP/OBS HIGH 50: CPT

## 2020-01-25 RX ORDER — DOCUSATE SODIUM 100 MG
100 CAPSULE ORAL THREE TIMES A DAY
Refills: 0 | Status: DISCONTINUED | OUTPATIENT
Start: 2020-01-25 | End: 2020-02-02

## 2020-01-25 RX ADMIN — Medication 2 MILLIGRAM(S): at 17:05

## 2020-01-25 RX ADMIN — Medication 2 MILLIGRAM(S): at 06:08

## 2020-01-25 RX ADMIN — PREGABALIN 500 MICROGRAM(S): 225 CAPSULE ORAL at 13:15

## 2020-01-25 RX ADMIN — Medication 3 MILLIGRAM(S): at 01:15

## 2020-01-25 RX ADMIN — Medication 3 MILLIGRAM(S): at 22:15

## 2020-01-25 RX ADMIN — LEVETIRACETAM 500 MILLIGRAM(S): 250 TABLET, FILM COATED ORAL at 17:05

## 2020-01-25 RX ADMIN — SODIUM CHLORIDE 2 GRAM(S): 9 INJECTION INTRAMUSCULAR; INTRAVENOUS; SUBCUTANEOUS at 22:15

## 2020-01-25 RX ADMIN — SODIUM CHLORIDE 2 GRAM(S): 9 INJECTION INTRAMUSCULAR; INTRAVENOUS; SUBCUTANEOUS at 06:08

## 2020-01-25 RX ADMIN — ATORVASTATIN CALCIUM 10 MILLIGRAM(S): 80 TABLET, FILM COATED ORAL at 22:15

## 2020-01-25 RX ADMIN — POLYETHYLENE GLYCOL 3350 17 GRAM(S): 17 POWDER, FOR SOLUTION ORAL at 13:15

## 2020-01-25 RX ADMIN — HUMAN INSULIN 5 UNIT(S): 100 INJECTION, SUSPENSION SUBCUTANEOUS at 06:07

## 2020-01-25 RX ADMIN — HUMAN INSULIN 5 UNIT(S): 100 INJECTION, SUSPENSION SUBCUTANEOUS at 17:50

## 2020-01-25 RX ADMIN — SENNA PLUS 2 TABLET(S): 8.6 TABLET ORAL at 22:15

## 2020-01-25 RX ADMIN — VALGANCICLOVIR 900 MILLIGRAM(S): 450 TABLET, FILM COATED ORAL at 17:06

## 2020-01-25 RX ADMIN — VALGANCICLOVIR 900 MILLIGRAM(S): 450 TABLET, FILM COATED ORAL at 06:08

## 2020-01-25 RX ADMIN — LEVETIRACETAM 500 MILLIGRAM(S): 250 TABLET, FILM COATED ORAL at 06:08

## 2020-01-25 RX ADMIN — PANTOPRAZOLE SODIUM 40 MILLIGRAM(S): 20 TABLET, DELAYED RELEASE ORAL at 06:08

## 2020-01-25 RX ADMIN — SODIUM CHLORIDE 2 GRAM(S): 9 INJECTION INTRAMUSCULAR; INTRAVENOUS; SUBCUTANEOUS at 13:16

## 2020-01-25 NOTE — CHART NOTE - NSCHARTNOTEFT_GEN_A_CORE
Pt's son, Jim Rider, requesting to speak with Dr. Wilkins directly - contacted Dr. Wilkins via email to inform him of son's request and provided his contact information.

## 2020-01-25 NOTE — PROGRESS NOTE ADULT - SUBJECTIVE AND OBJECTIVE BOX
PROGRESS NOTE:     Patient is a 69y old  Male who presents with a chief complaint of sent back from , Hyponatremia, SIADH, Glioblastoma, (24 Jan 2020 17:10)      SUBJECTIVE / OVERNIGHT EVENTS:  Patient seen and examined this morning. Overnight with moments of confusion, now on 1:1    ADDITIONAL REVIEW OF SYSTEMS:  No fevers or chills     MEDICATIONS  (STANDING):  atorvastatin 10 milliGRAM(s) Oral at bedtime  cyanocobalamin 500 MICROGram(s) Oral daily  dexAMETHasone     Tablet 2 milliGRAM(s) Oral two times a day  dextrose 5%. 1000 milliLiter(s) (50 mL/Hr) IV Continuous <Continuous>  dextrose 50% Injectable 12.5 Gram(s) IV Push once  dextrose 50% Injectable 25 Gram(s) IV Push once  dextrose 50% Injectable 25 Gram(s) IV Push once  insulin lispro (HumaLOG) corrective regimen sliding scale   SubCutaneous Before meals and at bedtime  insulin NPH human recombinant 5 Unit(s) SubCutaneous two times a day  levETIRAcetam 500 milliGRAM(s) Oral two times a day  melatonin 3 milliGRAM(s) Oral <User Schedule>  pantoprazole    Tablet 40 milliGRAM(s) Oral before breakfast  polyethylene glycol 3350 17 Gram(s) Oral daily  senna 2 Tablet(s) Oral at bedtime  sodium chloride 2 Gram(s) Oral three times a day  valGANciclovir 900 milliGRAM(s) Oral two times a day    MEDICATIONS  (PRN):  dextrose 40% Gel 15 Gram(s) Oral once PRN Blood Glucose LESS THAN 70 milliGRAM(s)/deciliter  glucagon  Injectable 1 milliGRAM(s) IntraMuscular once PRN Glucose LESS THAN 70 milligrams/deciliter      CAPILLARY BLOOD GLUCOSE      POCT Blood Glucose.: 207 mg/dL (25 Jan 2020 08:50)  POCT Blood Glucose.: 123 mg/dL (25 Jan 2020 05:38)  POCT Blood Glucose.: 151 mg/dL (24 Jan 2020 22:17)  POCT Blood Glucose.: 109 mg/dL (24 Jan 2020 17:47)  POCT Blood Glucose.: 201 mg/dL (24 Jan 2020 12:48)    I&O's Summary    24 Jan 2020 07:01  -  25 Jan 2020 07:00  --------------------------------------------------------  IN: 474 mL / OUT: 400 mL / NET: 74 mL        PHYSICAL EXAM:  Vital Signs Last 24 Hrs  T(C): 36.6 (25 Jan 2020 05:00), Max: 36.7 (24 Jan 2020 21:47)  T(F): 97.9 (25 Jan 2020 05:00), Max: 98 (24 Jan 2020 21:47)  HR: 77 (25 Jan 2020 05:00) (63 - 79)  BP: 127/80 (25 Jan 2020 05:00) (116/71 - 150/79)  BP(mean): --  RR: 18 (25 Jan 2020 05:00) (16 - 18)  SpO2: 100% (25 Jan 2020 05:00) (100% - 100%)    CONSTITUTIONAL: NAD, well-developed  RESPIRATORY: Normal respiratory effort; lungs are clear to auscultation bilaterally  CARDIOVASCULAR: Regular rate and rhythm, normal S1 and S2, no murmur/rub/gallop; No lower extremity edema; Peripheral pulses are 2+ bilaterally  ABDOMEN: Nontender to palpation, normoactive bowel sounds, no rebound/guarding; No hepatosplenomegaly  MUSCLOSKELETAL: no clubbing or cyanosis of digits; no joint swelling or tenderness to palpation  PSYCH: A+O to person, place, and time; affect appropriate    LABS:                        10.1   3.51  )-----------( 40       ( 25 Jan 2020 06:30 )             29.6     01-25    135  |  101  |  27<H>  ----------------------------<  115<H>  3.5   |  22  |  0.42<L>    Ca    8.8      25 Jan 2020 06:30  Phos  3.8     01-25  Mg     2.0     01-25    TPro  6.0  /  Alb  3.1<L>  /  TBili  0.6  /  DBili  x   /  AST  21  /  ALT  33  /  AlkPhos  61  01-25                RADIOLOGY & ADDITIONAL TESTS:  Results Reviewed:   Imaging Personally Reviewed:  Electrocardiogram Personally Reviewed:    COORDINATION OF CARE:  Care Discussed with Consultants/Other Providers [Y/N]:  Prior or Outpatient Records Reviewed [Y/N]:

## 2020-01-26 LAB
ALBUMIN SERPL ELPH-MCNC: 3 G/DL — LOW (ref 3.3–5)
ALP SERPL-CCNC: 51 U/L — SIGNIFICANT CHANGE UP (ref 40–120)
ALT FLD-CCNC: 32 U/L — SIGNIFICANT CHANGE UP (ref 4–41)
ANION GAP SERPL CALC-SCNC: 13 MMO/L — SIGNIFICANT CHANGE UP (ref 7–14)
AST SERPL-CCNC: 18 U/L — SIGNIFICANT CHANGE UP (ref 4–40)
BASOPHILS # BLD AUTO: 0.01 K/UL — SIGNIFICANT CHANGE UP (ref 0–0.2)
BASOPHILS NFR BLD AUTO: 0.3 % — SIGNIFICANT CHANGE UP (ref 0–2)
BILIRUB SERPL-MCNC: 0.5 MG/DL — SIGNIFICANT CHANGE UP (ref 0.2–1.2)
BUN SERPL-MCNC: 21 MG/DL — SIGNIFICANT CHANGE UP (ref 7–23)
CALCIUM SERPL-MCNC: 8.5 MG/DL — SIGNIFICANT CHANGE UP (ref 8.4–10.5)
CHLORIDE SERPL-SCNC: 100 MMOL/L — SIGNIFICANT CHANGE UP (ref 98–107)
CO2 SERPL-SCNC: 21 MMOL/L — LOW (ref 22–31)
CREAT SERPL-MCNC: 0.45 MG/DL — LOW (ref 0.5–1.3)
EOSINOPHIL # BLD AUTO: 0.01 K/UL — SIGNIFICANT CHANGE UP (ref 0–0.5)
EOSINOPHIL NFR BLD AUTO: 0.3 % — SIGNIFICANT CHANGE UP (ref 0–6)
GLUCOSE BLDC GLUCOMTR-MCNC: 111 MG/DL — HIGH (ref 70–99)
GLUCOSE BLDC GLUCOMTR-MCNC: 164 MG/DL — HIGH (ref 70–99)
GLUCOSE BLDC GLUCOMTR-MCNC: 169 MG/DL — HIGH (ref 70–99)
GLUCOSE BLDC GLUCOMTR-MCNC: 187 MG/DL — HIGH (ref 70–99)
GLUCOSE BLDC GLUCOMTR-MCNC: 95 MG/DL — SIGNIFICANT CHANGE UP (ref 70–99)
GLUCOSE SERPL-MCNC: 126 MG/DL — HIGH (ref 70–99)
HCT VFR BLD CALC: 29 % — LOW (ref 39–50)
HGB BLD-MCNC: 9.7 G/DL — LOW (ref 13–17)
IMM GRANULOCYTES NFR BLD AUTO: 3.5 % — HIGH (ref 0–1.5)
LYMPHOCYTES # BLD AUTO: 1.18 K/UL — SIGNIFICANT CHANGE UP (ref 1–3.3)
LYMPHOCYTES # BLD AUTO: 37.8 % — SIGNIFICANT CHANGE UP (ref 13–44)
MAGNESIUM SERPL-MCNC: 1.9 MG/DL — SIGNIFICANT CHANGE UP (ref 1.6–2.6)
MCHC RBC-ENTMCNC: 32.8 PG — SIGNIFICANT CHANGE UP (ref 27–34)
MCHC RBC-ENTMCNC: 33.4 % — SIGNIFICANT CHANGE UP (ref 32–36)
MCV RBC AUTO: 98 FL — SIGNIFICANT CHANGE UP (ref 80–100)
MONOCYTES # BLD AUTO: 0.13 K/UL — SIGNIFICANT CHANGE UP (ref 0–0.9)
MONOCYTES NFR BLD AUTO: 4.2 % — SIGNIFICANT CHANGE UP (ref 2–14)
NEUTROPHILS # BLD AUTO: 1.68 K/UL — LOW (ref 1.8–7.4)
NEUTROPHILS NFR BLD AUTO: 53.9 % — SIGNIFICANT CHANGE UP (ref 43–77)
NRBC # FLD: 0.04 K/UL — SIGNIFICANT CHANGE UP (ref 0–0)
NRBC FLD-RTO: 1.3 — SIGNIFICANT CHANGE UP
PHOSPHATE SERPL-MCNC: 3.2 MG/DL — SIGNIFICANT CHANGE UP (ref 2.5–4.5)
PLATELET # BLD AUTO: 34 K/UL — LOW (ref 150–400)
PMV BLD: 10.5 FL — SIGNIFICANT CHANGE UP (ref 7–13)
POTASSIUM SERPL-MCNC: 3.9 MMOL/L — SIGNIFICANT CHANGE UP (ref 3.5–5.3)
POTASSIUM SERPL-SCNC: 3.9 MMOL/L — SIGNIFICANT CHANGE UP (ref 3.5–5.3)
PROT SERPL-MCNC: 5.5 G/DL — LOW (ref 6–8.3)
RBC # BLD: 2.96 M/UL — LOW (ref 4.2–5.8)
RBC # FLD: 17.2 % — HIGH (ref 10.3–14.5)
SODIUM SERPL-SCNC: 134 MMOL/L — LOW (ref 135–145)
WBC # BLD: 3.12 K/UL — LOW (ref 3.8–10.5)
WBC # FLD AUTO: 3.12 K/UL — LOW (ref 3.8–10.5)

## 2020-01-26 PROCEDURE — 99233 SBSQ HOSP IP/OBS HIGH 50: CPT

## 2020-01-26 RX ORDER — SODIUM CHLORIDE 9 MG/ML
500 INJECTION INTRAMUSCULAR; INTRAVENOUS; SUBCUTANEOUS ONCE
Refills: 0 | Status: COMPLETED | OUTPATIENT
Start: 2020-01-26 | End: 2020-01-26

## 2020-01-26 RX ADMIN — HUMAN INSULIN 5 UNIT(S): 100 INJECTION, SUSPENSION SUBCUTANEOUS at 07:16

## 2020-01-26 RX ADMIN — Medication 100 MILLIGRAM(S): at 12:38

## 2020-01-26 RX ADMIN — Medication 100 MILLIGRAM(S): at 22:43

## 2020-01-26 RX ADMIN — SODIUM CHLORIDE 2 GRAM(S): 9 INJECTION INTRAMUSCULAR; INTRAVENOUS; SUBCUTANEOUS at 12:39

## 2020-01-26 RX ADMIN — Medication 2 MILLIGRAM(S): at 18:01

## 2020-01-26 RX ADMIN — PANTOPRAZOLE SODIUM 40 MILLIGRAM(S): 20 TABLET, DELAYED RELEASE ORAL at 07:16

## 2020-01-26 RX ADMIN — ATORVASTATIN CALCIUM 10 MILLIGRAM(S): 80 TABLET, FILM COATED ORAL at 22:43

## 2020-01-26 RX ADMIN — Medication 3 MILLIGRAM(S): at 19:46

## 2020-01-26 RX ADMIN — LEVETIRACETAM 500 MILLIGRAM(S): 250 TABLET, FILM COATED ORAL at 09:56

## 2020-01-26 RX ADMIN — SODIUM CHLORIDE 1000 MILLILITER(S): 9 INJECTION INTRAMUSCULAR; INTRAVENOUS; SUBCUTANEOUS at 15:59

## 2020-01-26 RX ADMIN — SODIUM CHLORIDE 2 GRAM(S): 9 INJECTION INTRAMUSCULAR; INTRAVENOUS; SUBCUTANEOUS at 22:43

## 2020-01-26 RX ADMIN — HUMAN INSULIN 5 UNIT(S): 100 INJECTION, SUSPENSION SUBCUTANEOUS at 18:01

## 2020-01-26 RX ADMIN — VALGANCICLOVIR 900 MILLIGRAM(S): 450 TABLET, FILM COATED ORAL at 07:16

## 2020-01-26 RX ADMIN — Medication 100 MILLIGRAM(S): at 07:24

## 2020-01-26 RX ADMIN — SODIUM CHLORIDE 2 GRAM(S): 9 INJECTION INTRAMUSCULAR; INTRAVENOUS; SUBCUTANEOUS at 07:15

## 2020-01-26 RX ADMIN — VALGANCICLOVIR 900 MILLIGRAM(S): 450 TABLET, FILM COATED ORAL at 18:01

## 2020-01-26 RX ADMIN — LEVETIRACETAM 500 MILLIGRAM(S): 250 TABLET, FILM COATED ORAL at 18:01

## 2020-01-26 RX ADMIN — SENNA PLUS 2 TABLET(S): 8.6 TABLET ORAL at 22:43

## 2020-01-26 RX ADMIN — Medication 2 MILLIGRAM(S): at 07:16

## 2020-01-26 RX ADMIN — Medication 0: at 12:39

## 2020-01-26 RX ADMIN — PREGABALIN 500 MICROGRAM(S): 225 CAPSULE ORAL at 12:38

## 2020-01-26 NOTE — PROGRESS NOTE ADULT - SUBJECTIVE AND OBJECTIVE BOX
PROGRESS NOTE:     Patient is a 69y old  Male who presents with a chief complaint of sent back from [facility], Hyponatremia, SIADH, Glioblastoma, (25 Jan 2020 09:39)      SUBJECTIVE / OVERNIGHT EVENTS:  Patient seen and examined this morning. Overnight with moments of confusion, now on 1:1    ADDITIONAL REVIEW OF SYSTEMS:  No fevers or chills     MEDICATIONS  (STANDING):  atorvastatin 10 milliGRAM(s) Oral at bedtime  cyanocobalamin 500 MICROGram(s) Oral daily  dexAMETHasone     Tablet 2 milliGRAM(s) Oral two times a day  dextrose 5%. 1000 milliLiter(s) (50 mL/Hr) IV Continuous <Continuous>  dextrose 50% Injectable 12.5 Gram(s) IV Push once  dextrose 50% Injectable 25 Gram(s) IV Push once  dextrose 50% Injectable 25 Gram(s) IV Push once  docusate sodium 100 milliGRAM(s) Oral three times a day  insulin lispro (HumaLOG) corrective regimen sliding scale   SubCutaneous Before meals and at bedtime  insulin NPH human recombinant 5 Unit(s) SubCutaneous two times a day  levETIRAcetam 500 milliGRAM(s) Oral two times a day  melatonin 3 milliGRAM(s) Oral <User Schedule>  pantoprazole    Tablet 40 milliGRAM(s) Oral before breakfast  polyethylene glycol 3350 17 Gram(s) Oral daily  senna 2 Tablet(s) Oral at bedtime  sodium chloride 2 Gram(s) Oral three times a day  valGANciclovir 900 milliGRAM(s) Oral two times a day    MEDICATIONS  (PRN):  dextrose 40% Gel 15 Gram(s) Oral once PRN Blood Glucose LESS THAN 70 milliGRAM(s)/deciliter  glucagon  Injectable 1 milliGRAM(s) IntraMuscular once PRN Glucose LESS THAN 70 milligrams/deciliter      CAPILLARY BLOOD GLUCOSE      POCT Blood Glucose.: 95 mg/dL (26 Jan 2020 08:18)  POCT Blood Glucose.: 111 mg/dL (26 Jan 2020 06:50)  POCT Blood Glucose.: 217 mg/dL (25 Jan 2020 22:04)  POCT Blood Glucose.: 164 mg/dL (25 Jan 2020 17:42)  POCT Blood Glucose.: 162 mg/dL (25 Jan 2020 12:31)    I&O's Summary    25 Jan 2020 07:01  -  26 Jan 2020 07:00  --------------------------------------------------------  IN: 240 mL / OUT: 825 mL / NET: -585 mL        PHYSICAL EXAM:  Vital Signs Last 24 Hrs  T(C): 36.3 (26 Jan 2020 05:29), Max: 37.2 (25 Jan 2020 21:16)  T(F): 97.4 (26 Jan 2020 05:29), Max: 98.9 (25 Jan 2020 21:16)  HR: 69 (26 Jan 2020 05:29) (69 - 86)  BP: 103/57 (26 Jan 2020 05:29) (103/57 - 127/78)  BP(mean): --  RR: 17 (26 Jan 2020 05:29) (17 - 18)  SpO2: 100% (26 Jan 2020 05:29) (100% - 100%)    CONSTITUTIONAL: NAD, well-developed  RESPIRATORY: Normal respiratory effort; lungs are clear to auscultation bilaterally  CARDIOVASCULAR: Regular rate and rhythm, normal S1 and S2, no murmur/rub/gallop; No lower extremity edema; Peripheral pulses are 2+ bilaterally  ABDOMEN: Nontender to palpation, normoactive bowel sounds, no rebound/guarding; No hepatosplenomegaly  MUSCLOSKELETAL: no clubbing or cyanosis of digits; no joint swelling or tenderness to palpation  PSYCH: Confused,       LABS:                        9.7    3.12  )-----------( 34       ( 26 Jan 2020 05:37 )             29.0     01-26    134<L>  |  100  |  21  ----------------------------<  126<H>  3.9   |  21<L>  |  0.45<L>    Ca    8.5      26 Jan 2020 05:37  Phos  3.2     01-26  Mg     1.9     01-26    TPro  5.5<L>  /  Alb  3.0<L>  /  TBili  0.5  /  DBili  x   /  AST  18  /  ALT  32  /  AlkPhos  51  01-26                RADIOLOGY & ADDITIONAL TESTS:  Results Reviewed:   Imaging Personally Reviewed:  Electrocardiogram Personally Reviewed:    COORDINATION OF CARE:  Care Discussed with Consultants/Other Providers [Y/N]:  Prior or Outpatient Records Reviewed [Y/N]:

## 2020-01-27 LAB
ALBUMIN SERPL ELPH-MCNC: 3 G/DL — LOW (ref 3.3–5)
ALP SERPL-CCNC: 49 U/L — SIGNIFICANT CHANGE UP (ref 40–120)
ALT FLD-CCNC: 29 U/L — SIGNIFICANT CHANGE UP (ref 4–41)
ANION GAP SERPL CALC-SCNC: 13 MMO/L — SIGNIFICANT CHANGE UP (ref 7–14)
ANISOCYTOSIS BLD QL: SLIGHT — SIGNIFICANT CHANGE UP
AST SERPL-CCNC: 19 U/L — SIGNIFICANT CHANGE UP (ref 4–40)
BASOPHILS # BLD AUTO: 0 K/UL — SIGNIFICANT CHANGE UP (ref 0–0.2)
BASOPHILS NFR BLD AUTO: 0 % — SIGNIFICANT CHANGE UP (ref 0–2)
BASOPHILS NFR SPEC: 0 % — SIGNIFICANT CHANGE UP (ref 0–2)
BILIRUB SERPL-MCNC: 0.5 MG/DL — SIGNIFICANT CHANGE UP (ref 0.2–1.2)
BLASTS # FLD: 0 % — SIGNIFICANT CHANGE UP (ref 0–0)
BUN SERPL-MCNC: 14 MG/DL — SIGNIFICANT CHANGE UP (ref 7–23)
CALCIUM SERPL-MCNC: 8.5 MG/DL — SIGNIFICANT CHANGE UP (ref 8.4–10.5)
CHLORIDE SERPL-SCNC: 98 MMOL/L — SIGNIFICANT CHANGE UP (ref 98–107)
CO2 SERPL-SCNC: 22 MMOL/L — SIGNIFICANT CHANGE UP (ref 22–31)
CREAT SERPL-MCNC: 0.56 MG/DL — SIGNIFICANT CHANGE UP (ref 0.5–1.3)
EOSINOPHIL # BLD AUTO: 0 K/UL — SIGNIFICANT CHANGE UP (ref 0–0.5)
EOSINOPHIL NFR BLD AUTO: 0 % — SIGNIFICANT CHANGE UP (ref 0–6)
EOSINOPHIL NFR FLD: 0 % — SIGNIFICANT CHANGE UP (ref 0–6)
GLUCOSE BLDC GLUCOMTR-MCNC: 131 MG/DL — HIGH (ref 70–99)
GLUCOSE BLDC GLUCOMTR-MCNC: 181 MG/DL — HIGH (ref 70–99)
GLUCOSE SERPL-MCNC: 176 MG/DL — HIGH (ref 70–99)
HCT VFR BLD CALC: 26 % — LOW (ref 39–50)
HGB BLD-MCNC: 9 G/DL — LOW (ref 13–17)
IMM GRANULOCYTES NFR BLD AUTO: 5.5 % — HIGH (ref 0–1.5)
LYMPHOCYTES # BLD AUTO: 0.72 K/UL — LOW (ref 1–3.3)
LYMPHOCYTES # BLD AUTO: 24.7 % — SIGNIFICANT CHANGE UP (ref 13–44)
LYMPHOCYTES NFR SPEC AUTO: 12.5 % — LOW (ref 13–44)
MAGNESIUM SERPL-MCNC: 1.8 MG/DL — SIGNIFICANT CHANGE UP (ref 1.6–2.6)
MCHC RBC-ENTMCNC: 32.7 PG — SIGNIFICANT CHANGE UP (ref 27–34)
MCHC RBC-ENTMCNC: 34.6 % — SIGNIFICANT CHANGE UP (ref 32–36)
MCV RBC AUTO: 94.5 FL — SIGNIFICANT CHANGE UP (ref 80–100)
METAMYELOCYTES # FLD: 0 % — SIGNIFICANT CHANGE UP (ref 0–1)
MICROCYTES BLD QL: SLIGHT — SIGNIFICANT CHANGE UP
MONOCYTES # BLD AUTO: 0.1 K/UL — SIGNIFICANT CHANGE UP (ref 0–0.9)
MONOCYTES NFR BLD AUTO: 3.4 % — SIGNIFICANT CHANGE UP (ref 2–14)
MONOCYTES NFR BLD: 0.9 % — LOW (ref 2–9)
MYELOCYTES NFR BLD: 1.8 % — HIGH (ref 0–0)
NEUTROPHIL AB SER-ACNC: 78.6 % — HIGH (ref 43–77)
NEUTROPHILS # BLD AUTO: 1.93 K/UL — SIGNIFICANT CHANGE UP (ref 1.8–7.4)
NEUTROPHILS NFR BLD AUTO: 66.4 % — SIGNIFICANT CHANGE UP (ref 43–77)
NEUTS BAND # BLD: 1.8 % — SIGNIFICANT CHANGE UP (ref 0–6)
NRBC # FLD: 0.03 K/UL — SIGNIFICANT CHANGE UP (ref 0–0)
NRBC FLD-RTO: 1 — SIGNIFICANT CHANGE UP
OTHER - HEMATOLOGY %: 0 — SIGNIFICANT CHANGE UP
OVALOCYTES BLD QL SMEAR: SLIGHT — SIGNIFICANT CHANGE UP
PHOSPHATE SERPL-MCNC: 3.7 MG/DL — SIGNIFICANT CHANGE UP (ref 2.5–4.5)
PLATELET # BLD AUTO: 34 K/UL — LOW (ref 150–400)
PLATELET COUNT - ESTIMATE: SIGNIFICANT CHANGE UP
PMV BLD: 9.9 FL — SIGNIFICANT CHANGE UP (ref 7–13)
POIKILOCYTOSIS BLD QL AUTO: SLIGHT — SIGNIFICANT CHANGE UP
POLYCHROMASIA BLD QL SMEAR: SLIGHT — SIGNIFICANT CHANGE UP
POTASSIUM SERPL-MCNC: 4 MMOL/L — SIGNIFICANT CHANGE UP (ref 3.5–5.3)
POTASSIUM SERPL-SCNC: 4 MMOL/L — SIGNIFICANT CHANGE UP (ref 3.5–5.3)
PROMYELOCYTES # FLD: 0 % — SIGNIFICANT CHANGE UP (ref 0–0)
PROT SERPL-MCNC: 5.5 G/DL — LOW (ref 6–8.3)
RBC # BLD: 2.75 M/UL — LOW (ref 4.2–5.8)
RBC # FLD: 17.1 % — HIGH (ref 10.3–14.5)
SMUDGE CELLS # BLD: PRESENT — SIGNIFICANT CHANGE UP
SODIUM SERPL-SCNC: 133 MMOL/L — LOW (ref 135–145)
VARIANT LYMPHS # BLD: 4.4 % — SIGNIFICANT CHANGE UP
WBC # BLD: 2.91 K/UL — LOW (ref 3.8–10.5)
WBC # FLD AUTO: 2.91 K/UL — LOW (ref 3.8–10.5)

## 2020-01-27 PROCEDURE — 99356: CPT

## 2020-01-27 PROCEDURE — 93010 ELECTROCARDIOGRAM REPORT: CPT

## 2020-01-27 PROCEDURE — 99223 1ST HOSP IP/OBS HIGH 75: CPT

## 2020-01-27 PROCEDURE — 99233 SBSQ HOSP IP/OBS HIGH 50: CPT

## 2020-01-27 PROCEDURE — 99357: CPT

## 2020-01-27 RX ORDER — DEXAMETHASONE 0.5 MG/5ML
24 ELIXIR ORAL ONCE
Refills: 0 | Status: COMPLETED | OUTPATIENT
Start: 2020-01-28 | End: 2020-01-28

## 2020-01-27 RX ORDER — HALOPERIDOL DECANOATE 100 MG/ML
0.25 INJECTION INTRAMUSCULAR ONCE
Refills: 0 | Status: COMPLETED | OUTPATIENT
Start: 2020-01-27 | End: 2020-01-27

## 2020-01-27 RX ADMIN — Medication 3 MILLIGRAM(S): at 21:26

## 2020-01-27 RX ADMIN — Medication 100 MILLIGRAM(S): at 15:03

## 2020-01-27 RX ADMIN — HUMAN INSULIN 5 UNIT(S): 100 INJECTION, SUSPENSION SUBCUTANEOUS at 08:45

## 2020-01-27 RX ADMIN — Medication 2 MILLIGRAM(S): at 19:01

## 2020-01-27 RX ADMIN — VALGANCICLOVIR 900 MILLIGRAM(S): 450 TABLET, FILM COATED ORAL at 19:01

## 2020-01-27 RX ADMIN — SODIUM CHLORIDE 2 GRAM(S): 9 INJECTION INTRAMUSCULAR; INTRAVENOUS; SUBCUTANEOUS at 07:08

## 2020-01-27 RX ADMIN — SODIUM CHLORIDE 2 GRAM(S): 9 INJECTION INTRAMUSCULAR; INTRAVENOUS; SUBCUTANEOUS at 15:03

## 2020-01-27 RX ADMIN — Medication 100 MILLIGRAM(S): at 21:26

## 2020-01-27 RX ADMIN — POLYETHYLENE GLYCOL 3350 17 GRAM(S): 17 POWDER, FOR SOLUTION ORAL at 12:34

## 2020-01-27 RX ADMIN — Medication 100 MILLIGRAM(S): at 07:10

## 2020-01-27 RX ADMIN — SENNA PLUS 2 TABLET(S): 8.6 TABLET ORAL at 21:26

## 2020-01-27 RX ADMIN — PREGABALIN 500 MICROGRAM(S): 225 CAPSULE ORAL at 15:03

## 2020-01-27 RX ADMIN — VALGANCICLOVIR 900 MILLIGRAM(S): 450 TABLET, FILM COATED ORAL at 07:08

## 2020-01-27 RX ADMIN — LEVETIRACETAM 500 MILLIGRAM(S): 250 TABLET, FILM COATED ORAL at 19:01

## 2020-01-27 RX ADMIN — ATORVASTATIN CALCIUM 10 MILLIGRAM(S): 80 TABLET, FILM COATED ORAL at 21:27

## 2020-01-27 RX ADMIN — LEVETIRACETAM 500 MILLIGRAM(S): 250 TABLET, FILM COATED ORAL at 07:08

## 2020-01-27 RX ADMIN — PANTOPRAZOLE SODIUM 40 MILLIGRAM(S): 20 TABLET, DELAYED RELEASE ORAL at 07:08

## 2020-01-27 RX ADMIN — SODIUM CHLORIDE 2 GRAM(S): 9 INJECTION INTRAMUSCULAR; INTRAVENOUS; SUBCUTANEOUS at 21:26

## 2020-01-27 RX ADMIN — Medication 2 MILLIGRAM(S): at 07:08

## 2020-01-27 RX ADMIN — HALOPERIDOL DECANOATE 0.25 MILLIGRAM(S): 100 INJECTION INTRAMUSCULAR at 15:57

## 2020-01-27 RX ADMIN — HUMAN INSULIN 5 UNIT(S): 100 INJECTION, SUSPENSION SUBCUTANEOUS at 19:00

## 2020-01-27 NOTE — PROGRESS NOTE ADULT - SUBJECTIVE AND OBJECTIVE BOX
PROGRESS NOTE:     Patient is a 69y old  Male who presents with a chief complaint of sent back from SNF, Hyponatremia, SIADH, Glioblastoma, (26 Jan 2020 09:08)      SUBJECTIVE / OVERNIGHT EVENTS:  Patient seen and examined this morning. No new events overnight, pending dc to rehab.     ADDITIONAL REVIEW OF SYSTEMS:  No fevers or chills.    MEDICATIONS  (STANDING):  atorvastatin 10 milliGRAM(s) Oral at bedtime  cyanocobalamin 500 MICROGram(s) Oral daily  dexAMETHasone     Tablet 2 milliGRAM(s) Oral two times a day  dextrose 5%. 1000 milliLiter(s) (50 mL/Hr) IV Continuous <Continuous>  dextrose 50% Injectable 12.5 Gram(s) IV Push once  dextrose 50% Injectable 25 Gram(s) IV Push once  dextrose 50% Injectable 25 Gram(s) IV Push once  docusate sodium 100 milliGRAM(s) Oral three times a day  insulin lispro (HumaLOG) corrective regimen sliding scale   SubCutaneous Before meals and at bedtime  insulin NPH human recombinant 5 Unit(s) SubCutaneous two times a day  levETIRAcetam 500 milliGRAM(s) Oral two times a day  melatonin 3 milliGRAM(s) Oral <User Schedule>  pantoprazole    Tablet 40 milliGRAM(s) Oral before breakfast  polyethylene glycol 3350 17 Gram(s) Oral daily  senna 2 Tablet(s) Oral at bedtime  sodium chloride 2 Gram(s) Oral three times a day  valGANciclovir 900 milliGRAM(s) Oral two times a day    MEDICATIONS  (PRN):  dextrose 40% Gel 15 Gram(s) Oral once PRN Blood Glucose LESS THAN 70 milliGRAM(s)/deciliter  glucagon  Injectable 1 milliGRAM(s) IntraMuscular once PRN Glucose LESS THAN 70 milligrams/deciliter      CAPILLARY BLOOD GLUCOSE      POCT Blood Glucose.: 181 mg/dL (27 Jan 2020 12:35)  POCT Blood Glucose.: 131 mg/dL (27 Jan 2020 08:31)  POCT Blood Glucose.: 169 mg/dL (26 Jan 2020 22:10)  POCT Blood Glucose.: 187 mg/dL (26 Jan 2020 17:44)    I&O's Summary    26 Jan 2020 07:01  -  27 Jan 2020 07:00  --------------------------------------------------------  IN: 480 mL / OUT: 0 mL / NET: 480 mL    27 Jan 2020 07:01  -  27 Jan 2020 15:45  --------------------------------------------------------  IN: 477 mL / OUT: 0 mL / NET: 477 mL        PHYSICAL EXAM:  Vital Signs Last 24 Hrs  T(C): 36.4 (27 Jan 2020 12:55), Max: 36.9 (26 Jan 2020 22:01)  T(F): 97.5 (27 Jan 2020 12:55), Max: 98.4 (26 Jan 2020 22:01)  HR: 77 (27 Jan 2020 12:55) (75 - 77)  BP: 105/60 (27 Jan 2020 12:55) (88/50 - 107/72)  BP(mean): --  RR: 18 (27 Jan 2020 12:55) (18 - 18)  SpO2: 100% (27 Jan 2020 12:55) (100% - 100%)    CONSTITUTIONAL: NAD, well-developed  RESPIRATORY: Normal respiratory effort; lungs are clear to auscultation bilaterally  CARDIOVASCULAR: Regular rate and rhythm, normal S1 and S2, no murmur/rub/gallop; No lower extremity edema; Peripheral pulses are 2+ bilaterally  ABDOMEN: Nontender to palpation, normoactive bowel sounds, no rebound/guarding; No hepatosplenomegaly  MUSCLOSKELETAL: no clubbing or cyanosis of digits; no joint swelling or tenderness to palpation  PSYCH: Confused,     LABS:                        9.0    2.91  )-----------( 34       ( 27 Jan 2020 05:43 )             26.0     01-27    133<L>  |  98  |  14  ----------------------------<  176<H>  4.0   |  22  |  0.56    Ca    8.5      27 Jan 2020 05:43  Phos  3.7     01-27  Mg     1.8     01-27    TPro  5.5<L>  /  Alb  3.0<L>  /  TBili  0.5  /  DBili  x   /  AST  19  /  ALT  29  /  AlkPhos  49  01-27                RADIOLOGY & ADDITIONAL TESTS:  Results Reviewed:   Imaging Personally Reviewed:  Electrocardiogram Personally Reviewed:    COORDINATION OF CARE:  Care Discussed with Consultants/Other Providers [Y/N]: Patient care discussed with oncology and palliative care during interdisciplinary rounds, case management, nursing management  and social work were also present.   Prior or Outpatient Records Reviewed [Y/N]:

## 2020-01-27 NOTE — CHART NOTE - NSCHARTNOTEFT_GEN_A_CORE
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 He underwent stereotactic biopsy by Dr. Roy on 9/27/19. Final pathology showed glioblastoma. When I saw him in my office on 10/3/19, he had well from surgery and was feeling well. No headaches, no n/v, no diplopia. No seizures or seizure-like symptoms. Main complaint was continued short-term memory difficulty, which he felt was the same or slightly worse since surgery. He was on keppra 500mg BID and decadron 2mg BID.     Since that time, he elected to seek care at [facility] with Lyle Chavis, who treated him with radiation, temozolomide and Bactrim, following the Stupp regimen. There was no clinical trial available for him. His last Temozolomide was at the end of November 2019. His last RT dose was 12/4/2019. The temozolomide was stopped early for myelosuppression.    He has since had several hospitalizations, including one in [facility] (near [place], [place]) for somnolence and falls while visiting his daughter patti lives near there). hyponatremia was found and an MRI was performed, which his son tells me has been given to the physicians at [facility] and he could bring with him to [facility] now.    He also presented to the ED at [facility] on 12/28/19 for similar complaints of somnolence and was noted to have hyponatremia (to 121), hypotension (BP=80's/40's), and was discharged 12/29/19 with outpatient follow-up. He returned to the ED at [facility] on 1/7/20, where he continued to have hyponatremia and somnolence. He also had a GTC seizure in the ED leading to Ativan administration. He was loaded with Keppra in the ED and admitted. Continuous EEG was performed. Official reports are available from 1/8/20 and 1/9/20, both showing no epileptiform discharges. He was evaluated by Neurology on 1/7/20 and 1/9/20. He improved on the second evaluation, with "much more awake" per Dr Martins. He was noted to have myelosuppression and was evaluated by heme on 1/11/20 and 1/13/20 (Jermain Brennan and [facility]), who felt the pancytopenia was likely related to chemotherapy. Of note, when I first evaluated him on 9/23/19, I noted that his Vitamin B12 had been found to be low on 9/9/19 (193, where normal range was 200-1100) and RPR was nonreactive. I recommended replacement with SL vit B12 5000mcg/daily in anticipation of chemotherapy as low B12 is associated with myelosuppression, too. Patient was discharged home from [facility] on 1/16/20 in the AM, and returned the same day in the PM from the rehab center. I learned the patient was at [facility] on 1/24/20 and asked Neurology house staff to evaluate him. At that time, I noted his imaging was c/w hydrocephalus, for which I thought Neurosurgical evaluation would be useful. I spoke with Neurosurgery at that time.    His son, at bedside, states that the somnolence is the major issue. He says it is too complicated for him to take his father to [facility], where his neuro-oncological care has been coordinated. He denies urinary incontinence. He states his father has not had swelling of either leg. He asks whether his father could be treated with Avastin.     MEDS  dexamethasone 2mg twice daily  insulin  colace/senna/PEG  keppra 500mg twice daily  pantoprazole 40 daily  Valgancyclovir 900mg BID  Salt tabs 2g TID    EXAMINATION  SOMNOLENT. arouses to voice to answer questions. Oriented to self and "hospital." does not know name of hospital. Does not know date. Paucity of speech and when speaks, he is hypophonic. He answers inappropriately when asked if he has a headache (at times saying, yes, and other times saying no). He follows many, but not all commands.  no facial asymmetry  eye movements intact  no drift  bilateral psoas weakness, but no deltoids weakness, albeit exam is limited by his ability to cooperate.  walks with walker    LABS  10/9/2019  13\13.4/168     /40.8\  12/28/19  1.69\11/28 /31.8\  URINALYSIS - ELEVATED GLUCOSE (500), NO SIGNS OF INFECTION  129|91|23/255   5.4 |20|0.6\  12/29/19  VITAMIN B12 =1154  1/24/20  2.9\9.1/36 /27\  1/27/20  2.9\9/34 /26\  133|98|14/176  4 |22|0.6\  AST/ALT=19/29  ALBUMIN=3    IMAGING  I PERSONALLY REVIEWED IMAGING DATED 1/10/20 (MR BRAIN) AND 1/7/20, 12/28/19, 9/28/19.  There is new, multifocal enhancing disease c/w progression of glioma, with probable CSF dissemination. There is expansion of the lateral ventricles, with rounded frontal and occipital horns, together with new temporal horn visibility (not visible in the past), consistent with hydrocephalus.    IMPRESSION/PLAN  68 yo RH former , now with splenial GBM, presenting with memory loss in September 2019, s/p biopsy showing GBM, s/p chemoradiation therapy with Bactrim and potentially low vitamin B12 during treatment, now with somnolence, gait disorder, no incontinence, hyponatremia, myelosuppression, progressive multifocal glioma, and hydrocephalus.    HYDROCEPHALUS -- Likely the result of gliomatous infiltration of the CSF. Oftentimes, this causes somnolence, gait disorder and incontinence. Onset is usually subacute to insidiously progressive, which fits the time course and his symptomatology. I recommend LARGE VOLUME LUMBAR PUNCTURE, with careful measurement of opening pressure and CSF sent to the lab for cell count, protein/glucose, and cytology. If opening pressure is >20 cmH2O or if he improves cogntiively following LP, then place VPS (by neurosurgery).    THROMBOCYTOPENIA -- Possibly due to venous thrombosis as >20% of GBM patients eventually develop DVTs. However, he has no LE swelling. Recommend sending D-Dimer, Fibrinogen, and fibrin split products to rule out DIC. May well be simply result of pancytopenia. Likely to require platelet administration prior to LP.    MYELOSUPPRESSION -- Vit B12 level presently therapeutic. If due to Temozolomide, will resolve in next 3-4 weeks.    GLIOMA -- Progressive on imaging. I suspect his prognosis is grim. Palliative care evaluation appropriate. Life expectancy is about 6 months.    CEREBRAL EDEMA -- There is not much edema on neuroimaging, but perhaps higher dose of decadron will resolve edema and improve cognition. Could give 24mg decadron once in AM and see if he wakes up. If not, then would resume 2mg twice daily, taper to 2mg once daily on 1/29/20, and to 1mg daily on 2/1/20.    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 Would improve with high protein diet, PT, and getting off the steroids.    DISPO -- I will sign off. Please reconsult as needed by calling [phone].    total time spent, of which half in counselling and coordination of care, was 130 minutes (from 4:30pm to 6:40pm)

## 2020-01-28 LAB
ALBUMIN SERPL ELPH-MCNC: 3.1 G/DL — LOW (ref 3.3–5)
ALP SERPL-CCNC: 50 U/L — SIGNIFICANT CHANGE UP (ref 40–120)
ALT FLD-CCNC: 28 U/L — SIGNIFICANT CHANGE UP (ref 4–41)
ANION GAP SERPL CALC-SCNC: 14 MMO/L — SIGNIFICANT CHANGE UP (ref 7–14)
APTT BLD: 26.3 SEC — LOW (ref 27.5–36.3)
AST SERPL-CCNC: 18 U/L — SIGNIFICANT CHANGE UP (ref 4–40)
BASOPHILS # BLD AUTO: 0 K/UL — SIGNIFICANT CHANGE UP (ref 0–0.2)
BASOPHILS NFR BLD AUTO: 0 % — SIGNIFICANT CHANGE UP (ref 0–2)
BILIRUB SERPL-MCNC: 0.4 MG/DL — SIGNIFICANT CHANGE UP (ref 0.2–1.2)
BLD GP AB SCN SERPL QL: NEGATIVE — SIGNIFICANT CHANGE UP
BUN SERPL-MCNC: 23 MG/DL — SIGNIFICANT CHANGE UP (ref 7–23)
CALCIUM SERPL-MCNC: 8.5 MG/DL — SIGNIFICANT CHANGE UP (ref 8.4–10.5)
CHLORIDE SERPL-SCNC: 103 MMOL/L — SIGNIFICANT CHANGE UP (ref 98–107)
CMV DNA CSF QL NAA+PROBE: 982 IU/ML — HIGH
CMV DNA SPEC NAA+PROBE-LOG#: 2.99 — HIGH
CO2 SERPL-SCNC: 20 MMOL/L — LOW (ref 22–31)
CREAT SERPL-MCNC: 0.56 MG/DL — SIGNIFICANT CHANGE UP (ref 0.5–1.3)
D DIMER BLD IA.RAPID-MCNC: 833 NG/ML — SIGNIFICANT CHANGE UP
EOSINOPHIL # BLD AUTO: 0.01 K/UL — SIGNIFICANT CHANGE UP (ref 0–0.5)
EOSINOPHIL NFR BLD AUTO: 0.3 % — SIGNIFICANT CHANGE UP (ref 0–6)
FIBRINOGEN PPP-MCNC: 463 MG/DL — SIGNIFICANT CHANGE UP (ref 350–510)
GLUCOSE SERPL-MCNC: 111 MG/DL — HIGH (ref 70–99)
HCT VFR BLD CALC: 26.1 % — LOW (ref 39–50)
HCT VFR BLD CALC: 26.5 % — LOW (ref 39–50)
HCT VFR BLD CALC: 27.8 % — LOW (ref 39–50)
HGB BLD-MCNC: 8.7 G/DL — LOW (ref 13–17)
HGB BLD-MCNC: 9.1 G/DL — LOW (ref 13–17)
HGB BLD-MCNC: 9.4 G/DL — LOW (ref 13–17)
IMM GRANULOCYTES NFR BLD AUTO: 6 % — HIGH (ref 0–1.5)
INR BLD: 1 — SIGNIFICANT CHANGE UP (ref 0.88–1.17)
LYMPHOCYTES # BLD AUTO: 0.99 K/UL — LOW (ref 1–3.3)
LYMPHOCYTES # BLD AUTO: 26.9 % — SIGNIFICANT CHANGE UP (ref 13–44)
MANUAL SMEAR VERIFICATION: SIGNIFICANT CHANGE UP
MCHC RBC-ENTMCNC: 32.8 PG — SIGNIFICANT CHANGE UP (ref 27–34)
MCHC RBC-ENTMCNC: 33 PG — SIGNIFICANT CHANGE UP (ref 27–34)
MCHC RBC-ENTMCNC: 33.1 PG — SIGNIFICANT CHANGE UP (ref 27–34)
MCHC RBC-ENTMCNC: 33.3 % — SIGNIFICANT CHANGE UP (ref 32–36)
MCHC RBC-ENTMCNC: 33.8 % — SIGNIFICANT CHANGE UP (ref 32–36)
MCHC RBC-ENTMCNC: 34.3 % — SIGNIFICANT CHANGE UP (ref 32–36)
MCV RBC AUTO: 96 FL — SIGNIFICANT CHANGE UP (ref 80–100)
MCV RBC AUTO: 97.9 FL — SIGNIFICANT CHANGE UP (ref 80–100)
MCV RBC AUTO: 98.5 FL — SIGNIFICANT CHANGE UP (ref 80–100)
MONOCYTES # BLD AUTO: 0.12 K/UL — SIGNIFICANT CHANGE UP (ref 0–0.9)
MONOCYTES NFR BLD AUTO: 3.3 % — SIGNIFICANT CHANGE UP (ref 2–14)
NEUTROPHILS # BLD AUTO: 2.34 K/UL — SIGNIFICANT CHANGE UP (ref 1.8–7.4)
NEUTROPHILS NFR BLD AUTO: 63.5 % — SIGNIFICANT CHANGE UP (ref 43–77)
NRBC # FLD: 0.03 K/UL — SIGNIFICANT CHANGE UP (ref 0–0)
NRBC # FLD: 0.03 K/UL — SIGNIFICANT CHANGE UP (ref 0–0)
NRBC # FLD: 0.06 K/UL — SIGNIFICANT CHANGE UP (ref 0–0)
NRBC FLD-RTO: 1.6 — SIGNIFICANT CHANGE UP
PLATELET # BLD AUTO: 107 K/UL — LOW (ref 150–400)
PLATELET # BLD AUTO: 37 K/UL — LOW (ref 150–400)
PLATELET # BLD AUTO: 93 K/UL — LOW (ref 150–400)
PMV BLD: 10.5 FL — SIGNIFICANT CHANGE UP (ref 7–13)
PMV BLD: 9.3 FL — SIGNIFICANT CHANGE UP (ref 7–13)
PMV BLD: 9.3 FL — SIGNIFICANT CHANGE UP (ref 7–13)
POTASSIUM SERPL-MCNC: 3.2 MMOL/L — LOW (ref 3.5–5.3)
POTASSIUM SERPL-SCNC: 3.2 MMOL/L — LOW (ref 3.5–5.3)
PROT SERPL-MCNC: 5.7 G/DL — LOW (ref 6–8.3)
PROTHROM AB SERPL-ACNC: 11.4 SEC — SIGNIFICANT CHANGE UP (ref 9.8–13.1)
RBC # BLD: 2.65 M/UL — LOW (ref 4.2–5.8)
RBC # BLD: 2.76 M/UL — LOW (ref 4.2–5.8)
RBC # BLD: 2.84 M/UL — LOW (ref 4.2–5.8)
RBC # FLD: 17.2 % — HIGH (ref 10.3–14.5)
RBC # FLD: 17.5 % — HIGH (ref 10.3–14.5)
RBC # FLD: 17.5 % — HIGH (ref 10.3–14.5)
RH IG SCN BLD-IMP: POSITIVE — SIGNIFICANT CHANGE UP
SODIUM SERPL-SCNC: 137 MMOL/L — SIGNIFICANT CHANGE UP (ref 135–145)
WBC # BLD: 3.68 K/UL — LOW (ref 3.8–10.5)
WBC # BLD: 4.02 K/UL — SIGNIFICANT CHANGE UP (ref 3.8–10.5)
WBC # BLD: 4.09 K/UL — SIGNIFICANT CHANGE UP (ref 3.8–10.5)
WBC # FLD AUTO: 3.68 K/UL — LOW (ref 3.8–10.5)
WBC # FLD AUTO: 4.02 K/UL — SIGNIFICANT CHANGE UP (ref 3.8–10.5)
WBC # FLD AUTO: 4.09 K/UL — SIGNIFICANT CHANGE UP (ref 3.8–10.5)

## 2020-01-28 PROCEDURE — 99233 SBSQ HOSP IP/OBS HIGH 50: CPT

## 2020-01-28 RX ORDER — DEXAMETHASONE 0.5 MG/5ML
8 ELIXIR ORAL
Refills: 0 | Status: COMPLETED | OUTPATIENT
Start: 2020-01-29 | End: 2020-02-01

## 2020-01-28 RX ORDER — LIDOCAINE HCL 20 MG/ML
20 VIAL (ML) INJECTION ONCE
Refills: 0 | Status: COMPLETED | OUTPATIENT
Start: 2020-01-28 | End: 2020-01-28

## 2020-01-28 RX ORDER — SODIUM CHLORIDE 9 MG/ML
500 INJECTION INTRAMUSCULAR; INTRAVENOUS; SUBCUTANEOUS ONCE
Refills: 0 | Status: COMPLETED | OUTPATIENT
Start: 2020-01-28 | End: 2020-01-28

## 2020-01-28 RX ORDER — ACETAMINOPHEN 500 MG
1000 TABLET ORAL ONCE
Refills: 0 | Status: DISCONTINUED | OUTPATIENT
Start: 2020-01-28 | End: 2020-02-02

## 2020-01-28 RX ORDER — POTASSIUM CHLORIDE 20 MEQ
40 PACKET (EA) ORAL ONCE
Refills: 0 | Status: COMPLETED | OUTPATIENT
Start: 2020-01-28 | End: 2020-01-28

## 2020-01-28 RX ORDER — DEXAMETHASONE 0.5 MG/5ML
4 ELIXIR ORAL
Refills: 0 | Status: DISCONTINUED | OUTPATIENT
Start: 2020-02-01 | End: 2020-02-02

## 2020-01-28 RX ORDER — DEXAMETHASONE 0.5 MG/5ML
ELIXIR ORAL
Refills: 0 | Status: DISCONTINUED | OUTPATIENT
Start: 2020-01-29 | End: 2020-02-02

## 2020-01-28 RX ADMIN — POLYETHYLENE GLYCOL 3350 17 GRAM(S): 17 POWDER, FOR SOLUTION ORAL at 13:17

## 2020-01-28 RX ADMIN — Medication 20 MILLILITER(S): at 16:00

## 2020-01-28 RX ADMIN — SODIUM CHLORIDE 2 GRAM(S): 9 INJECTION INTRAMUSCULAR; INTRAVENOUS; SUBCUTANEOUS at 21:43

## 2020-01-28 RX ADMIN — SODIUM CHLORIDE 2 GRAM(S): 9 INJECTION INTRAMUSCULAR; INTRAVENOUS; SUBCUTANEOUS at 13:16

## 2020-01-28 RX ADMIN — Medication 112 MILLIGRAM(S): at 07:39

## 2020-01-28 RX ADMIN — Medication 100 MILLIGRAM(S): at 21:43

## 2020-01-28 RX ADMIN — HUMAN INSULIN 5 UNIT(S): 100 INJECTION, SUSPENSION SUBCUTANEOUS at 17:55

## 2020-01-28 RX ADMIN — Medication 1: at 13:17

## 2020-01-28 RX ADMIN — Medication 40 MILLIEQUIVALENT(S): at 10:34

## 2020-01-28 RX ADMIN — Medication 100 MILLIGRAM(S): at 13:16

## 2020-01-28 RX ADMIN — SENNA PLUS 2 TABLET(S): 8.6 TABLET ORAL at 21:43

## 2020-01-28 RX ADMIN — LEVETIRACETAM 500 MILLIGRAM(S): 250 TABLET, FILM COATED ORAL at 17:55

## 2020-01-28 RX ADMIN — Medication 3 MILLIGRAM(S): at 19:29

## 2020-01-28 RX ADMIN — SODIUM CHLORIDE 2 GRAM(S): 9 INJECTION INTRAMUSCULAR; INTRAVENOUS; SUBCUTANEOUS at 07:40

## 2020-01-28 RX ADMIN — HUMAN INSULIN 5 UNIT(S): 100 INJECTION, SUSPENSION SUBCUTANEOUS at 08:51

## 2020-01-28 RX ADMIN — ATORVASTATIN CALCIUM 10 MILLIGRAM(S): 80 TABLET, FILM COATED ORAL at 21:43

## 2020-01-28 RX ADMIN — VALGANCICLOVIR 900 MILLIGRAM(S): 450 TABLET, FILM COATED ORAL at 17:55

## 2020-01-28 RX ADMIN — Medication 100 MILLIGRAM(S): at 07:38

## 2020-01-28 RX ADMIN — Medication 2 MILLIGRAM(S): at 07:39

## 2020-01-28 RX ADMIN — PANTOPRAZOLE SODIUM 40 MILLIGRAM(S): 20 TABLET, DELAYED RELEASE ORAL at 07:38

## 2020-01-28 RX ADMIN — LEVETIRACETAM 500 MILLIGRAM(S): 250 TABLET, FILM COATED ORAL at 07:38

## 2020-01-28 RX ADMIN — VALGANCICLOVIR 900 MILLIGRAM(S): 450 TABLET, FILM COATED ORAL at 07:38

## 2020-01-28 RX ADMIN — PREGABALIN 500 MICROGRAM(S): 225 CAPSULE ORAL at 13:16

## 2020-01-28 RX ADMIN — SODIUM CHLORIDE 2000 MILLILITER(S): 9 INJECTION INTRAMUSCULAR; INTRAVENOUS; SUBCUTANEOUS at 10:33

## 2020-01-28 NOTE — PROGRESS NOTE ADULT - PROBLEM SELECTOR PLAN 7
d/w patient's son over the phone  DVT ppx: SCDs in setting of thrombocytopenia  Diet: CC w/ 1200 cc fluid restriction, Halal diet  Dispo: pending authorization to rehab on 1/21  Appears to be at same functional status as was on d/c  Case d/w Case management, social work and nursing on IDRs  Working on d/c d/w patient's son over the phone  DVT ppx: SCDs in setting of thrombocytopenia  Diet: CC w/ 1200 cc fluid restriction, Halal diet  Dispo: pending authorization to rehab on 1/21  Appears to be at same functional status as was on d/c  Case d/w Case management, social work and nursing on IDRs

## 2020-01-28 NOTE — CHART NOTE - NSCHARTNOTEFT_GEN_A_CORE
Patient discussed with Dr. Giron, patient is more alert and had increased responsiveness following decadron 24 mg x 1. Will continue with decadron 8mg BID x three days and then 4mg BID for the duration of treatment as per Dr Pimentel.

## 2020-01-28 NOTE — CHART NOTE - NSCHARTNOTEFT_GEN_A_CORE
Attempted to complete an LP on this patient. Attempted 4 times with the patient in lateral decubitous position. Pt was not very tolerant of the procedure and was endorsing discomfort during the procedure and was unable to keep still or to bend his knees. No significant blood loss and was unable to obtain CSF. Pt's son endorsed that he felt that the patient was to uncomfortable and requested IR complete the procedure instead. Procedure was done with sterile technique; cleaned with 3 iodine swabs and the standard LP needle in the kit was used. Roughly 10ml of lidocaine in total was used.    Tae Hightower  EM/IM  PGY-2

## 2020-01-28 NOTE — PROGRESS NOTE ADULT - SUBJECTIVE AND OBJECTIVE BOX
PROGRESS NOTE:     Patient is a 69y old  Male who presents with a chief complaint of sent back from [facility], Hyponatremia, SIADH, Glioblastoma, (27 Jan 2020 15:45)      SUBJECTIVE / OVERNIGHT EVENTS:  Patient seen and examined this morning. Appears more awake after getting 24 mg of dexamethasone, in a chair with his son at bedside. Plan of care discussed discussed with the patient's son who is also a physician. All questions answered to his satisfaction.      ADDITIONAL REVIEW OF SYSTEMS:  No fevers or chills    MEDICATIONS  (STANDING):  atorvastatin 10 milliGRAM(s) Oral at bedtime  cyanocobalamin 500 MICROGram(s) Oral daily  dextrose 5%. 1000 milliLiter(s) (50 mL/Hr) IV Continuous <Continuous>  dextrose 50% Injectable 12.5 Gram(s) IV Push once  dextrose 50% Injectable 25 Gram(s) IV Push once  dextrose 50% Injectable 25 Gram(s) IV Push once  docusate sodium 100 milliGRAM(s) Oral three times a day  insulin lispro (HumaLOG) corrective regimen sliding scale   SubCutaneous Before meals and at bedtime  insulin NPH human recombinant 5 Unit(s) SubCutaneous two times a day  levETIRAcetam 500 milliGRAM(s) Oral two times a day  melatonin 3 milliGRAM(s) Oral <User Schedule>  pantoprazole    Tablet 40 milliGRAM(s) Oral before breakfast  polyethylene glycol 3350 17 Gram(s) Oral daily  senna 2 Tablet(s) Oral at bedtime  sodium chloride 2 Gram(s) Oral three times a day  valGANciclovir 900 milliGRAM(s) Oral two times a day    MEDICATIONS  (PRN):  dextrose 40% Gel 15 Gram(s) Oral once PRN Blood Glucose LESS THAN 70 milliGRAM(s)/deciliter  glucagon  Injectable 1 milliGRAM(s) IntraMuscular once PRN Glucose LESS THAN 70 milligrams/deciliter      CAPILLARY BLOOD GLUCOSE      POCT Blood Glucose.: 288 mg/dL (28 Jan 2020 12:28)  POCT Blood Glucose.: 114 mg/dL (28 Jan 2020 08:28)  POCT Blood Glucose.: 224 mg/dL (27 Jan 2020 21:25)  POCT Blood Glucose.: 180 mg/dL (27 Jan 2020 17:35)    I&O's Summary    27 Jan 2020 07:01  -  28 Jan 2020 07:00  --------------------------------------------------------  IN: 1095 mL / OUT: 100 mL / NET: 995 mL    28 Jan 2020 07:01  -  28 Jan 2020 15:25  --------------------------------------------------------  IN: 537 mL / OUT: 0 mL / NET: 537 mL        PHYSICAL EXAM:  Vital Signs Last 24 Hrs  T(C): 36.6 (28 Jan 2020 13:24), Max: 36.7 (27 Jan 2020 21:06)  T(F): 97.8 (28 Jan 2020 13:24), Max: 98.1 (27 Jan 2020 21:06)  HR: 105 (28 Jan 2020 13:24) (87 - 110)  BP: 106/64 (28 Jan 2020 13:24) (80/40 - 120/65)  BP(mean): --  RR: 18 (28 Jan 2020 13:24) (15 - 18)  SpO2: 100% (28 Jan 2020 13:24) (100% - 100%)    CONSTITUTIONAL: NAD, well-developed  RESPIRATORY: Normal respiratory effort; lungs are clear to auscultation bilaterally  CARDIOVASCULAR: Regular rate and rhythm, normal S1 and S2, no murmur/rub/gallop; No lower extremity edema; Peripheral pulses are 2+ bilaterally  ABDOMEN: Nontender to palpation, normoactive bowel sounds, no rebound/guarding; No hepatosplenomegaly  MUSCLOSKELETAL: no clubbing or cyanosis of digits; no joint swelling or tenderness to palpation  PSYCH: Confused, but more awake today      LABS:                        8.7    4.02  )-----------( 107      ( 28 Jan 2020 12:45 )             26.1     01-28    137  |  103  |  23  ----------------------------<  111<H>  3.2<L>   |  20<L>  |  0.56    Ca    8.5      28 Jan 2020 06:50  Phos  3.7     01-27  Mg     1.8     01-27    TPro  5.7<L>  /  Alb  3.1<L>  /  TBili  0.4  /  DBili  x   /  AST  18  /  ALT  28  /  AlkPhos  50  01-28    PT/INR - ( 28 Jan 2020 06:50 )   PT: 11.4 SEC;   INR: 1.00          PTT - ( 28 Jan 2020 06:50 )  PTT:26.3 SEC            RADIOLOGY & ADDITIONAL TESTS:  Results Reviewed:   Imaging Personally Reviewed:  Electrocardiogram Personally Reviewed:    COORDINATION OF CARE:  Care Discussed with Consultants/Other Providers [Y/N]:  Prior or Outpatient Records Reviewed [Y/N]:

## 2020-01-29 ENCOUNTER — RESULT REVIEW (OUTPATIENT)
Age: 70
End: 2020-01-29

## 2020-01-29 LAB
ANION GAP SERPL CALC-SCNC: 14 MMO/L — SIGNIFICANT CHANGE UP (ref 7–14)
BUN SERPL-MCNC: 16 MG/DL — SIGNIFICANT CHANGE UP (ref 7–23)
CALCIUM SERPL-MCNC: 8.7 MG/DL — SIGNIFICANT CHANGE UP (ref 8.4–10.5)
CHLORIDE SERPL-SCNC: 101 MMOL/L — SIGNIFICANT CHANGE UP (ref 98–107)
CLARITY CSF: CLEAR — SIGNIFICANT CHANGE UP
CO2 SERPL-SCNC: 20 MMOL/L — LOW (ref 22–31)
COLOR CSF: COLORLESS — SIGNIFICANT CHANGE UP
CREAT SERPL-MCNC: 0.49 MG/DL — LOW (ref 0.5–1.3)
GLUCOSE CSF-MCNC: 64 MG/DL — SIGNIFICANT CHANGE UP (ref 40–70)
GLUCOSE SERPL-MCNC: 99 MG/DL — SIGNIFICANT CHANGE UP (ref 70–99)
HCT VFR BLD CALC: 26.2 % — LOW (ref 39–50)
HGB BLD-MCNC: 8.9 G/DL — LOW (ref 13–17)
INR BLD: 1.04 — SIGNIFICANT CHANGE UP (ref 0.88–1.17)
LYMPHOCYTES # CSF: 82 % — SIGNIFICANT CHANGE UP
MCHC RBC-ENTMCNC: 32.7 PG — SIGNIFICANT CHANGE UP (ref 27–34)
MCHC RBC-ENTMCNC: 34 % — SIGNIFICANT CHANGE UP (ref 32–36)
MCV RBC AUTO: 96.3 FL — SIGNIFICANT CHANGE UP (ref 80–100)
MONOCYTES # CSF: 14 % — SIGNIFICANT CHANGE UP
NEUTS SEG NFR CSF MANUAL: 4 % — SIGNIFICANT CHANGE UP
NRBC # FLD: 0.03 K/UL — SIGNIFICANT CHANGE UP (ref 0–0)
NRBC NFR CSF: 2 CELL/UL — SIGNIFICANT CHANGE UP (ref 0–5)
PLATELET # BLD AUTO: 82 K/UL — LOW (ref 150–400)
PMV BLD: 9.9 FL — SIGNIFICANT CHANGE UP (ref 7–13)
POTASSIUM SERPL-MCNC: 3.6 MMOL/L — SIGNIFICANT CHANGE UP (ref 3.5–5.3)
POTASSIUM SERPL-SCNC: 3.6 MMOL/L — SIGNIFICANT CHANGE UP (ref 3.5–5.3)
PROT CSF-MCNC: 149.2 MG/DL — HIGH (ref 15–40)
PROTHROM AB SERPL-ACNC: 11.6 SEC — SIGNIFICANT CHANGE UP (ref 9.8–13.1)
RBC # BLD: 2.72 M/UL — LOW (ref 4.2–5.8)
RBC # CSF: < 1 CELL/UL — HIGH (ref 0–0)
RBC # FLD: 17.2 % — HIGH (ref 10.3–14.5)
SODIUM SERPL-SCNC: 135 MMOL/L — SIGNIFICANT CHANGE UP (ref 135–145)
TOTAL CELLS COUNTED, SPINAL FLUID: 50 CELLS — SIGNIFICANT CHANGE UP
WBC # BLD: 3.81 K/UL — SIGNIFICANT CHANGE UP (ref 3.8–10.5)
WBC # FLD AUTO: 3.81 K/UL — SIGNIFICANT CHANGE UP (ref 3.8–10.5)
XANTHOCHROMIA: SIGNIFICANT CHANGE UP

## 2020-01-29 PROCEDURE — 62328 DX LMBR SPI PNXR W/FLUOR/CT: CPT

## 2020-01-29 PROCEDURE — 99233 SBSQ HOSP IP/OBS HIGH 50: CPT

## 2020-01-29 PROCEDURE — 88108 CYTOPATH CONCENTRATE TECH: CPT | Mod: 26

## 2020-01-29 RX ADMIN — PANTOPRAZOLE SODIUM 40 MILLIGRAM(S): 20 TABLET, DELAYED RELEASE ORAL at 05:32

## 2020-01-29 RX ADMIN — ATORVASTATIN CALCIUM 10 MILLIGRAM(S): 80 TABLET, FILM COATED ORAL at 22:55

## 2020-01-29 RX ADMIN — SODIUM CHLORIDE 2 GRAM(S): 9 INJECTION INTRAMUSCULAR; INTRAVENOUS; SUBCUTANEOUS at 22:57

## 2020-01-29 RX ADMIN — SENNA PLUS 2 TABLET(S): 8.6 TABLET ORAL at 22:56

## 2020-01-29 RX ADMIN — POLYETHYLENE GLYCOL 3350 17 GRAM(S): 17 POWDER, FOR SOLUTION ORAL at 14:53

## 2020-01-29 RX ADMIN — Medication 100 MILLIGRAM(S): at 22:55

## 2020-01-29 RX ADMIN — HUMAN INSULIN 5 UNIT(S): 100 INJECTION, SUSPENSION SUBCUTANEOUS at 18:44

## 2020-01-29 RX ADMIN — SODIUM CHLORIDE 2 GRAM(S): 9 INJECTION INTRAMUSCULAR; INTRAVENOUS; SUBCUTANEOUS at 14:53

## 2020-01-29 RX ADMIN — HUMAN INSULIN 5 UNIT(S): 100 INJECTION, SUSPENSION SUBCUTANEOUS at 09:02

## 2020-01-29 RX ADMIN — PREGABALIN 500 MICROGRAM(S): 225 CAPSULE ORAL at 14:54

## 2020-01-29 RX ADMIN — SODIUM CHLORIDE 2 GRAM(S): 9 INJECTION INTRAMUSCULAR; INTRAVENOUS; SUBCUTANEOUS at 05:32

## 2020-01-29 RX ADMIN — Medication 8 MILLIGRAM(S): at 18:44

## 2020-01-29 RX ADMIN — Medication 3 MILLIGRAM(S): at 22:56

## 2020-01-29 RX ADMIN — VALGANCICLOVIR 900 MILLIGRAM(S): 450 TABLET, FILM COATED ORAL at 05:32

## 2020-01-29 RX ADMIN — Medication 100 MILLIGRAM(S): at 14:53

## 2020-01-29 RX ADMIN — LEVETIRACETAM 500 MILLIGRAM(S): 250 TABLET, FILM COATED ORAL at 18:47

## 2020-01-29 RX ADMIN — LEVETIRACETAM 500 MILLIGRAM(S): 250 TABLET, FILM COATED ORAL at 05:32

## 2020-01-29 NOTE — PROGRESS NOTE ADULT - PROBLEM SELECTOR PLAN 7
d/w patient's son over the phone  DVT ppx: SCDs in setting of thrombocytopenia  Diet: CC w/ 1200 cc fluid restriction, Halal diet  Dispo: pending authorization to rehab on 1/21  Appears to be at same functional status as was on d/c  Case d/w Case management, social work and nursing on IDRs

## 2020-01-29 NOTE — PROGRESS NOTE ADULT - SUBJECTIVE AND OBJECTIVE BOX
PROGRESS NOTE:     Patient is a 69y old  Male who presents with a chief complaint of sent back from [facility], Hyponatremia, SIADH, Glioblastoma, (28 Jan 2020 15:25)      SUBJECTIVE / OVERNIGHT EVENTS:  Patient seen and examined this morning with his son bedside. No new fevers or chills overnight. Patient appears more awake in the last two days.    ADDITIONAL REVIEW OF SYSTEMS:  No fevers or chills.     MEDICATIONS  (STANDING):  acetaminophen  IVPB .. 1000 milliGRAM(s) IV Intermittent once  atorvastatin 10 milliGRAM(s) Oral at bedtime  cyanocobalamin 500 MICROGram(s) Oral daily  dexAMETHasone     Tablet 8 milliGRAM(s) Oral two times a day  dextrose 5%. 1000 milliLiter(s) (50 mL/Hr) IV Continuous <Continuous>  dextrose 50% Injectable 12.5 Gram(s) IV Push once  dextrose 50% Injectable 25 Gram(s) IV Push once  dextrose 50% Injectable 25 Gram(s) IV Push once  docusate sodium 100 milliGRAM(s) Oral three times a day  insulin lispro (HumaLOG) corrective regimen sliding scale   SubCutaneous Before meals and at bedtime  insulin NPH human recombinant 5 Unit(s) SubCutaneous two times a day  levETIRAcetam 500 milliGRAM(s) Oral two times a day  melatonin 3 milliGRAM(s) Oral <User Schedule>  pantoprazole    Tablet 40 milliGRAM(s) Oral before breakfast  polyethylene glycol 3350 17 Gram(s) Oral daily  senna 2 Tablet(s) Oral at bedtime  sodium chloride 2 Gram(s) Oral three times a day    MEDICATIONS  (PRN):  dextrose 40% Gel 15 Gram(s) Oral once PRN Blood Glucose LESS THAN 70 milliGRAM(s)/deciliter  glucagon  Injectable 1 milliGRAM(s) IntraMuscular once PRN Glucose LESS THAN 70 milligrams/deciliter      CAPILLARY BLOOD GLUCOSE      POCT Blood Glucose.: 73 mg/dL (29 Jan 2020 13:16)  POCT Blood Glucose.: 98 mg/dL (29 Jan 2020 08:24)  POCT Blood Glucose.: 132 mg/dL (28 Jan 2020 22:14)  POCT Blood Glucose.: 181 mg/dL (28 Jan 2020 17:46)    I&O's Summary    28 Jan 2020 07:01  -  29 Jan 2020 07:00  --------------------------------------------------------  IN: 1037 mL / OUT: 350 mL / NET: 687 mL        PHYSICAL EXAM:  Vital Signs Last 24 Hrs  T(C): 36.2 (29 Jan 2020 14:48), Max: 36.6 (29 Jan 2020 05:27)  T(F): 97.1 (29 Jan 2020 14:48), Max: 97.8 (29 Jan 2020 05:27)  HR: 73 (29 Jan 2020 14:48) (73 - 84)  BP: 108/64 (29 Jan 2020 14:48) (108/64 - 132/81)  BP(mean): --  RR: 18 (29 Jan 2020 14:48) (15 - 18)  SpO2: 100% (29 Jan 2020 14:48) (100% - 100%)    CONSTITUTIONAL: NAD, well-developed  RESPIRATORY: Normal respiratory effort; lungs are clear to auscultation bilaterally  CARDIOVASCULAR: Regular rate and rhythm, normal S1 and S2, no murmur/rub/gallop; No lower extremity edema; Peripheral pulses are 2+ bilaterally  ABDOMEN: Nontender to palpation, normoactive bowel sounds, no rebound/guarding; No hepatosplenomegaly  MUSCLOSKELETAL: no clubbing or cyanosis of digits; no joint swelling or tenderness to palpation  PSYCH: Confused, but more awake today      LABS:                        8.9    3.81  )-----------( 82       ( 29 Jan 2020 06:30 )             26.2     01-29    135  |  101  |  16  ----------------------------<  99  3.6   |  20<L>  |  0.49<L>    Ca    8.7      29 Jan 2020 06:30    TPro  5.7<L>  /  Alb  3.1<L>  /  TBili  0.4  /  DBili  x   /  AST  18  /  ALT  28  /  AlkPhos  50  01-28    PT/INR - ( 29 Jan 2020 06:30 )   PT: 11.6 SEC;   INR: 1.04          PTT - ( 28 Jan 2020 06:50 )  PTT:26.3 SEC            RADIOLOGY & ADDITIONAL TESTS:  Results Reviewed:   Imaging Personally Reviewed:  Electrocardiogram Personally Reviewed:    COORDINATION OF CARE:  Care Discussed with Consultants/Other Providers [Y/N]:  Prior or Outpatient Records Reviewed [Y/N]:

## 2020-01-30 LAB
ANION GAP SERPL CALC-SCNC: 12 MMO/L — SIGNIFICANT CHANGE UP (ref 7–14)
BASOPHILS # BLD AUTO: 0.01 K/UL — SIGNIFICANT CHANGE UP (ref 0–0.2)
BASOPHILS NFR BLD AUTO: 0.3 % — SIGNIFICANT CHANGE UP (ref 0–2)
BUN SERPL-MCNC: 19 MG/DL — SIGNIFICANT CHANGE UP (ref 7–23)
CALCIUM SERPL-MCNC: 8.2 MG/DL — LOW (ref 8.4–10.5)
CHLORIDE SERPL-SCNC: 101 MMOL/L — SIGNIFICANT CHANGE UP (ref 98–107)
CO2 SERPL-SCNC: 21 MMOL/L — LOW (ref 22–31)
CREAT SERPL-MCNC: 0.49 MG/DL — LOW (ref 0.5–1.3)
EOSINOPHIL # BLD AUTO: 0 K/UL — SIGNIFICANT CHANGE UP (ref 0–0.5)
EOSINOPHIL NFR BLD AUTO: 0 % — SIGNIFICANT CHANGE UP (ref 0–6)
GLUCOSE SERPL-MCNC: 144 MG/DL — HIGH (ref 70–99)
HCT VFR BLD CALC: 26.3 % — LOW (ref 39–50)
HGB BLD-MCNC: 9 G/DL — LOW (ref 13–17)
IMM GRANULOCYTES NFR BLD AUTO: 1.3 % — SIGNIFICANT CHANGE UP (ref 0–1.5)
LYMPHOCYTES # BLD AUTO: 0.77 K/UL — LOW (ref 1–3.3)
LYMPHOCYTES # BLD AUTO: 19.8 % — SIGNIFICANT CHANGE UP (ref 13–44)
MAGNESIUM SERPL-MCNC: 1.8 MG/DL — SIGNIFICANT CHANGE UP (ref 1.6–2.6)
MCHC RBC-ENTMCNC: 32.7 PG — SIGNIFICANT CHANGE UP (ref 27–34)
MCHC RBC-ENTMCNC: 34.2 % — SIGNIFICANT CHANGE UP (ref 32–36)
MCV RBC AUTO: 95.6 FL — SIGNIFICANT CHANGE UP (ref 80–100)
MONOCYTES # BLD AUTO: 0.12 K/UL — SIGNIFICANT CHANGE UP (ref 0–0.9)
MONOCYTES NFR BLD AUTO: 3.1 % — SIGNIFICANT CHANGE UP (ref 2–14)
NEUTROPHILS # BLD AUTO: 2.94 K/UL — SIGNIFICANT CHANGE UP (ref 1.8–7.4)
NEUTROPHILS NFR BLD AUTO: 75.5 % — SIGNIFICANT CHANGE UP (ref 43–77)
NRBC # FLD: 0 K/UL — SIGNIFICANT CHANGE UP (ref 0–0)
PLATELET # BLD AUTO: 75 K/UL — LOW (ref 150–400)
PMV BLD: 10.1 FL — SIGNIFICANT CHANGE UP (ref 7–13)
POTASSIUM SERPL-MCNC: 3.9 MMOL/L — SIGNIFICANT CHANGE UP (ref 3.5–5.3)
POTASSIUM SERPL-SCNC: 3.9 MMOL/L — SIGNIFICANT CHANGE UP (ref 3.5–5.3)
RBC # BLD: 2.75 M/UL — LOW (ref 4.2–5.8)
RBC # FLD: 17.3 % — HIGH (ref 10.3–14.5)
SODIUM SERPL-SCNC: 134 MMOL/L — LOW (ref 135–145)
WBC # BLD: 3.89 K/UL — SIGNIFICANT CHANGE UP (ref 3.8–10.5)
WBC # FLD AUTO: 3.89 K/UL — SIGNIFICANT CHANGE UP (ref 3.8–10.5)

## 2020-01-30 PROCEDURE — 99233 SBSQ HOSP IP/OBS HIGH 50: CPT

## 2020-01-30 RX ORDER — SODIUM CHLORIDE 9 MG/ML
500 INJECTION INTRAMUSCULAR; INTRAVENOUS; SUBCUTANEOUS ONCE
Refills: 0 | Status: DISCONTINUED | OUTPATIENT
Start: 2020-01-30 | End: 2020-01-30

## 2020-01-30 RX ORDER — HALOPERIDOL DECANOATE 100 MG/ML
0.25 INJECTION INTRAMUSCULAR ONCE
Refills: 0 | Status: COMPLETED | OUTPATIENT
Start: 2020-01-30 | End: 2020-01-30

## 2020-01-30 RX ADMIN — ATORVASTATIN CALCIUM 10 MILLIGRAM(S): 80 TABLET, FILM COATED ORAL at 22:24

## 2020-01-30 RX ADMIN — SODIUM CHLORIDE 2 GRAM(S): 9 INJECTION INTRAMUSCULAR; INTRAVENOUS; SUBCUTANEOUS at 07:31

## 2020-01-30 RX ADMIN — SODIUM CHLORIDE 2 GRAM(S): 9 INJECTION INTRAMUSCULAR; INTRAVENOUS; SUBCUTANEOUS at 13:08

## 2020-01-30 RX ADMIN — POLYETHYLENE GLYCOL 3350 17 GRAM(S): 17 POWDER, FOR SOLUTION ORAL at 13:08

## 2020-01-30 RX ADMIN — LEVETIRACETAM 500 MILLIGRAM(S): 250 TABLET, FILM COATED ORAL at 07:31

## 2020-01-30 RX ADMIN — HUMAN INSULIN 5 UNIT(S): 100 INJECTION, SUSPENSION SUBCUTANEOUS at 18:31

## 2020-01-30 RX ADMIN — Medication 3 MILLIGRAM(S): at 22:24

## 2020-01-30 RX ADMIN — LEVETIRACETAM 500 MILLIGRAM(S): 250 TABLET, FILM COATED ORAL at 18:31

## 2020-01-30 RX ADMIN — Medication 100 MILLIGRAM(S): at 22:26

## 2020-01-30 RX ADMIN — PREGABALIN 500 MICROGRAM(S): 225 CAPSULE ORAL at 13:08

## 2020-01-30 RX ADMIN — SODIUM CHLORIDE 2 GRAM(S): 9 INJECTION INTRAMUSCULAR; INTRAVENOUS; SUBCUTANEOUS at 22:24

## 2020-01-30 RX ADMIN — SENNA PLUS 2 TABLET(S): 8.6 TABLET ORAL at 22:24

## 2020-01-30 RX ADMIN — PANTOPRAZOLE SODIUM 40 MILLIGRAM(S): 20 TABLET, DELAYED RELEASE ORAL at 07:31

## 2020-01-30 RX ADMIN — Medication 100 MILLIGRAM(S): at 13:07

## 2020-01-30 RX ADMIN — Medication 8 MILLIGRAM(S): at 07:31

## 2020-01-30 RX ADMIN — Medication 100 MILLIGRAM(S): at 07:32

## 2020-01-30 RX ADMIN — HUMAN INSULIN 5 UNIT(S): 100 INJECTION, SUSPENSION SUBCUTANEOUS at 08:49

## 2020-01-30 RX ADMIN — Medication 8 MILLIGRAM(S): at 18:31

## 2020-01-30 NOTE — PROGRESS NOTE ADULT - SUBJECTIVE AND OBJECTIVE BOX
PROGRESS NOTE:     Patient is a 69y old  Male who presents with a chief complaint of sent back from [facility], Hyponatremia, SIADH, Glioblastoma, (29 Jan 2020 14:53)      SUBJECTIVE / OVERNIGHT EVENTS:  Patient seen and examined this morning. No new fevers or chills overnight. Appears a little more confused this morning, but awake. Case discussed with the patient's son by phone who is also a physician.     ADDITIONAL REVIEW OF SYSTEMS:  No fevers or chills.    MEDICATIONS  (STANDING):  acetaminophen  IVPB .. 1000 milliGRAM(s) IV Intermittent once  atorvastatin 10 milliGRAM(s) Oral at bedtime  cyanocobalamin 500 MICROGram(s) Oral daily  dexAMETHasone     Tablet 8 milliGRAM(s) Oral two times a day  dextrose 5%. 1000 milliLiter(s) (50 mL/Hr) IV Continuous <Continuous>  dextrose 50% Injectable 12.5 Gram(s) IV Push once  dextrose 50% Injectable 25 Gram(s) IV Push once  dextrose 50% Injectable 25 Gram(s) IV Push once  docusate sodium 100 milliGRAM(s) Oral three times a day  insulin lispro (HumaLOG) corrective regimen sliding scale   SubCutaneous Before meals and at bedtime  insulin NPH human recombinant 5 Unit(s) SubCutaneous two times a day  levETIRAcetam 500 milliGRAM(s) Oral two times a day  melatonin 3 milliGRAM(s) Oral <User Schedule>  pantoprazole    Tablet 40 milliGRAM(s) Oral before breakfast  polyethylene glycol 3350 17 Gram(s) Oral daily  senna 2 Tablet(s) Oral at bedtime  sodium chloride 2 Gram(s) Oral three times a day    MEDICATIONS  (PRN):  dextrose 40% Gel 15 Gram(s) Oral once PRN Blood Glucose LESS THAN 70 milliGRAM(s)/deciliter  glucagon  Injectable 1 milliGRAM(s) IntraMuscular once PRN Glucose LESS THAN 70 milligrams/deciliter      CAPILLARY BLOOD GLUCOSE      POCT Blood Glucose.: 246 mg/dL (30 Jan 2020 12:26)  POCT Blood Glucose.: 136 mg/dL (30 Jan 2020 08:42)  POCT Blood Glucose.: 143 mg/dL (30 Jan 2020 06:16)  POCT Blood Glucose.: 140 mg/dL (29 Jan 2020 21:44)  POCT Blood Glucose.: 74 mg/dL (29 Jan 2020 18:41)    I&O's Summary    29 Jan 2020 07:01  -  30 Jan 2020 07:00  --------------------------------------------------------  IN: 0 mL / OUT: 500 mL / NET: -500 mL        PHYSICAL EXAM:  Vital Signs Last 24 Hrs  T(C): 36.8 (30 Jan 2020 14:58), Max: 36.9 (29 Jan 2020 21:23)  T(F): 98.2 (30 Jan 2020 14:58), Max: 98.4 (29 Jan 2020 21:23)  HR: 82 (30 Jan 2020 14:58) (80 - 89)  BP: 116/80 (30 Jan 2020 14:58) (74/43 - 121/80)  BP(mean): --  RR: 18 (30 Jan 2020 14:58) (14 - 18)  SpO2: 100% (30 Jan 2020 14:58) (100% - 100%)    CONSTITUTIONAL: NAD, well-developed  RESPIRATORY: Normal respiratory effort; lungs are clear to auscultation bilaterally  CARDIOVASCULAR: Regular rate and rhythm, normal S1 and S2, no murmur/rub/gallop; No lower extremity edema; Peripheral pulses are 2+ bilaterally  ABDOMEN: Nontender to palpation, normoactive bowel sounds, no rebound/guarding; No hepatosplenomegaly  MUSCLOSKELETAL: no clubbing or cyanosis of digits; no joint swelling or tenderness to palpation  PSYCH: Confused, continues to be more awake compared to last week/weekend.     LABS:                        9.0    3.89  )-----------( 75       ( 30 Jan 2020 06:15 )             26.3     01-30    134<L>  |  101  |  19  ----------------------------<  144<H>  3.9   |  21<L>  |  0.49<L>    Ca    8.2<L>      30 Jan 2020 06:15  Mg     1.8     01-30      PT/INR - ( 29 Jan 2020 06:30 )   PT: 11.6 SEC;   INR: 1.04                      RADIOLOGY & ADDITIONAL TESTS:  Results Reviewed:   Imaging Personally Reviewed:  Electrocardiogram Personally Reviewed:    COORDINATION OF CARE:  Care Discussed with Consultants/Other Providers [Y/N]:  Prior or Outpatient Records Reviewed [Y/N]:

## 2020-01-30 NOTE — CHART NOTE - NSCHARTNOTEFT_GEN_A_CORE
NEURO-ONCOLOGY: BERTA [phone]    CC: GLIOBLASTOMA, cerebral edema, SEIZURES, HYPONATREMIA, AND SOMNOLENCE    PATIENT SEEN AND EXAMINED  MET WITH NPs OF MEDICINE TEAM AT NURSING STATION  MET WITH NURSING AIDE AT BEDSIDE.  REVIEWED IMAGING  REVIEWED LABS  REVIEWED SUNRISE NOTES    Briefly, 69 year old right handed man with a past medical history of hypertension and hypercholesterolemia, who presented to local neurologist on 19 with complaints of memory loss and an MRI was performed on 19 demonstrate a 4cm mass involving the splenium of the callosum and bilateral parenchymal cortical involvement. He underwent stereotactic biopsy by Dr. Roy on 19. Final pathology showed glioblastoma.     PATIENT elected to seek care at [facility] with Lyle Chavis. Has received Stupp regimen (not clinical trial eligible) His last Temozolomide was at the end of 2019. His last RT dose was 2019. The temozolomide was stopped early for myelosuppression.    He has been hospitalized several times, includin. end  -- near [place], [place] for somnolence and falls. Hyponatremia was found and an MRI was performed, which is unavailable.  2. on 19 for similar complaints of somnolence and falls to [facility] ED. Found hyponatremia (to 121), hypotension (BP=80's/40's), and was discharged 19 with outpatient follow-up.   3. On 20, returned to the ED at [facility] for continued hyponatremia and somnolence, complicated by a GTC seizure in the ED. Given Ativan and Keppra load and admitted. No further seizures on EEGs performed 20 and 20. Noted to have myelosuppression thought related to chemotherapy by heme.   4. 20 returned from rehab for failure to thrive.   He has been evaluated by Neurology following the seizure on 20 and on 20 for persistent somnolence/failure to thrive.    I last saw the patient as outpatient on 19 and as inpatient on 20 for persistent somnolence/failure to thrive. At that time, his son stated that the somnolence is the major issue. It is too complicated for him to take his father to [facility], where his neuro-oncological care has been coordinated. He denies urinary incontinence. He states his father has not had swelling of either leg. He asks whether his father could be treated with Avastin. I thought perhaps cerebral edema was contributing or hydrocephalus as there was progressive ventriculomegaly and suggestion of leptomenigneal spread of his tumor on imaging and clinically (hyponatremia can be sign of CSF spread of neoplasm). I recommended (1) steroid bolus and (2) LP with opening pressure and large volume tap to see if that improves cognition.    He improved with dexamethasone 24mg once and had dex increased from 2mg twice daily.    A bedside LP was unsuccessful (tried 4 times). LP under fluoro demonstrated opening pressure of 11 cm H2O and 8cc of CSF were sent for analysis, showing no significant cells and elevated protein.    EXAMINATION  ALERT and complaining about the lack of family at his side (son is not at bedside presently). Answers most questions. Oriented to self and "hospital." Does not know date, but knew he did not know and was able to retain date in 3minutes. Mild agitation. Fluent speech "no one cares about me. No one is here to take care of me."  no facial asymmetry  eye movements intact  no drift  bilateral psoas weakness, but no deltoids weakness, albeit exam is limited by his ability to cooperate.    LABS  10/9/2019  13\13.4/168     /40.8\    19  1.69\.8\    19  VITAMIN B12 =1154    20  2.9\\    133|98|14/176  4 |22|0.6\    20  3.89\9.0/     /26.3\    134|101|19/ 144   3.9|21|0.49\    20 CSF PROTEIN=149.2 GLUCOSE= 64 WBC=2 RBC<1    IMAGING  I PERSONALLY REVIEWED IMAGING DATED 1/10/20 (MR BRAIN) AND 20, 19, 19.  There is new, multifocal enhancing disease c/w progression of glioma, with probable CSF dissemination. There is expansion of the lateral ventricles, with rounded frontal and occipital horns, together with new temporal horn visibility (not visible in the past), consistent with hydrocephalus.  I provided patient with selected imaging and he reviewed it.    IMPRESSION/PLAN  68 yo RH former , now with splenial GBM, presenting with memory loss in 2019, s/p biopsy showing GBM, s/p chemoradiation therapy with Bactrim and potentially low vitamin B12 during treatment, now with gait disorder, no incontinence, hyponatremia, myelosuppression, progressive multifocal glioma and cerebral edema, improving with increased steroid administration.    HYDROCEPHALUS -- Despite the clinical history, CSF elevation in protein and progressive ventriculomegaly, the opening pressure was low and staff report improvement with steroids, but not following spinal tap.    THROMBOCYTOPENIA -- Possibly due to venous thrombosis as >20% of GBM patients eventually develop DVTs. However, he has no LE swelling and D-Dimer was only 833. Moreover, platelets are improving as other counts improve, so likely related to chemotherapy-induced myelosuppression.     MYELOSUPPRESSION -- Resolving    GLIOMA -- Progressive on imaging. I suspect his prognosis is grim. Palliative care evaluation appropriate. Life expectancy is about 6 months.    CEREBRAL EDEMA -- He improved on Decadron even though there is not much edema on neuroimaging. 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 He has a large burden of gliomatous disease.    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 Would improve with high protein diet, PT, and getting off the steroids.    DISPO -- I will sign off. Please reconsult as needed by calling [phone].    total time spent, of which half in counselling and coordination of care, was 36 minutes.

## 2020-01-31 LAB
ALBUMIN SERPL ELPH-MCNC: 3.2 G/DL — LOW (ref 3.3–5)
ALP SERPL-CCNC: 50 U/L — SIGNIFICANT CHANGE UP (ref 40–120)
ALT FLD-CCNC: 27 U/L — SIGNIFICANT CHANGE UP (ref 4–41)
ANION GAP SERPL CALC-SCNC: 13 MMO/L — SIGNIFICANT CHANGE UP (ref 7–14)
AST SERPL-CCNC: 17 U/L — SIGNIFICANT CHANGE UP (ref 4–40)
BASOPHILS # BLD AUTO: 0 K/UL — SIGNIFICANT CHANGE UP (ref 0–0.2)
BASOPHILS NFR BLD AUTO: 0 % — SIGNIFICANT CHANGE UP (ref 0–2)
BILIRUB SERPL-MCNC: 0.4 MG/DL — SIGNIFICANT CHANGE UP (ref 0.2–1.2)
BUN SERPL-MCNC: 18 MG/DL — SIGNIFICANT CHANGE UP (ref 7–23)
CALCIUM SERPL-MCNC: 8.8 MG/DL — SIGNIFICANT CHANGE UP (ref 8.4–10.5)
CHLORIDE SERPL-SCNC: 104 MMOL/L — SIGNIFICANT CHANGE UP (ref 98–107)
CO2 SERPL-SCNC: 19 MMOL/L — LOW (ref 22–31)
CREAT SERPL-MCNC: 0.46 MG/DL — LOW (ref 0.5–1.3)
EOSINOPHIL # BLD AUTO: 0 K/UL — SIGNIFICANT CHANGE UP (ref 0–0.5)
EOSINOPHIL NFR BLD AUTO: 0 % — SIGNIFICANT CHANGE UP (ref 0–6)
GLUCOSE SERPL-MCNC: 186 MG/DL — HIGH (ref 70–99)
HCT VFR BLD CALC: 29.4 % — LOW (ref 39–50)
HGB BLD-MCNC: 9.9 G/DL — LOW (ref 13–17)
IMM GRANULOCYTES NFR BLD AUTO: 1.7 % — HIGH (ref 0–1.5)
LYMPHOCYTES # BLD AUTO: 0.55 K/UL — LOW (ref 1–3.3)
LYMPHOCYTES # BLD AUTO: 13.7 % — SIGNIFICANT CHANGE UP (ref 13–44)
MAGNESIUM SERPL-MCNC: 1.9 MG/DL — SIGNIFICANT CHANGE UP (ref 1.6–2.6)
MCHC RBC-ENTMCNC: 33.7 % — SIGNIFICANT CHANGE UP (ref 32–36)
MCHC RBC-ENTMCNC: 33.7 PG — SIGNIFICANT CHANGE UP (ref 27–34)
MCV RBC AUTO: 100 FL — SIGNIFICANT CHANGE UP (ref 80–100)
MONOCYTES # BLD AUTO: 0.08 K/UL — SIGNIFICANT CHANGE UP (ref 0–0.9)
MONOCYTES NFR BLD AUTO: 2 % — SIGNIFICANT CHANGE UP (ref 2–14)
NEUTROPHILS # BLD AUTO: 3.32 K/UL — SIGNIFICANT CHANGE UP (ref 1.8–7.4)
NEUTROPHILS NFR BLD AUTO: 82.6 % — HIGH (ref 43–77)
NRBC # FLD: 0 K/UL — SIGNIFICANT CHANGE UP (ref 0–0)
PHOSPHATE SERPL-MCNC: 3.6 MG/DL — SIGNIFICANT CHANGE UP (ref 2.5–4.5)
PLATELET # BLD AUTO: 69 K/UL — LOW (ref 150–400)
PMV BLD: 10.3 FL — SIGNIFICANT CHANGE UP (ref 7–13)
POTASSIUM SERPL-MCNC: 4.2 MMOL/L — SIGNIFICANT CHANGE UP (ref 3.5–5.3)
POTASSIUM SERPL-SCNC: 4.2 MMOL/L — SIGNIFICANT CHANGE UP (ref 3.5–5.3)
PROT SERPL-MCNC: 5.7 G/DL — LOW (ref 6–8.3)
RBC # BLD: 2.94 M/UL — LOW (ref 4.2–5.8)
RBC # FLD: 17.6 % — HIGH (ref 10.3–14.5)
SODIUM SERPL-SCNC: 136 MMOL/L — SIGNIFICANT CHANGE UP (ref 135–145)
WBC # BLD: 4.02 K/UL — SIGNIFICANT CHANGE UP (ref 3.8–10.5)
WBC # FLD AUTO: 4.02 K/UL — SIGNIFICANT CHANGE UP (ref 3.8–10.5)

## 2020-01-31 PROCEDURE — 70552 MRI BRAIN STEM W/DYE: CPT | Mod: 26

## 2020-01-31 PROCEDURE — 99233 SBSQ HOSP IP/OBS HIGH 50: CPT

## 2020-01-31 RX ADMIN — PREGABALIN 500 MICROGRAM(S): 225 CAPSULE ORAL at 13:25

## 2020-01-31 RX ADMIN — Medication 8 MILLIGRAM(S): at 19:14

## 2020-01-31 RX ADMIN — PANTOPRAZOLE SODIUM 40 MILLIGRAM(S): 20 TABLET, DELAYED RELEASE ORAL at 05:44

## 2020-01-31 RX ADMIN — HUMAN INSULIN 5 UNIT(S): 100 INJECTION, SUSPENSION SUBCUTANEOUS at 17:56

## 2020-01-31 RX ADMIN — Medication 8 MILLIGRAM(S): at 05:45

## 2020-01-31 RX ADMIN — SODIUM CHLORIDE 2 GRAM(S): 9 INJECTION INTRAMUSCULAR; INTRAVENOUS; SUBCUTANEOUS at 13:27

## 2020-01-31 RX ADMIN — SODIUM CHLORIDE 2 GRAM(S): 9 INJECTION INTRAMUSCULAR; INTRAVENOUS; SUBCUTANEOUS at 05:44

## 2020-01-31 RX ADMIN — HUMAN INSULIN 5 UNIT(S): 100 INJECTION, SUSPENSION SUBCUTANEOUS at 09:00

## 2020-01-31 RX ADMIN — LEVETIRACETAM 500 MILLIGRAM(S): 250 TABLET, FILM COATED ORAL at 05:44

## 2020-01-31 RX ADMIN — Medication 100 MILLIGRAM(S): at 22:10

## 2020-01-31 RX ADMIN — Medication 1: at 13:24

## 2020-01-31 RX ADMIN — SODIUM CHLORIDE 2 GRAM(S): 9 INJECTION INTRAMUSCULAR; INTRAVENOUS; SUBCUTANEOUS at 22:09

## 2020-01-31 RX ADMIN — SENNA PLUS 2 TABLET(S): 8.6 TABLET ORAL at 22:09

## 2020-01-31 RX ADMIN — Medication 3 MILLIGRAM(S): at 22:08

## 2020-01-31 RX ADMIN — POLYETHYLENE GLYCOL 3350 17 GRAM(S): 17 POWDER, FOR SOLUTION ORAL at 13:26

## 2020-01-31 RX ADMIN — Medication 100 MILLIGRAM(S): at 14:31

## 2020-01-31 RX ADMIN — Medication 100 MILLIGRAM(S): at 05:45

## 2020-01-31 RX ADMIN — LEVETIRACETAM 500 MILLIGRAM(S): 250 TABLET, FILM COATED ORAL at 19:14

## 2020-01-31 RX ADMIN — ATORVASTATIN CALCIUM 10 MILLIGRAM(S): 80 TABLET, FILM COATED ORAL at 22:08

## 2020-01-31 NOTE — CHART NOTE - NSCHARTNOTEFT_GEN_A_CORE
Source: Patient [X ]  Confused Family [ ]     other [X] electronic chart, RN, PCA     Diet : Diet, Regular:   Halal (01-28-20 @ 16:36)    Patient seen for nutrition follow-up. Met with patient. No family at bedside. Patient endorses good po intake per PCA. No nausea/vomiting/diarrhea/constipation or difficulty chewing and swallowing reported. Patient w/ elevated blood glucose likely steroid induced and w/ history. of T2DM. Patient was previously on Consistent Carbohydrate diet restriction, now liberalized to Regular per MD diet order. Would add previously recommended PO supplement Glucerna Therapeutic Nutrition 240mls 1x daily (220kcals, 10g protein). for added calories and protein. Encouraged good po intake and mealtime assistance as needed.         Current Weight: Weight (kg): 59.8 (01-25 @ 05:00) 60.3kg (1/26)      Pertinent Medications: acetaminophen  IVPB ..  atorvastatin  cyanocobalamin  dexAMETHasone     Tablet  dextrose 5%.  dextrose 50% Injectable  dextrose 50% Injectable  dextrose 50% Injectable  docusate sodium  insulin lispro (HumaLOG) corrective regimen sliding scale  insulin NPH human recombinant  levETIRAcetam  melatonin  pantoprazole    Tablet  polyethylene glycol 3350  senna  sodium chloride    Pertinent Labs:  01-31 Na136 mmol/L Glu 186 mg/dL<H> K+ 4.2 mmol/L Cr  0.46 mg/dL<L> BUN 18 mg/dL 01-31 Phos 3.6 mg/dL 01-31 Alb 3.2 g/dL<L>    No edema noted.     Skin: intact.     Estimated Needs:   [X ] no change since previous assessment  [ ] recalculated:       Previous Nutrition Diagnosis:     [ ] Inadequate Energy Intake [ ]Inadequate Oral Intake [ ] Excessive Energy Intake     [ ] Underweight [X ] Increased Nutrient Needs [ ] Overweight/Obesity     [ ] Altered GI Function [ ] Unintended Weight Loss [ ] Food & Nutrition Related Knowledge Deficit [ ] Malnutrition      Nutrition Diagnosis is [X ] ongoing  [ ] resolved [ ] not applicable          Additional Recommendations:    1. Continue current diet order, which remains appropriate at this time.   2. May consider addition of Consistent Carbohydrate diet restriction as medically appropriate/if blood glucose continues to elevate. Monitor blood glucose, POCT.  3. Add PO supplement Glucerna Therapeutic Nutrition 240mls 1x daily (220kcals, 10g protein) for optimal nutrition.   4. Please Encourage po intake, assist with meals and menu selections, provide alternatives PRN.   5. Monitor weights, labs, BM's, skin integrity, p.o. intake. Source: Patient [X ]  Confused Family [ ]     other [X] electronic chart, RN, PCA     Diet : Diet, Regular:   Halal (01-28-20 @ 16:36)    Patient seen for nutrition follow-up. Met with patient. No family at bedside. Patient endorses good po intake per PCA. No nausea/vomiting/diarrhea/constipation or difficulty chewing and swallowing reported. Patient w/ elevated blood glucose likely steroid induced and w/ history. of T2DM. Patient was previously on Consistent Carbohydrate diet restriction, now liberalized to Regular per MD diet order. Would add previously recommended PO supplement Glucerna Therapeutic Nutrition 240mls 1x daily (220kcals, 10g protein). for added calories and protein. Encouraged good po intake and mealtime assistance as needed.         Current Weight: Weight (kg): 59.8 (01-25 @ 05:00) 60.3kg (1/26)      Pertinent Medications: acetaminophen  IVPB ..  atorvastatin  cyanocobalamin  dexAMETHasone     Tablet  dextrose 5%.  dextrose 50% Injectable  dextrose 50% Injectable  dextrose 50% Injectable  docusate sodium  insulin lispro (HumaLOG) corrective regimen sliding scale  insulin NPH human recombinant  levETIRAcetam  melatonin  pantoprazole    Tablet  polyethylene glycol 3350  senna  sodium chloride    Pertinent Labs:  01-31 Na136 mmol/L Glu 186 mg/dL<H> K+ 4.2 mmol/L Cr  0.46 mg/dL<L> BUN 18 mg/dL 01-31 Phos 3.6 mg/dL 01-31 Alb 3.2 g/dL<L>    No edema noted.     Skin: intact.     Estimated Needs:   [X ] no change since previous assessment  [ ] recalculated:       Previous Nutrition Diagnosis:     [ ] Inadequate Energy Intake [ ]Inadequate Oral Intake [ ] Excessive Energy Intake     [ ] Underweight [X ] Increased Nutrient Needs [ ] Overweight/Obesity     [ ] Altered GI Function [ ] Unintended Weight Loss [ ] Food & Nutrition Related Knowledge Deficit [ ] Malnutrition      Nutrition Diagnosis is [X ] ongoing  [ ] resolved [ ] not applicable          Additional Recommendations:    1. Continue current diet order, which remains appropriate at this time.   2. May consider addition of Consistent Carbohydrate diet restriction as medically appropriate/if blood glucose continues to elevate. Monitor blood glucose, POCT.  3. Add PO supplement Glucerna Therapeutic Nutrition 240mls 1x daily (220kcals, 10g protein) for optimal nutrition.   4. Please Encourage po intake, assist with meals and menu selections, provide alternatives PRN.   5. Monitor weights, labs, BM's, skin integrity, p.o. intake.  6. Provide food preferences within therapeutic diet when requested.

## 2020-01-31 NOTE — PROGRESS NOTE ADULT - SUBJECTIVE AND OBJECTIVE BOX
PROGRESS NOTE:     Patient is a 69y old  Male who presents with a chief complaint of sent back from [facility], Hyponatremia, SIADH, Glioblastoma, (30 Jan 2020 15:30)      SUBJECTIVE / OVERNIGHT EVENTS:  Patient seen and examined this morning. Appears a little more lethargic.     ADDITIONAL REVIEW OF SYSTEMS:  No reports of fevers or chills    MEDICATIONS  (STANDING):  acetaminophen  IVPB .. 1000 milliGRAM(s) IV Intermittent once  atorvastatin 10 milliGRAM(s) Oral at bedtime  cyanocobalamin 500 MICROGram(s) Oral daily  dexAMETHasone     Tablet 8 milliGRAM(s) Oral two times a day  dextrose 5%. 1000 milliLiter(s) (50 mL/Hr) IV Continuous <Continuous>  dextrose 50% Injectable 12.5 Gram(s) IV Push once  dextrose 50% Injectable 25 Gram(s) IV Push once  dextrose 50% Injectable 25 Gram(s) IV Push once  docusate sodium 100 milliGRAM(s) Oral three times a day  insulin lispro (HumaLOG) corrective regimen sliding scale   SubCutaneous Before meals and at bedtime  insulin NPH human recombinant 5 Unit(s) SubCutaneous two times a day  levETIRAcetam 500 milliGRAM(s) Oral two times a day  melatonin 3 milliGRAM(s) Oral <User Schedule>  pantoprazole    Tablet 40 milliGRAM(s) Oral before breakfast  polyethylene glycol 3350 17 Gram(s) Oral daily  senna 2 Tablet(s) Oral at bedtime  sodium chloride 2 Gram(s) Oral three times a day    MEDICATIONS  (PRN):  dextrose 40% Gel 15 Gram(s) Oral once PRN Blood Glucose LESS THAN 70 milliGRAM(s)/deciliter  glucagon  Injectable 1 milliGRAM(s) IntraMuscular once PRN Glucose LESS THAN 70 milligrams/deciliter      CAPILLARY BLOOD GLUCOSE      POCT Blood Glucose.: 276 mg/dL (31 Jan 2020 12:46)  POCT Blood Glucose.: 160 mg/dL (31 Jan 2020 08:42)  POCT Blood Glucose.: 178 mg/dL (30 Jan 2020 22:18)  POCT Blood Glucose.: 166 mg/dL (30 Jan 2020 17:39)    I&O's Summary      PHYSICAL EXAM:  Vital Signs Last 24 Hrs  T(C): 36.3 (31 Jan 2020 06:05), Max: 36.8 (30 Jan 2020 14:58)  T(F): 97.4 (31 Jan 2020 06:05), Max: 98.2 (30 Jan 2020 14:58)  HR: 71 (31 Jan 2020 06:05) (71 - 82)  BP: 138/82 (31 Jan 2020 06:05) (111/74 - 138/82)  BP(mean): --  RR: 17 (31 Jan 2020 06:05) (17 - 18)  SpO2: 100% (31 Jan 2020 06:05) (100% - 100%)    CONSTITUTIONAL: NAD, pleasant, but sleepy  RESPIRATORY: Normal respiratory effort; lungs are clear to auscultation bilaterally  CARDIOVASCULAR: Regular rate and rhythm, normal S1 and S2, no murmur/rub/gallop; No lower extremity edema; Peripheral pulses are 2+ bilaterally  ABDOMEN: Nontender to palpation, normoactive bowel sounds, no rebound/guarding; No hepatosplenomegaly  MUSCLOSKELETAL: no clubbing or cyanosis of digits; no joint swelling or tenderness to palpation  PSYCH: Still confused, more sleepy/lethargic today  LABS:                        9.9    4.02  )-----------( 69       ( 31 Jan 2020 06:15 )             29.4     01-31    136  |  104  |  18  ----------------------------<  186<H>  4.2   |  19<L>  |  0.46<L>    Ca    8.8      31 Jan 2020 06:15  Phos  3.6     01-31  Mg     1.9     01-31    TPro  5.7<L>  /  Alb  3.2<L>  /  TBili  0.4  /  DBili  x   /  AST  17  /  ALT  27  /  AlkPhos  50  01-31                RADIOLOGY & ADDITIONAL TESTS:  Results Reviewed:   Imaging Personally Reviewed:  Electrocardiogram Personally Reviewed:    COORDINATION OF CARE:  Care Discussed with Consultants/Other Providers [Y/N]: Patient care discussed with oncology and palliative care during interdisciplinary rounds, case management, nursing management  and social work were also present.   Prior or Outpatient Records Reviewed [Y/N]:

## 2020-01-31 NOTE — CHART NOTE - NSCHARTNOTEFT_GEN_A_CORE
Called to expedite MRI head today, per MRI staff patient likely to go this evening. Explained importance in dispo planning.

## 2020-02-01 ENCOUNTER — TRANSCRIPTION ENCOUNTER (OUTPATIENT)
Age: 70
End: 2020-02-01

## 2020-02-01 LAB
ALBUMIN SERPL ELPH-MCNC: 3 G/DL — LOW (ref 3.3–5)
ALP SERPL-CCNC: 53 U/L — SIGNIFICANT CHANGE UP (ref 40–120)
ALT FLD-CCNC: 25 U/L — SIGNIFICANT CHANGE UP (ref 4–41)
ANION GAP SERPL CALC-SCNC: 14 MMO/L — SIGNIFICANT CHANGE UP (ref 7–14)
AST SERPL-CCNC: 13 U/L — SIGNIFICANT CHANGE UP (ref 4–40)
BASOPHILS # BLD AUTO: 0.01 K/UL — SIGNIFICANT CHANGE UP (ref 0–0.2)
BASOPHILS NFR BLD AUTO: 0.2 % — SIGNIFICANT CHANGE UP (ref 0–2)
BILIRUB SERPL-MCNC: 0.3 MG/DL — SIGNIFICANT CHANGE UP (ref 0.2–1.2)
BUN SERPL-MCNC: 21 MG/DL — SIGNIFICANT CHANGE UP (ref 7–23)
CALCIUM SERPL-MCNC: 8.7 MG/DL — SIGNIFICANT CHANGE UP (ref 8.4–10.5)
CHLORIDE SERPL-SCNC: 102 MMOL/L — SIGNIFICANT CHANGE UP (ref 98–107)
CO2 SERPL-SCNC: 20 MMOL/L — LOW (ref 22–31)
CREAT SERPL-MCNC: 0.43 MG/DL — LOW (ref 0.5–1.3)
EOSINOPHIL # BLD AUTO: 0 K/UL — SIGNIFICANT CHANGE UP (ref 0–0.5)
EOSINOPHIL NFR BLD AUTO: 0 % — SIGNIFICANT CHANGE UP (ref 0–6)
GLUCOSE SERPL-MCNC: 183 MG/DL — HIGH (ref 70–99)
HCT VFR BLD CALC: 27.3 % — LOW (ref 39–50)
HGB BLD-MCNC: 9.2 G/DL — LOW (ref 13–17)
IMM GRANULOCYTES NFR BLD AUTO: 1.8 % — HIGH (ref 0–1.5)
LYMPHOCYTES # BLD AUTO: 0.72 K/UL — LOW (ref 1–3.3)
LYMPHOCYTES # BLD AUTO: 14.6 % — SIGNIFICANT CHANGE UP (ref 13–44)
MAGNESIUM SERPL-MCNC: 1.9 MG/DL — SIGNIFICANT CHANGE UP (ref 1.6–2.6)
MCHC RBC-ENTMCNC: 32.9 PG — SIGNIFICANT CHANGE UP (ref 27–34)
MCHC RBC-ENTMCNC: 33.7 % — SIGNIFICANT CHANGE UP (ref 32–36)
MCV RBC AUTO: 97.5 FL — SIGNIFICANT CHANGE UP (ref 80–100)
MONOCYTES # BLD AUTO: 0.13 K/UL — SIGNIFICANT CHANGE UP (ref 0–0.9)
MONOCYTES NFR BLD AUTO: 2.6 % — SIGNIFICANT CHANGE UP (ref 2–14)
NEUTROPHILS # BLD AUTO: 3.97 K/UL — SIGNIFICANT CHANGE UP (ref 1.8–7.4)
NEUTROPHILS NFR BLD AUTO: 80.8 % — HIGH (ref 43–77)
NRBC # FLD: 0.04 K/UL — SIGNIFICANT CHANGE UP (ref 0–0)
PHOSPHATE SERPL-MCNC: 3.1 MG/DL — SIGNIFICANT CHANGE UP (ref 2.5–4.5)
PLATELET # BLD AUTO: 63 K/UL — LOW (ref 150–400)
PMV BLD: 10.5 FL — SIGNIFICANT CHANGE UP (ref 7–13)
POTASSIUM SERPL-MCNC: 3.7 MMOL/L — SIGNIFICANT CHANGE UP (ref 3.5–5.3)
POTASSIUM SERPL-SCNC: 3.7 MMOL/L — SIGNIFICANT CHANGE UP (ref 3.5–5.3)
PROT SERPL-MCNC: 5.3 G/DL — LOW (ref 6–8.3)
RBC # BLD: 2.8 M/UL — LOW (ref 4.2–5.8)
RBC # FLD: 17.5 % — HIGH (ref 10.3–14.5)
SODIUM SERPL-SCNC: 136 MMOL/L — SIGNIFICANT CHANGE UP (ref 135–145)
WBC # BLD: 4.92 K/UL — SIGNIFICANT CHANGE UP (ref 3.8–10.5)
WBC # FLD AUTO: 4.92 K/UL — SIGNIFICANT CHANGE UP (ref 3.8–10.5)

## 2020-02-01 PROCEDURE — 99233 SBSQ HOSP IP/OBS HIGH 50: CPT

## 2020-02-01 RX ADMIN — ATORVASTATIN CALCIUM 10 MILLIGRAM(S): 80 TABLET, FILM COATED ORAL at 21:30

## 2020-02-01 RX ADMIN — LEVETIRACETAM 500 MILLIGRAM(S): 250 TABLET, FILM COATED ORAL at 06:53

## 2020-02-01 RX ADMIN — Medication 8 MILLIGRAM(S): at 06:56

## 2020-02-01 RX ADMIN — SODIUM CHLORIDE 2 GRAM(S): 9 INJECTION INTRAMUSCULAR; INTRAVENOUS; SUBCUTANEOUS at 21:30

## 2020-02-01 RX ADMIN — LEVETIRACETAM 500 MILLIGRAM(S): 250 TABLET, FILM COATED ORAL at 17:43

## 2020-02-01 RX ADMIN — PREGABALIN 500 MICROGRAM(S): 225 CAPSULE ORAL at 13:05

## 2020-02-01 RX ADMIN — HUMAN INSULIN 5 UNIT(S): 100 INJECTION, SUSPENSION SUBCUTANEOUS at 17:43

## 2020-02-01 RX ADMIN — Medication 100 MILLIGRAM(S): at 06:53

## 2020-02-01 RX ADMIN — Medication 3 MILLIGRAM(S): at 21:30

## 2020-02-01 RX ADMIN — SODIUM CHLORIDE 2 GRAM(S): 9 INJECTION INTRAMUSCULAR; INTRAVENOUS; SUBCUTANEOUS at 13:04

## 2020-02-01 RX ADMIN — Medication 100 MILLIGRAM(S): at 13:05

## 2020-02-01 RX ADMIN — PANTOPRAZOLE SODIUM 40 MILLIGRAM(S): 20 TABLET, DELAYED RELEASE ORAL at 06:56

## 2020-02-01 RX ADMIN — HUMAN INSULIN 5 UNIT(S): 100 INJECTION, SUSPENSION SUBCUTANEOUS at 06:57

## 2020-02-01 RX ADMIN — Medication 4 MILLIGRAM(S): at 17:43

## 2020-02-01 RX ADMIN — SENNA PLUS 2 TABLET(S): 8.6 TABLET ORAL at 21:30

## 2020-02-01 RX ADMIN — POLYETHYLENE GLYCOL 3350 17 GRAM(S): 17 POWDER, FOR SOLUTION ORAL at 13:04

## 2020-02-01 RX ADMIN — Medication 100 MILLIGRAM(S): at 21:30

## 2020-02-01 RX ADMIN — SODIUM CHLORIDE 2 GRAM(S): 9 INJECTION INTRAMUSCULAR; INTRAVENOUS; SUBCUTANEOUS at 06:53

## 2020-02-01 NOTE — PROGRESS NOTE ADULT - SUBJECTIVE AND OBJECTIVE BOX
[facility] Division of Hospital Medicine  Belkys Olmstead DO  Pager: [phone]  Other Times:  [phone]    Patient is a 69y old  Male who presents with a chief complaint of sent back from [facility], Hyponatremia, SIADH, Glioblastoma, (31 Jan 2020 13:27)    SUBJECTIVE / OVERNIGHT EVENTS:  Patient seen denying any complaints.     ADDITIONAL REVIEW OF SYSTEMS:    MEDICATIONS  (STANDING):  acetaminophen  IVPB .. 1000 milliGRAM(s) IV Intermittent once  atorvastatin 10 milliGRAM(s) Oral at bedtime  cyanocobalamin 500 MICROGram(s) Oral daily  dexAMETHasone     Tablet   Oral   dexAMETHasone     Tablet 4 milliGRAM(s) Oral two times a day  dextrose 5%. 1000 milliLiter(s) (50 mL/Hr) IV Continuous <Continuous>  dextrose 50% Injectable 12.5 Gram(s) IV Push once  dextrose 50% Injectable 25 Gram(s) IV Push once  dextrose 50% Injectable 25 Gram(s) IV Push once  docusate sodium 100 milliGRAM(s) Oral three times a day  insulin lispro (HumaLOG) corrective regimen sliding scale   SubCutaneous Before meals and at bedtime  insulin NPH human recombinant 5 Unit(s) SubCutaneous two times a day  levETIRAcetam 500 milliGRAM(s) Oral two times a day  melatonin 3 milliGRAM(s) Oral <User Schedule>  pantoprazole    Tablet 40 milliGRAM(s) Oral before breakfast  polyethylene glycol 3350 17 Gram(s) Oral daily  senna 2 Tablet(s) Oral at bedtime  sodium chloride 2 Gram(s) Oral three times a day    MEDICATIONS  (PRN):  dextrose 40% Gel 15 Gram(s) Oral once PRN Blood Glucose LESS THAN 70 milliGRAM(s)/deciliter  glucagon  Injectable 1 milliGRAM(s) IntraMuscular once PRN Glucose LESS THAN 70 milligrams/deciliter      CAPILLARY BLOOD GLUCOSE      POCT Blood Glucose.: 156 mg/dL (01 Feb 2020 12:23)  POCT Blood Glucose.: 176 mg/dL (01 Feb 2020 08:49)  POCT Blood Glucose.: 235 mg/dL (01 Feb 2020 06:38)  POCT Blood Glucose.: 223 mg/dL (31 Jan 2020 21:52)  POCT Blood Glucose.: 215 mg/dL (31 Jan 2020 17:45)    I&O's Summary    31 Jan 2020 07:01  -  01 Feb 2020 07:00  --------------------------------------------------------  IN: 100 mL / OUT: 300 mL / NET: -200 mL    01 Feb 2020 07:01  -  01 Feb 2020 13:05  --------------------------------------------------------  IN: 400 mL / OUT: 0 mL / NET: 400 mL        PHYSICAL EXAM:  Vital Signs Last 24 Hrs  T(C): 36.3 (01 Feb 2020 06:30), Max: 36.4 (31 Jan 2020 21:50)  T(F): 97.3 (01 Feb 2020 06:30), Max: 97.6 (31 Jan 2020 21:50)  HR: 79 (01 Feb 2020 06:30) (79 - 80)  BP: 112/63 (01 Feb 2020 06:30) (103/51 - 112/63)  BP(mean): --  RR: 18 (01 Feb 2020 06:30) (18 - 18)  SpO2: 100% (01 Feb 2020 06:30) (100% - 100%)  CONSTITUTIONAL: NAD, pleasant, but sleepy, cooperative with exam  RESPIRATORY: Normal respiratory effort; lungs are clear to auscultation bilaterally  CARDIOVASCULAR: Regular rate and rhythm, normal S1 and S2, no murmur/rub/gallop; No lower extremity edema; Peripheral pulses are 2+ bilaterally  ABDOMEN: Nontender to palpation, normoactive bowel sounds, no rebound/guarding; No hepatosplenomegaly  MUSCULOSKELETAL: no clubbing or cyanosis of digits; no joint swelling or tenderness to palpation  PSYCH: Still confused, more sleepy/lethargic today    LABS:                        9.2    4.92  )-----------( 63       ( 01 Feb 2020 06:04 )             27.3     02-01    136  |  102  |  21  ----------------------------<  183<H>  3.7   |  20<L>  |  0.43<L>    Ca    8.7      01 Feb 2020 06:04  Phos  3.1     02-01  Mg     1.9     02-01    TPro  5.3<L>  /  Alb  3.0<L>  /  TBili  0.3  /  DBili  x   /  AST  13  /  ALT  25  /  AlkPhos  53  02-01                RADIOLOGY & ADDITIONAL TESTS:  Results Reviewed:   Imaging Personally Reviewed:  Electrocardiogram Personally Reviewed:    COORDINATION OF CARE:  Care Discussed with Consultants/Other Providers [Y/N]:  Prior or Outpatient Records Reviewed [Y/N]:

## 2020-02-01 NOTE — DISCHARGE NOTE NURSING/CASE MANAGEMENT/SOCIAL WORK - PATIENT PORTAL LINK FT
You can access the FollowMyHealth Patient Portal offered by [facility] by registering at the following website: [email or web address]. By joining [facility]’s FollowMyHealth portal, you will also be able to view your health information using other applications (apps) compatible with our system.

## 2020-02-02 VITALS
RESPIRATION RATE: 16 BRPM | SYSTOLIC BLOOD PRESSURE: 120 MMHG | OXYGEN SATURATION: 100 % | DIASTOLIC BLOOD PRESSURE: 78 MMHG | TEMPERATURE: 97 F | HEART RATE: 94 BPM

## 2020-02-02 LAB
ALBUMIN SERPL ELPH-MCNC: 2.9 G/DL — LOW (ref 3.3–5)
ALP SERPL-CCNC: 47 U/L — SIGNIFICANT CHANGE UP (ref 40–120)
ALT FLD-CCNC: 29 U/L — SIGNIFICANT CHANGE UP (ref 4–41)
ANION GAP SERPL CALC-SCNC: 13 MMO/L — SIGNIFICANT CHANGE UP (ref 7–14)
AST SERPL-CCNC: 16 U/L — SIGNIFICANT CHANGE UP (ref 4–40)
BASOPHILS # BLD AUTO: 0.01 K/UL — SIGNIFICANT CHANGE UP (ref 0–0.2)
BASOPHILS NFR BLD AUTO: 0.3 % — SIGNIFICANT CHANGE UP (ref 0–2)
BASOPHILS NFR SPEC: 0 % — SIGNIFICANT CHANGE UP (ref 0–2)
BILIRUB SERPL-MCNC: 0.2 MG/DL — SIGNIFICANT CHANGE UP (ref 0.2–1.2)
BUN SERPL-MCNC: 21 MG/DL — SIGNIFICANT CHANGE UP (ref 7–23)
CALCIUM SERPL-MCNC: 8.6 MG/DL — SIGNIFICANT CHANGE UP (ref 8.4–10.5)
CHLORIDE SERPL-SCNC: 104 MMOL/L — SIGNIFICANT CHANGE UP (ref 98–107)
CO2 SERPL-SCNC: 23 MMOL/L — SIGNIFICANT CHANGE UP (ref 22–31)
CREAT SERPL-MCNC: 0.48 MG/DL — LOW (ref 0.5–1.3)
EOSINOPHIL # BLD AUTO: 0 K/UL — SIGNIFICANT CHANGE UP (ref 0–0.5)
EOSINOPHIL NFR BLD AUTO: 0 % — SIGNIFICANT CHANGE UP (ref 0–6)
EOSINOPHIL NFR FLD: 0 % — SIGNIFICANT CHANGE UP (ref 0–6)
GLUCOSE SERPL-MCNC: 136 MG/DL — HIGH (ref 70–99)
HCT VFR BLD CALC: 27.2 % — LOW (ref 39–50)
HGB BLD-MCNC: 9.1 G/DL — LOW (ref 13–17)
IMM GRANULOCYTES NFR BLD AUTO: 4 % — HIGH (ref 0–1.5)
LYMPHOCYTES # BLD AUTO: 0.94 K/UL — LOW (ref 1–3.3)
LYMPHOCYTES # BLD AUTO: 23.7 % — SIGNIFICANT CHANGE UP (ref 13–44)
LYMPHOCYTES NFR SPEC AUTO: 21 % — SIGNIFICANT CHANGE UP (ref 13–44)
MAGNESIUM SERPL-MCNC: 1.8 MG/DL — SIGNIFICANT CHANGE UP (ref 1.6–2.6)
MCHC RBC-ENTMCNC: 33.1 PG — SIGNIFICANT CHANGE UP (ref 27–34)
MCHC RBC-ENTMCNC: 33.5 % — SIGNIFICANT CHANGE UP (ref 32–36)
MCV RBC AUTO: 98.9 FL — SIGNIFICANT CHANGE UP (ref 80–100)
METAMYELOCYTES # FLD: 1 % — SIGNIFICANT CHANGE UP (ref 0–1)
MONOCYTES # BLD AUTO: 0.13 K/UL — SIGNIFICANT CHANGE UP (ref 0–0.9)
MONOCYTES NFR BLD AUTO: 3.3 % — SIGNIFICANT CHANGE UP (ref 2–14)
MONOCYTES NFR BLD: 2 % — SIGNIFICANT CHANGE UP (ref 2–9)
NEUTROPHIL AB SER-ACNC: 67 % — SIGNIFICANT CHANGE UP (ref 43–77)
NEUTROPHILS # BLD AUTO: 2.72 K/UL — SIGNIFICANT CHANGE UP (ref 1.8–7.4)
NEUTROPHILS NFR BLD AUTO: 68.7 % — SIGNIFICANT CHANGE UP (ref 43–77)
NEUTS BAND # BLD: 7 % — HIGH (ref 0–6)
NRBC # BLD: 1 /100WBC — SIGNIFICANT CHANGE UP
NRBC # FLD: 0.12 K/UL — SIGNIFICANT CHANGE UP (ref 0–0)
NRBC FLD-RTO: 3 — SIGNIFICANT CHANGE UP
OVALOCYTES BLD QL SMEAR: SLIGHT — SIGNIFICANT CHANGE UP
PHOSPHATE SERPL-MCNC: 3.1 MG/DL — SIGNIFICANT CHANGE UP (ref 2.5–4.5)
PLATELET # BLD AUTO: 52 K/UL — LOW (ref 150–400)
PMV BLD: 10.3 FL — SIGNIFICANT CHANGE UP (ref 7–13)
POTASSIUM SERPL-MCNC: 3.7 MMOL/L — SIGNIFICANT CHANGE UP (ref 3.5–5.3)
POTASSIUM SERPL-SCNC: 3.7 MMOL/L — SIGNIFICANT CHANGE UP (ref 3.5–5.3)
PROT SERPL-MCNC: 5.3 G/DL — LOW (ref 6–8.3)
RBC # BLD: 2.75 M/UL — LOW (ref 4.2–5.8)
RBC # FLD: 17.9 % — HIGH (ref 10.3–14.5)
REVIEW TO FOLLOW: YES — SIGNIFICANT CHANGE UP
SODIUM SERPL-SCNC: 140 MMOL/L — SIGNIFICANT CHANGE UP (ref 135–145)
VARIANT LYMPHS # BLD: 2 % — SIGNIFICANT CHANGE UP
WBC # BLD: 3.96 K/UL — SIGNIFICANT CHANGE UP (ref 3.8–10.5)
WBC # FLD AUTO: 3.96 K/UL — SIGNIFICANT CHANGE UP (ref 3.8–10.5)

## 2020-02-02 PROCEDURE — 99231 SBSQ HOSP IP/OBS SF/LOW 25: CPT

## 2020-02-02 RX ADMIN — LEVETIRACETAM 500 MILLIGRAM(S): 250 TABLET, FILM COATED ORAL at 15:44

## 2020-02-02 RX ADMIN — Medication 100 MILLIGRAM(S): at 05:56

## 2020-02-02 RX ADMIN — PANTOPRAZOLE SODIUM 40 MILLIGRAM(S): 20 TABLET, DELAYED RELEASE ORAL at 05:56

## 2020-02-02 RX ADMIN — LEVETIRACETAM 500 MILLIGRAM(S): 250 TABLET, FILM COATED ORAL at 05:56

## 2020-02-02 RX ADMIN — HUMAN INSULIN 5 UNIT(S): 100 INJECTION, SUSPENSION SUBCUTANEOUS at 06:57

## 2020-02-02 RX ADMIN — Medication 4 MILLIGRAM(S): at 15:44

## 2020-02-02 RX ADMIN — Medication 4 MILLIGRAM(S): at 05:56

## 2020-02-02 RX ADMIN — SODIUM CHLORIDE 2 GRAM(S): 9 INJECTION INTRAMUSCULAR; INTRAVENOUS; SUBCUTANEOUS at 05:56

## 2020-02-02 NOTE — PROGRESS NOTE ADULT - REASON FOR ADMISSION
sent back from SNF, Hyponatremia, SIADH, Glioblastoma,

## 2020-02-02 NOTE — PROGRESS NOTE ADULT - PROBLEM SELECTOR PLAN 1
[place] [facility] [place] Temodar (last in 11/2019) and RT (last 12/4/2019).   - c/w Decadron 2mg BID for now - d/w neuro -rec continuing current dose  Neuro-oncology and hematology evals appreciated  Case d/w neuro and neurosurgery  Neuro-onc recommended neurosurgical evaluation to determine role for shunt. Seen by neurosurgery - no neurosurgical intervention or shunt placement
[place] in 9[place]2019 [place] [facility] [place] Temodar (last in 11/2019) and RT (last 12/4/2019).   - c/w Decadron 2mg BID for now - d/w neuro -rec continuing current dose  Neuro-oncology and hematology evals appreciated  Case d/w neuro and neurosurgery  Neuro-onc recommended neurosurgical evaluation to determine role for shunt. Seen by neurosurgery - no neurosurgical intervention or shunt placement
Diagnosed in 9/2019 [place] stereotactic [place] [facility] [place]   s/p Temodar (last in 11/2020) and RT (last 12/4/2020).   - c/w Decadron 2mg BID for now  Neuro-oncology and hematology evals requested
Diagnosed in 9/2019 s/p stereotactic biopsy and resection [place] [facility] [place],   s/p Temodar (last in 11/2020) and RT (last 12/4/2020).   - c/w Decadron 2mg BID for now
Diagnosed in 9/2019 s/p stereotactic biopsy and [place] [facility] [place],   s/p Temodar (last in 11/2019) and RT (last 12/4/2019).   - c/w Decadron 2mg BID for now - d/w neuro -rec continuing current dose  Neuro-oncology and hematology evals appreciated  Case d/w neuro and neurosurgery  Neuro-onc recommended neurosurgical evaluation to determine role for shunt. Seen by neurosurgery - no neurosurgical intervention or shunt placement
[place] in 9/2019 [place] [facility] [place]   s/p Temodar (last in 11/2019) and RT (last 12/4/2019).   - c/w Decadron 2mg BID for now - d/w neuro -rec continuing current dose  Neuro-oncology and hematology evals appreciated  Case d/w neuro and neurosurgery  Neuro-onc recommended neurosurgical evaluation to determine role for shunt. Seen by neurosurgery - no neurosurgical intervention or shunt placement
Diagnosed in 9/2019 s/p stereotactic biopsy and [place] [facility] [place],   s/p Temodar (last in 11/2019) and RT (last 12/4/2019).   Mental status responded well this morning to prednisone 24mg   Neuro-oncology and hematology evals appreciated  As per neuro-oncology, after transfusion of platelets will get a large volume tap. If mental status improves, will reconsult neurosurgery for VPS  Will repeat brain MRI
Diagnosed in 9/2019 s/p stereotactic biopsy and resection [place] [facility] [place],   s/p Temodar (last in 11/2019) and RT (last 12/4/2019).   Mental status responded well to prednisone 24mg   Neuro-oncology and hematology evals appreciated  S/P high volume LP with normal opening pressure  Repeat brain MRI with multifocal areas of tumor are again noted with distribution as described, postcontrast series are markedly limited by motion substantially limiting comparison to the prior study. The left temporal lobe edema appears mildly progressed.  - plan for rehab
Diagnosed in 9/2019 s/p stereotactic biopsy and resection [place] [facility] [place],   s/p Temodar (last in 11/2019) and RT (last 12/4/2019).   Mental status responded well to prednisone 24mg   Neuro-oncology and hematology evals appreciated  S/P high volume LP with normal opening pressure  Repeat brain MRI with multifocal areas of tumor are again noted with distribution as described, postcontrast series are markedly limited by motion substantially limiting comparison to the prior study. The left temporal lobe edema appears mildly progressed.  - plan for rehab
Diagnosed in 9/2019 s/p stereotactic biopsy and resection at [facility] [place],   s/p Temodar (last in 11/2019) and RT (last 12/4/2019).   Mental status responded well to prednisone 24mg   Neuro-oncology and hematology evals appreciated  S/P high volume LP with normal opening pressure  Will repeat brain MRI
[place] in 9/2019 [place] [facility] [place]/p Temodar (last in 11/2019) and RT (last 12/4/2019).   Mental status responded well this morning to prednisone 24mg   Neuro-oncology and hematology evals appreciated  As per neuro-oncology, after transfusion of platelets will get a large volume tap, procedure team unsuccessful with the LP, neuroradiology consulted. If mental status improves, will reconsult neurosurgery for VPS  Will repeat brain MRI
Diagnosed in 9/2019 s/p stereot[place] [facility] [place]   s/p Temodar (last in 11/2020) and RT (last 12/4/2020).   - c/w Decadron 2mg BID for now  - [place]s [place] [facility] [place] - patient's family asking for our oncologist to reevaluate - heme was following during last admission - will recall in am
Pt [place] in 9/2019 [place] [facility] [place] Temodar (last in 11/2020) and RT (last 12/4/2020).   - c/w Decadron 2mg BID    - 40 Protonix QD for GI PPx   - [facility] f/u after discharge from rehab
Pt diagnosed in 9/2019 s/p stereotactic biopsy and resection at [facility] [place], s/p Temodar (last in 11/2020) and RT (last 12/4/2020).   - c/w Decadron 2mg BID    - 40 Protonix QD for GI PPx   - [facility] f/u after discharge from rehab
Pt diagnosed in 9/2019 s/p stereotactic biopsy and [place] [facility] [place] s/p Temodar (last in 11/2020) and RT (last 12/4/2020).   - c/w Decadron 2mg BID    - 40 Protonix QD for GI PPx   - [facility] f/u after discharge from rehab
Diagnosed in 9/2019 s/p stereotactic biopsy and resection at [facility] [place],   s/p Temodar (last in 11/2019) and RT (last 12/4/2019).   Mental status responded well to prednisone 24mg   Neuro-oncology and hematology evals appreciated  S/P high volume LP with normal opening pressure  Will repeat brain MRI and then will try to proceed to rehab.

## 2020-02-02 NOTE — PROGRESS NOTE ADULT - PROBLEM SELECTOR PROBLEM 4
Hyponatremia

## 2020-02-02 NOTE — PROGRESS NOTE ADULT - PROBLEM SELECTOR PLAN 6
DM2, hyperglycemia on recent admission related to decadron use  - c/w NPH 5U BID while on Decadron 2mg BID - continue monitoring FSs
In setting of GBM.  - c/w Keppra 500 mg bid  - c/w decadron 2 mg bid  - fall, seizure, aspiration precautions
Pt w/ DM2, hyperglycemia on recent admission related to decadron  - c/w NPH 5U BID while on Decadron 2mg BID
Pt w/ DM2, hyperglycemia on recent admission related to decadron use  - c/w NPH 5U BID while on Decadron 2mg BID - continue monitoring FSs

## 2020-02-02 NOTE — PROGRESS NOTE ADULT - ASSESSMENT
Patient is a 68yo with h/o DM type 2, HTN, HLD, GBM (diagnosed 9/19, last chemo 11/19, last radiation 12/4), recently hospitalized for new seizure and CMV, SIADH, presenting from rehab/SNF for alternate rehab placement.
Patient is a 70yo with h/o DM type 2, HTN, HLD, GBM (diagnosed 9/19, last chemo 11/19, last radiation 12/4), recently hospitalized for new seizure and CMV, SIADH, presenting from rehab/SNF for alternate rehab placement.
Pt is a 68 yo PMHx DM2, HTN, HLD, GBM (diagnosed 9/19, last chemo 11/19, last radiation 12/4), recent hospitalization for new seizure and CMV, SIADH, presenting from rehab/SNF for alternate rehab placement.
Pt is a 70 yo PMHx DM2, HTN, HLD, GBM (diagnosed 9/19, last chemo 11/19, last radiation 12/4), recent hospitalization for new seizure and CMV, SIADH, presenting from rehab/SNF for alternate rehab placement.
Pt is a 70 yo PMHx DM2, HTN, HLD, GBM (diagnosed 9/19, last chemo 11/19, last radiation 12/4), recent hospitalization for new seizure and CMV, SIADH, presenting from rehab/SNF for alternate rehab placement.
Patient is a 70yo with h/o DM type 2, HTN, HLD, GBM (diagnosed 9/19, last chemo 11/19, last radiation 12/4), recently hospitalized for new seizure and CMV, SIADH, presenting from rehab/SNF for alternate rehab placement.

## 2020-02-02 NOTE — PROGRESS NOTE ADULT - SUBJECTIVE AND OBJECTIVE BOX
[facility] Division of Hospital Medicine  Belkys Olmstead DO  Pager: [phone]  Other Times:  [phone]    Patient is a 69y old  Male who presents with a chief complaint of sent back from [facility], Hyponatremia, SIADH, Glioblastoma, (01 Feb 2020 13:05)    SUBJECTIVE / OVERNIGHT EVENTS:  Patient seen denying any complaints.     ADDITIONAL REVIEW OF SYSTEMS:    MEDICATIONS  (STANDING):  acetaminophen  IVPB .. 1000 milliGRAM(s) IV Intermittent once  atorvastatin 10 milliGRAM(s) Oral at bedtime  cyanocobalamin 500 MICROGram(s) Oral daily  dexAMETHasone     Tablet   Oral   dexAMETHasone     Tablet 4 milliGRAM(s) Oral two times a day  dextrose 5%. 1000 milliLiter(s) (50 mL/Hr) IV Continuous <Continuous>  dextrose 50% Injectable 12.5 Gram(s) IV Push once  dextrose 50% Injectable 25 Gram(s) IV Push once  dextrose 50% Injectable 25 Gram(s) IV Push once  docusate sodium 100 milliGRAM(s) Oral three times a day  insulin lispro (HumaLOG) corrective regimen sliding scale   SubCutaneous Before meals and at bedtime  insulin NPH human recombinant 5 Unit(s) SubCutaneous two times a day  levETIRAcetam 500 milliGRAM(s) Oral two times a day  melatonin 3 milliGRAM(s) Oral <User Schedule>  pantoprazole    Tablet 40 milliGRAM(s) Oral before breakfast  polyethylene glycol 3350 17 Gram(s) Oral daily  senna 2 Tablet(s) Oral at bedtime  sodium chloride 2 Gram(s) Oral three times a day    MEDICATIONS  (PRN):  dextrose 40% Gel 15 Gram(s) Oral once PRN Blood Glucose LESS THAN 70 milliGRAM(s)/deciliter  glucagon  Injectable 1 milliGRAM(s) IntraMuscular once PRN Glucose LESS THAN 70 milligrams/deciliter      CAPILLARY BLOOD GLUCOSE      POCT Blood Glucose.: 174 mg/dL (02 Feb 2020 12:29)  POCT Blood Glucose.: 133 mg/dL (02 Feb 2020 08:36)  POCT Blood Glucose.: 138 mg/dL (02 Feb 2020 06:05)  POCT Blood Glucose.: 206 mg/dL (01 Feb 2020 22:18)  POCT Blood Glucose.: 167 mg/dL (01 Feb 2020 17:36)    I&O's Summary    01 Feb 2020 07:01  -  02 Feb 2020 07:00  --------------------------------------------------------  IN: 400 mL / OUT: 0 mL / NET: 400 mL        PHYSICAL EXAM:  Vital Signs Last 24 Hrs  T(C): 36.5 (02 Feb 2020 05:50), Max: 36.8 (01 Feb 2020 22:26)  T(F): 97.7 (02 Feb 2020 05:50), Max: 98.3 (01 Feb 2020 22:26)  HR: 69 (02 Feb 2020 05:50) (69 - 81)  BP: 122/59 (02 Feb 2020 05:50) (119/60 - 126/71)  BP(mean): --  RR: 15 (02 Feb 2020 05:50) (15 - 18)  SpO2: 100% (02 Feb 2020 05:50) (100% - 100%)  CONSTITUTIONAL: NAD, cooperative with exam  RESPIRATORY: Normal respiratory effort; lungs are clear to auscultation bilaterally  CARDIOVASCULAR: Regular rate and rhythm, normal S1 and S2, no murmur/rub/gallop; No lower extremity edema; Peripheral pulses are 2+ bilaterally  ABDOMEN: Nontender to palpation, normoactive bowel sounds, no rebound/guarding; No hepatosplenomegaly  MUSCULOSKELETAL: no clubbing or cyanosis of digits; no joint swelling or tenderness to palpation  PSYCH: Alert today    LABS:                        9.1    3.96  )-----------( 52       ( 02 Feb 2020 06:19 )             27.2     02-02    140  |  104  |  21  ----------------------------<  136<H>  3.7   |  23  |  0.48<L>    Ca    8.6      02 Feb 2020 06:19  Phos  3.1     02-02  Mg     1.8     02-02    TPro  5.3<L>  /  Alb  2.9<L>  /  TBili  0.2  /  DBili  x   /  AST  16  /  ALT  29  /  AlkPhos  47  02-02                RADIOLOGY & ADDITIONAL TESTS:  Results Reviewed:   Imaging Personally Reviewed:  Electrocardiogram Personally Reviewed:    COORDINATION OF CARE:  Care Discussed with Consultants/Other Providers [Y/N]:  Prior or Outpatient Records Reviewed [Y/N]:

## 2020-02-02 NOTE — PROGRESS NOTE ADULT - ATTENDING COMMENTS
I spent 40 minutes coordinating this patient's discharge, reviewed medications.
I spent 40 minutes coordinating this patient's discharge, reviewed medications.

## 2020-02-02 NOTE — PROGRESS NOTE ADULT - PROBLEM SELECTOR PROBLEM 1
Glioblastoma multiforme

## 2020-02-02 NOTE — PROGRESS NOTE ADULT - PROBLEM SELECTOR PLAN 5
In setting of GBM - c/w Keppra 500 mg bid  - fall, seizure, aspiration precautions
In setting of GBM.  - c/w Keppra 500 mg bid  - c/w decadron 2 mg bid  - fall, seizure, aspiration precautions
In setting of GBM.  - c/w Keppra 500 mg bid  - fall, seizure, aspiration precautions
Pancytopenia for >6weeks off Temodar w/out requiring transfusional support. May be 2/2 Bactrim or Temodar vs underlying BM disease. BM biopsy was held on recent admission   - monitor daily CBC  - f/u w/ [facility] after rehab

## 2020-02-02 NOTE — PROGRESS NOTE ADULT - PROBLEM SELECTOR PROBLEM 5
Seizures
Pancytopenia
Seizures

## 2020-02-02 NOTE — PROGRESS NOTE ADULT - PROBLEM SELECTOR PROBLEM 2
Diabetes mellitus, type II
Pancytopenia

## 2020-02-02 NOTE — PROGRESS NOTE ADULT - PROBLEM SELECTOR PROBLEM 3
CMV (cytomegalovirus infection)

## 2020-02-02 NOTE — PROGRESS NOTE ADULT - PROBLEM SELECTOR PLAN 4
Improved/Resolved  - c/w Salt tabs 2g TID w/ 1.2L fluid restriction. monitor BMP
Pt w/ hyponatremia 2/2 SIADH in setting of GBM on recent admission.   Na 134 at time of discharge on 1/16  Today at 133  - c/w Salt tabs 2g TID w/ 1.2L fluid restriction
Pt w/ hyponatremia 2/2 SIADH in setting of GBM on recent admission.   Na 134 at time of discharge on 1/16  Today at 135  - c/w Salt tabs 2g TID w/ 1.2L fluid restriction. monitor BMP
Pt w/ hyponatremia 2/2 SIADH in setting of GBM on recent admission.   Na 134 at time of discharge on 1/16, now 131  - c/w Salt tabs 2g TID w/ 1.2L fluid restriction
Pt w/ hyponatremia 2/2 SIADH in setting of GBM on recent admission.   Na stable  - c/w Salt tabs 2g TID w/ 1.2L fluid restriction. monitor BMP
Pt w/ hyponatremia 2/2 SIADH in setting of GBM on recent admission. Na 134 at time of discharge on 1/16  - c/w Salt tabs 2g TID w/ 1.2L fluid restriction  - f/u am CMP
Improved/Resolved  - c/w Salt tabs 2g TID w/ 1.2L fluid restriction. monitor BMP

## 2020-02-02 NOTE — PROGRESS NOTE ADULT - PROBLEM SELECTOR PLAN 2
Improved  will continue to monitor
Pancytopenia for >6weeks off Temodar   Is not requiring transfusional support.   Heme recommended BM bx at last admission but patient refused.  Recalling heme onc
Pancytopenia for >6weeks off Temodar   Is not requiring transfusional support.   Heme recommended BM bx at last admission but patient refused.  d/w Heme - reevaluation appreciated - recommending to finish valganciclovir course and f/u with heme outpatient. If no count recovery, then will pursue BMBx outpatient.
Pancytopenia for >6weeks off Temodar   Is not requiring transfusional support.   Heme recommended BM bx at last admission but patient refused.  d/w Heme - reevaluation requested
Pancytopenia for >6weeks off Temodar   Is not requiring transfusional support.   [facility] recommended BM bx at last admission but patient refused.  [facility] reevaluation requested  CMV may be contributing, but PCR is markedly improved today
Pt w/ DM2, hyperglycemia on recent admission related to decadron  - c/w NPH 5U BID while on Decadron 2mg BID
Improved  will continue to monitor

## 2020-02-02 NOTE — PROGRESS NOTE ADULT - PROBLEM SELECTOR PROBLEM 6
Diabetes mellitus, type II
Seizures

## 2020-02-02 NOTE — PROGRESS NOTE ADULT - PROBLEM SELECTOR PROBLEM 8
Discharge planning issues

## 2020-10-16 NOTE — ED PROVIDER NOTE - NSCAREINITIATED _GEN_ER
53 yo f w hx of chronic back pain (L5/S1 disc herniation),asthma, bipolar presents to ED With concern for worsening low back pain to left lumbar area x 1 week.  Taking anti inflam without significant improvement in symptoms.  No new trauma, no loss of bowel/bladder function, no urinary retention, no saddle anesthesia or extremity weakness.  Ambulating without difficulty, non toxic in appearance.  Will treat pain and recommend prompt outpatient follow up with ortho spine as previously planned.
Silvio Cruz(Attending)

## 2021-05-02 NOTE — H&P PST ADULT - GASTROINTESTINAL
General appearance AAox3 nontoxic looking afebrile sitting in bed in NAD, breathing comfortably, speak full sentence, no tripoding  Head : NCAT, no facial swelling, no bruise, no laceration, non TTP,  EYE : b/l eyes EOMI/PERRL, no nystagmus.   Mouth : Oropharynx moist and patent, no swelling, no edema, no tongue swelling, uvula midline. no exudate, no rash, no lesion   Neck : no cervical/paracervical spine tenderness, no meningeal signs, no neck rigidity, no lymph node swelling. FROM, no body step off  chest : no bruise, no deformity, non TTP, chest expanding equally b/l, no crepitus, no swelling,   lung : CTAB no w/r/r  abdomen : soft non tender, no distended, + BS x 4 quadrant, no guarding, no CVA tenderness, no organomegally  spine :  no lumbar/paralumbar ttp. moving all extremity equally b/l, skin intact, dry not warm to touch. no bruise, no deformity, no bony step off, no erythema, no swelling, no infection. non TTP. FROM, no sensory deficit, distal pulse intact, cap refill < 2 second, able to bare weight. strength 5/5   LE :  TTP around area, skin dry not warm to touch. no bruise, no deformity, no bony step off, no infection. FROM, no sensory deficit, distal pulse intact, cap refill < 2 second, able to bare weight. ambulating steadily, strength 5/5 negative detailed exam

## 2021-05-10 NOTE — DISCHARGE NOTE PROVIDER - NSDCFUADDINST_GEN_ALL_CORE_FT
Patent NO heavy lifting, strenuous activity, twisting, bending, driving, or working until cleared by your physician.    Please make an appointment for follow up with your primary care physician after discharge.    Return to ER immediately for any of the following: fever, bleeding, new onset numbness/tingling/weakness, nausea and/or vomiting, chest pain, shortness of breath, confusion, seizure, altered mental status, urinary and/or fecal incontinence or retention.    Ok to shower on monday but please do not allow water to hit incision site directly. Gently pat dry after shower.    Call Neurosurgery Dr for appointment in 1 week for wound check and staple removal.

## 2021-06-04 ENCOUNTER — INPATIENT (INPATIENT)
Facility: HOSPITAL | Age: 71
LOS: 10 days | Discharge: HOME | End: 2021-06-15
Attending: INTERNAL MEDICINE | Admitting: INTERNAL MEDICINE
Payer: MEDICARE

## 2021-06-04 VITALS
HEIGHT: 71 IN | DIASTOLIC BLOOD PRESSURE: 71 MMHG | SYSTOLIC BLOOD PRESSURE: 127 MMHG | RESPIRATION RATE: 22 BRPM | OXYGEN SATURATION: 100 % | HEART RATE: 119 BPM | TEMPERATURE: 100 F

## 2021-06-04 DIAGNOSIS — Z98.890 OTHER SPECIFIED POSTPROCEDURAL STATES: Chronic | ICD-10-CM

## 2021-06-04 PROCEDURE — 99285 EMERGENCY DEPT VISIT HI MDM: CPT

## 2021-06-04 NOTE — ED ADULT TRIAGE NOTE - CHIEF COMPLAINT QUOTE
Pt brought in for possible aspiration. as per ems pt hx brain CA on radiation. as per ems pt has been non verbal x 3 days. pt had home visit from Speech language pathologist who told family he possibly aspirated 3 days ago and family waited to bring him in. Per EMS he was tachypneic and o2 sat  96%. Pt brought in for possible aspiration. as per ems pt hx brain CA on radiation. as per ems pt has been non verbal x 3 days. pt had home visit from Speech language pathologist who told family he possibly aspirated 3 days ago and family waited to bring him in. Per EMS he was tachypneic and o2 sat  96%.    Addendum PER Pt's daughter Hailey ([phone]) Pt is aaox1 at baseline but steadily declining over the past month.  decreased PO intake, choking on fluids, and Non-verbal status  x 3 days.

## 2021-06-05 DIAGNOSIS — B37.0 CANDIDAL STOMATITIS: ICD-10-CM

## 2021-06-05 DIAGNOSIS — I10 ESSENTIAL (PRIMARY) HYPERTENSION: ICD-10-CM

## 2021-06-05 DIAGNOSIS — R53.83 OTHER FATIGUE: ICD-10-CM

## 2021-06-05 DIAGNOSIS — J69.0 PNEUMONITIS DUE TO INHALATION OF FOOD AND VOMIT: ICD-10-CM

## 2021-06-05 DIAGNOSIS — Z29.9 ENCOUNTER FOR PROPHYLACTIC MEASURES, UNSPECIFIED: ICD-10-CM

## 2021-06-05 DIAGNOSIS — E87.0 HYPEROSMOLALITY AND HYPERNATREMIA: ICD-10-CM

## 2021-06-05 DIAGNOSIS — Z86.39 PERSONAL HISTORY OF OTHER ENDOCRINE, NUTRITIONAL AND METABOLIC DISEASE: ICD-10-CM

## 2021-06-05 DIAGNOSIS — R63.8 OTHER SYMPTOMS AND SIGNS CONCERNING FOOD AND FLUID INTAKE: ICD-10-CM

## 2021-06-05 DIAGNOSIS — C71.9 MALIGNANT NEOPLASM OF BRAIN, UNSPECIFIED: ICD-10-CM

## 2021-06-05 PROBLEM — B25.9 CYTOMEGALOVIRAL DISEASE, UNSPECIFIED: Chronic | Status: ACTIVE | Noted: 2020-01-17

## 2021-06-05 PROBLEM — E11.9 TYPE 2 DIABETES MELLITUS WITHOUT COMPLICATIONS: Chronic | Status: ACTIVE | Noted: 2020-01-17

## 2021-06-05 PROBLEM — R56.9 UNSPECIFIED CONVULSIONS: Chronic | Status: ACTIVE | Noted: 2020-01-17

## 2021-06-05 LAB
ALBUMIN SERPL ELPH-MCNC: 3.2 G/DL — LOW (ref 3.3–5)
ALBUMIN SERPL ELPH-MCNC: 3.3 G/DL — SIGNIFICANT CHANGE UP (ref 3.3–5)
ALP SERPL-CCNC: 139 U/L — HIGH (ref 40–120)
ALP SERPL-CCNC: 151 U/L — HIGH (ref 40–120)
ALT FLD-CCNC: 21 U/L — SIGNIFICANT CHANGE UP (ref 4–41)
ALT FLD-CCNC: 26 U/L — SIGNIFICANT CHANGE UP (ref 4–41)
ANION GAP SERPL CALC-SCNC: 15 MMOL/L — HIGH (ref 7–14)
ANION GAP SERPL CALC-SCNC: 15 MMOL/L — HIGH (ref 7–14)
ANION GAP SERPL CALC-SCNC: 19 MMOL/L — HIGH (ref 7–14)
ANISOCYTOSIS BLD QL: SLIGHT — SIGNIFICANT CHANGE UP
APPEARANCE UR: CLEAR — SIGNIFICANT CHANGE UP
AST SERPL-CCNC: 26 U/L — SIGNIFICANT CHANGE UP (ref 4–40)
AST SERPL-CCNC: 35 U/L — SIGNIFICANT CHANGE UP (ref 4–40)
BACTERIA # UR AUTO: NEGATIVE — SIGNIFICANT CHANGE UP
BASE EXCESS BLDV CALC-SCNC: 2.8 MMOL/L — HIGH (ref -3–2)
BASOPHILS # BLD AUTO: 0 K/UL — SIGNIFICANT CHANGE UP (ref 0–0.2)
BASOPHILS NFR BLD AUTO: 0 % — SIGNIFICANT CHANGE UP (ref 0–2)
BILIRUB SERPL-MCNC: 0.3 MG/DL — SIGNIFICANT CHANGE UP (ref 0.2–1.2)
BILIRUB SERPL-MCNC: 0.4 MG/DL — SIGNIFICANT CHANGE UP (ref 0.2–1.2)
BILIRUB UR-MCNC: NEGATIVE — SIGNIFICANT CHANGE UP
BLOOD GAS VENOUS - CREATININE: 0.9 MG/DL — SIGNIFICANT CHANGE UP (ref 0.5–1.3)
BLOOD GAS VENOUS COMPREHENSIVE RESULT: SIGNIFICANT CHANGE UP
BUN SERPL-MCNC: 24 MG/DL — HIGH (ref 7–23)
BUN SERPL-MCNC: 31 MG/DL — HIGH (ref 7–23)
BUN SERPL-MCNC: 42 MG/DL — HIGH (ref 7–23)
CALCIUM SERPL-MCNC: 8.8 MG/DL — SIGNIFICANT CHANGE UP (ref 8.4–10.5)
CALCIUM SERPL-MCNC: 9.3 MG/DL — SIGNIFICANT CHANGE UP (ref 8.4–10.5)
CALCIUM SERPL-MCNC: 9.9 MG/DL — SIGNIFICANT CHANGE UP (ref 8.4–10.5)
CHLORIDE BLDV-SCNC: 112 MMOL/L — HIGH (ref 96–108)
CHLORIDE SERPL-SCNC: 108 MMOL/L — HIGH (ref 98–107)
CHLORIDE SERPL-SCNC: 109 MMOL/L — HIGH (ref 98–107)
CHLORIDE SERPL-SCNC: 110 MMOL/L — HIGH (ref 98–107)
CO2 SERPL-SCNC: 22 MMOL/L — SIGNIFICANT CHANGE UP (ref 22–31)
CO2 SERPL-SCNC: 24 MMOL/L — SIGNIFICANT CHANGE UP (ref 22–31)
CO2 SERPL-SCNC: 25 MMOL/L — SIGNIFICANT CHANGE UP (ref 22–31)
COLOR SPEC: YELLOW — SIGNIFICANT CHANGE UP
CREAT SERPL-MCNC: 0.57 MG/DL — SIGNIFICANT CHANGE UP (ref 0.5–1.3)
CREAT SERPL-MCNC: 0.62 MG/DL — SIGNIFICANT CHANGE UP (ref 0.5–1.3)
CREAT SERPL-MCNC: 0.89 MG/DL — SIGNIFICANT CHANGE UP (ref 0.5–1.3)
DIFF PNL FLD: ABNORMAL
EOSINOPHIL # BLD AUTO: 0 K/UL — SIGNIFICANT CHANGE UP (ref 0–0.5)
EOSINOPHIL NFR BLD AUTO: 0 % — SIGNIFICANT CHANGE UP (ref 0–6)
EPI CELLS # UR: 3 /HPF — SIGNIFICANT CHANGE UP (ref 0–5)
GAS PNL BLDV: 154 MMOL/L — HIGH (ref 136–146)
GLUCOSE BLDV-MCNC: 125 MG/DL — HIGH (ref 70–99)
GLUCOSE SERPL-MCNC: 103 MG/DL — HIGH (ref 70–99)
GLUCOSE SERPL-MCNC: 119 MG/DL — HIGH (ref 70–99)
GLUCOSE SERPL-MCNC: 120 MG/DL — HIGH (ref 70–99)
GLUCOSE UR QL: NEGATIVE — SIGNIFICANT CHANGE UP
HCO3 BLDV-SCNC: 25 MMOL/L — SIGNIFICANT CHANGE UP (ref 20–27)
HCT VFR BLD CALC: 37.8 % — LOW (ref 39–50)
HCT VFR BLD CALC: 40.3 % — SIGNIFICANT CHANGE UP (ref 39–50)
HCT VFR BLDA CALC: 39.3 % — SIGNIFICANT CHANGE UP (ref 39–51)
HGB BLD CALC-MCNC: 12.8 G/DL — LOW (ref 13–17)
HGB BLD-MCNC: 11.4 G/DL — LOW (ref 13–17)
HGB BLD-MCNC: 12.4 G/DL — LOW (ref 13–17)
HYALINE CASTS # UR AUTO: 3 /LPF — SIGNIFICANT CHANGE UP (ref 0–7)
IANC: 10.44 K/UL — HIGH (ref 1.5–8.5)
KETONES UR-MCNC: ABNORMAL
LACTATE BLDV-MCNC: 1.9 MMOL/L — SIGNIFICANT CHANGE UP (ref 0.5–2)
LEUKOCYTE ESTERASE UR-ACNC: NEGATIVE — SIGNIFICANT CHANGE UP
LYMPHOCYTES # BLD AUTO: 24.1 % — SIGNIFICANT CHANGE UP (ref 13–44)
LYMPHOCYTES # BLD AUTO: 3.82 K/UL — HIGH (ref 1–3.3)
MACROCYTES BLD QL: SLIGHT — SIGNIFICANT CHANGE UP
MAGNESIUM SERPL-MCNC: 2.2 MG/DL — SIGNIFICANT CHANGE UP (ref 1.6–2.6)
MCHC RBC-ENTMCNC: 27.1 PG — SIGNIFICANT CHANGE UP (ref 27–34)
MCHC RBC-ENTMCNC: 27.4 PG — SIGNIFICANT CHANGE UP (ref 27–34)
MCHC RBC-ENTMCNC: 30.2 GM/DL — LOW (ref 32–36)
MCHC RBC-ENTMCNC: 30.8 GM/DL — LOW (ref 32–36)
MCV RBC AUTO: 89.2 FL — SIGNIFICANT CHANGE UP (ref 80–100)
MCV RBC AUTO: 89.8 FL — SIGNIFICANT CHANGE UP (ref 80–100)
MONOCYTES # BLD AUTO: 0.73 K/UL — SIGNIFICANT CHANGE UP (ref 0–0.9)
MONOCYTES NFR BLD AUTO: 4.6 % — SIGNIFICANT CHANGE UP (ref 2–14)
NEUTROPHILS # BLD AUTO: 10.84 K/UL — HIGH (ref 1.8–7.4)
NEUTROPHILS NFR BLD AUTO: 64.8 % — SIGNIFICANT CHANGE UP (ref 43–77)
NEUTS BAND # BLD: 3.7 % — SIGNIFICANT CHANGE UP (ref 0–6)
NITRITE UR-MCNC: NEGATIVE — SIGNIFICANT CHANGE UP
NRBC # BLD: 0 /100 WBCS — SIGNIFICANT CHANGE UP
NRBC # BLD: 1 /100 — HIGH (ref 0–0)
NRBC # FLD: 0 K/UL — SIGNIFICANT CHANGE UP
PCO2 BLDV: 51 MMHG — SIGNIFICANT CHANGE UP (ref 41–51)
PH BLDV: 7.36 — SIGNIFICANT CHANGE UP (ref 7.32–7.43)
PH UR: 6 — SIGNIFICANT CHANGE UP (ref 5–8)
PHOSPHATE SERPL-MCNC: 3.5 MG/DL — SIGNIFICANT CHANGE UP (ref 2.5–4.5)
PLAT MORPH BLD: NORMAL — SIGNIFICANT CHANGE UP
PLATELET # BLD AUTO: 210 K/UL — SIGNIFICANT CHANGE UP (ref 150–400)
PLATELET # BLD AUTO: 252 K/UL — SIGNIFICANT CHANGE UP (ref 150–400)
PLATELET COUNT - ESTIMATE: NORMAL — SIGNIFICANT CHANGE UP
PO2 BLDV: 34 MMHG — LOW (ref 35–40)
POLYCHROMASIA BLD QL SMEAR: SLIGHT — SIGNIFICANT CHANGE UP
POTASSIUM BLDV-SCNC: 3.4 MMOL/L — SIGNIFICANT CHANGE UP (ref 3.4–4.5)
POTASSIUM SERPL-MCNC: 3.2 MMOL/L — LOW (ref 3.5–5.3)
POTASSIUM SERPL-MCNC: 3.7 MMOL/L — SIGNIFICANT CHANGE UP (ref 3.5–5.3)
POTASSIUM SERPL-MCNC: 4 MMOL/L — SIGNIFICANT CHANGE UP (ref 3.5–5.3)
POTASSIUM SERPL-SCNC: 3.2 MMOL/L — LOW (ref 3.5–5.3)
POTASSIUM SERPL-SCNC: 3.7 MMOL/L — SIGNIFICANT CHANGE UP (ref 3.5–5.3)
POTASSIUM SERPL-SCNC: 4 MMOL/L — SIGNIFICANT CHANGE UP (ref 3.5–5.3)
PROT SERPL-MCNC: 8.1 G/DL — SIGNIFICANT CHANGE UP (ref 6–8.3)
PROT SERPL-MCNC: 9.1 G/DL — HIGH (ref 6–8.3)
PROT UR-MCNC: ABNORMAL
RBC # BLD: 4.21 M/UL — SIGNIFICANT CHANGE UP (ref 4.2–5.8)
RBC # BLD: 4.52 M/UL — SIGNIFICANT CHANGE UP (ref 4.2–5.8)
RBC # FLD: 15.7 % — HIGH (ref 10.3–14.5)
RBC # FLD: 15.8 % — HIGH (ref 10.3–14.5)
RBC BLD AUTO: NORMAL — SIGNIFICANT CHANGE UP
RBC CASTS # UR COMP ASSIST: 16 /HPF — HIGH (ref 0–4)
SAO2 % BLDV: 53.6 % — LOW (ref 60–85)
SARS-COV-2 RNA SPEC QL NAA+PROBE: SIGNIFICANT CHANGE UP
SMUDGE CELLS # BLD: PRESENT — SIGNIFICANT CHANGE UP
SODIUM SERPL-SCNC: 148 MMOL/L — HIGH (ref 135–145)
SODIUM SERPL-SCNC: 149 MMOL/L — HIGH (ref 135–145)
SODIUM SERPL-SCNC: 150 MMOL/L — HIGH (ref 135–145)
SP GR SPEC: 1.04 — HIGH (ref 1.01–1.02)
UROBILINOGEN FLD QL: SIGNIFICANT CHANGE UP
VARIANT LYMPHS # BLD: 2.8 % — SIGNIFICANT CHANGE UP (ref 0–6)
WBC # BLD: 13.96 K/UL — HIGH (ref 3.8–10.5)
WBC # BLD: 15.83 K/UL — HIGH (ref 3.8–10.5)
WBC # FLD AUTO: 13.96 K/UL — HIGH (ref 3.8–10.5)
WBC # FLD AUTO: 15.83 K/UL — HIGH (ref 3.8–10.5)
WBC UR QL: 3 /HPF — SIGNIFICANT CHANGE UP (ref 0–5)

## 2021-06-05 PROCEDURE — 70450 CT HEAD/BRAIN W/O DYE: CPT | Mod: 26

## 2021-06-05 PROCEDURE — 71045 X-RAY EXAM CHEST 1 VIEW: CPT | Mod: 26

## 2021-06-05 PROCEDURE — 99223 1ST HOSP IP/OBS HIGH 75: CPT

## 2021-06-05 RX ORDER — LEVETIRACETAM 250 MG/1
2.5 TABLET, FILM COATED ORAL
Qty: 0 | Refills: 0 | DISCHARGE

## 2021-06-05 RX ORDER — ACETAMINOPHEN 500 MG
1000 TABLET ORAL ONCE
Refills: 0 | Status: COMPLETED | OUTPATIENT
Start: 2021-06-05 | End: 2021-06-05

## 2021-06-05 RX ORDER — POTASSIUM CHLORIDE 20 MEQ
40 PACKET (EA) ORAL ONCE
Refills: 0 | Status: COMPLETED | OUTPATIENT
Start: 2021-06-05 | End: 2021-06-05

## 2021-06-05 RX ORDER — PREGABALIN 225 MG/1
1 CAPSULE ORAL
Qty: 0 | Refills: 0 | DISCHARGE

## 2021-06-05 RX ORDER — LEVETIRACETAM 250 MG/1
500 TABLET, FILM COATED ORAL EVERY 12 HOURS
Refills: 0 | Status: DISCONTINUED | OUTPATIENT
Start: 2021-06-05 | End: 2021-06-14

## 2021-06-05 RX ORDER — PANTOPRAZOLE SODIUM 20 MG/1
1 TABLET, DELAYED RELEASE ORAL
Qty: 0 | Refills: 0 | DISCHARGE

## 2021-06-05 RX ORDER — PIPERACILLIN AND TAZOBACTAM 4; .5 G/20ML; G/20ML
3.38 INJECTION, POWDER, LYOPHILIZED, FOR SOLUTION INTRAVENOUS EVERY 8 HOURS
Refills: 0 | Status: DISCONTINUED | OUTPATIENT
Start: 2021-06-05 | End: 2021-06-06

## 2021-06-05 RX ORDER — PIPERACILLIN AND TAZOBACTAM 4; .5 G/20ML; G/20ML
3.38 INJECTION, POWDER, LYOPHILIZED, FOR SOLUTION INTRAVENOUS ONCE
Refills: 0 | Status: COMPLETED | OUTPATIENT
Start: 2021-06-05 | End: 2021-06-05

## 2021-06-05 RX ORDER — AMLODIPINE BESYLATE 2.5 MG/1
5 TABLET ORAL DAILY
Refills: 0 | Status: DISCONTINUED | OUTPATIENT
Start: 2021-06-05 | End: 2021-06-15

## 2021-06-05 RX ORDER — LEVETIRACETAM 250 MG/1
500 TABLET, FILM COATED ORAL
Refills: 0 | Status: DISCONTINUED | OUTPATIENT
Start: 2021-06-05 | End: 2021-06-05

## 2021-06-05 RX ORDER — ENOXAPARIN SODIUM 100 MG/ML
40 INJECTION SUBCUTANEOUS EVERY 24 HOURS
Refills: 0 | Status: DISCONTINUED | OUTPATIENT
Start: 2021-06-05 | End: 2021-06-15

## 2021-06-05 RX ORDER — POTASSIUM CHLORIDE 20 MEQ
10 PACKET (EA) ORAL
Refills: 0 | Status: COMPLETED | OUTPATIENT
Start: 2021-06-05 | End: 2021-06-06

## 2021-06-05 RX ORDER — LOSARTAN POTASSIUM 100 MG/1
100 TABLET, FILM COATED ORAL DAILY
Refills: 0 | Status: DISCONTINUED | OUTPATIENT
Start: 2021-06-05 | End: 2021-06-15

## 2021-06-05 RX ORDER — SODIUM CHLORIDE 9 MG/ML
1000 INJECTION, SOLUTION INTRAVENOUS
Refills: 0 | Status: DISCONTINUED | OUTPATIENT
Start: 2021-06-05 | End: 2021-06-06

## 2021-06-05 RX ORDER — AMANTADINE HCL 100 MG
100 CAPSULE ORAL
Refills: 0 | Status: DISCONTINUED | OUTPATIENT
Start: 2021-06-05 | End: 2021-06-15

## 2021-06-05 RX ORDER — SODIUM CHLORIDE 9 MG/ML
1000 INJECTION, SOLUTION INTRAVENOUS ONCE
Refills: 0 | Status: COMPLETED | OUTPATIENT
Start: 2021-06-05 | End: 2021-06-05

## 2021-06-05 RX ORDER — ATORVASTATIN CALCIUM 80 MG/1
10 TABLET, FILM COATED ORAL AT BEDTIME
Refills: 0 | Status: DISCONTINUED | OUTPATIENT
Start: 2021-06-05 | End: 2021-06-15

## 2021-06-05 RX ORDER — CITALOPRAM 10 MG/1
20 TABLET, FILM COATED ORAL DAILY
Refills: 0 | Status: DISCONTINUED | OUTPATIENT
Start: 2021-06-05 | End: 2021-06-15

## 2021-06-05 RX ADMIN — PIPERACILLIN AND TAZOBACTAM 200 GRAM(S): 4; .5 INJECTION, POWDER, LYOPHILIZED, FOR SOLUTION INTRAVENOUS at 02:18

## 2021-06-05 RX ADMIN — LEVETIRACETAM 400 MILLIGRAM(S): 250 TABLET, FILM COATED ORAL at 17:41

## 2021-06-05 RX ADMIN — SODIUM CHLORIDE 1000 MILLILITER(S): 9 INJECTION, SOLUTION INTRAVENOUS at 01:25

## 2021-06-05 RX ADMIN — CITALOPRAM 20 MILLIGRAM(S): 10 TABLET, FILM COATED ORAL at 17:42

## 2021-06-05 RX ADMIN — PIPERACILLIN AND TAZOBACTAM 200 GRAM(S): 4; .5 INJECTION, POWDER, LYOPHILIZED, FOR SOLUTION INTRAVENOUS at 15:41

## 2021-06-05 RX ADMIN — AMLODIPINE BESYLATE 5 MILLIGRAM(S): 2.5 TABLET ORAL at 17:43

## 2021-06-05 RX ADMIN — SODIUM CHLORIDE 1000 MILLILITER(S): 9 INJECTION, SOLUTION INTRAVENOUS at 05:40

## 2021-06-05 RX ADMIN — Medication 100 MILLIGRAM(S): at 17:41

## 2021-06-05 RX ADMIN — ENOXAPARIN SODIUM 40 MILLIGRAM(S): 100 INJECTION SUBCUTANEOUS at 17:41

## 2021-06-05 RX ADMIN — SODIUM CHLORIDE 70 MILLILITER(S): 9 INJECTION, SOLUTION INTRAVENOUS at 15:41

## 2021-06-05 RX ADMIN — Medication 400 MILLIGRAM(S): at 01:25

## 2021-06-05 RX ADMIN — PIPERACILLIN AND TAZOBACTAM 25 GRAM(S): 4; .5 INJECTION, POWDER, LYOPHILIZED, FOR SOLUTION INTRAVENOUS at 23:11

## 2021-06-05 RX ADMIN — ATORVASTATIN CALCIUM 10 MILLIGRAM(S): 80 TABLET, FILM COATED ORAL at 23:12

## 2021-06-05 NOTE — ED PROVIDER NOTE - PMH
CMV infection    Diabetes    Glioblastoma multiforme    History of SIADH    Hyperlipidemia    Hypertension    Seizures

## 2021-06-05 NOTE — H&P ADULT - PROBLEM SELECTOR PROBLEM 7
Patient was not available for the therapy session at this time.  Reason not seen: Nursing with patient (07/30/19 4970).    Re-Attempt Plan: Will re-attempt per established treatment plan (07/30/19 6129).   Prophylactic measure Nutrition, metabolism, and development symptoms

## 2021-06-05 NOTE — H&P ADULT - PROBLEM SELECTOR PLAN 5
Hx of GBM dx in 2019. Continues with chemo q2 weeks. Unclear if disease is progressing causing lethargy/AMS/aspiration and decreased PO intake.   - If lethargy/AMS does not improve with correction of metabolic derangements, likely will obtain MR head to assess for advancing disease. Hx of SIADH. Na 150.   - Plan as stated above. Additional Anesthesia Volume In Cc: 3

## 2021-06-05 NOTE — ED PROVIDER NOTE - CLINICAL SUMMARY MEDICAL DECISION MAKING FREE TEXT BOX
71M with GBM on chemo, T2DM, HTN, HLD, seizure d/o, p/w aspiration event, concern for PNA. Pt with low grade fever (100.2 rectal), tachy, recurrent aspiration events in past. Will give fluids, abx, get labs, and CXR. Likely admit.

## 2021-06-05 NOTE — H&P ADULT - NSHPLABSRESULTS_GEN_ALL_CORE
.  LABS:                         12.4   15.83 )-----------( 252      ( 2021 01:29 )             40.3     06-05    150<H>  |  109<H>  |  42<H>  ----------------------------<  119<H>  4.0   |  22  |  0.89    Ca    9.9      2021 01:29    TPro  9.1<H>  /  Alb  3.3  /  TBili  0.3  /  DBili  x   /  AST  35  /  ALT  26  /  AlkPhos  151<H>  06-05      Urinalysis Basic - ( 2021 05:21 )    Color: Yellow / Appearance: Clear / S.036 / pH: x  Gluc: x / Ketone: Small  / Bili: Negative / Urobili: <2 mg/dL   Blood: x / Protein: Trace / Nitrite: Negative   Leuk Esterase: Negative / RBC: 16 /HPF / WBC 3 /HPF   Sq Epi: x / Non Sq Epi: 3 /HPF / Bacteria: Negative                RADIOLOGY, EKG & ADDITIONAL TESTS: Reviewed.

## 2021-06-05 NOTE — CHART NOTE - NSCHARTNOTEFT_GEN_A_CORE
K low at 3.2    06-05    148<H>  |  108<H>  |  24<H>  ----------------------------<  120<H>  3.2<L>   |  25  |  0.57    Ca    8.8      05 Jun 2021 19:45  Phos  3.5     06-05  Mg     2.2     06-05    TPro  8.1  /  Alb  3.2<L>  /  TBili  0.4  /  DBili  x   /  AST  26  /  ALT  21  /  AlkPhos  139<H>  06-05      A/P:  PO Potassium ordered but pt unable to swallow pill  KCL 10meq x 3 ordered  check K in AM

## 2021-06-05 NOTE — H&P ADULT - PROBLEM SELECTOR PLAN 2
Na 150 likely due to dehydration in the setting of poor PO intake. S/p 2L of LR in the ED. Free water deficit: 1.8L.   - Repeat BMP to assess the amount of correction and will adjust fluid intake accordingly. Na 150 likely due to dehydration in the setting of poor PO intake. S/p 2L of LR in the ED. Free water deficit: 1.8L.   - Repeat BMP to assess the amount of correction and will adjust fluid intake accordingly.      #Hx of aspiration,   - Hospitalizations for aspiration in the past now with decreased PO intake and concern for aspiration. The family is interested in PEG tube placement for nutrition. Educated the family on the continued risk of aspiration with PEG tube feeds and they are still interested.   - Will obtain palliative consult on Monday to help with GOC.

## 2021-06-05 NOTE — ED PROVIDER NOTE - PHYSICAL EXAMINATION
PHYSICAL EXAM:  GENERAL: thin cachectic male  HEAD:  Atraumatic, Normocephalic  EYES: EOMI, PERRL, conjunctiva and sclera clear  NECK: Supple  CHEST/LUNG: grossly CTAB anteriorly, mildly tachypneic   HEART: Tachy rate and rhythm; No murmurs, rubs, or gallops  ABDOMEN: Soft, non-tender, non-distended; normal bowel sounds, no organomegaly  EXTREMITIES:  1+ peripheral pulses b/l, No clubbing, cyanosis, or edema  NEUROLOGY: awake, alert, nonverbal  SKIN: No rashes or lesions

## 2021-06-05 NOTE — H&P ADULT - NSICDXFAMILYHX_GEN_ALL_CORE_FT
FAMILY HISTORY:  Father  Still living? Unknown  FH: myocardial infarction, Age at diagnosis: Age Unknown

## 2021-06-05 NOTE — H&P ADULT - ASSESSMENT
72 y/o M- non-verbal/contracted with [facility] GBM (dx 2019 on chemo q2 weeks), seizures, HTN, HLD, DM, SIADH who presents for increased lethargy.

## 2021-06-05 NOTE — H&P ADULT - PROBLEM SELECTOR PLAN 8
DVT ppx: Lovenox 40mg qd     GOC: Discussed with family. Brought up DNR/DNI. Family wants everything. Full Code.

## 2021-06-05 NOTE — ED PROVIDER NOTE - NS ED MD DISPO SPECIAL CONSIDERATION1
Assess patients skin integrity every shift. Encourage turning every two hours in bed. Keep heels elevated off bed. Use cushions in recliner and wheelchair and pillows in bed for protection. Use barrier wipes in the event of incontinence. Low Suspicion of COVID-19

## 2021-06-05 NOTE — ED ADULT NURSE NOTE - NSIMPLEMENTINTERV_GEN_ALL_ED
Implemented All Fall Risk Interventions:  [place] to call system. Call bell, personal items and telephone within reach. Instruct patient to call for assistance. Room bathroom lighting operational. Non-slip footwear when patient is off stretcher. Physically safe environment: no spills, clutter or unnecessary equipment. Stretcher in lowest position, wheels locked, appropriate side rails in place. Provide visual cue, wrist band, yellow gown, etc. Monitor gait and stability. Monitor for mental status changes and reorient to person, place, and time. Review medications for side effects contributing to fall risk. Reinforce activity limits and safety measures with patient and family.

## 2021-06-05 NOTE — ED ADULT NURSE NOTE - OBJECTIVE STATEMENT
Pt presents to room 17, coming from home by EMS, lives with daughter and wife at home, HX of Brain Ca currently on radiation, AOX1 at baseline, currently non-verbal x3days coming to ED for further evaluation of possible aspiration as per visiting speech therapist. Pt is awake at this time, +eye contact, responsive to pain stimuli, does not follow verbal commands. Pt is sinus tachy on cardiac monitor, (100's), as per ems SpO2 96% RA, currently maintaining 100% on room air at this time, tachypneic to 25'30s. Pt changed and repositioned, skin dry and intact. Febrile at this time 100.2 rectal. 20g IV established on left forearm, labs drawn and sent, medications given as ordered.

## 2021-06-05 NOTE — H&P ADULT - NSHPPHYSICALEXAM_GEN_ALL_CORE
.  VITAL SIGNS:  T(C): 36.6 (06-05-21 @ 09:57), Max: 37.9 (06-05-21 @ 01:18)  T(F): 97.9 (06-05-21 @ 09:57), Max: 100.2 (06-05-21 @ 01:18)  HR: 97 (06-05-21 @ 09:57) (93 - 119)  BP: 155/91 (06-05-21 @ 09:57) (127/71 - 169/89)  BP(mean): --  RR: 19 (06-05-21 @ 09:57) (19 - 25)  SpO2: 97% (06-05-21 @ 09:57) (96% - 100%)  Wt(kg): --    PHYSICAL EXAM:    Constitutional: WDWN resting comfortably in bed; NAD  Head: NC/AT  Eyes: PERRL, EOMI, anicteric sclera  ENT: no nasal discharge; uvula midline, no oropharyngeal erythema or exudates; MMM  Neck: supple; no JVD or thyromegaly  Respiratory: CTA B/L; no W/R/R, no retractions  Cardiac: +S1/S2; RRR; no M/R/G; PMI non-displaced  Gastrointestinal: abdomen soft, NT/ND; no rebound or guarding; +BSx4  Back: spine midline, no bony tenderness or step-offs; no CVAT B/L  Extremities: WWP, no clubbing or cyanosis; no peripheral edema  Musculoskeletal: NROM x4; no joint swelling, tenderness or erythema  Vascular: 2+ radial, femoral, DP/PT pulses B/L  Dermatologic: skin warm, dry and intact; no rashes, wounds, or scars  Lymphatic: no submandibular or cervical LAD  Neurologic: AAOx3; CNII-XII grossly intact; no focal deficits  Psychiatric: affect and characteristics of appearance, verbalizations, behaviors are appropriate

## 2021-06-05 NOTE — ED ADULT NURSE NOTE - CHIEF COMPLAINT QUOTE
Pt brought in for possible aspiration. as per ems pt hx brain CA on radiation. as per ems pt has been non verbal x 3 days. pt had home visit from Speech language pathologist who told family he possibly aspirated 3 days ago and family waited to bring him in. Per EMS he was tachypneic and o2 sat  96%.    Addendum PER Pt's daughter Hailey ([phone]) Pt is aaox1 at baseline but steadily declining over the past month.  decreased PO intake, choking on fluids, and Non-verbal status  x 3 days.

## 2021-06-05 NOTE — H&P ADULT - PROBLEM SELECTOR PLAN 4
Hx of HTN.   - C/w losartan and norvasc Hx of GBM dx in 2019. Continues with chemo q2 weeks. Unclear if disease is progressing causing lethargy/AMS/aspiration and decreased PO intake.   - If lethargy/AMS does not improve with correction of metabolic derangements, likely will obtain MR head to assess for advancing disease. Hx of GBM dx in 2019. Continues with chemo q2 weeks. Unclear if disease is progressing causing lethargy/AMS/aspiration and decreased PO intake.   - If lethargy/AMS does not improve with correction of metabolic derangements, likely will obtain MR head to assess for advancing disease.    #Seizures   - C/w keppra.  - If no improvement in mental status with improvement in metabolic derangements will consider EEG

## 2021-06-05 NOTE — ED PROVIDER NOTE - OBJECTIVE STATEMENT
72 yo M with PMH of GBM (dx  on chemo, last received 1 wk ago, gets chemo q2wks), T2Dm, HTN, HLD, seizure d/o, SIADH p/w concern for aspiration. Patient is nonverbal currently. History obtained from daughter (Hailey). States patient couldn't eat much today, he was tachycardic and lethargic. Also with difficulty breathing for about 1 week. She states he is malnourished, usually able to speak a little but not full sentences. He usually knows his name and  but has been nonverbal for the last few days due to weakness. Daughter states he has had aspiration events in the past. Most recently he was noted to be choking on foods/liquids 3-4 days ago. Family tried feeding him today but he did not want to eat. They feed him a pureed diet with thickened liquids. Pt lives with daughter and wife. They are considering a feeding tube.    Heme/onc: Dr. Lyle Krueger   Meds: Keppra, amantidine, losartan, melatonin, citalopram, amlodipine 72 yo M with PMH of GBM (dx  on chemo, last received 1 wk ago, gets chemo q2wks), T2Dm, HTN, HLD, seizure d/o, SIADH p/w concern for aspiration. Patient is nonverbal currently. History obtained from daughter (Hailey). States patient couldn't eat much today, he was tachycardic and lethargic. Also with difficulty breathing for about 1 week. She states he is malnourished, usually able to speak a little but not full sentences. He usually knows his name and  but has been nonverbal for the last few days due to weakness. Daughter states he has had aspiration events in the past. Most recently he was noted to be choking on foods/liquids 3-4 days ago. Family tried feeding him today but he did not want to eat. They feed him a pureed diet with thickened liquids. Pt lives with daughter and wife. They are considering a feeding tube.    Heme/onc: Dr. Lyle Krueger   Meds: Keppra, amantidine, losartan, melatonin, citalopram, amlodipine    Attendinyo male presents with possible aspiration.  has been tolerating less PO, is less interactive than usual.  has some chest congestion.  low grade fever here in the ED. 70 yo M with PMH of GBM (dx  on chemo, last received 1 wk ago, gets chemo q2wks), T2Dm, HTN, HLD, seizure d/o, SIADH p/w concern for aspiration. Patient is nonverbal currently. History obtained from daughter (Hailey). States patient couldn't eat much today, he was tachycardic and lethargic. Also with difficulty breathing for about 1 week. She states he is malnourished, usually able to speak a little but not full sentences. He usually knows his name and  but has been nonverbal for the last few days due to weakness. Daughter states he has had aspiration events in the past. Most recently he was noted to be choking on foods/liquids 3-4 days ago. Family tried feeding him today but he did not want to eat. They feed him a pureed diet with thickened liquids. Pt lives with daughter and wife. They are considering a feeding tube.    Heme/onc: Dr. Lyle Chavis  Meds: Keppra, amantidine, losartan, melatonin, citalopram, amlodipine    Attendinyo male presents with possible aspiration.  has been tolerating less PO, is less interactive than usual.  has some chest congestion.  low grade fever here in the ED.

## 2021-06-05 NOTE — H&P ADULT - NSICDXPASTMEDICALHX_GEN_ALL_CORE_FT
PAST MEDICAL HISTORY:  CMV infection     Diabetes     Glioblastoma multiforme     History of SIADH     Hyperlipidemia     Hypertension     Seizures

## 2021-06-05 NOTE — H&P ADULT - PROBLEM SELECTOR PLAN 7
F: S/p 2L LR - may need more, f/u BMP   E: Replete prn   N: NPO for now - pending speech and swallow

## 2021-06-05 NOTE — H&P ADULT - HISTORY OF PRESENT ILLNESS
72 y/o M- non-verbal/contracted with [facility] GBM (dx 2019 on chemo q2 weeks), seizures, HTN, HLD, DM, SIADH who presents for increased lethargy.  70 y/o M- non-verbal/contracted with [facility] GBM (dx 2019 on chemo q2 weeks), seizures, HTN, HLD, DM, SIADH who presents for increased lethargy. The history was obtained from his daughter over the phone. She reports that at baseline he is non-verbal but does phonate random non-sensical words. He is alert, but contracted with minimal mobility. He normally eats, but does have some difficulty and has been on a puree diet since his last hospitalization. He was doing well until the family started to notice decreased alertness and poor PO intake. He was also having difficulty breathing. These symptoms prompted them to take him to the hospital. They do not think he is in any pain or was experiencing any increased pain. He has not had a BM for 2-3 days. Urine was more concentrated than usual. The patient could not verbalize any symptoms due to non-verbal status, but he looked comfortable/sleepy when I attempted to speak with him.

## 2021-06-05 NOTE — H&P ADULT - PROBLEM SELECTOR PLAN 1
Family reports increasing lethargy and decreased phonation. He has had hospitalizations for aspiration in the past so they were concerned. On my exam, patient is lethargic and minimally alert. Etiology may be multifactorial and can be attributed to infection(however UA and CXR were normal) vs intracranial process (worsening GBM) vs metabolic (hyperna) vs malnutrition due to poor PO intake vs vitamin deficiency.   - Infection, may be having microaspiration causing low grade fever and respiratory distress however he is sating well on RA, lungs are clear and CXR is clear. UA is negative for infection. S/p Zosyn. - Pending procalcitonin. - Will monitor off antibiotics for now.   - F/u head CT to rule out other intracranial process, may consider MRI if no improvement in symptoms to assess for worsening disease   - Metabolic, correct Na - plan as stated below   - Malnutrition, obtain B12, folate, and iron studies, consider nutrition consult and will obtain speech and swallow consult. Family reports increasing lethargy and decreased phonation. He has had hospitalizations for aspiration in the past so they were concerned. On my exam, patient is lethargic and minimally alert. Etiology may be multifactorial and can be attributed to infection(however UA and CXR were normal) vs intracranial process (worsening GBM) vs metabolic (hyperna) vs malnutrition due to poor PO intake vs vitamin deficiency.   - Infection, may be having microaspiration causing low grade fever and respiratory distress however he is sating well on RA, lungs are clear and CXR is clear. UA is negative for infection. S/p Zosyn. Procalcitonin elevated. Will continue with zosyn for now.   - F/u head CT to rule out other intracranial process, may consider MRI if no improvement in symptoms to assess for worsening disease   - Metabolic, correct Na - plan as stated below   - Malnutrition, obtain B12, folate, and iron studies, consider nutrition consult and will obtain speech and swallow consult.    #SIRS, pt meets SIRS criteria with tachycardia, tachypnea and leukocytosis. Unclear source of infection. Likely aspiration PNA.   - C/w Zosyn for now   - F/u blood cultures.

## 2021-06-05 NOTE — H&P ADULT - PROBLEM SELECTOR PLAN 3
Hx of SIADH. Na 150.   - Plan as stated above. Recently diagnosed thrush by his son who is a physician. He has been taking nystatin PO for a few days. Potential cause for decreased PO intake. On my exam, it is difficult to look in his mouth due to non-verbal status and inability to follow commands.   - Will hold off on nystatin for now  - Reassess when patient is resuscitated.

## 2021-06-06 LAB
-  STAPHYLOCOCCUS EPIDERMIDIS: SIGNIFICANT CHANGE UP
ANION GAP SERPL CALC-SCNC: 12 MMOL/L — SIGNIFICANT CHANGE UP (ref 7–14)
BASOPHILS # BLD AUTO: 0.01 K/UL — SIGNIFICANT CHANGE UP (ref 0–0.2)
BASOPHILS NFR BLD AUTO: 0.1 % — SIGNIFICANT CHANGE UP (ref 0–2)
BUN SERPL-MCNC: 16 MG/DL — SIGNIFICANT CHANGE UP (ref 7–23)
CALCIUM SERPL-MCNC: 8 MG/DL — LOW (ref 8.4–10.5)
CHLORIDE SERPL-SCNC: 104 MMOL/L — SIGNIFICANT CHANGE UP (ref 98–107)
CO2 SERPL-SCNC: 24 MMOL/L — SIGNIFICANT CHANGE UP (ref 22–31)
COVID-19 SPIKE DOMAIN AB INTERP: POSITIVE
COVID-19 SPIKE DOMAIN ANTIBODY RESULT: >250 U/ML — HIGH
CREAT SERPL-MCNC: 0.57 MG/DL — SIGNIFICANT CHANGE UP (ref 0.5–1.3)
CULTURE RESULTS: SIGNIFICANT CHANGE UP
EOSINOPHIL # BLD AUTO: 0.17 K/UL — SIGNIFICANT CHANGE UP (ref 0–0.5)
EOSINOPHIL NFR BLD AUTO: 1.7 % — SIGNIFICANT CHANGE UP (ref 0–6)
FOLATE SERPL-MCNC: 8.8 NG/ML — SIGNIFICANT CHANGE UP (ref 3.1–17.5)
GLUCOSE SERPL-MCNC: 120 MG/DL — HIGH (ref 70–99)
GRAM STN FLD: SIGNIFICANT CHANGE UP
GRAM STN FLD: SIGNIFICANT CHANGE UP
HCT VFR BLD CALC: 35.1 % — LOW (ref 39–50)
HGB BLD-MCNC: 10.7 G/DL — LOW (ref 13–17)
IANC: 6.43 K/UL — SIGNIFICANT CHANGE UP (ref 1.5–8.5)
IMM GRANULOCYTES NFR BLD AUTO: 0.5 % — SIGNIFICANT CHANGE UP (ref 0–1.5)
LYMPHOCYTES # BLD AUTO: 2.98 K/UL — SIGNIFICANT CHANGE UP (ref 1–3.3)
LYMPHOCYTES # BLD AUTO: 29.3 % — SIGNIFICANT CHANGE UP (ref 13–44)
MAGNESIUM SERPL-MCNC: 1.9 MG/DL — SIGNIFICANT CHANGE UP (ref 1.6–2.6)
MCHC RBC-ENTMCNC: 26.9 PG — LOW (ref 27–34)
MCHC RBC-ENTMCNC: 30.5 GM/DL — LOW (ref 32–36)
MCV RBC AUTO: 88.2 FL — SIGNIFICANT CHANGE UP (ref 80–100)
METHOD TYPE: SIGNIFICANT CHANGE UP
MONOCYTES # BLD AUTO: 0.52 K/UL — SIGNIFICANT CHANGE UP (ref 0–0.9)
MONOCYTES NFR BLD AUTO: 5.1 % — SIGNIFICANT CHANGE UP (ref 2–14)
NEUTROPHILS # BLD AUTO: 6.43 K/UL — SIGNIFICANT CHANGE UP (ref 1.8–7.4)
NEUTROPHILS NFR BLD AUTO: 63.3 % — SIGNIFICANT CHANGE UP (ref 43–77)
NRBC # BLD: 0 /100 WBCS — SIGNIFICANT CHANGE UP
NRBC # FLD: 0 K/UL — SIGNIFICANT CHANGE UP
PHOSPHATE SERPL-MCNC: 2.7 MG/DL — SIGNIFICANT CHANGE UP (ref 2.5–4.5)
PLATELET # BLD AUTO: 188 K/UL — SIGNIFICANT CHANGE UP (ref 150–400)
POTASSIUM SERPL-MCNC: 3 MMOL/L — LOW (ref 3.5–5.3)
POTASSIUM SERPL-SCNC: 3 MMOL/L — LOW (ref 3.5–5.3)
RBC # BLD: 3.98 M/UL — LOW (ref 4.2–5.8)
RBC # FLD: 15.3 % — HIGH (ref 10.3–14.5)
SARS-COV-2 IGG+IGM SERPL QL IA: >250 U/ML — HIGH
SARS-COV-2 IGG+IGM SERPL QL IA: POSITIVE
SODIUM SERPL-SCNC: 140 MMOL/L — SIGNIFICANT CHANGE UP (ref 135–145)
SPECIMEN SOURCE: SIGNIFICANT CHANGE UP
SPECIMEN SOURCE: SIGNIFICANT CHANGE UP
TSH SERPL-MCNC: 3 UIU/ML — SIGNIFICANT CHANGE UP (ref 0.27–4.2)
VIT B12 SERPL-MCNC: 909 PG/ML — HIGH (ref 200–900)
WBC # BLD: 10.16 K/UL — SIGNIFICANT CHANGE UP (ref 3.8–10.5)
WBC # FLD AUTO: 10.16 K/UL — SIGNIFICANT CHANGE UP (ref 3.8–10.5)

## 2021-06-06 PROCEDURE — 99233 SBSQ HOSP IP/OBS HIGH 50: CPT

## 2021-06-06 RX ORDER — NYSTATIN 500MM UNIT
500000 POWDER (EA) MISCELLANEOUS
Refills: 0 | Status: DISCONTINUED | OUTPATIENT
Start: 2021-06-06 | End: 2021-06-15

## 2021-06-06 RX ORDER — POTASSIUM CHLORIDE 20 MEQ
10 PACKET (EA) ORAL
Refills: 0 | Status: COMPLETED | OUTPATIENT
Start: 2021-06-06 | End: 2021-06-06

## 2021-06-06 RX ORDER — VANCOMYCIN HCL 1 G
750 VIAL (EA) INTRAVENOUS EVERY 12 HOURS
Refills: 0 | Status: DISCONTINUED | OUTPATIENT
Start: 2021-06-06 | End: 2021-06-06

## 2021-06-06 RX ORDER — NYSTATIN 500MM UNIT
500000 POWDER (EA) MISCELLANEOUS
Refills: 0 | Status: DISCONTINUED | OUTPATIENT
Start: 2021-06-06 | End: 2021-06-06

## 2021-06-06 RX ORDER — VANCOMYCIN HCL 1 G
750 VIAL (EA) INTRAVENOUS EVERY 12 HOURS
Refills: 0 | Status: DISCONTINUED | OUTPATIENT
Start: 2021-06-06 | End: 2021-06-07

## 2021-06-06 RX ORDER — VANCOMYCIN HCL 1 G
VIAL (EA) INTRAVENOUS
Refills: 0 | Status: DISCONTINUED | OUTPATIENT
Start: 2021-06-06 | End: 2021-06-06

## 2021-06-06 RX ORDER — VANCOMYCIN HCL 1 G
750 VIAL (EA) INTRAVENOUS ONCE
Refills: 0 | Status: COMPLETED | OUTPATIENT
Start: 2021-06-06 | End: 2021-06-06

## 2021-06-06 RX ADMIN — LEVETIRACETAM 400 MILLIGRAM(S): 250 TABLET, FILM COATED ORAL at 18:43

## 2021-06-06 RX ADMIN — Medication 100 MILLIGRAM(S): at 06:10

## 2021-06-06 RX ADMIN — Medication 100 MILLIEQUIVALENT(S): at 02:56

## 2021-06-06 RX ADMIN — PIPERACILLIN AND TAZOBACTAM 25 GRAM(S): 4; .5 INJECTION, POWDER, LYOPHILIZED, FOR SOLUTION INTRAVENOUS at 06:09

## 2021-06-06 RX ADMIN — Medication 100 MILLIEQUIVALENT(S): at 22:19

## 2021-06-06 RX ADMIN — Medication 100 MILLIEQUIVALENT(S): at 01:00

## 2021-06-06 RX ADMIN — CITALOPRAM 20 MILLIGRAM(S): 10 TABLET, FILM COATED ORAL at 17:12

## 2021-06-06 RX ADMIN — ATORVASTATIN CALCIUM 10 MILLIGRAM(S): 80 TABLET, FILM COATED ORAL at 22:19

## 2021-06-06 RX ADMIN — AMLODIPINE BESYLATE 5 MILLIGRAM(S): 2.5 TABLET ORAL at 06:10

## 2021-06-06 RX ADMIN — ENOXAPARIN SODIUM 40 MILLIGRAM(S): 100 INJECTION SUBCUTANEOUS at 17:12

## 2021-06-06 RX ADMIN — Medication 500000 UNIT(S): at 22:19

## 2021-06-06 RX ADMIN — Medication 100 MILLIEQUIVALENT(S): at 13:34

## 2021-06-06 RX ADMIN — Medication 250 MILLIGRAM(S): at 06:40

## 2021-06-06 RX ADMIN — Medication 100 MILLIEQUIVALENT(S): at 02:00

## 2021-06-06 RX ADMIN — LOSARTAN POTASSIUM 100 MILLIGRAM(S): 100 TABLET, FILM COATED ORAL at 06:10

## 2021-06-06 RX ADMIN — Medication 100 MILLIGRAM(S): at 17:12

## 2021-06-06 RX ADMIN — Medication 250 MILLIGRAM(S): at 18:42

## 2021-06-06 RX ADMIN — LEVETIRACETAM 400 MILLIGRAM(S): 250 TABLET, FILM COATED ORAL at 06:09

## 2021-06-06 RX ADMIN — Medication 100 MILLIEQUIVALENT(S): at 17:12

## 2021-06-06 RX ADMIN — Medication 500000 UNIT(S): at 17:12

## 2021-06-06 NOTE — PROGRESS NOTE ADULT - PROBLEM SELECTOR PLAN 8
DVT ppx: Lovenox 40mg qd     GOC: Discussed with family. Brought up DNR/DNI. Family wants everything. Full Code. DVT ppx: Lovenox 40mg qd     GOC: Discussed with family. On admission, brought up DNR/DNI. Family wants everything. Full Code.    Daughter Amparo updated. Amparo requested updates until Tuesday. Starting Wednesday, son Jim should start receiving updates.

## 2021-06-06 NOTE — PROGRESS NOTE ADULT - PROBLEM SELECTOR PLAN 1
Family reports increasing lethargy and decreased phonation. He has had hospitalizations for aspiration in the past so they were concerned. On my exam, patient is lethargic and minimally alert. Etiology may be multifactorial and can be attributed to infection(however UA and CXR were normal) vs intracranial process (worsening GBM) vs metabolic (hyperna) vs malnutrition due to poor PO intake vs vitamin deficiency.   - Infection, may be having microaspiration causing low grade fever and respiratory distress however he is sating well on RA, lungs are clear and CXR is clear. UA is negative for infection. S/p Zosyn. Procalcitonin elevated. Will continue with zosyn for now.   - F/u head CT to rule out other intracranial process, may consider MRI if no improvement in symptoms to assess for worsening disease   - Metabolic, correct Na - plan as stated below   - Malnutrition, obtain B12, folate, and iron studies, consider nutrition consult and will obtain speech and swallow consult.    #SIRS, pt meets SIRS criteria with tachycardia, tachypnea and leukocytosis. Unclear source of infection. Likely aspiration PNA.   - C/w Zosyn for now   - F/u blood cultures. Family reports increasing lethargy and decreased phonation. He has had hospitalizations for aspiration in the past.   - Mental status improved this AM, appears to be back to baseline per family's description, wife reports he is improved.   - Likely 2/2 hypernatremia & poor PO intake, improved with IVF and correction of Na   - CTH w/ white matter hypoattenuation maybe be 2/2 chronic microvascular changes and/or vasogenic edema. Per discussion with daughter who is a physician- pt has not been on steroids for over 1 year, recent head imaging 1 month ago at [facility] [place], she will reach out to brother for more information.   - Given MS improvement w/ IVF, will hold off on MRI.   - No PNA seen on CXray, will DC Zosyn  - B12, TSH, Folate, iron panel all wnl   - BCx: 1/2 bottles + Staph epi, likely contaminant. Will DC Vanc and re-check BCx     #SIRS, pt meets SIRS criteria with tachycardia, tachypnea and leukocytosis, all resolved.  - Monitor off abx   - Follow up repeat BCx

## 2021-06-06 NOTE — SWALLOW BEDSIDE ASSESSMENT ADULT - SWALLOW EVAL: DIAGNOSIS
Patient presents with Oropharyngeal Dysphagia marked by reduced stripping of bolus from utensil, reduced oral containment, reduced bolus manipulation, delay in oral transit time and suspected posterior loss for nectar thick and thin liquids. Reduced oral clearance marked by mild oral stasis which was manually removed via Yankhauer suction. There was laryngeal elevation upon digital palpation with suspected delay in initiation of pharyngeal swallow for nectar thick and thin liquids. There was throat clear response subsequent nectar thick and cough response subsequent thin liquids indicative of laryngeal penetration/aspiration. No overt s/s of laryngeal penetration/aspiration for puree consistency and honey thick liquids via teaspoon

## 2021-06-06 NOTE — PROGRESS NOTE ADULT - PROBLEM SELECTOR PLAN 3
Recently diagnosed thrush by his son who is a physician. He has been taking nystatin PO for a few days. Potential cause for decreased PO intake. On my exam, it is difficult to look in his mouth due to non-verbal status and inability to follow commands.   - Will hold off on nystatin for now  - Reassess when patient is resuscitated. Per daughter, last dx with thrush >1 month ago. Oral cavity with poor dentition, will need good oral care and will re-assess.  - Will hold off on nystatin for now

## 2021-06-06 NOTE — PROGRESS NOTE ADULT - PROBLEM SELECTOR PLAN 4
Hx of GBM dx in 2019. Continues with chemo q2 weeks. Unclear if disease is progressing causing lethargy/AMS/aspiration and decreased PO intake.   - If lethargy/AMS does not improve with correction of metabolic derangements, likely will obtain MR head to assess for advancing disease.    #Seizures   - C/w keppra.  - If no improvement in mental status with improvement in metabolic derangements will consider EEG Hx of GBM dx in 2019. Continues with chemo q2 weeks. Per daughter, patient's GBM has not been progressing. Had recent imaging at [facility]  [ ] OSH imaging     #Seizures   - C/w keppra.  - If no improvement in mental status with improvement in metabolic derangements will consider EEG

## 2021-06-06 NOTE — SWALLOW BEDSIDE ASSESSMENT ADULT - PHARYNGEAL PHASE
Within functional limits Delayed pharyngeal swallow/Throat clear post oral intake Delayed pharyngeal swallow/Cough post oral intake

## 2021-06-06 NOTE — PROGRESS NOTE ADULT - ASSESSMENT
72 y/o M- non-verbal/contracted with [facility] GBM (dx 2019 on chemo q2 weeks), seizures, HTN, HLD, DM, SIADH who presents for increased lethargy.

## 2021-06-06 NOTE — PROGRESS NOTE ADULT - SUBJECTIVE AND OBJECTIVE BOX
Merlin Mathew, MD   Hospitalist  Pager #[phone]    PROGRESS NOTE:     Patient is a 71y old  Male who presents with a chief complaint of Lethargy (2021 09:47)      SUBJECTIVE / OVERNIGHT EVENTS: Overnight, BCx + Staph epi 1/2 bottles. Vancomycin was started overnight.   This AM, patient is alert and intermittently answering questions, following some commands. Answered "no" when asked about pain, discomfort.     ADDITIONAL REVIEW OF SYSTEMS:    MEDICATIONS  (STANDING):  amantadine Syrup 100 milliGRAM(s) Oral two times a day  amLODIPine   Tablet 5 milliGRAM(s) Oral daily  atorvastatin 10 milliGRAM(s) Oral at bedtime  citalopram 20 milliGRAM(s) Oral daily  enoxaparin Injectable 40 milliGRAM(s) SubCutaneous every 24 hours  levETIRAcetam  IVPB 500 milliGRAM(s) IV Intermittent every 12 hours  losartan 100 milliGRAM(s) Oral daily  nystatin    Suspension 993777 Unit(s) Oral four times a day  potassium chloride  10 mEq/100 mL IVPB 10 milliEquivalent(s) IV Intermittent every 1 hour    MEDICATIONS  (PRN):      CAPILLARY BLOOD GLUCOSE        I&O's Summary      PHYSICAL EXAM:  Vital Signs Last 24 Hrs  T(C): 36.4 (2021 06:08), Max: 37.1 (2021 17:44)  T(F): 97.6 (2021 06:08), Max: 98.8 (2021 17:44)  HR: 98 (2021 06:08) (98 - 101)  BP: 123/69 (2021 06:08) (109/64 - 152/73)  BP(mean): --  RR: 16 (2021 06:08) (16 - 16)  SpO2: 100% (2021 06:08) (99% - 100%)    CONSTITUTIONAL: NAD, well-developed male   RESPIRATORY: Normal respiratory effort; lungs are clear to auscultation bilaterally  CARDIOVASCULAR: Regular rate and rhythm, normal S1 and S2, no murmur/rub/gallop; No lower extremity edema; Peripheral pulses are 2+ bilaterally  ABDOMEN: Nontender to palpation, normoactive bowel sounds, no rebound/guarding; No hepatosplenomegaly  MUSCULOSKELETAL no clubbing or cyanosis of digits; no joint swelling or tenderness to palpation  PSYCH: Alert     LABS:                        10.7   10.16 )-----------( 188      ( 2021 11:52 )             35.1     06-06    140  |  104  |  16  ----------------------------<  120<H>  3.0<L>   |  24  |  0.57    Ca    8.0<L>      2021 11:52  Phos  2.7     06-06  Mg     1.9     06-06    TPro  x   /  Alb  2.7<L>  /  TBili  x   /  DBili  x   /  AST  x   /  ALT  x   /  AlkPhos  x   06-06          Urinalysis Basic - ( 2021 05:21 )    Color: Yellow / Appearance: Clear / S.036 / pH: x  Gluc: x / Ketone: Small  / Bili: Negative / Urobili: <2 mg/dL   Blood: x / Protein: Trace / Nitrite: Negative   Leuk Esterase: Negative / RBC: 16 /HPF / WBC 3 /HPF   Sq Epi: x / Non Sq Epi: 3 /HPF / Bacteria: Negative        Culture - Blood (collected 2021 07:02)  Source: .Blood Blood-Peripheral  Gram Stain (2021 05:13):    Growth in aerobic bottle: Gram Positive Cocci in Clusters  Preliminary Report (2021 05:14):    Growth in aerobic bottle: Gram Positive Cocci in Clusters    ***Blood Panel PCR results on this specimen are available    approximately 3 hours after the Gram stain result.***    Gram stain, PCR, and/or culture results may not always    correspond due to difference in methodologies.    ************************************************************    This PCR assay was performed by multiplex PCR. This    Assay tests for 66 bacterial and resistance gene targets.    Please refer to the [facility] test directory    at [email or web address] for details.  Organism: Blood Culture PCR (2021 07:01)  Organism: Blood Culture PCR (2021 07:01)    Culture - Blood (collected 2021 07:02)  Source: .Blood Blood-Peripheral  Gram Stain (2021 13:30):    Growth in anaerobic bottle: Gram Positive Cocci in Clusters  Preliminary Report (2021 13:30):    Growth in anaerobic bottle: Gram Positive Cocci in Clusters    Culture - Urine (collected 2021 05:05)  Source: .Urine Clean Catch (Midstream)  Final Report (2021 07:22):    <10,000 CFU/mL Normal Urogenital Katja        RADIOLOGY & ADDITIONAL TESTS:  Results Reviewed:   Imaging Personally Reviewed:  Electrocardiogram Personally Reviewed:    COORDINATION OF CARE:  Care Discussed with Consultants/Other Providers [Y/N]:  Prior or Outpatient Records Reviewed [Y/N]:   Merlin Mathew, MD   Hospitalist  Pager #[phone]    PROGRESS NOTE:     Patient is a 71y old  Male who presents with a chief complaint of Lethargy (2021 09:47)      SUBJECTIVE / OVERNIGHT EVENTS: Overnight, BCx + Staph epi 1/2 bottles. Vancomycin was started overnight.   This AM, patient is alert and intermittently answering questions, following some commands. Answered "no" when asked about pain, discomfort.     ADDITIONAL REVIEW OF SYSTEMS:    MEDICATIONS  (STANDING):  amantadine Syrup 100 milliGRAM(s) Oral two times a day  amLODIPine   Tablet 5 milliGRAM(s) Oral daily  atorvastatin 10 milliGRAM(s) Oral at bedtime  citalopram 20 milliGRAM(s) Oral daily  enoxaparin Injectable 40 milliGRAM(s) SubCutaneous every 24 hours  levETIRAcetam  IVPB 500 milliGRAM(s) IV Intermittent every 12 hours  losartan 100 milliGRAM(s) Oral daily  nystatin    Suspension [id number] Unit(s) Oral four times a day  potassium chloride  10 mEq/100 mL IVPB 10 milliEquivalent(s) IV Intermittent every 1 hour    MEDICATIONS  (PRN):      CAPILLARY BLOOD GLUCOSE        I&O's Summary      PHYSICAL EXAM:  Vital Signs Last 24 Hrs  T(C): 36.4 (2021 06:08), Max: 37.1 (2021 17:44)  T(F): 97.6 (2021 06:08), Max: 98.8 (2021 17:44)  HR: 98 (2021 06:08) (98 - 101)  BP: 123/69 (2021 06:08) (109/64 - 152/73)  BP(mean): --  RR: 16 (2021 06:08) (16 - 16)  SpO2: 100% (2021 06:08) (99% - 100%)    CONSTITUTIONAL: NAD, well-developed male   RESPIRATORY: Normal respiratory effort; lungs are clear to auscultation bilaterally  CARDIOVASCULAR: Regular rate and rhythm, normal S1 and S2, no murmur/rub/gallop; No lower extremity edema; Peripheral pulses are 2+ bilaterally  ABDOMEN: Nontender to palpation, normoactive bowel sounds, no rebound/guarding; No hepatosplenomegaly  MUSCULOSKELETAL no clubbing or cyanosis of digits; no joint swelling or tenderness to palpation  PSYCH: Alert, tracks with eyes, intermittently follows commands, answers some simple questions, speech not clear     LABS:                        10.7   10.16 )-----------( 188      ( 2021 11:52 )             35.1     06-06    140  |  104  |  16  ----------------------------<  120<H>  3.0<L>   |  24  |  0.57    Ca    8.0<L>      2021 11:52  Phos  2.7     06-  Mg     1.9     06-06    TPro  x   /  Alb  2.7<L>  /  TBili  x   /  DBili  x   /  AST  x   /  ALT  x   /  AlkPhos  x   -          Urinalysis Basic - ( 2021 05:21 )    Color: Yellow / Appearance: Clear / S.036 / pH: x  Gluc: x / Ketone: Small  / Bili: Negative / Urobili: <2 mg/dL   Blood: x / Protein: Trace / Nitrite: Negative   Leuk Esterase: Negative / RBC: 16 /HPF / WBC 3 /HPF   Sq Epi: x / Non Sq Epi: 3 /HPF / Bacteria: Negative        Culture - Blood (collected 2021 07:02)  Source: .Blood Blood-Peripheral  Gram Stain (2021 05:13):    Growth in aerobic bottle: Gram Positive Cocci in Clusters  Preliminary Report (2021 05:14):    Growth in aerobic bottle: Gram Positive Cocci in Clusters    ***Blood Panel PCR results on this specimen are available    approximately 3 hours after the Gram stain result.***    Gram stain, PCR, and/or culture results may not always    correspond due to difference in methodologies.    ************************************************************    This PCR assay was performed by multiplex PCR. This    Assay tests for 66 bacterial and resistance gene targets.    Please refer to the [facility] test directory    at [email or web address] for details.  Organism: Blood Culture PCR (2021 07:01)  Organism: Blood Culture PCR (2021 07:01)    Culture - Blood (collected 2021 07:02)  Source: .Blood Blood-Peripheral  Gram Stain (2021 13:30):    Growth in anaerobic bottle: Gram Positive Cocci in Clusters  Preliminary Report (2021 13:30):    Growth in anaerobic bottle: Gram Positive Cocci in Clusters    Culture - Urine (collected 2021 05:05)  Source: .Urine Clean Catch (Midstream)  Final Report (2021 07:22):    <10,000 CFU/mL Normal Urogenital Katja        RADIOLOGY & ADDITIONAL TESTS:  Results Reviewed:   Imaging Personally Reviewed:  Electrocardiogram Personally Reviewed:    COORDINATION OF CARE:  Care Discussed with Consultants/Other Providers [Y/N]:  Prior or Outpatient Records Reviewed [Y/N]:

## 2021-06-06 NOTE — SWALLOW BEDSIDE ASSESSMENT ADULT - COMMENTS
H&P: 72 y/o M- non-verbal/contracted with PMH GBM (dx 2019 on chemo q2 weeks), seizures, HTN, HLD, DM, SIADH who presents for increased lethargy.     CTH 6/5/21: Posttreatment changes. No acute intracranial hemorrhage or hydrocephalus. White matter hypoattenuation may be from chronic microvascular changes and/or vasogenic edema.   CXR 6/5/21: Clear lungs. No pleural effusions or pneumothorax.    Patient known to this service from previous admission. Clinical bedside swallow evaluation completed on 1/20/20 (see note for details).     Patient was seen upright at bedside with sister present. Patient in awake state with some vocalizations noted. Patient unable to follow simple directions though was orally receptive to PO trials.

## 2021-06-06 NOTE — PROGRESS NOTE ADULT - PROBLEM SELECTOR PLAN 2
Na 150 likely due to dehydration in the setting of poor PO intake. S/p 2L of LR in the ED. Free water deficit: 1.8L.   - Repeat BMP to assess the amount of correction and will adjust fluid intake accordingly.      #Hx of aspiration,   - Hospitalizations for aspiration in the past now with decreased PO intake and concern for aspiration. The family is interested in PEG tube placement for nutrition. Educated the family on the continued risk of aspiration with PEG tube feeds and they are still interested.   - Will obtain palliative consult on Monday to help with GOC. Na 150 likely due to dehydration in the setting of poor PO intake. S/p 2L of LR in the ED. Free water deficit: 1.8L.   - Na corrected 10 meq/24h w/ D5. Will D/C D5 and ctm BMP.   - Encourage PO    #Hx of aspiration,   - Hospitalizations for aspiration in the past now with decreased PO intake. Family discussing possible PEG though will be dependent on SLP evaluation. Jairon Contreras is HCP though patient's children make decisions all together.   - Family to continue discussions, can consider Palliative care c/s Monday to assist

## 2021-06-07 LAB
ANION GAP SERPL CALC-SCNC: 12 MMOL/L — SIGNIFICANT CHANGE UP (ref 7–14)
BASOPHILS # BLD AUTO: 0.01 K/UL — SIGNIFICANT CHANGE UP (ref 0–0.2)
BASOPHILS NFR BLD AUTO: 0.1 % — SIGNIFICANT CHANGE UP (ref 0–2)
BUN SERPL-MCNC: 8 MG/DL — SIGNIFICANT CHANGE UP (ref 7–23)
CALCIUM SERPL-MCNC: 8.3 MG/DL — LOW (ref 8.4–10.5)
CHLORIDE SERPL-SCNC: 103 MMOL/L — SIGNIFICANT CHANGE UP (ref 98–107)
CO2 SERPL-SCNC: 23 MMOL/L — SIGNIFICANT CHANGE UP (ref 22–31)
CREAT SERPL-MCNC: 0.47 MG/DL — LOW (ref 0.5–1.3)
CULTURE RESULTS: SIGNIFICANT CHANGE UP
CULTURE RESULTS: SIGNIFICANT CHANGE UP
EOSINOPHIL # BLD AUTO: 0.11 K/UL — SIGNIFICANT CHANGE UP (ref 0–0.5)
EOSINOPHIL NFR BLD AUTO: 1.2 % — SIGNIFICANT CHANGE UP (ref 0–6)
GLUCOSE SERPL-MCNC: 111 MG/DL — HIGH (ref 70–99)
GRAM STN FLD: SIGNIFICANT CHANGE UP
HCT VFR BLD CALC: 32.8 % — LOW (ref 39–50)
HGB BLD-MCNC: 10.1 G/DL — LOW (ref 13–17)
IANC: 5.58 K/UL — SIGNIFICANT CHANGE UP (ref 1.5–8.5)
IMM GRANULOCYTES NFR BLD AUTO: 0.4 % — SIGNIFICANT CHANGE UP (ref 0–1.5)
LYMPHOCYTES # BLD AUTO: 3.38 K/UL — HIGH (ref 1–3.3)
LYMPHOCYTES # BLD AUTO: 35.4 % — SIGNIFICANT CHANGE UP (ref 13–44)
MAGNESIUM SERPL-MCNC: 1.8 MG/DL — SIGNIFICANT CHANGE UP (ref 1.6–2.6)
MCHC RBC-ENTMCNC: 27 PG — SIGNIFICANT CHANGE UP (ref 27–34)
MCHC RBC-ENTMCNC: 30.8 GM/DL — LOW (ref 32–36)
MCV RBC AUTO: 87.7 FL — SIGNIFICANT CHANGE UP (ref 80–100)
MONOCYTES # BLD AUTO: 0.43 K/UL — SIGNIFICANT CHANGE UP (ref 0–0.9)
MONOCYTES NFR BLD AUTO: 4.5 % — SIGNIFICANT CHANGE UP (ref 2–14)
NEUTROPHILS # BLD AUTO: 5.58 K/UL — SIGNIFICANT CHANGE UP (ref 1.8–7.4)
NEUTROPHILS NFR BLD AUTO: 58.4 % — SIGNIFICANT CHANGE UP (ref 43–77)
NRBC # BLD: 0 /100 WBCS — SIGNIFICANT CHANGE UP
NRBC # FLD: 0 K/UL — SIGNIFICANT CHANGE UP
ORGANISM # SPEC MICROSCOPIC CNT: SIGNIFICANT CHANGE UP
PHOSPHATE SERPL-MCNC: 3.2 MG/DL — SIGNIFICANT CHANGE UP (ref 2.5–4.5)
PLATELET # BLD AUTO: 193 K/UL — SIGNIFICANT CHANGE UP (ref 150–400)
POTASSIUM SERPL-MCNC: 3.3 MMOL/L — LOW (ref 3.5–5.3)
POTASSIUM SERPL-SCNC: 3.3 MMOL/L — LOW (ref 3.5–5.3)
RBC # BLD: 3.74 M/UL — LOW (ref 4.2–5.8)
RBC # FLD: 15.2 % — HIGH (ref 10.3–14.5)
SODIUM SERPL-SCNC: 138 MMOL/L — SIGNIFICANT CHANGE UP (ref 135–145)
SPECIMEN SOURCE: SIGNIFICANT CHANGE UP
VANCOMYCIN TROUGH SERPL-MCNC: 6.4 UG/ML — LOW (ref 10–20)
WBC # BLD: 9.55 K/UL — SIGNIFICANT CHANGE UP (ref 3.8–10.5)
WBC # FLD AUTO: 9.55 K/UL — SIGNIFICANT CHANGE UP (ref 3.8–10.5)

## 2021-06-07 PROCEDURE — 99233 SBSQ HOSP IP/OBS HIGH 50: CPT

## 2021-06-07 PROCEDURE — 99223 1ST HOSP IP/OBS HIGH 75: CPT | Mod: GC

## 2021-06-07 RX ORDER — POTASSIUM CHLORIDE 20 MEQ
40 PACKET (EA) ORAL ONCE
Refills: 0 | Status: COMPLETED | OUTPATIENT
Start: 2021-06-07 | End: 2021-06-07

## 2021-06-07 RX ORDER — VANCOMYCIN HCL 1 G
1000 VIAL (EA) INTRAVENOUS EVERY 12 HOURS
Refills: 0 | Status: DISCONTINUED | OUTPATIENT
Start: 2021-06-07 | End: 2021-06-08

## 2021-06-07 RX ADMIN — CITALOPRAM 20 MILLIGRAM(S): 10 TABLET, FILM COATED ORAL at 11:57

## 2021-06-07 RX ADMIN — Medication 250 MILLIGRAM(S): at 06:13

## 2021-06-07 RX ADMIN — ATORVASTATIN CALCIUM 10 MILLIGRAM(S): 80 TABLET, FILM COATED ORAL at 22:20

## 2021-06-07 RX ADMIN — Medication 100 MILLIGRAM(S): at 06:13

## 2021-06-07 RX ADMIN — Medication 500000 UNIT(S): at 06:12

## 2021-06-07 RX ADMIN — Medication 100 MILLIGRAM(S): at 17:18

## 2021-06-07 RX ADMIN — Medication 40 MILLIEQUIVALENT(S): at 10:29

## 2021-06-07 RX ADMIN — LOSARTAN POTASSIUM 100 MILLIGRAM(S): 100 TABLET, FILM COATED ORAL at 06:13

## 2021-06-07 RX ADMIN — ENOXAPARIN SODIUM 40 MILLIGRAM(S): 100 INJECTION SUBCUTANEOUS at 17:17

## 2021-06-07 RX ADMIN — LEVETIRACETAM 400 MILLIGRAM(S): 250 TABLET, FILM COATED ORAL at 19:43

## 2021-06-07 RX ADMIN — Medication 500000 UNIT(S): at 23:42

## 2021-06-07 RX ADMIN — Medication 250 MILLIGRAM(S): at 22:20

## 2021-06-07 RX ADMIN — Medication 500000 UNIT(S): at 11:57

## 2021-06-07 RX ADMIN — LEVETIRACETAM 400 MILLIGRAM(S): 250 TABLET, FILM COATED ORAL at 06:13

## 2021-06-07 RX ADMIN — Medication 500000 UNIT(S): at 17:17

## 2021-06-07 RX ADMIN — AMLODIPINE BESYLATE 5 MILLIGRAM(S): 2.5 TABLET ORAL at 06:13

## 2021-06-07 NOTE — DIETITIAN INITIAL EVALUATION ADULT. - PROBLEM SELECTOR PLAN 4
Hx of GBM dx in 2019. Continues with chemo q2 weeks. Unclear if disease is progressing causing lethargy/AMS/aspiration and decreased PO intake.   - If lethargy/AMS does not improve with correction of metabolic derangements, likely will obtain MR head to assess for advancing disease.    #Seizures   - C/w keppra.  - If no improvement in mental status with improvement in metabolic derangements will consider EEG

## 2021-06-07 NOTE — PROGRESS NOTE ADULT - PROBLEM SELECTOR PLAN 8
DVT ppx: Lovenox 40mg qd     GOC: Prior team Discussed with family. On admission, brought up DNR/DNI. Family wants everything. Full Code.    Prior team updated Daughter. Amparo requested updates until Tuesday. Starting Wednesday, son Jim should start receiving updates.  - will call family later today    - Hypokalemia- replete k

## 2021-06-07 NOTE — DIETITIAN INITIAL EVALUATION ADULT. - REASON
Patient unable to participate in nutrition focused physical exam, however, with notable visual overt physical sign noted.

## 2021-06-07 NOTE — DIETITIAN NUTRITION RISK NOTIFICATION - TREATMENT: THE FOLLOWING DIET HAS BEEN RECOMMENDED
Diet, Dysphagia 1 Pureed-Honey Consistency Fluid:   Supplement Feeding Modality:  Oral  Ensure Pudding Cans or Servings Per Day:  3       Frequency:  Daily (06-07-21 @ 15:51) [Active]

## 2021-06-07 NOTE — DIETITIAN INITIAL EVALUATION ADULT. - ORAL INTAKE PTA/DIET HISTORY
Patient non-verbal and disoriented x 4. Collateral information obtained from chart review, nursing and discussion with ACP. Patient has been on Pureed diets, generally eats well however family started to notice decreased alertness and poor PO intake PTA. NKFA.

## 2021-06-07 NOTE — DIETITIAN INITIAL EVALUATION ADULT. - ORAL NUTRITION SUPPLEMENTS
1. Recommend add PO supplement Ensure Pudding 3x daily (510 kcals, 12g protein) for optimal nutrition 2. Food and nutrition department will provide Magic Cup 3x daily (870kcal, 27gm pro)

## 2021-06-07 NOTE — DIETITIAN INITIAL EVALUATION ADULT. - CHIEF COMPLAINT
72 y/o Male non-verbal/contracted with medical history of GBM (Dx 2019 on chemo), seizures, HTN, HLD, T2DM (A1c 5.6%) SIADH who presents for increased lethargy.

## 2021-06-07 NOTE — PROGRESS NOTE ADULT - SUBJECTIVE AND OBJECTIVE BOX
Patient is a 71y old  Male who presents with a chief complaint of Lethargy (06 Jun 2021 13:52)      SUBJECTIVE / OVERNIGHT EVENTS: Pt seen and examined at 12:50pm, no overnight events, this am, is alert, awake slow to respond, denies any complaints, no new issues reported.    MEDICATIONS  (STANDING):  amantadine Syrup 100 milliGRAM(s) Oral two times a day  amLODIPine   Tablet 5 milliGRAM(s) Oral daily  atorvastatin 10 milliGRAM(s) Oral at bedtime  citalopram 20 milliGRAM(s) Oral daily  enoxaparin Injectable 40 milliGRAM(s) SubCutaneous every 24 hours  levETIRAcetam  IVPB 500 milliGRAM(s) IV Intermittent every 12 hours  losartan 100 milliGRAM(s) Oral daily  nystatin    Suspension [id number] Unit(s) Oral four times a day  vancomycin  IVPB 750 milliGRAM(s) IV Intermittent every 12 hours    MEDICATIONS  (PRN):      Vital Signs Last 24 Hrs  T(C): 37.1 (07 Jun 2021 06:17), Max: 37.2 (06 Jun 2021 21:16)  T(F): 98.8 (07 Jun 2021 06:17), Max: 99 (06 Jun 2021 21:16)  HR: 96 (07 Jun 2021 06:17) (94 - 96)  BP: 136/77 (07 Jun 2021 06:17) (124/80 - 136/77)  BP(mean): --  RR: 18 (07 Jun 2021 06:17) (16 - 18)  SpO2: 100% (07 Jun 2021 06:17) (99% - 100%)  CAPILLARY BLOOD GLUCOSE        I&O's Summary    06 Jun 2021 07:01  -  07 Jun 2021 07:00  --------------------------------------------------------  IN: 60 mL / OUT: 500 mL / NET: -440 mL        PHYSICAL EXAM:  GENERAL: NAD, thinly built male  CHEST/LUNG: Clear to auscultation bilaterally; No wheeze  HEART: Regular rate and rhythm  ABDOMEN: Soft, Nontender, Nondistended  EXTREMITIES: no LE edema, LE contracted unable to extend  PSYCH: Calm  NEUROLOGY: AA, oriented to self and place, slow to respond, hypophonic  SKIN: No rashes or lesions    LABS:                        10.1   9.55  )-----------( 193      ( 07 Jun 2021 07:33 )             32.8     06-07    138  |  103  |  8   ----------------------------<  111<H>  3.3<L>   |  23  |  0.47<L>    Ca    8.3<L>      07 Jun 2021 07:33  Phos  3.2     06-07  Mg     1.8     06-07    TPro  x   /  Alb  2.7<L>  /  TBili  x   /  DBili  x   /  AST  x   /  ALT  x   /  AlkPhos  x   06-06              RADIOLOGY & ADDITIONAL TESTS:    Imaging Personally Reviewed:    Consultant(s) Notes Reviewed:      Care Discussed with Consultants/Other Providers:

## 2021-06-07 NOTE — DIETITIAN INITIAL EVALUATION ADULT. - PROBLEM SELECTOR PLAN 3
Recently diagnosed thrush by his son who is a physician. He has been taking nystatin PO for a few days. Potential cause for decreased PO intake. On my exam, it is difficult to look in his mouth due to non-verbal status and inability to follow commands.   - Will hold off on nystatin for now  - Reassess when patient is resuscitated.

## 2021-06-07 NOTE — CONSULT NOTE ADULT - ATTENDING COMMENTS
71M with hypertension, hyperlipidemia, diabetes mellitus, seizures who was diagnosed GBM when he presented with memory loss s/p RT, Temodar, steroids in the past.  Currently under care of Dr. Barrios (@[facility]) and actively on treatment.  Hx treated CMV Infection.  Multiple hospitalizations for somnolence.  Weight was 59KG 1/2020.  Currently 50kg.  BC with staph epidermitis.  Possibly procurement contaminant.  No pacemakers, no prosthetic valves or other foreign bodies.      BC (+) Staphylococcus epidermidis  - please check vancomycin trough  - f/u repeat BC  - if negative, would stop the vancomycin  - continue off zosyn    Fever  - resolved    Leukocytosis  - resolved

## 2021-06-07 NOTE — DIETITIAN INITIAL EVALUATION ADULT. - PERTINENT LABORATORY DATA
06-07 Na138 mmol/L Glu 111 mg/dL<H> K+ 3.3 mmol/L<L> Cr  0.47 mg/dL<L> BUN 8 mg/dL 06-07 Phos 3.2 mg/dL 06-06 Alb 2.7 g/dL<L>    A1C with Estimated Average Glucose Result: 5.6: High Risk (prediabetic)    5.7 - 6.4 %  Diabetic, diagnostic           > 6.5 %  ADA diabetic treatment goal    < 7.0 %  HbA1C values may not accurately reflect mean blood glucose in patients  with Hb variants.  Suggest clinical correlation. % (06.05.21 @ 10:07)

## 2021-06-07 NOTE — PROGRESS NOTE ADULT - PROBLEM SELECTOR PLAN 3
Per daughter, last dx with thrush >1 month ago. Oral cavity with poor dentition, will need good oral care and will re-assess.  - Will hold off on nystatin for now

## 2021-06-07 NOTE — DIETITIAN INITIAL EVALUATION ADULT. - OTHER INFO
Patient currently on Pureed Diet, Honey Consistency per SLP recommendations on 6/6/21. Patient noted to be eating about 50% or less of meals, and variable intake of entree 0-70%. Per RN patient had about 70% of mac and cheese but did not touch anything else on the tray. Takes >1hour to feed as patient consumes food very slowly. Aspiration Precaution maintained. No nausea/vomiting/diarrhea/constipation reported. Family interested in PEG -GOC pending per discussion with palliative care. Previous weight per HIE, 1/7/2020-65.1kg and now 50kg 6/5/21 indicative of significant weight loss over 1 year.

## 2021-06-07 NOTE — DIETITIAN INITIAL EVALUATION ADULT. - ENTERAL
Family interested in PEG placement-awaiting GOC discussion with palliative team: 1. Defer Nutrition Plan of Care to MD based on GOC discussion and decisions of Pt's family;  2. If alternate means of Nutrition to initiate, reconsult nutrition services as needed.

## 2021-06-07 NOTE — PROGRESS NOTE ADULT - PROBLEM SELECTOR PLAN 1
Family reports increasing lethargy and decreased phonation. He has had hospitalizations for aspiration in the past.   - Mental status improved this AM   - Likely 2/2 hypernatremia & poor PO intake, improved with IVF and correction of Na   - CTH w/ white matter hypoattenuation maybe be 2/2 chronic microvascular changes and/or vasogenic edema. Per discussion with daughter who is a physician- pt has not been on steroids for over 1 year, recent head imaging 1 month ago at [facility], she will reach out to brother for more information.   - Given MS improvement w/ IVF, will hold off on MRI.   - No PNA seen on CXray, will DC Zosyn  - B12, TSH, Folate, iron panel all wnl   - BCx: with staph epi, cont vanco, will get ID consult  - f/u rpt cultures    #SIRS, pt meets SIRS criteria with tachycardia, tachypnea and leukocytosis, all resolved.  - cont to monitor , cont vanco  - Follow up repeat BCx Family reports increasing lethargy and decreased phonation. He has had hospitalizations for aspiration in the past.   - Mental status improved this AM   - Likely 2/2 hypernatremia & poor PO intake, improved with IVF and correction of Na   - CTH w/ white matter hypoattenuation maybe be 2/2 chronic microvascular changes and/or vasogenic edema. Per discussion with daughter who is a physician- pt has not been on steroids for over 1 year, [place] [facility], she will reach out to brother for more information.   - Given MS improvement w/ IVF, will hold off on MRI for now, will discuss with pt's oncologist Palmira Alvarenga [phone] in am  - No PNA seen on CXray, will DC Zosyn  - B12, TSH, Folate, iron panel all wnl   - BCx: with staph epi, cont vanco, will get ID consult  - f/u rpt cultures    #SIRS, pt meets SIRS criteria with tachycardia, tachypnea and leukocytosis, all resolved.  - cont to monitor , cont vanco  - Follow up repeat BCx    -Discussed with daughter Haily , per her pt has had MRI 6 weeks ago,  unclear if pt needs another MRI, will discuss with pt's oncologist in am, updated plan of care with daughter on 6/7.

## 2021-06-07 NOTE — PROGRESS NOTE ADULT - ASSESSMENT
70 y/o M- non-verbal/contracted with [facility] GBM (dx 2019 on chemo q2 weeks), seizures, HTN, HLD, DM, SIADH who presents for increased lethargy.

## 2021-06-07 NOTE — CONSULT NOTE ADULT - ASSESSMENT
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 History was obtained from chart review. He was admitted for decreased oral intake and lethargy and found to have hypernatremia. Hospital course was complicated by staphylococcal epidermidis bacteremia. CT head without contrast showed white matter hypoattenuation maybe be 2/2 chronic microvascular changes and/or vasogenic edema. Infectious disease was consulted for further recommendations.      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 History was obtained from chart review. He was admitted for decreased oral intake and lethargy and found to have hypernatremia. Hospital course was complicated by staphylococcal epidermidis bacteremia. CT head without contrast showed white matter hypoattenuation maybe be 2/2 chronic microvascular changes and/or vasogenic edema. Infectious disease was consulted for further recommendations.      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 History was obtained from chart review. He was admitted for decreased oral intake and lethargy and found to have hypernatremia. Hospital course was complicated by staphylococcal epidermidis bacteremia. CT head without contrast showed white matter hypoattenuation maybe be 2/2 chronic microvascular changes and/or vasogenic edema. Infectious disease was consulted for further recommendations.      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 History was obtained from chart review. He was admitted for decreased oral intake and lethargy and found to have hypernatremia. Hospital course was complicated by staphylococcal epidermidis bacteremia. CT head without contrast showed white matter hypoattenuation maybe be 2/2 chronic microvascular changes and/or vasogenic edema. Infectious disease was consulted for further recommendations.      Plan:  # Staphylococcus epidermidis bacteremia  - Change intravenous vancomycin to 1 g q24 hours   - Check vancomycin trough  - IV zosyn was discontinued   - Await repeat blood cultures with sensitivities, if negative would stop vancomycin   - Monitor fever curve  - Trend CBC and CMP daily  - ID will continue to follow      Daniel Bernal MD PGY-4   Fellow, Infectious Diseases   Pager: [phone]  If no response, after 5pm and on weekends: Call [phone]

## 2021-06-07 NOTE — DIETITIAN INITIAL EVALUATION ADULT. - COLLABORATION WITH OTHER PROVIDERS
normal (ped)...
Case discussed with ACP and verbalized recommendations. Pending diet verification entered.

## 2021-06-07 NOTE — PROGRESS NOTE ADULT - PROBLEM SELECTOR PLAN 2
- likely due to dehydration in the setting of poor PO intake. S/p 2L of LR in the ED.   - Na improved, seen by swallow rec puree diet with honey thick liquids  - Encourage PO    #Hx of aspiration,   - Hospitalizations for aspiration in the past now with decreased PO intake. Family discussing possible PEG, however passed swallow eval, will discuss with family

## 2021-06-07 NOTE — DIETITIAN INITIAL EVALUATION ADULT. - NS FNS CHANGE IN WEIGHT
Transition Communication Hand-off for Care Transitions to Next Level of Care Provider    Name: Marie Kline  : 1931  MRN #: [id number]  Primary Care Provider: Piper Kiser  Primary Care MD Name: Dr. ABBY Kiser  Primary Clinic: [facility] [address] [facility] [address]  Primary Care Clinic Name: [facility]  Reason for Hospitalization:  CLL (chronic lymphocytic leukemia) (H) [C91.90]  Hypoxia [R09.02]  COPD exacerbation (H) [J44.1]  Acute kidney injury (H) [N17.9]  Admit Date/Time: 2019 12:49 PM  Discharge Date: 5/15  Payor Source: Payor: MEDICARE / Plan: MEDICARE / Product Type: Medicare /     Readmission Assessment Measure (CARLOS MANUEL) Risk Score/category: elevated         Reason for Communication Hand-off Referral: Admission diagnoses: COPD    Discharge Plan: home to [facility]    Discharge Needs Assessment:  Needs      Most Recent Value   # of Referrals Placed by CTS  Scheduled Follow-up appointments     Follow-up plan:    Future Appointments   Date Time Provider Department Center   2019 11:00 AM Piper Kiser MD [facility]       Key Recommendations:  Patient admitted with COPD exacebation and suspect viral URI hypoxemic respiratory failure. Patient being treated with oxygen, IV steriods, nebs and azithromax. CTS following for COPD and elevated CARLOS MANUEL. Patient lives independently at [facility]. She receives meal services, 20 meals/month. Patient managing her own medications. She gets her prescriptions mailed from [facility]. She reports no problems taking or getting her medications. Patient uses a cane as needed. She has portable oxygen and nebulizer equipment at home. She uses them independently as needed. Patient drives herself to appointments. Her neighbor and family are supportive. Per patient request, follow up appointment requested with Dr. Kiser.  Patient doesn't anticipate any discharge needs.    Her new meds on discharge are breo ellipta inhaler, acyclovir oinment  for her dry cracked lips and valtrex    Follow up appointment scheduled with Dr. Kiser at [facility], Monday, May 20th at 11:00am.         Vaishnavi Garcias    AVS/Discharge Summary is the source of truth; this is a helpful guide for improved communication of patient story             Loss

## 2021-06-07 NOTE — DIETITIAN INITIAL EVALUATION ADULT. - PROBLEM SELECTOR PLAN 2
Na 150 likely due to dehydration in the setting of poor PO intake. S/p 2L of LR in the ED. Free water deficit: 1.8L.   - Repeat BMP to assess the amount of correction and will adjust fluid intake accordingly.      #Hx of aspiration,   - Hospitalizations for aspiration in the past now with decreased PO intake and concern for aspiration. The family is interested in PEG tube placement for nutrition. Educated the family on the continued risk of aspiration with PEG tube feeds and they are still interested.   - Will obtain palliative consult on Monday to help with GOC.

## 2021-06-07 NOTE — CONSULT NOTE ADULT - SUBJECTIVE AND OBJECTIVE BOX
The patient is a 71 year old male who presents with a chief complaint of lethargy. (07 Jun 2021 13:08)    HPI:  The patient is a 71 year old male with past medical history of non-verbal/contracted GBM (diagnosed in 2019 on chemotherapy every 2 weeks), seizures, hypertension, hyperlipidemia, diabetes mellitus, seizures, CMV infection, and SIADH who presents for increased lethargy. History was obtained from chart review. She reports that at baseline he is non-verbal, but does phonate random non-sensical words. He is alert, but contracted with minimal mobility. He normally eats, but does have some difficulty and has been on a puree diet since his last hospitalization. He was doing well until the family started to notice decreased alertness and poor PO intake. He was also having difficulty breathing. These symptoms prompted them to take him to the hospital. They do not think he is in any pain or was experiencing any increased pain. As per the daughter, she reports decreased bowel movements for the last two to three days. Her urine was more concentrated than usual. The patient could not verbalize any symptoms due to non-verbal status at baseline. He has been off steroids for one year. He has history of CMV viremia in 2020.     CBC showed normocytic anemia. CMP showed hypokalemia with hyperglycemia and hypocalcemia. Hypoalbuminemia was also noted with procalcitonin 0.22. LFTs were within satisfactory range. Blood cultures showed staphylococcus epidermidis in aerobic and anaerobic bottles. Urine cultures were negative. Urinalysis was unremarkable. COVID-19 Luis Ab was positive. CXR showed generalized osteopenia and mild spinal degenerative change. CT head without contrast showed post-treatment changes in the bilateral occipital lobe periventricular parenchyma. Hypoattenuation of the white matter diffusely, most prominent in the right posterior periventricular region and may represent chronic microvascular changes and/or vasogenic edema. He received intravenous piperacillin-tazobactam and started on intravenous vancomycin. 4 sets of blood cultures are pending.      Infectious disease was consulted for staphylococcus epidermidis bacteremia.        prior hospital charts reviewed [x]  primary team notes reviewed [x]  other consultant notes reviewed [x]    PAST MEDICAL & SURGICAL HISTORY:  Hypertension  Hyperlipidemia  Seizures  Diabetes\  Glioblastoma multiforme  CMV infection  History of SIADH  H/O colonoscopy  History of laryngoscopy  Status post stereotactic brain biopsy        Allergies  No Known Allergies    ANTIMICROBIALS (past 90 days)  MEDICATIONS  (STANDING):    nystatin    Suspension   [id number] Unit(s) Oral (06-07-21 @ 11:57)   [id number] Unit(s) Oral (06-07-21 @ 06:12)   [id number] Unit(s) Oral (06-06-21 @ 22:19)   [id number] Unit(s) Oral (06-06-21 @ 17:12)    piperacillin/tazobactam IVPB.   200 mL/Hr IV Intermittent (06-05-21 @ 02:18)    piperacillin/tazobactam IVPB.   200 mL/Hr IV Intermittent (06-05-21 @ 15:41)    piperacillin/tazobactam IVPB..   25 mL/Hr IV Intermittent (06-06-21 @ 06:09)   25 mL/Hr IV Intermittent (06-05-21 @ 23:11)    vancomycin  IVPB   250 mL/Hr IV Intermittent (06-06-21 @ 06:40)    vancomycin  IVPB   250 mL/Hr IV Intermittent (06-07-21 @ 06:13)   250 mL/Hr IV Intermittent (06-06-21 @ 18:42)        nystatin    Suspension [id number] four times a day  vancomycin  IVPB 750 every 12 hours    OTHER MEDS: MEDICATIONS  (STANDING):  amantadine Syrup 100 two times a day  amLODIPine   Tablet 5 daily  atorvastatin 10 at bedtime  citalopram 20 daily  enoxaparin Injectable 40 every 24 hours  levETIRAcetam  IVPB 500 every 12 hours  losartan 100 daily    SOCIAL HISTORY:       FAMILY HISTORY:  FH: myocardial infarction (Father)      REVIEW OF SYSTEMS  [x] ROS unobtainable because:  non-verbal  [x] All other systems negative except as noted below:	    Constitutional:  [ ] fever [ ] chills  [ ] weight loss  [ ] weakness  Skin:  [ ] rash [ ] phlebitis	  Eyes: [ ] icterus [ ] pain  [ ] discharge	  ENMT: [ ] sore throat  [ ] thrush [ ] ulcers [ ] exudates  Respiratory: [ ] dyspnea [ ] hemoptysis [ ] cough [ ] sputum	  Cardiovascular:  [ ] chest pain [ ] palpitations [ ] edema	  Gastrointestinal:  [ ] nausea [ ] vomiting [ ] diarrhea [ ] constipation [ ] pain	  Genitourinary:  [ ] dysuria [ ] frequency [ ] hematuria [ ] discharge [ ] flank pain  [ ] incontinence  Musculoskeletal:  [ ] myalgias [ ] arthralgias [ ] arthritis  [ ] back pain  Neurological:  [ ] headache [ ] seizures  [ ] confusion/altered mental status  Psychiatric:  [ ] anxiety [ ] depression	  Hematology/Lymphatics:  [ ] lymphadenopathy  Endocrine:  [ ] adrenal [ ] thyroid  Allergic/Immunologic:	 [ ] transplant [ ] seasonal    Vital Signs Last 24 Hrs  T(F): 98 (06-07-21 @ 13:12), Max: 100.2 (06-05-21 @ 01:18)  Vital Signs Last 24 Hrs  HR: 97 (06-07-21 @ 13:12) (94 - 97)  BP: 137/73 (06-07-21 @ 13:12) (124/80 - 137/73)  RR: 18 (06-07-21 @ 06:17)  SpO2: 100% (06-07-21 @ 13:12) (99% - 100%)  Wt(kg): --    PHYSICAL EXAM:                            10.1   9.55  )-----------( 193      ( 07 Jun 2021 07:33 )             32.8   06-07    138  |  103  |  8   ----------------------------<  111<H>  3.3<L>   |  23  |  0.47<L>    Ca    8.3<L>      07 Jun 2021 07:33  Phos  3.2     06-07  Mg     1.8     06-07    TPro  x   /  Alb  2.7<L>  /  TBili  x   /  DBili  x   /  AST  x   /  ALT  x   /  AlkPhos  x   06-06    MICROBIOLOGY:  Culture - Blood (collected 05 Jun 2021 07:02)  Source: .Blood Blood-Peripheral  Gram Stain (06 Jun 2021 13:30):    Growth in anaerobic bottle: Gram Positive Cocci in Clusters  Preliminary Report (07 Jun 2021 11:06):    Growth in anaerobic bottle: Staphylococcus epidermidis    Susceptibility to follow.    Culture - Urine (collected 05 Jun 2021 05:05)  Source: .Urine Clean Catch (Midstream)  Final Report (06 Jun 2021 07:22):    <10,000 CFU/mL Normal Urogenital Katja    Culture - Blood (collected 05 Jun 2021 00:45)  Source: .Blood Blood-Peripheral  Gram Stain (06 Jun 2021 05:13):    Growth in aerobic bottle: Gram Positive Cocci in Clusters  Final Report (07 Jun 2021 11:09):    Growth in aerobic bottle: Staphylococcus epidermidis    ***Blood Panel PCR results on this specimen are available    approximately 3 hours after the Gram stain result.***    Gram stain, PCR, and/or culture results may not always    correspond due to difference in methodologies.    ************************************************************    This PCR assay was performed by multiplex PCR. This    Assay tests for 66 bacterial and resistance gene targets.    Please refer to the [facility] test directory    at [email or web address] for details.  Organism: Blood Culture PCR (07 Jun 2021 11:09)  Organism: Blood Culture PCR (07 Jun 2021 11:09)      -  Staphylococcus epidermidis: Detec      Method Type: PCR            RADIOLOGY:    r< from: CT Head No Cont (06.05.21 @ 18:06) >  FINDINGS:    Posttreatment changes in the bilateral occipital lobe periventricular parenchyma. Hypoattenuation of the white matter diffusely, most prominent in the right posterior periventricular region and may represent chronic microvascular changes and/or vasogenic edema. The ventricles are similar in size to prior. No acute intraparenchymal hemorrhage No abnormal extra-axial fluid collections are present.    Right posterior parietal teri hole. Mild inflammatory mucosal changes are seen throughout the various portions of the paranasal sinuses. Bilateral ocular lens replacement. The orbits and mastoid air cells are otherwiseunremarkable.    IMPRESSION:    Posttreatment changes. No acute intracranial hemorrhage or hydrocephalus. White matter hypoattenuation may be from chronic microvascular changes and/or vasogenic edema. Contrast-enhanced MR may be obtained for further evaluation, if there are no contraindications.      < end of copied text >    < from: Xray Chest 1 View- PORTABLE-Urgent (06.05.21 @ 01:11) >    IMPRESSION:  Clear lungs. No pleural effusions or pneumothorax.    Scant aortic arch calcifications otherwise cardiac and mediastinal silhouettes within normal limits.    Trachea midline.    Generalized osteopenia and mild spinal degenerative change.    < end of copied text >   The patient is a 71 year old male who presents with a chief complaint of lethargy. (07 Jun 2021 13:08)    HPI:  71M with hypertension, hyperlipidemia, diabetes mellitus, seizures who was diagnosed GBM when he presented with memory loss s/p RT, Temodar, steroids in the past.  not clear when last treatment was.  He goes to [facility] - last visit not clear but per chart not on corticosteroids.  He has been in and out of hospitals for somnolence over the past 1-2 years.  Treated CMV Infection. At baseline, patient is non-verbal and contracted with minimal mobility. Here again with decreased alertness and poor PO intake. He was also having difficulty breathing. These symptoms prompted them to take him to the hospital.  Here, he had a one time 100.2 and WBC was 15K.  CT brain without acute changes.  Empirically started on vancomycin and zosyn.  CXR negative.      BC (+) Staphylococcus epidermidis.  Repeat BC sent and are pending.  zosyn was discontinued.     Infectious disease was consulted for staphylococcus epidermidis bacteremia.      prior hospital charts reviewed [x]  primary team notes reviewed [x]  other consultant notes reviewed [x]    PAST MEDICAL & SURGICAL HISTORY:  Hypertension  Hyperlipidemia  Seizures  Diabetes\  Glioblastoma multiforme  CMV infection  History of SIADH  H/O colonoscopy  History of laryngoscopy  Status post stereotactic brain biopsy    Allergies  No Known Allergies    ANTIMICROBIALS:  piperacillin/tazobactam IVPB (6/5-6/6)  nystatin    Suspension [id number] four times a day  vancomycin  IVPB 750 every 12 hours (6/6-)    MEDICATIONS  (STANDING):  amantadine Syrup 100 two times a day  amLODIPine   Tablet 5 daily  atorvastatin 10 at bedtime  citalopram 20 daily  enoxaparin Injectable 40 every 24 hours  levETIRAcetam  IVPB 500 every 12 hours  losartan 100 daily    SOCIAL HISTORY:   not a smoker, no etoh; lives with his wife    FAMILY HISTORY:  FH: myocardial infarction (Father)    REVIEW OF SYSTEMS  [x] ROS unobtainable because:  non-verbal  [x] All other systems negative except as noted below:	    Constitutional:  [ ] fever [ ] chills  [ ] weight loss  [ ] weakness  Skin:  [ ] rash [ ] phlebitis	  Eyes: [ ] icterus [ ] pain  [ ] discharge	  ENMT: [ ] sore throat  [ ] thrush [ ] ulcers [ ] exudates  Respiratory: [ ] dyspnea [ ] hemoptysis [ ] cough [ ] sputum	  Cardiovascular:  [ ] chest pain [ ] palpitations [ ] edema	  Gastrointestinal:  [ ] nausea [ ] vomiting [ ] diarrhea [ ] constipation [ ] pain	  Genitourinary:  [ ] dysuria [ ] frequency [ ] hematuria [ ] discharge [ ] flank pain  [ ] incontinence  Musculoskeletal:  [ ] myalgias [ ] arthralgias [ ] arthritis  [ ] back pain  Neurological:  [ ] headache [ ] seizures  [ ] confusion/altered mental status  Psychiatric:  [ ] anxiety [ ] depression	  Hematology/Lymphatics:  [ ] lymphadenopathy  Endocrine:  [ ] adrenal [ ] thyroid  Allergic/Immunologic:	 [ ] transplant [ ] seasonal    Vital Signs Last 24 Hrs  T(F): 98 (06-07-21 @ 13:12), Max: 100.2 (06-05-21 @ 01:18)  Vital Signs Last 24 Hrs  HR: 97 (06-07-21 @ 13:12) (94 - 97)  BP: 137/73 (06-07-21 @ 13:12) (124/80 - 137/73)  RR: 18 (06-07-21 @ 06:17)  SpO2: 100% (06-07-21 @ 13:12) (99% - 100%)  Wt(kg): --    PHYSICAL EXAM:                                10.1   9.55  )-----------( 193      ( 07 Jun 2021 07:33 )             32.8 06-07    138  |  103  |  8   ----------------------------<  111  3.3   |  23  |  0.47  Ca    8.3      07 Jun 2021 07:33Phos  3.2     06-07Mg     1.8     06-07  TPro  x   /  Alb  2.7  /  TBili  x   /  DBili  x   /  AST  x   /  ALT  x   /  AlkPhos  x   06-06    Procalcitonin, Serum: 0.22 (06-05)    WBC Count: 9.55 (06-07-21 @ 07:33)  WBC Count: 10.16 (06-06-21 @ 11:52)  WBC Count: 13.96 (06-05-21 @ 10:07)  WBC Count: 15.83 (06-05-21 @ 01:29)    MICROBIOLOGY:  6/7 BC pending    6/6 BC pending    Culture - Blood (collected 05 Jun 2021 07:02)  Source: .Blood Blood-Peripheral  Gram Stain (06 Jun 2021 13:30):    Growth in anaerobic bottle: Gram Positive Cocci in Clusters  Preliminary Report (07 Jun 2021 11:06):    Growth in anaerobic bottle: Staphylococcus epidermidis    Susceptibility to follow.    Culture - Urine (collected 05 Jun 2021 05:05)  Source: .Urine Clean Catch (Midstream)  Final Report (06 Jun 2021 07:22):    <10,000 CFU/mL Normal Urogenital Katja    Culture - Blood (collected 05 Jun 2021 00:45)  Source: .Blood Blood-Peripheral  Gram Stain (06 Jun 2021 05:13):    Growth in aerobic bottle: Gram Positive Cocci in Clusters  Final Report (07 Jun 2021 11:09):    Growth in aerobic bottle: Staphylococcus epidermidis    ***Blood Panel PCR results on this specimen are available    approximately 3 hours after the Gram stain result.***    Gram stain, PCR, and/or culture results may not always    correspond due to difference in methodologies.    ************************************************************    This PCR assay was performed by multiplex PCR. This    Assay tests for 66 bacterial and resistance gene targets.    Please refer to the [facility] test directory    at [email or web address] for details.  Organism: Blood Culture PCR (07 Jun 2021 11:09)  Organism: Blood Culture PCR (07 Jun 2021 11:09)      -  Staphylococcus epidermidis: Detec      Method Type: PCR    RADIOLOGY:  below radiology personally reviewed and agree with finding    CT Head No Cont (06.05.21 @ 18:06) >  IMPRESSION:  Posttreatment changes. No acute intracranial hemorrhage or hydrocephalus. White matter hypoattenuation may be from chronic microvascular changes and/or vasogenic edema. Contrast-enhanced MR may be obtained for further evaluation, if there are no contraindications.    Xray Chest 1 View- PORTABLE-Urgent (06.05.21 @ 01:11) >  IMPRESSION:  Clear lungs. No pleural effusions or pneumothorax.  Scant aortic arch calcifications otherwise cardiac and mediastinal silhouettes within normal limits.  Trachea midline.  Generalized osteopenia and mild spinal degenerative change.   The patient is a 71 year old male who presents with a chief complaint of lethargy. (07 Jun 2021 13:08)    HPI:  71M with hypertension, hyperlipidemia, diabetes mellitus, seizures who was diagnosed GBM when he presented with memory loss s/p RT, Temodar, steroids in the past.  not clear when last treatment was.  He goes to [facility] - last visit not clear but per chart not on corticosteroids.  He has been in and out of hospitals for somnolence over the past 1-2 years.  Treated CMV Infection. At baseline, patient is non-verbal and contracted with minimal mobility. Here again with decreased alertness and poor PO intake. He was also having difficulty breathing. These symptoms prompted them to take him to the hospital.  Here, he had a one time 100.2 and WBC was 15K.  CT brain without acute changes.  Empirically started on vancomycin and zosyn.  CXR negative.      BC (+) Staphylococcus epidermidis.  Repeat BC sent and are pending.  zosyn was discontinued.     Infectious disease was consulted for staphylococcus epidermidis bacteremia.      prior hospital charts reviewed [x]  primary team notes reviewed [x]  other consultant notes reviewed [x]    PAST MEDICAL & SURGICAL HISTORY:  Hypertension  Hyperlipidemia  Seizures  Diabetes\  Glioblastoma multiforme  CMV infection  History of SIADH  H/O colonoscopy  History of laryngoscopy  Status post stereotactic brain biopsy    Allergies  No Known Allergies    ANTIMICROBIALS:  piperacillin/tazobactam IVPB (6/5-6/6)  nystatin    Suspension [id number] four times a day  vancomycin  IVPB 750 every 12 hours (6/6-)    MEDICATIONS  (STANDING):  amantadine Syrup 100 two times a day  amLODIPine   Tablet 5 daily  atorvastatin 10 at bedtime  citalopram 20 daily  enoxaparin Injectable 40 every 24 hours  levETIRAcetam  IVPB 500 every 12 hours  losartan 100 daily    SOCIAL HISTORY:  not a smoker, no etoh; lives with his wife    FAMILY HISTORY:  FH: myocardial infarction (Father)    REVIEW OF SYSTEMS  [x] ROS unobtainable because:  non-verbal  [x] All other systems negative except as noted below:	    Constitutional:  [ ] fever [ ] chills  [ ] weight loss  [ ] weakness  Skin:  [ ] rash [ ] phlebitis	  Eyes: [ ] icterus [ ] pain  [ ] discharge	  ENMT: [ ] sore throat  [ ] thrush [ ] ulcers [ ] exudates  Respiratory: [ ] dyspnea [ ] hemoptysis [ ] cough [ ] sputum	  Cardiovascular:  [ ] chest pain [ ] palpitations [ ] edema	  Gastrointestinal:  [ ] nausea [ ] vomiting [ ] diarrhea [ ] constipation [ ] pain	  Genitourinary:  [ ] dysuria [ ] frequency [ ] hematuria [ ] discharge [ ] flank pain  [ ] incontinence  Musculoskeletal:  [ ] myalgias [ ] arthralgias [ ] arthritis  [ ] back pain  Neurological:  [ ] headache [ ] seizures  [ ] confusion/altered mental status  Psychiatric:  [ ] anxiety [ ] depression	  Hematology/Lymphatics:  [ ] lymphadenopathy  Endocrine:  [ ] adrenal [ ] thyroid  Allergic/Immunologic:	 [ ] transplant [ ] seasonal    Vital Signs Last 24 Hrs  T(F): 98 (06-07-21 @ 13:12), Max: 100.2 (06-05-21 @ 01:18)  Vital Signs Last 24 Hrs  HR: 97 (06-07-21 @ 13:12) (94 - 97)  BP: 137/73 (06-07-21 @ 13:12) (124/80 - 137/73)  RR: 18 (06-07-21 @ 06:17)  SpO2: 100% (06-07-21 @ 13:12) (99% - 100%)  Wt(kg): --    PHYSICAL EXAM:  General: appears cachectic  ENT: neck appears supple  Cardiovascular: S1, S2 present, no murmurs, rubs, or gallops  Respiratory: clear breath sounds bilaterally  GI: soft, non-tender  : no suprapubic tenderness  Skin: no rashes  Neuro: awake, responds with one word answers                                10.1   9.55  )-----------( 193      ( 07 Jun 2021 07:33 )             32.8 06-07    138  |  103  |  8   ----------------------------<  111  3.3   |  23  |  0.47  Ca    8.3      07 Jun 2021 07:33Phos  3.2     06-07Mg     1.8     06-07  TPro  x   /  Alb  2.7  /  TBili  x   /  DBili  x   /  AST  x   /  ALT  x   /  AlkPhos  x   06-06    Procalcitonin, Serum: 0.22 (06-05)    WBC Count: 9.55 (06-07-21 @ 07:33)  WBC Count: 10.16 (06-06-21 @ 11:52)  WBC Count: 13.96 (06-05-21 @ 10:07)  WBC Count: 15.83 (06-05-21 @ 01:29)    MICROBIOLOGY:  6/7 BC pending    6/6 BC pending    Culture - Blood (collected 05 Jun 2021 07:02)  Source: .Blood Blood-Peripheral  Gram Stain (06 Jun 2021 13:30):    Growth in anaerobic bottle: Gram Positive Cocci in Clusters  Preliminary Report (07 Jun 2021 11:06):    Growth in anaerobic bottle: Staphylococcus epidermidis    Susceptibility to follow.    Culture - Urine (collected 05 Jun 2021 05:05)  Source: .Urine Clean Catch (Midstream)  Final Report (06 Jun 2021 07:22):    <10,000 CFU/mL Normal Urogenital Katja    Culture - Blood (collected 05 Jun 2021 00:45)  Source: .Blood Blood-Peripheral  Gram Stain (06 Jun 2021 05:13):    Growth in aerobic bottle: Gram Positive Cocci in Clusters  Final Report (07 Jun 2021 11:09):    Growth in aerobic bottle: Staphylococcus epidermidis    ***Blood Panel PCR results on this specimen are available    approximately 3 hours after the Gram stain result.***    Gram stain, PCR, and/or culture results may not always    correspond due to difference in methodologies.    ************************************************************    This PCR assay was performed by multiplex PCR. This    Assay tests for 66 bacterial and resistance gene targets.    Please refer to the [facility] test directory    at [email or web address] for details.  Organism: Blood Culture PCR (07 Jun 2021 11:09)  Organism: Blood Culture PCR (07 Jun 2021 11:09)      -  Staphylococcus epidermidis: Detec      Method Type: PCR    RADIOLOGY:  below radiology personally reviewed and agree with finding    CT Head No Cont (06.05.21 @ 18:06) >  IMPRESSION:  Posttreatment changes. No acute intracranial hemorrhage or hydrocephalus. White matter hypoattenuation may be from chronic microvascular changes and/or vasogenic edema. Contrast-enhanced MR may be obtained for further evaluation, if there are no contraindications.    Xray Chest 1 View- PORTABLE-Urgent (06.05.21 @ 01:11) >  IMPRESSION:  Clear lungs. No pleural effusions or pneumothorax.  Scant aortic arch calcifications otherwise cardiac and mediastinal silhouettes within normal limits.  Trachea midline.  Generalized osteopenia and mild spinal degenerative change.

## 2021-06-07 NOTE — PROGRESS NOTE ADULT - PROBLEM SELECTOR PLAN 4
Hx of GBM dx in 2019. Continues with chemo q2 weeks. Per daughter, patient's GBM has not been progressing. Had recent imaging at [facility]      #Seizures   - C/w keppra.  - will monitor Mental status closely

## 2021-06-08 ENCOUNTER — TRANSCRIPTION ENCOUNTER (OUTPATIENT)
Age: 71
End: 2021-06-08

## 2021-06-08 LAB
ALBUMIN SERPL ELPH-MCNC: 2.8 G/DL — LOW (ref 3.3–5)
ALP SERPL-CCNC: 107 U/L — SIGNIFICANT CHANGE UP (ref 40–120)
ALT FLD-CCNC: 14 U/L — SIGNIFICANT CHANGE UP (ref 4–41)
ANION GAP SERPL CALC-SCNC: 13 MMOL/L — SIGNIFICANT CHANGE UP (ref 7–14)
AST SERPL-CCNC: 20 U/L — SIGNIFICANT CHANGE UP (ref 4–40)
BASOPHILS # BLD AUTO: 0.01 K/UL — SIGNIFICANT CHANGE UP (ref 0–0.2)
BASOPHILS NFR BLD AUTO: 0.1 % — SIGNIFICANT CHANGE UP (ref 0–2)
BILIRUB SERPL-MCNC: 0.4 MG/DL — SIGNIFICANT CHANGE UP (ref 0.2–1.2)
BUN SERPL-MCNC: 8 MG/DL — SIGNIFICANT CHANGE UP (ref 7–23)
CALCIUM SERPL-MCNC: 8.4 MG/DL — SIGNIFICANT CHANGE UP (ref 8.4–10.5)
CHLORIDE SERPL-SCNC: 101 MMOL/L — SIGNIFICANT CHANGE UP (ref 98–107)
CO2 SERPL-SCNC: 22 MMOL/L — SIGNIFICANT CHANGE UP (ref 22–31)
CREAT SERPL-MCNC: 0.48 MG/DL — LOW (ref 0.5–1.3)
EOSINOPHIL # BLD AUTO: 0.13 K/UL — SIGNIFICANT CHANGE UP (ref 0–0.5)
EOSINOPHIL NFR BLD AUTO: 1.2 % — SIGNIFICANT CHANGE UP (ref 0–6)
GLUCOSE SERPL-MCNC: 94 MG/DL — SIGNIFICANT CHANGE UP (ref 70–99)
GRAM STN FLD: SIGNIFICANT CHANGE UP
HCT VFR BLD CALC: 31.8 % — LOW (ref 39–50)
HGB BLD-MCNC: 10.1 G/DL — LOW (ref 13–17)
IANC: 6.19 K/UL — SIGNIFICANT CHANGE UP (ref 1.5–8.5)
IMM GRANULOCYTES NFR BLD AUTO: 0.4 % — SIGNIFICANT CHANGE UP (ref 0–1.5)
LYMPHOCYTES # BLD AUTO: 3.62 K/UL — HIGH (ref 1–3.3)
LYMPHOCYTES # BLD AUTO: 34.2 % — SIGNIFICANT CHANGE UP (ref 13–44)
MAGNESIUM SERPL-MCNC: 1.7 MG/DL — SIGNIFICANT CHANGE UP (ref 1.6–2.6)
MCHC RBC-ENTMCNC: 27.3 PG — SIGNIFICANT CHANGE UP (ref 27–34)
MCHC RBC-ENTMCNC: 31.8 GM/DL — LOW (ref 32–36)
MCV RBC AUTO: 85.9 FL — SIGNIFICANT CHANGE UP (ref 80–100)
MONOCYTES # BLD AUTO: 0.58 K/UL — SIGNIFICANT CHANGE UP (ref 0–0.9)
MONOCYTES NFR BLD AUTO: 5.5 % — SIGNIFICANT CHANGE UP (ref 2–14)
NEUTROPHILS # BLD AUTO: 6.19 K/UL — SIGNIFICANT CHANGE UP (ref 1.8–7.4)
NEUTROPHILS NFR BLD AUTO: 58.6 % — SIGNIFICANT CHANGE UP (ref 43–77)
NRBC # BLD: 0 /100 WBCS — SIGNIFICANT CHANGE UP
NRBC # FLD: 0 K/UL — SIGNIFICANT CHANGE UP
PHOSPHATE SERPL-MCNC: 3 MG/DL — SIGNIFICANT CHANGE UP (ref 2.5–4.5)
PLATELET # BLD AUTO: 202 K/UL — SIGNIFICANT CHANGE UP (ref 150–400)
POTASSIUM SERPL-MCNC: 3.2 MMOL/L — LOW (ref 3.5–5.3)
POTASSIUM SERPL-SCNC: 3.2 MMOL/L — LOW (ref 3.5–5.3)
PROT SERPL-MCNC: 7.1 G/DL — SIGNIFICANT CHANGE UP (ref 6–8.3)
RBC # BLD: 3.7 M/UL — LOW (ref 4.2–5.8)
RBC # FLD: 15 % — HIGH (ref 10.3–14.5)
SODIUM SERPL-SCNC: 136 MMOL/L — SIGNIFICANT CHANGE UP (ref 135–145)
WBC # BLD: 10.57 K/UL — HIGH (ref 3.8–10.5)
WBC # FLD AUTO: 10.57 K/UL — HIGH (ref 3.8–10.5)

## 2021-06-08 PROCEDURE — 99232 SBSQ HOSP IP/OBS MODERATE 35: CPT

## 2021-06-08 PROCEDURE — 99233 SBSQ HOSP IP/OBS HIGH 50: CPT

## 2021-06-08 RX ORDER — POTASSIUM CHLORIDE 20 MEQ
40 PACKET (EA) ORAL ONCE
Refills: 0 | Status: COMPLETED | OUTPATIENT
Start: 2021-06-08 | End: 2021-06-08

## 2021-06-08 RX ADMIN — Medication 500000 UNIT(S): at 18:36

## 2021-06-08 RX ADMIN — LEVETIRACETAM 400 MILLIGRAM(S): 250 TABLET, FILM COATED ORAL at 05:35

## 2021-06-08 RX ADMIN — Medication 100 MILLIGRAM(S): at 05:35

## 2021-06-08 RX ADMIN — ATORVASTATIN CALCIUM 10 MILLIGRAM(S): 80 TABLET, FILM COATED ORAL at 22:44

## 2021-06-08 RX ADMIN — CITALOPRAM 20 MILLIGRAM(S): 10 TABLET, FILM COATED ORAL at 13:03

## 2021-06-08 RX ADMIN — AMLODIPINE BESYLATE 5 MILLIGRAM(S): 2.5 TABLET ORAL at 05:35

## 2021-06-08 RX ADMIN — ENOXAPARIN SODIUM 40 MILLIGRAM(S): 100 INJECTION SUBCUTANEOUS at 18:36

## 2021-06-08 RX ADMIN — Medication 250 MILLIGRAM(S): at 09:38

## 2021-06-08 RX ADMIN — Medication 500000 UNIT(S): at 22:45

## 2021-06-08 RX ADMIN — Medication 40 MILLIEQUIVALENT(S): at 09:38

## 2021-06-08 RX ADMIN — Medication 500000 UNIT(S): at 05:35

## 2021-06-08 RX ADMIN — LEVETIRACETAM 400 MILLIGRAM(S): 250 TABLET, FILM COATED ORAL at 18:36

## 2021-06-08 RX ADMIN — Medication 100 MILLIGRAM(S): at 18:36

## 2021-06-08 RX ADMIN — Medication 500000 UNIT(S): at 13:03

## 2021-06-08 RX ADMIN — LOSARTAN POTASSIUM 100 MILLIGRAM(S): 100 TABLET, FILM COATED ORAL at 05:35

## 2021-06-08 NOTE — GOALS OF CARE CONVERSATION - ADVANCED CARE PLANNING - CONVERSATION DETAILS
Had extensive discussion with daughter Haily ( physician) wants to be full code Had extensive discussion with daughter Amparo ( physician) wants to be full code

## 2021-06-08 NOTE — PROGRESS NOTE ADULT - PROBLEM SELECTOR PLAN 2
- likely due to dehydration in the setting of poor PO intake. S/p 2L of LR in the ED.   - Na improved, seen by swallow rec puree diet with honey thick liquids  - Encourage PO    #Hx of aspiration,   - Hospitalizations for aspiration in the past now with decreased PO intake. Family deferred PEG now as pt has  passed swallow eval and is taking po

## 2021-06-08 NOTE — PROVIDER CONTACT NOTE (CRITICAL VALUE NOTIFICATION) - SITUATION
[facility] called regarding BCX drawn on 6/7: aerobic bottle gram positive cocci in clusters.

## 2021-06-08 NOTE — PROGRESS NOTE ADULT - ASSESSMENT
72 y/o M- non-verbal/contracted with [facility] GBM (dx 2019 on chemo q2 weeks), seizures, HTN, HLD, DM, SIADH who presents for increased lethargy.

## 2021-06-08 NOTE — PROGRESS NOTE ADULT - SUBJECTIVE AND OBJECTIVE BOX
Patient is a 71y old  Male who presents with a chief complaint of Lethargy (08 Jun 2021 09:43)      SUBJECTIVE / OVERNIGHT EVENTS: Pt seen and examined at 10:50am, no overnight events, this am is alert, slow to respond, per nsg ate breakfast, no new issues reported.    MEDICATIONS  (STANDING):  amantadine Syrup 100 milliGRAM(s) Oral two times a day  amLODIPine   Tablet 5 milliGRAM(s) Oral daily  atorvastatin 10 milliGRAM(s) Oral at bedtime  citalopram 20 milliGRAM(s) Oral daily  enoxaparin Injectable 40 milliGRAM(s) SubCutaneous every 24 hours  levETIRAcetam  IVPB 500 milliGRAM(s) IV Intermittent every 12 hours  losartan 100 milliGRAM(s) Oral daily  nystatin    Suspension 665214 Unit(s) Oral four times a day    MEDICATIONS  (PRN):      Vital Signs Last 24 Hrs  T(C): 37.1 (08 Jun 2021 13:09), Max: 37.2 (08 Jun 2021 05:32)  T(F): 98.7 (08 Jun 2021 13:09), Max: 99 (08 Jun 2021 05:32)  HR: 103 (08 Jun 2021 13:09) (98 - 103)  BP: 127/74 (08 Jun 2021 13:09) (122/71 - 134/80)  BP(mean): --  RR: 18 (08 Jun 2021 13:09) (17 - 18)  SpO2: 96% (08 Jun 2021 13:09) (96% - 100%)  CAPILLARY BLOOD GLUCOSE        I&O's Summary    07 Jun 2021 07:01  -  08 Jun 2021 07:00  --------------------------------------------------------  IN: 120 mL / OUT: 0 mL / NET: 120 mL    08 Jun 2021 07:01  -  08 Jun 2021 13:36  --------------------------------------------------------  IN: 178 mL / OUT: 0 mL / NET: 178 mL        PHYSICAL EXAM:  GENERAL: NAD, thinly built male  CHEST/LUNG: Clear to auscultation bilaterally; No wheeze  HEART: Regular rate and rhythm  ABDOMEN: Soft, Nontender, Nondistended  EXTREMITIES: no LE edema, LE contracted unable to extend  PSYCH: Calm  NEUROLOGY: Awake, slow to respond  SKIN: No rashes or lesions      LABS:                        10.1   10.57 )-----------( 202      ( 08 Jun 2021 07:16 )             31.8     06-08    136  |  101  |  8   ----------------------------<  94  3.2<L>   |  22  |  0.48<L>    Ca    8.4      08 Jun 2021 07:16  Phos  3.0     06-08  Mg     1.7     06-08    TPro  7.1  /  Alb  2.8<L>  /  TBili  0.4  /  DBili  x   /  AST  20  /  ALT  14  /  AlkPhos  107  06-08              RADIOLOGY & ADDITIONAL TESTS:    Imaging Personally Reviewed:    Consultant(s) Notes Reviewed:      Care Discussed with Consultants/Other Providers:

## 2021-06-08 NOTE — PROGRESS NOTE ADULT - SUBJECTIVE AND OBJECTIVE BOX
Follow Up: Patient is a 71y old  Male who presents with a chief complaint of Lethargy (07 Jun 2021 16:02)      Interval History/ROS:  Patient unable to accurate verbalize wants and needs.     Allergies    No Known Allergies    Intolerances        ANTIMICROBIALS:  nystatin    Suspension [id number] four times a day  vancomycin  IVPB 1000 every 12 hours      OTHER MEDS:  amantadine Syrup 100 milliGRAM(s) Oral two times a day  amLODIPine   Tablet 5 milliGRAM(s) Oral daily  atorvastatin 10 milliGRAM(s) Oral at bedtime  citalopram 20 milliGRAM(s) Oral daily  enoxaparin Injectable 40 milliGRAM(s) SubCutaneous every 24 hours  levETIRAcetam  IVPB 500 milliGRAM(s) IV Intermittent every 12 hours  losartan 100 milliGRAM(s) Oral daily      Vital Signs Last 24 Hrs  T(C): 37.2 (08 Jun 2021 05:32), Max: 37.2 (08 Jun 2021 05:32)  T(F): 99 (08 Jun 2021 05:32), Max: 99 (08 Jun 2021 05:32)  HR: 100 (08 Jun 2021 05:32) (97 - 100)  BP: 134/80 (08 Jun 2021 05:32) (122/71 - 137/73)  BP(mean): --  RR: 17 (08 Jun 2021 05:32) (17 - 18)  SpO2: 98% (08 Jun 2021 05:32) (98% - 100%)    REVIEW OF SYSTEMS  [x] ROS unobtainable because: non-verbal  [x] All other systems negative except as noted below:	    Constitutional:  [ ] fever [ ] chills  [ ] weight loss  [ ] weakness  Skin:  [ ] rash [ ] phlebitis	  Eyes: [ ] icterus [ ] pain  [ ] discharge	  ENMT: [ ] sore throat  [ ] thrush [ ] ulcers [ ] exudates  Respiratory: [ ] dyspnea [ ] hemoptysis [ ] cough [ ] sputum	  Cardiovascular:  [ ] chest pain [ ] palpitations [ ] edema	  Gastrointestinal:  [ ] nausea [ ] vomiting [ ] diarrhea [ ] constipation [ ] pain	  Genitourinary:  [ ] dysuria [ ] frequency [ ] hematuria [ ] discharge [ ] flank pain  [ ] incontinence  Musculoskeletal:  [ ] myalgias [ ] arthralgias [ ] arthritis  [ ] back pain  Neurological:  [ ] headache [ ] seizures  [ ] confusion/altered mental status  Psychiatric:  [ ] anxiety [ ] depression	  Endocrine:  [ ] adrenal [ ] thyroid  Allergic/Immunologic:	 [ ] transplant [ ] seasonal    EXAM:  General: not in acute distress  ENT: neck supples  Cardiovascular: S1, S2 present  Respiratory: clear breath sounds bilaterally  GI: soft, non-tender  Skin: no rashes  Neuro: awake and alert, responding to commands                         10.1   10.57 )-----------( 202      ( 08 Jun 2021 07:16 )             31.8       06-08    136  |  101  |  8   ----------------------------<  94  3.2<L>   |  22  |  0.48<L>    Ca    8.4      08 Jun 2021 07:16  Phos  3.0     06-08  Mg     1.7     06-08    TPro  7.1  /  Alb  2.8<L>  /  TBili  0.4  /  DBili  x   /  AST  20  /  ALT  14  /  AlkPhos  107  06-08          MICROBIOLOGY:    Culture Results:   No growth to date. (06-06 @ 18:21)  Culture Results:   No growth to date. (06-06 @ 18:21)  Culture Results:   Growth in anaerobic bottle: Staphylococcus epidermidis  Susceptibility to follow. (06-05 @ 07:02)  Culture Results:   Growth in aerobic bottle: Staphylococcus epidermidis  ***Blood Panel PCR results on this specimen are available  approximately 3 hours after the Gram stain result.***  Gram stain, PCR, and/or culture results may not always  correspond due to difference in methodologies.  ************************************************************  This PCR assay was performed by multiplex PCR. This  Assay tests for 66 bacterial and resistance gene targets.  Please refer to the [facility] test directory  at [email or web address] for details.  Growth in anaerobic bottle: Gram Positive Cocci in Clusters (06-05 @ 07:02)  Culture Results:   <10,000 CFU/mL Normal Urogenital Katja (06-05 @ 05:05)      RADIOLOGY:    < from: CT Head No Cont (06.05.21 @ 18:06) >  FINDINGS:    Posttreatment changes in the bilateral occipital lobe periventricular parenchyma. Hypoattenuation of the white matter diffusely, most prominent in the right posterior periventricular region and may represent chronic microvascular changes and/or vasogenic edema. The ventricles are similar in size to prior. No acute intraparenchymal hemorrhage No abnormal extra-axial fluid collections are present.    Right posterior parietal teri hole. Mild inflammatory mucosal changes are seen throughout the various portions of the paranasal sinuses. Bilateral ocular lens replacement. The orbits and mastoid air cells are otherwiseunremarkable.    IMPRESSION:    Posttreatment changes. No acute intracranial hemorrhage or hydrocephalus. White matter hypoattenuation may be from chronic microvascular changes and/or vasogenic edema. Contrast-enhanced MR may be obtained for further evaluation, if there are no contraindications.    < end of copied text >    < from: Xray Chest 1 View- PORTABLE-Urgent (06.05.21 @ 01:11) >  IMPRESSION:  Clear lungs. No pleural effusions or pneumothorax.    Scant aortic arch calcifications otherwise cardiac and mediastinal silhouettes within normal limits.    Trachea midline.    Generalized osteopenia and mild spinal degenerative change.    < end of copied text >     Patient is a 71y old  Male who presents with a chief complaint of Lethargy (07 Jun 2021 16:02)    f/u bacteremia    Interval History/ROS:  no fever.  ROS unable as he is non-verbal (at baseline) though he is more awake.      PAST MEDICAL & SURGICAL HISTORY:  Hypertension  Hyperlipidemia  Seizures  Diabetes\  Glioblastoma multiforme  CMV infection  History of SIADH  H/O colonoscopy  History of laryngoscopy  Status post stereotactic brain biopsy    Allergies  No Known Allergies    ANTIMICROBIALS:  piperacillin/tazobactam IVPB (6/5-6/6)    active:  nystatin    Suspension [id number] four times a day  vancomycin  IVPB 1000 every 12 hours (6/6-)    MEDICATIONS  (STANDING):  amantadine Syrup 100 two times a day  amLODIPine   Tablet 5 daily  atorvastatin 10 at bedtime  citalopram 20 daily  enoxaparin Injectable 40 every 24 hours  levETIRAcetam  IVPB 500 every 12 hours  losartan 100 daily    Vital Signs Last 24 Hrs  T(F): 99 (06-08-21 @ 05:32), Max: 99 (06-08-21 @ 05:32)  HR: 100 (06-08-21 @ 05:32)  BP: 134/80 (06-08-21 @ 05:32)  RR: 17 (06-08-21 @ 05:32)  SpO2: 98% (06-08-21 @ 05:32) (98% - 100%)  Wt(kg): --Wt(kg): --    PHYSICAL EXAM:  General: cachectic  Head/Eyes:  no icterus  ENT: neck appears supple  Cardiovascular: S1, S  Respiratory: clear breath sounds bilaterally  GI: soft, non-tender  : no suprapubic tenderness  Skin: no rashes  Musculoskeletal:  contracted  Neuro: awake, responds with one word answers  Skin:  no rash                                 10.1   10.57 )-----------( 202      ( 08 Jun 2021 07:16 )             31.8 06-08    136  |  101  |  8   ----------------------------<  94  3.2   |  22  |  0.48  Ca    8.4      08 Jun 2021 07:16Phos  3.0     06-08Mg     1.7     06-08  TPro  7.1  /  Alb  2.8  /  TBili  0.4  /  DBili  x   /  AST  20  /  ALT  14  /  AlkPhos  107  06-08    WBC Count: 10.57 (06-08-21 @ 07:16)  WBC Count: 9.55 (06-07-21 @ 07:33)  WBC Count: 10.16 (06-06-21 @ 11:52)  WBC Count: 13.96 (06-05-21 @ 10:07)  WBC Count: 15.83 (06-05-21 @ 01:29)    MICROBIOLOGY:  6/7 BC pending    Culture - Blood (collected 06-06-21 @ 18:21)  Source: .Blood Blood-Venous  Preliminary Report (06-07-21 @ 19:01):    No growth to date.    Culture - Blood (collected 06-06-21 @ 18:21)  Source: .Blood Blood-Peripheral  Preliminary Report (06-07-21 @ 19:01):    No growth to date.    Culture - Blood (collected 05 Jun 2021 07:02)  Source: .Blood Blood-Peripheral  Gram Stain (06 Jun 2021 13:30):    Growth in anaerobic bottle: Gram Positive Cocci in Clusters  Preliminary Report (07 Jun 2021 11:06):    Growth in anaerobic bottle: Staphylococcus epidermidis    Susceptibility to follow.    Culture - Urine (collected 05 Jun 2021 05:05)  Source: .Urine Clean Catch (Midstream)  Final Report (06 Jun 2021 07:22):    <10,000 CFU/mL Normal Urogenital Katja    Culture - Blood (collected 05 Jun 2021 00:45)  Source: .Blood Blood-Peripheral  Gram Stain (06 Jun 2021 05:13):    Growth in aerobic bottle: Gram Positive Cocci in Clusters  Final Report (07 Jun 2021 11:09):    Growth in aerobic bottle: Staphylococcus epidermidis      -  Staphylococcus epidermidis: Detec      Method Type: PCR    RADIOLOGY:  below radiology personally reviewed and agree with finding    CT Head No Cont (06.05.21 @ 18:06) >  IMPRESSION:  Posttreatment changes. No acute intracranial hemorrhage or hydrocephalus. White matter hypoattenuation may be from chronic microvascular changes and/or vasogenic edema. Contrast-enhanced MR may be obtained for further evaluation, if there are no contraindications.    Xray Chest 1 View- PORTABLE-Urgent (06.05.21 @ 01:11) >  IMPRESSION:  Clear lungs. No pleural effusions or pneumothorax.  Scant aortic arch calcifications otherwise cardiac and mediastinal silhouettes within normal limits.  Trachea midline.  Generalized osteopenia and mild spinal degenerative change.

## 2021-06-08 NOTE — DISCHARGE NOTE PROVIDER - NSDCCPCAREPLAN_GEN_ALL_CORE_FT
PRINCIPAL DISCHARGE DIAGNOSIS  Diagnosis: Lethargy  Assessment and Plan of Treatment: Likely secondary to elevated sodium levels. You were given fluids and sodium level improved. You underewnt MRI brain which showeed stable glioblastoma, you were started on steroids per Oncology. You were also evaluated by infectious disease for possible infection, you recieved antibiotics which were then stopped as blood culture findings were likely contaminan ("false positive"). Lethargy resolved prior to discharge.      SECONDARY DISCHARGE DIAGNOSES  Diagnosis: Thrush  Assessment and Plan of Treatment: Continue with Nystatin swish and swallow.    Diagnosis: Glioblastoma multiforme  Assessment and Plan of Treatment: You were evaluated by Oncology. You underwent an MRI brain which showed a stable brain lesion. You were started on Decadron. Continue with Decadron as an out patient. Decadron to be tapered off by your oncologist at [facility].    Diagnosis: Hypernatremia  Assessment and Plan of Treatment: You had elevated sodium levels on admission. You were given IVF. Your sodium levels normalized prior to discharge.     PRINCIPAL DISCHARGE DIAGNOSIS  Diagnosis: Lethargy  Assessment and Plan of Treatment: Likely secondary to elevated sodium levels. You were given fluids and sodium level improved. You underewnt MRI brain which showeed stable glioblastoma, you were started on steroids per Oncology. You were also evaluated by infectious disease for possible infection, you recieved antibiotics which were then stopped as blood culture findings were likely contaminant ("false positive"). Lethargy resolved prior to discharge.      SECONDARY DISCHARGE DIAGNOSES  Diagnosis: Thrush  Assessment and Plan of Treatment: Continue with Nystatin swish and swallow.    Diagnosis: Glioblastoma multiforme  Assessment and Plan of Treatment: You were evaluated by Oncology. You underwent an MRI brain which showed a stable brain lesion. You were started on Decadron. Continue with Decadron Taper as an out patient.    Diagnosis: Hypernatremia  Assessment and Plan of Treatment: You had elevated sodium levels on admission. You were given IVF. Your sodium levels normalized prior to discharge.     PRINCIPAL DISCHARGE DIAGNOSIS  Diagnosis: Lethargy  Assessment and Plan of Treatment: Likely secondary to elevated sodium levels. You were given fluids and sodium level improved. You underewnt MRI brain which showeed stable glioblastoma, you were started on steroids per Oncology. You were also evaluated by infectious disease for possible infection, you recieved antibiotics which were then stopped as blood culture findings were likely contaminant ("false positive"). Lethargy resolved prior to discharge.      SECONDARY DISCHARGE DIAGNOSES  Diagnosis: Thrush  Assessment and Plan of Treatment: Continue with Nystatin swish and swallow.    Diagnosis: Glioblastoma multiforme  Assessment and Plan of Treatment: You were evaluated by Oncology. You underwent an MRI brain which showed a stable brain lesion. You were started on Decadron. Continue with Decadron Taper as an out patient. You have a virtual appointment with Dr. Chavis Neuro Oncologist on 6/29 2 9:30 am.    Diagnosis: Hypernatremia  Assessment and Plan of Treatment: You had elevated sodium levels on admission. You were given IVF. Your sodium levels normalized prior to discharge.

## 2021-06-08 NOTE — PROGRESS NOTE ADULT - ATTENDING COMMENTS
71M with hypertension, hyperlipidemia, diabetes mellitus, seizures who was diagnosed GBM when he presented with memory loss s/p RT, Temodar, steroids in the past.  Currently under care of Dr. Barrios (@[facility]) and actively on treatment.  Hx treated CMV Infection.  Multiple hospitalizations for somnolence.  Weight was 59KG 1/2020.  Currently 50kg.  BC with staph epidermitis.  Suspect procurement contaminant.  No pacemakers, no prosthetic valves or other foreign bodies.  Negative Repeat BC    BC (+) Staphylococcus epidermidis  - if 6/6 remains negative today and 6/7 BC (-), would d/c vancomycin  - f/u repeat BC  - continue off zosyn    Fever  - resolved    Leukocytosis  - resolved

## 2021-06-08 NOTE — DISCHARGE NOTE PROVIDER - HOSPITAL COURSE
72 y/o M- non-verbal/contracted with PMH GBM (dx 2019 on chemo q2 weeks), seizures, HTN, HLD, DM, SIADH who presents for increased lethargy.     Lethargy  - Mental status improved   - Likely 2/2 hypernatremia & poor PO intake, improved with IVF and correction of Na   - CTH w/ white matter hypoattenuation maybe be 2/2 chronic microvascular changes and/or vasogenic edema. Per discussion with daughter who is a physician- pt has not been on steroids for over 1 year, recent head imaging 1 month ago at [facility] [place], she will reach out to brother for more information.   - No PNA seen on CXR, zosyn d/c'd   - B12, TSH, Folate, iron panel all wnl   - BCx: with [facility] epi, appreciate ID consult, blood culture from 6th and 7th neg to date  - S/p IV vanco, monitor off abx  - discussed with pt's outpt neuro oncologist Dr. Lyle Geller's fellow Dr. Hinkle they have planned an outpt MRI for pt  - pt can get outpt MRI, discussed with daughter karma Johansen planning for tomorrow if stable.     Hypernatremia  - likely due to dehydration in the setting of poor PO intake. S/p 2L of LR in the ED.   - Na improved, seen by swallow rec puree diet with honey thick liquids  - Encourage PO    Hx of aspiration  - Hospitalizations for aspiration in the past now with decreased PO intake.  - Family deferred PEG now as pt has  passed swallow eval and is taking po  - Speech and swallow: pureed with nectar thickened liq    Thrush  - Per daughter, last dx with thrush >1 month ago. Oral cavity with poor dentition, will need good oral care and will re-assess.  - Nystatin swish and spit ordered     Glioblastoma multiforme  - Hx of GBM dx in 2019. Continues with chemo q2 weeks. Per daughter, patient's GBM has not been progressing. Had recent imaging at [facility]    Seizures   - C/w Keppra.  - will monitor Mental status closely.     History of SIADH  - Hx of SIADH. Na improved  - Plan as stated above.     Hypertension  - C/w losartan and Norvasc.    Prophylactic measure  - DVT ppx: Lovenox 40mg qd     GOC: Prior team Discussed with family. On admission, brought up DNR/DNI. Family wants everything. Full Code.      On_________, discussed with .__________, patient is medically cleared and optimized for discharge today. All medications were reviewed with attending, and sent to mutually agreed upon pharmacy. 72 y/o M- non-verbal/contracted with PMH GBM (dx 2019 on chemo q2 weeks), seizures, HTN, HLD, DM, SIADH who presents for increased lethargy.     Lethargy  - Mental status improved   - Likely 2/2 hypernatremia & poor PO intake, improved with IVF and correction of Na   - CTH w/ white matter hypoattenuation maybe be 2/2 chronic microvascular changes and/or vasogenic edema. Per discussion with daughter who is a physician- pt has not been on steroids for over 1 year, recent head imaging 1 month ago at [facility] [place], she will reach out to brother for more information.   - No PNA seen on CXR, zosyn d/c'd   - B12, TSH, Folate, iron panel all wnl   - BCx: with [facility] epi. ID consulted --> s/p vanco  - bcx 6/7: gale douglass (coag neg) -->per ID likely contaminant, thus no need for abx   - repeat bcx 6/9: Negative, Vanco d/sienna per ID   - Discussed with pt's outpt neuro oncologist Dr. Lyle Geller's fellow Dr. Hinkle they have planned an outpt MRI for pt thus initially held off  - However pt with worsening lethargy so [facility] Oncology consulted and MRI brain obtained: Stable mass lesion. Progressive radiation gliosis  - Pt placed on Decadron 4 q 12hrs. --> Tapered to 4 mg QD per oncology. Pt to be discharged on this dose and it can be further tapered as an out patient by oncologist at [facility] [place].     Hypernatremia  - likely due to dehydration in the setting of poor PO intake. S/p 2L of LR in the ED.   - Na improved, seen by swallow rec puree diet with honey thick liquids  - Encourage PO --> pt tolerating PO prior to discharge     Hx of aspiration  - Hospitalizations for aspiration in the past now with decreased PO intake.  - Family deferred PEG now as pt has  passed swallow eval and is taking po  - Speech and swallow: pureed with nectar thickened liq    Thrush  - Per daughter, last dx with thrush >1 month ago. Oral cavity with poor dentition, will need good oral care and will re-assess.  - Nystatin swish and spit ordered     Glioblastoma multiforme  - Hx of GBM dx in 2019. Continues with chemo q2 weeks. Per daughter, patient's GBM has not been progressing. Had recent imaging at [facility] [place]  - Repeat MRI head in house with stable mass, + gliosis. Pt started on decadron 4 mg q 12 hrs per oncology which was tapered to 4 mg QD per oncology. Further taper to be done as out patient by [facility] oncology     Seizures   - Continued Keppra.    History of SIADH  - Hx of SIADH. Na improved  - Plan as stated above.     Hypertension  - C/w losartan and Norvasc.    Prophylactic measure  - DVT ppx: Lovenox 40mg qd     GOC: Prior team Discussed with family. On admission, brought up DNR/DNI. Family wants everything. Full Code.      On_________, discussed with __________, patient is medically cleared and optimized for discharge today. All medications were reviewed with attending, and sent to mutually agreed upon pharmacy. 70 y/o M- non-verbal/contracted with PMH GBM (dx 2019 on chemo q2 weeks), seizures, HTN, HLD, DM, SIADH who presents for increased lethargy.     Lethargy  - Mental status improved   - Likely 2/2 hypernatremia & poor PO intake, improved with IVF and correction of Na   - CTH w/ white matter hypoattenuation maybe be 2/2 chronic microvascular changes and/or vasogenic edema. Per discussion with daughter who is a physician- pt has not been on steroids for over 1 year, recent head imaging 1 month ago at [facility] [place], she will reach out to brother for more information.   - No PNA seen on CXR, zosyn d/c'd   - B12, TSH, Folate, iron panel all wnl   - BCx: with [facility] epi. ID consulted --> s/p vanco  - bcx 6/7: gale hendrixi (coag neg) -->per ID likely contaminant, thus no need for abx   - repeat bcx 6/9: Negative, Vanco d/sienna per ID   - Discussed with pt's outpt neuro oncologist Dr. Lyle Geller's fellow Dr. Hinkle they have planned an outpt MRI for pt thus initially held off  - However pt with worsening lethargy so [facility] Oncology consulted and MRI brain obtained: Stable mass lesion. Progressive radiation gliosis  - Pt placed on Decadron 4 q 12hrs --> Tapered to 4 mg QD per oncology. Per oncology, steroid taper upon discharge     Hypernatremia  - likely due to dehydration in the setting of poor PO intake. S/p 2L of LR in the ED.   - Na improved, seen by swallow rec puree diet with honey thick liquids  - Encourage PO --> pt tolerating PO prior to discharge     Hx of aspiration  - Hospitalizations for aspiration in the past now with decreased PO intake.  - Family deferred PEG now as pt has  passed swallow eval and is taking po  - Speech and swallow: pureed with nectar thickened liquid    Thrush  - Per daughter, last dx with thrush >1 month ago. Oral cavity with poor dentition, will need good oral care and will re-assess.  - Nystatin swish and spit ordered     Glioblastoma multiforme  - Hx of GBM dx in 2019. Continues with chemo q2 weeks. Per daughter, patient's GBM has not been progressing. Had recent imaging at [facility] [place]  - Repeat MRI head in house with stable mass, + gliosis. Pt started on decadron 4 mg q 12 hrs per oncology which was tapered to 4 mg QD per oncology. Further taper to be done as out patient by [facility] oncology     Seizures   - Continued Keppra.    History of SIADH  - Hx of SIADH. Na improved  - Plan as stated above.     Hypertension  - C/w losartan and Norvasc.    Prophylactic measure  - DVT ppx: Lovenox 40mg qd     GOC: Prior team Discussed with family. On admission, brought up DNR/DNI. Family wants everything. Full Code.      On 6/15/21, discussed with Dr. Garcia, patient is medically cleared and optimized for discharge today. Pt cleared by Oncology attg Dr. Ramos. All medications were reviewed with attending, and sent to mutually agreed upon pharmacy. 70 y/o M- non-verbal/contracted with PMH GBM (dx 2019 on chemo q2 weeks), seizures, HTN, HLD, DM, SIADH who presents for increased lethargy.     Lethargy  - Mental status improved   - Likely 2/2 hypernatremia & poor PO intake, improved with IVF and correction of Na   - CTH w/ white matter hypoattenuation maybe be 2/2 chronic microvascular changes and/or vasogenic edema. Per discussion with daughter who is a physician- pt has not been on steroids for over 1 year, recent head imaging 1 month ago at [facility] [place], she will reach out to brother for more information.   - No PNA seen on CXR, zosyn d/c'd   - B12, TSH, Folate, iron panel all wnl   - BCx: with [facility] epi. ID consulted --> s/p vanco  - bcx 6/7: gale hendrixi (coag neg) -->per ID likely contaminant, thus no need for abx   - repeat bcx 6/9: Negative, Vanco d/sienna per ID   - Discussed with pt's outpt neuro oncologist Dr. Lyle Geller's fellow Dr. Hinkle they have planned an outpt MRI for pt thus initially held off  - However pt with worsening lethargy so [facility] Oncology consulted and MRI brain obtained: Stable mass lesion. Progressive radiation gliosis  - Pt placed on Decadron 4 q 12hrs --> Tapered to 4 mg QD per oncology. Per oncology, steroid taper upon discharge     Hypernatremia  - likely due to dehydration in the setting of poor PO intake. S/p 2L of LR in the ED.   - Na improved, seen by swallow rec puree diet with honey thick liquids  - Encourage PO --> pt tolerating PO prior to discharge     Hx of aspiration  - Hospitalizations for aspiration in the past now with decreased PO intake.  - Family deferred PEG now as pt has  passed swallow eval and is taking po  - Speech and swallow: pureed with nectar thickened liquid    Thrush  - Per daughter, last dx with thrush >1 month ago. Oral cavity with poor dentition, will need good oral care and will re-assess.  - Nystatin swish and spit ordered     Glioblastoma multiforme  - Hx of GBM dx in 2019. Continues with chemo q2 weeks. Per daughter, patient's GBM has not been progressing. Had recent imaging at [facility] [place]  - Repeat MRI head in house with stable mass, + gliosis. Pt started on decadron 4 mg q 12 hrs per oncology which was tapered to 4 mg QD per oncology. Further taper to be done as out patient by [facility] oncology     Seizures   - Continued Keppra.    History of SIADH  - Hx of SIADH. Na improved  - Plan as stated above.     Hypertension  - C/w losartan and Norvasc.    Prophylactic measure  - DVT ppx: Lovenox 40mg qd     GOC: Prior team Discussed with family. On admission, brought up DNR/DNI. Family wants everything. Full Code.      On 6/15/21, discussed with Dr. Garcia, patient is medically cleared and optimized for discharge today.   Pt cleared by Oncology attg Dr. Ramos. Dr. Ramos discussed case with [facility] oncologist who was updated on hospital course and in agreement with discharge.  [facility] was emailed for follow up appointment with son's contact information.  All medications were reviewed with attending, and sent to mutually agreed upon pharmacy. 72 y/o M- non-verbal/contracted with PMH GBM (dx 2019 on chemo q2 weeks), seizures, HTN, HLD, DM, SIADH who presents for increased lethargy.     Lethargy  - Mental status improved   - Likely 2/2 hypernatremia & poor PO intake, improved with IVF and correction of Na   - CTH w/ white matter hypoattenuation maybe be 2/2 chronic microvascular changes and/or vasogenic edema. Per discussion with daughter who is a physician- pt has not been on steroids for over 1 year, recent [place] [facility] [place], she will reach out to brother for more information.   - No PNA seen on CXR, zosyn d/c'd   - B12, TSH, Folate, iron panel all wnl   - BCx: with [facility] epi. ID consulted --> s/p vanco  - bcx 6/7: gale hendrixi (coag neg) -->per ID likely contaminant, thus no need for abx   - repeat bcx 6/9: Negative, Vanco d/sienna per ID   - Discussed with pt's outpt neuro oncologist Dr. Lyle Geller's fellow Dr. Hinkle they have planned an outpt MRI for pt thus initially held off  - However pt with worsening lethargy so [facility] Oncology consulted and MRI brain obtained: Stable mass lesion. Progressive radiation gliosis  - Pt placed on Decadron 4 q 12hrs --> Tapered to 4 mg QD per oncology. Per oncology, steroid taper upon discharge per oncology    Hypernatremia  - likely due to dehydration in the setting of poor PO intake. S/p 2L of LR in the ED.   - Na improved, seen by swallow rec puree diet with honey thick liquids  - Encourage PO --> pt tolerating PO prior to discharge     Hx of aspiration  - Hospitalizations for aspiration in the past now with decreased PO intake.  - Family deferred PEG now as pt has  passed swallow eval and is taking po  - Speech and swallow: pureed with nectar thickened liquid    Thrush  - Per daughter, last dx with thrush >1 month ago. Oral cavity with poor dentition, will need good oral care and will re-assess.  - Nystatin swish and spit ordered     Glioblastoma multiforme  - Hx of GBM dx in 2019. Continues with chemo q2 weeks. Per daughter, patient's GBM has not been progressing. Had recent imaging [place] [facility]  - Repeat MRI head in house with stable mass, + gliosis. Pt started on decadron 4 mg q 12 hrs per oncology which was tapered to 4 mg QD per oncology. Further taper to be done as out patient by [facility] [place]ology     Seizures   - Continued Keppra.    History of SIADH  - Hx of SIADH. Na improved  - Plan as stated above.     Hypertension  - C/w losartan and Norvasc.    Prophylactic measure  - DVT ppx: Lovenox 40mg qd     GOC: Prior team Discussed with family. On admission, brought up DNR/DNI. Family wants everything. Full Code.      On 6/15/21, discussed with Dr. Garcia, patient is medically cleared and optimized for discharge today.   Pt cleared by Oncology attg Dr. Ramos. Dr. Ramos discussed case with [facility] oncologist who was updated on hospital course and in agreement with discharge.  Pt is scheduled for follow up with [facility] Neuro Oncologist on June 29th @ 9:30 am (this is a virtual appointment to accommodate pt's bedbound status).  All medications were reviewed with attending, and sent to mutually agreed upon pharmacy.

## 2021-06-08 NOTE — PROGRESS NOTE ADULT - PROBLEM SELECTOR PLAN 1
Family reports increasing lethargy and decreased phonation. He has had hospitalizations for aspiration in the past.   - Mental status improved this AM   - Likely 2/2 hypernatremia & poor PO intake, improved with IVF and correction of Na   - CTH w/ white matter hypoattenuation maybe be 2/2 chronic microvascular changes and/or vasogenic edema. Per discussion with daughter who is a physician- pt has not been on steroids for over 1 year, recent head imaging 1 month ago at [facility] [place], she will reach out to brother for more information.   - Given MS improvement w/ IVF, will hold off on MRI for now, will discuss with pt's oncologist Palmira Alvarenga [phone] in am  - No PNA seen on CXray, will DC Zosyn  - B12, TSH, Folate, iron panel all wnl   - BCx: with [facility] epi, appreciate ID consult, can dc vanco, blood culture from 6th and 7th neg to date  - monitor off abx    #SIRS, pt meets SIRS criteria with tachycardia, tachypnea and leukocytosis, all resolved.  - cont to monitor   - Follow up repeat BCx    -Discussed with daughter Haily on 6/8, per her pt has had MRI 6 weeks ago  - discussed with pt's outpt neuro oncologist Dr. Lyle Geller's fellow Dr. Hinkle they have planned an outpt MRI for pt  - pt can get outpt MRI, discussed with daughter Hailykarma planning for tomorrow if stable

## 2021-06-08 NOTE — DISCHARGE NOTE PROVIDER - NSDCCAREPROVSEEN_GEN_ALL_CORE_FT
[facility] ACP Nj Sharron  [facility] Medicine ACP  Chastity Garcia

## 2021-06-08 NOTE — PROGRESS NOTE ADULT - PROBLEM SELECTOR PLAN 8
DVT ppx: Lovenox 40mg qd     GOC: Prior team Discussed with family. On admission, brought up DNR/DNI. Family wants everything. Full Code.    Prior team updated Daughter. Amparo requested updates until Tuesday. Starting Wednesday, son Jim should start receiving updates. Family wants him to be full code.  GOC discussed with pt's family on 6/8    - Hypokalemia- replete k DVT ppx: Lovenox 40mg qd     GOC: Prior team Discussed with family. On admission, brought up DNR/DNI. Family wants everything. Full Code.    Prior team updated Daughter. Amparo requested updates until Tuesday. Starting Wednesday, son Jim should start receiving updates. Family wants him to be full code.  Daughter Amparo updated on 6/8    - Hypokalemia- replete k

## 2021-06-08 NOTE — DISCHARGE NOTE PROVIDER - NSDCMRMEDTOKEN_GEN_ALL_CORE_FT
amantadine 50 mg/5 mL oral syrup: 10 milliliter(s) orally 2 times a day  atorvastatin 10 mg oral tablet: 1 tab(s) orally once a day (FIlled Oct/2019 x 3 months)  atorvastatin 10 mg oral tablet: 1 tab(s) orally once a day  citalopram 20 mg oral tablet: 1 tab(s) orally once a day  Keppra 100 mg/mL oral solution: 7.5 milliliter(s) orally 2 times a day  losartan 100 mg oral tablet: 1 tab(s) orally once a day  Norvasc 5 mg oral tablet: 1 tab(s) orally once a day  nystatin 100,000 units/mL oral suspension: 4 milliliter(s) orally 4 times a day  ramelteon 8 mg oral tablet: 1 tab(s) orally once a day (at bedtime)   amantadine 50 mg/5 mL oral syrup: 10 milliliter(s) orally 2 times a day  atorvastatin 10 mg oral tablet: 1 tab(s) orally once a day  citalopram 20 mg oral tablet: 1 tab(s) orally once a day  Keppra 100 mg/mL oral solution: 7.5 milliliter(s) orally 2 times a day  losartan 100 mg oral tablet: 1 tab(s) orally once a day  methylphenidate 5 mg oral tablet: 1 tab(s) orally @ 8am and 2 pm  Norvasc 5 mg oral tablet: 1 tab(s) orally once a day  nystatin 100,000 units/mL oral suspension: 4 milliliter(s) orally 4 times a day  PHOS-NaK oral powder for reconstitution: 1 packet(s) orally 3 times a day (with meals)   Protonix 40 mg oral delayed release tablet: 1 tab(s) orally once a day  ramelteon 8 mg oral tablet: 1 tab(s) orally once a day (at bedtime)   amantadine 50 mg/5 mL oral syrup: 10 milliliter(s) orally 2 times a day  atorvastatin 10 mg oral tablet: 1 tab(s) orally once a day  citalopram 20 mg oral tablet: 1 tab(s) orally once a day  dexamethasone 1 mg oral tablet: 3 tab(s) orally 6/16-6/18, then 2 tabs orally 6/19-6/21 then 1 tab orally 6/22-24  Keppra 100 mg/mL oral solution: 7.5 milliliter(s) orally 2 times a day  losartan 100 mg oral tablet: 1 tab(s) orally once a day  methylphenidate 5 mg oral tablet: 1 tab(s) orally @ 8am and 2 pm  Norvasc 5 mg oral tablet: 1 tab(s) orally once a day  nystatin 100,000 units/mL oral suspension: 4 milliliter(s) orally 4 times a day  PHOS-NaK oral powder for reconstitution: 1 packet(s) orally 3 times a day (with meals)   Protonix 40 mg oral delayed release tablet: 1 tab(s) orally once a day  ramelteon 8 mg oral tablet: 1 tab(s) orally once a day (at bedtime)

## 2021-06-08 NOTE — CHART NOTE - NSCHARTNOTEFT_GEN_A_CORE
MICROBIOLOGY:  6/7/2021 BC negative x2  6/6/2021 BC negative x2  6/5/2021 BC + Staphylococcus epidermidis    We can discontinue the vancomycin at this point.  likely procurement contaminant    Please call Infectious Diseases if there is a change in status.  Thank you.  [phone].

## 2021-06-08 NOTE — PROGRESS NOTE ADULT - ASSESSMENT
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 History was obtained from chart review. He was admitted for decreased oral intake and lethargy and found to have hypernatremia. Hospital course was complicated by staphylococcal epidermidis bacteremia. CT head without contrast showed white matter hypoattenuation maybe be 2/2 chronic microvascular changes and/or vasogenic edema. Infectious disease was consulted for further recommendations.      Plan:  # Staphylococcus epidermidis bacteremia  - Continue intravenous vancomycin for now   - Check vancomycin trough  - IV zosyn was discontinued   - Await repeat blood cultures with sensitivities, if negative would stop vancomycin   - Monitor fever curve  - Trend CBC and CMP daily  - ID will continue to follow      Daniel Bernal MD PGY-4   Fellow, Infectious Diseases   Pager: [phone]  If no response, after 5pm and on weekends: Call [phone]   Assessment:  The patient is a 71 year old male with past medical history of non-verbal/contracted GBM (diagnosed in 2019 on chemotherapy every 2 weeks), seizures, hypertension, hyperlipidemia, diabetes mellitus, seizures, CMV infection, and SIADH who presents for increased lethargy. History was obtained from chart review. He was admitted for decreased oral intake and lethargy and found to have hypernatremia. Hospital course was complicated by staphylococcal epidermidis bacteremia. CT head without contrast showed white matter hypoattenuation maybe be 2/2 chronic microvascular changes and/or vasogenic edema. Infectious disease was consulted for further recommendations.      Plan:  # Staphylococcus epidermidis bacteremia  - Continue intravenous vancomycin for now   - Leukocytosis worsening   - Vancomycin trough 6.4  - IV zosyn was discontinued   - Await repeat blood cultures with sensitivities, if negative would stop vancomycin   - Monitor fever curve  - Trend CBC and CMP daily  - ID will continue to follow      Daniel Bernal MD PGY-4   Fellow, Infectious Diseases   Pager: [phone]  If no response, after 5pm and on weekends: Call [phone]   Assessment:  The patient is a 71 year old male with past medical history of non-verbal/contracted GBM (diagnosed in 2019 on chemotherapy every 2 weeks), seizures, hypertension, hyperlipidemia, diabetes mellitus, seizures, CMV infection, and SIADH who presents for increased lethargy. History was obtained from chart review. He was admitted for decreased oral intake and lethargy and found to have hypernatremia. Hospital course was complicated by staphylococcal epidermidis bacteremia. CT head without contrast showed white matter hypoattenuation maybe be 2/2 chronic microvascular changes and/or vasogenic edema. Infectious disease was consulted for further recommendations.      Plan:  # Staphylococcus epidermidis bacteremia  - Can discontinue intravenous vancomycin - Staph. epidermidis likely contaminant   - Vancomycin trough 6.4 today  - Will sign off, please call with other questions    Daniel Bernal MD PGY-4   Fellow, Infectious Diseases   Pager: [phone]  If no response, after 5pm and on weekends: Call [phone]

## 2021-06-09 LAB
-  AMPICILLIN/SULBACTAM: SIGNIFICANT CHANGE UP
-  CEFAZOLIN: SIGNIFICANT CHANGE UP
-  CLINDAMYCIN: SIGNIFICANT CHANGE UP
-  ERYTHROMYCIN: SIGNIFICANT CHANGE UP
-  GENTAMICIN: SIGNIFICANT CHANGE UP
-  OXACILLIN: SIGNIFICANT CHANGE UP
-  PENICILLIN: SIGNIFICANT CHANGE UP
-  RIFAMPIN: SIGNIFICANT CHANGE UP
-  TETRACYCLINE: SIGNIFICANT CHANGE UP
-  TRIMETHOPRIM/SULFAMETHOXAZOLE: SIGNIFICANT CHANGE UP
-  VANCOMYCIN: SIGNIFICANT CHANGE UP
ANION GAP SERPL CALC-SCNC: 13 MMOL/L — SIGNIFICANT CHANGE UP (ref 7–14)
BASOPHILS # BLD AUTO: 0.03 K/UL — SIGNIFICANT CHANGE UP (ref 0–0.2)
BASOPHILS NFR BLD AUTO: 0.3 % — SIGNIFICANT CHANGE UP (ref 0–2)
BUN SERPL-MCNC: 11 MG/DL — SIGNIFICANT CHANGE UP (ref 7–23)
CALCIUM SERPL-MCNC: 8.7 MG/DL — SIGNIFICANT CHANGE UP (ref 8.4–10.5)
CHLORIDE SERPL-SCNC: 102 MMOL/L — SIGNIFICANT CHANGE UP (ref 98–107)
CO2 SERPL-SCNC: 22 MMOL/L — SIGNIFICANT CHANGE UP (ref 22–31)
CREAT SERPL-MCNC: 0.55 MG/DL — SIGNIFICANT CHANGE UP (ref 0.5–1.3)
CULTURE RESULTS: SIGNIFICANT CHANGE UP
EOSINOPHIL # BLD AUTO: 0.13 K/UL — SIGNIFICANT CHANGE UP (ref 0–0.5)
EOSINOPHIL NFR BLD AUTO: 1.4 % — SIGNIFICANT CHANGE UP (ref 0–6)
GLUCOSE SERPL-MCNC: 81 MG/DL — SIGNIFICANT CHANGE UP (ref 70–99)
HCT VFR BLD CALC: 32.6 % — LOW (ref 39–50)
HGB BLD-MCNC: 10.2 G/DL — LOW (ref 13–17)
IANC: 4.82 K/UL — SIGNIFICANT CHANGE UP (ref 1.5–8.5)
IMM GRANULOCYTES NFR BLD AUTO: 0.3 % — SIGNIFICANT CHANGE UP (ref 0–1.5)
LYMPHOCYTES # BLD AUTO: 3.58 K/UL — HIGH (ref 1–3.3)
LYMPHOCYTES # BLD AUTO: 39 % — SIGNIFICANT CHANGE UP (ref 13–44)
MAGNESIUM SERPL-MCNC: 1.9 MG/DL — SIGNIFICANT CHANGE UP (ref 1.6–2.6)
MCHC RBC-ENTMCNC: 27 PG — SIGNIFICANT CHANGE UP (ref 27–34)
MCHC RBC-ENTMCNC: 31.3 GM/DL — LOW (ref 32–36)
MCV RBC AUTO: 86.2 FL — SIGNIFICANT CHANGE UP (ref 80–100)
METHOD TYPE: SIGNIFICANT CHANGE UP
MONOCYTES # BLD AUTO: 0.58 K/UL — SIGNIFICANT CHANGE UP (ref 0–0.9)
MONOCYTES NFR BLD AUTO: 6.3 % — SIGNIFICANT CHANGE UP (ref 2–14)
NEUTROPHILS # BLD AUTO: 4.82 K/UL — SIGNIFICANT CHANGE UP (ref 1.8–7.4)
NEUTROPHILS NFR BLD AUTO: 52.7 % — SIGNIFICANT CHANGE UP (ref 43–77)
NRBC # BLD: 0 /100 WBCS — SIGNIFICANT CHANGE UP
NRBC # FLD: 0 K/UL — SIGNIFICANT CHANGE UP
ORGANISM # SPEC MICROSCOPIC CNT: SIGNIFICANT CHANGE UP
ORGANISM # SPEC MICROSCOPIC CNT: SIGNIFICANT CHANGE UP
PHOSPHATE SERPL-MCNC: 3.1 MG/DL — SIGNIFICANT CHANGE UP (ref 2.5–4.5)
PLATELET # BLD AUTO: 216 K/UL — SIGNIFICANT CHANGE UP (ref 150–400)
POTASSIUM SERPL-MCNC: 3.6 MMOL/L — SIGNIFICANT CHANGE UP (ref 3.5–5.3)
POTASSIUM SERPL-SCNC: 3.6 MMOL/L — SIGNIFICANT CHANGE UP (ref 3.5–5.3)
RBC # BLD: 3.78 M/UL — LOW (ref 4.2–5.8)
RBC # FLD: 15.2 % — HIGH (ref 10.3–14.5)
SODIUM SERPL-SCNC: 137 MMOL/L — SIGNIFICANT CHANGE UP (ref 135–145)
SPECIMEN SOURCE: SIGNIFICANT CHANGE UP
SPECIMEN SOURCE: SIGNIFICANT CHANGE UP
VIT B1 SERPL-MCNC: 104.7 NMOL/L — SIGNIFICANT CHANGE UP (ref 66.5–200)
WBC # BLD: 9.17 K/UL — SIGNIFICANT CHANGE UP (ref 3.8–10.5)
WBC # FLD AUTO: 9.17 K/UL — SIGNIFICANT CHANGE UP (ref 3.8–10.5)

## 2021-06-09 PROCEDURE — 99233 SBSQ HOSP IP/OBS HIGH 50: CPT

## 2021-06-09 RX ORDER — SODIUM CHLORIDE 9 MG/ML
1000 INJECTION, SOLUTION INTRAVENOUS
Refills: 0 | Status: DISCONTINUED | OUTPATIENT
Start: 2021-06-09 | End: 2021-06-11

## 2021-06-09 RX ADMIN — ENOXAPARIN SODIUM 40 MILLIGRAM(S): 100 INJECTION SUBCUTANEOUS at 17:40

## 2021-06-09 RX ADMIN — LOSARTAN POTASSIUM 100 MILLIGRAM(S): 100 TABLET, FILM COATED ORAL at 06:25

## 2021-06-09 RX ADMIN — Medication 500000 UNIT(S): at 06:25

## 2021-06-09 RX ADMIN — Medication 500000 UNIT(S): at 11:00

## 2021-06-09 RX ADMIN — ATORVASTATIN CALCIUM 10 MILLIGRAM(S): 80 TABLET, FILM COATED ORAL at 22:09

## 2021-06-09 RX ADMIN — Medication 500000 UNIT(S): at 17:41

## 2021-06-09 RX ADMIN — CITALOPRAM 20 MILLIGRAM(S): 10 TABLET, FILM COATED ORAL at 11:00

## 2021-06-09 RX ADMIN — Medication 100 MILLIGRAM(S): at 06:25

## 2021-06-09 RX ADMIN — LEVETIRACETAM 400 MILLIGRAM(S): 250 TABLET, FILM COATED ORAL at 17:40

## 2021-06-09 RX ADMIN — SODIUM CHLORIDE 70 MILLILITER(S): 9 INJECTION, SOLUTION INTRAVENOUS at 10:53

## 2021-06-09 RX ADMIN — AMLODIPINE BESYLATE 5 MILLIGRAM(S): 2.5 TABLET ORAL at 06:25

## 2021-06-09 RX ADMIN — Medication 500000 UNIT(S): at 22:09

## 2021-06-09 RX ADMIN — LEVETIRACETAM 400 MILLIGRAM(S): 250 TABLET, FILM COATED ORAL at 06:25

## 2021-06-09 RX ADMIN — Medication 100 MILLIGRAM(S): at 17:40

## 2021-06-09 NOTE — PROGRESS NOTE ADULT - SUBJECTIVE AND OBJECTIVE BOX
Patient is a 71y old  Male who presents with a chief complaint of Lethargy (08 Jun 2021 19:16)      SUBJECTIVE / OVERNIGHT EVENTS: Pt seen and examined at 8:55am, no overnight events, this am is still sleepy, wakes up to voice, does not communicate however, per nsg has minimal verbal output, no new issues reported.        MEDICATIONS  (STANDING):  amantadine Syrup 100 milliGRAM(s) Oral two times a day  amLODIPine   Tablet 5 milliGRAM(s) Oral daily  atorvastatin 10 milliGRAM(s) Oral at bedtime  citalopram 20 milliGRAM(s) Oral daily  enoxaparin Injectable 40 milliGRAM(s) SubCutaneous every 24 hours  levETIRAcetam  IVPB 500 milliGRAM(s) IV Intermittent every 12 hours  losartan 100 milliGRAM(s) Oral daily  nystatin    Suspension 360168 Unit(s) Oral four times a day    MEDICATIONS  (PRN):      Vital Signs Last 24 Hrs  T(C): 36.4 (09 Jun 2021 06:02), Max: 37.4 (08 Jun 2021 21:17)  T(F): 97.6 (09 Jun 2021 06:02), Max: 99.3 (08 Jun 2021 21:17)  HR: 102 (09 Jun 2021 06:02) (100 - 103)  BP: 106/65 (09 Jun 2021 06:02) (106/65 - 133/76)  BP(mean): --  RR: 16 (09 Jun 2021 06:02) (16 - 18)  SpO2: 100% (09 Jun 2021 06:02) (96% - 100%)  CAPILLARY BLOOD GLUCOSE        I&O's Summary    08 Jun 2021 07:01  -  09 Jun 2021 07:00  --------------------------------------------------------  IN: 178 mL / OUT: 0 mL / NET: 178 mL        PHYSICAL EXAM:  GENERAL: NAD, thinly built male  CHEST/LUNG: Clear to auscultation bilaterally; No wheeze  HEART: Regular rate and rhythm  ABDOMEN: Soft, Nontender, Nondistended  EXTREMITIES: no LE edema, LE contracted unable to extend  PSYCH: Calm  NEUROLOGY: Asleep but arousable, did not communicate today  SKIN: No rashes or lesions      LABS:                        10.2   9.17  )-----------( 216      ( 09 Jun 2021 06:46 )             32.6     06-09    137  |  102  |  11  ----------------------------<  81  3.6   |  22  |  0.55    Ca    8.7      09 Jun 2021 06:46  Phos  3.1     06-09  Mg     1.9     06-09    TPro  7.1  /  Alb  2.8<L>  /  TBili  0.4  /  DBili  x   /  AST  20  /  ALT  14  /  AlkPhos  107  06-08              RADIOLOGY & ADDITIONAL TESTS:    Imaging Personally Reviewed:    Consultant(s) Notes Reviewed:      Care Discussed with Consultants/Other Providers:

## 2021-06-09 NOTE — CHART NOTE - NSCHARTNOTEFT_GEN_A_CORE
Blood culture from 6/7 with gram positive cocci.   Lab called to add on blood culture identification panel, this is processing.  Discussed with ID, Dr. Pratt, will continue to monitor off abx. Follow up repeat blood culture that was sent this morning.

## 2021-06-09 NOTE — PROGRESS NOTE ADULT - PROBLEM SELECTOR PLAN 8
DVT ppx: Lovenox 40mg qd     GOC: Prior team Discussed with family. On admission, brought up DNR/DNI. Family wants everything. Full Code.    Prior team updated Daughter. Amparo requested updates until Tuesday. Starting Wednesday, son Jim should start receiving updates. Family wants him to be full code.  Daughter Amparo updated on 6/8 DVT ppx: Lovenox 40mg qd     GOC: Prior team Discussed with family. On admission, brought up DNR/DNI. Family wants everything. Full Code.    Prior team updated Daughter. Amparo requested updates until Tuesday. Starting Wednesday, son Jim should start receiving updates. Family wants him to be full code.  Daughter Amparo updated on 6/9

## 2021-06-09 NOTE — PROGRESS NOTE ADULT - ASSESSMENT
72 y/o M- non-verbal/contracted with [facility] GBM (dx 2019 on chemo q2 weeks), seizures, HTN, HLD, DM, SIADH who presents for increased lethargy.

## 2021-06-09 NOTE — CHART NOTE - NSCHARTNOTEFT_GEN_A_CORE
[facility] MEDICINE [facility] COVERAGE    This report was requested by: Jessica Prince | Reference #: [id number]    Others' Prescriptions  Patient Name: Ronald Braga Date: 1950  Address: [address] [place] [id number]: Male  Rx Written	Rx Dispensed	Drug	Quantity	Days Supply	Prescriber Name	Prescriber Marizol #	Payment Method	Dispenser  04/29/2021	04/29/2021	methylphenidate 5 mg tablet	60	30	[facility]	[id number]	Medicare	[id number]  11/16/2020	11/17/2020	alprazolam 0.25 mg tablet	60	30	[facility]	[id number]	Medicare	[id number]  08/17/2020	08/20/2020	alprazolam 0.25 mg tablet	60	30	[facility]	[id number]	Medicare	[facility][id number]  07/28/2020	07/29/2020	alprazolam 0.25 mg tablet	30	15	[facility]	[id number]	Medicare	[id number]  07/14/2020	07/14/2020	alprazolam 0.25 mg tablet	30	15	[facility]	[id number]	Medicare	[facility][id number]  06/24/2020	06/25/2020	alprazolam 0.25 mg tablet	30	15	[facility]	[id number]	Medicare	[facility] #[id number]    Patient Name: Ronald Braga Date: 1950  Address: [address] [place] [id number]: Male  Rx Written	Rx Dispensed	Drug	Quantity	Days Supply	Prescriber Name	Prescriber Marizol #	Payment Method	Dispenser  04/26/2021	04/26/2021	methylphenidate 5 mg tablet	10	5	Gerardo Feliciano)	[id number]	[facility]

## 2021-06-10 LAB
ANION GAP SERPL CALC-SCNC: 13 MMOL/L — SIGNIFICANT CHANGE UP (ref 7–14)
BASOPHILS # BLD AUTO: 0.02 K/UL — SIGNIFICANT CHANGE UP (ref 0–0.2)
BASOPHILS NFR BLD AUTO: 0.3 % — SIGNIFICANT CHANGE UP (ref 0–2)
BUN SERPL-MCNC: 15 MG/DL — SIGNIFICANT CHANGE UP (ref 7–23)
CALCIUM SERPL-MCNC: 8.5 MG/DL — SIGNIFICANT CHANGE UP (ref 8.4–10.5)
CHLORIDE SERPL-SCNC: 103 MMOL/L — SIGNIFICANT CHANGE UP (ref 98–107)
CO2 SERPL-SCNC: 23 MMOL/L — SIGNIFICANT CHANGE UP (ref 22–31)
CREAT SERPL-MCNC: 0.53 MG/DL — SIGNIFICANT CHANGE UP (ref 0.5–1.3)
CULTURE RESULTS: SIGNIFICANT CHANGE UP
EOSINOPHIL # BLD AUTO: 0.11 K/UL — SIGNIFICANT CHANGE UP (ref 0–0.5)
EOSINOPHIL NFR BLD AUTO: 1.4 % — SIGNIFICANT CHANGE UP (ref 0–6)
GLUCOSE SERPL-MCNC: 81 MG/DL — SIGNIFICANT CHANGE UP (ref 70–99)
HCT VFR BLD CALC: 30.6 % — LOW (ref 39–50)
HGB BLD-MCNC: 9.6 G/DL — LOW (ref 13–17)
IANC: 3.78 K/UL — SIGNIFICANT CHANGE UP (ref 1.5–8.5)
IMM GRANULOCYTES NFR BLD AUTO: 0.4 % — SIGNIFICANT CHANGE UP (ref 0–1.5)
LYMPHOCYTES # BLD AUTO: 3.42 K/UL — HIGH (ref 1–3.3)
LYMPHOCYTES # BLD AUTO: 42.8 % — SIGNIFICANT CHANGE UP (ref 13–44)
MAGNESIUM SERPL-MCNC: 1.9 MG/DL — SIGNIFICANT CHANGE UP (ref 1.6–2.6)
MCHC RBC-ENTMCNC: 27.2 PG — SIGNIFICANT CHANGE UP (ref 27–34)
MCHC RBC-ENTMCNC: 31.4 GM/DL — LOW (ref 32–36)
MCV RBC AUTO: 86.7 FL — SIGNIFICANT CHANGE UP (ref 80–100)
MONOCYTES # BLD AUTO: 0.63 K/UL — SIGNIFICANT CHANGE UP (ref 0–0.9)
MONOCYTES NFR BLD AUTO: 7.9 % — SIGNIFICANT CHANGE UP (ref 2–14)
NEUTROPHILS # BLD AUTO: 3.78 K/UL — SIGNIFICANT CHANGE UP (ref 1.8–7.4)
NEUTROPHILS NFR BLD AUTO: 47.2 % — SIGNIFICANT CHANGE UP (ref 43–77)
NRBC # BLD: 0 /100 WBCS — SIGNIFICANT CHANGE UP
NRBC # FLD: 0 K/UL — SIGNIFICANT CHANGE UP
PHOSPHATE SERPL-MCNC: 2.9 MG/DL — SIGNIFICANT CHANGE UP (ref 2.5–4.5)
PLATELET # BLD AUTO: 212 K/UL — SIGNIFICANT CHANGE UP (ref 150–400)
POTASSIUM SERPL-MCNC: 3.6 MMOL/L — SIGNIFICANT CHANGE UP (ref 3.5–5.3)
POTASSIUM SERPL-SCNC: 3.6 MMOL/L — SIGNIFICANT CHANGE UP (ref 3.5–5.3)
RBC # BLD: 3.53 M/UL — LOW (ref 4.2–5.8)
RBC # FLD: 15.4 % — HIGH (ref 10.3–14.5)
SODIUM SERPL-SCNC: 139 MMOL/L — SIGNIFICANT CHANGE UP (ref 135–145)
WBC # BLD: 7.99 K/UL — SIGNIFICANT CHANGE UP (ref 3.8–10.5)
WBC # FLD AUTO: 7.99 K/UL — SIGNIFICANT CHANGE UP (ref 3.8–10.5)

## 2021-06-10 PROCEDURE — 99233 SBSQ HOSP IP/OBS HIGH 50: CPT

## 2021-06-10 RX ORDER — METHYLPHENIDATE HCL 5 MG
5 TABLET ORAL
Refills: 0 | Status: DISCONTINUED | OUTPATIENT
Start: 2021-06-10 | End: 2021-06-15

## 2021-06-10 RX ORDER — METHYLPHENIDATE HCL 5 MG
5 TABLET ORAL
Refills: 0 | Status: DISCONTINUED | OUTPATIENT
Start: 2021-06-10 | End: 2021-06-10

## 2021-06-10 RX ADMIN — AMLODIPINE BESYLATE 5 MILLIGRAM(S): 2.5 TABLET ORAL at 07:39

## 2021-06-10 RX ADMIN — SODIUM CHLORIDE 70 MILLILITER(S): 9 INJECTION, SOLUTION INTRAVENOUS at 14:21

## 2021-06-10 RX ADMIN — Medication 500000 UNIT(S): at 19:18

## 2021-06-10 RX ADMIN — ENOXAPARIN SODIUM 40 MILLIGRAM(S): 100 INJECTION SUBCUTANEOUS at 19:19

## 2021-06-10 RX ADMIN — Medication 100 MILLIGRAM(S): at 07:40

## 2021-06-10 RX ADMIN — SODIUM CHLORIDE 70 MILLILITER(S): 9 INJECTION, SOLUTION INTRAVENOUS at 22:40

## 2021-06-10 RX ADMIN — Medication 100 MILLIGRAM(S): at 19:18

## 2021-06-10 RX ADMIN — LEVETIRACETAM 400 MILLIGRAM(S): 250 TABLET, FILM COATED ORAL at 07:39

## 2021-06-10 RX ADMIN — ATORVASTATIN CALCIUM 10 MILLIGRAM(S): 80 TABLET, FILM COATED ORAL at 22:39

## 2021-06-10 RX ADMIN — LEVETIRACETAM 400 MILLIGRAM(S): 250 TABLET, FILM COATED ORAL at 19:18

## 2021-06-10 RX ADMIN — Medication 500000 UNIT(S): at 22:57

## 2021-06-10 RX ADMIN — Medication 5 MILLIGRAM(S): at 14:17

## 2021-06-10 RX ADMIN — LOSARTAN POTASSIUM 100 MILLIGRAM(S): 100 TABLET, FILM COATED ORAL at 07:39

## 2021-06-10 RX ADMIN — Medication 500000 UNIT(S): at 07:40

## 2021-06-10 RX ADMIN — CITALOPRAM 20 MILLIGRAM(S): 10 TABLET, FILM COATED ORAL at 14:17

## 2021-06-10 NOTE — PROGRESS NOTE ADULT - SUBJECTIVE AND OBJECTIVE BOX
Patient is a 71y old  Male who presents with a chief complaint of Lethargy (09 Jun 2021 10:17)      SUBJECTIVE / OVERNIGHT EVENTS: Pt seen and examined at 10:45am, no overnight events, this am is still drowsy, arousable to voice with some difficulty, does not communicate, per nsg has poor intake, no new issues reported.        MEDICATIONS  (STANDING):  amantadine Syrup 100 milliGRAM(s) Oral two times a day  amLODIPine   Tablet 5 milliGRAM(s) Oral daily  atorvastatin 10 milliGRAM(s) Oral at bedtime  citalopram 20 milliGRAM(s) Oral daily  enoxaparin Injectable 40 milliGRAM(s) SubCutaneous every 24 hours  lactated ringers. 1000 milliLiter(s) (70 mL/Hr) IV Continuous <Continuous>  levETIRAcetam  IVPB 500 milliGRAM(s) IV Intermittent every 12 hours  losartan 100 milliGRAM(s) Oral daily  methylphenidate 5 milliGRAM(s) Oral <User Schedule>  nystatin    Suspension [id number] Unit(s) Oral four times a day    MEDICATIONS  (PRN):      Vital Signs Last 24 Hrs  T(C): 36.8 (10 Abel 2021 12:47), Max: 37.1 (09 Jun 2021 21:18)  T(F): 98.3 (10 Abel 2021 12:47), Max: 98.8 (09 Jun 2021 21:18)  HR: 107 (10 Abel 2021 12:47) (102 - 107)  BP: 128/75 (10 Abel 2021 12:47) (123/70 - 132/70)  BP(mean): --  RR: 16 (10 Abel 2021 12:47) (16 - 16)  SpO2: 98% (10 Abel 2021 12:47) (96% - 98%)  CAPILLARY BLOOD GLUCOSE        I&O's Summary      PHYSICAL EXAM:  GENERAL: NAD, thinly built male  CHEST/LUNG: Clear to auscultation bilaterally; No wheeze  HEART: Regular rate and rhythm  ABDOMEN: Soft, Nontender, Nondistended  EXTREMITIES: no LE edema, LE contracted unable to extend  PSYCH: Calm  NEUROLOGY: Drowsy, but arousable, did not communicate   SKIN: No rashes or lesions    LABS:                        9.6    7.99  )-----------( 212      ( 10 Abel 2021 07:37 )             30.6     06-10    139  |  103  |  15  ----------------------------<  81  3.6   |  23  |  0.53    Ca    8.5      10 Abel 2021 07:37  Phos  2.9     06-10  Mg     1.9     06-10                RADIOLOGY & ADDITIONAL TESTS:    Imaging Personally Reviewed:    Consultant(s) Notes Reviewed:      Care Discussed with Consultants/Other Providers:

## 2021-06-10 NOTE — PROGRESS NOTE ADULT - PROBLEM SELECTOR PLAN 1
Family reports increasing lethargy and decreased phonation. He has had hospitalizations for aspiration in the past.   - Mental status improved this AM   - Likely 2/2 hypernatremia & poor PO intake, improved with IVF and correction of Na   - CTH w/ white matter hypoattenuation maybe be 2/2 chronic microvascular changes and/or vasogenic edema. Per discussion with daughter who is a physician- pt has not been on steroids for over 1 year, recent head imaging 1 month ago at [facility], she will reach out to brother for more information.   - Given MS improvement w/ IVF, will hold off on MRI for now, will discuss with pt's oncologist Palmira Alvarenga [phone] in am  - No PNA seen on CXray,  DC Zosyn  - B12, TSH, Folate, iron panel all wnl   - BCx: with [facility] epi, appreciate ID consult, now off of vanco, blood culture from 7th with [facility] in clusters ID aware will f/u sensitivities  - monitor off abx    #SIRS, pt meets SIRS criteria with tachycardia, tachypnea and leukocytosis, all resolved.  - cont to monitor   - Follow up repeat BCx    -Discussed with daughter Haily on 6/8, per her pt has had MRI 6 weeks ago  - discussed with pt's outpt neuro oncologist Dr. Lyle Geller's fellow Dr. Hinkle on 6/8 they have planned an outpt MRI for pt  - pt can get outpt MRI, discussed with daughter Haily on 6/9 given blood cultue with gpc again, f/u rpt culture  - Cont ivf for now

## 2021-06-10 NOTE — PROGRESS NOTE ADULT - PROBLEM SELECTOR PLAN 8
DVT ppx: Lovenox 40mg qd     GOC: Prior team Discussed with family. On admission, brought up DNR/DNI. Family wants everything. Full Code.    Prior team updated Daughter. Amparo requested updates until Tuesday. Starting Wednesday, son Jim should start receiving updates. Family wants him to be full code.  Daughter Amparo updated on 6/9

## 2021-06-10 NOTE — PROGRESS NOTE ADULT - ASSESSMENT
70 y/o M- non-verbal/contracted with [facility] GBM (dx 2019 on chemo q2 weeks), seizures, HTN, HLD, DM, SIADH who presents for increased lethargy.

## 2021-06-10 NOTE — PROGRESS NOTE ADULT - PROBLEM SELECTOR PLAN 4
Hx of GBM dx in 2019. Continues with chemo q2 weeks. Per daughter, patient's GBM has not been progressing. Had recent imaging at [facility]    - Pt now drowsy, per son Jim pt has been on Ritalin at home will restart today, will monitor mental status closely, if improves will plan for dc tomorrow. Son updated on 6/10    #Seizures   - C/w keppra.  - will monitor Mental status closely Hx of GBM dx in 2019. Continues with chemo q2 weeks. Per daughter, patient's GBM has not been progressing. Had recent imaging at [facility]    - Pt now drowsy, per son Dr. Fernandez pt has been on Ritalin at home will restart today, will monitor mental status closely, if improves will plan for dc tomorrow. Son updated on 6/10    #Seizures   - C/w keppra.  - will monitor Mental status closely

## 2021-06-11 LAB
-  AMPICILLIN/SULBACTAM: SIGNIFICANT CHANGE UP
-  CEFAZOLIN: SIGNIFICANT CHANGE UP
-  CLINDAMYCIN: SIGNIFICANT CHANGE UP
-  ERYTHROMYCIN: SIGNIFICANT CHANGE UP
-  GENTAMICIN: SIGNIFICANT CHANGE UP
-  OXACILLIN: SIGNIFICANT CHANGE UP
-  PENICILLIN: SIGNIFICANT CHANGE UP
-  RIFAMPIN: SIGNIFICANT CHANGE UP
-  TETRACYCLINE: SIGNIFICANT CHANGE UP
-  TRIMETHOPRIM/SULFAMETHOXAZOLE: SIGNIFICANT CHANGE UP
-  VANCOMYCIN: SIGNIFICANT CHANGE UP
ANION GAP SERPL CALC-SCNC: 15 MMOL/L — HIGH (ref 7–14)
BASOPHILS # BLD AUTO: 0.02 K/UL — SIGNIFICANT CHANGE UP (ref 0–0.2)
BASOPHILS NFR BLD AUTO: 0.3 % — SIGNIFICANT CHANGE UP (ref 0–2)
BLD GP AB SCN SERPL QL: NEGATIVE — SIGNIFICANT CHANGE UP
BUN SERPL-MCNC: 11 MG/DL — SIGNIFICANT CHANGE UP (ref 7–23)
CALCIUM SERPL-MCNC: 7.9 MG/DL — LOW (ref 8.4–10.5)
CHLORIDE SERPL-SCNC: 101 MMOL/L — SIGNIFICANT CHANGE UP (ref 98–107)
CO2 SERPL-SCNC: 21 MMOL/L — LOW (ref 22–31)
CREAT SERPL-MCNC: 0.49 MG/DL — LOW (ref 0.5–1.3)
CULTURE RESULTS: SIGNIFICANT CHANGE UP
EOSINOPHIL # BLD AUTO: 0.15 K/UL — SIGNIFICANT CHANGE UP (ref 0–0.5)
EOSINOPHIL NFR BLD AUTO: 2.2 % — SIGNIFICANT CHANGE UP (ref 0–6)
GLUCOSE SERPL-MCNC: 65 MG/DL — LOW (ref 70–99)
HCT VFR BLD CALC: 29.1 % — LOW (ref 39–50)
HCT VFR BLD CALC: 29.3 % — LOW (ref 39–50)
HGB BLD-MCNC: 8.9 G/DL — LOW (ref 13–17)
HGB BLD-MCNC: 9.1 G/DL — LOW (ref 13–17)
IANC: 2.91 K/UL — SIGNIFICANT CHANGE UP (ref 1.5–8.5)
IMM GRANULOCYTES NFR BLD AUTO: 0.1 % — SIGNIFICANT CHANGE UP (ref 0–1.5)
LYMPHOCYTES # BLD AUTO: 2.93 K/UL — SIGNIFICANT CHANGE UP (ref 1–3.3)
LYMPHOCYTES # BLD AUTO: 43.9 % — SIGNIFICANT CHANGE UP (ref 13–44)
MAGNESIUM SERPL-MCNC: 1.7 MG/DL — SIGNIFICANT CHANGE UP (ref 1.6–2.6)
MCHC RBC-ENTMCNC: 26.6 PG — LOW (ref 27–34)
MCHC RBC-ENTMCNC: 27.4 PG — SIGNIFICANT CHANGE UP (ref 27–34)
MCHC RBC-ENTMCNC: 30.4 GM/DL — LOW (ref 32–36)
MCHC RBC-ENTMCNC: 31.3 GM/DL — LOW (ref 32–36)
MCV RBC AUTO: 87.5 FL — SIGNIFICANT CHANGE UP (ref 80–100)
MCV RBC AUTO: 87.7 FL — SIGNIFICANT CHANGE UP (ref 80–100)
METHOD TYPE: SIGNIFICANT CHANGE UP
MONOCYTES # BLD AUTO: 0.65 K/UL — SIGNIFICANT CHANGE UP (ref 0–0.9)
MONOCYTES NFR BLD AUTO: 9.7 % — SIGNIFICANT CHANGE UP (ref 2–14)
NEUTROPHILS # BLD AUTO: 2.91 K/UL — SIGNIFICANT CHANGE UP (ref 1.8–7.4)
NEUTROPHILS NFR BLD AUTO: 43.8 % — SIGNIFICANT CHANGE UP (ref 43–77)
NRBC # BLD: 0 /100 WBCS — SIGNIFICANT CHANGE UP
NRBC # BLD: 0 /100 WBCS — SIGNIFICANT CHANGE UP
NRBC # FLD: 0 K/UL — SIGNIFICANT CHANGE UP
NRBC # FLD: 0 K/UL — SIGNIFICANT CHANGE UP
OB PNL STL: NEGATIVE — SIGNIFICANT CHANGE UP
ORGANISM # SPEC MICROSCOPIC CNT: SIGNIFICANT CHANGE UP
ORGANISM # SPEC MICROSCOPIC CNT: SIGNIFICANT CHANGE UP
PHOSPHATE SERPL-MCNC: 2.7 MG/DL — SIGNIFICANT CHANGE UP (ref 2.5–4.5)
PLATELET # BLD AUTO: 191 K/UL — SIGNIFICANT CHANGE UP (ref 150–400)
PLATELET # BLD AUTO: 204 K/UL — SIGNIFICANT CHANGE UP (ref 150–400)
POTASSIUM SERPL-MCNC: 3.5 MMOL/L — SIGNIFICANT CHANGE UP (ref 3.5–5.3)
POTASSIUM SERPL-SCNC: 3.5 MMOL/L — SIGNIFICANT CHANGE UP (ref 3.5–5.3)
RBC # BLD: 3.32 M/UL — LOW (ref 4.2–5.8)
RBC # BLD: 3.35 M/UL — LOW (ref 4.2–5.8)
RBC # FLD: 15.1 % — HIGH (ref 10.3–14.5)
RBC # FLD: 15.2 % — HIGH (ref 10.3–14.5)
RH IG SCN BLD-IMP: POSITIVE — SIGNIFICANT CHANGE UP
SODIUM SERPL-SCNC: 137 MMOL/L — SIGNIFICANT CHANGE UP (ref 135–145)
SPECIMEN SOURCE: SIGNIFICANT CHANGE UP
SPECIMEN SOURCE: SIGNIFICANT CHANGE UP
WBC # BLD: 6.17 K/UL — SIGNIFICANT CHANGE UP (ref 3.8–10.5)
WBC # BLD: 6.67 K/UL — SIGNIFICANT CHANGE UP (ref 3.8–10.5)
WBC # FLD AUTO: 6.17 K/UL — SIGNIFICANT CHANGE UP (ref 3.8–10.5)
WBC # FLD AUTO: 6.67 K/UL — SIGNIFICANT CHANGE UP (ref 3.8–10.5)

## 2021-06-11 PROCEDURE — 99233 SBSQ HOSP IP/OBS HIGH 50: CPT

## 2021-06-11 PROCEDURE — 70552 MRI BRAIN STEM W/DYE: CPT | Mod: 26

## 2021-06-11 RX ORDER — DEXTROSE 50 % IN WATER 50 %
25 SYRINGE (ML) INTRAVENOUS ONCE
Refills: 0 | Status: DISCONTINUED | OUTPATIENT
Start: 2021-06-11 | End: 2021-06-15

## 2021-06-11 RX ORDER — SODIUM CHLORIDE 9 MG/ML
1000 INJECTION, SOLUTION INTRAVENOUS
Refills: 0 | Status: DISCONTINUED | OUTPATIENT
Start: 2021-06-11 | End: 2021-06-15

## 2021-06-11 RX ORDER — DEXAMETHASONE 0.5 MG/5ML
4 ELIXIR ORAL EVERY 12 HOURS
Refills: 0 | Status: DISCONTINUED | OUTPATIENT
Start: 2021-06-12 | End: 2021-06-14

## 2021-06-11 RX ORDER — DEXAMETHASONE 0.5 MG/5ML
10 ELIXIR ORAL ONCE
Refills: 0 | Status: DISCONTINUED | OUTPATIENT
Start: 2021-06-11 | End: 2021-06-11

## 2021-06-11 RX ORDER — DEXTROSE 50 % IN WATER 50 %
12.5 SYRINGE (ML) INTRAVENOUS ONCE
Refills: 0 | Status: DISCONTINUED | OUTPATIENT
Start: 2021-06-11 | End: 2021-06-15

## 2021-06-11 RX ORDER — GLUCAGON INJECTION, SOLUTION 0.5 MG/.1ML
1 INJECTION, SOLUTION SUBCUTANEOUS ONCE
Refills: 0 | Status: DISCONTINUED | OUTPATIENT
Start: 2021-06-11 | End: 2021-06-15

## 2021-06-11 RX ORDER — SODIUM CHLORIDE 9 MG/ML
1000 INJECTION, SOLUTION INTRAVENOUS
Refills: 0 | Status: DISCONTINUED | OUTPATIENT
Start: 2021-06-11 | End: 2021-06-11

## 2021-06-11 RX ORDER — DEXTROSE 50 % IN WATER 50 %
15 SYRINGE (ML) INTRAVENOUS ONCE
Refills: 0 | Status: DISCONTINUED | OUTPATIENT
Start: 2021-06-11 | End: 2021-06-15

## 2021-06-11 RX ORDER — PANTOPRAZOLE SODIUM 20 MG/1
40 TABLET, DELAYED RELEASE ORAL
Refills: 0 | Status: DISCONTINUED | OUTPATIENT
Start: 2021-06-11 | End: 2021-06-15

## 2021-06-11 RX ORDER — DEXAMETHASONE 0.5 MG/5ML
10 ELIXIR ORAL ONCE
Refills: 0 | Status: COMPLETED | OUTPATIENT
Start: 2021-06-11 | End: 2021-06-11

## 2021-06-11 RX ADMIN — LOSARTAN POTASSIUM 100 MILLIGRAM(S): 100 TABLET, FILM COATED ORAL at 06:51

## 2021-06-11 RX ADMIN — Medication 500000 UNIT(S): at 12:02

## 2021-06-11 RX ADMIN — LEVETIRACETAM 400 MILLIGRAM(S): 250 TABLET, FILM COATED ORAL at 20:03

## 2021-06-11 RX ADMIN — Medication 500000 UNIT(S): at 19:10

## 2021-06-11 RX ADMIN — Medication 500000 UNIT(S): at 06:51

## 2021-06-11 RX ADMIN — ATORVASTATIN CALCIUM 10 MILLIGRAM(S): 80 TABLET, FILM COATED ORAL at 22:19

## 2021-06-11 RX ADMIN — SODIUM CHLORIDE 50 MILLILITER(S): 9 INJECTION, SOLUTION INTRAVENOUS at 22:19

## 2021-06-11 RX ADMIN — SODIUM CHLORIDE 50 MILLILITER(S): 9 INJECTION, SOLUTION INTRAVENOUS at 15:43

## 2021-06-11 RX ADMIN — CITALOPRAM 20 MILLIGRAM(S): 10 TABLET, FILM COATED ORAL at 12:03

## 2021-06-11 RX ADMIN — Medication 102 MILLIGRAM(S): at 23:30

## 2021-06-11 RX ADMIN — Medication 5 MILLIGRAM(S): at 12:05

## 2021-06-11 RX ADMIN — LEVETIRACETAM 400 MILLIGRAM(S): 250 TABLET, FILM COATED ORAL at 06:51

## 2021-06-11 RX ADMIN — PANTOPRAZOLE SODIUM 40 MILLIGRAM(S): 20 TABLET, DELAYED RELEASE ORAL at 19:11

## 2021-06-11 RX ADMIN — AMLODIPINE BESYLATE 5 MILLIGRAM(S): 2.5 TABLET ORAL at 06:51

## 2021-06-11 RX ADMIN — Medication 100 MILLIGRAM(S): at 19:10

## 2021-06-11 RX ADMIN — SODIUM CHLORIDE 50 MILLILITER(S): 9 INJECTION, SOLUTION INTRAVENOUS at 12:03

## 2021-06-11 RX ADMIN — ENOXAPARIN SODIUM 40 MILLIGRAM(S): 100 INJECTION SUBCUTANEOUS at 19:10

## 2021-06-11 RX ADMIN — Medication 100 MILLIGRAM(S): at 06:51

## 2021-06-11 RX ADMIN — Medication 500000 UNIT(S): at 23:30

## 2021-06-11 NOTE — PROGRESS NOTE ADULT - PROBLEM SELECTOR PLAN 2
- likely due to dehydration in the setting of poor PO intake. S/p 2L of LR in the ED.   - Na improved, seen by swallow rec puree diet with honey thick liquids  - Encourage PO    #Hx of aspiration,   - Hospitalizations for aspiration in the past now with decreased PO intake. Family deferred PEG now as pt has  passed swallow eval and is taking po now again with worsening mental staus, will keep pt NPO for now  - ivf for now

## 2021-06-11 NOTE — CONSULT NOTE ADULT - SUBJECTIVE AND OBJECTIVE BOX
Patient is a 71y old  Male who presents with a chief complaint of Lethargy (11 Jun 2021 15:50)      HPI:  70 y/o M- non-verbal/contracted with [facility] GBM (dx 2019 on chemo q2 weeks), seizures, HTN, HLD, DM, SIADH who presents for increased lethargy. The history was obtained from his daughter over the phone. She reports that at baseline he is non-verbal but does phonate random non-sensical words. He is alert, but contracted with minimal mobility. He normally eats, but does have some difficulty and has been on a puree diet since his last hospitalization. He was doing well until the family started to notice decreased alertness and poor PO intake. He was also having difficulty breathing. These symptoms prompted them to take him to the hospital. They do not think he is in any pain or was experiencing any increased pain. He has not had a BM for 2-3 days. Urine was more concentrated than usual. The patient could not verbalize any symptoms due to non-verbal status, but he looked comfortable/sleepy when I attempted to speak with him.  (05 Jun 2021 09:47)    pt follows at [facility] DR Chavis for GBM s/p chemoRT in 2019 and has been on maintainence Avastin since then ; last dose 3 wks ago.  Last MRI brain showed stable findings   pt was started on Amantadine and Ritalin in April with some improvement in symptoms.  Ritalin was held since admission and only restarted yesterday .  He has had an extensive Neuro work up for his symptoms including LP and spine MRI which was unremarkable.  He also has h/o aspiration pneumonia, hyponatremia and FFT   MRI brain pending        REVIEW OF SYSTEMS:  unable to obtain      PAST MEDICAL & SURGICAL HISTORY:  Hypertension    Hyperlipidemia    Seizures    Diabetes    Glioblastoma multiforme    CMV infection    History of SIADH    H/O colonoscopy    History of laryngoscopy    Status post stereotactic brain biopsy        SOCIAL HISTORY:    FAMILY HISTORY:  FH: myocardial infarction (Father)        MEDICATIONS  (STANDING):  amantadine Syrup 100 milliGRAM(s) Oral two times a day  amLODIPine   Tablet 5 milliGRAM(s) Oral daily  atorvastatin 10 milliGRAM(s) Oral at bedtime  citalopram 20 milliGRAM(s) Oral daily  dextrose 40% Gel 15 Gram(s) Oral once  dextrose 5% + sodium chloride 0.45%. 1000 milliLiter(s) (50 mL/Hr) IV Continuous <Continuous>  dextrose 5%. 1000 milliLiter(s) (50 mL/Hr) IV Continuous <Continuous>  dextrose 5%. 1000 milliLiter(s) (100 mL/Hr) IV Continuous <Continuous>  dextrose 50% Injectable 25 Gram(s) IV Push once  dextrose 50% Injectable 12.5 Gram(s) IV Push once  dextrose 50% Injectable 25 Gram(s) IV Push once  enoxaparin Injectable 40 milliGRAM(s) SubCutaneous every 24 hours  glucagon  Injectable 1 milliGRAM(s) IntraMuscular once  levETIRAcetam  IVPB 500 milliGRAM(s) IV Intermittent every 12 hours  losartan 100 milliGRAM(s) Oral daily  methylphenidate 5 milliGRAM(s) Oral <User Schedule>  nystatin    Suspension [id number] Unit(s) Oral four times a day    MEDICATIONS  (PRN):      Allergies    No Known Allergies    Intolerances        Vital Signs Last 24 Hrs  T(C): 36.3 (11 Jun 2021 15:26), Max: 37.1 (11 Jun 2021 06:48)  T(F): 97.3 (11 Jun 2021 15:26), Max: 98.8 (11 Jun 2021 06:48)  HR: 105 (11 Jun 2021 15:26) (103 - 105)  BP: 149/83 (11 Jun 2021 15:26) (120/61 - 149/83)  BP(mean): --  RR: 16 (11 Jun 2021 15:26) (16 - 17)  SpO2: 99% (11 Jun 2021 15:26) (97% - 99%)    PHYSICAL EXAM  General: adult in NAD  HEENT: clear oropharynx, anicteric sclera, pink conjunctiva  Neck: supple  CV: normal S1/S2 with no murmur rubs or gallops  Lungs: positive air movement b/l ant lungs,clear to auscultation, no wheezes, no rales  Abdomen: soft non-tender non-distended, no hepatosplenomegaly  Ext: no clubbing cyanosis or edema  Skin: no rashes and no petechiae  Neuro: non verbal       LABS:                          8.9    6.17  )-----------( 191      ( 11 Jun 2021 12:42 )             29.3         Mean Cell Volume : 87.5 fL  Mean Cell Hemoglobin : 26.6 pg  Mean Cell Hemoglobin Concentration : 30.4 gm/dL  Auto Neutrophil # : x  Auto Lymphocyte # : x  Auto Monocyte # : x  Auto Eosinophil # : x  Auto Basophil # : x  Auto Neutrophil % : x  Auto Lymphocyte % : x  Auto Monocyte % : x  Auto Eosinophil % : x  Auto Basophil % : x      Serial CBC's  06-11 @ 12:42  Hct-29.3 / Hgb-8.9 / Plat-191 / RBC-3.35 / WBC-6.17  Serial CBC's  06-11 @ 08:07  Hct-29.1 / Hgb-9.1 / Plat-204 / RBC-3.32 / WBC-6.67  Serial CBC's  06-10 @ 07:37  Hct-30.6 / Hgb-9.6 / Plat-212 / RBC-3.53 / WBC-7.99  Serial CBC's  06-09 @ 06:46  Hct-32.6 / Hgb-10.2 / Plat-216 / RBC-3.78 / WBC-9.17  Serial CBC's  06-08 @ 07:16  Hct-31.8 / Hgb-10.1 / Plat-202 / RBC-3.70 / WBC-10.57      06-11    137  |  101  |  11  ----------------------------<  65<L>  3.5   |  21<L>  |  0.49<L>    Ca    7.9<L>      11 Jun 2021 08:07  Phos  2.7     06-11  Mg     1.7     06-11            Vitamin B12, Serum: 909 pg/mL (06-06 @ 11:52)  Folate, Serum: 8.8 ng/mL (06-06 @ 11:52)              BLOOD SMEAR INTERPRETATION:       RADIOLOGY & ADDITIONAL STUDIES:

## 2021-06-11 NOTE — PROGRESS NOTE ADULT - PROBLEM SELECTOR PLAN 4
Hx of GBM dx in 2019. Continues with chemo q2 weeks. Per daughter, patient's GBM has not been progressing. Had recent imaging at [facility]    - Pt now lethargic, per son Dr. Fernandez pt has been on Ritalin at home restarted on 6/10, will monitor mental status closely    #Seizures   - C/w keppra.  - will monitor Mental status closely

## 2021-06-11 NOTE — PROGRESS NOTE ADULT - ASSESSMENT
72 y/o M- non-verbal/contracted with [facility] GBM (dx 2019 on chemo q2 weeks), seizures, HTN, HLD, DM, SIADH who presents for increased lethargy.

## 2021-06-11 NOTE — CHART NOTE - NSCHARTNOTEFT_GEN_A_CORE
Blood cultures from 6/7 with Staphylococcus Pettenkoferi. Discussed with ID, no indication for abx (likely contaminant).  MRI with héctor pending given lethargy (safety and consent in chart). Per oncology Dr. Carrillo: if evidence of vasogenic edema start on decadron 10mg x1 and then 4mg q12h.  Will continue with d5 1/2 NS given poor po intake.

## 2021-06-11 NOTE — CONSULT NOTE ADULT - ASSESSMENT
72 y/o M- non-verbal/contracted with [facility] GBM (dx 2019) s/p chemoRT and on maintainence Avastin, seizures, HTN, HLD, DM, SIADH, aspiration pna who presents for increased lethargy.     GBM  - follows with [facility]   - s/p chemoRT in 2019; now on Avastin maintainence ; last dose 5/28; on hold since admit, plan to restart as outpt   - recent MRI in April - stable findings     Altered Mental Status   - Family reports increasing lethargy and decreased phonation. He has had hospitalizations for aspiration in the past.   - Mental status had improved until couple of days ago, but now again lethargic   - was started on Amantadine and Ritalin in the past; Ritalin just restarted yesterday   - CTH w/ white matter hypoattenuation maybe be 2/2 chronic microvascular changes and/or vasogenic edema. Per discussion with daughter who is a physician- pt has not been on steroids for over 1 year, recent head imaging 1 month ago at [facility] which showed stable findings; no edema ; + leukoencephalopathy   - repeat MRI pending   - ID eval noted; no infectious etiology   - cont Keppra, Ritalin, Amantadine   - h/o hyponatremia - now resolved   - aspirtion precautions     -Discussed with daughter Haily over phone   d/w Dr Saez fellow at [facility]  agree with repeat MRI brain and starting steroids if edema present     Roger Carrillo MD  [facility]  [phone]

## 2021-06-11 NOTE — PROGRESS NOTE ADULT - PROBLEM SELECTOR PLAN 3
Per daughter, last dx with thrush >1 month ago. Oral cavity with poor dentition, will need good oral care and will re-assess.  - on nystatin for now

## 2021-06-11 NOTE — PROGRESS NOTE ADULT - PROBLEM SELECTOR PLAN 1
Family reports increasing lethargy and decreased phonation. He has had hospitalizations for aspiration in the past.   - Mental status had improved until couple of days ago, but now again lethargic   - Likely 2/2 hypernatremia & poor PO intake vs due to his GBM vs not taking ritalin  - CTH w/ white matter hypoattenuation maybe be 2/2 chronic microvascular changes and/or vasogenic edema. Per discussion with daughter who is a physician- pt has not been on steroids for over 1 year, recent head imaging 1 month ago at [facility]   - Given worsening mental status will get MRI  -Left message for pt's oncologist Palmira Alvarenga [phone] on 6/11  - B12, TSH, Folate, iron panel all wnl   - BCx: with [facility] epi, appreciate ID consult, now off of vanco, blood culture from 7th with [facility] in clusters ID aware will f/u sensitivities  - monitor off abx per ID    #SIRS, pt meets SIRS criteria with tachycardia, tachypnea and leukocytosis, all resolved.  - cont to monitor   - Follow up repeat BCx    -Discussed with daughter Haily on 6/11, per her pt has had MRI 6 weeks ago  -will rpt MRI given encephalopathy, if MRI shows vasogenic edema, to start on decadron 10mg x1 and then 4mg q12h discussed with Oncology Dr. Carrillo   - Oncology consulted, f/u consult recs  - Cont ivf for now-d51/2 given poor po intake Family reports increasing lethargy and decreased phonation. He has had hospitalizations for aspiration in the past.   - Mental status had improved until couple of days ago, but now again lethargic   - Likely 2/2 hypernatremia & poor PO intake vs due to his GBM vs not taking ritalin  - CTH w/ white matter hypoattenuation maybe be 2/2 chronic microvascular changes and/or vasogenic edema. Per discussion with daughter who is a physician- pt has not been on steroids for over 1 year, recent head imaging 1 month ago at [facility]   - Given worsening mental status will get MRI  -Left message for pt's oncologist Palmira Alvarenga [phone] on 6/11  - B12, TSH, Folate, iron panel all wnl   - BCx: with [facility] epi, appreciate ID consult, now off of vanco, blood culture from 7th with Staphylococcus Pettenkoferi ID made aware, f/u ID recs  - monitor off abx per ID    #SIRS, pt meets SIRS criteria with tachycardia, tachypnea and leukocytosis, all resolved.  - cont to monitor   - Follow up repeat BCx    -Discussed with daughter Haily on 6/11, per her pt has had MRI 6 weeks ago  -will rpt MRI given encephalopathy, if MRI shows vasogenic edema, to start on decadron 10mg x1 and then 4mg q12h discussed with Oncology Dr. Carrillo   - Oncology consulted, f/u consult recs  - Cont ivf for now-d51/2 given poor po intake

## 2021-06-11 NOTE — PROGRESS NOTE ADULT - SUBJECTIVE AND OBJECTIVE BOX
Patient is a 71y old  Male who presents with a chief complaint of Lethargy (10 Abel 2021 12:56)      SUBJECTIVE / OVERNIGHT EVENTS: Pt seen and examined at 10:40am, no overnight events, pt is lethargic, moans upon arousal. Wife at bedside, per nsg not taking po or meds. No other issues reported.    MEDICATIONS  (STANDING):  amantadine Syrup 100 milliGRAM(s) Oral two times a day  amLODIPine   Tablet 5 milliGRAM(s) Oral daily  atorvastatin 10 milliGRAM(s) Oral at bedtime  citalopram 20 milliGRAM(s) Oral daily  dextrose 40% Gel 15 Gram(s) Oral once  dextrose 5% + sodium chloride 0.45%. 1000 milliLiter(s) (50 mL/Hr) IV Continuous <Continuous>  dextrose 5%. 1000 milliLiter(s) (50 mL/Hr) IV Continuous <Continuous>  dextrose 5%. 1000 milliLiter(s) (100 mL/Hr) IV Continuous <Continuous>  dextrose 50% Injectable 25 Gram(s) IV Push once  dextrose 50% Injectable 12.5 Gram(s) IV Push once  dextrose 50% Injectable 25 Gram(s) IV Push once  enoxaparin Injectable 40 milliGRAM(s) SubCutaneous every 24 hours  glucagon  Injectable 1 milliGRAM(s) IntraMuscular once  levETIRAcetam  IVPB 500 milliGRAM(s) IV Intermittent every 12 hours  losartan 100 milliGRAM(s) Oral daily  methylphenidate 5 milliGRAM(s) Oral <User Schedule>  nystatin    Suspension 782384 Unit(s) Oral four times a day    MEDICATIONS  (PRN):      Vital Signs Last 24 Hrs  T(C): 36.3 (11 Jun 2021 15:26), Max: 37.1 (11 Jun 2021 06:48)  T(F): 97.3 (11 Jun 2021 15:26), Max: 98.8 (11 Jun 2021 06:48)  HR: 105 (11 Jun 2021 15:26) (103 - 105)  BP: 149/83 (11 Jun 2021 15:26) (120/61 - 149/83)  BP(mean): --  RR: 16 (11 Jun 2021 15:26) (16 - 17)  SpO2: 99% (11 Jun 2021 15:26) (97% - 99%)  CAPILLARY BLOOD GLUCOSE      POCT Blood Glucose.: 79 mg/dL (11 Jun 2021 10:16)    I&O's Summary    11 Jun 2021 07:01  -  11 Jun 2021 15:51  --------------------------------------------------------  IN: 480 mL / OUT: 0 mL / NET: 480 mL        PHYSICAL EXAM:  GENERAL: NAD, thinly built male  CHEST/LUNG: Decreased bs bilaterally poor effort  HEART: Tachycardic to 102/mt  ABDOMEN: Soft, Nontender, Nondistended  EXTREMITIES: LE contracted  PSYCH: Lethargic  NEUROLOGY: Lethargic, moans upon arousal/tactile stimuli   SKIN: No rashes or lesions    LABS:                        8.9    6.17  )-----------( 191      ( 11 Jun 2021 12:42 )             29.3     06-11    137  |  101  |  11  ----------------------------<  65<L>  3.5   |  21<L>  |  0.49<L>    Ca    7.9<L>      11 Jun 2021 08:07  Phos  2.7     06-11  Mg     1.7     06-11                RADIOLOGY & ADDITIONAL TESTS:    Imaging Personally Reviewed:    Consultant(s) Notes Reviewed:      Care Discussed with Consultants/Other Providers:

## 2021-06-12 LAB
ANION GAP SERPL CALC-SCNC: 15 MMOL/L — HIGH (ref 7–14)
BUN SERPL-MCNC: 8 MG/DL — SIGNIFICANT CHANGE UP (ref 7–23)
CALCIUM SERPL-MCNC: 8.4 MG/DL — SIGNIFICANT CHANGE UP (ref 8.4–10.5)
CHLORIDE SERPL-SCNC: 97 MMOL/L — LOW (ref 98–107)
CO2 SERPL-SCNC: 21 MMOL/L — LOW (ref 22–31)
CREAT SERPL-MCNC: 0.44 MG/DL — LOW (ref 0.5–1.3)
CULTURE RESULTS: SIGNIFICANT CHANGE UP
GLUCOSE SERPL-MCNC: 196 MG/DL — HIGH (ref 70–99)
HCT VFR BLD CALC: 30.6 % — LOW (ref 39–50)
HGB BLD-MCNC: 9.7 G/DL — LOW (ref 13–17)
MAGNESIUM SERPL-MCNC: 1.8 MG/DL — SIGNIFICANT CHANGE UP (ref 1.6–2.6)
MCHC RBC-ENTMCNC: 26.9 PG — LOW (ref 27–34)
MCHC RBC-ENTMCNC: 31.7 GM/DL — LOW (ref 32–36)
MCV RBC AUTO: 85 FL — SIGNIFICANT CHANGE UP (ref 80–100)
NRBC # BLD: 0 /100 WBCS — SIGNIFICANT CHANGE UP
NRBC # FLD: 0 K/UL — SIGNIFICANT CHANGE UP
PHOSPHATE SERPL-MCNC: 3 MG/DL — SIGNIFICANT CHANGE UP (ref 2.5–4.5)
PLATELET # BLD AUTO: 216 K/UL — SIGNIFICANT CHANGE UP (ref 150–400)
POTASSIUM SERPL-MCNC: 3.8 MMOL/L — SIGNIFICANT CHANGE UP (ref 3.5–5.3)
POTASSIUM SERPL-SCNC: 3.8 MMOL/L — SIGNIFICANT CHANGE UP (ref 3.5–5.3)
RBC # BLD: 3.6 M/UL — LOW (ref 4.2–5.8)
RBC # FLD: 14.8 % — HIGH (ref 10.3–14.5)
SODIUM SERPL-SCNC: 133 MMOL/L — LOW (ref 135–145)
SPECIMEN SOURCE: SIGNIFICANT CHANGE UP
WBC # BLD: 5.5 K/UL — SIGNIFICANT CHANGE UP (ref 3.8–10.5)
WBC # FLD AUTO: 5.5 K/UL — SIGNIFICANT CHANGE UP (ref 3.8–10.5)

## 2021-06-12 PROCEDURE — 99233 SBSQ HOSP IP/OBS HIGH 50: CPT

## 2021-06-12 RX ORDER — SODIUM CHLORIDE 9 MG/ML
500 INJECTION INTRAMUSCULAR; INTRAVENOUS; SUBCUTANEOUS ONCE
Refills: 0 | Status: DISCONTINUED | OUTPATIENT
Start: 2021-06-12 | End: 2021-06-12

## 2021-06-12 RX ADMIN — LEVETIRACETAM 400 MILLIGRAM(S): 250 TABLET, FILM COATED ORAL at 05:50

## 2021-06-12 RX ADMIN — Medication 100 MILLIGRAM(S): at 05:49

## 2021-06-12 RX ADMIN — Medication 4 MILLIGRAM(S): at 12:06

## 2021-06-12 RX ADMIN — Medication 500000 UNIT(S): at 18:45

## 2021-06-12 RX ADMIN — Medication 4 MILLIGRAM(S): at 22:06

## 2021-06-12 RX ADMIN — ATORVASTATIN CALCIUM 10 MILLIGRAM(S): 80 TABLET, FILM COATED ORAL at 22:06

## 2021-06-12 RX ADMIN — SODIUM CHLORIDE 50 MILLILITER(S): 9 INJECTION, SOLUTION INTRAVENOUS at 08:50

## 2021-06-12 RX ADMIN — PANTOPRAZOLE SODIUM 40 MILLIGRAM(S): 20 TABLET, DELAYED RELEASE ORAL at 08:47

## 2021-06-12 RX ADMIN — Medication 5 MILLIGRAM(S): at 15:43

## 2021-06-12 RX ADMIN — AMLODIPINE BESYLATE 5 MILLIGRAM(S): 2.5 TABLET ORAL at 05:50

## 2021-06-12 RX ADMIN — ENOXAPARIN SODIUM 40 MILLIGRAM(S): 100 INJECTION SUBCUTANEOUS at 18:45

## 2021-06-12 RX ADMIN — CITALOPRAM 20 MILLIGRAM(S): 10 TABLET, FILM COATED ORAL at 12:04

## 2021-06-12 RX ADMIN — LOSARTAN POTASSIUM 100 MILLIGRAM(S): 100 TABLET, FILM COATED ORAL at 05:50

## 2021-06-12 RX ADMIN — Medication 500000 UNIT(S): at 12:04

## 2021-06-12 RX ADMIN — Medication 500000 UNIT(S): at 05:49

## 2021-06-12 RX ADMIN — Medication 5 MILLIGRAM(S): at 08:47

## 2021-06-12 RX ADMIN — PANTOPRAZOLE SODIUM 40 MILLIGRAM(S): 20 TABLET, DELAYED RELEASE ORAL at 18:45

## 2021-06-12 RX ADMIN — LEVETIRACETAM 400 MILLIGRAM(S): 250 TABLET, FILM COATED ORAL at 18:44

## 2021-06-12 RX ADMIN — Medication 100 MILLIGRAM(S): at 18:46

## 2021-06-12 NOTE — PROGRESS NOTE ADULT - PROBLEM SELECTOR PLAN 2
- likely due to dehydration in the setting of poor PO intake. S/p 2L of LR in the ED.   - Na improved, seen by swallow rec puree diet with honey thick liquids  - Encourage PO    #Hx of aspiration,   - Hospitalizations for aspiration in the past now with decreased PO intake. Family deferred PEG now as pt has  passed swallow eval and was taking po   now again with worsening mental staus, will keep pt NPO for now  - ivf for now

## 2021-06-12 NOTE — PROGRESS NOTE ADULT - SUBJECTIVE AND OBJECTIVE BOX
Pt seen, not interactive, family at bedside    MEDICATIONS  (STANDING):  amantadine Syrup 100 milliGRAM(s) Oral two times a day  amLODIPine   Tablet 5 milliGRAM(s) Oral daily  atorvastatin 10 milliGRAM(s) Oral at bedtime  citalopram 20 milliGRAM(s) Oral daily  dexAMETHasone  Injectable 4 milliGRAM(s) IV Push every 12 hours  dextrose 40% Gel 15 Gram(s) Oral once  dextrose 5% + sodium chloride 0.45%. 1000 milliLiter(s) (50 mL/Hr) IV Continuous <Continuous>  dextrose 5%. 1000 milliLiter(s) (100 mL/Hr) IV Continuous <Continuous>  dextrose 5%. 1000 milliLiter(s) (50 mL/Hr) IV Continuous <Continuous>  dextrose 50% Injectable 25 Gram(s) IV Push once  dextrose 50% Injectable 12.5 Gram(s) IV Push once  dextrose 50% Injectable 25 Gram(s) IV Push once  enoxaparin Injectable 40 milliGRAM(s) SubCutaneous every 24 hours  glucagon  Injectable 1 milliGRAM(s) IntraMuscular once  levETIRAcetam  IVPB 500 milliGRAM(s) IV Intermittent every 12 hours  losartan 100 milliGRAM(s) Oral daily  methylphenidate 5 milliGRAM(s) Oral <User Schedule>  nystatin    Suspension 486337 Unit(s) Oral four times a day  pantoprazole  Injectable 40 milliGRAM(s) IV Push two times a day    MEDICATIONS  (PRN):      ROS  UTO 2/2 mental status    Vital Signs Last 24 Hrs  T(C): 36.9 (12 Jun 2021 14:11), Max: 37 (11 Jun 2021 21:27)  T(F): 98.4 (12 Jun 2021 14:11), Max: 98.6 (11 Jun 2021 21:27)  HR: 106 (12 Jun 2021 14:11) (94 - 106)  BP: 144/75 (12 Jun 2021 14:11) (128/75 - 148/77)  BP(mean): --  RR: 17 (12 Jun 2021 14:11) (16 - 17)  SpO2: 100% (12 Jun 2021 14:11) (100% - 100%)    PE  NAD  contracted, cachectic  exam limited 2/2 participation                          9.7    5.50  )-----------( 216      ( 12 Jun 2021 07:40 )             30.6       06-12    133<L>  |  97<L>  |  8   ----------------------------<  196<H>  3.8   |  21<L>  |  0.44<L>    Ca    8.4      12 Jun 2021 07:40  Phos  3.0     06-12  Mg     1.8     06-12

## 2021-06-12 NOTE — PROGRESS NOTE ADULT - SUBJECTIVE AND OBJECTIVE BOX
[facility] Division of Hospital Medicine  Pat Lopez MD  Pager [phone]    Patient is a 71y old  Male who presents with a chief complaint of Lethargy       SUBJECTIVE / OVERNIGHT EVENTS: unresponsive on my visit this AM; appears comfortable      MEDICATIONS  (STANDING):  amantadine Syrup 100 milliGRAM(s) Oral two times a day  amLODIPine   Tablet 5 milliGRAM(s) Oral daily  atorvastatin 10 milliGRAM(s) Oral at bedtime  citalopram 20 milliGRAM(s) Oral daily  dexAMETHasone  Injectable 4 milliGRAM(s) IV Push every 12 hours  dextrose 40% Gel 15 Gram(s) Oral once  dextrose 5% + sodium chloride 0.45%. 1000 milliLiter(s) (50 mL/Hr) IV Continuous <Continuous>  dextrose 5%. 1000 milliLiter(s) (100 mL/Hr) IV Continuous <Continuous>  dextrose 5%. 1000 milliLiter(s) (50 mL/Hr) IV Continuous <Continuous>  dextrose 50% Injectable 25 Gram(s) IV Push once  dextrose 50% Injectable 12.5 Gram(s) IV Push once  dextrose 50% Injectable 25 Gram(s) IV Push once  enoxaparin Injectable 40 milliGRAM(s) SubCutaneous every 24 hours  glucagon  Injectable 1 milliGRAM(s) IntraMuscular once  levETIRAcetam  IVPB 500 milliGRAM(s) IV Intermittent every 12 hours  losartan 100 milliGRAM(s) Oral daily  methylphenidate 5 milliGRAM(s) Oral <User Schedule>  nystatin    Suspension 573510 Unit(s) Oral four times a day  pantoprazole  Injectable 40 milliGRAM(s) IV Push two times a day        PHYSICAL EXAM:  Vital Signs Last 24 Hrs  T(F): 98.4 (12 Jun 2021 14:11), Max: 98.6 (11 Jun 2021 21:27)  HR: 106 (12 Jun 2021 14:11) (94 - 106)  BP: 144/75 (12 Jun 2021 14:11) (128/75 - 148/77)  RR: 17 (12 Jun 2021 14:11) (16 - 17)  SpO2: 100% (12 Jun 2021 14:11) (100% - 100%)    CONSTITUTIONAL: NAD, thin, appears comfortable  EYES: closed  ENMT: Moist oral mucosa  RESPIRATORY: Normal respiratory effort; grossly b/l AE  CARDIOVASCULAR: Regular rate and rhythm; No lower extremity edema;   ABDOMEN: Nontender to palpation, normoactive bowel sounds  MUSCULOSKELETAL: no joint swelling or tenderness to palpation, contracted LE  PSYCH: unable to assess due to mental status  NEUROLOGY: non verbal      LABS:                        9.7    5.50  )-----------( 216      ( 12 Jun 2021 07:40 )             30.6     06-12    133<L>  |  97<L>  |  8   ----------------------------<  196<H>  3.8   |  21<L>  |  0.44<L>    Ca    8.4      12 Jun 2021 07:40  Phos  3.0     06-12  Mg     1.8     06-12

## 2021-06-12 NOTE — PROGRESS NOTE ADULT - ASSESSMENT
72 y/o M- non-verbal/contracted with [facility] GBM (dx 2019) s/p chemoRT and on maintainence Avastin, seizures, HTN, HLD, DM, SIADH, aspiration pna who presents for increased lethargy.     GBM  - [place] [facility] [place]   - [place]/p chemoRT in 2019; now on Avastin maintainence ; last dose 5/28; on hold since admit, plan to restart as outpt   - recent MRI in April - stable findings   - repeat MRI here showed stable mass, with progression of RT gliosis. Reviewed report with [facility] fellow and rad/onc, no acute intervention indicated for gliosis but will obtain imaging and review      Altered Mental Status   - Family reports increasing lethargy and decreased phonation. He has had hospitalizations for aspiration in the past.   - Mental status had improved until couple of days ago, waxing and waning  - was started on Amantadine and Ritalin in the past; Ritalin restarted   - CTH w/ white matter hypoattenuation maybe be 2/2 chronic microvascular changes and/or vasogenic edema. Per discussion with daughter who is a physician- pt has not been on steroids for over 1 year, recent [place] [facility] findings; no edema ; + leukoencephalopathy   - repeat MRI as above, plan and impression as above  - keep on decadron for now until imaging [place] [facility]  - ID eval noted; no infectious etiology   - cont Keppra, Ritalin, Amantadine   - aspiration precautions   - consider neuro consult    Discussed with daughter Haily over phone, [facility] fellow and Dr Lauren as well, will follow, total time spent 35min, >50% spent in discussion and coordination of care, [phone]

## 2021-06-12 NOTE — PROGRESS NOTE ADULT - ASSESSMENT
70 y/o M- non-verbal/contracted with [facility] GBM (dx 2019 on chemo q2 weeks), seizures, HTN, HLD, DM, SIADH who presents for increased lethargy.

## 2021-06-12 NOTE — PROGRESS NOTE ADULT - PROBLEM SELECTOR PLAN 1
metabolic encep likely due to hypernatremia, GBM   MRI P  -Left message for pt's oncologist Palmira Alvarenga [phone] on 6/11  - B12, TSH, Folate, iron panel all wnl   - BCx: with staph epi, appreciate ID consult, now off of vanco, blood culture from [facility] with Staphylococcus Pettenkoferi ID made aware, likely contam, no need to treat       if MRI shows vasogenic edema, to start on decadron 10mg x1 and then 4mg q12h discussed with Oncology Dr. Carrillo   - Oncology consulted, f/u consult recs  - Cont ivf for now-d51/2 given poor po intake

## 2021-06-13 LAB
ANION GAP SERPL CALC-SCNC: 12 MMOL/L — SIGNIFICANT CHANGE UP (ref 7–14)
BUN SERPL-MCNC: 9 MG/DL — SIGNIFICANT CHANGE UP (ref 7–23)
CALCIUM SERPL-MCNC: 8.9 MG/DL — SIGNIFICANT CHANGE UP (ref 8.4–10.5)
CHLORIDE SERPL-SCNC: 104 MMOL/L — SIGNIFICANT CHANGE UP (ref 98–107)
CHLORIDE UR-SCNC: 38 MMOL/L — SIGNIFICANT CHANGE UP
CO2 SERPL-SCNC: 24 MMOL/L — SIGNIFICANT CHANGE UP (ref 22–31)
CREAT SERPL-MCNC: 0.43 MG/DL — LOW (ref 0.5–1.3)
GLUCOSE SERPL-MCNC: 180 MG/DL — HIGH (ref 70–99)
HCT VFR BLD CALC: 27.9 % — LOW (ref 39–50)
HGB BLD-MCNC: 8.9 G/DL — LOW (ref 13–17)
MAGNESIUM SERPL-MCNC: 2 MG/DL — SIGNIFICANT CHANGE UP (ref 1.6–2.6)
MCHC RBC-ENTMCNC: 27.1 PG — SIGNIFICANT CHANGE UP (ref 27–34)
MCHC RBC-ENTMCNC: 31.9 GM/DL — LOW (ref 32–36)
MCV RBC AUTO: 85.1 FL — SIGNIFICANT CHANGE UP (ref 80–100)
NRBC # BLD: 0 /100 WBCS — SIGNIFICANT CHANGE UP
NRBC # FLD: 0 K/UL — SIGNIFICANT CHANGE UP
OSMOLALITY UR: 242 MOSM/KG — SIGNIFICANT CHANGE UP (ref 50–1200)
PHOSPHATE SERPL-MCNC: 2.2 MG/DL — LOW (ref 2.5–4.5)
PLATELET # BLD AUTO: 232 K/UL — SIGNIFICANT CHANGE UP (ref 150–400)
POTASSIUM SERPL-MCNC: 3.4 MMOL/L — LOW (ref 3.5–5.3)
POTASSIUM SERPL-SCNC: 3.4 MMOL/L — LOW (ref 3.5–5.3)
POTASSIUM UR-SCNC: 13.7 MMOL/L — SIGNIFICANT CHANGE UP
RBC # BLD: 3.28 M/UL — LOW (ref 4.2–5.8)
RBC # FLD: 14.6 % — HIGH (ref 10.3–14.5)
SODIUM SERPL-SCNC: 140 MMOL/L — SIGNIFICANT CHANGE UP (ref 135–145)
SODIUM UR-SCNC: 37 MMOL/L — SIGNIFICANT CHANGE UP
WBC # BLD: 5.07 K/UL — SIGNIFICANT CHANGE UP (ref 3.8–10.5)
WBC # FLD AUTO: 5.07 K/UL — SIGNIFICANT CHANGE UP (ref 3.8–10.5)

## 2021-06-13 PROCEDURE — 99233 SBSQ HOSP IP/OBS HIGH 50: CPT

## 2021-06-13 RX ORDER — POTASSIUM CHLORIDE 20 MEQ
20 PACKET (EA) ORAL ONCE
Refills: 0 | Status: COMPLETED | OUTPATIENT
Start: 2021-06-13 | End: 2021-06-13

## 2021-06-13 RX ADMIN — Medication 500000 UNIT(S): at 23:26

## 2021-06-13 RX ADMIN — Medication 20 MILLIEQUIVALENT(S): at 10:06

## 2021-06-13 RX ADMIN — Medication 5 MILLIGRAM(S): at 10:06

## 2021-06-13 RX ADMIN — CITALOPRAM 20 MILLIGRAM(S): 10 TABLET, FILM COATED ORAL at 17:13

## 2021-06-13 RX ADMIN — Medication 500000 UNIT(S): at 17:13

## 2021-06-13 RX ADMIN — LOSARTAN POTASSIUM 100 MILLIGRAM(S): 100 TABLET, FILM COATED ORAL at 05:28

## 2021-06-13 RX ADMIN — Medication 500000 UNIT(S): at 05:28

## 2021-06-13 RX ADMIN — Medication 100 MILLIGRAM(S): at 17:13

## 2021-06-13 RX ADMIN — ENOXAPARIN SODIUM 40 MILLIGRAM(S): 100 INJECTION SUBCUTANEOUS at 17:13

## 2021-06-13 RX ADMIN — SODIUM CHLORIDE 50 MILLILITER(S): 9 INJECTION, SOLUTION INTRAVENOUS at 05:28

## 2021-06-13 RX ADMIN — ATORVASTATIN CALCIUM 10 MILLIGRAM(S): 80 TABLET, FILM COATED ORAL at 22:00

## 2021-06-13 RX ADMIN — AMLODIPINE BESYLATE 5 MILLIGRAM(S): 2.5 TABLET ORAL at 05:28

## 2021-06-13 RX ADMIN — PANTOPRAZOLE SODIUM 40 MILLIGRAM(S): 20 TABLET, DELAYED RELEASE ORAL at 05:29

## 2021-06-13 RX ADMIN — Medication 100 MILLIGRAM(S): at 05:28

## 2021-06-13 RX ADMIN — Medication 4 MILLIGRAM(S): at 22:00

## 2021-06-13 RX ADMIN — LEVETIRACETAM 400 MILLIGRAM(S): 250 TABLET, FILM COATED ORAL at 05:28

## 2021-06-13 RX ADMIN — Medication 4 MILLIGRAM(S): at 10:06

## 2021-06-13 RX ADMIN — Medication 5 MILLIGRAM(S): at 17:13

## 2021-06-13 RX ADMIN — Medication 500000 UNIT(S): at 00:55

## 2021-06-13 RX ADMIN — LEVETIRACETAM 400 MILLIGRAM(S): 250 TABLET, FILM COATED ORAL at 17:14

## 2021-06-13 RX ADMIN — PANTOPRAZOLE SODIUM 40 MILLIGRAM(S): 20 TABLET, DELAYED RELEASE ORAL at 17:14

## 2021-06-13 NOTE — PROGRESS NOTE ADULT - ASSESSMENT
70 y/o M- non-verbal/contracted with [facility] GBM (dx 2019) s/p chemoRT and on maintainence Avastin, seizures, HTN, HLD, DM, SIADH, aspiration pna who presents for increased lethargy.     GBM  - [place] with [place] [place] [facility] [place]   - s/p chemoRT in 2019; now on Avastin maintainence ; last dose 5/28; on hold since admit, plan to restart as outpt   - recent MRI in April - stable findings   - repeat MRI here showed stable mass, with progression of RT gliosis. Reviewed report with [facility] fellow and rad/onc, no acute intervention indicated for gliosis but will obtain imaging and review      Altered Mental Status   - Family reports increasing lethargy and decreased phonation. He has had hospitalizations for aspiration in the past.   - Mental status had improved until couple of days ago, waxing and waning  - was started on Amantadine and Ritalin in the past; Ritalin restarted   - CTH w/ white matter hypoattenuation maybe be 2/2 chronic microvascular changes and/or vasogenic edema. Per discussion with daughter who is a physician- pt has not been on steroids for over 1 year, recent head imaging 1 month ago at [facility] [place] which showed stable findings; no edema ; + leukoencephalopathy   - repeat MRI as above, plan and impression as above  - keep on decadron for now until imaging reviewed at [facility] [place]  - ID eval noted; no infectious etiology   - cont Keppra, Ritalin, Amantadine   - aspiration precautions   - consider neuro consult    will cont to follow     Dereck Mclain MD  Hematology Oncology   O: [phone]  C: [phone]

## 2021-06-13 NOTE — PROGRESS NOTE ADULT - SUBJECTIVE AND OBJECTIVE BOX
Patient seen and examined at bedside. states he feels well.     MEDICATIONS  (STANDING):  amantadine Syrup 100 milliGRAM(s) Oral two times a day  amLODIPine   Tablet 5 milliGRAM(s) Oral daily  atorvastatin 10 milliGRAM(s) Oral at bedtime  citalopram 20 milliGRAM(s) Oral daily  dexAMETHasone  Injectable 4 milliGRAM(s) IV Push every 12 hours  dextrose 40% Gel 15 Gram(s) Oral once  dextrose 5% + sodium chloride 0.45%. 1000 milliLiter(s) (50 mL/Hr) IV Continuous <Continuous>  dextrose 5%. 1000 milliLiter(s) (50 mL/Hr) IV Continuous <Continuous>  dextrose 5%. 1000 milliLiter(s) (100 mL/Hr) IV Continuous <Continuous>  dextrose 50% Injectable 25 Gram(s) IV Push once  dextrose 50% Injectable 12.5 Gram(s) IV Push once  dextrose 50% Injectable 25 Gram(s) IV Push once  enoxaparin Injectable 40 milliGRAM(s) SubCutaneous every 24 hours  glucagon  Injectable 1 milliGRAM(s) IntraMuscular once  levETIRAcetam  IVPB 500 milliGRAM(s) IV Intermittent every 12 hours  losartan 100 milliGRAM(s) Oral daily  methylphenidate 5 milliGRAM(s) Oral <User Schedule>  nystatin    Suspension 310575 Unit(s) Oral four times a day  pantoprazole  Injectable 40 milliGRAM(s) IV Push two times a day    MEDICATIONS  (PRN):        Vital Signs Last 24 Hrs  T(C): 36.6 (13 Jun 2021 05:25), Max: 36.9 (12 Jun 2021 14:11)  T(F): 97.9 (13 Jun 2021 05:25), Max: 98.4 (12 Jun 2021 14:11)  HR: 96 (13 Jun 2021 05:25) (96 - 106)  BP: 137/74 (13 Jun 2021 05:25) (137/74 - 144/75)  BP(mean): --  RR: 17 (13 Jun 2021 05:25) (17 - 18)  SpO2: 98% (13 Jun 2021 05:25) (98% - 100%)      PHYSICAL EXAM:     GENERAL:  Appears stated age, well-groomed  HEENT:  NC/AT,    CHEST:  CTA b/l  HEART:  S1 s2+   ABDOMEN:  Soft, non-tender, non-distended  EXTEREMITIES:  no cyanosis,clubbing or edema  NEURO:  follows commands., unable to maintain conversation                             8.9    5.07  )-----------( 232      ( 13 Jun 2021 07:27 )             27.9       06-13    140  |  104  |  9   ----------------------------<  180<H>  3.4<L>   |  24  |  0.43<L>    Ca    8.9      13 Jun 2021 07:27  Phos  2.2     06-13  Mg     2.0     06-13

## 2021-06-13 NOTE — PROGRESS NOTE ADULT - PROBLEM SELECTOR PLAN 2
- likely due to dehydration in the setting of poor PO intake. S/p 2L of LR in the ED.   - Na improved, seen by swallow rec puree diet with honey thick liquids  - Encourage PO    #Hx of aspiration,   - Hospitalizations for aspiration in the past now with decreased PO intake. Family deferred PEG now as pt has  passed swallow eval and was taking po   now more awake, taking puree diet with assistance

## 2021-06-13 NOTE — PROGRESS NOTE ADULT - PROBLEM SELECTOR PLAN 1
metabolic encep likely due to hypernatremia, GBM   MRI P  message left for pt's oncologist Palmira Alvarenga [phone] on 6/11  - B12, TSH, Folate, iron panel all wnl   - BCx: with staph epi, appreciate ID consult, now off of vanco, blood culture from [facility] with Staphylococcus Pettenkoferi ID made aware, likely contam, no need to treat      MRI stable  - Cont ivf for now-d51/2 given poor po intake, taking some PO

## 2021-06-13 NOTE — PROGRESS NOTE ADULT - ASSESSMENT
72 y/o M- non-verbal/contracted with [facility] GBM (dx 2019 on chemo q2 weeks), seizures, HTN, HLD, DM, SIADH who presents for increased lethargy.

## 2021-06-13 NOTE — PROGRESS NOTE ADULT - SUBJECTIVE AND OBJECTIVE BOX
[facility] Division of Hospital Medicine  Pat Lopez MD  Pager [phone]    Patient is a 71y old  Male who presents with a chief complaint of Lethargy      SUBJECTIVE / OVERNIGHT EVENTS: more alert this AM; RN feeding breakfast; pt does make some attempt to speak/answer simple questions      MEDICATIONS  (STANDING):  amantadine Syrup 100 milliGRAM(s) Oral two times a day  amLODIPine   Tablet 5 milliGRAM(s) Oral daily  atorvastatin 10 milliGRAM(s) Oral at bedtime  citalopram 20 milliGRAM(s) Oral daily  dexAMETHasone  Injectable 4 milliGRAM(s) IV Push every 12 hours  dextrose 40% Gel 15 Gram(s) Oral once  dextrose 5% + sodium chloride 0.45%. 1000 milliLiter(s) (50 mL/Hr) IV Continuous <Continuous>  dextrose 5%. 1000 milliLiter(s) (50 mL/Hr) IV Continuous <Continuous>  dextrose 5%. 1000 milliLiter(s) (100 mL/Hr) IV Continuous <Continuous>  dextrose 50% Injectable 25 Gram(s) IV Push once  dextrose 50% Injectable 12.5 Gram(s) IV Push once  dextrose 50% Injectable 25 Gram(s) IV Push once  enoxaparin Injectable 40 milliGRAM(s) SubCutaneous every 24 hours  glucagon  Injectable 1 milliGRAM(s) IntraMuscular once  levETIRAcetam  IVPB 500 milliGRAM(s) IV Intermittent every 12 hours  losartan 100 milliGRAM(s) Oral daily  methylphenidate 5 milliGRAM(s) Oral <User Schedule>  nystatin    Suspension 003215 Unit(s) Oral four times a day  pantoprazole  Injectable 40 milliGRAM(s) IV Push two times a day        PHYSICAL EXAM:  Vital Signs Last 24 Hrs  T(F): 97.9 (13 Jun 2021 05:25), Max: 97.9 (12 Jun 2021 22:10)  HR: 96 (13 Jun 2021 05:25) (96 - 100)  BP: 137/74 (13 Jun 2021 05:25) (137/74 - 140/77)  RR: 17 (13 Jun 2021 05:25) (17 - 18)  SpO2: 98% (13 Jun 2021 05:25) (98% - 100%)    CONSTITUTIONAL: NAD, thin  EYES: PERRLA; conjunctiva and sclera clear  ENMT: Moist oral mucosa  RESPIRATORY: Normal respiratory effort; grossly b/l AE  CARDIOVASCULAR: Regular rate and rhythm; No lower extremity edema;   ABDOMEN: Nontender to palpation, normoactive bowel sounds  MUSCULOSKELETAL: LE contractures  PSYCH: calm  NEUROLOGY: attempts to speak/answer simple questions      LABS:                        8.9    5.07  )-----------( 232      ( 13 Jun 2021 07:27 )             27.9     06-13    140  |  104  |  9   ----------------------------<  180<H>  3.4<L>   |  24  |  0.43<L>    Ca    8.9      13 Jun 2021 07:27  Phos  2.2     06-13  Mg     2.0     06-13

## 2021-06-14 DIAGNOSIS — E87.8 OTHER DISORDERS OF ELECTROLYTE AND FLUID BALANCE, NOT ELSEWHERE CLASSIFIED: ICD-10-CM

## 2021-06-14 LAB
ANION GAP SERPL CALC-SCNC: 13 MMOL/L — SIGNIFICANT CHANGE UP (ref 7–14)
BLD GP AB SCN SERPL QL: NEGATIVE — SIGNIFICANT CHANGE UP
BUN SERPL-MCNC: 10 MG/DL — SIGNIFICANT CHANGE UP (ref 7–23)
CALCIUM SERPL-MCNC: 8.7 MG/DL — SIGNIFICANT CHANGE UP (ref 8.4–10.5)
CHLORIDE SERPL-SCNC: 102 MMOL/L — SIGNIFICANT CHANGE UP (ref 98–107)
CO2 SERPL-SCNC: 23 MMOL/L — SIGNIFICANT CHANGE UP (ref 22–31)
CREAT SERPL-MCNC: 0.4 MG/DL — LOW (ref 0.5–1.3)
CULTURE RESULTS: SIGNIFICANT CHANGE UP
FERRITIN SERPL-MCNC: 278 NG/ML — SIGNIFICANT CHANGE UP (ref 30–400)
FOLATE SERPL-MCNC: 6.8 NG/ML — SIGNIFICANT CHANGE UP (ref 3.1–17.5)
GLUCOSE SERPL-MCNC: 140 MG/DL — HIGH (ref 70–99)
HCT VFR BLD CALC: 28.7 % — LOW (ref 39–50)
HGB BLD-MCNC: 8.9 G/DL — LOW (ref 13–17)
IRON SATN MFR SERPL: 32 % — SIGNIFICANT CHANGE UP (ref 14–50)
IRON SATN MFR SERPL: 53 UG/DL — SIGNIFICANT CHANGE UP (ref 45–165)
MAGNESIUM SERPL-MCNC: 1.9 MG/DL — SIGNIFICANT CHANGE UP (ref 1.6–2.6)
MCHC RBC-ENTMCNC: 27 PG — SIGNIFICANT CHANGE UP (ref 27–34)
MCHC RBC-ENTMCNC: 31 GM/DL — LOW (ref 32–36)
MCV RBC AUTO: 87 FL — SIGNIFICANT CHANGE UP (ref 80–100)
NRBC # BLD: 0 /100 WBCS — SIGNIFICANT CHANGE UP
NRBC # FLD: 0 K/UL — SIGNIFICANT CHANGE UP
PHOSPHATE SERPL-MCNC: 2.4 MG/DL — LOW (ref 2.5–4.5)
PLATELET # BLD AUTO: 245 K/UL — SIGNIFICANT CHANGE UP (ref 150–400)
POTASSIUM SERPL-MCNC: 3.4 MMOL/L — LOW (ref 3.5–5.3)
POTASSIUM SERPL-SCNC: 3.4 MMOL/L — LOW (ref 3.5–5.3)
RBC # BLD: 3.3 M/UL — LOW (ref 4.2–5.8)
RBC # FLD: 14.9 % — HIGH (ref 10.3–14.5)
RH IG SCN BLD-IMP: POSITIVE — SIGNIFICANT CHANGE UP
SODIUM SERPL-SCNC: 138 MMOL/L — SIGNIFICANT CHANGE UP (ref 135–145)
SPECIMEN SOURCE: SIGNIFICANT CHANGE UP
TIBC SERPL-MCNC: 167 UG/DL — LOW (ref 220–430)
UIBC SERPL-MCNC: 114 UG/DL — SIGNIFICANT CHANGE UP (ref 110–370)
WBC # BLD: 5.62 K/UL — SIGNIFICANT CHANGE UP (ref 3.8–10.5)
WBC # FLD AUTO: 5.62 K/UL — SIGNIFICANT CHANGE UP (ref 3.8–10.5)

## 2021-06-14 PROCEDURE — 99233 SBSQ HOSP IP/OBS HIGH 50: CPT

## 2021-06-14 RX ORDER — POTASSIUM CHLORIDE 20 MEQ
10 PACKET (EA) ORAL
Refills: 0 | Status: COMPLETED | OUTPATIENT
Start: 2021-06-14 | End: 2021-06-14

## 2021-06-14 RX ORDER — LEVETIRACETAM 250 MG/1
750 TABLET, FILM COATED ORAL EVERY 12 HOURS
Refills: 0 | Status: DISCONTINUED | OUTPATIENT
Start: 2021-06-15 | End: 2021-06-15

## 2021-06-14 RX ORDER — DEXAMETHASONE 0.5 MG/5ML
4 ELIXIR ORAL DAILY
Refills: 0 | Status: DISCONTINUED | OUTPATIENT
Start: 2021-06-15 | End: 2021-06-15

## 2021-06-14 RX ORDER — LEVETIRACETAM 250 MG/1
250 TABLET, FILM COATED ORAL ONCE
Refills: 0 | Status: COMPLETED | OUTPATIENT
Start: 2021-06-14 | End: 2021-06-14

## 2021-06-14 RX ADMIN — Medication 500000 UNIT(S): at 06:59

## 2021-06-14 RX ADMIN — PANTOPRAZOLE SODIUM 40 MILLIGRAM(S): 20 TABLET, DELAYED RELEASE ORAL at 06:59

## 2021-06-14 RX ADMIN — Medication 100 MILLIEQUIVALENT(S): at 08:58

## 2021-06-14 RX ADMIN — LEVETIRACETAM 400 MILLIGRAM(S): 250 TABLET, FILM COATED ORAL at 17:06

## 2021-06-14 RX ADMIN — AMLODIPINE BESYLATE 5 MILLIGRAM(S): 2.5 TABLET ORAL at 06:59

## 2021-06-14 RX ADMIN — Medication 4 MILLIGRAM(S): at 10:52

## 2021-06-14 RX ADMIN — CITALOPRAM 20 MILLIGRAM(S): 10 TABLET, FILM COATED ORAL at 12:31

## 2021-06-14 RX ADMIN — PANTOPRAZOLE SODIUM 40 MILLIGRAM(S): 20 TABLET, DELAYED RELEASE ORAL at 17:05

## 2021-06-14 RX ADMIN — ENOXAPARIN SODIUM 40 MILLIGRAM(S): 100 INJECTION SUBCUTANEOUS at 17:05

## 2021-06-14 RX ADMIN — Medication 63.75 MILLIMOLE(S): at 13:12

## 2021-06-14 RX ADMIN — Medication 500000 UNIT(S): at 17:05

## 2021-06-14 RX ADMIN — LEVETIRACETAM 400 MILLIGRAM(S): 250 TABLET, FILM COATED ORAL at 06:58

## 2021-06-14 RX ADMIN — Medication 500000 UNIT(S): at 23:05

## 2021-06-14 RX ADMIN — Medication 5 MILLIGRAM(S): at 09:24

## 2021-06-14 RX ADMIN — LOSARTAN POTASSIUM 100 MILLIGRAM(S): 100 TABLET, FILM COATED ORAL at 06:58

## 2021-06-14 RX ADMIN — Medication 100 MILLIGRAM(S): at 17:05

## 2021-06-14 RX ADMIN — Medication 500000 UNIT(S): at 12:30

## 2021-06-14 RX ADMIN — Medication 100 MILLIEQUIVALENT(S): at 10:52

## 2021-06-14 RX ADMIN — SODIUM CHLORIDE 50 MILLILITER(S): 9 INJECTION, SOLUTION INTRAVENOUS at 08:58

## 2021-06-14 RX ADMIN — Medication 5 MILLIGRAM(S): at 13:12

## 2021-06-14 RX ADMIN — LEVETIRACETAM 400 MILLIGRAM(S): 250 TABLET, FILM COATED ORAL at 20:29

## 2021-06-14 RX ADMIN — Medication 100 MILLIGRAM(S): at 06:59

## 2021-06-14 RX ADMIN — ATORVASTATIN CALCIUM 10 MILLIGRAM(S): 80 TABLET, FILM COATED ORAL at 23:05

## 2021-06-14 NOTE — PROGRESS NOTE ADULT - SUBJECTIVE AND OBJECTIVE BOX
Patient seen and examined at bedside. pt responds appropriately to questions     MEDICATIONS  (STANDING):  amantadine Syrup 100 milliGRAM(s) Oral two times a day  amLODIPine   Tablet 5 milliGRAM(s) Oral daily  atorvastatin 10 milliGRAM(s) Oral at bedtime  citalopram 20 milliGRAM(s) Oral daily  dexAMETHasone  Injectable 4 milliGRAM(s) IV Push every 12 hours  dextrose 40% Gel 15 Gram(s) Oral once  dextrose 5% + sodium chloride 0.45%. 1000 milliLiter(s) (50 mL/Hr) IV Continuous <Continuous>  dextrose 5%. 1000 milliLiter(s) (100 mL/Hr) IV Continuous <Continuous>  dextrose 5%. 1000 milliLiter(s) (50 mL/Hr) IV Continuous <Continuous>  dextrose 50% Injectable 25 Gram(s) IV Push once  dextrose 50% Injectable 12.5 Gram(s) IV Push once  dextrose 50% Injectable 25 Gram(s) IV Push once  enoxaparin Injectable 40 milliGRAM(s) SubCutaneous every 24 hours  glucagon  Injectable 1 milliGRAM(s) IntraMuscular once  levETIRAcetam  IVPB 500 milliGRAM(s) IV Intermittent every 12 hours  losartan 100 milliGRAM(s) Oral daily  methylphenidate 5 milliGRAM(s) Oral <User Schedule>  nystatin    Suspension 950400 Unit(s) Oral four times a day  pantoprazole  Injectable 40 milliGRAM(s) IV Push two times a day  potassium chloride  10 mEq/100 mL IVPB 10 milliEquivalent(s) IV Intermittent every 1 hour  sodium phosphate IVPB 15 milliMole(s) IV Intermittent once    MEDICATIONS  (PRN):        Vital Signs Last 24 Hrs  T(C): 36.7 (14 Jun 2021 06:58), Max: 37.3 (13 Jun 2021 21:47)  T(F): 98 (14 Jun 2021 06:58), Max: 99.1 (13 Jun 2021 21:47)  HR: 88 (14 Jun 2021 06:58) (88 - 99)  BP: 127/77 (14 Jun 2021 06:58) (127/77 - 133/71)  BP(mean): --  RR: 16 (14 Jun 2021 06:58) (16 - 18)  SpO2: 100% (14 Jun 2021 06:58) (98% - 100%)      PHYSICAL EXAM:     GENERAL:  Appears comfortable   HEENT:  NC/AT,   CHEST:  CTA b/l  HEART:  S1 s2+   ABDOMEN:  Soft, non-tender, non-distended  NEURO:  minimally talkative                             8.9    5.62  )-----------( 245      ( 14 Jun 2021 06:43 )             28.7       06-14    138  |  102  |  10  ----------------------------<  140<H>  3.4<L>   |  23  |  0.40<L>    Ca    8.7      14 Jun 2021 06:43  Phos  2.4     06-14  Mg     1.9     06-14

## 2021-06-14 NOTE — PROGRESS NOTE ADULT - PROBLEM SELECTOR PLAN 4
Hx of GBM dx in 2019. Continues with chemo q2 weeks. Per daughter, patient's GBM has not been progressing. Had recent imaging at [facility]  - cont steroid per onc recs    -  per son Dr. Fernandez pt has been on Ritalin at home restarted on 6/10, will monitor mental status closely- mental status has improved    #Seizures   - C/w keppra.  - will monitor Mental status closely

## 2021-06-14 NOTE — PROGRESS NOTE ADULT - PROBLEM SELECTOR PROBLEM 4
Glioblastoma multiforme

## 2021-06-14 NOTE — PROGRESS NOTE ADULT - PROBLEM/PLAN-4
Large Joint Aspiration/Injection  Date/Time: 1/15/2018 8:30 AM  Performed by: FENG MACDONALD  Authorized by: FENG MACDONALD     Consent Done?:  Yes (Verbal)  Indications:  Pain  Procedure site marked: Yes    Timeout: Prior to procedure the correct patient, procedure, and site was verified      Location:  Knee  Site:  L knee  Prep: Patient was prepped and draped in usual sterile fashion    Needle size:  20 G  Approach:  Anterolateral  Medications:  20 mg EUFLEXXA 10 mg/mL(mw 2.4 -3.6 million)  Patient tolerance:  Patient tolerated the procedure well with no immediate complications      
DISPLAY PLAN FREE TEXT

## 2021-06-14 NOTE — PROGRESS NOTE ADULT - PROBLEM SELECTOR PROBLEM 5
History of SIADH

## 2021-06-14 NOTE — PROGRESS NOTE ADULT - PROBLEM SELECTOR PLAN 1
metabolic encep likely due to hypernatremia, GBM   MRI results reviewed  message left for pt's oncologist Palmira Alvarenga [phone] on 6/11  - B12, TSH, Folate, iron panel all wnl   - BCx: with staph epi, appreciate ID consult, now off of vanco, blood culture from [facility] with Staphylococcus Pettenkoferi ID made aware, likely contam, no need to treat      MRI stable  - Cont ivf for now-d51/2 given poor po intake although had taken meals today well per nsg

## 2021-06-14 NOTE — PROGRESS NOTE ADULT - PROBLEM SELECTOR PLAN 2
- likely due to dehydration in the setting of poor PO intake. S/p 2L of LR in the ED.   - Na improved, seen by swallow rec puree diet with honey thick liquids  - Encourage PO    #Hx of aspiration,   - Hospitalizations for aspiration in the past now with decreased PO intake. Family deferred PEG now as pt has  passed swallow eval and is taking po   now more awake, taking puree diet with assistance

## 2021-06-14 NOTE — CHART NOTE - NSCHARTNOTEFT_GEN_A_CORE
Per discussion with Dr. Ramos Oncology, will taper Decadron to 4 mg QD. Decadron to be further tapered as an out patient by [facility].

## 2021-06-14 NOTE — PHARMACOTHERAPY INTERVENTION NOTE - COMMENTS
Educated patient on medications including indication, directions, and side effects of medications.     Nayely Suarez, PharmD  PGY-1 Pharmacy Resident  Spectra [phone]

## 2021-06-14 NOTE — PROGRESS NOTE ADULT - PROBLEM SELECTOR PLAN 7
F: S/p 2L LR - may need more, f/u BMP   E: Replete prn   N: NPO for now - pending speech and swallow
speech and swallow eval done rec puree with nectar thick liquid  But given encephalopathy will keep pt NPO
speech and swallow eval done rec puree with nectar thick liquid  But given encephalopathy will keep pt NPO
speech and swallow eval done rec puree with nectar thick liquid
speech and swallow eval done rec puree with nectar thick liquid  But given encephalopathy will keep pt NPO
speech and swallow eval done rec puree with nectar thick liquid

## 2021-06-14 NOTE — PROGRESS NOTE ADULT - PROBLEM SELECTOR PLAN 5
Hx of SIADH. Na improved  - Plan as stated above.
Hx of SIADH. Na improved  - Plan as stated above.
Hx of SIADH. Na 150.   - Plan as stated above.
Hx of SIADH. Na improved  - Plan as stated above.

## 2021-06-14 NOTE — PROGRESS NOTE ADULT - PROBLEM SELECTOR PLAN 9
Hypokalemia- replete k  Hypophosphatemia- replete phos    Updated son Dr. Fernandez on 6/14, wants him to be dc only tomorrow, dc planning for tomorrow  Plan discussed with ACP

## 2021-06-14 NOTE — PROGRESS NOTE ADULT - NUTRITIONAL ASSESSMENT
This patient has been assessed with a concern for Malnutrition and has been determined to have a diagnosis/diagnoses of Severe protein-calorie malnutrition and Underweight (BMI < 19).    This patient is being managed with:   Diet Dysphagia 1 Pureed-Honey Consistency Fluid-  Supplement Feeding Modality:  Oral  Ensure Pudding Cans or Servings Per Day:  3       Frequency:  Daily  Entered: Jun 7 2021  3:51PM    

## 2021-06-14 NOTE — PROGRESS NOTE ADULT - SUBJECTIVE AND OBJECTIVE BOX
Patient is a 71y old  Male who presents with a chief complaint of Lethargy (14 Jun 2021 09:12)      SUBJECTIVE / OVERNIGHT EVENTS: Pt seen and examined at 12:20pm, no overnight events, is alert, awake, unable to get much history due to his mental status, ( although his mental status has much improved since a few days ago) per nsg ate 100% of his breakfast. No other issues reported.      MEDICATIONS  (STANDING):  amantadine Syrup 100 milliGRAM(s) Oral two times a day  amLODIPine   Tablet 5 milliGRAM(s) Oral daily  atorvastatin 10 milliGRAM(s) Oral at bedtime  citalopram 20 milliGRAM(s) Oral daily  dextrose 40% Gel 15 Gram(s) Oral once  dextrose 5% + sodium chloride 0.45%. 1000 milliLiter(s) (50 mL/Hr) IV Continuous <Continuous>  dextrose 5%. 1000 milliLiter(s) (100 mL/Hr) IV Continuous <Continuous>  dextrose 5%. 1000 milliLiter(s) (50 mL/Hr) IV Continuous <Continuous>  dextrose 50% Injectable 25 Gram(s) IV Push once  dextrose 50% Injectable 12.5 Gram(s) IV Push once  dextrose 50% Injectable 25 Gram(s) IV Push once  enoxaparin Injectable 40 milliGRAM(s) SubCutaneous every 24 hours  glucagon  Injectable 1 milliGRAM(s) IntraMuscular once  levETIRAcetam  IVPB 500 milliGRAM(s) IV Intermittent every 12 hours  losartan 100 milliGRAM(s) Oral daily  methylphenidate 5 milliGRAM(s) Oral <User Schedule>  nystatin    Suspension 285829 Unit(s) Oral four times a day  pantoprazole  Injectable 40 milliGRAM(s) IV Push two times a day    MEDICATIONS  (PRN):      Vital Signs Last 24 Hrs  T(C): 36.4 (14 Jun 2021 13:59), Max: 37.3 (13 Jun 2021 21:47)  T(F): 97.5 (14 Jun 2021 13:59), Max: 99.1 (13 Jun 2021 21:47)  HR: 88 (14 Jun 2021 13:59) (88 - 98)  BP: 143/76 (14 Jun 2021 13:59) (127/77 - 143/76)  BP(mean): --  RR: 18 (14 Jun 2021 13:59) (16 - 18)  SpO2: 100% (14 Jun 2021 13:59) (100% - 100%)  CAPILLARY BLOOD GLUCOSE        I&O's Summary    13 Jun 2021 07:01  -  14 Jun 2021 07:00  --------------------------------------------------------  IN: 0 mL / OUT: 2000 mL / NET: -2000 mL    14 Jun 2021 07:01  -  14 Jun 2021 15:26  --------------------------------------------------------  IN: 349 mL / OUT: 0 mL / NET: 349 mL        PHYSICAL EXAM:  GENERAL: NAD, thinly built male  CHEST/LUNG: Decreased bs bilaterally poor effort  HEART: Regular rate and rhythm  ABDOMEN: Soft, Nontender, Nondistended  EXTREMITIES: LE contracted  PSYCH: Alert, awake  NEUROLOGY: Alert, awake, oriented to self and knows he is in the hospital, hypophonic voice  SKIN: No rashes or lesions    LABS:                        8.9    5.62  )-----------( 245      ( 14 Jun 2021 06:43 )             28.7     06-14    138  |  102  |  10  ----------------------------<  140<H>  3.4<L>   |  23  |  0.40<L>    Ca    8.7      14 Jun 2021 06:43  Phos  2.4     06-14  Mg     1.9     06-14                RADIOLOGY & ADDITIONAL TESTS:    Imaging Personally Reviewed:    Consultant(s) Notes Reviewed:      Care Discussed with Consultants/Other Providers:

## 2021-06-14 NOTE — PROGRESS NOTE ADULT - PROBLEM SELECTOR PLAN 6
Hx of HTN.   - C/w losartan and norvasc

## 2021-06-14 NOTE — PROGRESS NOTE ADULT - ASSESSMENT
72 y/o M- non-verbal/contracted with [facility] GBM (dx 2019) s/p chemoRT and on maintainence Avastin, seizures, HTN, HLD, DM, SIADH, aspiration pna who presents for increased lethargy.     GBM  - follows with Dr Chavis NeuroOnc at [facility] [place]   - s/p chemoRT in 2019; now on Avastin maintainence ; last dose 5/28; on hold since admit, plan to restart as outpt   - recent MRI in April - stable findings   - repeat MRI here showed stable mass, with progression of RT gliosis. Reviewed report with [facility] fellow and rad/onc, no acute intervention indicated for gliosis but will obtain imaging and review      Altered Mental Status   - Family reports increasing lethargy and decreased phonation. He has had hospitalizations for aspiration in the past.   - Mental status had improved until couple of days ago, waxing and waning  - was started on Amantadine and Ritalin in the past; Ritalin restarted   - CTH w/ white matter hypoattenuation maybe be 2/2 chronic microvascular changes and/or vasogenic edema. Per discussion with daughter who is a physician- pt has not been on steroids for over 1 year, recent head imaging 1 month ago at [facility] which showed stable findings; no edema ; + leukoencephalopathy   - repeat MRI as above, plan and impression as above  - keep on decadron for now until imaging reviewed at [facility]  - - BCx: with stapfrancoise epi, appreciate ID consult, now off of vanco, blood culture from 7th with Staphylococcus Pettenkoferi ID made aware, likely contam, no need to treat  - consider neuro consult     will cont to follow     Dereck Mclain MD  Hematology Oncology   O: [phone]  C: [phone]

## 2021-06-14 NOTE — PROGRESS NOTE ADULT - ASSESSMENT
72 y/o M- non-verbal/contracted with [facility] GBM (dx 2019 on chemo q2 weeks), seizures, HTN, HLD, DM, SIADH who presents for increased lethargy.

## 2021-06-15 ENCOUNTER — TRANSCRIPTION ENCOUNTER (OUTPATIENT)
Age: 71
End: 2021-06-15

## 2021-06-15 VITALS
SYSTOLIC BLOOD PRESSURE: 119 MMHG | DIASTOLIC BLOOD PRESSURE: 64 MMHG | TEMPERATURE: 98 F | RESPIRATION RATE: 100 BRPM | HEART RATE: 99 BPM

## 2021-06-15 LAB
ANION GAP SERPL CALC-SCNC: 10 MMOL/L — SIGNIFICANT CHANGE UP (ref 7–14)
BUN SERPL-MCNC: 7 MG/DL — SIGNIFICANT CHANGE UP (ref 7–23)
CALCIUM SERPL-MCNC: 8.4 MG/DL — SIGNIFICANT CHANGE UP (ref 8.4–10.5)
CHLORIDE SERPL-SCNC: 102 MMOL/L — SIGNIFICANT CHANGE UP (ref 98–107)
CO2 SERPL-SCNC: 26 MMOL/L — SIGNIFICANT CHANGE UP (ref 22–31)
CREAT SERPL-MCNC: 0.43 MG/DL — LOW (ref 0.5–1.3)
GLUCOSE SERPL-MCNC: 105 MG/DL — HIGH (ref 70–99)
HCT VFR BLD CALC: 31.7 % — LOW (ref 39–50)
HGB BLD-MCNC: 9.9 G/DL — LOW (ref 13–17)
MAGNESIUM SERPL-MCNC: 1.9 MG/DL — SIGNIFICANT CHANGE UP (ref 1.6–2.6)
MCHC RBC-ENTMCNC: 26.9 PG — LOW (ref 27–34)
MCHC RBC-ENTMCNC: 31.2 GM/DL — LOW (ref 32–36)
MCV RBC AUTO: 86.1 FL — SIGNIFICANT CHANGE UP (ref 80–100)
NRBC # BLD: 0 /100 WBCS — SIGNIFICANT CHANGE UP
NRBC # FLD: 0 K/UL — SIGNIFICANT CHANGE UP
PHOSPHATE SERPL-MCNC: 2.3 MG/DL — LOW (ref 2.5–4.5)
PLATELET # BLD AUTO: 242 K/UL — SIGNIFICANT CHANGE UP (ref 150–400)
POTASSIUM SERPL-MCNC: 3.1 MMOL/L — LOW (ref 3.5–5.3)
POTASSIUM SERPL-SCNC: 3.1 MMOL/L — LOW (ref 3.5–5.3)
RBC # BLD: 3.68 M/UL — LOW (ref 4.2–5.8)
RBC # FLD: 14.7 % — HIGH (ref 10.3–14.5)
SODIUM SERPL-SCNC: 138 MMOL/L — SIGNIFICANT CHANGE UP (ref 135–145)
WBC # BLD: 6.9 K/UL — SIGNIFICANT CHANGE UP (ref 3.8–10.5)
WBC # FLD AUTO: 6.9 K/UL — SIGNIFICANT CHANGE UP (ref 3.8–10.5)

## 2021-06-15 PROCEDURE — 99239 HOSP IP/OBS DSCHRG MGMT >30: CPT

## 2021-06-15 RX ORDER — METHYLPHENIDATE HCL 5 MG
1 TABLET ORAL
Qty: 0 | Refills: 0 | DISCHARGE
Start: 2021-06-15

## 2021-06-15 RX ORDER — ATORVASTATIN CALCIUM 80 MG/1
1 TABLET, FILM COATED ORAL
Qty: 0 | Refills: 0 | DISCHARGE

## 2021-06-15 RX ORDER — SODIUM,POTASSIUM PHOSPHATES 278-250MG
1 POWDER IN PACKET (EA) ORAL
Qty: 3 | Refills: 0
Start: 2021-06-15 | End: 2021-06-15

## 2021-06-15 RX ORDER — DEXAMETHASONE 0.5 MG/5ML
1 ELIXIR ORAL
Qty: 18 | Refills: 0
Start: 2021-06-15

## 2021-06-15 RX ORDER — PANTOPRAZOLE SODIUM 20 MG/1
1 TABLET, DELAYED RELEASE ORAL
Qty: 14 | Refills: 0
Start: 2021-06-15 | End: 2021-06-28

## 2021-06-15 RX ORDER — POTASSIUM PHOSPHATE, MONOBASIC POTASSIUM PHOSPHATE, DIBASIC 236; 224 MG/ML; MG/ML
30 INJECTION, SOLUTION INTRAVENOUS ONCE
Refills: 0 | Status: COMPLETED | OUTPATIENT
Start: 2021-06-15 | End: 2021-06-15

## 2021-06-15 RX ADMIN — Medication 5 MILLIGRAM(S): at 07:53

## 2021-06-15 RX ADMIN — Medication 4 MILLIGRAM(S): at 05:43

## 2021-06-15 RX ADMIN — Medication 5 MILLIGRAM(S): at 14:06

## 2021-06-15 RX ADMIN — SODIUM CHLORIDE 50 MILLILITER(S): 9 INJECTION, SOLUTION INTRAVENOUS at 05:43

## 2021-06-15 RX ADMIN — Medication 100 MILLIGRAM(S): at 05:21

## 2021-06-15 RX ADMIN — SODIUM CHLORIDE 50 MILLILITER(S): 9 INJECTION, SOLUTION INTRAVENOUS at 05:41

## 2021-06-15 RX ADMIN — PANTOPRAZOLE SODIUM 40 MILLIGRAM(S): 20 TABLET, DELAYED RELEASE ORAL at 05:21

## 2021-06-15 RX ADMIN — Medication 500000 UNIT(S): at 05:21

## 2021-06-15 RX ADMIN — CITALOPRAM 20 MILLIGRAM(S): 10 TABLET, FILM COATED ORAL at 12:17

## 2021-06-15 RX ADMIN — LEVETIRACETAM 400 MILLIGRAM(S): 250 TABLET, FILM COATED ORAL at 05:20

## 2021-06-15 RX ADMIN — POTASSIUM PHOSPHATE, MONOBASIC POTASSIUM PHOSPHATE, DIBASIC 83.33 MILLIMOLE(S): 236; 224 INJECTION, SOLUTION INTRAVENOUS at 12:33

## 2021-06-15 RX ADMIN — Medication 500000 UNIT(S): at 12:17

## 2021-06-15 RX ADMIN — AMLODIPINE BESYLATE 5 MILLIGRAM(S): 2.5 TABLET ORAL at 05:22

## 2021-06-15 RX ADMIN — LOSARTAN POTASSIUM 100 MILLIGRAM(S): 100 TABLET, FILM COATED ORAL at 05:22

## 2021-06-15 NOTE — PROGRESS NOTE ADULT - NSICDXPILOT_GEN_ALL_CORE
[place]

## 2021-06-15 NOTE — DISCHARGE NOTE NURSING/CASE MANAGEMENT/SOCIAL WORK - PATIENT PORTAL LINK FT
You can access the FollowMyHealth Patient Portal offered by [facility] by registering at the following website: [email or web address]. By joining [employer]’s FollowMyHealth portal, you will also be able to view your health information using other applications (apps) compatible with our system.

## 2021-06-15 NOTE — PROGRESS NOTE ADULT - SUBJECTIVE AND OBJECTIVE BOX
Patient seen and examined at bedside.  pt opens eyes, answers questions appropriately     MEDICATIONS  (STANDING):  amantadine Syrup 100 milliGRAM(s) Oral two times a day  amLODIPine   Tablet 5 milliGRAM(s) Oral daily  atorvastatin 10 milliGRAM(s) Oral at bedtime  citalopram 20 milliGRAM(s) Oral daily  dexAMETHasone     Tablet 4 milliGRAM(s) Oral daily  dextrose 40% Gel 15 Gram(s) Oral once  dextrose 5% + sodium chloride 0.45%. 1000 milliLiter(s) (50 mL/Hr) IV Continuous <Continuous>  dextrose 5%. 1000 milliLiter(s) (100 mL/Hr) IV Continuous <Continuous>  dextrose 5%. 1000 milliLiter(s) (50 mL/Hr) IV Continuous <Continuous>  dextrose 50% Injectable 25 Gram(s) IV Push once  dextrose 50% Injectable 12.5 Gram(s) IV Push once  dextrose 50% Injectable 25 Gram(s) IV Push once  enoxaparin Injectable 40 milliGRAM(s) SubCutaneous every 24 hours  glucagon  Injectable 1 milliGRAM(s) IntraMuscular once  levETIRAcetam  IVPB 750 milliGRAM(s) IV Intermittent every 12 hours  losartan 100 milliGRAM(s) Oral daily  methylphenidate 5 milliGRAM(s) Oral <User Schedule>  nystatin    Suspension [id number] Unit(s) Oral four times a day  pantoprazole  Injectable 40 milliGRAM(s) IV Push two times a day    MEDICATIONS  (PRN):        Vital Signs Last 24 Hrs  T(C): 36.9 (15 Abel 2021 04:52), Max: 36.9 (15 Abel 2021 04:52)  T(F): 98.5 (15 Abel 2021 04:52), Max: 98.5 (15 Abel 2021 04:52)  HR: 87 (15 Abel 2021 04:52) (87 - 95)  BP: 138/79 (15 Abel 2021 04:52) (138/79 - 147/78)  BP(mean): --  RR: 18 (15 Abel 2021 04:52) (16 - 18)  SpO2: 100% (15 Abel 2021 04:52) (100% - 100%)      PHYSICAL EXAM:     GENERAL:  Appears stated age  HEENT:  NC/AT,    CHEST:  CTA b/l  HEART:  S1 s2+   ABDOMEN:  Soft, non-tender, non-distended  EXTEREMITIES:  no cyanosis,clubbing or edema                              9.9    6.90  )-----------( 242      ( 15 Abel 2021 07:35 )             31.7       06-14    138  |  102  |  10  ----------------------------<  140<H>  3.4<L>   |  23  |  0.40<L>    Ca    8.7      14 Jun 2021 06:43  Phos  2.4     06-14  Mg     1.9     06-14

## 2021-06-15 NOTE — CHART NOTE - NSCHARTNOTEFT_GEN_A_CORE
Pt's son at beside. and examined     Vital Signs Last 24 Hrs  T(C): 36.9 (15 Abel 2021 04:52), Max: 36.9 (15 Abel 2021 04:52)  T(F): 98.5 (15 Abel 2021 04:52), Max: 98.5 (15 Abel 2021 04:52)  HR: 87 (15 Abel 2021 04:52) (87 - 95)  BP: 138/79 (15 Abel 2021 04:52) (138/79 - 147/78)  BP(mean): --  RR: 18 (15 Abel 2021 04:52) (16 - 18)  SpO2: 100% (15 Abel 2021 04:52) (100% - 100%)    PHYSICAL EXAM:  GENERAL: NAD  HEAD:  cachetic   EYES: EOMI,  conjunctiva and sclera clear  NECK: Supple, No JVD  CHEST/LUNG: Clear to auscultation bilaterally; No wheeze  HEART: Regular rate and rhythm;  ABDOMEN: Soft, Nontender, Nondistended; Bowel sounds present  EXTREMITIES:  2+ Peripheral Pulses, No clubbing, cyanosis, or edema    70 y/o M- non-verbal/contracted with [facility] GBM (dx 2019 on chemo q2 weeks), seizures, HTN, HLD, DM, SIADH who presents for increased lethargy. Has been progressively declining in past 1 yr needs assistance for feeds and has been bedbound. Lethargy improved with IVF. Family refused PEG on dysphagia diet. MRI reviewed by [facility] rad/onc case d/w Dr. Mclain no acute intervention. Steroid taper as per Onc with outpt f/u [facility]. case d/w Son Jim. discharge with outpt f/u 40 min. Pt's son at beside. and examined     Vital Signs Last 24 Hrs  T(C): 36.9 (15 Abel 2021 04:52), Max: 36.9 (15 Abel 2021 04:52)  T(F): 98.5 (15 Abel 2021 04:52), Max: 98.5 (15 Abel 2021 04:52)  HR: 87 (15 Abel 2021 04:52) (87 - 95)  BP: 138/79 (15 Abel 2021 04:52) (138/79 - 147/78)  BP(mean): --  RR: 18 (15 Abel 2021 04:52) (16 - 18)  SpO2: 100% (15 Abel 2021 04:52) (100% - 100%)    PHYSICAL EXAM:  GENERAL: NAD  HEAD:  cachetic   EYES: EOMI,  conjunctiva and sclera clear  NECK: Supple, No JVD  CHEST/LUNG: Clear to auscultation bilaterally; No wheeze  HEART: Regular rate and rhythm;  ABDOMEN: Soft, Nontender, Nondistended; Bowel sounds present  EXTREMITIES:  2+ Peripheral Pulses, No clubbing, cyanosis, or edema    70 y/o M- non-verbal/contracted with [facility] GBM (dx 2019 on chemo q2 weeks), seizures, HTN, HLD, DM, SIADH who presents for increased lethargy. Has been progressively declining in past 1 yr needs assistance for feeds and has been bedbound. Lethargy improved with IVF. Family refused PEG on dysphagia diet. MRI reviewed by [facility] rad/onc case d/w Dr. Mclain no acute intervention. Steroid taper as per Onc with outpt f/u [facility]. case d/w Son Jim. discharge with outpt f/u with [facility] on 6/29 Neuro Onc Dr. Chavis.  40 min.

## 2021-06-15 NOTE — PROGRESS NOTE ADULT - ASSESSMENT
72 y/o M- non-verbal/contracted with [facility] GBM (dx 2019) s/p chemoRT and on maintainence Avastin, seizures, HTN, HLD, DM, SIADH, aspiration pna who presents for increased lethargy.     GBM  - follows with Dr Chavis [place]Onc at [facility] [place]   - s/p chemoRT in 2019; now on Avastin maintainence ; last dose 5/28; on hold since admit, plan to restart as outpt   - recent MRI in April - stable findings   - repeat MRI here showed stable mass, with progression of RT gliosis. Reviewed report with [facility] fellow and rad/onc, no acute intervention indicated for gliosis but will obtain imaging and review      Altered Mental Status   - Family reports increasing lethargy and decreased phonation. He has had hospitalizations for aspiration in the past.   - Mental status had improved until couple of days ago, waxing and waning  - was started on Amantadine and Ritalin in the past; Ritalin restarted   - CTH w/ white matter hypoattenuation maybe be 2/2 chronic microvascular changes and/or vasogenic edema. Per discussion with daughter who is a physician- pt has not been on steroids for over 1 year, recent head imaging 1 month ago at [facility] which showed stable findings; no edema ; + leukoencephalopathy   - repeat MRI as above, plan and impression as above  - keep on decadron for now until imaging reviewed at [facility], imaging yet to be reviewed, will follow up today , if comparison is stable pt to be discharged   - - BCx: with stapfrancoise epi, appreciate ID consult, now off of vanco, blood culture from 7th with Staphylococcus Pettenkoferi ID made aware, likely contam, no need to treat      will cont to follow   d/w Medicine team     Dereck Mclain MD  Hematology Oncology   O: [phone]  C: [phone]

## 2021-06-15 NOTE — PROGRESS NOTE ADULT - PROVIDER SPECIALTY LIST ADULT
Hospitalist
Heme/Onc
Heme/Onc
Infectious Disease
Heme/Onc
Hospitalist
Hospitalist
Heme/Onc
Hospitalist

## 2021-06-15 NOTE — PROGRESS NOTE ADULT - REASON FOR ADMISSION
Lethargy

## 2021-06-15 NOTE — CHART NOTE - NSCHARTNOTESELECT_GEN_ALL_CORE
ACP/Event Note
Event Note
ID note/Event Note
ACP- Juany Lift/Event Note
ACP/Event Note
ACP/Event Note
Event Note
iSTOP/Event Note

## 2021-06-28 ENCOUNTER — INPATIENT (INPATIENT)
Facility: HOSPITAL | Age: 71
LOS: 21 days | Discharge: HOSPICE | DRG: 100 | End: 2021-07-20
Attending: PSYCHIATRY & NEUROLOGY | Admitting: PSYCHIATRY & NEUROLOGY
Payer: MEDICARE

## 2021-06-28 VITALS
OXYGEN SATURATION: 100 % | HEIGHT: 71 IN | HEART RATE: 100 BPM | RESPIRATION RATE: 22 BRPM | SYSTOLIC BLOOD PRESSURE: 117 MMHG | DIASTOLIC BLOOD PRESSURE: 46 MMHG

## 2021-06-28 DIAGNOSIS — Z98.890 OTHER SPECIFIED POSTPROCEDURAL STATES: Chronic | ICD-10-CM

## 2021-06-28 PROCEDURE — 99284 EMERGENCY DEPT VISIT MOD MDM: CPT

## 2021-06-29 DIAGNOSIS — R56.9 UNSPECIFIED CONVULSIONS: ICD-10-CM

## 2021-06-29 LAB
ALBUMIN SERPL ELPH-MCNC: 3.4 G/DL — SIGNIFICANT CHANGE UP (ref 3.3–5)
ALBUMIN SERPL ELPH-MCNC: 3.5 G/DL — SIGNIFICANT CHANGE UP (ref 3.3–5)
ALP SERPL-CCNC: 83 U/L — SIGNIFICANT CHANGE UP (ref 40–120)
ALP SERPL-CCNC: 87 U/L — SIGNIFICANT CHANGE UP (ref 40–120)
ALT FLD-CCNC: 13 U/L — SIGNIFICANT CHANGE UP (ref 10–45)
ALT FLD-CCNC: 15 U/L — SIGNIFICANT CHANGE UP (ref 10–45)
ANION GAP SERPL CALC-SCNC: 12 MMOL/L — SIGNIFICANT CHANGE UP (ref 5–17)
ANION GAP SERPL CALC-SCNC: 14 MMOL/L — SIGNIFICANT CHANGE UP (ref 5–17)
AST SERPL-CCNC: 14 U/L — SIGNIFICANT CHANGE UP (ref 10–40)
AST SERPL-CCNC: 15 U/L — SIGNIFICANT CHANGE UP (ref 10–40)
BASOPHILS # BLD AUTO: 0.01 K/UL — SIGNIFICANT CHANGE UP (ref 0–0.2)
BASOPHILS # BLD AUTO: 0.01 K/UL — SIGNIFICANT CHANGE UP (ref 0–0.2)
BASOPHILS NFR BLD AUTO: 0.1 % — SIGNIFICANT CHANGE UP (ref 0–2)
BASOPHILS NFR BLD AUTO: 0.1 % — SIGNIFICANT CHANGE UP (ref 0–2)
BILIRUB SERPL-MCNC: 0.3 MG/DL — SIGNIFICANT CHANGE UP (ref 0.2–1.2)
BILIRUB SERPL-MCNC: 0.3 MG/DL — SIGNIFICANT CHANGE UP (ref 0.2–1.2)
BUN SERPL-MCNC: 14 MG/DL — SIGNIFICANT CHANGE UP (ref 7–23)
BUN SERPL-MCNC: 16 MG/DL — SIGNIFICANT CHANGE UP (ref 7–23)
CALCIUM SERPL-MCNC: 8.7 MG/DL — SIGNIFICANT CHANGE UP (ref 8.4–10.5)
CALCIUM SERPL-MCNC: 9 MG/DL — SIGNIFICANT CHANGE UP (ref 8.4–10.5)
CHLORIDE SERPL-SCNC: 97 MMOL/L — SIGNIFICANT CHANGE UP (ref 96–108)
CHLORIDE SERPL-SCNC: 97 MMOL/L — SIGNIFICANT CHANGE UP (ref 96–108)
CO2 SERPL-SCNC: 21 MMOL/L — LOW (ref 22–31)
CO2 SERPL-SCNC: 23 MMOL/L — SIGNIFICANT CHANGE UP (ref 22–31)
CREAT SERPL-MCNC: 0.35 MG/DL — LOW (ref 0.5–1.3)
CREAT SERPL-MCNC: 0.4 MG/DL — LOW (ref 0.5–1.3)
EOSINOPHIL # BLD AUTO: 0.01 K/UL — SIGNIFICANT CHANGE UP (ref 0–0.5)
EOSINOPHIL # BLD AUTO: 0.03 K/UL — SIGNIFICANT CHANGE UP (ref 0–0.5)
EOSINOPHIL NFR BLD AUTO: 0.1 % — SIGNIFICANT CHANGE UP (ref 0–6)
EOSINOPHIL NFR BLD AUTO: 0.4 % — SIGNIFICANT CHANGE UP (ref 0–6)
FOLATE SERPL-MCNC: 8.3 NG/ML — SIGNIFICANT CHANGE UP
GLUCOSE SERPL-MCNC: 103 MG/DL — HIGH (ref 70–99)
GLUCOSE SERPL-MCNC: 84 MG/DL — SIGNIFICANT CHANGE UP (ref 70–99)
HCT VFR BLD CALC: 37.1 % — LOW (ref 39–50)
HCT VFR BLD CALC: 38.3 % — LOW (ref 39–50)
HGB BLD-MCNC: 11.8 G/DL — LOW (ref 13–17)
HGB BLD-MCNC: 12.3 G/DL — LOW (ref 13–17)
IMM GRANULOCYTES NFR BLD AUTO: 0.4 % — SIGNIFICANT CHANGE UP (ref 0–1.5)
IMM GRANULOCYTES NFR BLD AUTO: 0.5 % — SIGNIFICANT CHANGE UP (ref 0–1.5)
LYMPHOCYTES # BLD AUTO: 1.5 K/UL — SIGNIFICANT CHANGE UP (ref 1–3.3)
LYMPHOCYTES # BLD AUTO: 17.1 % — SIGNIFICANT CHANGE UP (ref 13–44)
LYMPHOCYTES # BLD AUTO: 2.58 K/UL — SIGNIFICANT CHANGE UP (ref 1–3.3)
LYMPHOCYTES # BLD AUTO: 30.9 % — SIGNIFICANT CHANGE UP (ref 13–44)
MAGNESIUM SERPL-MCNC: 2 MG/DL — SIGNIFICANT CHANGE UP (ref 1.6–2.6)
MCHC RBC-ENTMCNC: 27.7 PG — SIGNIFICANT CHANGE UP (ref 27–34)
MCHC RBC-ENTMCNC: 27.8 PG — SIGNIFICANT CHANGE UP (ref 27–34)
MCHC RBC-ENTMCNC: 31.8 GM/DL — LOW (ref 32–36)
MCHC RBC-ENTMCNC: 32.1 GM/DL — SIGNIFICANT CHANGE UP (ref 32–36)
MCV RBC AUTO: 86.3 FL — SIGNIFICANT CHANGE UP (ref 80–100)
MCV RBC AUTO: 87.5 FL — SIGNIFICANT CHANGE UP (ref 80–100)
MONOCYTES # BLD AUTO: 0.11 K/UL — SIGNIFICANT CHANGE UP (ref 0–0.9)
MONOCYTES # BLD AUTO: 0.45 K/UL — SIGNIFICANT CHANGE UP (ref 0–0.9)
MONOCYTES NFR BLD AUTO: 1.3 % — LOW (ref 2–14)
MONOCYTES NFR BLD AUTO: 5.4 % — SIGNIFICANT CHANGE UP (ref 2–14)
NEUTROPHILS # BLD AUTO: 5.24 K/UL — SIGNIFICANT CHANGE UP (ref 1.8–7.4)
NEUTROPHILS # BLD AUTO: 7.08 K/UL — SIGNIFICANT CHANGE UP (ref 1.8–7.4)
NEUTROPHILS NFR BLD AUTO: 62.8 % — SIGNIFICANT CHANGE UP (ref 43–77)
NEUTROPHILS NFR BLD AUTO: 80.9 % — HIGH (ref 43–77)
NRBC # BLD: 0 /100 WBCS — SIGNIFICANT CHANGE UP (ref 0–0)
NRBC # BLD: 0 /100 WBCS — SIGNIFICANT CHANGE UP (ref 0–0)
PHOSPHATE SERPL-MCNC: 2.7 MG/DL — SIGNIFICANT CHANGE UP (ref 2.5–4.5)
PLATELET # BLD AUTO: 145 K/UL — LOW (ref 150–400)
PLATELET # BLD AUTO: 183 K/UL — SIGNIFICANT CHANGE UP (ref 150–400)
POTASSIUM SERPL-MCNC: 3.9 MMOL/L — SIGNIFICANT CHANGE UP (ref 3.5–5.3)
POTASSIUM SERPL-MCNC: 4.2 MMOL/L — SIGNIFICANT CHANGE UP (ref 3.5–5.3)
POTASSIUM SERPL-SCNC: 3.9 MMOL/L — SIGNIFICANT CHANGE UP (ref 3.5–5.3)
POTASSIUM SERPL-SCNC: 4.2 MMOL/L — SIGNIFICANT CHANGE UP (ref 3.5–5.3)
PROT SERPL-MCNC: 7.1 G/DL — SIGNIFICANT CHANGE UP (ref 6–8.3)
PROT SERPL-MCNC: 7.2 G/DL — SIGNIFICANT CHANGE UP (ref 6–8.3)
RBC # BLD: 4.24 M/UL — SIGNIFICANT CHANGE UP (ref 4.2–5.8)
RBC # BLD: 4.44 M/UL — SIGNIFICANT CHANGE UP (ref 4.2–5.8)
RBC # FLD: 16.4 % — HIGH (ref 10.3–14.5)
RBC # FLD: 16.8 % — HIGH (ref 10.3–14.5)
SARS-COV-2 RNA SPEC QL NAA+PROBE: SIGNIFICANT CHANGE UP
SODIUM SERPL-SCNC: 132 MMOL/L — LOW (ref 135–145)
SODIUM SERPL-SCNC: 132 MMOL/L — LOW (ref 135–145)
VIT B12 SERPL-MCNC: 678 PG/ML — SIGNIFICANT CHANGE UP (ref 232–1245)
WBC # BLD: 8.34 K/UL — SIGNIFICANT CHANGE UP (ref 3.8–10.5)
WBC # BLD: 8.75 K/UL — SIGNIFICANT CHANGE UP (ref 3.8–10.5)
WBC # FLD AUTO: 8.34 K/UL — SIGNIFICANT CHANGE UP (ref 3.8–10.5)
WBC # FLD AUTO: 8.75 K/UL — SIGNIFICANT CHANGE UP (ref 3.8–10.5)

## 2021-06-29 PROCEDURE — 99222 1ST HOSP IP/OBS MODERATE 55: CPT

## 2021-06-29 PROCEDURE — 71045 X-RAY EXAM CHEST 1 VIEW: CPT | Mod: 26

## 2021-06-29 PROCEDURE — 70450 CT HEAD/BRAIN W/O DYE: CPT | Mod: 26,MA

## 2021-06-29 RX ORDER — AMLODIPINE BESYLATE 2.5 MG/1
5 TABLET ORAL DAILY
Refills: 0 | Status: DISCONTINUED | OUTPATIENT
Start: 2021-06-29 | End: 2021-07-09

## 2021-06-29 RX ORDER — ENOXAPARIN SODIUM 100 MG/ML
40 INJECTION SUBCUTANEOUS DAILY
Refills: 0 | Status: DISCONTINUED | OUTPATIENT
Start: 2021-06-29 | End: 2021-07-06

## 2021-06-29 RX ORDER — RAMELTEON 8 MG
8 TABLET ORAL AT BEDTIME
Refills: 0 | Status: DISCONTINUED | OUTPATIENT
Start: 2021-06-29 | End: 2021-07-20

## 2021-06-29 RX ORDER — LEVETIRACETAM 250 MG/1
1000 TABLET, FILM COATED ORAL
Refills: 0 | Status: DISCONTINUED | OUTPATIENT
Start: 2021-06-29 | End: 2021-07-12

## 2021-06-29 RX ORDER — ACETAMINOPHEN 500 MG
1000 TABLET ORAL ONCE
Refills: 0 | Status: COMPLETED | OUTPATIENT
Start: 2021-06-29 | End: 2021-06-29

## 2021-06-29 RX ORDER — METHYLPHENIDATE HCL 5 MG
5 TABLET ORAL
Refills: 0 | Status: DISCONTINUED | OUTPATIENT
Start: 2021-06-29 | End: 2021-07-06

## 2021-06-29 RX ORDER — LEVETIRACETAM 250 MG/1
1000 TABLET, FILM COATED ORAL
Refills: 0 | Status: DISCONTINUED | OUTPATIENT
Start: 2021-06-29 | End: 2021-06-29

## 2021-06-29 RX ORDER — DEXAMETHASONE 0.5 MG/5ML
4 ELIXIR ORAL ONCE
Refills: 0 | Status: COMPLETED | OUTPATIENT
Start: 2021-06-29 | End: 2021-06-29

## 2021-06-29 RX ORDER — CITALOPRAM 10 MG/1
20 TABLET, FILM COATED ORAL DAILY
Refills: 0 | Status: DISCONTINUED | OUTPATIENT
Start: 2021-06-29 | End: 2021-06-30

## 2021-06-29 RX ORDER — LOSARTAN POTASSIUM 100 MG/1
100 TABLET, FILM COATED ORAL DAILY
Refills: 0 | Status: DISCONTINUED | OUTPATIENT
Start: 2021-06-29 | End: 2021-07-09

## 2021-06-29 RX ORDER — PANTOPRAZOLE SODIUM 20 MG/1
40 TABLET, DELAYED RELEASE ORAL
Refills: 0 | Status: DISCONTINUED | OUTPATIENT
Start: 2021-06-29 | End: 2021-07-05

## 2021-06-29 RX ORDER — AMANTADINE HCL 100 MG
10 CAPSULE ORAL
Refills: 0 | Status: DISCONTINUED | OUTPATIENT
Start: 2021-06-29 | End: 2021-06-29

## 2021-06-29 RX ORDER — AMANTADINE HCL 100 MG
100 CAPSULE ORAL
Refills: 0 | Status: DISCONTINUED | OUTPATIENT
Start: 2021-06-29 | End: 2021-07-09

## 2021-06-29 RX ORDER — DEXAMETHASONE 0.5 MG/5ML
4 ELIXIR ORAL DAILY
Refills: 0 | Status: DISCONTINUED | OUTPATIENT
Start: 2021-06-29 | End: 2021-07-03

## 2021-06-29 RX ORDER — SODIUM,POTASSIUM PHOSPHATES 278-250MG
1 POWDER IN PACKET (EA) ORAL
Refills: 0 | Status: COMPLETED | OUTPATIENT
Start: 2021-06-29 | End: 2021-06-29

## 2021-06-29 RX ORDER — SODIUM CHLORIDE 9 MG/ML
1000 INJECTION, SOLUTION INTRAVENOUS
Refills: 0 | Status: DISCONTINUED | OUTPATIENT
Start: 2021-06-29 | End: 2021-06-30

## 2021-06-29 RX ORDER — NYSTATIN 500MM UNIT
400000 POWDER (EA) MISCELLANEOUS
Refills: 0 | Status: DISCONTINUED | OUTPATIENT
Start: 2021-06-29 | End: 2021-07-06

## 2021-06-29 RX ORDER — ATORVASTATIN CALCIUM 80 MG/1
10 TABLET, FILM COATED ORAL AT BEDTIME
Refills: 0 | Status: DISCONTINUED | OUTPATIENT
Start: 2021-06-29 | End: 2021-07-09

## 2021-06-29 RX ORDER — AMANTADINE HCL 100 MG
10 CAPSULE ORAL DAILY
Refills: 0 | Status: DISCONTINUED | OUTPATIENT
Start: 2021-06-29 | End: 2021-06-29

## 2021-06-29 RX ADMIN — Medication 4 MILLIGRAM(S): at 04:32

## 2021-06-29 RX ADMIN — Medication 4 MILLIGRAM(S): at 11:25

## 2021-06-29 RX ADMIN — SODIUM CHLORIDE 100 MILLILITER(S): 9 INJECTION, SOLUTION INTRAVENOUS at 11:24

## 2021-06-29 RX ADMIN — LEVETIRACETAM 400 MILLIGRAM(S): 250 TABLET, FILM COATED ORAL at 23:53

## 2021-06-29 RX ADMIN — LEVETIRACETAM 400 MILLIGRAM(S): 250 TABLET, FILM COATED ORAL at 12:52

## 2021-06-29 RX ADMIN — ENOXAPARIN SODIUM 40 MILLIGRAM(S): 100 INJECTION SUBCUTANEOUS at 11:24

## 2021-06-29 RX ADMIN — Medication 400 MILLIGRAM(S): at 23:54

## 2021-06-29 NOTE — ED PROVIDER NOTE - ATTENDING CONTRIBUTION TO CARE
Pt with known glioblastoma here with sz. pt currently slightly more altered than baseline, otherwise no new neuro deficits. w/u in ED shows stable CT from prior, no acute abnormality on labs. unclear etiology of sz. pt evaluated by neuro and will be admitted for further care, gina JOHN.

## 2021-06-29 NOTE — H&P ADULT - ASSESSMENT
Ronald Rider is a 72 year old RH male with a past medical history of HTN, HLD, hx of SIADH, gliobastoma multiforme (diagnosed in 2019) and seizures presenting after an episode of depressed level of consciousness and suspected seizure episode.    Impression: breakthrough seizure likely in the setting of progression of disease of glioblastoma multiforme.    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 Lyle Chavis as to patient's current treatment plan and goals of care  [] q4h neurochecks  [] speech and swallow eval to determine safe diet, family has refused PEG in the past  [] CPK, B12, folate    Case discussed with neurology attending, Dr. Carlin. Ronald Rider is a 72 year old RH male with a past medical history of HTN, HLD, hx of SIADH, gliobastoma multiforme (diagnosed in 2019) and seizures presenting after an episode of depressed level of consciousness and suspected seizure episode.    O    Impression: breakthrough seizure likely in the setting of progression of disease of glioblastoma multiforme.    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 Lyle Chavis as to patient's current treatment plan and goals of care  [] q4h neurochecks  [] speech and swallow eval to determine safe diet, family has refused PEG in the past  [] CPK, B12, folate    Case discussed with neurology attending, Dr. Carlin.

## 2021-06-29 NOTE — ED PROVIDER NOTE - CLINICAL SUMMARY MEDICAL DECISION MAKING FREE TEXT BOX
Pt w/ glioblastoma here with fasculations of mouth and eyes and incontinence. VSS. Exam w/ reactive pupils but not following commands to verbal which is deviated from his baseline. Questionable seizure. Will obtain CT head to r/o worsening mass effect from GB. Also labs to assess for electrolyte abnormalities or infection. Labs, EKG, CTH, UA, reassess.

## 2021-06-29 NOTE — ED PROVIDER NOTE - PHYSICAL EXAMINATION
General: chronically ill appearing  HEENT: +fasciulations of eyes and mouth, -tongue lacs, +reactive pupils  Cardiac: RRR, no murmurs, 2+ peripheral pulses  Chest: CTA  Abdomen: soft, non-distended, no ttp  Extremities: no peripheral edema, +contracted   Skin: no rashes  Neuro: AAOx0, no purposeful movements of arms or legs, responsive to voice  Psych: cannot assess

## 2021-06-29 NOTE — ED PROVIDER NOTE - OBJECTIVE STATEMENT
70yo *** 70yo hx of HTN, HLD, glioblastoma diagnosed in 2019 following at [facility] here w/ seizure like activity. 5pm this afternoon, pt had witnessed episode of R sided leaning and contracture, movements of mouth and eye blinking, and urinary incontinence. Witnessed by Son who is a hospitalist in [place]. Episode lasted 20 minutes. Pt is non-verbal at baseline but follows basic commands. At baseline prior to today's episode. Had similar episode few weeks ago and was admitted to [facility] where MRI did not find any acute changes. Pt's last chemo was on friday.

## 2021-06-29 NOTE — ED ADULT NURSE NOTE - OBJECTIVE STATEMENT
72yo hx of HTN, HLD, glioblastoma diagnosed in 2019 following at [facility] here w/ seizure like activity. 5pm this afternoon, pt had witnessed episode of R sided leaning and contracture, movements of mouth and eye blinking, and urinary incontinence. Witnessed by Son who is a hospitalist in [place]. Episode lasted 20 minutes. Pt is non-verbal at baseline but follows basic commands. At baseline prior to today's episode. Had similar episode few weeks ago and was admitted to [facility] where MRI did not find any acute changes. Pt's last chemo was on friday.

## 2021-06-29 NOTE — H&P ADULT - ATTENDING COMMENTS
Ronald Rider is a 72 year old right handed man with a PMHx of HTN, HLD,   SIADH, gliobastoma multiforme (diagnosed in 2019) and seizures who presented after an episode of altered level of consciousness and suspected seizure episode.    Exam demonstrates altered mental status with decreased ability to follow command.      Likely repeated seizure in the setting of  of glioblastoma multiforme.  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 Lyle Chavis as to patient's current treatment plan and goals of care

## 2021-06-29 NOTE — CONSULT NOTE ADULT - SUBJECTIVE AND OBJECTIVE BOX
Reason for consult: Glioblastoma multiforme    HPI:  Ronald Rider is a 72 year old RH male with a past medical history of HTN, HLD, hx of SIADH, gliobastoma multiforme (diagnosed in 2019) and seizures presenting after an episode of depressed level of consciousness and suspected seizure episode. At baseline, patient has contractions throughout and is bedbound, he is non-verbal but can respond to some simple commands. Patient was recently admitted for increased somnolence, difficulty breathing, decreased alertness, and poor PO intake. Was briefly on antibiotics but discontinued once no infection was found, had hypernatremia which resolved, briefly on steroid which were tapered and then subsequently discontinued. Was discharged for [place] [facility] [place] Currently patient was on Keppra 750mg tid and as per son was compliant. Patient now presenting with after an episode where he was sitting in his chair and then noted be slumping over to the R, having increased tonic contractions of the RUE, and some twitching of his face. Patient was noted to be incontinent during the episode and was less responsive than usual. He was taken by EMS to the hospital and given Versed 5mg IM en route. Patient gradually improved and was able to follow some command to squeeze a finger with his son in the room. On examination, the patient was not able to contribute to history, ROS, or follow commands to exam.     Glioblastoma History:  Diagnosed in 2019 with MR noting involvement of the splenium of the corpus callosum and bilateral parenchymal involvement. Had stereotactic biopsy in 2019 with pathology noting glioblastoma. Was started on Keppra and Decadron. Was [place] [facility] [place] Chavis with radiation, temozolomide, and Bactrim. Temozolamide was stopped due to myelosuppression. Has had numerous admission for somnolence and seizure episodes. Currently on Avastin maintenance. MRI in April 2021 and June 2021 show stable mass, progression of RT gliosis. Patient was administered steroids at the time of last admission in early June 2021 and was tapered off as an outpatient.    Hematology/Oncology called to follow patient who is known to Dr. Lyle Chavis at [facility] [place] for the treatment of glioblastoma multiforme. He was discharged from [facility] as noted above. As per available records from [facility] last treatment with Avastin was 6/11/2021 (will obtain updated records once [facility] has been notified of admission).    ALLERGIES/INTOLERANCES:  Allergies  No Known Allergies    Intolerances    VITALS & EXAMINATION:  Vital Signs Last 24 Hrs  T(C): 36.7 (29 Jun 2021 03:00), Max: 36.7 (29 Jun 2021 03:00)  T(F): 98 (29 Jun 2021 03:00), Max: 98 (29 Jun 2021 03:00)  HR: 99 (29 Jun 2021 03:00) (99 - 100)  BP: 135/71 (29 Jun 2021 03:00) (117/46 - 146/82)  BP(mean): --  RR: 17 (29 Jun 2021 03:00) (17 - 22)  SpO2: 100% (29 Jun 2021 03:00) (100% - 100%) (29 Jun 2021 03:53)      PAST MEDICAL & SURGICAL HISTORY:  Hypertension    Hyperlipidemia    Seizures    Diabetes    Glioblastoma multiforme    CMV infection    History of SIADH    H/O colonoscopy    History of laryngoscopy    Status post stereotactic brain biopsy        FAMILY HISTORY:  FH: myocardial infarction (Father)        Alcohol Denied  Smoking: Nonsmoker  Drug Use: Denied  Marital Status:         Allergies    No Known Allergies    Intolerances        MEDICATIONS  (STANDING):  amantadine Syrup 100 milliGRAM(s) Oral two times a day  amLODIPine   Tablet 5 milliGRAM(s) Oral daily  atorvastatin 10 milliGRAM(s) Oral at bedtime  citalopram 20 milliGRAM(s) Oral daily  dexAMETHasone  Injectable 4 milliGRAM(s) IV Push daily  enoxaparin Injectable 40 milliGRAM(s) SubCutaneous daily  levETIRAcetam  Solution 1000 milliGRAM(s) Oral <User Schedule>  losartan 100 milliGRAM(s) Oral daily  methylphenidate 5 milliGRAM(s) Oral two times a day  nystatin    Suspension 099489 Unit(s) Oral four times a day  pantoprazole   Suspension 40 milliGRAM(s) Oral before breakfast  potassium phosphate / sodium phosphate Tablet (K-PHOS No. 2) 1 Tablet(s) Oral three times a day with meals    MEDICATIONS  (PRN):  ramelteon 8 milliGRAM(s) Oral at bedtime PRN Insomnia      ROS  No fever, sweats, chills  No epistaxis, HA, sore throat  No CP, SOB, cough, sputum  No n/v/d, abd pain, melena, hematochezia  No edema  No rash  No anxiety  No back pain, joint pain  No bleeding, bruising  No dysuria, hematuria    T(C): 36.9 (06-29-21 @ 06:30), Max: 36.9 (06-29-21 @ 05:45)  HR: 97 (06-29-21 @ 06:30) (97 - 100)  BP: 148/83 (06-29-21 @ 06:30) (117/46 - 148/83)  RR: 16 (06-29-21 @ 06:30) (16 - 22)  SpO2: 100% (06-29-21 @ 06:30) (99% - 100%)  Wt(kg): --    PE  NAD  Altered mental status  Anicteric  RRR  CTAB  Abd soft, NT, ND  No c/c/e  No rash grossly                            11.8   8.34  )-----------( 183      ( 29 Jun 2021 00:31 )             37.1       06-29    132<L>  |  97  |  16  ----------------------------<  84  3.9   |  23  |  0.40<L>    Ca    8.7      29 Jun 2021 00:31  Phos  2.7     06-29  Mg     2.0     06-29    TPro  7.2  /  Alb  3.5  /  TBili  0.3  /  DBili  x   /  AST  14  /  ALT  15  /  AlkPhos  83  06-29      EXAM:  CT BRAIN                            PROCEDURE DATE:  06/29/2021            INTERPRETATION:  CLINICAL INFORMATION: Possible seizure, nonverbal. Glioblastoma multiforme.    TECHNIQUE: Noncontrast axial CT images were acquired through the head. Two-dimensional sagittal and coronal reformats were generated.    COMPARISON STUDY: CT head 6/5/2021.    FINDINGS:    Redemonstration of posttreatment changes in the bilateral occipital lobes and periventricular regions. There is diffuse white matter hypoattenuation, most prominent in the right posterior periventricular region, which may represent chronic microvascular changes versus vasogenic edema. Stable ventricular size. Partially empty sella.    No acute intracranial hemorrhage, mass effect, or midline shift. No abnormal extra-axial collections. The basal cisterns are patent without evidence of central herniation. The gray-white junctions are preserved.    Right posterior parietal teri hole. The soft tissues of the scalp are unremarkable. Polyp/mucus retention cyst in the right maxillary sinus. The mastoid air cells and middle ear cavities are clear. Bilateral lens replacements.      IMPRESSION:    No CT evidence of acute intracranial hemorrhage, mass effect, or midline shift.  Unchanged posttreatment changes.       Reason for consult: Glioblastoma multiforme    HPI:  Ronald Rider is a 72 year old RH male with a past medical history of HTN, HLD, hx of SIADH, gliobastoma multiforme (diagnosed in 2019) and seizures presenting after an episode of depressed level of consciousness and suspected seizure episode. At baseline, patient has contractions throughout and is bedbound, he is non-verbal but can respond to some simple commands. Patient was recently admitted for increased somnolence, difficulty breathing, decreased alertness, and poor PO intake. Was briefly on antibiotics but discontinued once no infection was found, had hypernatremia which resolved, briefly on steroid which were tapered and then subsequently discontinued. Was discharged for follow up with [place] [facility] Currently patient was on Keppra 750mg tid and as per son was compliant. Patient now presenting with after an episode where he was sitting in his chair and then noted be slumping over to the R, having increased tonic contractions of the RUE, and some twitching of his face. Patient was noted to be incontinent during the episode and was less responsive than usual. He was taken by EMS to the hospital and given Versed 5mg IM en route. Patient gradually improved and was able to follow some command to squeeze a finger with his son in the room. On examination, the patient was not able to contribute to history, ROS, or follow commands to exam.     Glioblastoma History:  Diagnosed in 2019 with MR noting involvement of the splenium of the corpus callosum and bilateral parenchymal involvement. Had stereotactic biopsy in 2019 with pathology noting glioblastoma. Was started on Keppra and Decadron. Was treated at [facility] [place]. Chavis with radiation, temozolomide, and Bactrim. Temozolamide was stopped due to myelosuppression. Has had numerous admission for somnolence and seizure episodes. Currently on Avastin maintenance. MRI in April 2021 and June 2021 show stable mass, progression of RT gliosis. Patient was administered steroids at the time of last admission in early June 2021 and was tapered off as an outpatient.    Hematology/Oncology called to follow patient who is known to Dr. Lyle Chavis at [facility] for the treatment of glioblastoma multiforme. He was discharged from [facility] as noted above. As per available records from [facility] last treatment with Avastin was 6/11/2021 (will obtain updated records once [facility] has been notified of admission).    ALLERGIES/INTOLERANCES:  Allergies  No Known Allergies    Intolerances    VITALS & EXAMINATION:  Vital Signs Last 24 Hrs  T(C): 36.7 (29 Jun 2021 03:00), Max: 36.7 (29 Jun 2021 03:00)  T(F): 98 (29 Jun 2021 03:00), Max: 98 (29 Jun 2021 03:00)  HR: 99 (29 Jun 2021 03:00) (99 - 100)  BP: 135/71 (29 Jun 2021 03:00) (117/46 - 146/82)  BP(mean): --  RR: 17 (29 Jun 2021 03:00) (17 - 22)  SpO2: 100% (29 Jun 2021 03:00) (100% - 100%) (29 Jun 2021 03:53)      PAST MEDICAL & SURGICAL HISTORY:  Hypertension    Hyperlipidemia    Seizures    Diabetes    Glioblastoma multiforme    CMV infection    History of SIADH    H/O colonoscopy    History of laryngoscopy    Status post stereotactic brain biopsy        FAMILY HISTORY:  FH: myocardial infarction (Father)        Alcohol Denied  Smoking: Nonsmoker  Drug Use: Denied  Marital Status:         Allergies    No Known Allergies    Intolerances        MEDICATIONS  (STANDING):  amantadine Syrup 100 milliGRAM(s) Oral two times a day  amLODIPine   Tablet 5 milliGRAM(s) Oral daily  atorvastatin 10 milliGRAM(s) Oral at bedtime  citalopram 20 milliGRAM(s) Oral daily  dexAMETHasone  Injectable 4 milliGRAM(s) IV Push daily  enoxaparin Injectable 40 milliGRAM(s) SubCutaneous daily  levETIRAcetam  Solution 1000 milliGRAM(s) Oral <User Schedule>  losartan 100 milliGRAM(s) Oral daily  methylphenidate 5 milliGRAM(s) Oral two times a day  nystatin    Suspension 039949 Unit(s) Oral four times a day  pantoprazole   Suspension 40 milliGRAM(s) Oral before breakfast  potassium phosphate / sodium phosphate Tablet (K-PHOS No. 2) 1 Tablet(s) Oral three times a day with meals    MEDICATIONS  (PRN):  ramelteon 8 milliGRAM(s) Oral at bedtime PRN Insomnia      ROS  Patient unable to give ROS - please see admission H & P    T(C): 36.9 (06-29-21 @ 06:30), Max: 36.9 (06-29-21 @ 05:45)  HR: 97 (06-29-21 @ 06:30) (97 - 100)  BP: 148/83 (06-29-21 @ 06:30) (117/46 - 148/83)  RR: 16 (06-29-21 @ 06:30) (16 - 22)  SpO2: 100% (06-29-21 @ 06:30) (99% - 100%)  Wt(kg): --    PE  NAD  Cachetic  Altered mental status  Non-communicative  Anicteric  RRR  CTAB  Abd soft, NT, ND  No c/c/e  No rash grossly                            11.8   8.34  )-----------( 183      ( 29 Jun 2021 00:31 )             37.1       06-29    132<L>  |  97  |  16  ----------------------------<  84  3.9   |  23  |  0.40<L>    Ca    8.7      29 Jun 2021 00:31  Phos  2.7     06-29  Mg     2.0     06-29    TPro  7.2  /  Alb  3.5  /  TBili  0.3  /  DBili  x   /  AST  14  /  ALT  15  /  AlkPhos  83  06-29      EXAM:  CT BRAIN                            PROCEDURE DATE:  06/29/2021            INTERPRETATION:  CLINICAL INFORMATION: Possible seizure, nonverbal. Glioblastoma multiforme.    TECHNIQUE: Noncontrast axial CT images were acquired through the head. Two-dimensional sagittal and coronal reformats were generated.    COMPARISON STUDY: CT head 6/5/2021.    FINDINGS:    Redemonstration of posttreatment changes in the bilateral occipital lobes and periventricular regions. There is diffuse white matter hypoattenuation, most prominent in the right posterior periventricular region, which may represent chronic microvascular changes versus vasogenic edema. Stable ventricular size. Partially empty sella.    No acute intracranial hemorrhage, mass effect, or midline shift. No abnormal extra-axial collections. The basal cisterns are patent without evidence of central herniation. The gray-white junctions are preserved.    Right posterior parietal teri hole. The soft tissues of the scalp are unremarkable. Polyp/mucus retention cyst in the right maxillary sinus. The mastoid air cells and middle ear cavities are clear. Bilateral lens replacements.      IMPRESSION:    No CT evidence of acute intracranial hemorrhage, mass effect, or midline shift.  Unchanged posttreatment changes.

## 2021-06-29 NOTE — ED CLERICAL - NS ED CLERK NOTE PRE-ARRIVAL INFORMATION; ADDITIONAL PRE-ARRIVAL INFORMATION
This patient is enrolled in the COPD or PNA STARS readmission program and has active care navigation.  Please call the contact number above to speak with the Care navigation team to obtain additional clinical information regarding this patient and/or to arrange close clinical follow up within 24 hours.     Between the hours of 8A and 5P, Monday through Friday, a Hospital Medicine attending will meet with the ED care team to provide relevant clinical information, and expedite appropriate follow-up if there is an opportunity for discharge from the ED. Off hours, you may contact the Hospitalist-In-Charge at pager [phone] for assistance.     For expedited pulmonary follow-up appointments, you may email [email or web address]. Please include in this email: patient name, date of birth, MRN and contact information.

## 2021-06-29 NOTE — H&P ADULT - NSHPLABSRESULTS_GEN_ALL_CORE
LABORATORY:  CBC                       11.8   8.34  )-----------( 183      ( 29 Jun 2021 00:31 )             37.1     Chem 06-29    132<L>  |  97  |  16  ----------------------------<  84  3.9   |  23  |  0.40<L>    Ca    8.7      29 Jun 2021 00:31  Phos  2.7     06-29  Mg     2.0     06-29    TPro  7.2  /  Alb  3.5  /  TBili  0.3  /  DBili  x   /  AST  14  /  ALT  15  /  AlkPhos  83  06-29    LFTs LIVER FUNCTIONS - ( 29 Jun 2021 00:31 )  Alb: 3.5 g/dL / Pro: 7.2 g/dL / ALK PHOS: 83 U/L / ALT: 15 U/L / AST: 14 U/L / GGT: x           STUDIES & IMAGING:    Radiology (XR, CT, MR, U/S, TTE/VARINDER):    < from: CT Head No Cont (06.29.21 @ 00:43) >    FINDINGS:    Redemonstration of posttreatment changes in the bilateral occipital lobes and periventricular regions. There is diffuse white matterhypoattenuation, most prominent in the right posterior periventricular region, which may represent chronic microvascular changes versus vasogenic edema. Stable ventricular size. Partially empty sella.    No acute intracranial hemorrhage, mass effect,or midline shift. No abnormal extra-axial collections. The basal cisterns are patent without evidence of central herniation. The gray-white junctions are preserved.    Right posterior parietal teri hole. The soft tissues of the scalp are unremarkable. Polyp/mucus retention cyst in the right maxillary sinus. The mastoid air cells and middle ear cavities are clear. Bilateral lens replacements.      IMPRESSION:    No CT evidence of acute intracranial hemorrhage, mass effect, or midline shift.    < end of copied text >

## 2021-06-29 NOTE — CHART NOTE - NSCHARTNOTEFT_GEN_A_CORE
Spoke with [facility] Neuro-oncologist Dr. Martínez (covering for Dr. Chavis)    Patient's current treatment regimen includes monotherapy treatment with Avastin (last dose 6/25, every 2-3 weeks). Next dose scheduled around July 9th. Spoke with [facility] Neuro-oncologist Dr. Martínez (covering for Dr. Chavis)    Patient's current treatment regimen includes monotherapy treatment with Avastin (last dose 6/25, every 2-3 weeks). Next dose scheduled around July 9th. Agrees with plan that if patient is unable to tolerate PO intake, next step would be a discussion with the family for goals of care.

## 2021-06-29 NOTE — H&P ADULT - NSHPPHYSICALEXAM_GEN_ALL_CORE
General:  Constitutional: Thin appearing male, in no apparent distress including pain  Head: Normocephalic & atraumatic.    Neurological (>12):  MS: Awake, alert, unable to assess orientation. Does not follow any commands.     Language: No spontaneous speech.     CNs: PERRL (R = 4mm, L = 4mm). VF intact to blink to threat. EOMI no nystagmus. No facial asymmetry appreciated.     Motor: Slightly increased tone in the bilateral upper and lower extremities.              	   R	at least 4	 	  L	at least 4	    Unable to assess manual motor testing due to not following commands. Moves all extremities spontaneously     Sensation: Grimace to noxious stimuli throughout.    Reflexes:               Plantar Resp  R	Mute  L	Mute     Coordination: unable to comply with    Gait: Deferred General:  Constitutional: Thin appearing male, in no apparent distress including pain  Head: Normocephalic & atraumatic.    Neurological (>12):  MS: Awake, alert, unable to assess orientation. Does not follow any commands.     Language: No spontaneous speech.     CNs: PERRL (R = 4mm, L = 4mm). VF intact to blink to threat. EOMI no nystagmus. No facial asymmetry appreciated.     Motor: Increased tone in the bilateral upper and lower extremities.              	   R	at least 4	 	  L	at least 4	    Unable to assess manual motor testing due to not following commands. Moves all extremities spontaneously     Sensation: Grimace to noxious stimuli throughout.    Reflexes:               Plantar Resp  R	Mute  L	Mute     Coordination: unable to comply with    Gait: Deferred

## 2021-06-29 NOTE — H&P ADULT - HISTORY OF PRESENT ILLNESS
HPI:  Ronald Rider is a 72 year old RH male with a past medical history of HTN, HLD, hx of SIADH, gliobastoma multiforme (diagnosed in 2019) and seizures presenting after an episode of depressed level of consciousness and suspected seizure episode. At baseline, patient has contractions throughout and is bedbound, he is non-verbal but can respond to some simple commands. Patient was recently admitted for increased somnolence, difficulty breathing, decreased alertness, and poor PO intake. Was briefly on antibiotics but discontinued once no infection was found, had hypernatremia which resolved, briefly on steroid which were tapered and then subsequently discontinued. Was discharged for [place] [facility] Currently patient was on Keppra 750mg tid and as per son was compliant. Patient now presenting with after an episode where he was sitting in his chair and then noted be slumping over to the R, having increased tonic contractions of the RUE, and some twitching of his face. Patient was noted to be incontinent during the episode and was less responsive than usual. He was taken by EMS to the hospital and given Versed 5mg IM en route. Patient gradually improved and was able to follow some command to squeeze a finger with his son in the room. On examination, the patient was not able to contribute to history, ROS, or follow commands to exam.     Glioblastoma History:  Diagnosed in 2019 with MR noting involvement of the splenium of the corpus callosum and bilateral parenchymal involvement. Had stereotactic biopsy in 2019 with pathology noting glioblastoma. Was started on Keppra and Decadron. [place] [facility] [place] Chavis with radiation, temozolomide, and Bactrim. Temozolamide was stopped due to myelosuppression. Has had numerous admission for somnolence and seizure episodes. Currently on Avastin maintenance. MRI in April 2021 and June 2021 show stable mass, progression of RT gliosis. Patient was administered steroids at the time of last admission in early June 2021 and was tapered off as an outpatient.      ALLERGIES/INTOLERANCES:  Allergies  No Known Allergies    Intolerances    VITALS & EXAMINATION:  Vital Signs Last 24 Hrs  T(C): 36.7 (29 Jun 2021 03:00), Max: 36.7 (29 Jun 2021 03:00)  T(F): 98 (29 Jun 2021 03:00), Max: 98 (29 Jun 2021 03:00)  HR: 99 (29 Jun 2021 03:00) (99 - 100)  BP: 135/71 (29 Jun 2021 03:00) (117/46 - 146/82)  BP(mean): --  RR: 17 (29 Jun 2021 03:00) (17 - 22)  SpO2: 100% (29 Jun 2021 03:00) (100% - 100%)

## 2021-06-30 DIAGNOSIS — R13.10 DYSPHAGIA, UNSPECIFIED: ICD-10-CM

## 2021-06-30 LAB
ALBUMIN SERPL ELPH-MCNC: 3.2 G/DL — LOW (ref 3.3–5)
ALP SERPL-CCNC: 76 U/L — SIGNIFICANT CHANGE UP (ref 40–120)
ALT FLD-CCNC: 12 U/L — SIGNIFICANT CHANGE UP (ref 10–45)
ANION GAP SERPL CALC-SCNC: 12 MMOL/L — SIGNIFICANT CHANGE UP (ref 5–17)
AST SERPL-CCNC: 16 U/L — SIGNIFICANT CHANGE UP (ref 10–40)
BASOPHILS # BLD AUTO: 0.01 K/UL — SIGNIFICANT CHANGE UP (ref 0–0.2)
BASOPHILS NFR BLD AUTO: 0.1 % — SIGNIFICANT CHANGE UP (ref 0–2)
BILIRUB SERPL-MCNC: 0.5 MG/DL — SIGNIFICANT CHANGE UP (ref 0.2–1.2)
BUN SERPL-MCNC: 11 MG/DL — SIGNIFICANT CHANGE UP (ref 7–23)
CALCIUM SERPL-MCNC: 8.5 MG/DL — SIGNIFICANT CHANGE UP (ref 8.4–10.5)
CHLORIDE SERPL-SCNC: 91 MMOL/L — LOW (ref 96–108)
CK SERPL-CCNC: 40 U/L — SIGNIFICANT CHANGE UP (ref 30–200)
CO2 SERPL-SCNC: 21 MMOL/L — LOW (ref 22–31)
COVID-19 SPIKE DOMAIN AB INTERP: POSITIVE
COVID-19 SPIKE DOMAIN ANTIBODY RESULT: >250 U/ML — HIGH
CREAT SERPL-MCNC: 0.41 MG/DL — LOW (ref 0.5–1.3)
EOSINOPHIL # BLD AUTO: 0.02 K/UL — SIGNIFICANT CHANGE UP (ref 0–0.5)
EOSINOPHIL NFR BLD AUTO: 0.2 % — SIGNIFICANT CHANGE UP (ref 0–6)
GLUCOSE SERPL-MCNC: 82 MG/DL — SIGNIFICANT CHANGE UP (ref 70–99)
HCT VFR BLD CALC: 35.4 % — LOW (ref 39–50)
HGB BLD-MCNC: 11.4 G/DL — LOW (ref 13–17)
IMM GRANULOCYTES NFR BLD AUTO: 0.3 % — SIGNIFICANT CHANGE UP (ref 0–1.5)
LYMPHOCYTES # BLD AUTO: 3.96 K/UL — HIGH (ref 1–3.3)
LYMPHOCYTES # BLD AUTO: 35 % — SIGNIFICANT CHANGE UP (ref 13–44)
MCHC RBC-ENTMCNC: 27.3 PG — SIGNIFICANT CHANGE UP (ref 27–34)
MCHC RBC-ENTMCNC: 32.2 GM/DL — SIGNIFICANT CHANGE UP (ref 32–36)
MCV RBC AUTO: 84.7 FL — SIGNIFICANT CHANGE UP (ref 80–100)
MONOCYTES # BLD AUTO: 0.78 K/UL — SIGNIFICANT CHANGE UP (ref 0–0.9)
MONOCYTES NFR BLD AUTO: 6.9 % — SIGNIFICANT CHANGE UP (ref 2–14)
NEUTROPHILS # BLD AUTO: 6.51 K/UL — SIGNIFICANT CHANGE UP (ref 1.8–7.4)
NEUTROPHILS NFR BLD AUTO: 57.5 % — SIGNIFICANT CHANGE UP (ref 43–77)
NRBC # BLD: 0 /100 WBCS — SIGNIFICANT CHANGE UP (ref 0–0)
PLATELET # BLD AUTO: 161 K/UL — SIGNIFICANT CHANGE UP (ref 150–400)
POTASSIUM SERPL-MCNC: 4.5 MMOL/L — SIGNIFICANT CHANGE UP (ref 3.5–5.3)
POTASSIUM SERPL-SCNC: 4.5 MMOL/L — SIGNIFICANT CHANGE UP (ref 3.5–5.3)
PROT SERPL-MCNC: 6.4 G/DL — SIGNIFICANT CHANGE UP (ref 6–8.3)
RBC # BLD: 4.18 M/UL — LOW (ref 4.2–5.8)
RBC # FLD: 16.4 % — HIGH (ref 10.3–14.5)
SARS-COV-2 IGG+IGM SERPL QL IA: >250 U/ML — HIGH
SARS-COV-2 IGG+IGM SERPL QL IA: POSITIVE
SODIUM SERPL-SCNC: 124 MMOL/L — LOW (ref 135–145)
WBC # BLD: 11.31 K/UL — HIGH (ref 3.8–10.5)
WBC # FLD AUTO: 11.31 K/UL — HIGH (ref 3.8–10.5)

## 2021-06-30 PROCEDURE — 71045 X-RAY EXAM CHEST 1 VIEW: CPT | Mod: 26

## 2021-06-30 PROCEDURE — 43753 TX GASTRO INTUB W/ASP: CPT

## 2021-06-30 PROCEDURE — 99232 SBSQ HOSP IP/OBS MODERATE 35: CPT

## 2021-06-30 PROCEDURE — 71045 X-RAY EXAM CHEST 1 VIEW: CPT | Mod: 26,77

## 2021-06-30 PROCEDURE — 99221 1ST HOSP IP/OBS SF/LOW 40: CPT | Mod: 25

## 2021-06-30 PROCEDURE — 31575 DIAGNOSTIC LARYNGOSCOPY: CPT

## 2021-06-30 RX ORDER — SODIUM CHLORIDE 9 MG/ML
1000 INJECTION INTRAMUSCULAR; INTRAVENOUS; SUBCUTANEOUS
Refills: 0 | Status: DISCONTINUED | OUTPATIENT
Start: 2021-06-30 | End: 2021-07-02

## 2021-06-30 RX ADMIN — LEVETIRACETAM 400 MILLIGRAM(S): 250 TABLET, FILM COATED ORAL at 21:17

## 2021-06-30 RX ADMIN — ENOXAPARIN SODIUM 40 MILLIGRAM(S): 100 INJECTION SUBCUTANEOUS at 12:10

## 2021-06-30 RX ADMIN — Medication 100 MILLIGRAM(S): at 21:15

## 2021-06-30 RX ADMIN — Medication 400000 UNIT(S): at 12:11

## 2021-06-30 RX ADMIN — Medication 5 MILLIGRAM(S): at 21:53

## 2021-06-30 RX ADMIN — Medication 4 MILLIGRAM(S): at 06:51

## 2021-06-30 RX ADMIN — LEVETIRACETAM 400 MILLIGRAM(S): 250 TABLET, FILM COATED ORAL at 17:59

## 2021-06-30 RX ADMIN — ATORVASTATIN CALCIUM 10 MILLIGRAM(S): 80 TABLET, FILM COATED ORAL at 21:15

## 2021-06-30 RX ADMIN — LEVETIRACETAM 400 MILLIGRAM(S): 250 TABLET, FILM COATED ORAL at 06:51

## 2021-06-30 RX ADMIN — Medication 400000 UNIT(S): at 18:05

## 2021-06-30 NOTE — SWALLOW BEDSIDE ASSESSMENT ADULT - SWALLOW EVAL: DIAGNOSIS
72 y/o RH M with PMH of HTN, HLD, h/o SIADH, gliobastoma multiforme (dx in 2019) and seizures presenting after an episode of depressed level of consciousness and suspected seizure episode likely iso progression of disease of glioblastoma multiforme. Pt was seen for bedside swallow evaluation. Pt would not awaken for participation despite maximal verbal and tactile cueing as well as environment modifications. PO trials contraindicated as pt unable to arouse.

## 2021-06-30 NOTE — SWALLOW BEDSIDE ASSESSMENT ADULT - SLP GENERAL OBSERVATIONS
Pt was received at bedside sleeping soundly. +room air, +open mouth posture (xerostomia of oral cavity). He was severely contracted with his legs up to his chest and sleeping on right side. Clinician attempted to awaken pt with loud verbal cueing, tactile cueing (sternal rub and cool washcloth), and environmental modifications (repositioning pt upright to 90 degrees). However, pt would not rouse. PO trials contraindicated 2/2 lethargy/unresponsiveness.

## 2021-06-30 NOTE — PROGRESS NOTE ADULT - ASSESSMENT
Ronald Rider is a 72 year old RH male with a past medical history of HTN, HLD, hx of SIADH, gliobastoma multiforme (diagnosed in 2019) and seizures presenting after an episode of depressed level of consciousness and suspected seizure episode.    Impression: breakthrough seizure likely in the setting of progression of disease of glioblastoma multiforme.    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 Lyle Chavis - currently on Avastin monotherapy (last dose 6/24, next dose around 7/9)  [] q4h neurochecks  [] speech and swallow eval to determine safe diet, family has refused PEG in the past but willing to have NG tube placed for short term  [x] CPK, B12, folate    Case discussed with neurology attending, Dr. Carlin.   Ronald Rider is a 72 year old RH male with a past medical history of HTN, HLD, hx of SIADH, gliobastoma multiforme (diagnosed in 2019) and seizures presenting after an episode of depressed level of consciousness and suspected seizure episode.    Impression: breakthrough seizure likely in the setting of progression of disease of glioblastoma multiforme.    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 Lyle Chavis - currently on Avastin monotherapy (last dose 6/24, next dose around 7/9)  [x] CPK, B12, folate    Case discussed with neurology attending, Dr. Carlin.   Ronald Rider is a 72 year old RH male with a past medical history of HTN, HLD, hx of SIADH, gliobastoma multiforme (diagnosed in 2019) and seizures presenting after an episode of depressed level of consciousness and suspected seizure episode.    Impression: breakthrough seizure likely in the setting of progression of disease of glioblastoma multiforme.    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 Lyle Chavis - currently on Avastin monotherapy (last dose 6/24, next dose around 7/9)  [x] CPK, B12, folate    Case discussed with neurology attending, Dr. Carlin.   Assessment: 72 year old RH male with a past medical history of HTN, HLD, hx of SIADH, gliobastoma multiforme (diagnosed in 2019), and seizures presenting after an episode of depressed level of consciousness and suspected seizure episode.    Impression: Likely breakthrough seizure in the setting of progression of disease of glioblastoma multiforme.    Plan:  [x] d/c IV fluids - hyponatremia of 124 (from 132 yesterday).  [] IM consult placed, Dr. Godfrey, will f/u recs.  [] Continue Decadron 4MG IV QD.  [] c/w Keppra to 1000MG IV TID.  [] S&S saw patient today, unable to participate with evaluation, will place NGT. Daughter contacted, family OK with NGT.  [] At this time no indication for further neuro imaging.  [] May consider repeat EEG but not emergent at this time.  [x] Discussed patient's treatment plan and goals of care with neuro-oncologist Dr. Martínez - currently on Avastin monotherapy (last dose 6/24, next dose due around 7/9).  [x] CPK, B12, folate: wnl.  [] Diet: NPO for now until NGT placement.  [] DVT PPx: Lovenox SC.    Case discussed with neurology attending, Dr. Carlin.

## 2021-06-30 NOTE — SWALLOW BEDSIDE ASSESSMENT ADULT - SWALLOW EVAL: PATIENT/FAMILY GOALS STATEMENT
"He was eating fine after he was discharged from [facility] and then recently he has been sleepy, not eating, and doing this eye twitching and lip smacking thing making us concerned for seizures so we brought him in."

## 2021-06-30 NOTE — SWALLOW BEDSIDE ASSESSMENT ADULT - SWALLOW EVAL: CURRENT DIET
No active diet. Pt failed dysphagia screener 6/29/21 at 04:30 2/2 "lethargy/unresponsiveness" and "unable to sit upright."

## 2021-06-30 NOTE — PROGRESS NOTE ADULT - ASSESSMENT
Ronald Rider is a 72 year old RH male with a past medical history of HTN, HLD, hx of SIADH, gliobastoma multiforme (diagnosed in 2019) and seizures presenting after an episode of depressed level of consciousness and suspected seizure episode. At baseline, patient has contractions throughout and is bedbound, he is non-verbal but can respond to some simple commands. Patient was recently admitted for increased somnolence, difficulty breathing, decreased alertness, and poor PO intake. Was briefly on antibiotics but discontinued once no infection was found, had hypernatremia which resolved, briefly on steroid which were tapered and then subsequently discontinued. Was discharged for follow up with his neuro-oncologist at [facility]. Currently patient was on Keppra 750mg tid and as per son was compliant. Patient now presenting with after an episode where he was sitting in his chair and then noted be slumping over to the R, having increased tonic contractions of the RUE, and some twitching of his face. Patient was noted to be incontinent during the episode and was less responsive than usual. He was taken by EMS to the hospital and given Versed 5mg IM en route. Patient gradually improved and was able to follow some command to squeeze a finger with his son in the room. On examination, the patient was not able to contribute to history, ROS, or follow commands to exam.     Glioblastoma History:  Diagnosed in 2019 with MR noting involvement of the splenium of the corpus callosum and bilateral parenchymal involvement. Had stereotactic biopsy in 2019 with pathology noting glioblastoma. Was started on Keppra and Decadron. Was treated at [facility] with Dr. Chavis with radiation, temozolomide, and Bactrim. Temozolamide was stopped due to myelosuppression. Has had numerous admission for somnolence and seizure episodes. Currently on Avastin maintenance. MRI in April 2021 and June 2021 show stable mass, progression of RT gliosis. Patient was administered steroids at the time of last admission in early June 2021 and was tapered off as an outpatient.    Hematology/Oncology called to follow patient who is known to Dr. Lyle Chavis at [facility] for the treatment of glioblastoma multiforme. He was discharged from [facility] as noted above. As per available records from [facility] last treatment with Avastin was 6/11/2021 (will obtain updated records once [facility] has been notified of admission).    Glioblastoma Multiforme  --Under care at [facility] Dr. Lyle Chavis  --Currently on maintenance Avastin  --Appears to have stable disease    AMS, Seizure like activity  --No edema, mass effect, hemorrhage or midline shift on CT  --MRI may be of more benefit to assess disease  --Patient had UTI 4/2021 which caused AMS  --UA ordered   --Patient on Dexamethasone  --Keppra has been increased to 1000 mg BID  --Further management per Neurology    Hyponatremia  --Management per primary team    [facility]  --Palliative met with family (son) on last admission at [facility]  --Patient still full code at that time  --As per Dr. Martínez who is covering for Dr. Chavis - if mental status unable to be improved on current management, hospice may be appropriate    We will continue to follow patient and coordinate with [facility]    Shashi Cason PA-C  Hematology/Oncology  [facility]   [phone] (cell)  [phone] (office)  [phone] (alt office)  Evenings and weekends please call MD on call or office

## 2021-06-30 NOTE — SWALLOW BEDSIDE ASSESSMENT ADULT - SLP PERTINENT HISTORY OF CURRENT PROBLEM
71 y/o RH M with PMH of HTN, HLD, hx of SIADH, gliobastoma multiforme (diagnosed in 2019) and seizures presenting after an episode of depressed level of consciousness and suspected seizure episode. At baseline, patient has contractions throughout and is bedbound, he is non-verbal but can respond to some simple commands. Patient was recently admitted for increased somnolence, difficulty breathing, decreased alertness, and poor PO intake. Was briefly on antibiotics but discontinued once no infection was found, had hypernatremia which resolved, briefly on steroid which were tapered and then subsequently discontinued. Was discharged for follow up with his neuro-oncologist at [facility]. Currently patient was on Keppra 750mg tid and as per son was compliant. Patient now presenting with after an episode where he was sitting in his chair and then noted be slumping over to the R, having increased tonic contractions of the RUE, and some twitching of his face. 70 y/o RH M with PMH of HTN, HLD, hx of SIADH, gliobastoma multiforme (diagnosed in 2019) and seizures presenting after an episode of depressed level of consciousness and suspected seizure episode. At baseline, patient has contractions throughout and is bedbound, he is non-verbal but can respond to some simple commands. Patient was recently admitted for increased somnolence, difficulty breathing, decreased alertness, and poor PO intake. Was briefly on antibiotics but discontinued once no infection was found, had hypernatremia which resolved, briefly on steroid which were tapered and then subsequently discontinued. Was discharged for follow up with his neuro-oncologist at [facility]. Currently patient was on Keppra 750mg tid and as per son was compliant. Patient now presenting with after an episode where he was sitting in his chair and then noted be slumping over to the R, having increased tonic contractions of the RUE, and some twitching of his face.

## 2021-06-30 NOTE — SWALLOW BEDSIDE ASSESSMENT ADULT - COMMENTS
Hx cont: Taken by EMS to the hospital and given Versed 5mg IM en route. Pt gradually improved and was able to follow some commands. Heme/Onco consulted. Appears to have stable disease. Glioblastoma History: Dx in 2019 with MR noting involvement of the splenium of the corpus callosum and bilateral parenchymal involvement. Had stereotactic bx in 2019 with pathology noting glioblastoma. Was started on Keppra and Decadron. Was treated at [facility] with Dr. Chavis with radiation, temozolomide, and Bactrim. Temozolamide was stopped due to myelosuppression. Has had numerous admission for somnolence and seizure episodes. Currently on Avastin maintenance. MRI in April 2021 and June 2021 show stable mass, progression of RT gliosis. Patient was administered steroids at the time of last admission in early June 2021 and was tapered off as an OP. Speech and swallow eval to determine safe diet, family has refused PEG in the past but willing to have NG tube placed for short term nutrition.     IMAGING:  CT HEAD: 6/29/21  IMPRESSION:  No CT evidence of acute intracranial hemorrhage, mass effect, or midline shift. Unchanged posttreatment changes.    CXR: 6/29/21  IMPRESSION:  Clear lungs.    Pt is known to this service from recent bedside swallow evaluation completed 6/6/21. Pt presented with oropharyngeal dysphagia marked by reduced stripping of bolus from utensil, reduced oral containment, reduced bolus manipulation, delay in oral transit time and suspected posterior loss for nectar thick and thin liquids. Reduced oral clearance marked by mild oral stasis which was manually removed via Yankhauer suction.  suspected delay in initiation of pharyngeal swallow for nectar thick and thin liquids. There was throat clear response subsequent nectar thick & cough response subsequent thin liquids indicative of laryngeal penetration/aspiration. No overt s/s of laryngeal penetration/aspiration for puree consistency & honey thick liquids via tsp. Recs at that time: Puree and Honey-Thick liquid via tsp.

## 2021-06-30 NOTE — CONSULT NOTE ADULT - ATTENDING COMMENTS
Pt seen and examined with team at time of service, I was physically present for the key portions for evaluation and management (E/M) service provided, and preformed key portions of the procedure. Agree with above. Plan discussed with primary team.  inability to take PO, NGT placed for nutrition and meds  CXR  larynx clear

## 2021-06-30 NOTE — PROGRESS NOTE ADULT - SUBJECTIVE AND OBJECTIVE BOX
[id number]  Patient is a 71y old  Male who presents with a chief complaint of seizure episode (29 Jun 2021 07:09)    Subjective:  Patient seen and examined at bedside. He is difficult to arouse. Hyponatremic this morning to 124. Patient has not been able to have PO intake since admission.      ALLERGIES/INTOLERANCES:  Allergies  No Known Allergies    Intolerances    VITALS & EXAMINATION:  Vital Signs Last 24 Hrs  T(C): 36.7 (29 Jun 2021 03:00), Max: 36.7 (29 Jun 2021 03:00)  T(F): 98 (29 Jun 2021 03:00), Max: 98 (29 Jun 2021 03:00)  HR: 99 (29 Jun 2021 03:00) (99 - 100)  BP: 135/71 (29 Jun 2021 03:00) (117/46 - 146/82)  RR: 17 (29 Jun 2021 03:00) (17 - 22)  SpO2: 100% (29 Jun 2021 03:00) (100% - 100%) (29 Jun 2021 03:53)      PAST MEDICAL & SURGICAL HISTORY:  Hypertension    Hyperlipidemia    Seizures    Diabetes    Glioblastoma multiforme    CMV infection    History of SIADH    H/O colonoscopy    History of laryngoscopy    Status post stereotactic brain biopsy      FAMILY HISTORY:  FH: myocardial infarction (Father)      Social Hx:  Nonsmoker, no drug or alcohol use    Home Medications:  amantadine 50 mg/5 mL oral syrup: 10 milliliter(s) orally 2 times a day (05 Jun 2021 10:00)  atorvastatin 10 mg oral tablet: 1 tab(s) orally once a day (05 Jun 2021 10:00)  citalopram 20 mg oral tablet: 1 tab(s) orally once a day (05 Jun 2021 10:00)  Keppra 100 mg/mL oral solution: 7.5 milliliter(s) orally 2 times a day (05 Jun 2021 10:00)  losartan 100 mg oral tablet: 1 tab(s) orally once a day (05 Jun 2021 10:00)  methylphenidate 5 mg oral tablet: 1 tab(s) orally @ 8am and 2 pm (15 Abel 2021 12:19)  Norvasc 5 mg oral tablet: 1 tab(s) orally once a day (05 Jun 2021 10:00)  nystatin 100,000 units/mL oral suspension: 4 milliliter(s) orally 4 times a day (05 Jun 2021 10:00)  ramelteon 8 mg oral tablet: 1 tab(s) orally once a day (at bedtime) (05 Jun 2021 10:00)    MEDICATIONS  (STANDING):  amantadine Syrup 100 milliGRAM(s) Oral two times a day  amLODIPine   Tablet 5 milliGRAM(s) Oral daily  atorvastatin 10 milliGRAM(s) Oral at bedtime  citalopram 20 milliGRAM(s) Oral daily  dexAMETHasone  Injectable 4 milliGRAM(s) IV Push daily  enoxaparin Injectable 40 milliGRAM(s) SubCutaneous daily  levETIRAcetam  IVPB 1000 milliGRAM(s) IV Intermittent <User Schedule>  losartan 100 milliGRAM(s) Oral daily  methylphenidate 5 milliGRAM(s) Oral two times a day  nystatin    Suspension 103502 Unit(s) Oral four times a day  pantoprazole   Suspension 40 milliGRAM(s) Oral before breakfast    MEDICATIONS  (PRN):  ramelteon 8 milliGRAM(s) Oral at bedtime PRN Insomnia    Allergies  No Known Allergies    Intolerances      REVIEW OF SYSTEMS  ROS: Pertinent positives in HPI, all other ROS were reviewed and are negative.      Vital Signs Last 24 Hrs  T(C): 36.9 (30 Jun 2021 07:16), Max: 36.9 (29 Jun 2021 15:21)  T(F): 98.4 (30 Jun 2021 07:16), Max: 98.4 (29 Jun 2021 15:21)  HR: 101 (30 Jun 2021 07:16) (94 - 110)  BP: 156/79 (30 Jun 2021 07:16) (145/83 - 165/91)  BP(mean): --  RR: 18 (30 Jun 2021 07:16) (18 - 18)  SpO2: 97% (30 Jun 2021 07:16) (92% - 100%)    GENERAL EXAM:  Constitutional: Thin appearing male, in no apparent distress including pain  Head: Normocephalic & atraumatic.    Neurological (>12):  MS: Awake, alert, unable to assess orientation. Does not follow any commands.     Language: No spontaneous speech.     CNs: PERRL (R = 4mm, L = 4mm). VF intact to blink to threat. EOMI no nystagmus. No facial asymmetry appreciated.     Motor: Increased tone in the bilateral upper and lower extremities.              	   R	at least 4	 	  L	at least 4	    Unable to assess manual motor testing due to not following commands. Moves all extremities spontaneously     Sensation: Grimace to noxious stimuli throughout.    Reflexes:               Plantar Resp  R	Mute  L	Mute     Coordination: unable to comply with    Gait: Deferred        Labs:   cbc                      11.4   11.31 )-----------( 161      ( 30 Jun 2021 06:07 )             35.4     [id number]    124<L>  |  91<L>  |  11  ----------------------------<  82  4.5   |  21<L>  |  0.41<L>    Ca    8.5      30 Jun 2021 06:07  Phos  2.7     06-29  Mg     2.0     06-29    TPro  6.4  /  Alb  3.2<L>  /  TBili  0.5  /  DBili  x   /  AST  16  /  ALT  12  /  AlkPhos  76  06-30      CardiacMarkersCARDIAC MARKERS ( 30 Jun 2021 06:07 )  x     / x     / 40 U/L / x     / x          LFTsLIVER FUNCTIONS - ( 30 Jun 2021 06:07 )  Alb: 3.2 g/dL / Pro: 6.4 g/dL / ALK PHOS: 76 U/L / ALT: 12 U/L / AST: 16 U/L / GGT: x           UA  CSF  Immunological Labs    Radiology:    c< from: CT Head No Cont (06.29.21 @ 00:43) >  IMPRESSION:    No CT evidence of acute intracranial hemorrhage, mass effect, or midline shift.  Unchanged posttreatment changes.    < end of copied text >   [id number]  Patient is a 71y old  Male who presents with a chief complaint of seizure episode (29 Jun 2021 07:09)    Subjective:  Patient seen and examined at bedside. He is difficult to arouse. Hyponatremic this morning to 124. Patient has not been able to have PO intake since admission.      ALLERGIES/INTOLERANCES:  Allergies  No Known Allergies    Intolerances    VITALS & EXAMINATION:  Vital Signs Last 24 Hrs  T(C): 36.7 (29 Jun 2021 03:00), Max: 36.7 (29 Jun 2021 03:00)  T(F): 98 (29 Jun 2021 03:00), Max: 98 (29 Jun 2021 03:00)  HR: 99 (29 Jun 2021 03:00) (99 - 100)  BP: 135/71 (29 Jun 2021 03:00) (117/46 - 146/82)  RR: 17 (29 Jun 2021 03:00) (17 - 22)  SpO2: 100% (29 Jun 2021 03:00) (100% - 100%) (29 Jun 2021 03:53)      PAST MEDICAL & SURGICAL HISTORY:  Hypertension    Hyperlipidemia    Seizures    Diabetes    Glioblastoma multiforme    CMV infection    History of SIADH    H/O colonoscopy    History of laryngoscopy    Status post stereotactic brain biopsy      FAMILY HISTORY:  FH: myocardial infarction (Father)      Social Hx:  Nonsmoker, no drug or alcohol use    Home Medications:  amantadine 50 mg/5 mL oral syrup: 10 milliliter(s) orally 2 times a day (05 Jun 2021 10:00)  atorvastatin 10 mg oral tablet: 1 tab(s) orally once a day (05 Jun 2021 10:00)  citalopram 20 mg oral tablet: 1 tab(s) orally once a day (05 Jun 2021 10:00)  Keppra 100 mg/mL oral solution: 7.5 milliliter(s) orally 2 times a day (05 Jun 2021 10:00)  losartan 100 mg oral tablet: 1 tab(s) orally once a day (05 Jun 2021 10:00)  methylphenidate 5 mg oral tablet: 1 tab(s) orally @ 8am and 2 pm (15 Abel 2021 12:19)  Norvasc 5 mg oral tablet: 1 tab(s) orally once a day (05 Jun 2021 10:00)  nystatin 100,000 units/mL oral suspension: 4 milliliter(s) orally 4 times a day (05 Jun 2021 10:00)  ramelteon 8 mg oral tablet: 1 tab(s) orally once a day (at bedtime) (05 Jun 2021 10:00)    MEDICATIONS  (STANDING):  amantadine Syrup 100 milliGRAM(s) Oral two times a day  amLODIPine   Tablet 5 milliGRAM(s) Oral daily  atorvastatin 10 milliGRAM(s) Oral at bedtime  citalopram 20 milliGRAM(s) Oral daily  dexAMETHasone  Injectable 4 milliGRAM(s) IV Push daily  enoxaparin Injectable 40 milliGRAM(s) SubCutaneous daily  levETIRAcetam  IVPB 1000 milliGRAM(s) IV Intermittent <User Schedule>  losartan 100 milliGRAM(s) Oral daily  methylphenidate 5 milliGRAM(s) Oral two times a day  nystatin    Suspension 672415 Unit(s) Oral four times a day  pantoprazole   Suspension 40 milliGRAM(s) Oral before breakfast    MEDICATIONS  (PRN):  ramelteon 8 milliGRAM(s) Oral at bedtime PRN Insomnia    Allergies  No Known Allergies    Intolerances      REVIEW OF SYSTEMS  ROS: Pertinent positives in HPI, all other ROS were reviewed and are negative.      Vital Signs Last 24 Hrs  T(C): 36.9 (30 Jun 2021 07:16), Max: 36.9 (29 Jun 2021 15:21)  T(F): 98.4 (30 Jun 2021 07:16), Max: 98.4 (29 Jun 2021 15:21)  HR: 101 (30 Jun 2021 07:16) (94 - 110)  BP: 156/79 (30 Jun 2021 07:16) (145/83 - 165/91)  BP(mean): --  RR: 18 (30 Jun 2021 07:16) (18 - 18)  SpO2: 97% (30 Jun 2021 07:16) (92% - 100%)    GENERAL EXAM:  Constitutional: Thin appearing male, in no apparent distress including pain  Head: Normocephalic & atraumatic.    Neurological (>12):  MS: Unable to be aroused to voice, sternal rub, or repositioning. Unable to assess orientation. Does not follow any commands.     Language: No spontaneous speech.     CNs: PERRL (R = 4mm, L = 4mm). VF intact to blink to threat. EOMI no nystagmus. No facial asymmetry appreciated.     Motor: Increased tone in the bilateral upper and lower extremities.              	   R	at least 4	 	  L	at least 4	    Unable to assess manual motor testing due to not following commands. Moves all extremities spontaneously     Sensation: Grimace to noxious stimuli throughout.    Reflexes:               Plantar Resp  R	Mute  L	Mute     Coordination: unable to comply with    Gait: Deferred        Labs:   cbc                      11.4   11.31 )-----------( 161      ( 30 Jun 2021 06:07 )             35.4     [id number]    124<L>  |  91<L>  |  11  ----------------------------<  82  4.5   |  21<L>  |  0.41<L>    Ca    8.5      30 Jun 2021 06:07  Phos  2.7     06-29  Mg     2.0     06-29    TPro  6.4  /  Alb  3.2<L>  /  TBili  0.5  /  DBili  x   /  AST  16  /  ALT  12  /  AlkPhos  76  06-30      CardiacMarkersCARDIAC MARKERS ( 30 Jun 2021 06:07 )  x     / x     / 40 U/L / x     / x          LFTsLIVER FUNCTIONS - ( 30 Jun 2021 06:07 )  Alb: 3.2 g/dL / Pro: 6.4 g/dL / ALK PHOS: 76 U/L / ALT: 12 U/L / AST: 16 U/L / GGT: x             Radiology:    c< from: CT Head No Cont (06.29.21 @ 00:43) >  IMPRESSION:    No CT evidence of acute intracranial hemorrhage, mass effect, or midline shift.  Unchanged posttreatment changes.    < end of copied text >   [id number]  Patient is a 71y old  Male who presents with a chief complaint of seizure episode (29 Jun 2021 07:09)    Primary health care decision maker: Amparo (patient's daughter) [phone]    Subjective:  Patient seen and examined at bedside. He is difficult to arouse. Hyponatremic this morning to 124. Patient has not been able to have PO intake since admission.      ALLERGIES/INTOLERANCES:  Allergies  No Known Allergies    Intolerances    VITALS & EXAMINATION:  Vital Signs Last 24 Hrs  T(C): 36.7 (29 Jun 2021 03:00), Max: 36.7 (29 Jun 2021 03:00)  T(F): 98 (29 Jun 2021 03:00), Max: 98 (29 Jun 2021 03:00)  HR: 99 (29 Jun 2021 03:00) (99 - 100)  BP: 135/71 (29 Jun 2021 03:00) (117/46 - 146/82)  RR: 17 (29 Jun 2021 03:00) (17 - 22)  SpO2: 100% (29 Jun 2021 03:00) (100% - 100%) (29 Jun 2021 03:53)      PAST MEDICAL & SURGICAL HISTORY:  Hypertension    Hyperlipidemia    Seizures    Diabetes    Glioblastoma multiforme    CMV infection    History of SIADH    H/O colonoscopy    History of laryngoscopy    Status post stereotactic brain biopsy      FAMILY HISTORY:  FH: myocardial infarction (Father)      Social Hx:  Nonsmoker, no drug or alcohol use    Home Medications:  amantadine 50 mg/5 mL oral syrup: 10 milliliter(s) orally 2 times a day (05 Jun 2021 10:00)  atorvastatin 10 mg oral tablet: 1 tab(s) orally once a day (05 Jun 2021 10:00)  citalopram 20 mg oral tablet: 1 tab(s) orally once a day (05 Jun 2021 10:00)  Keppra 100 mg/mL oral solution: 7.5 milliliter(s) orally 2 times a day (05 Jun 2021 10:00)  losartan 100 mg oral tablet: 1 tab(s) orally once a day (05 Jun 2021 10:00)  methylphenidate 5 mg oral tablet: 1 tab(s) orally @ 8am and 2 pm (15 Abel 2021 12:19)  Norvasc 5 mg oral tablet: 1 tab(s) orally once a day (05 Jun 2021 10:00)  nystatin 100,000 units/mL oral suspension: 4 milliliter(s) orally 4 times a day (05 Jun 2021 10:00)  ramelteon 8 mg oral tablet: 1 tab(s) orally once a day (at bedtime) (05 Jun 2021 10:00)    MEDICATIONS  (STANDING):  amantadine Syrup 100 milliGRAM(s) Oral two times a day  amLODIPine   Tablet 5 milliGRAM(s) Oral daily  atorvastatin 10 milliGRAM(s) Oral at bedtime  citalopram 20 milliGRAM(s) Oral daily  dexAMETHasone  Injectable 4 milliGRAM(s) IV Push daily  enoxaparin Injectable 40 milliGRAM(s) SubCutaneous daily  levETIRAcetam  IVPB 1000 milliGRAM(s) IV Intermittent <User Schedule>  losartan 100 milliGRAM(s) Oral daily  methylphenidate 5 milliGRAM(s) Oral two times a day  nystatin    Suspension 258538 Unit(s) Oral four times a day  pantoprazole   Suspension 40 milliGRAM(s) Oral before breakfast    MEDICATIONS  (PRN):  ramelteon 8 milliGRAM(s) Oral at bedtime PRN Insomnia    Allergies  No Known Allergies    Intolerances      REVIEW OF SYSTEMS  ROS: Pertinent positives in HPI, all other ROS were reviewed and are negative.      Vital Signs Last 24 Hrs  T(C): 36.9 (30 Jun 2021 07:16), Max: 36.9 (29 Jun 2021 15:21)  T(F): 98.4 (30 Jun 2021 07:16), Max: 98.4 (29 Jun 2021 15:21)  HR: 101 (30 Jun 2021 07:16) (94 - 110)  BP: 156/79 (30 Jun 2021 07:16) (145/83 - 165/91)  BP(mean): --  RR: 18 (30 Jun 2021 07:16) (18 - 18)  SpO2: 97% (30 Jun 2021 07:16) (92% - 100%)    GENERAL EXAM:  Constitutional: Thin appearing male, in no apparent distress including pain  Head: Normocephalic & atraumatic.    Neurological (>12):  MS: Unable to be aroused to voice, sternal rub, or repositioning. Unable to assess orientation. Does not follow any commands.     Language: No spontaneous speech.     CNs: PERRL (R = 4mm, L = 4mm). VF intact to blink to threat. EOMI no nystagmus. No facial asymmetry appreciated.     Motor: Increased tone in the bilateral upper and lower extremities.              	   R	at least 4	 	  L	at least 4	    Unable to assess manual motor testing due to not following commands. Moves all extremities spontaneously     Sensation: Grimace to noxious stimuli throughout.    Reflexes:               Plantar Resp  R	Mute  L	Mute     Coordination: unable to comply with    Gait: Deferred        Labs:   cbc                      11.4   11.31 )-----------( 161      ( 30 Jun 2021 06:07 )             35.4     [id number]    124<L>  |  91<L>  |  11  ----------------------------<  82  4.5   |  21<L>  |  0.41<L>    Ca    8.5      30 Jun 2021 06:07  Phos  2.7     06-29  Mg     2.0     06-29    TPro  6.4  /  Alb  3.2<L>  /  TBili  0.5  /  DBili  x   /  AST  16  /  ALT  12  /  AlkPhos  76  06-30      CardiacMarkersCARDIAC MARKERS ( 30 Jun 2021 06:07 )  x     / x     / 40 U/L / x     / x          LFTsLIVER FUNCTIONS - ( 30 Jun 2021 06:07 )  Alb: 3.2 g/dL / Pro: 6.4 g/dL / ALK PHOS: 76 U/L / ALT: 12 U/L / AST: 16 U/L / GGT: x             Radiology:    c< from: CT Head No Cont (06.29.21 @ 00:43) >  IMPRESSION:    No CT evidence of acute intracranial hemorrhage, mass effect, or midline shift.  Unchanged posttreatment changes.    < end of copied text >   [id number]  Patient is a 71y old  Male who presents with a chief complaint of seizure episode (29 Jun 2021 07:09)    Primary health care decision maker: Amparo (patient's daughter) [phone]    Subjective:  Patient seen and examined at bedside. He is difficult to arouse. Hyponatremic this morning to 124. Patient has not been able to have PO intake since admission.    ALLERGIES/INTOLERANCES:  Allergies  No Known Allergies    VITALS & EXAMINATION:  Vital Signs Last 24 Hrs  T(C): 36.7 (29 Jun 2021 03:00), Max: 36.7 (29 Jun 2021 03:00)  T(F): 98 (29 Jun 2021 03:00), Max: 98 (29 Jun 2021 03:00)  HR: 99 (29 Jun 2021 03:00) (99 - 100)  BP: 135/71 (29 Jun 2021 03:00) (117/46 - 146/82)  RR: 17 (29 Jun 2021 03:00) (17 - 22)  SpO2: 100% (29 Jun 2021 03:00) (100% - 100%) (29 Jun 2021 03:53)    PAST MEDICAL & SURGICAL HISTORY:  Hypertension    Hyperlipidemia    Seizures    Diabetes    Glioblastoma multiforme    CMV infection    History of SIADH    H/O colonoscopy    History of laryngoscopy    Status post stereotactic brain biopsy    FAMILY HISTORY:  FH: myocardial infarction (Father)    Social Hx:  Nonsmoker, no drug or alcohol use    Home Medications:  amantadine 50 mg/5 mL oral syrup: 10 milliliter(s) orally 2 times a day (05 Jun 2021 10:00)  atorvastatin 10 mg oral tablet: 1 tab(s) orally once a day (05 Jun 2021 10:00)  citalopram 20 mg oral tablet: 1 tab(s) orally once a day (05 Jun 2021 10:00)  Keppra 100 mg/mL oral solution: 7.5 milliliter(s) orally 2 times a day (05 Jun 2021 10:00)  losartan 100 mg oral tablet: 1 tab(s) orally once a day (05 Jun 2021 10:00)  methylphenidate 5 mg oral tablet: 1 tab(s) orally @ 8am and 2 pm (15 Abel 2021 12:19)  Norvasc 5 mg oral tablet: 1 tab(s) orally once a day (05 Jun 2021 10:00)  nystatin 100,000 units/mL oral suspension: 4 milliliter(s) orally 4 times a day (05 Jun 2021 10:00)  ramelteon 8 mg oral tablet: 1 tab(s) orally once a day (at bedtime) (05 Jun 2021 10:00)    MEDICATIONS  (STANDING):  amantadine Syrup 100 milliGRAM(s) Oral two times a day  amLODIPine   Tablet 5 milliGRAM(s) Oral daily  atorvastatin 10 milliGRAM(s) Oral at bedtime  citalopram 20 milliGRAM(s) Oral daily  dexAMETHasone  Injectable 4 milliGRAM(s) IV Push daily  enoxaparin Injectable 40 milliGRAM(s) SubCutaneous daily  levETIRAcetam  IVPB 1000 milliGRAM(s) IV Intermittent <User Schedule>  losartan 100 milliGRAM(s) Oral daily  methylphenidate 5 milliGRAM(s) Oral two times a day  nystatin    Suspension 100238 Unit(s) Oral four times a day  pantoprazole   Suspension 40 milliGRAM(s) Oral before breakfast    MEDICATIONS  (PRN):  ramelteon 8 milliGRAM(s) Oral at bedtime PRN Insomnia    Allergies  No Known Allergies    REVIEW OF SYSTEMS  ROS: Unable to obtain due to patient's mental status.    Vital Signs Last 24 Hrs  T(C): 36.9 (30 Jun 2021 07:16), Max: 36.9 (29 Jun 2021 15:21)  T(F): 98.4 (30 Jun 2021 07:16), Max: 98.4 (29 Jun 2021 15:21)  HR: 101 (30 Jun 2021 07:16) (94 - 110)  BP: 156/79 (30 Jun 2021 07:16) (145/83 - 165/91)  RR: 18 (30 Jun 2021 07:16) (18 - 18)  SpO2: 97% (30 Jun 2021 07:16) (92% - 100%)    GENERAL EXAM:  Constitutional: Thin appearing male, in no apparent distress including pain  Head: Normocephalic & atraumatic.    Neurological (>12):  MS: Unable to be aroused to voice, sternal rub, or repositioning. Unable to assess orientation. Does not follow any commands.     Language: No spontaneous speech.     CNs: PERRL (R = 4mm, L = 4mm). VF intact to blink to threat. EOMI no nystagmus. No facial asymmetry appreciated.     Motor: Increased tone in the bilateral upper and lower extremities.                 R	at least 4	 	  L	at least 4	    Unable to assess manual motor testing due to not following commands. Moves all extremities spontaneously.    Sensation: Grimace to noxious stimuli throughout.    Reflexes:              Plantar Resp  R	Mute  L	Mute     Coordination: unable to assess.    Gait: Deferred.    Labs:   cbc                      11.4   11.31 )-----------( 161      ( 30 Jun 2021 06:07 )             35.4     [id number]    124<L>  |  91<L>  |  11  ----------------------------<  82  4.5   |  21<L>  |  0.41<L>    Ca    8.5      30 Jun 2021 06:07  Phos  2.7     06-29  Mg     2.0     06-29    TPro  6.4  /  Alb  3.2<L>  /  TBili  0.5  /  DBili  x   /  AST  16  /  ALT  12  /  AlkPhos  76  06-30    Cardiac Markers: CARDIAC MARKERS ( 30 Jun 2021 06:07 )  x     / x     / 40 U/L / x     / x        LFTs: LIVER FUNCTIONS - ( 30 Jun 2021 06:07 )  Alb: 3.2 g/dL / Pro: 6.4 g/dL / ALK PHOS: 76 U/L / ALT: 12 U/L / AST: 16 U/L / GGT: x           Radiology:    c< from: CT Head No Cont (06.29.21 @ 00:43) >  IMPRESSION:    No CT evidence of acute intracranial hemorrhage, mass effect, or midline shift.  Unchanged posttreatment changes.    < end of copied text >

## 2021-06-30 NOTE — SWALLOW BEDSIDE ASSESSMENT ADULT - NS ASR SWALLOW FINDINGS DISCUS
pt's daughter, SOCRATES Christensen, Noy, and Dr. Patel pt's daughter, Amparo [phone], RN, Noy, and Dr. Patel

## 2021-06-30 NOTE — CONSULT NOTE ADULT - SUBJECTIVE AND OBJECTIVE BOX
CC: dysphagia    HPI: Ronald Rider is a 72 year old RH male with a past medical history of HTN, HLD, hx of SIADH, gliobastoma multiforme (diagnosed in 2019) and seizures presenting after an episode of depressed level of consciousness and suspected seizure episode. At baseline, patient has contractions throughout and is bedbound, he is non-verbal but can respond to some simple commands. ENT consulted for NG tube placement. Unable to obtain hx from pt.         PAST MEDICAL & SURGICAL HISTORY:  Hypertension    Hyperlipidemia    Seizures    Diabetes    Glioblastoma multiforme    CMV infection    History of SIADH    H/O colonoscopy    History of laryngoscopy    Status post stereotactic brain biopsy      Allergies    No Known Allergies    Intolerances      MEDICATIONS  (STANDING):  amantadine Syrup 100 milliGRAM(s) Oral two times a day  amLODIPine   Tablet 5 milliGRAM(s) Oral daily  atorvastatin 10 milliGRAM(s) Oral at bedtime  citalopram 20 milliGRAM(s) Oral daily  dexAMETHasone  Injectable 4 milliGRAM(s) IV Push daily  enoxaparin Injectable 40 milliGRAM(s) SubCutaneous daily  levETIRAcetam  IVPB 1000 milliGRAM(s) IV Intermittent <User Schedule>  losartan 100 milliGRAM(s) Oral daily  methylphenidate 5 milliGRAM(s) Oral two times a day  nystatin    Suspension 379589 Unit(s) Oral four times a day  pantoprazole   Suspension 40 milliGRAM(s) Oral before breakfast  sodium chloride 0.9%. 1000 milliLiter(s) (75 mL/Hr) IV Continuous <Continuous>    MEDICATIONS  (PRN):  ramelteon 8 milliGRAM(s) Oral at bedtime PRN Insomnia      Social History: no pertinent social history     Family history: no pertinent family history     ROS:   uto    Vital Signs Last 24 Hrs  T(C): 36.7 (30 Jun 2021 15:23), Max: 36.9 (29 Jun 2021 19:14)  T(F): 98.1 (30 Jun 2021 15:23), Max: 98.4 (29 Jun 2021 19:14)  HR: 96 (30 Jun 2021 15:23) (94 - 110)  BP: 133/77 (30 Jun 2021 15:23) (133/77 - 165/91)  BP(mean): --  RR: 18 (30 Jun 2021 15:23) (18 - 18)  SpO2: 96% (30 Jun 2021 15:23) (92% - 100%)                          11.4   11.31 )-----------( 161      ( 30 Jun 2021 06:07 )             35.4    06-30    124<L>  |  91<L>  |  11  ----------------------------<  82  4.5   |  21<L>  |  0.41<L>    Ca    8.5      30 Jun 2021 06:07  Phos  2.7     06-29  Mg     2.0     06-29    TPro  6.4  /  Alb  3.2<L>  /  TBili  0.5  /  DBili  x   /  AST  16  /  ALT  12  /  AlkPhos  76  06-30       PHYSICAL EXAM:  Gen: A&Ox0  Skin: No rashes, bruises, or lesions  Head: Normocephalic, Atraumatic  Face: no edema, erythema, or fluctuance. Parotid glands soft without mass  Eyes: no scleral injection  Nose: Nares bilaterally patent, no discharge  Mouth: No Stridor / Drooling / Trismus.  Mucosa moist, tongue/uvula midline, oropharynx clear  Neck: Flat, supple, no lymphadenopathy, trachea midline, no masses  Lymphatic: No lymphadenopathy  Resp: breathing easily, no stridor  CV: no peripheral edema/cyanosis  GI: nondistended   Peripheral vascular: no JVD or edema  Neuro: facial nerve intact, no facial droop        Fiberoptic Indirect laryngoscopy:  (Scope #2 used)  Reason for Laryngoscopy: ng tube    Patient was unable to cooperate with mirror.  Nasopharynx, oropharynx, and hypopharynx clear, no bleeding. Tongue base, posterior pharyngeal wall, vallecula, epiglottis, and subglottis appear normal. No erythema, edema, pooling of secretions, masses or lesions. Airway patent, no foreign body visualized. No glottic/supraglottic edema. True vocal cords, arytenoids, vestibular folds, ventricles, pyriform sinuses, and aryepiglottic folds appear normal bilaterally. Vocal cords mobile with good contact b/l.      NG Tube placement:  Patient was seen and examined at the bedside. NG tube placement procedure was explained to the patient. Head of the bed was elevated to 90 degrees. Placement of tube was attempted in the right nostril. NG tube was placed successfully on 1st attempt. CXR was ordered to confirm placement of NG tube. No complications, patient tolerated procedure well.

## 2021-06-30 NOTE — PROGRESS NOTE ADULT - SUBJECTIVE AND OBJECTIVE BOX
Patient is a 71y old  Male who presents with a chief complaint of seizure episode (30 Jun 2021 07:57)    Patient seen this morning. Non-responsive. Unable to open eyes. Was unable to have swallow study today    MEDICATIONS  (STANDING):  amantadine Syrup 100 milliGRAM(s) Oral two times a day  amLODIPine   Tablet 5 milliGRAM(s) Oral daily  atorvastatin 10 milliGRAM(s) Oral at bedtime  citalopram 20 milliGRAM(s) Oral daily  dexAMETHasone  Injectable 4 milliGRAM(s) IV Push daily  enoxaparin Injectable 40 milliGRAM(s) SubCutaneous daily  levETIRAcetam  IVPB 1000 milliGRAM(s) IV Intermittent <User Schedule>  losartan 100 milliGRAM(s) Oral daily  methylphenidate 5 milliGRAM(s) Oral two times a day  nystatin    Suspension 818008 Unit(s) Oral four times a day  pantoprazole   Suspension 40 milliGRAM(s) Oral before breakfast    MEDICATIONS  (PRN):  ramelteon 8 milliGRAM(s) Oral at bedtime PRN Insomnia        Vital Signs Last 24 Hrs  T(C): 36.3 (30 Jun 2021 11:02), Max: 36.9 (29 Jun 2021 15:21)  T(F): 97.4 (30 Jun 2021 11:02), Max: 98.4 (29 Jun 2021 15:21)  HR: 102 (30 Jun 2021 11:02) (94 - 110)  BP: 142/77 (30 Jun 2021 11:02) (142/77 - 165/91)  BP(mean): --  RR: 18 (30 Jun 2021 11:02) (18 - 18)  SpO2: 99% (30 Jun 2021 11:02) (92% - 100%)    PE  NAD  Non-responsive  Anicteric  No rash grossly                            11.4   11.31 )-----------( 161      ( 30 Jun 2021 06:07 )             35.4       06-30    124<L>  |  91<L>  |  11  ----------------------------<  82  4.5   |  21<L>  |  0.41<L>    Ca    8.5      30 Jun 2021 06:07  Phos  2.7     06-29  Mg     2.0     06-29    TPro  6.4  /  Alb  3.2<L>  /  TBili  0.5  /  DBili  x   /  AST  16  /  ALT  12  /  AlkPhos  76  06-30

## 2021-06-30 NOTE — CONSULT NOTE ADULT - SUBJECTIVE AND OBJECTIVE BOX
Ronald Rider is a 72 year old RH male with a past medical history of HTN, HLD, hx of SIADH, gliobastoma multiforme (diagnosed in 2019) and seizures presenting after an episode of depressed level of consciousness and suspected seizure episode. At baseline, patient has contractions throughout and is bedbound, he is non-verbal but can respond to some simple commands. Patient was recently admitted for increased somnolence, difficulty breathing, decreased alertness, and poor PO intake. Was briefly on antibiotics but discontinued once no infection was found, had hypernatremia which resolved, briefly on steroid which were tapered and then subsequently discontinued. Was discharged for follow up with his neuro-oncologist at [facility] [place]. Currently patient was on Keppra 750mg tid and as per son was compliant. Patient now presenting with after an episode where he was sitting in his chair and then noted be slumping over to the R, having increased tonic contractions of the RUE, and some twitching of his face. Patient was noted to be incontinent during the episode and was less responsive than usual. He was taken by EMS to the hospital and given Versed 5mg IM en route. Patient gradually improved and was able to follow some command to squeeze a finger with his son in the room. On examination, the patient was not able to contribute to history, ROS, or follow commands to exam.     Glioblastoma History:  Diagnosed in 2019 with MR noting involvement of the splenium of the corpus callosum and bilateral parenchymal involvement. Had stereotactic biopsy in 2019 with pathology noting glioblastoma. Was started on Keppra and Decadron. Was treated at [facility] with Dr. Chavis with radiation, temozolomide, and Bactrim. Temozolamide was stopped due to myelosuppression. Has had numerous admission for somnolence and seizure episodes. Currently on Avastin maintenance. MRI in April 2021 and June 2021 show stable mass, progression of RT gliosis. Patient was administered steroids at the time of last admission in early June 2021 and was tapered off as an outpatient.      ALLERGIES/INTOLERANCES:  Allergies  No Known Allergies    Intolerances    VITALS & EXAMINATION:  Vital Signs Last 24 Hrs  T(C): 36.7 (29 Jun 2021 03:00), Max: 36.7 (29 Jun 2021 03:00)  T(F): 98 (29 Jun 2021 03:00), Max: 98 (29 Jun 2021 03:00)  HR: 99 (29 Jun 2021 03:00) (99 - 100)  BP: 135/71 (29 Jun 2021 03:00) (117/46 - 146/82)  BP(mean): --  RR: 17 (29 Jun 2021 03:00) (17 - 22)  SpO2: 100% (29 Jun 2021 03:00) (100% - 100%) (29 Jun 2021 03:53)      PAST MEDICAL & SURGICAL HISTORY:  Hypertension    Hyperlipidemia    Seizures    Diabetes    Glioblastoma multiforme    CMV infection    History of SIADH    H/O colonoscopy    History of laryngoscopy    Status post stereotactic brain biopsy        Review of Systems:   UTO    Allergies    No Known Allergies    Intolerances        Social History:     FAMILY HISTORY:  FH: myocardial infarction (Father)        MEDICATIONS  (STANDING):  amantadine Syrup 100 milliGRAM(s) Oral two times a day  amLODIPine   Tablet 5 milliGRAM(s) Oral daily  atorvastatin 10 milliGRAM(s) Oral at bedtime  citalopram 20 milliGRAM(s) Oral daily  dexAMETHasone  Injectable 4 milliGRAM(s) IV Push daily  enoxaparin Injectable 40 milliGRAM(s) SubCutaneous daily  levETIRAcetam  IVPB 1000 milliGRAM(s) IV Intermittent <User Schedule>  losartan 100 milliGRAM(s) Oral daily  methylphenidate 5 milliGRAM(s) Oral two times a day  nystatin    Suspension [id number] Unit(s) Oral four times a day  pantoprazole   Suspension 40 milliGRAM(s) Oral before breakfast    MEDICATIONS  (PRN):  ramelteon 8 milliGRAM(s) Oral at bedtime PRN Insomnia      Vital Signs Last 24 Hrs  T(C): 36.3 (30 Jun 2021 11:02), Max: 36.9 (29 Jun 2021 19:14)  T(F): 97.4 (30 Jun 2021 11:02), Max: 98.4 (29 Jun 2021 19:14)  HR: 102 (30 Jun 2021 11:02) (94 - 110)  BP: 142/77 (30 Jun 2021 11:02) (142/77 - 165/91)  BP(mean): --  RR: 18 (30 Jun 2021 11:02) (18 - 18)  SpO2: 99% (30 Jun 2021 11:02) (92% - 100%)  CAPILLARY BLOOD GLUCOSE        I&O's Summary      PHYSICAL EXAM:  GENERAL: NAD, cachectic, unresponsive  HEAD:  Atraumatic, Normocephalic  EYES: EOMI, PERRLA, conjunctiva and sclera clear  NECK: Supple, No JVD  CHEST/LUNG: Clear to auscultation bilaterally; No wheeze  HEART: Regular rate and rhythm; No murmurs, rubs, or gallops  ABDOMEN: Soft, Nontender, Nondistended; Bowel sounds present  EXTREMITIES:  2+ Peripheral Pulses, No clubbing, cyanosis, or edema, contracted all ext  Neurological (>12):  MS: Unable to be aroused to voice, sternal rub, or repositioning. Unable to assess orientation. Does not follow any commands.     Language: No spontaneous speech.     CNs: PERRL (R = 4mm, L = 4mm). VF intact to blink to threat. EOMI no nystagmus. No facial asymmetry appreciated.     Motor: Increased tone in the bilateral upper and lower extremities.              	   R	at least 4	 	  L	at least 4	    Unable to assess manual motor testing due to not following commands. Moves all extremities spontaneously     Sensation: Grimace to noxious stimuli throughout.    Reflexes:               Plantar Resp  R	Mute  L	Mute     Coordination: unable to comply with    SKIN: No rashes or lesions    LABS:                        11.4   11.31 )-----------( 161      ( 30 Jun 2021 06:07 )             35.4     06-30    124<L>  |  91<L>  |  11  ----------------------------<  82  4.5   |  21<L>  |  0.41<L>    Ca    8.5      30 Jun 2021 06:07  Phos  2.7     06-29  Mg     2.0     06-29    TPro  6.4  /  Alb  3.2<L>  /  TBili  0.5  /  DBili  x   /  AST  16  /  ALT  12  /  AlkPhos  76  06-30      CARDIAC MARKERS ( 30 Jun 2021 06:07 )  x     / x     / 40 U/L / x     / x              RADIOLOGY & ADDITIONAL TESTS:    Imaging Personally Reviewed:    Consultant(s) Notes Reviewed:      Care Discussed with Consultants/Other Providers:

## 2021-06-30 NOTE — PROGRESS NOTE ADULT - ATTENDING COMMENTS
Mr. Rider is a 72 year old RH male with a past medical history of HTN, HLD, hx of SIADH, gliobastoma multiforme (diagnosed in 2019), and seizures who presented after an episode of transient altered level of consciousness and suspected seizure episode.    On exam pt is minimally externally responsive and is in somewhat retracted position in right lateral decubitus.  + reaction to painful stimuli but minimal spontaneous movement and no verbalization.      Impression:  glioblastoma multiforme.  Likely seizure with altered mental status.  Hyponatremia    Plan:   d/c IV fluids - hyponatremia of 124 (from 132 yesterday).  Continue Decadron 4MG IV QD.  c/w Keppra to 1000MG IV TID.S&S saw patient today, unable to participate with evaluation, will place NGT.    May consider repeat EEG

## 2021-07-01 LAB
ALBUMIN SERPL ELPH-MCNC: 3.1 G/DL — LOW (ref 3.3–5)
ALP SERPL-CCNC: 72 U/L — SIGNIFICANT CHANGE UP (ref 40–120)
ALT FLD-CCNC: 12 U/L — SIGNIFICANT CHANGE UP (ref 10–45)
ANION GAP SERPL CALC-SCNC: 12 MMOL/L — SIGNIFICANT CHANGE UP (ref 5–17)
APPEARANCE UR: ABNORMAL
APPEARANCE UR: CLEAR — SIGNIFICANT CHANGE UP
AST SERPL-CCNC: 17 U/L — SIGNIFICANT CHANGE UP (ref 10–40)
BACTERIA # UR AUTO: ABNORMAL
BILIRUB SERPL-MCNC: 0.5 MG/DL — SIGNIFICANT CHANGE UP (ref 0.2–1.2)
BILIRUB UR-MCNC: NEGATIVE — SIGNIFICANT CHANGE UP
BILIRUB UR-MCNC: NEGATIVE — SIGNIFICANT CHANGE UP
BUN SERPL-MCNC: 10 MG/DL — SIGNIFICANT CHANGE UP (ref 7–23)
CALCIUM SERPL-MCNC: 8.3 MG/DL — LOW (ref 8.4–10.5)
CHLORIDE SERPL-SCNC: 95 MMOL/L — LOW (ref 96–108)
CO2 SERPL-SCNC: 24 MMOL/L — SIGNIFICANT CHANGE UP (ref 22–31)
COLOR SPEC: SIGNIFICANT CHANGE UP
COLOR SPEC: SIGNIFICANT CHANGE UP
CREAT SERPL-MCNC: 0.39 MG/DL — LOW (ref 0.5–1.3)
DIFF PNL FLD: ABNORMAL
DIFF PNL FLD: NEGATIVE — SIGNIFICANT CHANGE UP
EPI CELLS # UR: 0 /HPF — SIGNIFICANT CHANGE UP
FOLATE SERPL-MCNC: 5.1 NG/ML — SIGNIFICANT CHANGE UP
GLUCOSE BLDC GLUCOMTR-MCNC: 100 MG/DL — HIGH (ref 70–99)
GLUCOSE SERPL-MCNC: 94 MG/DL — SIGNIFICANT CHANGE UP (ref 70–99)
GLUCOSE UR QL: NEGATIVE — SIGNIFICANT CHANGE UP
GLUCOSE UR QL: NEGATIVE — SIGNIFICANT CHANGE UP
HCT VFR BLD CALC: 32 % — LOW (ref 39–50)
HGB BLD-MCNC: 10.6 G/DL — LOW (ref 13–17)
HYALINE CASTS # UR AUTO: 0 /LPF — SIGNIFICANT CHANGE UP (ref 0–2)
KETONES UR-MCNC: ABNORMAL
KETONES UR-MCNC: ABNORMAL
LEUKOCYTE ESTERASE UR-ACNC: ABNORMAL
LEUKOCYTE ESTERASE UR-ACNC: NEGATIVE — SIGNIFICANT CHANGE UP
MAGNESIUM SERPL-MCNC: 2 MG/DL — SIGNIFICANT CHANGE UP (ref 1.6–2.6)
MCHC RBC-ENTMCNC: 27.7 PG — SIGNIFICANT CHANGE UP (ref 27–34)
MCHC RBC-ENTMCNC: 33.1 GM/DL — SIGNIFICANT CHANGE UP (ref 32–36)
MCV RBC AUTO: 83.6 FL — SIGNIFICANT CHANGE UP (ref 80–100)
NITRITE UR-MCNC: NEGATIVE — SIGNIFICANT CHANGE UP
NITRITE UR-MCNC: NEGATIVE — SIGNIFICANT CHANGE UP
NRBC # BLD: 0 /100 WBCS — SIGNIFICANT CHANGE UP (ref 0–0)
OSMOLALITY UR: 364 MOS/KG — SIGNIFICANT CHANGE UP (ref 300–900)
OSMOLALITY UR: 411 MOS/KG — SIGNIFICANT CHANGE UP (ref 300–900)
PH UR: 7.5 — SIGNIFICANT CHANGE UP (ref 5–8)
PH UR: 8 — SIGNIFICANT CHANGE UP (ref 5–8)
PHOSPHATE SERPL-MCNC: 2.8 MG/DL — SIGNIFICANT CHANGE UP (ref 2.5–4.5)
PLATELET # BLD AUTO: 158 K/UL — SIGNIFICANT CHANGE UP (ref 150–400)
POTASSIUM SERPL-MCNC: 3.7 MMOL/L — SIGNIFICANT CHANGE UP (ref 3.5–5.3)
POTASSIUM SERPL-SCNC: 3.7 MMOL/L — SIGNIFICANT CHANGE UP (ref 3.5–5.3)
PROT SERPL-MCNC: 6.1 G/DL — SIGNIFICANT CHANGE UP (ref 6–8.3)
PROT UR-MCNC: NEGATIVE — SIGNIFICANT CHANGE UP
PROT UR-MCNC: NEGATIVE — SIGNIFICANT CHANGE UP
RBC # BLD: 3.83 M/UL — LOW (ref 4.2–5.8)
RBC # FLD: 16.6 % — HIGH (ref 10.3–14.5)
RBC CASTS # UR COMP ASSIST: 6 /HPF — HIGH (ref 0–4)
SODIUM SERPL-SCNC: 131 MMOL/L — LOW (ref 135–145)
SODIUM UR-SCNC: 122 MMOL/L — SIGNIFICANT CHANGE UP
SODIUM UR-SCNC: 87 MMOL/L — SIGNIFICANT CHANGE UP
SP GR SPEC: 1.01 — SIGNIFICANT CHANGE UP (ref 1.01–1.02)
SP GR SPEC: 1.01 — SIGNIFICANT CHANGE UP (ref 1.01–1.02)
TSH SERPL-MCNC: 3.11 UIU/ML — SIGNIFICANT CHANGE UP (ref 0.27–4.2)
UROBILINOGEN FLD QL: ABNORMAL
UROBILINOGEN FLD QL: NEGATIVE — SIGNIFICANT CHANGE UP
VIT B12 SERPL-MCNC: 585 PG/ML — SIGNIFICANT CHANGE UP (ref 232–1245)
WBC # BLD: 9.92 K/UL — SIGNIFICANT CHANGE UP (ref 3.8–10.5)
WBC # FLD AUTO: 9.92 K/UL — SIGNIFICANT CHANGE UP (ref 3.8–10.5)
WBC UR QL: 358 /HPF — HIGH (ref 0–5)

## 2021-07-01 PROCEDURE — 99232 SBSQ HOSP IP/OBS MODERATE 35: CPT

## 2021-07-01 RX ORDER — LACOSAMIDE 50 MG/1
50 TABLET ORAL EVERY 12 HOURS
Refills: 0 | Status: DISCONTINUED | OUTPATIENT
Start: 2021-07-01 | End: 2021-07-02

## 2021-07-01 RX ORDER — LACOSAMIDE 50 MG/1
100 TABLET ORAL ONCE
Refills: 0 | Status: DISCONTINUED | OUTPATIENT
Start: 2021-07-01 | End: 2021-07-01

## 2021-07-01 RX ORDER — CEFTRIAXONE 500 MG/1
1000 INJECTION, POWDER, FOR SOLUTION INTRAMUSCULAR; INTRAVENOUS EVERY 24 HOURS
Refills: 0 | Status: COMPLETED | OUTPATIENT
Start: 2021-07-01 | End: 2021-07-03

## 2021-07-01 RX ADMIN — Medication 5 MILLIGRAM(S): at 05:54

## 2021-07-01 RX ADMIN — AMLODIPINE BESYLATE 5 MILLIGRAM(S): 2.5 TABLET ORAL at 05:54

## 2021-07-01 RX ADMIN — LACOSAMIDE 100 MILLIGRAM(S): 50 TABLET ORAL at 23:46

## 2021-07-01 RX ADMIN — LEVETIRACETAM 400 MILLIGRAM(S): 250 TABLET, FILM COATED ORAL at 13:20

## 2021-07-01 RX ADMIN — Medication 1 MILLIGRAM(S): at 22:15

## 2021-07-01 RX ADMIN — SODIUM CHLORIDE 75 MILLILITER(S): 9 INJECTION INTRAMUSCULAR; INTRAVENOUS; SUBCUTANEOUS at 06:11

## 2021-07-01 RX ADMIN — Medication 5 MILLIGRAM(S): at 13:20

## 2021-07-01 RX ADMIN — CEFTRIAXONE 100 MILLIGRAM(S): 500 INJECTION, POWDER, FOR SOLUTION INTRAMUSCULAR; INTRAVENOUS at 17:31

## 2021-07-01 RX ADMIN — Medication 400000 UNIT(S): at 12:22

## 2021-07-01 RX ADMIN — PANTOPRAZOLE SODIUM 40 MILLIGRAM(S): 20 TABLET, DELAYED RELEASE ORAL at 06:09

## 2021-07-01 RX ADMIN — LEVETIRACETAM 400 MILLIGRAM(S): 250 TABLET, FILM COATED ORAL at 22:22

## 2021-07-01 RX ADMIN — LOSARTAN POTASSIUM 100 MILLIGRAM(S): 100 TABLET, FILM COATED ORAL at 06:09

## 2021-07-01 RX ADMIN — Medication 400000 UNIT(S): at 17:32

## 2021-07-01 RX ADMIN — Medication 4 MILLIGRAM(S): at 06:09

## 2021-07-01 RX ADMIN — ATORVASTATIN CALCIUM 10 MILLIGRAM(S): 80 TABLET, FILM COATED ORAL at 22:21

## 2021-07-01 RX ADMIN — ENOXAPARIN SODIUM 40 MILLIGRAM(S): 100 INJECTION SUBCUTANEOUS at 12:31

## 2021-07-01 RX ADMIN — Medication 100 MILLIGRAM(S): at 05:53

## 2021-07-01 RX ADMIN — Medication 400000 UNIT(S): at 05:54

## 2021-07-01 RX ADMIN — LEVETIRACETAM 400 MILLIGRAM(S): 250 TABLET, FILM COATED ORAL at 06:09

## 2021-07-01 RX ADMIN — Medication 100 MILLIGRAM(S): at 17:32

## 2021-07-01 NOTE — DIETITIAN NUTRITION RISK NOTIFICATION - TREATMENT: THE FOLLOWING DIET HAS BEEN RECOMMENDED
Diet, NPO with Tube Feed:   Tube Feeding Modality: Nasogastric  Glucerna 1.5 Davey (GLUCERNA1.5RTH)  Total Volume for 24 Hours (mL): 1200  Continuous  Starting Tube Feed Rate {mL per Hour}: 10  Increase Tube Feed Rate by (mL): 10     Every 4 hours  Until Goal Tube Feed Rate (mL per Hour): 50  Tube Feed Duration (in Hours): 24  Tube Feed Start Time: 18:30 (06-30-21 @ 18:28) [Active]

## 2021-07-01 NOTE — PROGRESS NOTE ADULT - SUBJECTIVE AND OBJECTIVE BOX
Patient is a 71y old  Male who presents with a chief complaint of seizure episode (01 Jul 2021 08:51)    Patient seen this morning. Unresponsive. NG tube intact.    MEDICATIONS  (STANDING):  amantadine Syrup 100 milliGRAM(s) Oral two times a day  amLODIPine   Tablet 5 milliGRAM(s) Oral daily  atorvastatin 10 milliGRAM(s) Oral at bedtime  dexAMETHasone  Injectable 4 milliGRAM(s) IV Push daily  enoxaparin Injectable 40 milliGRAM(s) SubCutaneous daily  levETIRAcetam  IVPB 1000 milliGRAM(s) IV Intermittent <User Schedule>  losartan 100 milliGRAM(s) Oral daily  methylphenidate 5 milliGRAM(s) Oral two times a day  nystatin    Suspension [id number] Unit(s) Oral four times a day  pantoprazole   Suspension 40 milliGRAM(s) Oral before breakfast  sodium chloride 0.9%. 1000 milliLiter(s) (75 mL/Hr) IV Continuous <Continuous>    MEDICATIONS  (PRN):  ramelteon 8 milliGRAM(s) Oral at bedtime PRN Insomnia      Vital Signs Last 24 Hrs  T(C): 36.6 (01 Jul 2021 07:47), Max: 36.8 (30 Jun 2021 19:21)  T(F): 97.9 (01 Jul 2021 07:47), Max: 98.2 (30 Jun 2021 19:21)  HR: 94 (01 Jul 2021 07:47) (89 - 98)  BP: 112/71 (01 Jul 2021 07:47) (112/71 - 137/78)  BP(mean): --  RR: 20 (01 Jul 2021 07:47) (18 - 20)  SpO2: 99% (01 Jul 2021 07:47) (93% - 99%)    PE  NAD  Unresponsive  Anicteric  NG tube in place  No rash grossly                            10.6   9.92  )-----------( 158      ( 01 Jul 2021 06:00 )             32.0       07-01    131<L>  |  95<L>  |  10  ----------------------------<  94  3.7   |  24  |  0.39<L>    Ca    8.3<L>      01 Jul 2021 06:00  Phos  2.8     07-01  Mg     2.0     07-01    TPro  6.1  /  Alb  3.1<L>  /  TBili  0.5  /  DBili  x   /  AST  17  /  ALT  12  /  AlkPhos  72  07-01

## 2021-07-01 NOTE — DIETITIAN INITIAL EVALUATION ADULT. - PERTINENT MEDS FT
atorvastatin 10 milliGRAM(s) Oral at bedtime  dexAMETHasone  Injectable 4 milliGRAM(s) IV Push daily  pantoprazole   Suspension 40 milliGRAM(s) Oral before breakfast  sodium chloride 0.9%. 1000 milliLiter(s) (75 mL/Hr) IV Continuous <Continuous>

## 2021-07-01 NOTE — CHART NOTE - NSCHARTNOTEFT_GEN_A_CORE
Notified by RN for concerns for seizures. Upon examination at bedside, Pt had RUE/RLE myoclonic jerks, was not following commands and staring. Ativan 1mg administered with slight resolution of symptoms. Upon reeval, pt continued with jerks. Advised RN to give standing dose of Keppra. One keppra was completed, pt continued with jerks. Case discussed with Dr. Carlin and agreed to starting with Vimpat 100mg load followed by Vimpat 50mg IV BID.       Vital Signs Last 24 Hrs  T(C): 36.8 (01 Jul 2021 23:13), Max: 37.9 (01 Jul 2021 22:05)  T(F): 98.3 (01 Jul 2021 23:13), Max: 100.2 (01 Jul 2021 22:05)  HR: 97 (01 Jul 2021 23:13) (82 - 111)  BP: 107/62 (01 Jul 2021 23:13) (107/62 - 136/69)  RR: 18 (01 Jul 2021 23:13) (18 - 20)  SpO2: 96% (01 Jul 2021 23:13) (93% - 99%)      Plan of care discussed with RN at bedside, Neurology Resident, and Dr. Carlin. Will continue to monitor with Q4hr neuro/vital assessments. Pt was placed on a pulse ox and will arrange for EEG. Notified by RN for concerns for seizures. Upon examination at bedside, Pt had RUE/RLE myoclonic jerks, was not following commands and staring. Ativan 1mg administered with slight resolution of symptoms. Upon reeval, pt continued with jerks. Advised RN to give standing dose of Keppra. Once Keppra was completed, pt showed no signs of improvement. Case discussed with Dr. Carlin and agreed with starting with Vimpat 100mg IVP load followed by Vimpat 50mg IV BID. VSS      Vital Signs Last 24 Hrs  T(C): 36.8 (01 Jul 2021 23:13), Max: 37.9 (01 Jul 2021 22:05)  T(F): 98.3 (01 Jul 2021 23:13), Max: 100.2 (01 Jul 2021 22:05)  HR: 97 (01 Jul 2021 23:13) (82 - 111)  BP: 107/62 (01 Jul 2021 23:13) (107/62 - 136/69)  RR: 18 (01 Jul 2021 23:13) (18 - 20)  SpO2: 96% (01 Jul 2021 23:13) (93% - 99%)      Plan of care discussed with RN at bedside, Neurology Resident, and Dr. Carlin. Will continue to monitor with Q4hr neuro/vital assessments. Pt was placed on a pulse ox and will arrange for EEG.

## 2021-07-01 NOTE — PROGRESS NOTE ADULT - ASSESSMENT
72 year old RH male with a past medical history of HTN, HLD, hx of SIADH, gliobastoma multiforme (diagnosed in 2019) and seizures presenting after an episode of depressed level of consciousness and suspected seizure episode.    hyponatremia  - likely SIADH  - for now keep pt on maintenance fluids NS as he is NPO    dysphagia  - failed swallow eval  - would keep NPO   - consider NG tube feeds  - peg pending clarification of GOC    leukocytosis  - positive UA (358 wbc)  - ceftriaxone x 3 days    GBM  - treatment as per neurooncology  - c/w decadron    seizure   - on keppra    depression  - on celexa  - would hold as pt has not been getting  - could be contributing to hyponatremia    dvt px  - Lovenox

## 2021-07-01 NOTE — DIETITIAN INITIAL EVALUATION ADULT. - ADD RECOMMEND
2) Recommend multivitamin and thiamine for possible refeeding risk. 3) Continue to trend labs, weight, skin integrity, and tolerance to EN. 4) Malnutrition + BMI <19 placed.

## 2021-07-01 NOTE — DIETITIAN INITIAL EVALUATION ADULT. - ORAL INTAKE PTA/DIET HISTORY
Per previous RD note 6/7/21 pt with poor PO intake, was on Pureed Diet, Honey Consistency during that admission to [facility]. Per H&P, pt continued with poor PO intake PTA. No known therapeutic diet. No known food allergies. Pt with swallowing issues; s/p swallow evaluation by SLP 7/1 with recommendation, "Continue NPO, with non-oral nutrition/hydration/medications via KFT." No known micronutrient or oral nutrient supplement use.

## 2021-07-01 NOTE — DIETITIAN INITIAL EVALUATION ADULT. - CHIEF COMPLAINT
The patient is a 70 y/o "Male non-verbal/contracted with medical history of GBM (Dx 2019 on chemo), seizures, HTN, HLD, T2DM (A1c 5.6%) SIADH who presents for increased lethargy."

## 2021-07-01 NOTE — DIETITIAN INITIAL EVALUATION ADULT. - OTHER INFO
RD obtained bed scale wt 46.4 kG. Wt Hx per previous RD note: 65.1 kG (1/7/20), 50 kG (6/5/21). Indicates 7.2% wt loss x~1 month (suspect unintentional). RD will continue to trend as new wts available/able.     Pt has been on and off NPO for swallow evaluations. Feeds of Glucerna 1.2 resumed and running at 50 cc/hr during RD visit. No known intolerances to current regimen. Last BM 6/30.    Pt noted on decadron with Hx of elevated blood glucose during previous admission. Blood glucose during this admission WNL with pt on Glucerna.

## 2021-07-01 NOTE — PROGRESS NOTE ADULT - SUBJECTIVE AND OBJECTIVE BOX
DATE OF SERVICE: 07-01-21 @ 16:28    Patient is a 71y old  Male who presents with a chief complaint of seizure episode (01 Jul 2021 12:38)      SUBJECTIVE / OVERNIGHT EVENTS:  unresponsive. NGT was placed    MEDICATIONS  (STANDING):  amantadine Syrup 100 milliGRAM(s) Oral two times a day  amLODIPine   Tablet 5 milliGRAM(s) Oral daily  atorvastatin 10 milliGRAM(s) Oral at bedtime  dexAMETHasone  Injectable 4 milliGRAM(s) IV Push daily  enoxaparin Injectable 40 milliGRAM(s) SubCutaneous daily  levETIRAcetam  IVPB 1000 milliGRAM(s) IV Intermittent <User Schedule>  losartan 100 milliGRAM(s) Oral daily  methylphenidate 5 milliGRAM(s) Oral two times a day  nystatin    Suspension [id number] Unit(s) Oral four times a day  pantoprazole   Suspension 40 milliGRAM(s) Oral before breakfast  sodium chloride 0.9%. 1000 milliLiter(s) (75 mL/Hr) IV Continuous <Continuous>    MEDICATIONS  (PRN):  ramelteon 8 milliGRAM(s) Oral at bedtime PRN Insomnia      Vital Signs Last 24 Hrs  T(C): 36.5 (01 Jul 2021 15:34), Max: 36.8 (30 Jun 2021 19:21)  T(F): 97.7 (01 Jul 2021 15:34), Max: 98.2 (30 Jun 2021 19:21)  HR: 82 (01 Jul 2021 15:34) (82 - 98)  BP: 126/76 (01 Jul 2021 15:34) (108/72 - 137/78)  BP(mean): --  RR: 18 (01 Jul 2021 15:34) (18 - 20)  SpO2: 98% (01 Jul 2021 15:34) (93% - 99%)  CAPILLARY BLOOD GLUCOSE      POCT Blood Glucose.: 100 mg/dL (01 Jul 2021 00:27)    I&O's Summary    01 Jul 2021 07:01  -  01 Jul 2021 16:28  --------------------------------------------------------  IN: 890 mL / OUT: 0 mL / NET: 890 mL        PHYSICAL EXAM:  GENERAL: NAD, well-developed  HEAD:  Atraumatic, Normocephalic  EYES: EOMI, PERRLA, conjunctiva and sclera clear  NECK: Supple, No JVD  CHEST/LUNG: Clear to auscultation bilaterally; No wheeze  HEART: Regular rate and rhythm; No murmurs, rubs, or gallops  ABDOMEN: Soft, Nontender, Nondistended; Bowel sounds present  EXTREMITIES:  2+ Peripheral Pulses, No clubbing, cyanosis, or edema  PSYCH: AAOx3  NEUROLOGY: non-focal  SKIN: No rashes or lesions    LABS:                        10.6   9.92  )-----------( 158      ( 01 Jul 2021 06:00 )             32.0     07-01    131<L>  |  95<L>  |  10  ----------------------------<  94  3.7   |  24  |  0.39<L>    Ca    8.3<L>      01 Jul 2021 06:00  Phos  2.8     07-01  Mg     2.0     07-01    TPro  6.1  /  Alb  3.1<L>  /  TBili  0.5  /  DBili  x   /  AST  17  /  ALT  12  /  AlkPhos  72  07-01      CARDIAC MARKERS ( 30 Jun 2021 06:07 )  x     / x     / 40 U/L / x     / x          Urinalysis Basic - ( 01 Jul 2021 13:02 )    Color: x / Appearance: x / SG: x / pH: x  Gluc: x / Ketone: x  / Bili: x / Urobili: x   Blood: x / Protein: x / Nitrite: x   Leuk Esterase: x / RBC: 6 /hpf /  /HPF   Sq Epi: x / Non Sq Epi: 0 /hpf / Bacteria: Moderate        RADIOLOGY & ADDITIONAL TESTS:    Imaging Personally Reviewed:    Consultant(s) Notes Reviewed:      Care Discussed with Consultants/Other Providers:

## 2021-07-01 NOTE — DIETITIAN INITIAL EVALUATION ADULT. - ENTERAL
1) If within GOC, recommend Glucerna 1.2 at goal rate 60 cc/hr x24 hours to provide total volume 1440cc, 1728 kcals, 86 gm protein, and 1159 cc free water. Meeds 37 kcals/kG and 1.9 gm protein/kG based on RD obtained wt 46.4 kG. Fluid needs deferred to provider.

## 2021-07-01 NOTE — CHART NOTE - NSCHARTNOTEFT_GEN_A_CORE
72 y/o RH M with PMH of HTN, HLD, h/o SIADH, gliobastoma multiforme (dx in 2019) and seizures presenting after an episode of depressed level of consciousness and suspected seizure episode likely iso progression of disease of glioblastoma multiforme. Pt was seen for initial bedside swallow evaluation yesterday 6/30/21. Pt would not awaken for participation despite maximal verbal and tactile cueing as well as environment modifications. PO trials contraindicated as pt unable to arouse. Recommendations at that time: NPO, with non-oral nutrition/hydration/medications. NGT placed by ENT following evaluation. Per discussion with Junie ROB, difficulty passing NGT given pt's inability to perform volitional swallow.    Pt was seen today, 7/1/21, for re-evaluation of nishant-pharyngeal swallow mechanism. Pt was received at bedside sleeping soundly. +room air, +open mouth posture (xerostomia of oral cavity). He was severely contracted with his legs up to his chest and leaning to left side. Pt eventually roused with loud verbal cueing, tactile cueing (sternal rub and cool washcloth), and environmental modifications (repositioning pt upright to 90 degrees). Pt non-verbal and bedbound at baseline. Pt did not follow any commands. Attempted oral care; however, pt would not open oral cavity wide enough to clean dry lingual structure. Sparse lower dentition noted. Pt was trialed with small piece of crushed ice chip. The swallow sequence was marked by absent oral grading and absent attempt to propel bolus posteriorly despite maximal verbal and visual cueing. Residue noted in right sulci and was removed via dried green toothette. Pt left sitting upright. Purposeful and proactive rounding completed. 5 P's addressed. Pt left in no distress.    Impressions:  Pt presents with severe nishant-pharyngeal dysphagia characterized by absent response to the feeding task.    Recommendations:  1) Continue NPO, with non-oral nutrition/hydration/medications via KFT. Recommend GOC discussion with pt's family/medical team re: long term provision of nutrition/hydration/medication.   2) Excellent oral care and aspiration precautions for secretions and enteral feeds.  3) This service will f/u for re-evaluation of nishant-pharyngeal swallow pending improvement in mentation.    Discussed the above with RNHannah, and Junie ROB.     Kimber Diaz M.S., CCC-SLP ext. [phone] 70 y/o RH M with PMH of HTN, HLD, h/o SIADH, gliobastoma multiforme (dx in 2019) and seizures presenting after an episode of depressed level of consciousness and suspected seizure episode likely iso progression of disease of glioblastoma multiforme. Pt was seen for initial bedside swallow evaluation yesterday 6/30/21. Pt would not awaken for participation despite maximal verbal and tactile cueing as well as environment modifications. PO trials contraindicated as pt unable to arouse. Recommendations at that time: NPO, with non-oral nutrition/hydration/medications. NGT placed by ENT following evaluation. Per discussion with Junie ROB, difficulty passing NGT given pt's inability to perform volitional swallow.    Pt was seen today, 7/1/21, for re-evaluation of nishant-pharyngeal swallow mechanism. Pt was received at bedside sleeping soundly. +room air, +open mouth posture (xerostomia of oral cavity). He was severely contracted with his legs up to his chest and leaning to left side. Pt eventually roused with loud verbal cueing, tactile cueing (sternal rub and cool washcloth), and environmental modifications (repositioning pt upright to 90 degrees). Pt non-verbal and bedbound at baseline. Pt did not follow any commands. Attempted oral care; however, pt would not open oral cavity wide enough to clean dry lingual structure. Sparse lower dentition noted. Pt was trialed with small piece of crushed ice chip. The swallow sequence was marked by absent oral grading and absent attempt to propel bolus posteriorly despite maximal verbal and visual cueing. Residue noted in right sulci and was removed via dried green toothette. Pt left sitting upright. Purposeful and proactive rounding completed. 5 P's addressed. Pt left in no distress.    Impressions:  Pt presents with severe nishant-pharyngeal dysphagia characterized by absent response to the feeding task.    Recommendations:  1) Continue NPO, with non-oral nutrition/hydration/medications via KFT. Recommend GOC discussion with pt's family/medical team re: long term provision of nutrition/hydration/medication.   2) Excellent oral care and aspiration precautions for secretions and enteral feeds.  3) This service will f/u for re-evaluation of nishant-pharyngeal swallow pending improvement in mentation.    Discussed the above with RNHannah, and Junie ROB. Attempted to update daughter, Amparo; however, no answer.     Kimber Diaz M.S., CCC-SLP ext. [phone]

## 2021-07-01 NOTE — DIETITIAN INITIAL EVALUATION ADULT. - PERTINENT LABORATORY DATA
07-01 Na 131 mmol/L<L> Glu 94 mg/dL K+ 3.7 mmol/L Cr  0.39 mg/dL<L> BUN 10 mg/dL Phos 2.8 mg/dL Alb 3.1 g/dL<L>  Hgb 10.6 g/dL<L> Hct 32.0 %<L>  A1C with Estimated Average Glucose Result: 5.6 % (06-05-21 @ 10:07)  CAPILLARY BLOOD GLUCOSE  POCT Blood Glucose.: 100 mg/dL (01 Jul 2021 00:27)

## 2021-07-01 NOTE — PROGRESS NOTE ADULT - SUBJECTIVE AND OBJECTIVE BOX
MRN-[id number]    Subjective:    PAST MEDICAL & SURGICAL HISTORY:  Hypertension    Hyperlipidemia    Seizures    Diabetes    Glioblastoma multiforme    CMV infection    History of SIADH    H/O colonoscopy    History of laryngoscopy    Status post stereotactic brain biopsy    FAMILY HISTORY:  FH: myocardial infarction (Father)    Social Hx:  Nonsmoker, no drug or alcohol use    Home Medications:  amantadine 50 mg/5 mL oral syrup: 10 milliliter(s) orally 2 times a day (2021 10:00)  atorvastatin 10 mg oral tablet: 1 tab(s) orally once a day (2021 10:00)  citalopram 20 mg oral tablet: 1 tab(s) orally once a day (2021 10:00)  Keppra 100 mg/mL oral solution: 7.5 milliliter(s) orally 2 times a day (2021 10:00)  losartan 100 mg oral tablet: 1 tab(s) orally once a day (2021 10:00)  methylphenidate 5 mg oral tablet: 1 tab(s) orally @ 8am and 2 pm (15 Abel 2021 12:19)  Norvasc 5 mg oral tablet: 1 tab(s) orally once a day (2021 10:00)  nystatin 100,000 units/mL oral suspension: 4 milliliter(s) orally 4 times a day (2021 10:00)  ramelteon 8 mg oral tablet: 1 tab(s) orally once a day (at bedtime) (2021 10:00)    MEDICATIONS  (STANDING):  amantadine Syrup 100 milliGRAM(s) Oral two times a day  amLODIPine   Tablet 5 milliGRAM(s) Oral daily  atorvastatin 10 milliGRAM(s) Oral at bedtime  dexAMETHasone  Injectable 4 milliGRAM(s) IV Push daily  enoxaparin Injectable 40 milliGRAM(s) SubCutaneous daily  levETIRAcetam  IVPB 1000 milliGRAM(s) IV Intermittent <User Schedule>  losartan 100 milliGRAM(s) Oral daily  methylphenidate 5 milliGRAM(s) Oral two times a day  nystatin    Suspension 655720 Unit(s) Oral four times a day  pantoprazole   Suspension 40 milliGRAM(s) Oral before breakfast  sodium chloride 0.9%. 1000 milliLiter(s) (75 mL/Hr) IV Continuous <Continuous>    MEDICATIONS  (PRN):  ramelteon 8 milliGRAM(s) Oral at bedtime PRN Insomnia    Allergies  No Known Allergies    ROS: Unable to obtain due to patient's mental status.     Vital Signs Last 24 Hrs  T(C): 36.6 (2021 07:47), Max: 36.8 (2021 19:21)  T(F): 97.9 (2021 07:47), Max: 98.2 (2021 19:21)  HR: 94 (2021 07:47) (89 - 102)  BP: 112/71 (2021 07:47) (112/71 - 142/77)  RR: 20 (2021 07:47) (18 - 20)  SpO2: 99% (2021 07:47) (93% - 99%)    GENERAL EXAM:  Constitutional:  HEENT:  Neck:    NEUROLOGICAL EXAM:  MS:  CN:  Motor:  Sensation:  Coordination/Gait: Unable to assess.    Labs:   cbc                      10.6   9.92  )-----------( 158      ( 2021 06:00 )             32.0     [id number]    131<L>  |  95<L>  |  10  ----------------------------<  94  3.7   |  24  |  0.39<L>    Ca    8.3<L>      2021 06:00  Phos  2.8     07-01  Mg     2.0     07-01    TPro  6.1  /  Alb  3.1<L>  /  TBili  0.5  /  DBili  x   /  AST  17  /  ALT  12  /  AlkPhos  72  07-01    Cardiac Markers: CARDIAC MARKERS ( 2021 06:07 )  x     / x     / 40 U/L / x     / x        LIVER FUNCTIONS - ( 2021 06:00 )  Alb: 3.1 g/dL / Pro: 6.1 g/dL / ALK PHOS: 72 U/L / ALT: 12 U/L / AST: 17 U/L / GGT: x           UA: Urinalysis Basic - ( 2021 23:52 )    Color: Light Yellow / Appearance: Clear / S.011 / pH: x  Gluc: x / Ketone: Moderate  / Bili: Negative / Urobili: Negative   Blood: x / Protein: Negative / Nitrite: Negative   Leuk Esterase: Negative / RBC: x / WBC x   Sq Epi: x / Non Sq Epi: x / Bacteria: x    Radiology:  - CTH: No CT evidence of acute intracranial hemorrhage, mass effect, or midline shift. Unchanged posttreatment changes. MRN-[id number]    Subjective:    PAST MEDICAL & SURGICAL HISTORY:  Hypertension    Hyperlipidemia    Seizures    Diabetes    Glioblastoma multiforme    CMV infection    History of SIADH    H/O colonoscopy    History of laryngoscopy    Status post stereotactic brain biopsy    FAMILY HISTORY:  FH: myocardial infarction (Father)    Social Hx:  Nonsmoker, no drug or alcohol use    Home Medications:  amantadine 50 mg/5 mL oral syrup: 10 milliliter(s) orally 2 times a day (2021 10:00)  atorvastatin 10 mg oral tablet: 1 tab(s) orally once a day (2021 10:00)  citalopram 20 mg oral tablet: 1 tab(s) orally once a day (2021 10:00)  Keppra 100 mg/mL oral solution: 7.5 milliliter(s) orally 2 times a day (2021 10:00)  losartan 100 mg oral tablet: 1 tab(s) orally once a day (2021 10:00)  methylphenidate 5 mg oral tablet: 1 tab(s) orally @ 8am and 2 pm (15 Abel 2021 12:19)  Norvasc 5 mg oral tablet: 1 tab(s) orally once a day (2021 10:00)  nystatin 100,000 units/mL oral suspension: 4 milliliter(s) orally 4 times a day (2021 10:00)  ramelteon 8 mg oral tablet: 1 tab(s) orally once a day (at bedtime) (2021 10:00)    MEDICATIONS  (STANDING):  amantadine Syrup 100 milliGRAM(s) Oral two times a day  amLODIPine   Tablet 5 milliGRAM(s) Oral daily  atorvastatin 10 milliGRAM(s) Oral at bedtime  dexAMETHasone  Injectable 4 milliGRAM(s) IV Push daily  enoxaparin Injectable 40 milliGRAM(s) SubCutaneous daily  levETIRAcetam  IVPB 1000 milliGRAM(s) IV Intermittent <User Schedule>  losartan 100 milliGRAM(s) Oral daily  methylphenidate 5 milliGRAM(s) Oral two times a day  nystatin    Suspension 539818 Unit(s) Oral four times a day  pantoprazole   Suspension 40 milliGRAM(s) Oral before breakfast  sodium chloride 0.9%. 1000 milliLiter(s) (75 mL/Hr) IV Continuous <Continuous>    MEDICATIONS  (PRN):  ramelteon 8 milliGRAM(s) Oral at bedtime PRN Insomnia    Allergies  No Known Allergies    ROS: Unable to obtain due to patient's mental status.     Vital Signs Last 24 Hrs  T(C): 36.6 (2021 07:47), Max: 36.8 (2021 19:21)  T(F): 97.9 (2021 07:47), Max: 98.2 (2021 19:21)  HR: 94 (2021 07:47) (89 - 102)  BP: 112/71 (2021 07:47) (112/71 - 142/77)  RR: 20 (2021 07:47) (18 - 20)  SpO2: 99% (2021 07:47) (93% - 99%)    GENERAL EXAM:  Constitutional: Lying in bed, NAD.  HEENT: Normocephalic, atraumatic.    NEUROLOGICAL EXAM:  MS: Eyes closed, do not open to verbal/noxious stimuli. No verbal output.  CN: Face symmetric.  Motor: Significant contracture b/l lower extremities. Some spontaneous movement noted in upper extremities, L>R.  Coordination/Gait: Unable to assess.    Labs:   cbc                      10.6   9.92  )-----------( 158      ( 2021 06:00 )             32.0     [id number]    131<L>  |  95<L>  |  10  ----------------------------<  94  3.7   |  24  |  0.39<L>    Ca    8.3<L>      2021 06:00  Phos  2.8     07-01  Mg     2.0     07-01    TPro  6.1  /  Alb  3.1<L>  /  TBili  0.5  /  DBili  x   /  AST  17  /  ALT  12  /  AlkPhos  72  07-01    Cardiac Markers: CARDIAC MARKERS ( 2021 06:07 )  x     / x     / 40 U/L / x     / x        LIVER FUNCTIONS - ( 2021 06:00 )  Alb: 3.1 g/dL / Pro: 6.1 g/dL / ALK PHOS: 72 U/L / ALT: 12 U/L / AST: 17 U/L / GGT: x           UA: Urinalysis Basic - ( 2021 23:52 )    Color: Light Yellow / Appearance: Clear / S.011 / pH: x  Gluc: x / Ketone: Moderate  / Bili: Negative / Urobili: Negative   Blood: x / Protein: Negative / Nitrite: Negative   Leuk Esterase: Negative / RBC: x / WBC x   Sq Epi: x / Non Sq Epi: x / Bacteria: x    Radiology:  - CTH: No CT evidence of acute intracranial hemorrhage, mass effect, or midline shift. Unchanged posttreatment changes. MRN-[id number]    Subjective: 70 yo male seen and examined at bedside.    PAST MEDICAL & SURGICAL HISTORY:  Hypertension    Hyperlipidemia    Seizures    Diabetes    Glioblastoma multiforme    CMV infection    History of SIADH    H/O colonoscopy    History of laryngoscopy    Status post stereotactic brain biopsy    FAMILY HISTORY:  FH: myocardial infarction (Father)    Social Hx:  Nonsmoker, no drug or alcohol use    Home Medications:  amantadine 50 mg/5 mL oral syrup: 10 milliliter(s) orally 2 times a day (2021 10:00)  atorvastatin 10 mg oral tablet: 1 tab(s) orally once a day (2021 10:00)  citalopram 20 mg oral tablet: 1 tab(s) orally once a day (2021 10:00)  Keppra 100 mg/mL oral solution: 7.5 milliliter(s) orally 2 times a day (2021 10:00)  losartan 100 mg oral tablet: 1 tab(s) orally once a day (2021 10:00)  methylphenidate 5 mg oral tablet: 1 tab(s) orally @ 8am and 2 pm (15 Abel 2021 12:19)  Norvasc 5 mg oral tablet: 1 tab(s) orally once a day (2021 10:00)  nystatin 100,000 units/mL oral suspension: 4 milliliter(s) orally 4 times a day (2021 10:00)  ramelteon 8 mg oral tablet: 1 tab(s) orally once a day (at bedtime) (2021 10:00)    MEDICATIONS  (STANDING):  amantadine Syrup 100 milliGRAM(s) Oral two times a day  amLODIPine   Tablet 5 milliGRAM(s) Oral daily  atorvastatin 10 milliGRAM(s) Oral at bedtime  dexAMETHasone  Injectable 4 milliGRAM(s) IV Push daily  enoxaparin Injectable 40 milliGRAM(s) SubCutaneous daily  levETIRAcetam  IVPB 1000 milliGRAM(s) IV Intermittent <User Schedule>  losartan 100 milliGRAM(s) Oral daily  methylphenidate 5 milliGRAM(s) Oral two times a day  nystatin    Suspension [id number] Unit(s) Oral four times a day  pantoprazole   Suspension 40 milliGRAM(s) Oral before breakfast  sodium chloride 0.9%. 1000 milliLiter(s) (75 mL/Hr) IV Continuous <Continuous>    MEDICATIONS  (PRN):  ramelteon 8 milliGRAM(s) Oral at bedtime PRN Insomnia    Allergies  No Known Allergies    ROS: Unable to obtain due to patient's mental status.     Vital Signs Last 24 Hrs  T(C): 36.6 (2021 07:47), Max: 36.8 (2021 19:21)  T(F): 97.9 (2021 07:47), Max: 98.2 (2021 19:21)  HR: 94 (2021 07:47) (89 - 102)  BP: 112/71 (2021 07:47) (112/71 - 142/77)  RR: 20 (2021 07:47) (18 - 20)  SpO2: 99% (2021 07:47) (93% - 99%)    GENERAL EXAM:  Constitutional: Lying in bed, NAD.  HEENT: Normocephalic, atraumatic.    NEUROLOGICAL EXAM:  MS: Eyes closed, do not open to verbal/noxious stimuli. No verbal output.  CN: Face symmetric.  Motor: Significant contracture b/l lower extremities. Some spontaneous movement noted in upper extremities, L>R.  Coordination/Gait: Unable to assess.    Labs:   cbc                      10.6   9.92  )-----------( 158      ( 2021 06:00 )             32.0     [id number]    131<L>  |  95<L>  |  10  ----------------------------<  94  3.7   |  24  |  0.39<L>    Ca    8.3<L>      2021 06:00  Phos  2.8     07-01  Mg     2.0     07-01    TPro  6.1  /  Alb  3.1<L>  /  TBili  0.5  /  DBili  x   /  AST  17  /  ALT  12  /  AlkPhos  72  07-01    Cardiac Markers: CARDIAC MARKERS ( 2021 06:07 )  x     / x     / 40 U/L / x     / x        LIVER FUNCTIONS - ( 2021 06:00 )  Alb: 3.1 g/dL / Pro: 6.1 g/dL / ALK PHOS: 72 U/L / ALT: 12 U/L / AST: 17 U/L / GGT: x           UA: Urinalysis Basic - ( 2021 23:52 )    Color: Light Yellow / Appearance: Clear / S.011 / pH: x  Gluc: x / Ketone: Moderate  / Bili: Negative / Urobili: Negative   Blood: x / Protein: Negative / Nitrite: Negative   Leuk Esterase: Negative / RBC: x / WBC x   Sq Epi: x / Non Sq Epi: x / Bacteria: x    Radiology:  - CTH: No CT evidence of acute intracranial hemorrhage, mass effect, or midline shift. Unchanged posttreatment changes.

## 2021-07-01 NOTE — PROGRESS NOTE ADULT - ATTENDING COMMENTS
Mr. Rider is a 72 year old RH man with a pmhx of HTN, HLD, hx of SIADH, glioblastoma multiforme (diagnosed in 2019), and seizures who presented with progressive alteration in mental status possibly associated with seizure vs. progression of underlying illness.    No change in exam. Pt now with NG in place and receiving feeds.  Minimal response to painful stimuli in bilateral upper extremities L>R.  Minimal spontaneous movement.    Impression: Likely breakthrough seizure in the setting of progression of disease of glioblastoma multiforme.    Plan:  HypoNa improving, 124->131. On IVF NS @ 75ml/hr.  IM on board, Dr. Godfrey. Recs appreciated.  Continue Decadron 4MG IV QD,  Keppra to 1000MG IV TID.   S&S to see patient when he is more alert and able to participate in eval. Nutrition to see patient also.  Discussed patient's treatment plan and goals of care with neuro-oncologist Dr. Martínez - currently on Avastin monotherapy (last dose 6/24, next dose due around 7/9).

## 2021-07-01 NOTE — PROGRESS NOTE ADULT - ASSESSMENT
Assessment: 72 year old RH male with a past medical history of HTN, HLD, hx of SIADH, gliobastoma multiforme (diagnosed in 2019), and seizures presenting after an episode of depressed level of consciousness and suspected seizure episode.    Impression: Likely breakthrough seizure in the setting of progression of disease of glioblastoma multiforme.    Plan:  [x] HypoNa improving, 124->131. On IVF NS @ 75ml/hr.  [] IM on board, Dr. Godfrey. Recs appreciated.  [] Continue Decadron 4MG IV QD.  [] c/w Keppra to 1000MG IV TID.  [x] NGT placed by ENT on 06/30, tube feeds started.  [] S&S to see patient when he is more alert and able to participate in eval. Nutrition to see patient also.  [] At this time no indication for further neuro imaging.  [] May consider repeat EEG but not emergent at this time.  [x] Discussed patient's treatment plan and goals of care with neuro-oncologist Dr. Martínez - currently on Avastin monotherapy (last dose 6/24, next dose due around 7/9).  [x] CPK, B12, folate: wnl.  [] Diet: Glucerna 1.5 via NGT.  [] DVT PPx: Lovenox SC.    Case discussed with neurology attending, Dr. Carlin.   Assessment: 71 year old RH male with a past medical history of HTN, HLD, hx of SIADH, gliobastoma multiforme (diagnosed in 2019), and seizures presenting after an episode of depressed level of consciousness and suspected seizure episode.    Impression: Likely breakthrough seizure in the setting of progression of disease of glioblastoma multiforme.    Plan:  [x] HypoNa improving, 124->131. On IVF NS @ 75ml/hr.  [] IM on board, Dr. Godfrey. Recs appreciated.  [] Continue Decadron 4MG IV QD.  [] c/w Keppra to 1000MG IV TID.  [x] NGT placed by ENT on 06/30, tube feeds started.  [] S&S to see patient when he is more alert and able to participate in eval. Nutrition to see patient also.  [] At this time no indication for further neuro imaging.  [] May consider repeat EEG but not emergent at this time.  [x] Discussed patient's treatment plan and goals of care with neuro-oncologist Dr. Martínez - currently on Avastin monotherapy (last dose 6/24, next dose due around 7/9).  [x] CPK, B12, folate: wnl.  [] Diet: Glucerna 1.5 via NGT.  [] DVT PPx: Lovenox SC.    Case discussed with neurology attending, Dr. Carlin.

## 2021-07-01 NOTE — PROGRESS NOTE ADULT - ASSESSMENT
Ronald Rider is a 72 year old RH male with a past medical history of HTN, HLD, hx of SIADH, gliobastoma multiforme (diagnosed in 2019) and seizures presenting after an episode of depressed level of consciousness and suspected seizure episode. At baseline, patient has contractions throughout and is bedbound, he is non-verbal but can respond to some simple commands. Patient was recently admitted for increased somnolence, difficulty breathing, decreased alertness, and poor PO intake. Was briefly on antibiotics but discontinued once no infection was found, had hypernatremia which resolved, briefly on steroid which were tapered and then subsequently discontinued. Was discharged for follow up with his neuro-oncologist at [facility]. Currently patient was on Keppra 750mg tid and as per son was compliant. Patient now presenting with after an episode where he was sitting in his chair and then noted be slumping over to the R, having increased tonic contractions of the RUE, and some twitching of his face. Patient was noted to be incontinent during the episode and was less responsive than usual. He was taken by EMS to the hospital and given Versed 5mg IM en route. Patient gradually improved and was able to follow some command to squeeze a finger with his son in the room. On examination, the patient was not able to contribute to history, ROS, or follow commands to exam.     Glioblastoma History:  Diagnosed in 2019 with MR noting involvement of the splenium of the corpus callosum and bilateral parenchymal involvement. Had stereotactic biopsy in 2019 with pathology noting glioblastoma. Was started on Keppra and Decadron. Was treated at [facility] with Dr. Chavis with radiation, temozolomide, and Bactrim. Temozolamide was stopped due to myelosuppression. Has had numerous admission for somnolence and seizure episodes. Currently on Avastin maintenance. MRI in April 2021 and June 2021 show stable mass, progression of RT gliosis. Patient was administered steroids at the time of last admission in early June 2021 and was tapered off as an outpatient.    Hematology/Oncology called to follow patient who is known to Dr. Lyle Chavis at [facility] for the treatment of glioblastoma multiforme. He was discharged from [facility] as noted above. As per available records from [facility] last treatment with Avastin was 6/11/2021 (will obtain updated records once [facility] has been notified of admission).    Glioblastoma Multiforme  --Under care at [facility] Dr. Lyle Chavis  --Currently on maintenance Avastin  --Appears to have stable disease    AMS, Seizure like activity  --No edema, mass effect, hemorrhage or midline shift on CT  --MRI may be of more benefit to assess disease  --Patient had UTI 4/2021 which caused AMS  --UA ordered   --Patient on Dexamethasone  --Keppra has been increased to 1000 mg BID  --Further management per Neurology  --[facility] planned to speak with Dr. Carlin re: management - to consider getting EEG for 24 hours to rule out subclinical seizure    Hyponatremia  --Management per primary team  --Has improved    GOC  --Palliative met with family (son) on last admission at [facility]  --Patient still full code at that time  --NG tube in place - patient unable to participate in swallow study  --As per Dr. Martínez who is covering for Dr. Chavis - if mental status unable to be improved on current management, hospice may be appropriate    We will continue to follow patient and coordinate with [facility]    Shashi Cason PA-C  Hematology/Oncology  [facility]   [phone] (cell)  [phone] (office)  [phone] (alt office)  Evenings and weekends please call MD on call or office

## 2021-07-01 NOTE — DIETITIAN INITIAL EVALUATION ADULT. - EDUCATION DIETARY MODIFICATIONS
Visitor would be appropriate for this patient.    unable to verbalize/demonstrate/(0) unable to meet; needs instruction

## 2021-07-02 LAB
ALBUMIN SERPL ELPH-MCNC: 2.6 G/DL — LOW (ref 3.3–5)
ALP SERPL-CCNC: 84 U/L — SIGNIFICANT CHANGE UP (ref 40–120)
ALT FLD-CCNC: 14 U/L — SIGNIFICANT CHANGE UP (ref 10–45)
ANION GAP SERPL CALC-SCNC: 10 MMOL/L — SIGNIFICANT CHANGE UP (ref 5–17)
AST SERPL-CCNC: 16 U/L — SIGNIFICANT CHANGE UP (ref 10–40)
BILIRUB SERPL-MCNC: 0.2 MG/DL — SIGNIFICANT CHANGE UP (ref 0.2–1.2)
BUN SERPL-MCNC: 12 MG/DL — SIGNIFICANT CHANGE UP (ref 7–23)
CALCIUM SERPL-MCNC: 8 MG/DL — LOW (ref 8.4–10.5)
CHLORIDE SERPL-SCNC: 100 MMOL/L — SIGNIFICANT CHANGE UP (ref 96–108)
CO2 SERPL-SCNC: 18 MMOL/L — LOW (ref 22–31)
CREAT SERPL-MCNC: 0.31 MG/DL — LOW (ref 0.5–1.3)
GLUCOSE BLDC GLUCOMTR-MCNC: 113 MG/DL — HIGH (ref 70–99)
GLUCOSE BLDC GLUCOMTR-MCNC: 115 MG/DL — HIGH (ref 70–99)
GLUCOSE BLDC GLUCOMTR-MCNC: 128 MG/DL — HIGH (ref 70–99)
GLUCOSE BLDC GLUCOMTR-MCNC: 146 MG/DL — HIGH (ref 70–99)
GLUCOSE SERPL-MCNC: 108 MG/DL — HIGH (ref 70–99)
HCT VFR BLD CALC: 32.6 % — LOW (ref 39–50)
HGB BLD-MCNC: 10.6 G/DL — LOW (ref 13–17)
MCHC RBC-ENTMCNC: 28 PG — SIGNIFICANT CHANGE UP (ref 27–34)
MCHC RBC-ENTMCNC: 32.5 GM/DL — SIGNIFICANT CHANGE UP (ref 32–36)
MCV RBC AUTO: 86 FL — SIGNIFICANT CHANGE UP (ref 80–100)
NRBC # BLD: 0 /100 WBCS — SIGNIFICANT CHANGE UP (ref 0–0)
PLATELET # BLD AUTO: 125 K/UL — LOW (ref 150–400)
POTASSIUM SERPL-MCNC: 4.1 MMOL/L — SIGNIFICANT CHANGE UP (ref 3.5–5.3)
POTASSIUM SERPL-SCNC: 4.1 MMOL/L — SIGNIFICANT CHANGE UP (ref 3.5–5.3)
PROT SERPL-MCNC: 5.7 G/DL — LOW (ref 6–8.3)
RBC # BLD: 3.79 M/UL — LOW (ref 4.2–5.8)
RBC # FLD: 17.1 % — HIGH (ref 10.3–14.5)
SODIUM SERPL-SCNC: 128 MMOL/L — LOW (ref 135–145)
WBC # BLD: 12.18 K/UL — HIGH (ref 3.8–10.5)
WBC # FLD AUTO: 12.18 K/UL — HIGH (ref 3.8–10.5)

## 2021-07-02 PROCEDURE — 95720 EEG PHY/QHP EA INCR W/VEEG: CPT

## 2021-07-02 PROCEDURE — 99232 SBSQ HOSP IP/OBS MODERATE 35: CPT

## 2021-07-02 RX ORDER — LACOSAMIDE 50 MG/1
100 TABLET ORAL EVERY 12 HOURS
Refills: 0 | Status: DISCONTINUED | OUTPATIENT
Start: 2021-07-02 | End: 2021-07-02

## 2021-07-02 RX ORDER — LACOSAMIDE 50 MG/1
150 TABLET ORAL EVERY 12 HOURS
Refills: 0 | Status: DISCONTINUED | OUTPATIENT
Start: 2021-07-02 | End: 2021-07-06

## 2021-07-02 RX ORDER — LACOSAMIDE 50 MG/1
200 TABLET ORAL ONCE
Refills: 0 | Status: DISCONTINUED | OUTPATIENT
Start: 2021-07-02 | End: 2021-07-02

## 2021-07-02 RX ADMIN — Medication 400000 UNIT(S): at 18:20

## 2021-07-02 RX ADMIN — LEVETIRACETAM 400 MILLIGRAM(S): 250 TABLET, FILM COATED ORAL at 13:38

## 2021-07-02 RX ADMIN — AMLODIPINE BESYLATE 5 MILLIGRAM(S): 2.5 TABLET ORAL at 06:00

## 2021-07-02 RX ADMIN — LEVETIRACETAM 400 MILLIGRAM(S): 250 TABLET, FILM COATED ORAL at 21:52

## 2021-07-02 RX ADMIN — LACOSAMIDE 200 MILLIGRAM(S): 50 TABLET ORAL at 19:19

## 2021-07-02 RX ADMIN — CEFTRIAXONE 100 MILLIGRAM(S): 500 INJECTION, POWDER, FOR SOLUTION INTRAMUSCULAR; INTRAVENOUS at 20:05

## 2021-07-02 RX ADMIN — PANTOPRAZOLE SODIUM 40 MILLIGRAM(S): 20 TABLET, DELAYED RELEASE ORAL at 06:01

## 2021-07-02 RX ADMIN — Medication 4 MILLIGRAM(S): at 06:00

## 2021-07-02 RX ADMIN — Medication 100 MILLIGRAM(S): at 06:08

## 2021-07-02 RX ADMIN — ATORVASTATIN CALCIUM 10 MILLIGRAM(S): 80 TABLET, FILM COATED ORAL at 21:50

## 2021-07-02 RX ADMIN — Medication 100 MILLIGRAM(S): at 18:20

## 2021-07-02 RX ADMIN — Medication 400000 UNIT(S): at 06:02

## 2021-07-02 RX ADMIN — LOSARTAN POTASSIUM 100 MILLIGRAM(S): 100 TABLET, FILM COATED ORAL at 06:00

## 2021-07-02 RX ADMIN — LEVETIRACETAM 400 MILLIGRAM(S): 250 TABLET, FILM COATED ORAL at 06:12

## 2021-07-02 RX ADMIN — Medication 5 MILLIGRAM(S): at 13:54

## 2021-07-02 RX ADMIN — LACOSAMIDE 120 MILLIGRAM(S): 50 TABLET ORAL at 18:20

## 2021-07-02 RX ADMIN — Medication 400000 UNIT(S): at 12:03

## 2021-07-02 RX ADMIN — ENOXAPARIN SODIUM 40 MILLIGRAM(S): 100 INJECTION SUBCUTANEOUS at 12:02

## 2021-07-02 RX ADMIN — Medication 400000 UNIT(S): at 06:09

## 2021-07-02 RX ADMIN — Medication 5 MILLIGRAM(S): at 08:54

## 2021-07-02 RX ADMIN — Medication 1 MILLIGRAM(S): at 17:45

## 2021-07-02 RX ADMIN — Medication 1 MILLIGRAM(S): at 15:05

## 2021-07-02 RX ADMIN — LACOSAMIDE 110 MILLIGRAM(S): 50 TABLET ORAL at 05:57

## 2021-07-02 NOTE — PROGRESS NOTE ADULT - SUBJECTIVE AND OBJECTIVE BOX
MRN-[id number]      Subjective: Patient seen and examined at bedside. Episode of R upper and lower myoclonic jerking overnight. Received Ativan 1mg, vimpat 100 mg load, now on 50 mg BID. This morning, no myoclonic jerking. Not able to awaken to voice or sternal rub. NG Tube in place and receiving tube feeds.      ALLERGIES/INTOLERANCES:  Allergies  No Known Allergies    Intolerances    PAST MEDICAL & SURGICAL HISTORY:  Hypertension    Hyperlipidemia    Seizures    Diabetes    Glioblastoma multiforme    CMV infection    History of SIADH    H/O colonoscopy    History of laryngoscopy    Status post stereotactic brain biopsy      FAMILY HISTORY:  FH: myocardial infarction (Father)      Social Hx:  Nonsmoker, no drug or alcohol use    Home Medications:  amantadine 50 mg/5 mL oral syrup: 10 milliliter(s) orally 2 times a day (05 Jun 2021 10:00)  atorvastatin 10 mg oral tablet: 1 tab(s) orally once a day (05 Jun 2021 10:00)  citalopram 20 mg oral tablet: 1 tab(s) orally once a day (05 Jun 2021 10:00)  Keppra 100 mg/mL oral solution: 7.5 milliliter(s) orally 2 times a day (05 Jun 2021 10:00)  losartan 100 mg oral tablet: 1 tab(s) orally once a day (05 Jun 2021 10:00)  methylphenidate 5 mg oral tablet: 1 tab(s) orally @ 8am and 2 pm (15 Abel 2021 12:19)  Norvasc 5 mg oral tablet: 1 tab(s) orally once a day (05 Jun 2021 10:00)  nystatin 100,000 units/mL oral suspension: 4 milliliter(s) orally 4 times a day (05 Jun 2021 10:00)  ramelteon 8 mg oral tablet: 1 tab(s) orally once a day (at bedtime) (05 Jun 2021 10:00)    MEDICATIONS  (STANDING):  amantadine Syrup 100 milliGRAM(s) Oral two times a day  amLODIPine   Tablet 5 milliGRAM(s) Oral daily  atorvastatin 10 milliGRAM(s) Oral at bedtime  cefTRIAXone   IVPB 1000 milliGRAM(s) IV Intermittent every 24 hours  dexAMETHasone  Injectable 4 milliGRAM(s) IV Push daily  enoxaparin Injectable 40 milliGRAM(s) SubCutaneous daily  lacosamide IVPB 50 milliGRAM(s) IV Intermittent every 12 hours  levETIRAcetam  IVPB 1000 milliGRAM(s) IV Intermittent <User Schedule>  losartan 100 milliGRAM(s) Oral daily  methylphenidate 5 milliGRAM(s) Oral two times a day  nystatin    Suspension 866150 Unit(s) Oral four times a day  pantoprazole   Suspension 40 milliGRAM(s) Oral before breakfast  sodium chloride 0.9%. 1000 milliLiter(s) (75 mL/Hr) IV Continuous <Continuous>    MEDICATIONS  (PRN):  ramelteon 8 milliGRAM(s) Oral at bedtime PRN Insomnia    Allergies  No Known Allergies    Intolerances      REVIEW OF SYSTEMS  ROS: Unable to obtain due to patient's mental status.      Vital Signs Last 24 Hrs  T(C): 36.2 (02 Jul 2021 07:10), Max: 37.9 (01 Jul 2021 22:05)  T(F): 97.2 (02 Jul 2021 07:10), Max: 100.2 (01 Jul 2021 22:05)  HR: 90 (02 Jul 2021 07:10) (82 - 111)  BP: 128/72 (02 Jul 2021 07:10) (107/62 - 128/72)  RR: 18 (02 Jul 2021 07:10) (18 - 20)  SpO2: 97% (02 Jul 2021 07:10) (96% - 99%)    NEUROLOGICAL EXAM:  MS: Unable to be aroused to voice, sternal rub, or repositioning. Unable to assess orientation.  Language: No spontaneous speech.   CNs: No facial asymmetry appreciated  Motor: Increased tone in the bilateral upper and lower extremities. Unable to assess manual motor testing due to not following commands.  Sensation: Unable to assess    Reflexes:              Plantar Resp  R	Mute  L	Mute     Coordination: unable to assess.    Gait: Deferred.      Labs:   cbc                      10.6   12.18 )-----------( 125      ( 02 Jul 2021 05:41 )             32.6     [id number]    128<L>  |  100  |  12  ----------------------------<  108<H>  4.1   |  18<L>  |  0.31<L>    Ca    8.0<L>      02 Jul 2021 05:41  Phos  2.8     07-01  Mg     2.0     07-01    TPro  5.7<L>  /  Alb  2.6<L>  /  TBili  0.2  /  DBili  x   /  AST  16  /  ALT  14  /  AlkPhos  84  07-02    Coags  Lipids  A1C  CardiacMarkers    LFTsLIVER FUNCTIONS - ( 02 Jul 2021 05:41 )  Alb: 2.6 g/dL / Pro: 5.7 g/dL / ALK PHOS: 84 U/L / ALT: 14 U/L / AST: 16 U/L / GGT: x           UAUrinalysis Basic - ( 01 Jul 2021 13:02 )    Color: x / Appearance: x / SG: x / pH: x  Gluc: x / Ketone: x  / Bili: x / Urobili: x   Blood: x / Protein: x / Nitrite: x   Leuk Esterase: x / RBC: 6 /hpf /  /HPF   Sq Epi: x / Non Sq Epi: 0 /hpf / Bacteria: Moderate        Radiology:  -06/29 CTH: No CT evidence of acute intracranial hemorrhage, mass effect, or midline shift. Unchanged posttreatment changes. MRN-[id number]      Subjective: Patient seen and examined at bedside. Episode of R upper and lower myoclonic jerking overnight. Received Ativan 1mg, vimpat 100 mg load, now on 50 mg BID. This morning, no myoclonic jerking. Not able to awaken to voice or sternal rub. NG Tube in place and receiving tube feeds.      ALLERGIES/INTOLERANCES:  Allergies  No Known Allergies    Intolerances    PAST MEDICAL & SURGICAL HISTORY:  Hypertension    Hyperlipidemia    Seizures    Diabetes    Glioblastoma multiforme    CMV infection    History of SIADH    H/O colonoscopy    History of laryngoscopy    Status post stereotactic brain biopsy      FAMILY HISTORY:  FH: myocardial infarction (Father)      Social Hx:  Nonsmoker, no drug or alcohol use    Home Medications:  amantadine 50 mg/5 mL oral syrup: 10 milliliter(s) orally 2 times a day (05 Jun 2021 10:00)  atorvastatin 10 mg oral tablet: 1 tab(s) orally once a day (05 Jun 2021 10:00)  citalopram 20 mg oral tablet: 1 tab(s) orally once a day (05 Jun 2021 10:00)  Keppra 100 mg/mL oral solution: 7.5 milliliter(s) orally 2 times a day (05 Jun 2021 10:00)  losartan 100 mg oral tablet: 1 tab(s) orally once a day (05 Jun 2021 10:00)  methylphenidate 5 mg oral tablet: 1 tab(s) orally @ 8am and 2 pm (15 Abel 2021 12:19)  Norvasc 5 mg oral tablet: 1 tab(s) orally once a day (05 Jun 2021 10:00)  nystatin 100,000 units/mL oral suspension: 4 milliliter(s) orally 4 times a day (05 Jun 2021 10:00)  ramelteon 8 mg oral tablet: 1 tab(s) orally once a day (at bedtime) (05 Jun 2021 10:00)    MEDICATIONS  (STANDING):  amantadine Syrup 100 milliGRAM(s) Oral two times a day  amLODIPine   Tablet 5 milliGRAM(s) Oral daily  atorvastatin 10 milliGRAM(s) Oral at bedtime  cefTRIAXone   IVPB 1000 milliGRAM(s) IV Intermittent every 24 hours  dexAMETHasone  Injectable 4 milliGRAM(s) IV Push daily  enoxaparin Injectable 40 milliGRAM(s) SubCutaneous daily  lacosamide IVPB 50 milliGRAM(s) IV Intermittent every 12 hours  levETIRAcetam  IVPB 1000 milliGRAM(s) IV Intermittent <User Schedule>  losartan 100 milliGRAM(s) Oral daily  methylphenidate 5 milliGRAM(s) Oral two times a day  nystatin    Suspension 408810 Unit(s) Oral four times a day  pantoprazole   Suspension 40 milliGRAM(s) Oral before breakfast  sodium chloride 0.9%. 1000 milliLiter(s) (75 mL/Hr) IV Continuous <Continuous>    MEDICATIONS  (PRN):  ramelteon 8 milliGRAM(s) Oral at bedtime PRN Insomnia    Allergies  No Known Allergies    Intolerances      REVIEW OF SYSTEMS  ROS: Unable to obtain due to patient's mental status.      Vital Signs Last 24 Hrs  T(C): 36.2 (02 Jul 2021 07:10), Max: 37.9 (01 Jul 2021 22:05)  T(F): 97.2 (02 Jul 2021 07:10), Max: 100.2 (01 Jul 2021 22:05)  HR: 90 (02 Jul 2021 07:10) (82 - 111)  BP: 128/72 (02 Jul 2021 07:10) (107/62 - 128/72)  RR: 18 (02 Jul 2021 07:10) (18 - 20)  SpO2: 97% (02 Jul 2021 07:10) (96% - 99%)    NEUROLOGICAL EXAM:  MS: Arousable to voice. Unable to assess orientation. Does not follow commands.  Language: No spontaneous speech.  CNs: Left gaze preference, no eye tracking, no facial asymmetry appreciated  Motor: Increased tone in the bilateral upper and lower extremities. Unable to assess manual motor testing due to not following commands.  Sensation: Unable to assess    Reflexes:              Plantar Resp  R	Mute  L	Mute     Coordination: unable to assess.    Gait: Deferred.      Labs:   cbc                      10.6   12.18 )-----------( 125      ( 02 Jul 2021 05:41 )             32.6     [id number]    128<L>  |  100  |  12  ----------------------------<  108<H>  4.1   |  18<L>  |  0.31<L>    Ca    8.0<L>      02 Jul 2021 05:41  Phos  2.8     07-01  Mg     2.0     07-01    TPro  5.7<L>  /  Alb  2.6<L>  /  TBili  0.2  /  DBili  x   /  AST  16  /  ALT  14  /  AlkPhos  84  07-02    Coags  Lipids  A1C  CardiacMarkers    LFTsLIVER FUNCTIONS - ( 02 Jul 2021 05:41 )  Alb: 2.6 g/dL / Pro: 5.7 g/dL / ALK PHOS: 84 U/L / ALT: 14 U/L / AST: 16 U/L / GGT: x           UAUrinalysis Basic - ( 01 Jul 2021 13:02 )    Color: x / Appearance: x / SG: x / pH: x  Gluc: x / Ketone: x  / Bili: x / Urobili: x   Blood: x / Protein: x / Nitrite: x   Leuk Esterase: x / RBC: 6 /hpf /  /HPF   Sq Epi: x / Non Sq Epi: 0 /hpf / Bacteria: Moderate        Radiology:  -06/29 CTH: No CT evidence of acute intracranial hemorrhage, mass effect, or midline shift. Unchanged posttreatment changes. [id number]    Subjective: Patient seen and examined at bedside. Episode of R upper and lower myoclonic jerking overnight. Received Ativan 1mg, vimpat 100 mg load, now on 50 mg BID. This morning, no myoclonic jerking. Not able to awaken to voice or sternal rub. NG Tube in place and receiving tube feeds.    ALLERGIES/INTOLERANCES:  Allergies  No Known Allergies    PAST MEDICAL & SURGICAL HISTORY:  Hypertension    Hyperlipidemia    Seizures    Diabetes    Glioblastoma multiforme    CMV infection    History of SIADH    H/O colonoscopy    History of laryngoscopy    Status post stereotactic brain biopsy    FAMILY HISTORY:  FH: myocardial infarction (Father)    Social Hx:  Nonsmoker, no drug or alcohol use    Home Medications:  amantadine 50 mg/5 mL oral syrup: 10 milliliter(s) orally 2 times a day (05 Jun 2021 10:00)  atorvastatin 10 mg oral tablet: 1 tab(s) orally once a day (05 Jun 2021 10:00)  citalopram 20 mg oral tablet: 1 tab(s) orally once a day (05 Jun 2021 10:00)  Keppra 100 mg/mL oral solution: 7.5 milliliter(s) orally 2 times a day (05 Jun 2021 10:00)  losartan 100 mg oral tablet: 1 tab(s) orally once a day (05 Jun 2021 10:00)  methylphenidate 5 mg oral tablet: 1 tab(s) orally @ 8am and 2 pm (15 Abel 2021 12:19)  Norvasc 5 mg oral tablet: 1 tab(s) orally once a day (05 Jun 2021 10:00)  nystatin 100,000 units/mL oral suspension: 4 milliliter(s) orally 4 times a day (05 Jun 2021 10:00)  ramelteon 8 mg oral tablet: 1 tab(s) orally once a day (at bedtime) (05 Jun 2021 10:00)    MEDICATIONS  (STANDING):  amantadine Syrup 100 milliGRAM(s) Oral two times a day  amLODIPine   Tablet 5 milliGRAM(s) Oral daily  atorvastatin 10 milliGRAM(s) Oral at bedtime  cefTRIAXone   IVPB 1000 milliGRAM(s) IV Intermittent every 24 hours  dexAMETHasone  Injectable 4 milliGRAM(s) IV Push daily  enoxaparin Injectable 40 milliGRAM(s) SubCutaneous daily  lacosamide IVPB 50 milliGRAM(s) IV Intermittent every 12 hours  levETIRAcetam  IVPB 1000 milliGRAM(s) IV Intermittent <User Schedule>  losartan 100 milliGRAM(s) Oral daily  methylphenidate 5 milliGRAM(s) Oral two times a day  nystatin    Suspension 410440 Unit(s) Oral four times a day  pantoprazole   Suspension 40 milliGRAM(s) Oral before breakfast  sodium chloride 0.9%. 1000 milliLiter(s) (75 mL/Hr) IV Continuous <Continuous>    MEDICATIONS  (PRN):  ramelteon 8 milliGRAM(s) Oral at bedtime PRN Insomnia    Allergies  No Known Allergies    REVIEW OF SYSTEMS  ROS: Unable to obtain due to patient's mental status.    Vital Signs Last 24 Hrs  T(C): 36.2 (02 Jul 2021 07:10), Max: 37.9 (01 Jul 2021 22:05)  T(F): 97.2 (02 Jul 2021 07:10), Max: 100.2 (01 Jul 2021 22:05)  HR: 90 (02 Jul 2021 07:10) (82 - 111)  BP: 128/72 (02 Jul 2021 07:10) (107/62 - 128/72)  RR: 18 (02 Jul 2021 07:10) (18 - 20)  SpO2: 97% (02 Jul 2021 07:10) (96% - 99%)    NEUROLOGICAL EXAM:  MS: Eyes open to voice. No verbal output. Does not follow any commands.  CN: Left gaze preference, BTT absent OD, present OS, does not track reflection, no facial asymmetry.  Motor: Significant contracture b/l lower extremities. Some spontaneous movement noted in upper extremities, L>R.  Reflexes:              Plantar Resp  R	Mute  L	Mute     Coordination/Gait: Unable to assess.    Labs:   cbc                      10.6   12.18 )-----------( 125      ( 02 Jul 2021 05:41 )             32.6     [id number]    128<L>  |  100  |  12  ----------------------------<  108<H>  4.1   |  18<L>  |  0.31<L>    Ca    8.0<L>      02 Jul 2021 05:41  Phos  2.8     07-01  Mg     2.0     07-01    TPro  5.7<L>  /  Alb  2.6<L>  /  TBili  0.2  /  DBili  x   /  AST  16  /  ALT  14  /  AlkPhos  84  07-02    LFTs: LIVER FUNCTIONS - ( 02 Jul 2021 05:41 )  Alb: 2.6 g/dL / Pro: 5.7 g/dL / ALK PHOS: 84 U/L / ALT: 14 U/L / AST: 16 U/L / GGT: x           UA: Urinalysis Basic - ( 01 Jul 2021 13:02 )    Color: x / Appearance: x / SG: x / pH: x  Gluc: x / Ketone: x  / Bili: x / Urobili: x   Blood: x / Protein: x / Nitrite: x   Leuk Esterase: x / RBC: 6 /hpf /  /HPF   Sq Epi: x / Non Sq Epi: 0 /hpf / Bacteria: Moderate    Radiology:  -06/29 CTH: No CT evidence of acute intracranial hemorrhage, mass effect, or midline shift. Unchanged posttreatment changes. MRN-[id number]    Subjective: Patient seen and examined at bedside. Episode of R upper and lower myoclonic jerking overnight. Received Ativan 1mg, Vimpat 100 mg load, now on Vimpat 50 mg BID. No further myoclonic jerking noted.    ALLERGIES/INTOLERANCES:  Allergies  No Known Allergies    PAST MEDICAL & SURGICAL HISTORY:  Hypertension    Hyperlipidemia    Seizures    Diabetes    Glioblastoma multiforme    CMV infection    History of SIADH    H/O colonoscopy    History of laryngoscopy    Status post stereotactic brain biopsy    FAMILY HISTORY:  FH: myocardial infarction (Father)    Social Hx:  Nonsmoker, no drug or alcohol use    Home Medications:  amantadine 50 mg/5 mL oral syrup: 10 milliliter(s) orally 2 times a day (05 Jun 2021 10:00)  atorvastatin 10 mg oral tablet: 1 tab(s) orally once a day (05 Jun 2021 10:00)  citalopram 20 mg oral tablet: 1 tab(s) orally once a day (05 Jun 2021 10:00)  Keppra 100 mg/mL oral solution: 7.5 milliliter(s) orally 2 times a day (05 Jun 2021 10:00)  losartan 100 mg oral tablet: 1 tab(s) orally once a day (05 Jun 2021 10:00)  methylphenidate 5 mg oral tablet: 1 tab(s) orally @ 8am and 2 pm (15 Abel 2021 12:19)  Norvasc 5 mg oral tablet: 1 tab(s) orally once a day (05 Jun 2021 10:00)  nystatin 100,000 units/mL oral suspension: 4 milliliter(s) orally 4 times a day (05 Jun 2021 10:00)  ramelteon 8 mg oral tablet: 1 tab(s) orally once a day (at bedtime) (05 Jun 2021 10:00)    MEDICATIONS  (STANDING):  amantadine Syrup 100 milliGRAM(s) Oral two times a day  amLODIPine   Tablet 5 milliGRAM(s) Oral daily  atorvastatin 10 milliGRAM(s) Oral at bedtime  cefTRIAXone   IVPB 1000 milliGRAM(s) IV Intermittent every 24 hours  dexAMETHasone  Injectable 4 milliGRAM(s) IV Push daily  enoxaparin Injectable 40 milliGRAM(s) SubCutaneous daily  lacosamide IVPB 50 milliGRAM(s) IV Intermittent every 12 hours  levETIRAcetam  IVPB 1000 milliGRAM(s) IV Intermittent <User Schedule>  losartan 100 milliGRAM(s) Oral daily  methylphenidate 5 milliGRAM(s) Oral two times a day  nystatin    Suspension 059147 Unit(s) Oral four times a day  pantoprazole   Suspension 40 milliGRAM(s) Oral before breakfast  sodium chloride 0.9%. 1000 milliLiter(s) (75 mL/Hr) IV Continuous <Continuous>    MEDICATIONS  (PRN):  ramelteon 8 milliGRAM(s) Oral at bedtime PRN Insomnia    Allergies  No Known Allergies    REVIEW OF SYSTEMS  ROS: Unable to obtain due to patient's mental status.    Vital Signs Last 24 Hrs  T(C): 36.2 (02 Jul 2021 07:10), Max: 37.9 (01 Jul 2021 22:05)  T(F): 97.2 (02 Jul 2021 07:10), Max: 100.2 (01 Jul 2021 22:05)  HR: 90 (02 Jul 2021 07:10) (82 - 111)  BP: 128/72 (02 Jul 2021 07:10) (107/62 - 128/72)  RR: 18 (02 Jul 2021 07:10) (18 - 20)  SpO2: 97% (02 Jul 2021 07:10) (96% - 99%)    NEUROLOGICAL EXAM:  MS: Eyes open to voice. No verbal output. Does not follow any commands.  CN: Left gaze preference, BTT absent OD, present OS, does not track reflection, no facial asymmetry.  Motor: Significant contracture b/l lower extremities. Some spontaneous movement noted in upper extremities, L>R.  Reflexes:              Plantar Resp  R	Mute  L	Mute     Coordination/Gait: Unable to assess.    Labs:   cbc                      10.6   12.18 )-----------( 125      ( 02 Jul 2021 05:41 )             32.6     [id number]    128<L>  |  100  |  12  ----------------------------<  108<H>  4.1   |  18<L>  |  0.31<L>    Ca    8.0<L>      02 Jul 2021 05:41  Phos  2.8     07-01  Mg     2.0     07-01    TPro  5.7<L>  /  Alb  2.6<L>  /  TBili  0.2  /  DBili  x   /  AST  16  /  ALT  14  /  AlkPhos  84  07-02    LFTs: LIVER FUNCTIONS - ( 02 Jul 2021 05:41 )  Alb: 2.6 g/dL / Pro: 5.7 g/dL / ALK PHOS: 84 U/L / ALT: 14 U/L / AST: 16 U/L / GGT: x           UA: Urinalysis Basic - ( 01 Jul 2021 13:02 )    Color: x / Appearance: x / SG: x / pH: x  Gluc: x / Ketone: x  / Bili: x / Urobili: x   Blood: x / Protein: x / Nitrite: x   Leuk Esterase: x / RBC: 6 /hpf /  /HPF   Sq Epi: x / Non Sq Epi: 0 /hpf / Bacteria: Moderate    Radiology:  -06/29 CTH: No CT evidence of acute intracranial hemorrhage, mass effect, or midline shift. Unchanged posttreatment changes.

## 2021-07-02 NOTE — PROGRESS NOTE ADULT - ASSESSMENT
Ronald Rider is a 72 year old RH male with a past medical history of HTN, HLD, hx of SIADH, gliobastoma multiforme (diagnosed in 2019) and seizures presenting after an episode of depressed level of consciousness and suspected seizure episode. At baseline, patient has contractions throughout and is bedbound, he is non-verbal but can respond to some simple commands. Patient was recently admitted for increased somnolence, difficulty breathing, decreased alertness, and poor PO intake. Was briefly on antibiotics but discontinued once no infection was found, had hypernatremia which resolved, briefly on steroid which were tapered and then subsequently discontinued. Was discharged for follow up with his neuro-oncologist at [facility]. Currently patient was on Keppra 750mg tid and as per son was compliant. Patient now presenting with after an episode where he was sitting in his chair and then noted be slumping over to the R, having increased tonic contractions of the RUE, and some twitching of his face. Patient was noted to be incontinent during the episode and was less responsive than usual. He was taken by EMS to the hospital and given Versed 5mg IM en route. Patient gradually improved and was able to follow some command to squeeze a finger with his son in the room. On examination, the patient was not able to contribute to history, ROS, or follow commands to exam.     Glioblastoma History:  Diagnosed in 2019 with MR noting involvement of the splenium of the corpus callosum and bilateral parenchymal involvement. Had stereotactic biopsy in 2019 with pathology noting glioblastoma. Was started on Keppra and Decadron. Was treated at [facility] with Dr. Chavis with radiation, temozolomide, and Bactrim. Temozolamide was stopped due to myelosuppression. Has had numerous admission for somnolence and seizure episodes. Currently on Avastin maintenance. MRI in April 2021 and June 2021 show stable mass, progression of RT gliosis. Patient was administered steroids at the time of last admission in early June 2021 and was tapered off as an outpatient.    Hematology/Oncology called to follow patient who is known to Dr. Lyle Chavis [place] [facility] [place] of [place]iforme. He was discharged from [facility] as noted above. As per available records from [facility] [place] last treatment with Avastin was 6/11/2021 (will obtain updated records once [facility] has been notified of admission).    Glioblastoma Multiforme  --Under care at [facility] [place]. Lyle Chavis  --Currently on maintenance Avastin  --Appears to have stable disease    AMS, Seizure like activity  --No edema, mass effect, hemorrhage or midline shift on CT  --MRI may be of more benefit to assess disease  --Patient had UTI 4/2021 which caused AMS  --UA ordered   --Patient on Dexamethasone  --Keppra has been increased to 1000 mg BID  --Further management per Neurology  --Vimpat started for seizure like activity noted last night  --[facility] planned to speak with Dr. Carlin re: management - to consider getting EEG for 24 hours to rule out subclinical seizure  --Per Neuro note - plan will be to repeat EEG but not urgently    Hyponatremia  --Management per primary team  --Has improved overall - Na 128 on 7/2.    [facility]  --Palliative met with family (son) on last admission at [facility]  --Patient still full code at that time  --NG tube in place - patient unable to participate in swallow study  --As per Dr. Martínez who is covering for Dr. Chavis - if mental status unable to be improved on current management, hospice may be appropriate    We will continue to follow patient and coordinate with [facility] [place]    Shashi Cason PA-C  Hematology/Oncology  [facility]   [phone] (cell)  [phone] (office)  [phone] (alt office)  Evenings and weekends please call MD on call or office

## 2021-07-02 NOTE — PROGRESS NOTE ADULT - ASSESSMENT
72 year old RH male with a past medical history of HTN, HLD, hx of SIADH, gliobastoma multiforme (diagnosed in 2019), and seizures presenting after an episode of depressed level of consciousness and suspected seizure episode.    Impression: Likely breakthrough seizure in the setting of progression of disease of glioblastoma multiforme.    Plan:  [] Vimpat 50 mg BID  [] Continue Decadron 4MG IV QD.  [] c/w Keppra to 1000MG IV TID.  [] Will discuss palliative care consultation with daughter (Trupti)  [x] HypoNa improving, 124->131 -> 128. On IVF NS @ 75ml/hr.  [] IM on board, Dr. Godfrey. Recs appreciated.  [x] NGT placed by ENT on 06/30, tube feeds started.  [] S&S to see patient when he is more alert and able to participate in eval. Nutrition to see patient also.  [] At this time no indication for further neuro imaging.  [] May consider repeat EEG but not emergent at this time.  [x] Discussed patient's treatment plan and goals of care with neuro-oncologist Dr. Martínez - currently on Avastin monotherapy (last dose 6/24, next dose due around 7/9).  [x] CPK, B12, folate: wnl.  [] Diet: Glucerna 1.5 via NGT.  [] DVT PPx: Lovenox SC.    Case discussed with neurology attending, Dr. Carlin.   72 year old RH male with a past medical history of HTN, HLD, hx of SIADH, gliobastoma multiforme (diagnosed in 2019), and seizures presenting after an episode of depressed level of consciousness and suspected seizure episode. Had another episode concerning for seizure 07/01 PM, started second AED. UA positive, started abx.    Impression: Likely breakthrough seizure in the setting of progression of disease of glioblastoma multiforme.    Plan:  [] Start Vimpat 50 mg BID.  [] Continue Decadron 4MG IV QD.  [] c/w Keppra to 1000MG IV TID.  [] Ceftriaxone 1G IV QD x3 days, day 2/3.  [] Will discuss palliative care consultation with daughter (Trupti).  [x] HypoNa improving, 124->131 -> 128. On IVF NS @ 75ml/hr.  [] IM on board, Dr. Godfrey. Recs appreciated.  [x] NGT placed by ENT on 06/30, tube feeds started.  [] S&S to see patient when he is more alert and able to participate in eval.  [] At this time no indication for further neuro imaging.  [] May consider repeat EEG but not emergent at this time.  [x] Discussed patient's treatment plan and goals of care with neuro-oncologist Dr. Martínez - currently on Avastin monotherapy (last dose 6/24, next dose due around 7/9).  [x] CPK, B12, folate: wnl.  [] Diet: Glucerna 1.5 via NGT, goal rate 60cc's/hr.  [] DVT PPx: Lovenox SC.    Case discussed with neurology attending, Dr. Carlin.   72 year old RH male with a past medical history of HTN, HLD, hx of SIADH, gliobastoma multiforme (diagnosed in 2019), and seizures presenting after an episode of depressed level of consciousness and suspected seizure episode. Had another episode concerning for seizure 07/01 PM, started second AED. UA positive, started abx.    Impression: Likely breakthrough seizure in the setting of progression of disease of glioblastoma multiforme.    Plan:  [] Start Vimpat 50 mg BID.  [] Continue Decadron 4MG IV QD.  [] c/w Keppra to 1000MG IV TID.  [] UA pos, Ceftriaxone 1G IV QD x3 days, day 2/3.  [x] Positive blood culture likely contaminant, will not treat for now.  [] Hyponatremia likely secondary to SIADH, d/c'd IVF. Fluid restriction < 1.5L daily.  [x] Holding home Celexa as this could contribute to hyponatremia.  [] Will discuss palliative care consultation with daughter (Telephone# [phone]).  [] IM on board, Dr. Godfrey. Recs appreciated.  [x] NGT placed by ENT on 06/30, tube feeds started.  [] S&S to see patient when he is more alert and able to participate in eval.  [] At this time no indication for further neuro imaging.  [x] Discussed patient's treatment plan and goals of care with neuro-oncologist Dr. Martínez - currently on Avastin monotherapy (last dose 6/24, next dose due around 7/9).  [x] CPK, B12, folate: wnl.  [] Diet: Glucerna 1.5 via NGT, goal rate 60cc's/hr.  [] DVT PPx: Lovenox SC.    Case discussed with neurology attending, Dr. Carlin.

## 2021-07-02 NOTE — CHART NOTE - NSCHARTNOTEFT_GEN_A_CORE
Initial 80 minutes of record reviewed.  Lateralized periodic discharges (LPDs) over the left centroparietal region. LPDs indicate an increased risk for seizures.  Background is continuous but poorly organized.   Full report to follow after review of completed study tomorrow.     Chuck Freeman MD PhD  Director, Epilepsy Division, [facility] EEG Reading Room Ph#: [phone]  Epilepsy Answering Service after 5PM and before 8:30AM: Ph#: [phone]

## 2021-07-02 NOTE — PROGRESS NOTE ADULT - SUBJECTIVE AND OBJECTIVE BOX
DATE OF SERVICE: 07-02-21 @ 11:04    Patient is a 71y old  Male who presents with a chief complaint of seizure episode (02 Jul 2021 10:52)        SUBJECTIVE / OVERNIGHT EVENTS:  unresponsive    MEDICATIONS  (STANDING):  amantadine Syrup 100 milliGRAM(s) Oral two times a day  amLODIPine   Tablet 5 milliGRAM(s) Oral daily  atorvastatin 10 milliGRAM(s) Oral at bedtime  cefTRIAXone   IVPB 1000 milliGRAM(s) IV Intermittent every 24 hours  dexAMETHasone  Injectable 4 milliGRAM(s) IV Push daily  enoxaparin Injectable 40 milliGRAM(s) SubCutaneous daily  lacosamide IVPB 50 milliGRAM(s) IV Intermittent every 12 hours  levETIRAcetam  IVPB 1000 milliGRAM(s) IV Intermittent <User Schedule>  losartan 100 milliGRAM(s) Oral daily  methylphenidate 5 milliGRAM(s) Oral two times a day  nystatin    Suspension 640643 Unit(s) Oral four times a day  pantoprazole   Suspension 40 milliGRAM(s) Oral before breakfast    MEDICATIONS  (PRN):  ramelteon 8 milliGRAM(s) Oral at bedtime PRN Insomnia      Vital Signs Last 24 Hrs  T(C): 36.8 (02 Jul 2021 11:38), Max: 37.9 (01 Jul 2021 22:05)  T(F): 98.3 (02 Jul 2021 11:38), Max: 100.2 (01 Jul 2021 22:05)  HR: 101 (02 Jul 2021 11:38) (82 - 111)  BP: 131/81 (02 Jul 2021 11:38) (107/62 - 131/81)  BP(mean): --  RR: 18 (02 Jul 2021 11:38) (18 - 20)  SpO2: 96% (02 Jul 2021 11:38) (96% - 99%)  CAPILLARY BLOOD GLUCOSE      POCT Blood Glucose.: 128 mg/dL (02 Jul 2021 06:48)  POCT Blood Glucose.: 115 mg/dL (02 Jul 2021 00:40)    I&O's Summary    01 Jul 2021 07:01  -  02 Jul 2021 07:00  --------------------------------------------------------  IN: 1520 mL / OUT: 350 mL / NET: 1170 mL        PHYSICAL EXAM:  GENERAL: NAD, well-developed  HEAD:  Atraumatic, Normocephalic  EYES: EOMI, PERRLA, conjunctiva and sclera clear  NECK: Supple, No JVD  CHEST/LUNG: Clear to auscultation bilaterally; No wheeze  HEART: Regular rate and rhythm; No murmurs, rubs, or gallops  ABDOMEN: Soft, Nontender, Nondistended; Bowel sounds present  EXTREMITIES:  contracted  PSYCH: AAOx0  NEUROLOGY: does not follow commands, unresponsive  SKIN: No rashes or lesions    LABS:                        10.6   12.18 )-----------( 125      ( 02 Jul 2021 05:41 )             32.6     07-02    128<L>  |  100  |  12  ----------------------------<  108<H>  4.1   |  18<L>  |  0.31<L>    Ca    8.0<L>      02 Jul 2021 05:41  Phos  2.8     07-01  Mg     2.0     07-01    TPro  5.7<L>  /  Alb  2.6<L>  /  TBili  0.2  /  DBili  x   /  AST  16  /  ALT  14  /  AlkPhos  84  07-02          Urinalysis Basic - ( 01 Jul 2021 13:02 )    Color: x / Appearance: x / SG: x / pH: x  Gluc: x / Ketone: x  / Bili: x / Urobili: x   Blood: x / Protein: x / Nitrite: x   Leuk Esterase: x / RBC: 6 /hpf /  /HPF   Sq Epi: x / Non Sq Epi: 0 /hpf / Bacteria: Moderate        RADIOLOGY & ADDITIONAL TESTS:    Imaging Personally Reviewed:    Consultant(s) Notes Reviewed:      Care Discussed with Consultants/Other Providers:

## 2021-07-02 NOTE — PROGRESS NOTE ADULT - ATTENDING COMMENTS
Mr. Rider is a 72 year old RH male with a pmhx of HTN, HLD, hx of SIADH, gliobastoma multiforme (diagnosed in 2019), and seizures who presented after an episode of depressed level of consciousness and suspected seizure episode. He was noted by nursing to have dad another episode concerning for seizure 07/01 PM and started addition of Vimpat. UA positive, started abx.  Today he had another visualized event with facial and arm twitching and with tonic movements restricted to right.  REsolved following time and ativan and c/w previous seizures.    Impression: Likely breakthrough seizure in the setting of progression of disease of glioblastoma multiforme.    Plan:  increase Vimpat to 50 mg BID.  Continue Decadron 4MG IV QD.  c/w Keppra to 1000MG IV TID.  UA pos, Ceftriaxone 1G IV QD x3 days, day 2/3.  Positive blood culture likely contaminant, will not treat for now.

## 2021-07-02 NOTE — CHART NOTE - NSCHARTNOTEFT_GEN_A_CORE
Nutrition Follow-up Note  Patient seen for: malnutrition follow up     Source of Information: Comprehensive chart review and RN    Chart reviewed, events noted.     Current Diet: Diet, NPO with Tube Feed:   Tube Feeding Modality: Nasogastric  Glucerna 1.5 Davey (GLUCERNA1.5RTH)  Total Volume for 24 Hours (mL): 1440  Continuous  Starting Tube Feed Rate {mL per Hour}: 10  Increase Tube Feed Rate by (mL): 10     Every 4 hours  Until Goal Tube Feed Rate (mL per Hour): 60  Tube Feed Duration (in Hours): 24  Tube Feed Start Time: 18:30 (07-01-21 @ 16:34)    Feeds at goal provide total volume 1440cc, 1728 kcals, 86 gm protein, and 1159 cc free water. Meeds 37 kcals/kG and 1.9 gm protein/kG based on RD obtained wt 46.4 kG (7/1).    Subjective Information: Feeds currently running at goal of 60ml/hr. Per RN, pt tolerating feeds well with no acute GI issues at this time.       Previous Nutrition Diagnosis: Severe chronic malnutrition    New Nutrition Diagnosis: n/a    Nutrition Care Plan:  [X] In progress   [ ] Achieved    Nutrition Interventions:  1) Continue current EN regimen as tolerated.   2) Recommend thiamin and Multivitamin to promote adequate nutrient intake.     Monitoring and Evaluation:   Continue to monitor Nutritional intake, Tolerance to diet prescription, weights, labs, skin integrity    RD remains available upon request and will follow up per protocol    Whit Sanchez RD pager# [phone]

## 2021-07-02 NOTE — PROGRESS NOTE ADULT - SUBJECTIVE AND OBJECTIVE BOX
Patient is a 71y old  Male who presents with a chief complaint of seizure episode (02 Jul 2021 08:11)    Patient seen this morning. Was awake but totally unresponsive.     MEDICATIONS  (STANDING):  amantadine Syrup 100 milliGRAM(s) Oral two times a day  amLODIPine   Tablet 5 milliGRAM(s) Oral daily  atorvastatin 10 milliGRAM(s) Oral at bedtime  cefTRIAXone   IVPB 1000 milliGRAM(s) IV Intermittent every 24 hours  dexAMETHasone  Injectable 4 milliGRAM(s) IV Push daily  enoxaparin Injectable 40 milliGRAM(s) SubCutaneous daily  lacosamide IVPB 50 milliGRAM(s) IV Intermittent every 12 hours  levETIRAcetam  IVPB 1000 milliGRAM(s) IV Intermittent <User Schedule>  losartan 100 milliGRAM(s) Oral daily  methylphenidate 5 milliGRAM(s) Oral two times a day  nystatin    Suspension 019262 Unit(s) Oral four times a day  pantoprazole   Suspension 40 milliGRAM(s) Oral before breakfast    MEDICATIONS  (PRN):  ramelteon 8 milliGRAM(s) Oral at bedtime PRN Insomnia      Vital Signs Last 24 Hrs  T(C): 36.8 (02 Jul 2021 11:38), Max: 37.9 (01 Jul 2021 22:05)  T(F): 98.3 (02 Jul 2021 11:38), Max: 100.2 (01 Jul 2021 22:05)  HR: 101 (02 Jul 2021 11:38) (82 - 111)  BP: 131/81 (02 Jul 2021 11:38) (107/62 - 131/81)  BP(mean): --  RR: 18 (02 Jul 2021 11:38) (18 - 20)  SpO2: 96% (02 Jul 2021 11:38) (96% - 99%)    PE  NAD  Awake, unresponsive  Anicteric  Cachetic  NG tube in place  No rash grossly                            10.6   12.18 )-----------( 125      ( 02 Jul 2021 05:41 )             32.6       07-02    128<L>  |  100  |  12  ----------------------------<  108<H>  4.1   |  18<L>  |  0.31<L>    Ca    8.0<L>      02 Jul 2021 05:41  Phos  2.8     07-01  Mg     2.0     07-01    TPro  5.7<L>  /  Alb  2.6<L>  /  TBili  0.2  /  DBili  x   /  AST  16  /  ALT  14  /  AlkPhos  84  07-02

## 2021-07-02 NOTE — PROGRESS NOTE ADULT - ASSESSMENT
72 year old RH male with a past medical history of HTN, HLD, hx of SIADH, gliobastoma multiforme (diagnosed in 2019) and seizures presenting after an episode of depressed level of consciousness and suspected seizure episode.    hyponatremia  - likely SIADH  - d/c iv fluids  - fluid restrict    dysphagia  - failed swallow eval  - would keep NPO   - cont NG tube feeds  - peg pending clarification of GOC    leukocytosis  - positive UA (358 wbc)  - ceftriaxone x 3 days  positive blood culture likely contaminant    GBM  - treatment as per neuroncology  - c/w decadron    seizure   - on keppra    depression  - on celexa  - would hold   - could be contributing to hyponatremia    dvt px  - Lovenox

## 2021-07-03 LAB
ALBUMIN SERPL ELPH-MCNC: 2.5 G/DL — LOW (ref 3.3–5)
ALP SERPL-CCNC: 91 U/L — SIGNIFICANT CHANGE UP (ref 40–120)
ALT FLD-CCNC: 13 U/L — SIGNIFICANT CHANGE UP (ref 10–45)
ANION GAP SERPL CALC-SCNC: 16 MMOL/L — SIGNIFICANT CHANGE UP (ref 5–17)
AST SERPL-CCNC: 21 U/L — SIGNIFICANT CHANGE UP (ref 10–40)
BILIRUB SERPL-MCNC: 0.2 MG/DL — SIGNIFICANT CHANGE UP (ref 0.2–1.2)
BUN SERPL-MCNC: 10 MG/DL — SIGNIFICANT CHANGE UP (ref 7–23)
CALCIUM SERPL-MCNC: 8.4 MG/DL — SIGNIFICANT CHANGE UP (ref 8.4–10.5)
CHLORIDE SERPL-SCNC: 100 MMOL/L — SIGNIFICANT CHANGE UP (ref 96–108)
CO2 SERPL-SCNC: 18 MMOL/L — LOW (ref 22–31)
CREAT SERPL-MCNC: 0.32 MG/DL — LOW (ref 0.5–1.3)
GLUCOSE BLDC GLUCOMTR-MCNC: 130 MG/DL — HIGH (ref 70–99)
GLUCOSE BLDC GLUCOMTR-MCNC: 162 MG/DL — HIGH (ref 70–99)
GLUCOSE BLDC GLUCOMTR-MCNC: 185 MG/DL — HIGH (ref 70–99)
GLUCOSE SERPL-MCNC: 95 MG/DL — SIGNIFICANT CHANGE UP (ref 70–99)
HCT VFR BLD CALC: 35.5 % — LOW (ref 39–50)
HGB BLD-MCNC: 11.5 G/DL — LOW (ref 13–17)
MCHC RBC-ENTMCNC: 27.9 PG — SIGNIFICANT CHANGE UP (ref 27–34)
MCHC RBC-ENTMCNC: 32.4 GM/DL — SIGNIFICANT CHANGE UP (ref 32–36)
MCV RBC AUTO: 86.2 FL — SIGNIFICANT CHANGE UP (ref 80–100)
NRBC # BLD: 0 /100 WBCS — SIGNIFICANT CHANGE UP (ref 0–0)
PLATELET # BLD AUTO: 128 K/UL — LOW (ref 150–400)
POTASSIUM SERPL-MCNC: 4 MMOL/L — SIGNIFICANT CHANGE UP (ref 3.5–5.3)
POTASSIUM SERPL-SCNC: 4 MMOL/L — SIGNIFICANT CHANGE UP (ref 3.5–5.3)
PROT SERPL-MCNC: 6 G/DL — SIGNIFICANT CHANGE UP (ref 6–8.3)
RBC # BLD: 4.12 M/UL — LOW (ref 4.2–5.8)
RBC # FLD: 17.4 % — HIGH (ref 10.3–14.5)
SODIUM SERPL-SCNC: 134 MMOL/L — LOW (ref 135–145)
WBC # BLD: 18.89 K/UL — HIGH (ref 3.8–10.5)
WBC # FLD AUTO: 18.89 K/UL — HIGH (ref 3.8–10.5)

## 2021-07-03 PROCEDURE — 95720 EEG PHY/QHP EA INCR W/VEEG: CPT

## 2021-07-03 PROCEDURE — 99232 SBSQ HOSP IP/OBS MODERATE 35: CPT

## 2021-07-03 RX ORDER — LACOSAMIDE 50 MG/1
200 TABLET ORAL ONCE
Refills: 0 | Status: DISCONTINUED | OUTPATIENT
Start: 2021-07-03 | End: 2021-07-03

## 2021-07-03 RX ORDER — DEXAMETHASONE 0.5 MG/5ML
4 ELIXIR ORAL EVERY 12 HOURS
Refills: 0 | Status: DISCONTINUED | OUTPATIENT
Start: 2021-07-03 | End: 2021-07-04

## 2021-07-03 RX ADMIN — ATORVASTATIN CALCIUM 10 MILLIGRAM(S): 80 TABLET, FILM COATED ORAL at 21:10

## 2021-07-03 RX ADMIN — Medication 400000 UNIT(S): at 00:27

## 2021-07-03 RX ADMIN — LEVETIRACETAM 400 MILLIGRAM(S): 250 TABLET, FILM COATED ORAL at 05:13

## 2021-07-03 RX ADMIN — Medication 5 MILLIGRAM(S): at 09:14

## 2021-07-03 RX ADMIN — Medication 400000 UNIT(S): at 12:15

## 2021-07-03 RX ADMIN — Medication 400000 UNIT(S): at 05:14

## 2021-07-03 RX ADMIN — AMLODIPINE BESYLATE 5 MILLIGRAM(S): 2.5 TABLET ORAL at 05:14

## 2021-07-03 RX ADMIN — Medication 5 MILLIGRAM(S): at 14:18

## 2021-07-03 RX ADMIN — Medication 4 MILLIGRAM(S): at 05:13

## 2021-07-03 RX ADMIN — Medication 4 MILLIGRAM(S): at 18:13

## 2021-07-03 RX ADMIN — Medication 400000 UNIT(S): at 18:11

## 2021-07-03 RX ADMIN — LACOSAMIDE 130 MILLIGRAM(S): 50 TABLET ORAL at 18:10

## 2021-07-03 RX ADMIN — Medication 1 MILLIGRAM(S): at 16:55

## 2021-07-03 RX ADMIN — Medication 1 MILLIGRAM(S): at 23:06

## 2021-07-03 RX ADMIN — CEFTRIAXONE 100 MILLIGRAM(S): 500 INJECTION, POWDER, FOR SOLUTION INTRAMUSCULAR; INTRAVENOUS at 16:48

## 2021-07-03 RX ADMIN — Medication 1 MILLIGRAM(S): at 23:12

## 2021-07-03 RX ADMIN — LEVETIRACETAM 400 MILLIGRAM(S): 250 TABLET, FILM COATED ORAL at 21:10

## 2021-07-03 RX ADMIN — LACOSAMIDE 280 MILLIGRAM(S): 50 TABLET ORAL at 23:08

## 2021-07-03 RX ADMIN — LEVETIRACETAM 400 MILLIGRAM(S): 250 TABLET, FILM COATED ORAL at 14:18

## 2021-07-03 RX ADMIN — Medication 100 MILLIGRAM(S): at 18:11

## 2021-07-03 RX ADMIN — PANTOPRAZOLE SODIUM 40 MILLIGRAM(S): 20 TABLET, DELAYED RELEASE ORAL at 05:14

## 2021-07-03 RX ADMIN — LACOSAMIDE 130 MILLIGRAM(S): 50 TABLET ORAL at 06:31

## 2021-07-03 RX ADMIN — ENOXAPARIN SODIUM 40 MILLIGRAM(S): 100 INJECTION SUBCUTANEOUS at 12:43

## 2021-07-03 RX ADMIN — LOSARTAN POTASSIUM 100 MILLIGRAM(S): 100 TABLET, FILM COATED ORAL at 05:14

## 2021-07-03 RX ADMIN — Medication 100 MILLIGRAM(S): at 05:14

## 2021-07-03 NOTE — PROGRESS NOTE ADULT - SUBJECTIVE AND OBJECTIVE BOX
MRN-[id number]    Subjective: Patient seen and examined at bedside on the morning of 7/3/21. Received another 200mg Vimpat load last night and now on 150mg bid of maintenance    ALLERGIES/INTOLERANCES:  Allergies  No Known Allergies    PAST MEDICAL & SURGICAL HISTORY:  Hypertension    Hyperlipidemia    Seizures    Diabetes    Glioblastoma multiforme    CMV infection    History of SIADH    H/O colonoscopy    History of laryngoscopy    Status post stereotactic brain biopsy    FAMILY HISTORY:  FH: myocardial infarction (Father)    Social Hx:  Nonsmoker, no drug or alcohol use    Home Medications:  amantadine 50 mg/5 mL oral syrup: 10 milliliter(s) orally 2 times a day (05 Jun 2021 10:00)  atorvastatin 10 mg oral tablet: 1 tab(s) orally once a day (05 Jun 2021 10:00)  citalopram 20 mg oral tablet: 1 tab(s) orally once a day (05 Jun 2021 10:00)  Keppra 100 mg/mL oral solution: 7.5 milliliter(s) orally 2 times a day (05 Jun 2021 10:00)  losartan 100 mg oral tablet: 1 tab(s) orally once a day (05 Jun 2021 10:00)  methylphenidate 5 mg oral tablet: 1 tab(s) orally @ 8am and 2 pm (15 Abel 2021 12:19)  Norvasc 5 mg oral tablet: 1 tab(s) orally once a day (05 Jun 2021 10:00)  nystatin 100,000 units/mL oral suspension: 4 milliliter(s) orally 4 times a day (05 Jun 2021 10:00)  ramelteon 8 mg oral tablet: 1 tab(s) orally once a day (at bedtime) (05 Jun 2021 10:00)    MEDICATIONS  (STANDING):  amantadine Syrup 100 milliGRAM(s) Oral two times a day  amLODIPine   Tablet 5 milliGRAM(s) Oral daily  atorvastatin 10 milliGRAM(s) Oral at bedtime  cefTRIAXone   IVPB 1000 milliGRAM(s) IV Intermittent every 24 hours  dexAMETHasone  Injectable 4 milliGRAM(s) IV Push daily  enoxaparin Injectable 40 milliGRAM(s) SubCutaneous daily  lacosamide IVPB 150 milliGRAM(s) IV Intermittent every 12 hours  levETIRAcetam  IVPB 1000 milliGRAM(s) IV Intermittent <User Schedule>  losartan 100 milliGRAM(s) Oral daily  methylphenidate 5 milliGRAM(s) Oral two times a day  nystatin    Suspension 664210 Unit(s) Oral four times a day  pantoprazole   Suspension 40 milliGRAM(s) Oral before breakfast    MEDICATIONS  (PRN):  ramelteon 8 milliGRAM(s) Oral at bedtime PRN Insomnia      Allergies  No Known Allergies    REVIEW OF SYSTEMS  ROS: Unable to obtain due to patient's mental status.    Vital Signs Last 24 Hrs  T(C): 36.6 (03 Jul 2021 05:15), Max: 36.9 (02 Jul 2021 19:24)  T(F): 97.8 (03 Jul 2021 05:15), Max: 98.5 (02 Jul 2021 19:24)  HR: 93 (03 Jul 2021 05:15) (91 - 101)  BP: 120/65 (03 Jul 2021 05:15) (114/69 - 131/81)  BP(mean): --  RR: 18 (03 Jul 2021 05:15) (18 - 18)  SpO2: 95% (03 Jul 2021 05:15) (95% - 98%)    NEUROLOGICAL EXAM:  MS: Eyes open to voice. No verbal output. Does not follow any commands.  CN: Left gaze preference, BTT absent OD, present OS, does not track reflection, no facial asymmetry.  Motor: Significant contracture b/l lower extremities. Some spontaneous movement noted in upper extremities, L>R.  Reflexes:              Plantar Resp  R	Mute  L	Mute     Coordination/Gait: Unable to assess.                          10.6   12.18 )-----------( 125      ( 02 Jul 2021 05:41 )             32.6     07-03    134<L>  |  100  |  10  ----------------------------<  95  4.0   |  18<L>  |  0.32<L>    Ca    8.4      03 Jul 2021 06:32    TPro  6.0  /  Alb  2.5<L>  /  TBili  0.2  /  DBili  x   /  AST  21  /  ALT  13  /  AlkPhos  91  07-03    CAPILLARY BLOOD GLUCOSE      POCT Blood Glucose.: 130 mg/dL (03 Jul 2021 06:50)  POCT Blood Glucose.: 113 mg/dL (02 Jul 2021 23:50)  POCT Blood Glucose.: 146 mg/dL (02 Jul 2021 18:39)      Urinalysis Basic - ( 01 Jul 2021 13:02 )    Color: x / Appearance: x / SG: x / pH: x  Gluc: x / Ketone: x  / Bili: x / Urobili: x   Blood: x / Protein: x / Nitrite: x   Leuk Esterase: x / RBC: 6 /hpf /  /HPF   Sq Epi: x / Non Sq Epi: 0 /hpf / Bacteria: Moderate      I&O's Summary    02 Jul 2021 07:01  -  03 Jul 2021 07:00  --------------------------------------------------------  IN: 0 mL / OUT: 950 mL / NET: -950 mL      Radiology:  -06/29 C[facility]: No CT evidence of acute intracranial hemorrhage, mass effect, or midline shift. Unchanged posttreatment changes.

## 2021-07-03 NOTE — PROGRESS NOTE ADULT - ASSESSMENT
Ronald Rider is a 72 year old RH male with a past medical history of HTN, HLD, hx of SIADH, gliobastoma multiforme (diagnosed in 2019) and seizures presenting after an episode of depressed level of consciousness and suspected seizure episode. At baseline, patient has contractions throughout and is bedbound, he is non-verbal but can respond to some simple commands. Patient was recently admitted for increased somnolence, difficulty breathing, decreased alertness, and poor PO intake. Was briefly on antibiotics but discontinued once no infection was found, had hypernatremia which resolved, briefly on steroid which were tapered and then subsequently discontinued. Was discharged for follow up with his neuro-oncologist at [facility]. Currently patient was on Keppra 750mg tid and as per son was compliant. Patient now presenting with after an episode where he was sitting in his chair and then noted be slumping over to the R, having increased tonic contractions of the RUE, and some twitching of his face. Patient was noted to be incontinent during the episode and was less responsive than usual. He was taken by EMS to the hospital and given Versed 5mg IM en route. Patient gradually improved and was able to follow some command to squeeze a finger with his son in the room. On examination, the patient was not able to contribute to history, ROS, or follow commands to exam.     Glioblastoma History:  Diagnosed in 2019 with MR noting involvement of the splenium of the corpus callosum and bilateral parenchymal involvement. Had stereotactic biopsy in 2019 with pathology noting glioblastoma. Was started on Keppra and Decadron. Was treated at [facility] with Dr. Chavis with radiation, temozolomide, and Bactrim. Temozolamide was stopped due to myelosuppression. Has had numerous admission for somnolence and seizure episodes. Currently on Avastin maintenance. MRI in April 2021 and June 2021 show stable mass, progression of RT gliosis. Patient was administered steroids at the time of last admission in early June 2021 and was tapered off as an outpatient.    Hematology/Oncology called to follow patient who is known to Dr. Lyle Chavis at [facility] for the treatment of glioblastoma multiforme. He was discharged from [facility] as noted above. As per available records from [facility] last treatment with Avastin was 6/11/2021 (will obtain updated records once [facility] has been notified of admission).    Glioblastoma Multiforme  --Under care at [facility] Dr. Lyle Chavis  --Currently on maintenance Avastin  --Appears to have stable disease    AMS, Seizure like activity  --No edema, mass effect, hemorrhage or midline shift on CT  --MRI may be of more benefit to assess disease  --Patient had UTI 4/2021 which caused AMS  --UA ordered   --Patient on Dexamethasone  --Keppra has been increased to 1000 mg BID  --Further management per Neurology  --Vimpat started for seizure like activity noted last night  --[facility] planned to speak with Dr. Carlin re: management - to consider getting EEG for 24 hours to rule out subclinical seizure  --Per Neuro note - plan will be to repeat EEG but not urgently    Hyponatremia  --Management per primary team  --Has improved overall - Na 128 on 7/2.    [facility]  --Palliative met with family (son) on last admission at [facility]  --Patient still full code at that time  --NG tube in place - patient unable to participate in swallow study  --As per Dr. Martínez who is covering for Dr. Chavis - if mental status unable to be improved on current management, hospice may be appropriate    Donald Ramirez MD  [facility]  Cell: [phone]

## 2021-07-03 NOTE — CHART NOTE - NSCHARTNOTEFT_GEN_A_CORE
Called by Rn to evaluate patient.   Upon my assessment patient was found to have RUE shaking and twitching. Patient also having staring episodes, with no verbal amount, responsive to pain. Patient Vitals during this time stable with no oxygen desaturation. Patient event duration over 5 minutes. Patient given 1mg Ativan x2, given his Keppra 1000mg that was due. After constant reassessment patient RUE shaking and twitching still present. Contacted on call EMU attending who recommended vimpat 200mg  over 15 minutes.  Patient RUE shaking and twitching improved.  No further interventions needed at this time.

## 2021-07-03 NOTE — PROGRESS NOTE ADULT - SUBJECTIVE AND OBJECTIVE BOX
Patient is a 71y old  Male who presents with a chief complaint of seizure episode (03 Jul 2021 10:03)    Date of servie : 07-03-21 @ 14:56  INTERVAL HPI/OVERNIGHT EVENTS:  T(C): 36.5 (07-03-21 @ 11:56), Max: 36.9 (07-02-21 @ 19:24)  HR: 96 (07-03-21 @ 11:56) (91 - 98)  BP: 119/68 (07-03-21 @ 11:56) (114/69 - 127/71)  RR: 18 (07-03-21 @ 11:56) (18 - 18)  SpO2: 97% (07-03-21 @ 11:56) (95% - 98%)  Wt(kg): --  I&O's Summary    02 Jul 2021 07:01  -  03 Jul 2021 07:00  --------------------------------------------------------  IN: 0 mL / OUT: 950 mL / NET: -950 mL    03 Jul 2021 07:01  -  03 Jul 2021 14:56  --------------------------------------------------------  IN: 0 mL / OUT: 600 mL / NET: -600 mL        LABS:                        11.5   18.89 )-----------( 128      ( 03 Jul 2021 07:40 )             35.5     07-03    134<L>  |  100  |  10  ----------------------------<  95  4.0   |  18<L>  |  0.32<L>    Ca    8.4      03 Jul 2021 06:32    TPro  6.0  /  Alb  2.5<L>  /  TBili  0.2  /  DBili  x   /  AST  21  /  ALT  13  /  AlkPhos  91  07-03        CAPILLARY BLOOD GLUCOSE      POCT Blood Glucose.: 185 mg/dL (03 Jul 2021 11:13)  POCT Blood Glucose.: 130 mg/dL (03 Jul 2021 06:50)  POCT Blood Glucose.: 113 mg/dL (02 Jul 2021 23:50)  POCT Blood Glucose.: 146 mg/dL (02 Jul 2021 18:39)            MEDICATIONS  (STANDING):  amantadine Syrup 100 milliGRAM(s) Oral two times a day  amLODIPine   Tablet 5 milliGRAM(s) Oral daily  atorvastatin 10 milliGRAM(s) Oral at bedtime  cefTRIAXone   IVPB 1000 milliGRAM(s) IV Intermittent every 24 hours  dexAMETHasone     Tablet 4 milliGRAM(s) Oral every 12 hours  enoxaparin Injectable 40 milliGRAM(s) SubCutaneous daily  lacosamide IVPB 150 milliGRAM(s) IV Intermittent every 12 hours  levETIRAcetam  IVPB 1000 milliGRAM(s) IV Intermittent <User Schedule>  LORazepam   Injectable 1 milliGRAM(s) IV Push once  losartan 100 milliGRAM(s) Oral daily  methylphenidate 5 milliGRAM(s) Oral two times a day  nystatin    Suspension 502409 Unit(s) Oral four times a day  pantoprazole   Suspension 40 milliGRAM(s) Oral before breakfast    MEDICATIONS  (PRN):  ramelteon 8 milliGRAM(s) Oral at bedtime PRN Insomnia          PHYSICAL EXAM:  GENERAL: NAD, well-groomed, well-developed  HEAD:  Atraumatic, Normocephalic  CHEST/LUNG: Clear to percussion bilaterally; No rales, rhonchi, wheezing, or rubs  HEART: Regular rate and rhythm; No murmurs, rubs, or gallops  ABDOMEN: Soft, Nontender, Nondistended; Bowel sounds present  EXTREMITIES:  2+ Peripheral Pulses, No clubbing, cyanosis, or edema  LYMPH: No lymphadenopathy noted  SKIN: No rashes or lesions    Care Discussed with Consultants/Other Providers [ ] YES  [ ] NO

## 2021-07-03 NOTE — PROGRESS NOTE ADULT - ASSESSMENT
72 year old RH male with a past medical history of HTN, HLD, hx of SIADH, gliobastoma multiforme (diagnosed in 2019), and seizures presenting after an episode of depressed level of consciousness and suspected seizure episode. Had another episode concerning for seizure 07/01 PM, started second AED. UA positive, started abx.    Impression: Likely breakthrough seizure in the setting of progression of disease of glioblastoma multiforme.    Plan:  [] Vimpa 150 mg BID.  [] Continue Decadron 4MG IV QD.  [] c/w Keppra to 1000MG IV TID.  [x] UA pos, Ceftriaxone 1G IV QD x3 days, day 3/3.  [x] Positive blood culture likely contaminant, will not treat for now.  [] Hyponatremia likely secondary to SIADH, d/c'd IVF. Fluid restriction < 1.5L daily. (134 on 7/3)  [x] Holding home Celexa as this could contribute to hyponatremia.  [] Will discuss palliative care consultation with daughter (Telephone# [phone]).  [x] IM on board, Dr. Godfrey. Recs appreciated.  [x] NGT placed by ENT on 06/30, tube feeds started.  [] S&S to see patient when he is more alert and able to participate in eval.  [] At this time no indication for further neuro imaging.  [x] Discussed patient's treatment plan and goals of care with neuro-oncologist Dr. Martínez - currently on Avastin monotherapy (last dose 6/24, next dose due around 7/9).  [x] CPK, B12, folate: wnl.  [] Diet: Glucerna 1.5 via NGT, goal rate 60cc's/hr.  [] DVT PPx: Lovenox SC.    Case to be discussed with neurology attending, Dr. Carlin.   72 year old RH male with a past medical history of HTN, HLD, hx of SIADH, gliobastoma multiforme (diagnosed in 2019), and seizures presenting after an episode of depressed level of consciousness and suspected seizure episode. Had another episode concerning for seizure 07/01 PM, started second AED. UA positive, started abx.    Impression: Likely breakthrough seizure in the setting of progression of disease of glioblastoma multiforme.    Plan:  [] Vimpat 150 mg BID.  [] Increase Decadron to 4MG PO BID.  [] c/w Keppra to 1000MG IV TID.  [x] UA pos, Ceftriaxone 1G IV QD x3 days, day 3/3.  [x] Positive blood culture likely contaminant, will not treat for now.  [] Hyponatremia likely secondary to SIADH, d/c'd IVF. Fluid restriction < 1.5L daily. (134 on 7/3)  [x] Holding home Celexa as this could contribute to hyponatremia.  [] Will discuss palliative care consultation with daughter (Telephone# [phone]).  [x] IM on board, Dr. Godfrey. Recs appreciated.  [x] NGT placed by ENT on 06/30, tube feeds started.  [] S&S to see patient when he is more alert and able to participate in eval.  [] At this time no indication for further neuro imaging.  [x] Discussed patient's treatment plan and goals of care with neuro-oncologist Dr. Martínez - currently on Avastin monotherapy (last dose 6/24, next dose due around 7/9).  [x] CPK, B12, folate: wnl.  [] Diet: Glucerna 1.5 via NGT, goal rate 60cc's/hr.  [] DVT PPx: Lovenox SC.    Case to be discussed with neurology attending, Dr. Carlin.

## 2021-07-03 NOTE — PROGRESS NOTE ADULT - SUBJECTIVE AND OBJECTIVE BOX
Pt seen, resting    MEDICATIONS  (STANDING):  amantadine Syrup 100 milliGRAM(s) Oral two times a day  amLODIPine   Tablet 5 milliGRAM(s) Oral daily  atorvastatin 10 milliGRAM(s) Oral at bedtime  cefTRIAXone   IVPB 1000 milliGRAM(s) IV Intermittent every 24 hours  dexAMETHasone  Injectable 4 milliGRAM(s) IV Push daily  enoxaparin Injectable 40 milliGRAM(s) SubCutaneous daily  lacosamide IVPB 150 milliGRAM(s) IV Intermittent every 12 hours  levETIRAcetam  IVPB 1000 milliGRAM(s) IV Intermittent <User Schedule>  losartan 100 milliGRAM(s) Oral daily  methylphenidate 5 milliGRAM(s) Oral two times a day  nystatin    Suspension 208217 Unit(s) Oral four times a day  pantoprazole   Suspension 40 milliGRAM(s) Oral before breakfast    MEDICATIONS  (PRN):  ramelteon 8 milliGRAM(s) Oral at bedtime PRN Insomnia      ROS  unable to obtain    Vital Signs Last 24 Hrs  T(C): 36.6 (03 Jul 2021 08:58), Max: 36.9 (02 Jul 2021 19:24)  T(F): 97.8 (03 Jul 2021 08:58), Max: 98.5 (02 Jul 2021 19:24)  HR: 98 (03 Jul 2021 08:58) (91 - 101)  BP: 124/70 (03 Jul 2021 08:58) (114/69 - 131/81)  BP(mean): --  RR: 18 (03 Jul 2021 08:58) (18 - 18)  SpO2: 96% (03 Jul 2021 08:58) (95% - 98%)    PE  NAD  Awake, unresponsive  Anicteric  Cachetic  NG tube in place  No rash grossly  FROM                          11.5   18.89 )-----------( 128      ( 03 Jul 2021 07:40 )             35.5       07-03    134<L>  |  100  |  10  ----------------------------<  95  4.0   |  18<L>  |  0.32<L>    Ca    8.4      03 Jul 2021 06:32    TPro  6.0  /  Alb  2.5<L>  /  TBili  0.2  /  DBili  x   /  AST  21  /  ALT  13  /  AlkPhos  91  07-03

## 2021-07-03 NOTE — PROVIDER CONTACT NOTE (CHANGE IN STATUS NOTIFICATION) - BACKGROUND
Patient has a GBM came in for decreased loc.
Patient has a history of GBM. with decreased loc and energy.
Patient has a history of GMB and came in for decreased LOC and suspected seizure activity
Patient here for GBM, decreased loc and decreased effort.
Patient here for gbm, decreased mental status and energy

## 2021-07-03 NOTE — PROVIDER CONTACT NOTE (CHANGE IN STATUS NOTIFICATION) - RECOMMENDATIONS
Assess patient at bedside. IV Push ativan
Assess patient at bedside. 1mg of Ativan
Come assess patient at bedside, ativan as needed
Assess patient at bedside and give ativan if seizure doesn't resolve.
Assess patient at bedside and give Ativan 1mg

## 2021-07-03 NOTE — EEG REPORT - NS EEG TEXT BOX
[facility] Epilepsy [facility] Report of Continuous Video EEG  [facility]: [address] , [address], [place] [address], Ph#: [phone] [facility]: [address] [place] [address], [place] [address], Ph#: [phone] Office: [address] [facility], [place] [address] Ph#: [phone]  Patient Name: Ronald Rider   Age: 71 year, : 1950 Patient ID: -, MRN #: MRN: [id number], [place]: [address] [place] [address] W Referring Physician: - EEG #: [id number]  Study Date: 2021   Start Time: 4:36:19 PM    End Date:          End Time: 08:00:03 AM    Study Duration: 14 hr 55 min  Study Information:  EEG Recording Technique: The patient underwent continuous Video-EEG monitoring, using Telemetry System hardware on the XLTek Digital System. EEG and video data were stored on a computer hard drive with important events saved in digital archive files. The material was reviewed by a physician (electroencephalographer / epileptologist) on a daily basis. Luis and seizure detection algorithms were utilized and reviewed. An EEG Technician attended to the patient, and was available throughout daytime work hours.  The epilepsy center neurologist was available in person or on call 24-hours per day.  EEG Placement and Labeling of Electrodes: The EEG was performed utilizing 20 channel referential EEG connections (coronal over temporal over parasagittal montage) using all standard 10-20 electrode placements with EKG, with additional electrodes placed in the inferior temporal region using the modified 10-10 montage electrode placements for elective admissions, or if deemed necessary. Recording was at a sampling rate of 256 samples per second per channel. Time synchronized digital video recording was done simultaneously with EEG recording. A low light infrared camera was used for low light recording.   History:  VEEG performed at the bedside COR: Awake, Confused, Lethargic No HV/Photic due to patient condition 72 Y/O Male P/W: Upper extremity shaking H/O: SIADH, CMV, DM, Seizures, HLD, HTN, Glioblastoma Multiform  Medication Keppra Vimpat  Interpretation:  [Abbreviation Key:  PDR=alpha rhythm/posterior dominant rhythm. A-P=anterior posterior gradient.  Amplitude: ‘very low’:<20; ‘low’:20-50; ‘medium’:; ‘high’:>200uV.  Persistence for periodic/rhythmic patterns (% of epoch) ‘rare’:<1%; ‘occasional’:1-10%; ‘frequent’:10-50%; ‘abundant’:50-90%; ‘continuous’:>90%.  Persistence for sporadic discharges: ‘rare’:<1/hr; ‘occasional’:1/min-1/hr; ‘frequent’:>1/min; ‘abundant’:>1/10 sec.  GRDA=generalized rhythmic delta activity, LRDA=lateralized rhythmic delta activity, TIRDA=temporal intermittent rhythmic delta activity, FIRDA=frontal intermittent rhythmic activity. LPD=PLED=lateralized periodic discharges, GPD=generalized periodic discharges, BiPDs=BiPLEDs=bilateral independent periodic epileptiform discharges, SIRPID=stimulus induced rhythmic, periodic, or ictal appearing discharges.  Modifiers: +F=with fast component, +S=with spike component, +R=with rhythmic component.  S-B=burst suppression pattern.  PFA: paroxysmal fast activity. Max=maximal. N1-drowsy, N2-stage II sleep, N3-slow wave sleep.  HV=hyperventilation, PS=photic stimulation]  21- Startin2021  Daily EEG Visual Analysis FINDINGS: The background was continuous, but not reactive. Wakefulness did not occur.  The background over the right hemisphere is a disorganized mix of frequencies in the theta and delta range.  The background over the left is described below.  Sleep Background: Stage II sleep transients were not recorded.  Other Non-Epileptiform Findings: None were present.  Interictal Epileptiform Activity:  There are periodic discharges over the left hemisphere greatest over the left central and parietal region.   Events: Clinical events: None recorded. Seizures: None recorded.  Activation Procedures:  Hyperventilation was not performed.   Photic stimulation was not performed.  Artifacts: Intermittent myogenic and movement artifacts were noted.  ECG: The heart rate on single channel ECG was predominantly between 90 BPM.  EEG Summary / Classification: Abnormal EEG in comatose patient Lateralized periodic discharges, left hemisphere, max over centroparietal region Disorganization Moderate background slowing, generalized.  EEG Impression / Clinical Correlate: The presence of LPDs is often associated with subacute structural injury in the left hemisphere. LPDs also indicate heightened risk for seizures.  There is also evidence of moderate diffuse cerebral dysfunction.  ________________________________________    Chuck Freeman MD, PhD Director, Epilepsy Division, [facility]

## 2021-07-03 NOTE — PROVIDER CONTACT NOTE (CHANGE IN STATUS NOTIFICATION) - ACTION/TREATMENT ORDERED:
Team assessed patient at bedside and IV push 1mg of ativan given for seizure activity
Ativan 1mg Given and continue to monitor.
1mg of Ativan given, Continue to assess patient
Continue to monitor. No intervention at this time
Continue to monitor. No interventions at this time

## 2021-07-03 NOTE — PROVIDER CONTACT NOTE (CHANGE IN STATUS NOTIFICATION) - SITUATION
Patient having seizure activity
Patient right upper extremity shaking. Staring into space.
Patient having seizure like activity.
Patient desaturation to 86%, Staring, eye fluttering, not withdrawing to pain in all extremities, rue shaking

## 2021-07-03 NOTE — PROVIDER CONTACT NOTE (CHANGE IN STATUS NOTIFICATION) - NAME OF MD/NP/PA/DO NOTIFIED:
formerly [facility]
formerly [facility]
JENNIFER Maxwell
Neurology team

## 2021-07-03 NOTE — PROVIDER CONTACT NOTE (CHANGE IN STATUS NOTIFICATION) - ASSESSMENT
Patient vss. Patient upper extremity shaking. Eye fluttering and staring.
Patient upper extremity shaking, Eye fluttering. Vital signs stable.
Patient vss. Patient RUE shaking and twitching. Patient staring, not withdrawing to pain. No oxygen desaturation
Patient no longer following with eyes and staring in one direction, eye fluttering, Oxygen saturation decrease to 86%, no longer withdrawing to pain
Vitals 112/69, HR 96, 97% O2. Pupils- L-3RB, 3IB. Patient not withdrawing to pain.

## 2021-07-03 NOTE — PROGRESS NOTE ADULT - ATTENDING COMMENTS
Mr. Rider is a 72 year old RH male with a pmhx of HTN, HLD, hx of SIADH, gliobastoma multiforme (diagnosed in 2019), and seizures presenting after an episode of depressed level of consciousness likely associated with a seizure episode. Had another episode concerning for seizure 07/01 PM, started second AED with Vimpat and considered related to positive UA for which he started Abx.   Has NG in place but continues to have intermittent likely partial complex seizure events localizing to area of known deficit.    On exam pt is eyes open but tracking only to left.  + blink, gag and pupillary response.  Remains in contracted position.  NG in place.  Localize to painful stimulus in bilateral upper extremity.  Minimal spontaneous movement.      Impression: Seizures, worsening in the setting of likely progression of disease of glioblastoma multiforme.    Plan:  [] Vimpat 150 mg BID.  [] Increase Decadron to 4MG PO BID.  [] c/w Keppra to 1000MG IV TID.  [x] UA pos, Ceftriaxone 1G IV QD x3 days, day 3/3.  [x] Positive blood culture likely contaminant, will not treat for now.  [] Hyponatremia likely secondary to SIADH, d/c'd IVF. Fluid restriction < 1.5L daily. (134 on 7/3)  [] Will discuss palliative care consultation with daughter (Telephone# [phone]).  [x] IM on board, Dr. Godfrey. Recs appreciated.  [x] NGT placed by ENT on 06/30, tube feeds started.  [] S&S to see patient when he is more alert and able to participate in eval.  [] At this time no indication for further neuro imaging.  [] DVT PPx: Lovenox SC.

## 2021-07-04 LAB
ANION GAP SERPL CALC-SCNC: 11 MMOL/L — SIGNIFICANT CHANGE UP (ref 5–17)
BUN SERPL-MCNC: 15 MG/DL — SIGNIFICANT CHANGE UP (ref 7–23)
CALCIUM SERPL-MCNC: 8.7 MG/DL — SIGNIFICANT CHANGE UP (ref 8.4–10.5)
CHLORIDE SERPL-SCNC: 101 MMOL/L — SIGNIFICANT CHANGE UP (ref 96–108)
CO2 SERPL-SCNC: 24 MMOL/L — SIGNIFICANT CHANGE UP (ref 22–31)
CREAT SERPL-MCNC: 0.32 MG/DL — LOW (ref 0.5–1.3)
GLUCOSE SERPL-MCNC: 137 MG/DL — HIGH (ref 70–99)
HCT VFR BLD CALC: 37.1 % — LOW (ref 39–50)
HGB BLD-MCNC: 11.7 G/DL — LOW (ref 13–17)
MAGNESIUM SERPL-MCNC: 2.2 MG/DL — SIGNIFICANT CHANGE UP (ref 1.6–2.6)
MCHC RBC-ENTMCNC: 27.5 PG — SIGNIFICANT CHANGE UP (ref 27–34)
MCHC RBC-ENTMCNC: 31.5 GM/DL — LOW (ref 32–36)
MCV RBC AUTO: 87.3 FL — SIGNIFICANT CHANGE UP (ref 80–100)
NRBC # BLD: 0 /100 WBCS — SIGNIFICANT CHANGE UP (ref 0–0)
PHOSPHATE SERPL-MCNC: 2.4 MG/DL — LOW (ref 2.5–4.5)
PLATELET # BLD AUTO: 130 K/UL — LOW (ref 150–400)
POTASSIUM SERPL-MCNC: 4.8 MMOL/L — SIGNIFICANT CHANGE UP (ref 3.5–5.3)
POTASSIUM SERPL-SCNC: 4.8 MMOL/L — SIGNIFICANT CHANGE UP (ref 3.5–5.3)
RBC # BLD: 4.25 M/UL — SIGNIFICANT CHANGE UP (ref 4.2–5.8)
RBC # FLD: 17.6 % — HIGH (ref 10.3–14.5)
SODIUM SERPL-SCNC: 136 MMOL/L — SIGNIFICANT CHANGE UP (ref 135–145)
WBC # BLD: 20.32 K/UL — HIGH (ref 3.8–10.5)
WBC # FLD AUTO: 20.32 K/UL — HIGH (ref 3.8–10.5)

## 2021-07-04 PROCEDURE — 99232 SBSQ HOSP IP/OBS MODERATE 35: CPT

## 2021-07-04 PROCEDURE — 95720 EEG PHY/QHP EA INCR W/VEEG: CPT

## 2021-07-04 RX ORDER — ACETAMINOPHEN 500 MG
650 TABLET ORAL ONCE
Refills: 0 | Status: COMPLETED | OUTPATIENT
Start: 2021-07-04 | End: 2021-07-05

## 2021-07-04 RX ORDER — DEXAMETHASONE 0.5 MG/5ML
4 ELIXIR ORAL EVERY 8 HOURS
Refills: 0 | Status: DISCONTINUED | OUTPATIENT
Start: 2021-07-04 | End: 2021-07-09

## 2021-07-04 RX ORDER — SODIUM,POTASSIUM PHOSPHATES 278-250MG
2 POWDER IN PACKET (EA) ORAL ONCE
Refills: 0 | Status: COMPLETED | OUTPATIENT
Start: 2021-07-04 | End: 2021-07-04

## 2021-07-04 RX ADMIN — LEVETIRACETAM 400 MILLIGRAM(S): 250 TABLET, FILM COATED ORAL at 13:30

## 2021-07-04 RX ADMIN — Medication 100 MILLIGRAM(S): at 17:58

## 2021-07-04 RX ADMIN — Medication 4 MILLIGRAM(S): at 21:30

## 2021-07-04 RX ADMIN — Medication 4 MILLIGRAM(S): at 06:00

## 2021-07-04 RX ADMIN — Medication 400000 UNIT(S): at 17:58

## 2021-07-04 RX ADMIN — ENOXAPARIN SODIUM 40 MILLIGRAM(S): 100 INJECTION SUBCUTANEOUS at 11:28

## 2021-07-04 RX ADMIN — Medication 100 MILLIGRAM(S): at 06:01

## 2021-07-04 RX ADMIN — ATORVASTATIN CALCIUM 10 MILLIGRAM(S): 80 TABLET, FILM COATED ORAL at 21:30

## 2021-07-04 RX ADMIN — LACOSAMIDE 130 MILLIGRAM(S): 50 TABLET ORAL at 17:58

## 2021-07-04 RX ADMIN — Medication 400000 UNIT(S): at 06:01

## 2021-07-04 RX ADMIN — PANTOPRAZOLE SODIUM 40 MILLIGRAM(S): 20 TABLET, DELAYED RELEASE ORAL at 06:00

## 2021-07-04 RX ADMIN — Medication 1 MILLIGRAM(S): at 06:41

## 2021-07-04 RX ADMIN — Medication 400000 UNIT(S): at 23:35

## 2021-07-04 RX ADMIN — LOSARTAN POTASSIUM 100 MILLIGRAM(S): 100 TABLET, FILM COATED ORAL at 06:48

## 2021-07-04 RX ADMIN — Medication 5 MILLIGRAM(S): at 09:23

## 2021-07-04 RX ADMIN — Medication 5 MILLIGRAM(S): at 13:29

## 2021-07-04 RX ADMIN — Medication 2 PACKET(S): at 18:03

## 2021-07-04 RX ADMIN — LEVETIRACETAM 400 MILLIGRAM(S): 250 TABLET, FILM COATED ORAL at 21:30

## 2021-07-04 RX ADMIN — Medication 4 MILLIGRAM(S): at 13:25

## 2021-07-04 RX ADMIN — LACOSAMIDE 130 MILLIGRAM(S): 50 TABLET ORAL at 05:06

## 2021-07-04 RX ADMIN — Medication 400000 UNIT(S): at 11:29

## 2021-07-04 RX ADMIN — LEVETIRACETAM 400 MILLIGRAM(S): 250 TABLET, FILM COATED ORAL at 04:47

## 2021-07-04 RX ADMIN — AMLODIPINE BESYLATE 5 MILLIGRAM(S): 2.5 TABLET ORAL at 06:01

## 2021-07-04 NOTE — PROGRESS NOTE ADULT - ASSESSMENT
Ronald Rider is a 72 year old RH male with a past medical history of HTN, HLD, hx of SIADH, gliobastoma multiforme (diagnosed in 2019) and seizures presenting after an episode of depressed level of consciousness and suspected seizure episode. At baseline, patient has contractions throughout and is bedbound, he is non-verbal but can respond to some simple commands. Patient was recently admitted for increased somnolence, difficulty breathing, decreased alertness, and poor PO intake. Was briefly on antibiotics but discontinued once no infection was found, had hypernatremia which resolved, briefly on steroid which were tapered and then subsequently discontinued. Was discharged for follow up with his neuro[place] [facility] Currently patient was on Keppra 750mg tid and as per son was compliant. Patient now presenting with after an episode where he was sitting in his chair and then noted be slumping over to the R, having increased tonic contractions of the RUE, and some twitching of his face. Patient was noted to be incontinent during the episode and was less responsive than usual. He was taken by EMS to the hospital and given Versed 5mg IM en route. Patient gradually improved and was able to follow some command to squeeze a finger with his son in the room. On examination, the patient was not able to contribute to history, ROS, or follow commands to exam.     Glioblastoma History:  Diagnosed in 2019 with MR noting involvement of the splenium of the corpus callosum and bilateral parenchymal involvement. Had stereotactic biopsy in 2019 with pathology noting glioblastoma. Was started on Keppra and Decadron. Was treated at [facility] [place]. Chavis with radiation, temozolomide, and Bactrim. Temozolamide was stopped due to myelosuppression. Has had numerous admission for somnolence and seizure episodes. Currently on Avastin maintenance. MRI in April 2021 and June 2021 show stable mass, progression of RT gliosis. Patient was administered steroids at the time of last admission in early June 2021 and was tapered off as an outpatient.    Hematology/Oncology called to follow patient who is known to Dr. Lyle Chavis at [facility] for the treatment of glioblastoma multiforme. He was discharged from [facility] as noted above. As per available records from [facility] last treatment with Avastin was 6/11/2021 (will obtain updated records once [facility] has been notified of admission).    Glioblastoma Multiforme  --Under care at [facility] Dr. Lyle Chavis  --Currently on maintenance Avastin next due 7/9  --Appears to have stable disease    AMS, Seizure like activity  --No edema, mass effect, hemorrhage or midline shift on CT  --MRI may be of more benefit to assess disease but would defer to neurology  --Patient had UTI 4/2021 which caused AMS  --UA ordered   --Patient on Dexamethasone now q8 hours  --Keppra has been increased to 1000 mg BID  --Further management per Neurology  --Vimpat started for seizure like activity noted last night    Hyponatremia  --Management per primary team  --Has improved overall - Na 128 on 7/2. now 136    GOC  --Palliative met with family (son) on last admission at [facility]  --Patient still full code at that time  --NG tube in place - patient unable to participate in swallow study  --As per Dr. Martínez who is covering for Dr. Chavis - if mental status unable to be improved on current management, hospice may be appropriate      We will be happy to answer any onc questions on behalf of [facility] and will be in touch with them.     Mitchell López MD  Hematology/Oncology  Cell:  [phone]  Office Phone: [phone]  Office Fax:  [phone] [address] [id number]

## 2021-07-04 NOTE — EEG REPORT - NS EEG TEXT BOX
[facility]  Comprehensive Epilepsy Center  Report of Continuous Video EEG    [facility]: [address] [facility] , [address], [place] [address], Ph#: [phone]  [facility]: [address] [place] [address], Ph#: [phone]  Office: [address] [place] [address] [address], [place] [address] Ph#: [phone]    Patient Name: Ronald Rider    Age: 71 year, : 1950  Patient ID: -, MRN #: MRN: [id number], Floyd: [address] [address] [place] [address]  Referring Physician: -  EEG #: [id number]    Study Date: 7/3/2021   Start Time: 08:00 AM    End Date:          End Time: 08:00 AM     Study Duration: 24 HOURS    Study Information:    EEG Recording Technique:  The patient underwent continuous Video-EEG monitoring, using Telemetry System hardware on the XLTek Digital System. EEG and video data were stored on a computer hard drive with important events saved in digital archive files. The material was reviewed by a physician (electroencephalographer / epileptologist) on a daily basis. Luis and seizure detection algorithms were utilized and reviewed. An EEG Technician attended to the patient, and was available throughout daytime work hours.  The epilepsy center neurologist was available in person or on call 24-hours per day.    EEG Placement and Labeling of Electrodes:  The EEG was performed utilizing 20 channel referential EEG connections (coronal over temporal over parasagittal montage) using all standard 10-20 electrode placements with EKG, with additional electrodes placed in the inferior temporal region using the modified 10-10 montage electrode placements for elective admissions, or if deemed necessary. Recording was at a sampling rate of 256 samples per second per channel. Time synchronized digital video recording was done simultaneously with EEG recording. A low light infrared camera was used for low light recording.     History:   glioblastoma multiforme resected, multiple focal seizures  Medication  Keppra 1000 tid  Vimpat 150 bid    Interpretation:    [Abbreviation Key:  PDR=alpha rhythm/posterior dominant rhythm. A-P=anterior posterior gradient.  Amplitude: ‘very low’:<20; ‘low’:20-50; ‘medium’:; ‘high’:>200uV.  Persistence for periodic/rhythmic patterns (% of epoch) ‘rare’:<1%; ‘occasional’:1-10%; ‘frequent’:10-50%; ‘abundant’:50-90%; ‘continuous’:>90%.  Persistence for sporadic discharges: ‘rare’:<1/hr; ‘occasional’:1/min-1/hr; ‘frequent’:>1/min; ‘abundant’:>1/10 sec.  GRDA=generalized rhythmic delta activity, LRDA=lateralized rhythmic delta activity, TIRDA=temporal intermittent rhythmic delta activity, FIRDA=frontal intermittent rhythmic activity. LPD=PLED=lateralized periodic discharges, GPD=generalized periodic discharges, BiPDs=BiPLEDs=bilateral independent periodic epileptiform discharges, SIRPID=stimulus induced rhythmic, periodic, or ictal appearing discharges.  Modifiers: +F=with fast component, +S=with spike component, +R=with rhythmic component.  S-B=burst suppression pattern.  PFA: paroxysmal fast activity. Max=maximal. N1-drowsy, N2-stage II sleep, N3-slow wave sleep.  HV=hyperventilation, PS=photic stimulation]    21-  Startin2021    Daily EEG Visual Analysis  FINDINGS: The background was continuous, minimally reactive. During wakefulness the PDR was not present.     Background:  disorganized, generalized slowing,  in theta ,delta range     Sleep Background:  Stage II sleep transients were not recorded.    Other Non-Epileptiform Findings:  None were present.    Interictal Epileptiform Activity:   There are periodic discharges over the left hemisphere greatest over the left central and parietal region.     Events:  Clinical events: None recorded.  Seizures: None recorded.    Activation Procedures:   Hyperventilation was not performed.    Photic stimulation was not performed.    Artifacts:  Intermittent myogenic and movement artifacts were noted.    ECG:  The heart rate on single channel ECG was predominantly between 90 BPM.    EEG Summary / Classification:  Abnormal EEG in comatose patient  Lateralized periodic discharges, left hemisphere, max over centroparietal region.  Disorganization  Moderate background slowing, generalized.    EEG Impression / Clinical Correlate:  The presence of LPDs is often associated with subacute structural injury in the left hemisphere. LPDs also indicate heightened risk for seizures.  There is also evidence of moderate diffuse cerebral dysfunction.    ________________________________________      Elena Bailon MD  Epilepsy Attending

## 2021-07-04 NOTE — PROGRESS NOTE ADULT - SUBJECTIVE AND OBJECTIVE BOX
SUBJECTIVE: patient seen and examined at bedside.      MEDICATIONS (HOME):  Home Medications:  amantadine 50 mg/5 mL oral syrup: 10 milliliter(s) orally 2 times a day (05 Jun 2021 10:00)  atorvastatin 10 mg oral tablet: 1 tab(s) orally once a day (05 Jun 2021 10:00)  citalopram 20 mg oral tablet: 1 tab(s) orally once a day (05 Jun 2021 10:00)  Keppra 100 mg/mL oral solution: 7.5 milliliter(s) orally 2 times a day (05 Jun 2021 10:00)  losartan 100 mg oral tablet: 1 tab(s) orally once a day (05 Jun 2021 10:00)  methylphenidate 5 mg oral tablet: 1 tab(s) orally @ 8am and 2 pm (15 Abel 2021 12:19)  Norvasc 5 mg oral tablet: 1 tab(s) orally once a day (05 Jun 2021 10:00)  nystatin 100,000 units/mL oral suspension: 4 milliliter(s) orally 4 times a day (05 Jun 2021 10:00)  ramelteon 8 mg oral tablet: 1 tab(s) orally once a day (at bedtime) (05 Jun 2021 10:00)    MEDICATIONS  (STANDING):  amantadine Syrup 100 milliGRAM(s) Oral two times a day  amLODIPine   Tablet 5 milliGRAM(s) Oral daily  atorvastatin 10 milliGRAM(s) Oral at bedtime  dexAMETHasone     Tablet 4 milliGRAM(s) Oral every 12 hours  enoxaparin Injectable 40 milliGRAM(s) SubCutaneous daily  lacosamide IVPB 150 milliGRAM(s) IV Intermittent every 12 hours  levETIRAcetam  IVPB 1000 milliGRAM(s) IV Intermittent <User Schedule>  losartan 100 milliGRAM(s) Oral daily  methylphenidate 5 milliGRAM(s) Oral two times a day  nystatin    Suspension 903978 Unit(s) Oral four times a day  pantoprazole   Suspension 40 milliGRAM(s) Oral before breakfast    MEDICATIONS  (PRN):  ramelteon 8 milliGRAM(s) Oral at bedtime PRN Insomnia    ALLERGIES/INTOLERANCES:  Allergies  No Known Allergies    VITALS & EXAMINATION:  Vital Signs Last 24 Hrs  T(C): 37 (04 Jul 2021 04:50), Max: 37 (03 Jul 2021 20:15)  T(F): 98.6 (04 Jul 2021 04:50), Max: 98.6 (03 Jul 2021 20:15)  HR: 88 (04 Jul 2021 06:47) (83 - 100)  BP: 116/70 (04 Jul 2021 06:47) (101/66 - 124/72)  BP(mean): --  RR: 16 (04 Jul 2021 06:47) (16 - 19)  SpO2: 99% (04 Jul 2021 06:47) (95% - 100%)    Neurological (>12):  MS: Eyes open to voice. No verbal output. Does not follow any commands.  CN: Left gaze preference, BTT absent OD, present OS, does not track reflection, no facial asymmetry.  Motor: Significant contracture b/l lower extremities. Some spontaneous movement noted in upper extremities, L>R.  Reflexes:              Plantar Resp  R	Mute  L	Mute     Coordination/Gait: Unable to assess.     LABORATORY:  CBC                       11.5   18.89 )-----------( 128      ( 03 Jul 2021 07:40 )             35.5     Chem 07-03    134<L>  |  100  |  10  ----------------------------<  95  4.0   |  18<L>  |  0.32<L>    Ca    8.4      03 Jul 2021 06:32    TPro  6.0  /  Alb  2.5<L>  /  TBili  0.2  /  DBili  x   /  AST  21  /  ALT  13  /  AlkPhos  91  07-03    LFTs LIVER FUNCTIONS - ( 03 Jul 2021 06:32 )  Alb: 2.5 g/dL / Pro: 6.0 g/dL / ALK PHOS: 91 U/L / ALT: 13 U/L / AST: 21 U/L / GGT: x           STUDIES & IMAGING:  Studies (EKG, EEG, EMG, etc):     Radiology (XR, CT, MR, U/S, TTE/VARINDER):

## 2021-07-04 NOTE — PROGRESS NOTE ADULT - SUBJECTIVE AND OBJECTIVE BOX
pt with seizures overnight    RUE twitching    amantadine Syrup 100 milliGRAM(s) Oral two times a day  amLODIPine   Tablet 5 milliGRAM(s) Oral daily  atorvastatin 10 milliGRAM(s) Oral at bedtime  dexAMETHasone     Tablet 4 milliGRAM(s) Oral every 8 hours  enoxaparin Injectable 40 milliGRAM(s) SubCutaneous daily  lacosamide IVPB 150 milliGRAM(s) IV Intermittent every 12 hours  levETIRAcetam  IVPB 1000 milliGRAM(s) IV Intermittent <User Schedule>  LORazepam   Injectable 1 milliGRAM(s) IV Push once PRN  losartan 100 milliGRAM(s) Oral daily  methylphenidate 5 milliGRAM(s) Oral two times a day  nystatin    Suspension 127321 Unit(s) Oral four times a day  pantoprazole   Suspension 40 milliGRAM(s) Oral before breakfast  ramelteon 8 milliGRAM(s) Oral at bedtime PRN      No Known Allergies      ROS otherwise negative     T(C): 37 (07-04-21 @ 04:50), Max: 37 (07-03-21 @ 20:15)  HR: 88 (07-04-21 @ 06:47) (83 - 100)  BP: 116/70 (07-04-21 @ 06:47) (101/66 - 124/72)  RR: 16 (07-04-21 @ 06:47) (16 - 19)  SpO2: 99% (07-04-21 @ 06:47) (95% - 100%)  PHYSICAL EXAM  Gen:  laying in bed, nad  H:  anicteric, eomi  Ext:  no edema                          11.7   20.32 )-----------( 130      ( 04 Jul 2021 08:36 )             37.1                         11.5   18.89 )-----------( 128      ( 03 Jul 2021 07:40 )             35.5                         10.6   12.18 )-----------( 125      ( 02 Jul 2021 05:41 )             32.6   07-04    136  |  101  |  15  ----------------------------<  137<H>  4.8   |  24  |  0.32<L>    Ca    8.7      04 Jul 2021 08:36  Phos  2.4     07-04  Mg     2.2     07-04    TPro  6.0  /  Alb  2.5<L>  /  TBili  0.2  /  DBili  x   /  AST  21  /  ALT  13  /  AlkPhos  91  07-03   pt with seizures overnight    RUE twitching    amantadine Syrup 100 milliGRAM(s) Oral two times a day  amLODIPine   Tablet 5 milliGRAM(s) Oral daily  atorvastatin 10 milliGRAM(s) Oral at bedtime  dexAMETHasone     Tablet 4 milliGRAM(s) Oral every 8 hours  enoxaparin Injectable 40 milliGRAM(s) SubCutaneous daily  lacosamide IVPB 150 milliGRAM(s) IV Intermittent every 12 hours  levETIRAcetam  IVPB 1000 milliGRAM(s) IV Intermittent <User Schedule>  LORazepam   Injectable 1 milliGRAM(s) IV Push once PRN  losartan 100 milliGRAM(s) Oral daily  methylphenidate 5 milliGRAM(s) Oral two times a day  nystatin    Suspension 921299 Unit(s) Oral four times a day  pantoprazole   Suspension 40 milliGRAM(s) Oral before breakfast  ramelteon 8 milliGRAM(s) Oral at bedtime PRN      No Known Allergies      ROS otherwise negative     T(C): 37 (07-04-21 @ 04:50), Max: 37 (07-03-21 @ 20:15)  HR: 88 (07-04-21 @ 06:47) (83 - 100)  BP: 116/70 (07-04-21 @ 06:47) (101/66 - 124/72)  RR: 16 (07-04-21 @ 06:47) (16 - 19)  SpO2: 99% (07-04-21 @ 06:47) (95% - 100%)  PHYSICAL EXAM  Gen:  laying in bed, nad,   H:  anicteric, gazing straight, non-responsive  Ext:  no edema                          11.7   20.32 )-----------( 130      ( 04 Jul 2021 08:36 )             37.1                         11.5   18.89 )-----------( 128      ( 03 Jul 2021 07:40 )             35.5                         10.6   12.18 )-----------( 125      ( 02 Jul 2021 05:41 )             32.6   07-04    136  |  101  |  15  ----------------------------<  137<H>  4.8   |  24  |  0.32<L>    Ca    8.7      04 Jul 2021 08:36  Phos  2.4     07-04  Mg     2.2     07-04    TPro  6.0  /  Alb  2.5<L>  /  TBili  0.2  /  DBili  x   /  AST  21  /  ALT  13  /  AlkPhos  91  07-03

## 2021-07-04 NOTE — PROGRESS NOTE ADULT - ATTENDING COMMENTS
Mr. Rider is a 72 year old RH male with a past medical history of HTN, HLD, hx of SIADH, gliobastoma multiforme (diagnosed in 2019), and seizures who arrived in hospital due to rapidly progressive depressed level of consciousness and suspected seizure episode. Had another episode concerning for seizure 07/01 PM and started addition of Vimpat to Keppra, and that has continued to be adjusted as he has had repeated stereotyped right sided seizure.  Did increase his decadron on 7/3 to 4mg bid as well as increase Vimpat dosing.  On exam:  pt eyes open but no clear attention to stimuli.  + pipils, corneals and gag reflex.  minimal spontaneous movememnt of head and arms.  Legs drawn up but no spontaneous movement seen.  Intermittently with erratic clonic movement of right upper extremity.    Impression: Likely repeated seizures in the setting of progression of disease of glioblastoma multiforme.    Plan:  Continue Vimpat 150 mg BID.  Consdier increase deacdron to 4mg tid.   c/w Keppra to 1000MG IV TID.  UA pos, Ceftriaxone 1G IV QD x3 days, day 3/3.  Positive blood culture likely contaminant, will not treat for now.   Fluid restriction < 1.5L daily. (134 on 7/3) and hold citalopram  IM on board, Dr. Godfrey. Recs appreciated.  tube feeds

## 2021-07-04 NOTE — PROGRESS NOTE ADULT - ASSESSMENT
72 year old RH male with a past medical history of HTN, HLD, hx of SIADH, gliobastoma multiforme (diagnosed in 2019), and seizures presenting after an episode of depressed level of consciousness and suspected seizure episode. Had another episode concerning for seizure 07/01 PM, started second AED. UA positive, started abx.    Impression: Likely breakthrough seizure in the setting of progression of disease of glioblastoma multiforme.    Plan:  [] Vimpat 150 mg BID.  [] Increase Decadron to 4MG PO BID.  [] c/w Keppra to 1000MG IV TID.  [x] UA pos, Ceftriaxone 1G IV QD x3 days, day 3/3.  [x] Positive blood culture likely contaminant, will not treat for now.  [] Hyponatremia likely secondary to SIADH, d/c'd IVF. Fluid restriction < 1.5L daily. (134 on 7/3)  [x] Holding home Celexa as this could contribute to hyponatremia.  [] Will discuss palliative care consultation with daughter (Telephone# [phone]).  [x] IM on board, Dr. Godfrey. Recs appreciated.  [x] NGT placed by ENT on 06/30, tube feeds started.  [] S&S to see patient when he is more alert and able to participate in eval.  [] At this time no indication for further neuro imaging.  [x] Discussed patient's treatment plan and goals of care with neuro-oncologist Dr. Martínez - currently on Avastin monotherapy (last dose 6/24, next dose due around 7/9).  [x] CPK, B12, folate: wnl.  [] Diet: Glucerna 1.5 via NGT, goal rate 60cc's/hr.  [] DVT PPx: Lovenox SC.    Case to be discussed with neurology attending, Dr. Carlin.

## 2021-07-04 NOTE — CHART NOTE - NSCHARTNOTEFT_GEN_A_CORE
Spoke with patient's daughter, Haily, over the phone for daily updates.    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 Carlin, Epilepsy Dr. Bailon

## 2021-07-05 LAB
ANION GAP SERPL CALC-SCNC: 13 MMOL/L — SIGNIFICANT CHANGE UP (ref 5–17)
BUN SERPL-MCNC: 18 MG/DL — SIGNIFICANT CHANGE UP (ref 7–23)
CALCIUM SERPL-MCNC: 8.7 MG/DL — SIGNIFICANT CHANGE UP (ref 8.4–10.5)
CHLORIDE SERPL-SCNC: 99 MMOL/L — SIGNIFICANT CHANGE UP (ref 96–108)
CO2 SERPL-SCNC: 24 MMOL/L — SIGNIFICANT CHANGE UP (ref 22–31)
CREAT SERPL-MCNC: 0.31 MG/DL — LOW (ref 0.5–1.3)
GLUCOSE SERPL-MCNC: 120 MG/DL — HIGH (ref 70–99)
HCT VFR BLD CALC: 34.6 % — LOW (ref 39–50)
HGB BLD-MCNC: 11 G/DL — LOW (ref 13–17)
MCHC RBC-ENTMCNC: 27.6 PG — SIGNIFICANT CHANGE UP (ref 27–34)
MCHC RBC-ENTMCNC: 31.8 GM/DL — LOW (ref 32–36)
MCV RBC AUTO: 86.7 FL — SIGNIFICANT CHANGE UP (ref 80–100)
NRBC # BLD: 0 /100 WBCS — SIGNIFICANT CHANGE UP (ref 0–0)
PLATELET # BLD AUTO: 165 K/UL — SIGNIFICANT CHANGE UP (ref 150–400)
POTASSIUM SERPL-MCNC: 4.5 MMOL/L — SIGNIFICANT CHANGE UP (ref 3.5–5.3)
POTASSIUM SERPL-SCNC: 4.5 MMOL/L — SIGNIFICANT CHANGE UP (ref 3.5–5.3)
RBC # BLD: 3.99 M/UL — LOW (ref 4.2–5.8)
RBC # FLD: 17.7 % — HIGH (ref 10.3–14.5)
SODIUM SERPL-SCNC: 136 MMOL/L — SIGNIFICANT CHANGE UP (ref 135–145)
WBC # BLD: 15.39 K/UL — HIGH (ref 3.8–10.5)
WBC # FLD AUTO: 15.39 K/UL — HIGH (ref 3.8–10.5)

## 2021-07-05 PROCEDURE — 95720 EEG PHY/QHP EA INCR W/VEEG: CPT

## 2021-07-05 PROCEDURE — 71045 X-RAY EXAM CHEST 1 VIEW: CPT | Mod: 26

## 2021-07-05 PROCEDURE — 99231 SBSQ HOSP IP/OBS SF/LOW 25: CPT | Mod: GC

## 2021-07-05 RX ORDER — SENNA PLUS 8.6 MG/1
2 TABLET ORAL AT BEDTIME
Refills: 0 | Status: DISCONTINUED | OUTPATIENT
Start: 2021-07-05 | End: 2021-07-09

## 2021-07-05 RX ORDER — POLYETHYLENE GLYCOL 3350 17 G/17G
17 POWDER, FOR SOLUTION ORAL DAILY
Refills: 0 | Status: DISCONTINUED | OUTPATIENT
Start: 2021-07-05 | End: 2021-07-09

## 2021-07-05 RX ORDER — PANTOPRAZOLE SODIUM 20 MG/1
40 TABLET, DELAYED RELEASE ORAL DAILY
Refills: 0 | Status: DISCONTINUED | OUTPATIENT
Start: 2021-07-05 | End: 2021-07-12

## 2021-07-05 RX ADMIN — PANTOPRAZOLE SODIUM 40 MILLIGRAM(S): 20 TABLET, DELAYED RELEASE ORAL at 13:10

## 2021-07-05 RX ADMIN — LOSARTAN POTASSIUM 100 MILLIGRAM(S): 100 TABLET, FILM COATED ORAL at 06:28

## 2021-07-05 RX ADMIN — AMLODIPINE BESYLATE 5 MILLIGRAM(S): 2.5 TABLET ORAL at 05:13

## 2021-07-05 RX ADMIN — LEVETIRACETAM 400 MILLIGRAM(S): 250 TABLET, FILM COATED ORAL at 21:14

## 2021-07-05 RX ADMIN — Medication 400000 UNIT(S): at 05:14

## 2021-07-05 RX ADMIN — Medication 400000 UNIT(S): at 17:26

## 2021-07-05 RX ADMIN — LEVETIRACETAM 400 MILLIGRAM(S): 250 TABLET, FILM COATED ORAL at 05:15

## 2021-07-05 RX ADMIN — ENOXAPARIN SODIUM 40 MILLIGRAM(S): 100 INJECTION SUBCUTANEOUS at 13:09

## 2021-07-05 RX ADMIN — Medication 650 MILLIGRAM(S): at 22:09

## 2021-07-05 RX ADMIN — LACOSAMIDE 130 MILLIGRAM(S): 50 TABLET ORAL at 17:26

## 2021-07-05 RX ADMIN — Medication 100 MILLIGRAM(S): at 05:14

## 2021-07-05 RX ADMIN — Medication 5 MILLIGRAM(S): at 13:09

## 2021-07-05 RX ADMIN — Medication 650 MILLIGRAM(S): at 22:39

## 2021-07-05 RX ADMIN — Medication 5 MILLIGRAM(S): at 08:12

## 2021-07-05 RX ADMIN — Medication 100 MILLIGRAM(S): at 17:26

## 2021-07-05 RX ADMIN — Medication 400000 UNIT(S): at 13:09

## 2021-07-05 RX ADMIN — LACOSAMIDE 130 MILLIGRAM(S): 50 TABLET ORAL at 05:15

## 2021-07-05 RX ADMIN — Medication 4 MILLIGRAM(S): at 22:06

## 2021-07-05 RX ADMIN — Medication 4 MILLIGRAM(S): at 13:11

## 2021-07-05 RX ADMIN — LEVETIRACETAM 400 MILLIGRAM(S): 250 TABLET, FILM COATED ORAL at 13:10

## 2021-07-05 RX ADMIN — ATORVASTATIN CALCIUM 10 MILLIGRAM(S): 80 TABLET, FILM COATED ORAL at 22:06

## 2021-07-05 RX ADMIN — Medication 400000 UNIT(S): at 23:09

## 2021-07-05 RX ADMIN — Medication 4 MILLIGRAM(S): at 05:13

## 2021-07-05 NOTE — EEG REPORT - NS EEG TEXT BOX
[facility]  Comprehensive Epilepsy Center  Report of Continuous Video EEG    [facility]: [address] [facility] , [address], [place] [address], Ph#: [phone]  [facility]: [id number] [place] [address], [place] [address], Ph#: [phone]  Office: [address] [place] [address], [facility] [address], [place] [address] Ph#: [phone]    Patient Name: Ronald Rider    Age: 71 year, : 1950  Patient ID: -, MRN #: MRN: [id number], [place]: [address] [address] [address]  EEG #: [id number]    Study Date: 2021   Start Time: 08:00 AM    End Date: 2021    End Time: 08:00 AM     Study Duration: 24 HOURS    Study Information:    EEG Recording Technique:  The patient underwent continuous Video-EEG monitoring, using Telemetry System hardware on the XLTek Digital System. EEG and video data were stored on a computer hard drive with important events saved in digital archive files. The material was reviewed by a physician (electroencephalographer / epileptologist) on a daily basis. Luis and seizure detection algorithms were utilized and reviewed. An EEG Technician attended to the patient, and was available throughout daytime work hours.  The epilepsy center neurologist was available in person or on call 24-hours per day.    EEG Placement and Labeling of Electrodes:  The EEG was performed utilizing 20 channel referential EEG connections (coronal over temporal over parasagittal montage) using all standard 10-20 electrode placements with EKG, with additional electrodes placed in the inferior temporal region using the modified 10-10 montage electrode placements for elective admissions, or if deemed necessary. Recording was at a sampling rate of 256 samples per second per channel. Time synchronized digital video recording was done simultaneously with EEG recording. A low light infrared camera was used for low light recording.     History:   glioblastoma multiforme resected, multiple focal seizures  Medication  Keppra 1000 tid  Vimpat 150 bid    Interpretation:    [Abbreviation Key:  PDR=alpha rhythm/posterior dominant rhythm. A-P=anterior posterior gradient.  Amplitude: ‘very low’:<20; ‘low’:20-50; ‘medium’:; ‘high’:>200uV.  Persistence for periodic/rhythmic patterns (% of epoch) ‘rare’:<1%; ‘occasional’:1-10%; ‘frequent’:10-50%; ‘abundant’:50-90%; ‘continuous’:>90%.  Persistence for sporadic discharges: ‘rare’:<1/hr; ‘occasional’:1/min-1/hr; ‘frequent’:>1/min; ‘abundant’:>1/10 sec.  GRDA=generalized rhythmic delta activity, LRDA=lateralized rhythmic delta activity, TIRDA=temporal intermittent rhythmic delta activity, FIRDA=frontal intermittent rhythmic activity. LPD=PLED=lateralized periodic discharges, GPD=generalized periodic discharges, BiPDs=BiPLEDs=bilateral independent periodic epileptiform discharges, SIRPID=stimulus induced rhythmic, periodic, or ictal appearing discharges.  Modifiers: +F=with fast component, +S=with spike component, +R=with rhythmic component.  S-B=burst suppression pattern.  PFA: paroxysmal fast activity. Max=maximal. N1-drowsy, N2-stage II sleep, N3-slow wave sleep.  HV=hyperventilation, PS=photic stimulation]    21-  Startin2021    Daily EEG Visual Analysis  FINDINGS: The background was continuous, minimally reactive. During wakefulness the PDR was not present.     Background:  disorganized, generalized slowing,  in theta ,delta range     Sleep Background:  Stage II sleep transients were not recorded.    Other Non-Epileptiform Findings:  None were present.    Interictal Epileptiform Activity:   There are continuous lateralized periodic discharges over the left hemisphere greatest over the left central and parietal region.     Events:  Clinical events: None recorded.  Seizures: None recorded.    Activation Procedures:   Hyperventilation was not performed.    Photic stimulation was not performed.    Artifacts:  Intermittent myogenic and movement artifacts were noted.    ECG:  The heart rate on single channel ECG was predominantly between 90 BPM.    EEG Summary / Classification:  Abnormal EEG in comatose patient  Lateralized periodic discharges, left hemisphere, max over centroparietal region.  Disorganization  Moderate background slowing, generalized.    EEG Impression / Clinical Correlate:  The presence of LPDs is often associated with subacute structural injury in the left hemisphere. LPDs also indicate heightened risk for seizures.  There is also evidence of moderate diffuse cerebral dysfunction.    ________________________________________    Jose Pierce MD PGY-5  Epilepsy Fellow    This Preliminary report is based on fellow review. Final report pending attending review.    Reading Room: [phone]  On Call Service After Hours: [phone] [facility]  Comprehensive Epilepsy Center  Report of Continuous Video EEG    [facility]: [address] [facility] , [address], [place] [address], Ph#: [phone]  [facility]: [phone] [place] [address], [place] [address], Ph#: [phone]  Office: [address] [place] [address], [facility] [address], [place] [address] Ph#: [phone]    Patient Name: Ronald Rider    Age: 71 year, : 1950  Patient ID: -, MRN #: MRN: [id number], [place]: [address] [address] [address]  EEG #: [id number]    Study Date: 2021   Start Time: 08:00 AM    End Date: 2021    End Time: 08:00 AM     Study Duration: 24 HOURS    Study Information:    EEG Recording Technique:  The patient underwent continuous Video-EEG monitoring, using Telemetry System hardware on the XLTek Digital System. EEG and video data were stored on a computer hard drive with important events saved in digital archive files. The material was reviewed by a physician (electroencephalographer / epileptologist) on a daily basis. Luis and seizure detection algorithms were utilized and reviewed. An EEG Technician attended to the patient, and was available throughout daytime work hours.  The epilepsy center neurologist was available in person or on call 24-hours per day.    EEG Placement and Labeling of Electrodes:  The EEG was performed utilizing 20 channel referential EEG connections (coronal over temporal over parasagittal montage) using all standard 10-20 electrode placements with EKG, with additional electrodes placed in the inferior temporal region using the modified 10-10 montage electrode placements for elective admissions, or if deemed necessary. Recording was at a sampling rate of 256 samples per second per channel. Time synchronized digital video recording was done simultaneously with EEG recording. A low light infrared camera was used for low light recording.     History:   glioblastoma multiforme resected, multiple focal seizures  Medication  Keppra 1000 tid  Vimpat 150 bid    Interpretation:    [Abbreviation Key:  PDR=alpha rhythm/posterior dominant rhythm. A-P=anterior posterior gradient.  Amplitude: ‘very low’:<20; ‘low’:20-50; ‘medium’:; ‘high’:>200uV.  Persistence for periodic/rhythmic patterns (% of epoch) ‘rare’:<1%; ‘occasional’:1-10%; ‘frequent’:10-50%; ‘abundant’:50-90%; ‘continuous’:>90%.  Persistence for sporadic discharges: ‘rare’:<1/hr; ‘occasional’:1/min-1/hr; ‘frequent’:>1/min; ‘abundant’:>1/10 sec.  GRDA=generalized rhythmic delta activity, LRDA=lateralized rhythmic delta activity, TIRDA=temporal intermittent rhythmic delta activity, FIRDA=frontal intermittent rhythmic activity. LPD=PLED=lateralized periodic discharges, GPD=generalized periodic discharges, BiPDs=BiPLEDs=bilateral independent periodic epileptiform discharges, SIRPID=stimulus induced rhythmic, periodic, or ictal appearing discharges.  Modifiers: +F=with fast component, +S=with spike component, +R=with rhythmic component.  S-B=burst suppression pattern.  PFA: paroxysmal fast activity. Max=maximal. N1-drowsy, N2-stage II sleep, N3-slow wave sleep.  HV=hyperventilation, PS=photic stimulation]    21-  Startin2021    Daily EEG Visual Analysis  FINDINGS: The background was continuous, minimally reactive. During wakefulness the PDR was not present.     Background:  disorganized, generalized slowing,  in theta ,delta range     Sleep Background:  Stage II sleep transients were not recorded.    Other Non-Epileptiform Findings:  None were present.    Interictal Epileptiform Activity:   There are continuous lateralized periodic discharges over the left hemisphere greatest over the left central and parietal region.     Events:  Clinical events: None recorded.  Seizures: None recorded.    Activation Procedures:   Hyperventilation was not performed.    Photic stimulation was not performed.    Artifacts:  Intermittent myogenic and movement artifacts were noted.    ECG:  The heart rate on single channel ECG was predominantly between 90 BPM.    EEG Summary / Classification:  Abnormal EEG in comatose patient  Lateralized periodic discharges, left hemisphere, max over centroparietal region.  Disorganization  Moderate background slowing, generalized.    EEG Impression / Clinical Correlate:  The presence of LPDs is often associated with subacute structural injury in the left hemisphere. LPDs also indicate heightened risk for seizures.  There is also evidence of moderate diffuse cerebral dysfunction.    ________________________________________    Jose Pierce MD PGY-5  Epilepsy Fellow        Reading Room: [phone]  On Call Service After Hours: [phone]

## 2021-07-05 NOTE — PROGRESS NOTE ADULT - ASSESSMENT
72 year old RH male with a past medical history of HTN, HLD, hx of SIADH, gliobastoma multiforme (diagnosed in 2019), and seizures presenting after an episode of depressed level of consciousness and suspected seizure episode. Had another episode concerning for seizure 07/01 PM, started Vimpat. S/p 3d CTX for UTI. Decadron increased 7/3 as well as Vimpat without clear change in mental status.     Impression: Likely breakthrough seizure in the setting of progression of disease of glioblastoma multiforme.    Plan:    #seizures secondary to GBM  [] Vimpat 150 mg BID, Keppra 1g IV TID  [] not treating focal seizures (RUE jerking) per Dr. Bailon   [] Decadron 4MG PO BID  [x] NGT placed by ENT on 06/30, tube feeds started.  [] S&S to see patient when he is more alert and able to participate in eval.  [x] Discussed patient's treatment plan and goals of care with neuro-oncologist Dr. Martínez - currently on Avastin monotherapy (last dose 6/24, next dose due around 7/9)  [] Family agreeable to speak with Palliative- they will see patient tomorrow 7/6    #hyponatremia  [] Hyponatremia likely secondary to SIADH, d/c'd IVF. Fluid restriction < 1.5L daily.  [x] Holding home Celexa as this could contribute to hyponatremia.  [x] IM on board, Dr. Godfrey. Recs appreciated.    [] DVT PPx: Lovenox SC.     72 year old RH male with a past medical history of HTN, HLD, hx of SIADH, gliobastoma multiforme (diagnosed in 2019), and seizures presenting after an episode of depressed level of consciousness and suspected seizure episode. Had another episode concerning for seizure 07/01 PM, started Vimpat. S/p 3d CTX for UTI. Decadron increased 7/3 as well as Vimpat without clear change in mental status. Seizure likely secondary to POD vs possibly triggered by underlying UTI. GTC resolved, now with persistent focal status.       Plan:    #focal status secondary to GBM  [] Vimpat 150 mg BID, Keppra 1g IV TID  [] not treating focal seizures (RUE jerking) per Dr. Bailon   [] Decadron 4MG PO BID  [x] NGT placed by ENT on 06/30, tube feeds started.  [] S&S to see patient after weekend if more alert and able to participate in eval.  [x] Discussed patient's treatment plan and goals of care with neuro-oncologist Dr. Martínez at [facility]- currently on Avastin monotherapy (last dose 6/24, next dose due around 7/9)  [] Family agreeable to speak with Palliative- they will see patient tomorrow 7/6, last spoke with palliative at [facility]. Patient currently full code.     #hyponatremia  [] Hyponatremia likely secondary to SIADH, d/c'd IVF. Fluid restriction < 1.5L daily.  [x] Holding home Celexa as this could contribute to hyponatremia.  [x] IM on board, Dr. Godfrey. Recs appreciated.    [] DVT PPx: Lovenox SC     72 year old RH male with a past medical history of HTN, HLD, hx of SIADH, gliobastoma multiforme (diagnosed in 2019), and seizures presenting after an episode of depressed level of consciousness and suspected seizure episode. Had another episode concerning for seizure 07/01 PM, started Vimpat. S/p 3d CTX for UTI. Decadron increased 7/3 as well as Vimpat without clear change in mental status. Seizure likely secondary to POD vs possibly triggered by underlying UTI. GTC resolved, now with persistent focal status.       Plan:    #focal status secondary to GBM  [] Vimpat 150 mg BID, Keppra 1g IV TID  [] not treating focal seizures (RUE jerking) per Dr. Bailon   [] Decadron 4MG PO TID  [x] NGT placed by ENT on 06/30, tube feeds started.  [] S&S to see patient after weekend if more alert and able to participate in eval.  [x] Discussed patient's treatment plan and goals of care with neuro[place]ologist [place] [facility] Avastin monotherapy (last dose 6/24, next dose due around 7/9)  [] Family agreeable to speak with Palliative- they will see patient tomorrow 7/6, last spoke with palliative at [facility]. Patient currently full code.   [] consider MR head ww/o to eval for POD. Will need to further coordinate G[place] [facility], Palliative and family tomorrow.     #hyponatremia  [] Hyponatremia likely secondary to SIADH, d/c'd IVF. Fluid restriction < 1.5L daily.  [x] Holding home Celexa as this could contribute to hyponatremia.  [x] IM on board, Dr. Godfrey. Recs appreciated.    [] DVT PPx: Lovenox SC

## 2021-07-05 NOTE — PROGRESS NOTE ADULT - ASSESSMENT
Ronald Rider is a 72 year old RH male with a past medical history of HTN, HLD, hx of SIADH, gliobastoma multiforme (diagnosed in 2019) and seizures presenting after an episode of depressed level of consciousness and suspected seizure episode. At baseline, patient has contractions throughout and is bedbound, he is non-verbal but can respond to some simple commands. Patient was recently admitted for increased somnolence, difficulty breathing, decreased alertness, and poor PO intake. Was briefly on antibiotics but discontinued once no infection was found, had hypernatremia which resolved, briefly on steroid which were tapered and then subsequently discontinued. Was discharged for follow up with his neuro-oncologist at [facility]. Currently patient was on Keppra 750mg tid and as per son was compliant. Patient now presenting with after an episode where he was sitting in his chair and then noted be slumping over to the R, having increased tonic contractions of the RUE, and some twitching of his face. Patient was noted to be incontinent during the episode and was less responsive than usual. He was taken by EMS to the hospital and given Versed 5mg IM en route. Patient gradually improved and was able to follow some command to squeeze a finger with his son in the room. On examination, the patient was not able to contribute to history, ROS, or follow commands to exam.     Glioblastoma History:  Diagnosed in 2019 with MR noting involvement of the splenium of the corpus callosum and bilateral parenchymal involvement. Had stereotactic biopsy in 2019 with pathology noting glioblastoma. Was started on Keppra and Decadron. Was treated at [facility] with Dr. Chavis with radiation, temozolomide, and Bactrim. Temozolamide was stopped due to myelosuppression. Has had numerous admission for somnolence and seizure episodes. Currently on Avastin maintenance. MRI in April 2021 and June 2021 show stable mass, progression of RT gliosis. Patient was administered steroids at the time of last admission in early June 2021 and was tapered off as an outpatient.    Hematology/Oncology called to follow patient who is known to Dr. Lyle Chavis at [facility] for the treatment of glioblastoma multiforme. He was discharged from [facility] as noted above. As per available records from [facility] last treatment with Avastin was 6/11/2021 (will obtain updated records once [facility] has been notified of admission).    Glioblastoma Multiforme  --Under care at [facility] Dr. Lyle Chavis  --Currently on maintenance Avastin next due 7/9  --Appears to have stable disease    AMS, Seizure like activity  --No edema, mass effect, hemorrhage or midline shift on CT  --MRI may be of more benefit to assess disease but would defer to neurology  --Patient had UTI 4/2021 which caused AMS  --Patient on Dexamethasone now q8 hours  --Keppra has been increased to 1000 mg BID  --Further management per Neurology  --Vimpat dose increased     Hyponatremia  --Management per primary team  --Has improved overall - Na 128 on 7/2. now 136    GOC  --Palliative met with family (son) on last admission at [facility]  --Patient still full code at that time  --NG tube in place - patient unable to participate in swallow study  --As per Dr. Martínez who is covering for Dr. Chavis - if mental status unable to be improved on current management, hospice may be appropriate      Dereck Mclain MD  Hematology Oncology   O:   C: [phone]

## 2021-07-05 NOTE — PROGRESS NOTE ADULT - ATTENDING COMMENTS
71 y/o man with left hemispheric GBM, cachexia, dysphagia , right focal myoclonic status. NGT currently placed for  nutrition. Pending GOC with family whether PEG to be placed.

## 2021-07-05 NOTE — PROGRESS NOTE ADULT - SUBJECTIVE AND OBJECTIVE BOX
Patient seen and examined at bedside. on EEG monitor. remains altered     MEDICATIONS  (STANDING):  amantadine Syrup 100 milliGRAM(s) Oral two times a day  amLODIPine   Tablet 5 milliGRAM(s) Oral daily  atorvastatin 10 milliGRAM(s) Oral at bedtime  dexAMETHasone     Tablet 4 milliGRAM(s) Oral every 8 hours  enoxaparin Injectable 40 milliGRAM(s) SubCutaneous daily  lacosamide IVPB 150 milliGRAM(s) IV Intermittent every 12 hours  levETIRAcetam  IVPB 1000 milliGRAM(s) IV Intermittent <User Schedule>  losartan 100 milliGRAM(s) Oral daily  methylphenidate 5 milliGRAM(s) Oral two times a day  nystatin    Suspension 265994 Unit(s) Oral four times a day  pantoprazole  Injectable 40 milliGRAM(s) IV Push daily    MEDICATIONS  (PRN):  acetaminophen   Tablet .. 650 milliGRAM(s) Oral once PRN Temp greater or equal to 38C (100.4F), Mild Pain (1 - 3), Moderate Pain (4 - 6), Severe Pain (7 - 10)  LORazepam   Injectable 1 milliGRAM(s) IV Push once PRN GTC  ramelteon 8 milliGRAM(s) Oral at bedtime PRN Insomnia        Vital Signs Last 24 Hrs  T(C): 36.3 (05 Jul 2021 08:21), Max: 37.1 (04 Jul 2021 12:20)  T(F): 97.4 (05 Jul 2021 08:21), Max: 98.7 (04 Jul 2021 12:20)  HR: 89 (05 Jul 2021 08:21) (82 - 98)  BP: 139/68 (05 Jul 2021 08:21) (114/72 - 149/84)  BP(mean): --  RR: 18 (05 Jul 2021 08:21) (15 - 19)  SpO2: 99% (05 Jul 2021 08:21) (97% - 100%)      PHYSICAL EXAM:     GENERAL:  Altered, on EEG monitor   HEENT:  NC/AT,    CHEST:  CTA b/l  HEART:  S1 s2+   ABDOMEN:  Soft, non-tender, non-distended  EXTEREMITIES:  no cyanosis,clubbing or edema                              11.7   20.32 )-----------( 130      ( 04 Jul 2021 08:36 )             37.1       07-04    136  |  101  |  15  ----------------------------<  137<H>  4.8   |  24  |  0.32<L>    Ca    8.7      04 Jul 2021 08:36  Phos  2.4     07-04  Mg     2.2     07-04

## 2021-07-05 NOTE — PROGRESS NOTE ADULT - SUBJECTIVE AND OBJECTIVE BOX
SUBJECTIVE:     Vital Signs Last 24 Hrs  T(C): 36.3 (05 Jul 2021 08:21), Max: 37.1 (04 Jul 2021 12:20)  T(F): 97.4 (05 Jul 2021 08:21), Max: 98.7 (04 Jul 2021 12:20)  HR: 89 (05 Jul 2021 08:21) (80 - 98)  BP: 139/68 (05 Jul 2021 08:21) (114/72 - 149/84)  BP(mean): --  RR: 18 (05 Jul 2021 08:21) (15 - 19)  SpO2: 99% (05 Jul 2021 08:21) (96% - 100%)      Neurological (>12):  MS: Eyes open to voice. No verbal output. Does not follow any commands.  CN: Left gaze preference, BTT absent OD, present OS, does not track reflection, no facial asymmetry.  Motor: Significant contracture b/l lower extremities. Some spontaneous movement noted in upper extremities, L>R.      LABORATORY:  CBC                       11.7   20.32 )-----------( 130      ( 04 Jul 2021 08:36 )             37.1     Chem 07-04    136  |  101  |  15  ----------------------------<  137<H>  4.8   |  24  |  0.32<L>    Ca    8.7      04 Jul 2021 08:36  Phos  2.4     07-04  Mg     2.2     07-04        MEDICATIONS  (STANDING):  amantadine Syrup 100 milliGRAM(s) Oral two times a day  amLODIPine   Tablet 5 milliGRAM(s) Oral daily  atorvastatin 10 milliGRAM(s) Oral at bedtime  dexAMETHasone     Tablet 4 milliGRAM(s) Oral every 8 hours  enoxaparin Injectable 40 milliGRAM(s) SubCutaneous daily  lacosamide IVPB 150 milliGRAM(s) IV Intermittent every 12 hours  levETIRAcetam  IVPB 1000 milliGRAM(s) IV Intermittent <User Schedule>  losartan 100 milliGRAM(s) Oral daily  methylphenidate 5 milliGRAM(s) Oral two times a day  nystatin    Suspension 953223 Unit(s) Oral four times a day  pantoprazole  Injectable 40 milliGRAM(s) IV Push daily    MEDICATIONS  (PRN):  acetaminophen   Tablet .. 650 milliGRAM(s) Oral once PRN Temp greater or equal to 38C (100.4F), Mild Pain (1 - 3), Moderate Pain (4 - 6), Severe Pain (7 - 10)  LORazepam   Injectable 1 milliGRAM(s) IV Push once PRN GTC  ramelteon 8 milliGRAM(s) Oral at bedtime PRN Insomnia       SUBJECTIVE: No overnight events, patient unable to provide history, on EEG with persistent RUE jerking    Vital Signs Last 24 Hrs  T(C): 36.3 (05 Jul 2021 08:21), Max: 37.1 (04 Jul 2021 12:20)  T(F): 97.4 (05 Jul 2021 08:21), Max: 98.7 (04 Jul 2021 12:20)  HR: 89 (05 Jul 2021 08:21) (80 - 98)  BP: 139/68 (05 Jul 2021 08:21) (114/72 - 149/84)  BP(mean): --  RR: 18 (05 Jul 2021 08:21) (15 - 19)  SpO2: 99% (05 Jul 2021 08:21) (96% - 100%)      Neurological (>12):  MS: Eyes open to voice. No verbal output. Does not follow any commands.  CN: Eyes midline, BTT bilaterally, present OS, does not track reflection, no facial asymmetry.  Motor: Significant contracture b/l lower extremities. RUE rhythmic jerking      LABORATORY:  CBC                       11.7   20.32 )-----------( 130      ( 04 Jul 2021 08:36 )             37.1     Chem 07-04    136  |  101  |  15  ----------------------------<  137<H>  4.8   |  24  |  0.32<L>    Ca    8.7      04 Jul 2021 08:36  Phos  2.4     07-04  Mg     2.2     07-04        MEDICATIONS  (STANDING):  amantadine Syrup 100 milliGRAM(s) Oral two times a day  amLODIPine   Tablet 5 milliGRAM(s) Oral daily  atorvastatin 10 milliGRAM(s) Oral at bedtime  dexAMETHasone     Tablet 4 milliGRAM(s) Oral every 8 hours  enoxaparin Injectable 40 milliGRAM(s) SubCutaneous daily  lacosamide IVPB 150 milliGRAM(s) IV Intermittent every 12 hours  levETIRAcetam  IVPB 1000 milliGRAM(s) IV Intermittent <User Schedule>  losartan 100 milliGRAM(s) Oral daily  methylphenidate 5 milliGRAM(s) Oral two times a day  nystatin    Suspension [id number] Unit(s) Oral four times a day  pantoprazole  Injectable 40 milliGRAM(s) IV Push daily    MEDICATIONS  (PRN):  acetaminophen   Tablet .. 650 milliGRAM(s) Oral once PRN Temp greater or equal to 38C (100.4F), Mild Pain (1 - 3), Moderate Pain (4 - 6), Severe Pain (7 - 10)  LORazepam   Injectable 1 milliGRAM(s) IV Push once PRN GTC  ramelteon 8 milliGRAM(s) Oral at bedtime PRN Insomnia

## 2021-07-06 LAB
ANION GAP SERPL CALC-SCNC: 12 MMOL/L — SIGNIFICANT CHANGE UP (ref 5–17)
BUN SERPL-MCNC: 17 MG/DL — SIGNIFICANT CHANGE UP (ref 7–23)
CALCIUM SERPL-MCNC: 8.8 MG/DL — SIGNIFICANT CHANGE UP (ref 8.4–10.5)
CHLORIDE SERPL-SCNC: 99 MMOL/L — SIGNIFICANT CHANGE UP (ref 96–108)
CO2 SERPL-SCNC: 23 MMOL/L — SIGNIFICANT CHANGE UP (ref 22–31)
CREAT SERPL-MCNC: 0.31 MG/DL — LOW (ref 0.5–1.3)
GLUCOSE SERPL-MCNC: 130 MG/DL — HIGH (ref 70–99)
HCT VFR BLD CALC: 33.8 % — LOW (ref 39–50)
HGB BLD-MCNC: 10.9 G/DL — LOW (ref 13–17)
MCHC RBC-ENTMCNC: 27.8 PG — SIGNIFICANT CHANGE UP (ref 27–34)
MCHC RBC-ENTMCNC: 32.2 GM/DL — SIGNIFICANT CHANGE UP (ref 32–36)
MCV RBC AUTO: 86.2 FL — SIGNIFICANT CHANGE UP (ref 80–100)
NRBC # BLD: 0 /100 WBCS — SIGNIFICANT CHANGE UP (ref 0–0)
PLATELET # BLD AUTO: 171 K/UL — SIGNIFICANT CHANGE UP (ref 150–400)
POTASSIUM SERPL-MCNC: 4.2 MMOL/L — SIGNIFICANT CHANGE UP (ref 3.5–5.3)
POTASSIUM SERPL-SCNC: 4.2 MMOL/L — SIGNIFICANT CHANGE UP (ref 3.5–5.3)
RBC # BLD: 3.92 M/UL — LOW (ref 4.2–5.8)
RBC # FLD: 17.1 % — HIGH (ref 10.3–14.5)
SODIUM SERPL-SCNC: 134 MMOL/L — LOW (ref 135–145)
WBC # BLD: 9.84 K/UL — SIGNIFICANT CHANGE UP (ref 3.8–10.5)
WBC # FLD AUTO: 9.84 K/UL — SIGNIFICANT CHANGE UP (ref 3.8–10.5)

## 2021-07-06 PROCEDURE — 99232 SBSQ HOSP IP/OBS MODERATE 35: CPT | Mod: GC

## 2021-07-06 PROCEDURE — 95718 EEG PHYS/QHP 2-12 HR W/VEEG: CPT

## 2021-07-06 PROCEDURE — 70553 MRI BRAIN STEM W/O & W/DYE: CPT | Mod: 26

## 2021-07-06 RX ORDER — LACOSAMIDE 50 MG/1
200 TABLET ORAL EVERY 12 HOURS
Refills: 0 | Status: DISCONTINUED | OUTPATIENT
Start: 2021-07-06 | End: 2021-07-12

## 2021-07-06 RX ORDER — FAMOTIDINE 10 MG/ML
20 INJECTION INTRAVENOUS DAILY
Refills: 0 | Status: DISCONTINUED | OUTPATIENT
Start: 2021-07-06 | End: 2021-07-09

## 2021-07-06 RX ORDER — METHYLPHENIDATE HCL 5 MG
5 TABLET ORAL
Refills: 0 | Status: DISCONTINUED | OUTPATIENT
Start: 2021-07-06 | End: 2021-07-09

## 2021-07-06 RX ORDER — ENOXAPARIN SODIUM 100 MG/ML
40 INJECTION SUBCUTANEOUS DAILY
Refills: 0 | Status: DISCONTINUED | OUTPATIENT
Start: 2021-07-06 | End: 2021-07-10

## 2021-07-06 RX ADMIN — Medication 4 MILLIGRAM(S): at 21:08

## 2021-07-06 RX ADMIN — SENNA PLUS 2 TABLET(S): 8.6 TABLET ORAL at 21:05

## 2021-07-06 RX ADMIN — LACOSAMIDE 130 MILLIGRAM(S): 50 TABLET ORAL at 05:05

## 2021-07-06 RX ADMIN — Medication 4 MILLIGRAM(S): at 12:35

## 2021-07-06 RX ADMIN — LEVETIRACETAM 400 MILLIGRAM(S): 250 TABLET, FILM COATED ORAL at 21:08

## 2021-07-06 RX ADMIN — LEVETIRACETAM 400 MILLIGRAM(S): 250 TABLET, FILM COATED ORAL at 05:05

## 2021-07-06 RX ADMIN — ENOXAPARIN SODIUM 40 MILLIGRAM(S): 100 INJECTION SUBCUTANEOUS at 11:06

## 2021-07-06 RX ADMIN — PANTOPRAZOLE SODIUM 40 MILLIGRAM(S): 20 TABLET, DELAYED RELEASE ORAL at 11:06

## 2021-07-06 RX ADMIN — ATORVASTATIN CALCIUM 10 MILLIGRAM(S): 80 TABLET, FILM COATED ORAL at 21:06

## 2021-07-06 RX ADMIN — Medication 100 MILLIGRAM(S): at 17:49

## 2021-07-06 RX ADMIN — AMLODIPINE BESYLATE 5 MILLIGRAM(S): 2.5 TABLET ORAL at 06:14

## 2021-07-06 RX ADMIN — Medication 100 MILLIGRAM(S): at 05:05

## 2021-07-06 RX ADMIN — LACOSAMIDE 140 MILLIGRAM(S): 50 TABLET ORAL at 17:50

## 2021-07-06 RX ADMIN — FAMOTIDINE 20 MILLIGRAM(S): 10 INJECTION INTRAVENOUS at 11:06

## 2021-07-06 RX ADMIN — LEVETIRACETAM 400 MILLIGRAM(S): 250 TABLET, FILM COATED ORAL at 12:36

## 2021-07-06 RX ADMIN — Medication 5 MILLIGRAM(S): at 12:41

## 2021-07-06 RX ADMIN — LOSARTAN POTASSIUM 100 MILLIGRAM(S): 100 TABLET, FILM COATED ORAL at 05:05

## 2021-07-06 RX ADMIN — Medication 400000 UNIT(S): at 05:05

## 2021-07-06 RX ADMIN — SENNA PLUS 2 TABLET(S): 8.6 TABLET ORAL at 00:22

## 2021-07-06 RX ADMIN — Medication 4 MILLIGRAM(S): at 05:05

## 2021-07-06 RX ADMIN — POLYETHYLENE GLYCOL 3350 17 GRAM(S): 17 POWDER, FOR SOLUTION ORAL at 00:25

## 2021-07-06 NOTE — PROGRESS NOTE ADULT - ASSESSMENT
Assessment: 71 year old RH male with a past medical history of HTN, HLD, hx of SIADH, gliobastoma multiforme (diagnosed in 2019), and seizures presenting after an episode of depressed level of consciousness and suspected seizure episode.    Impression: Likely breakthrough seizures in the setting of progression of disease of glioblastoma multiforme.    Plan:  [] MRI Brain w/wo contrast.  [] c/w Vimpat 150 mg BID.  [] c/w Decadron to 4MG PO Q8HR  [] c/w Keppra to 1000MG IV TID.  [x] Positive blood culture likely contaminant, will not treat.  [] Hyponatremia likely secondary to SIADH. Fluid restriction < 1.5L daily. (134 on 7/6).  [x] Holding home Celexa as this could contribute to hyponatremia.  [] Palliative care has been consulted, pending recs.  [x] Daughter updated (Telephone# [phone]).  [x] IM on board, Dr. Godfrey. Recs appreciated.  [x] NGT placed by ENT on 06/30. Persistent hiccupping and thick secretions requiring suctioning, tube feeds held overnight last night. CXR clear. Patient also without BM for ~1 week. Bowel regimen started. Will restart feeds as tolerated.  [] S&S to see patient when he is more alert and able to participate in eval.  [x] Discussed patient's treatment plan and goals of care with neuro-oncologist Dr. Martínez - currently on Avastin monotherapy (last dose 6/24, next dose due around 7/9).  [x] CPK, B12, folate: wnl.  [] Diet: Glucerna 1.5 via NGT, goal rate 60cc's/hr. Currently on hold.  [] DVT PPx: Lovenox SC.  [] Seizure resucue: Ativan 1MG IVP ONLY FOR CONVULSIONS/GTC LASTING > 3 MINUTES. Not treating focal seizures.    Case discussed with neurology attending Dr. Samaniego.   Assessment: 71 year old RH male with a past medical history of HTN, HLD, hx of SIADH, gliobastoma multiforme (diagnosed in 2019), and seizures presenting after an episode of depressed level of consciousness and suspected seizure episode.    Impression: Likely breakthrough seizures in the setting of progression of disease of glioblastoma multiforme.    Plan:  [] MRI Brain w/wo contrast.  [] c/w Vimpat 150 mg BID.  [] c/w Decadron to 4MG PO Q8HR  [] c/w Keppra to 1000MG IV TID.  [x] Positive blood culture likely contaminant, will not treat.  [] Hyponatremia likely secondary to SIADH. Fluid restriction < 1.5L daily. (134 on 7/6).  [x] Holding home Celexa as this could contribute to hyponatremia.  [] Palliative care has been consulted, pending recs.  [x] Daughter updated (Telephone# [phone]).  [x] IM on board, Dr. Godfrey. Recs appreciated.  [x] NGT placed by ENT on 06/30. Persistent hiccupping and thick secretions requiring suctioning, tube feeds held overnight last night. CXR clear. Patient also without BM for ~1 week. Bowel regimen started as well as Pepcid. Will restart feeds as tolerated.  [] S&S to see patient when he is more alert and able to participate in eval.  [x] Discussed patient's treatment plan and goals of care with neuro-oncologist Dr. Martínez - currently on Avastin monotherapy (last dose 6/24, next dose due around 7/9).  [x] CPK, B12, folate: wnl.  [] Diet: Glucerna 1.5 via NGT, goal rate 60cc's/hr. Currently on hold.  [] DVT PPx: Lovenox SC.  [] GI PPx: Protonix IV QD.  [] Seizure resucue: Ativan 1MG IVP ONLY FOR CONVULSIONS/GTC LASTING > 3 MINUTES. Not treating focal seizures.    Case discussed with neurology attending Dr. Samaniego.   Assessment: 71 year old RH male with a past medical history of HTN, HLD, hx of SIADH, gliobastoma multiforme (diagnosed in 2019), and seizures presenting after an episode of depressed level of consciousness and suspected seizure episode.    Impression: Likely breakthrough seizures in the setting of progression of disease of glioblastoma multiforme.    Plan:  [] d/c vEEG.  [] MRI Brain w/wo contrast at 4PM.  [] Increase Vimpat to 200MG IV BID.  [] c/w Decadron to 4MG PO Q8HR  [] c/w Keppra 1000MG IV TID.  [x] Positive blood culture likely contaminant, will not treat.  [] Hyponatremia likely secondary to SIADH. Fluid restriction < 1.5L daily. (134 on 7/6).  [x] Holding home Celexa as this could contribute to hyponatremia.  [] Palliative care has been consulted, pending recs.  [x] Daughter updated (Telephone# [phone]).  [x] IM on board, Dr. Godfrey. Recs appreciated.  [x] NGT placed by ENT on 06/30. Persistent hiccupping and thick secretions requiring suctioning, tube feeds held overnight last night. CXR clear. Patient also without BM for ~1 week. Bowel regimen started as well as Pepcid. Will restart feeds as tolerated.  [] S&S to see patient when he is more alert and able to participate in eval.  [x] Discussed patient's treatment plan and goals of care with neuro-oncologist Dr. Martínez - currently on Avastin monotherapy (last dose 6/24, next dose due around 7/9).  [x] CPK, B12, folate: wnl.  [] Diet: Glucerna 1.5 via NGT, goal rate 60cc's/hr.  [] DVT PPx: Lovenox SC.  [] GI PPx: Protonix IV QD.  [] Seizure rescue: Ativan 1MG IVP ONLY FOR CONVULSIONS/GTC LASTING > 3 MINUTES. Not treating focal seizures.    Case discussed with neurology attending Dr. Samaniego.   Assessment: 71 year old RH male with a past medical history of HTN, HLD, hx of SIADH, gliobastoma multiforme (diagnosed in 2019), and seizures presenting after an episode of depressed level of consciousness and suspected seizure episode.    Impression: Likely breakthrough seizures in the setting of progression of disease of glioblastoma multiforme.    Plan:  [] d/c vEEG.  [] MRI Brain w/wo contrast at 4PM.  [] Increase Vimpat to 200MG IV BID.  [] c/w Decadron to 4MG PO Q8HR  [] c/w Keppra 1000MG IV TID.  [x] Positive blood culture likely contaminant, will not treat.  [] Hyponatremia likely secondary to SIADH. Fluid restriction < 1.5L daily. (134 on 7/6).  [x] Holding home Celexa as this could contribute to hyponatremia.  [] Palliative care has been consulted, pending recs.  [] Called daughter and left voicemail (Telephone# [phone]).  [x] IM on board, Dr. Godfrey. Recs appreciated.  [x] NGT placed by ENT on 06/30. Persistent hiccupping and thick secretions requiring suctioning, tube feeds held overnight last night. CXR clear. Patient also without BM for ~1 week. Bowel regimen started as well as Pepcid. Will restart feeds as tolerated.  [] S&S to see patient when he is more alert and able to participate in eval.  [x] Discussed patient's treatment plan and goals of care with neuro-oncologist Dr. Martínez - currently on Avastin monotherapy (last dose 6/24, next dose due around 7/9).  [x] CPK, B12, folate: wnl.  [] Diet: Glucerna 1.5 via NGT, goal rate 60cc's/hr.  [] DVT PPx: Lovenox SC.  [] GI PPx: Protonix IV QD.  [] Seizure rescue: Ativan 1MG IVP ONLY FOR CONVULSIONS/GTC LASTING > 3 MINUTES. Not treating focal seizures.    Case discussed with neurology attending Dr. Samaniego.

## 2021-07-06 NOTE — CHART NOTE - NSCHARTNOTEFT_GEN_A_CORE
Met with son/surrogate Jim Rider in person.   Introduced self and role .  Son appreciative of meeting but states he has been informed on previous admissions about advance directives and hospice.  He and family continue to want a full resuscitative measure including intubation.   He offers no interest in hospice care .   Please reconsult as needs arise.   Signing off

## 2021-07-06 NOTE — PROGRESS NOTE ADULT - ASSESSMENT
Ronald Rider is a 72 year old RH male with a past medical history of HTN, HLD, hx of SIADH, gliobastoma multiforme (diagnosed in 2019) and seizures presenting after an episode of depressed level of consciousness and suspected seizure episode. At baseline, patient has contractions throughout and is bedbound, he is non-verbal but can respond to some simple commands. Patient was recently admitted for increased somnolence, difficulty breathing, decreased alertness, and poor PO intake. Was briefly on antibiotics but discontinued once no infection was found, had hypernatremia which resolved, briefly on steroid which were tapered and then subsequently discontinued. Was discharged for follow up with his neuro-oncologist at [facility]. Currently patient was on Keppra 750mg tid and as per son was compliant. Patient now presenting with after an episode where he was sitting in his chair and then noted be slumping over to the R, having increased tonic contractions of the RUE, and some twitching of his face. Patient was noted to be incontinent during the episode and was less responsive than usual. He was taken by EMS to the hospital and given Versed 5mg IM en route. Patient gradually improved and was able to follow some command to squeeze a finger with his son in the room. On examination, the patient was not able to contribute to history, ROS, or follow commands to exam.     Glioblastoma History:  Diagnosed in 2019 with MR noting involvement of the splenium of the corpus callosum and bilateral parenchymal involvement. Had stereotactic biopsy in 2019 with pathology noting glioblastoma. Was started on Keppra and Decadron. Was treated at [facility] with Dr. Chavis with radiation, temozolomide, and Bactrim. Temozolamide was stopped due to myelosuppression. Has had numerous admission for somnolence and seizure episodes. Currently on Avastin maintenance. MRI in April 2021 and June 2021 show stable mass, progression of RT gliosis. Patient was administered steroids at the time of last admission in early June 2021 and was tapered off as an outpatient.    Hematology/Oncology called to follow patient who is known to Dr. yLle Chavis at [facility] for the treatment of glioblastoma multiforme. He was discharged from [facility] as noted above. As per available records from [facility] last treatment with Avastin was 6/11/2021 (will obtain updated records once [facility] has been notified of admission).    Glioblastoma Multiforme  --Under care at [facility] Dr. Lyle Chavis  --Currently on maintenance Avastin next due 7/9  --Appears to have stable disease    AMS, Seizure like activity  --No edema, mass effect, hemorrhage or midline shift on CT  --MRI may be of more benefit to assess disease but would defer to neurology  --Patient had UTI 4/2021 which caused AMS  --Patient on Dexamethasone now q8 hours  --Keppra has been increased to 1000 mg BID  --Further management per Neurology  --Vimpat dose increased     Hyponatremia  --Management per primary team  --Has improved overall - Na 128 on 7/2. now 136    GOC  --Palliative met with family (son) on last admission at [facility]  --Patient still full code at that time  --NG tube in place - patient unable to participate in swallow study  --As per Dr. Martínez who is covering for Dr. Chavis - if mental status unable to be improved on current management, hospice may be appropriate    We will continue to follow patient and coordinate with [facility].    Shashi Cason PA-C  Hematology/Oncology  [facility]   [phone] (cell)  [phone] (office)  [phone] (alt office)  Evenings and weekends please call MD on call or office

## 2021-07-06 NOTE — PROGRESS NOTE ADULT - ATTENDING COMMENTS
70 y/o man with left parietal GBM with aphasia, dysphagia and cachexia due to nutritional deficiency and malignancy. Pt with myoclonic status.   Family refusing palliative consult at this time. Requesting repeat MRI to assess for progression of disease.   NGT placed for nutrition.   Will increase vimpat to 200mg BID for myoclonus. 70 y/o man with left parietal GBM with aphasia, dysphagia and cachexia due to nutritional deficiency and malignancy. Pt with myoclonic status.   Family refusing palliative consult at this time. Requesting repeat MRI to assess for progression of disease.   NGT placed for nutrition.   Will increase vimpat to 200mg BID for myoclonus.  Will discontinue EEG.

## 2021-07-06 NOTE — PROGRESS NOTE ADULT - SUBJECTIVE AND OBJECTIVE BOX
Patient is a 71y old  Male who presents with a chief complaint of seizure episode (06 Jul 2021 08:03)    Patient seen this morning. Unresponsive with twitching. EEG was being performed.    MEDICATIONS  (STANDING):  amantadine Syrup 100 milliGRAM(s) Oral two times a day  amLODIPine   Tablet 5 milliGRAM(s) Oral daily  atorvastatin 10 milliGRAM(s) Oral at bedtime  dexAMETHasone     Tablet 4 milliGRAM(s) Oral every 8 hours  enoxaparin Injectable 40 milliGRAM(s) SubCutaneous daily  famotidine   Suspension 20 milliGRAM(s) Oral daily  lacosamide IVPB 200 milliGRAM(s) IV Intermittent every 12 hours  levETIRAcetam  IVPB 1000 milliGRAM(s) IV Intermittent <User Schedule>  losartan 100 milliGRAM(s) Oral daily  methylphenidate 5 milliGRAM(s) Oral <User Schedule>  pantoprazole  Injectable 40 milliGRAM(s) IV Push daily  polyethylene glycol 3350 17 Gram(s) Oral daily  senna 2 Tablet(s) Oral at bedtime    MEDICATIONS  (PRN):  LORazepam   Injectable 1 milliGRAM(s) IV Push once PRN GTC  ramelteon 8 milliGRAM(s) Oral at bedtime PRN Insomnia      Vital Signs Last 24 Hrs  T(C): 36.6 (06 Jul 2021 07:25), Max: 36.6 (06 Jul 2021 07:25)  T(F): 97.8 (06 Jul 2021 07:25), Max: 97.8 (06 Jul 2021 07:25)  HR: 80 (06 Jul 2021 07:25) (60 - 97)  BP: 122/74 (06 Jul 2021 07:25) (121/66 - 129/73)  BP(mean): --  RR: 18 (06 Jul 2021 07:25) (18 - 18)  SpO2: 97% (06 Jul 2021 07:25) (97% - 98%)    PE  NAD  Unresponsive eyes closed  Anicteric  No rash grossly                            10.9   9.84  )-----------( 171      ( 06 Jul 2021 05:16 )             33.8       07-06    134<L>  |  99  |  17  ----------------------------<  130<H>  4.2   |  23  |  0.31<L>    Ca    8.8      06 Jul 2021 05:16

## 2021-07-06 NOTE — CHART NOTE - NSCHARTNOTEFT_GEN_A_CORE
70 y/o RH M with PMH of HTN, HLD, h/o SIADH, gliobastoma multiforme (dx in 2019) and seizures presenting after an episode of depressed level of consciousness and suspected seizure episode likely iso progression of disease of glioblastoma multiforme. Pt was seen for initial bedside swallow evaluation yesterday 6/30/21. Pt would not awaken for participation despite maximal verbal and tactile cueing as well as environment modifications. PO trials contraindicated as pt unable to arouse. Recommendations at that time: NPO, with non-oral nutrition/hydration/medications. NGT placed by ENT following evaluation. Per discussion with Junie ROB, difficulty passing NGT given pt's inability to perform volitional swallow. Pt being followed by this service to determine candidacy of initiating an oral diet. Pt is currently NPO, with non-oral nutrition/hydration/medications.    Pt seen today for reassessment of swallow. Pt found in bed, awake upon encounter. +Flat affect, on room air. Repetitive oral movement (rotary chew) evident. + Hiccups noted. Son present. Per son, on puree and honey thick liquids prior to admission. Pt localizes to son's voice, establishes eye contact briefly with son and follows simple command ~75%  "Open your mouth" when son requests. Pt does not follow for SLP. Non-productive cough noted at baseline. Pt is non-verbal. Pt opens mouth to presentation of spoon, absent oral grading and absent stripping of spoon despite verbal/tactile stimulation. SLP places 1/2 tsp of honey thick liquid in anterior sulci, absent response noted. No spontaneous movement of material, no awareness demonstrated for material despite verbal/tactile stimulation. SLP suctioned material from mouth x2 attempts. Oral care provided. ?thrush on palate and left and right buccal mucosa. MD to assess for thrush. D/W MD Zaman and SOCRATES Wu. No further PO trials administered 2/2 increaesd aspiration risk. Purposeful proactive rounding completed. 5Ps addressed.     Impressions: Pt continues to present with an insufficient mental status to support PO feeding. Poor orientation to feeding task despite verbal/tactile cues.   Recommendations: Continue NPO, with non-oral nutrition/hydration/medications. Maintain good oral hygiene and aspiration precautions. MD to assess for oral thrush.     Maryann Granados #[phone]   Speech Pathology

## 2021-07-06 NOTE — EEG REPORT - NS EEG TEXT BOX
[facility]  Comprehensive Epilepsy Center  Report of Continuous Video EEG    [facility]: [address] , [address], [place] [address], Ph#: [phone]  LI: [id number] [address] [place] [address], Ph#: [phone]  Office: [address] [place] [address] [address], [place] [address] Ph#: [phone]    Patient Name: Ronald Rider    Age: 71 year, : 1950  Patient ID: -, MRN #: MRN: [id number], Floyd: [address] [address] [address]  EEG #: [id number]    Study Date: 2021   Start Time: 08:00 AM    End Date: 2021    End Time: 08:00 AM     Study Duration: 24 HOURS    Study Information:    EEG Recording Technique:  The patient underwent continuous Video-EEG monitoring, using Telemetry System hardware on the XLTek Digital System. EEG and video data were stored on a computer hard drive with important events saved in digital archive files. The material was reviewed by a physician (electroencephalographer / epileptologist) on a daily basis. Luis and seizure detection algorithms were utilized and reviewed. An EEG Technician attended to the patient, and was available throughout daytime work hours.  The epilepsy center neurologist was available in person or on call 24-hours per day.    EEG Placement and Labeling of Electrodes:  The EEG was performed utilizing 20 channel referential EEG connections (coronal over temporal over parasagittal montage) using all standard 10-20 electrode placements with EKG, with additional electrodes placed in the inferior temporal region using the modified 10-10 montage electrode placements for elective admissions, or if deemed necessary. Recording was at a sampling rate of 256 samples per second per channel. Time synchronized digital video recording was done simultaneously with EEG recording. A low light infrared camera was used for low light recording.     History:   glioblastoma multiforme resected, multiple focal seizures            MEDICATIONS  (STANDING):  amantadine Syrup 100 milliGRAM(s) Oral two times a day  amLODIPine   Tablet 5 milliGRAM(s) Oral daily  atorvastatin 10 milliGRAM(s) Oral at bedtime  dexAMETHasone     Tablet 4 milliGRAM(s) Oral every 8 hours  enoxaparin Injectable 40 milliGRAM(s) SubCutaneous daily  famotidine   Suspension 20 milliGRAM(s) Oral daily  lacosamide IVPB 200 milliGRAM(s) IV Intermittent every 12 hours  levETIRAcetam  IVPB 1000 milliGRAM(s) IV Intermittent <User Schedule>  losartan 100 milliGRAM(s) Oral daily  methylphenidate 5 milliGRAM(s) Oral <User Schedule>    Interpretation:    [Abbreviation Key:  PDR=alpha rhythm/posterior dominant rhythm. A-P=anterior posterior gradient.  Amplitude: ‘very low’:<20; ‘low’:20-50; ‘medium’:; ‘high’:>200uV.  Persistence for periodic/rhythmic patterns (% of epoch) ‘rare’:<1%; ‘occasional’:1-10%; ‘frequent’:10-50%; ‘abundant’:50-90%; ‘continuous’:>90%.  Persistence for sporadic discharges: ‘rare’:<1/hr; ‘occasional’:1/min-1/hr; ‘frequent’:>1/min; ‘abundant’:>1/10 sec.  GRDA=generalized rhythmic delta activity, LRDA=lateralized rhythmic delta activity, TIRDA=temporal intermittent rhythmic delta activity, FIRDA=frontal intermittent rhythmic activity. LPD=PLED=lateralized periodic discharges, GPD=generalized periodic discharges, BiPDs=BiPLEDs=bilateral independent periodic epileptiform discharges, SIRPID=stimulus induced rhythmic, periodic, or ictal appearing discharges.  Modifiers: +F=with fast component, +S=with spike component, +R=with rhythmic component.  S-B=burst suppression pattern.  PFA: paroxysmal fast activity. Max=maximal. N1-drowsy, N2-stage II sleep, N3-slow wave sleep.  HV=hyperventilation, PS=photic stimulation]    Daily EEG Visual Analysis  FINDINGS: The background was continuous, minimally reactive. During wakefulness the PDR was present over the right near 8hz and 30-0uV.     Background:  disorganized, generalized slowing,  in theta/elta range     Sleep Background:  Stage II sleep transients were not recorded.    Other Non-Epileptiform Findings:  None were present.    Epileptiform Activity:   There are continuous lateralized periodic discharges at 1hz over the left hemisphere greatest over the left central and parietal region max P3, variably with initial spike component at T7, there was time locked right arm clonus with a 20msec delay.     Activation Procedures:   Hyperventilation was not performed.    Photic stimulation was not performed.    Artifacts:  Intermittent myogenic and movement artifacts were noted.    ECG:  The heart rate on single channel ECG was predominantly between  BPM.    EEG Summary / Classification:  Abnormal EEG in comatose patient  Lateralized periodic discharges, left hemisphere, max over temporo-centroparietal region with time locked clonus of the right arm.  Disorganization  Moderate background slowing, generalized.    EEG Impression / Clinical Correlate:  The presence of LPDs with associated time locked clonus indicative of focal motor seizures ("EPC").  There is also evidence of moderate diffuse cerebral dysfunction.      Darnell Stinson MD  Epilepsy Fellow    Reading Room: [phone]  On Call Service After Hours: [phone]

## 2021-07-06 NOTE — PROGRESS NOTE ADULT - SUBJECTIVE AND OBJECTIVE BOX
[id number]    Subjective:    PAST MEDICAL & SURGICAL HISTORY:  Hypertension    Hyperlipidemia    Seizures    Diabetes    Glioblastoma multiforme    CMV infection    History of SIADH    H/O colonoscopy    History of laryngoscopy    Status post stereotactic brain biopsy    FAMILY HISTORY:  FH: myocardial infarction (Father)    Social Hx:  Nonsmoker, no drug or alcohol use    Home Medications:  amantadine 50 mg/5 mL oral syrup: 10 milliliter(s) orally 2 times a day (05 Jun 2021 10:00)  atorvastatin 10 mg oral tablet: 1 tab(s) orally once a day (05 Jun 2021 10:00)  citalopram 20 mg oral tablet: 1 tab(s) orally once a day (05 Jun 2021 10:00)  Keppra 100 mg/mL oral solution: 7.5 milliliter(s) orally 2 times a day (05 Jun 2021 10:00)  losartan 100 mg oral tablet: 1 tab(s) orally once a day (05 Jun 2021 10:00)  methylphenidate 5 mg oral tablet: 1 tab(s) orally @ 8am and 2 pm (15 Abel 2021 12:19)  Norvasc 5 mg oral tablet: 1 tab(s) orally once a day (05 Jun 2021 10:00)  nystatin 100,000 units/mL oral suspension: 4 milliliter(s) orally 4 times a day (05 Jun 2021 10:00)  ramelteon 8 mg oral tablet: 1 tab(s) orally once a day (at bedtime) (05 Jun 2021 10:00)    MEDICATIONS  (STANDING):  amantadine Syrup 100 milliGRAM(s) Oral two times a day  amLODIPine   Tablet 5 milliGRAM(s) Oral daily  atorvastatin 10 milliGRAM(s) Oral at bedtime  dexAMETHasone     Tablet 4 milliGRAM(s) Oral every 8 hours  enoxaparin Injectable 40 milliGRAM(s) SubCutaneous daily  lacosamide IVPB 150 milliGRAM(s) IV Intermittent every 12 hours  levETIRAcetam  IVPB 1000 milliGRAM(s) IV Intermittent <User Schedule>  losartan 100 milliGRAM(s) Oral daily  methylphenidate 5 milliGRAM(s) Oral <User Schedule>  pantoprazole  Injectable 40 milliGRAM(s) IV Push daily  polyethylene glycol 3350 17 Gram(s) Oral daily  senna 2 Tablet(s) Oral at bedtime    MEDICATIONS  (PRN):  LORazepam   Injectable 1 milliGRAM(s) IV Push once PRN GTC  ramelteon 8 milliGRAM(s) Oral at bedtime PRN Insomnia    Allergies  No Known Allergies	    ROS: Unable to obtain due to patient's mental status.    Vital Signs Last 24 Hrs  T(C): 36.6 (06 Jul 2021 07:25), Max: 36.6 (06 Jul 2021 07:25)  T(F): 97.8 (06 Jul 2021 07:25), Max: 97.8 (06 Jul 2021 07:25)  HR: 80 (06 Jul 2021 07:25) (60 - 97)  BP: 122/74 (06 Jul 2021 07:25) (121/66 - 139/68)  RR: 18 (06 Jul 2021 07:25) (18 - 18)  SpO2: 97% (06 Jul 2021 07:25) (97% - 99%)    GENERAL EXAM:  Constitutional:  HEENT:    NEUROLOGICAL EXAM:  MS:   CN:   Motor:  Sensation:   Coordination/Gait: Unable to assess.    Labs:   cbc                      10.9   9.84  )-----------( 171      ( 06 Jul 2021 05:16 )             33.8     [id number]    134<L>  |  99  |  17  ----------------------------<  130<H>  4.2   |  23  |  0.31<L>    Ca    8.8      06 Jul 2021 05:16  Phos  2.4     07-04  Mg     2.2     07-04    Radiology/EEGs:  -06/29 CTH: No CT evidence of acute intracranial hemorrhage, mass effect, or midline shift. Unchanged posttreatment changes.    -07/03 EEG Summary / Classification:  Abnormal EEG in comatose patient  Lateralized periodic discharges, left hemisphere, max over centroparietal region  Disorganization  Moderate background slowing, generalized.  EEG Impression / Clinical Correlate:  The presence of LPDs is often associated with subacute structural injury in the left hemisphere. LPDs also indicate heightened risk for seizures.  There is also evidence of moderate diffuse cerebral dysfunction.    -07/04 EEG Summary / Classification:  Abnormal EEG in comatose patient  4.	Lateralized periodic discharges, left hemisphere, max over centroparietal region.  5.	Disorganization  6.	Moderate background slowing, generalized.  EEG Impression / Clinical Correlate:  The presence of LPDs is often associated with subacute structural injury in the left hemisphere. LPDs also indicate heightened risk for seizures.  There is also evidence of moderate diffuse cerebral dysfunction.    -07/05 EEG Summary / Classification:  Abnormal EEG in comatose patient  7.	Lateralized periodic discharges, left hemisphere, max over centroparietal region.  8.	Disorganization  9.	Moderate background slowing, generalized.  EEG Impression / Clinical Correlate:  The presence of LPDs is often associated with subacute structural injury in the left hemisphere. LPDs also indicate heightened risk for seizures.  There is also evidence of moderate diffuse cerebral dysfunction. [id number]    Subjective: 72 yo male seen and examined at bedside. Still having focal motor seizures in RUE. Intermittently with hiccups. Does not open eyes, no verbal output.     PAST MEDICAL & SURGICAL HISTORY:  Hypertension    Hyperlipidemia    Seizures    Diabetes    Glioblastoma multiforme    CMV infection    History of SIADH    H/O colonoscopy    History of laryngoscopy    Status post stereotactic brain biopsy    FAMILY HISTORY:  FH: myocardial infarction (Father)    Social Hx:  Nonsmoker, no drug or alcohol use    Home Medications:  amantadine 50 mg/5 mL oral syrup: 10 milliliter(s) orally 2 times a day (05 Jun 2021 10:00)  atorvastatin 10 mg oral tablet: 1 tab(s) orally once a day (05 Jun 2021 10:00)  citalopram 20 mg oral tablet: 1 tab(s) orally once a day (05 Jun 2021 10:00)  Keppra 100 mg/mL oral solution: 7.5 milliliter(s) orally 2 times a day (05 Jun 2021 10:00)  losartan 100 mg oral tablet: 1 tab(s) orally once a day (05 Jun 2021 10:00)  methylphenidate 5 mg oral tablet: 1 tab(s) orally @ 8am and 2 pm (15 Abel 2021 12:19)  Norvasc 5 mg oral tablet: 1 tab(s) orally once a day (05 Jun 2021 10:00)  nystatin 100,000 units/mL oral suspension: 4 milliliter(s) orally 4 times a day (05 Jun 2021 10:00)  ramelteon 8 mg oral tablet: 1 tab(s) orally once a day (at bedtime) (05 Jun 2021 10:00)    MEDICATIONS  (STANDING):  amantadine Syrup 100 milliGRAM(s) Oral two times a day  amLODIPine   Tablet 5 milliGRAM(s) Oral daily  atorvastatin 10 milliGRAM(s) Oral at bedtime  dexAMETHasone     Tablet 4 milliGRAM(s) Oral every 8 hours  enoxaparin Injectable 40 milliGRAM(s) SubCutaneous daily  lacosamide IVPB 150 milliGRAM(s) IV Intermittent every 12 hours  levETIRAcetam  IVPB 1000 milliGRAM(s) IV Intermittent <User Schedule>  losartan 100 milliGRAM(s) Oral daily  methylphenidate 5 milliGRAM(s) Oral <User Schedule>  pantoprazole  Injectable 40 milliGRAM(s) IV Push daily  polyethylene glycol 3350 17 Gram(s) Oral daily  senna 2 Tablet(s) Oral at bedtime    MEDICATIONS  (PRN):  LORazepam   Injectable 1 milliGRAM(s) IV Push once PRN GTC  ramelteon 8 milliGRAM(s) Oral at bedtime PRN Insomnia    Allergies  No Known Allergies	    ROS: Unable to obtain due to patient's mental status.    Vital Signs Last 24 Hrs  T(C): 36.6 (06 Jul 2021 07:25), Max: 36.6 (06 Jul 2021 07:25)  T(F): 97.8 (06 Jul 2021 07:25), Max: 97.8 (06 Jul 2021 07:25)  HR: 80 (06 Jul 2021 07:25) (60 - 97)  BP: 122/74 (06 Jul 2021 07:25) (121/66 - 139/68)  RR: 18 (06 Jul 2021 07:25) (18 - 18)  SpO2: 97% (06 Jul 2021 07:25) (97% - 99%)    GENERAL EXAM:  Constitutional: Lying in bed. Does not open eyes or respond to questions. Still with myoclonic jerking in RUE.  HEENT: PERRL. Atraumatic.    NEUROLOGICAL EXAM:  MS: Eyes closed. Do not open to voice or noxious stimuli. No verbal output. Does not follow commands.  CN: PERRL. Eyes midline, cross midline with OCR. Face grossly symmetric. Intermittent hiccups.  Motor: Myoclonic jerks in RUE, worse with activation. Some effort against gravity in LUE. RLE/LLE contracted, no spontaneous movement noted.  Coordination/Gait: Unable to assess.    Labs:   cbc                      10.9   9.84  )-----------( 171      ( 06 Jul 2021 05:16 )             33.8     [id number]    134<L>  |  99  |  17  ----------------------------<  130<H>  4.2   |  23  |  0.31<L>    Ca    8.8      06 Jul 2021 05:16  Phos  2.4     07-04  Mg     2.2     07-04    Radiology/EEGs:  -06/29 CTH: No CT evidence of acute intracranial hemorrhage, mass effect, or midline shift. Unchanged posttreatment changes.    -07/03 EEG Summary / Classification:  Abnormal EEG in comatose patient  Lateralized periodic discharges, left hemisphere, max over centroparietal region  Disorganization  Moderate background slowing, generalized.  EEG Impression / Clinical Correlate:  The presence of LPDs is often associated with subacute structural injury in the left hemisphere. LPDs also indicate heightened risk for seizures.  There is also evidence of moderate diffuse cerebral dysfunction.    -07/04 EEG Summary / Classification:  Abnormal EEG in comatose patient  4.	Lateralized periodic discharges, left hemisphere, max over centroparietal region.  5.	Disorganization  6.	Moderate background slowing, generalized.  EEG Impression / Clinical Correlate:  The presence of LPDs is often associated with subacute structural injury in the left hemisphere. LPDs also indicate heightened risk for seizures.  There is also evidence of moderate diffuse cerebral dysfunction.    -07/05 EEG Summary / Classification:  Abnormal EEG in comatose patient  7.	Lateralized periodic discharges, left hemisphere, max over centroparietal region.  8.	Disorganization  9.	Moderate background slowing, generalized.  EEG Impression / Clinical Correlate:  The presence of LPDs is often associated with subacute structural injury in the left hemisphere. LPDs also indicate heightened risk for seizures.  There is also evidence of moderate diffuse cerebral dysfunction. [id number]    Subjective: 70 yo male seen and examined at bedside. Still having focal motor seizures in RUE. Intermittently with hiccups. Does not open eyes, no verbal output.     PAST MEDICAL & SURGICAL HISTORY:  Hypertension    Hyperlipidemia    Seizures    Diabetes    Glioblastoma multiforme    CMV infection    History of SIADH    H/O colonoscopy    History of laryngoscopy    Status post stereotactic brain biopsy    FAMILY HISTORY:  FH: myocardial infarction (Father)    Social Hx:  Nonsmoker, no drug or alcohol use    Home Medications:  amantadine 50 mg/5 mL oral syrup: 10 milliliter(s) orally 2 times a day (05 Jun 2021 10:00)  atorvastatin 10 mg oral tablet: 1 tab(s) orally once a day (05 Jun 2021 10:00)  citalopram 20 mg oral tablet: 1 tab(s) orally once a day (05 Jun 2021 10:00)  Keppra 100 mg/mL oral solution: 7.5 milliliter(s) orally 2 times a day (05 Jun 2021 10:00)  losartan 100 mg oral tablet: 1 tab(s) orally once a day (05 Jun 2021 10:00)  methylphenidate 5 mg oral tablet: 1 tab(s) orally @ 8am and 2 pm (15 Abel 2021 12:19)  Norvasc 5 mg oral tablet: 1 tab(s) orally once a day (05 Jun 2021 10:00)  nystatin 100,000 units/mL oral suspension: 4 milliliter(s) orally 4 times a day (05 Jun 2021 10:00)  ramelteon 8 mg oral tablet: 1 tab(s) orally once a day (at bedtime) (05 Jun 2021 10:00)    MEDICATIONS  (STANDING):  amantadine Syrup 100 milliGRAM(s) Oral two times a day  amLODIPine   Tablet 5 milliGRAM(s) Oral daily  atorvastatin 10 milliGRAM(s) Oral at bedtime  dexAMETHasone     Tablet 4 milliGRAM(s) Oral every 8 hours  enoxaparin Injectable 40 milliGRAM(s) SubCutaneous daily  lacosamide IVPB 150 milliGRAM(s) IV Intermittent every 12 hours  levETIRAcetam  IVPB 1000 milliGRAM(s) IV Intermittent <User Schedule>  losartan 100 milliGRAM(s) Oral daily  methylphenidate 5 milliGRAM(s) Oral <User Schedule>  pantoprazole  Injectable 40 milliGRAM(s) IV Push daily  polyethylene glycol 3350 17 Gram(s) Oral daily  senna 2 Tablet(s) Oral at bedtime    MEDICATIONS  (PRN):  LORazepam   Injectable 1 milliGRAM(s) IV Push once PRN GTC  ramelteon 8 milliGRAM(s) Oral at bedtime PRN Insomnia    Allergies  No Known Allergies	    ROS: Unable to obtain due to patient's mental status.    Vital Signs Last 24 Hrs  T(C): 36.6 (06 Jul 2021 07:25), Max: 36.6 (06 Jul 2021 07:25)  T(F): 97.8 (06 Jul 2021 07:25), Max: 97.8 (06 Jul 2021 07:25)  HR: 80 (06 Jul 2021 07:25) (60 - 97)  BP: 122/74 (06 Jul 2021 07:25) (121/66 - 139/68)  RR: 18 (06 Jul 2021 07:25) (18 - 18)  SpO2: 97% (06 Jul 2021 07:25) (97% - 99%)    GENERAL EXAM:  Constitutional: Lying in bed. Does not open eyes or respond to questions. Still with myoclonic jerking in RUE.  HEENT: PERRL. Atraumatic.    NEUROLOGICAL EXAM:  MS: Eyes closed. Do not open to voice or noxious stimuli. No verbal output. Does not follow commands.  CN: PERRL. Eyes midline, cross midline with OCR. Face grossly symmetric. Intermittent hiccups.  Motor: Myoclonic jerks in RUE, worse with activation. Some effort against gravity in LUE. RLE/LLE contracted, no spontaneous movement noted.  Coordination/Gait: Unable to assess.    Labs:   cbc                      10.9   9.84  )-----------( 171      ( 06 Jul 2021 05:16 )             33.8     [id number]    134<L>  |  99  |  17  ----------------------------<  130<H>  4.2   |  23  |  0.31<L>    Ca    8.8      06 Jul 2021 05:16  Phos  2.4     07-04  Mg     2.2     07-04    Radiology/EEGs:  -06/29 CTH: No CT evidence of acute intracranial hemorrhage, mass effect, or midline shift. Unchanged posttreatment changes.    -07/03 EEG Summary / Classification:  Abnormal EEG in comatose patient  Lateralized periodic discharges, left hemisphere, max over centroparietal region  Disorganization  Moderate background slowing, generalized.  EEG Impression / Clinical Correlate:  The presence of LPDs is often associated with subacute structural injury in the left hemisphere. LPDs also indicate heightened risk for seizures.  There is also evidence of moderate diffuse cerebral dysfunction.    -07/04 EEG Summary / Classification:  Abnormal EEG in comatose patient  4.	Lateralized periodic discharges, left hemisphere, max over centroparietal region.  5.	Disorganization  6.	Moderate background slowing, generalized.  EEG Impression / Clinical Correlate:  The presence of LPDs is often associated with subacute structural injury in the left hemisphere. LPDs also indicate heightened risk for seizures.  There is also evidence of moderate diffuse cerebral dysfunction.    -07/05 EEG Summary / Classification:  Abnormal EEG in comatose patient  7.	Lateralized periodic discharges, left hemisphere, max over centroparietal region.  8.	Disorganization  9.	Moderate background slowing, generalized.  EEG Impression / Clinical Correlate:  The presence of LPDs is often associated with subacute structural injury in the left hemisphere. LPDs also indicate heightened risk for seizures.  There is also evidence of moderate diffuse cerebral dysfunction.    -07/06 EEG Summary / Classification:  Abnormal EEG in comatose patient  10.	Lateralized periodic discharges, left hemisphere, max over temporo-centroparietal region with time locked clonus of the right arm.  11.	Disorganization  12.	Moderate background slowing, generalized.  EEG Impression / Clinical Correlate:  The presence of LPDs with associated time locked clonus indicative of focal motor seizures ("EPC").  There is also evidence of moderate diffuse cerebral dysfunction. MRN-[id number]    Subjective: 72 yo male seen and examined at bedside. Still having focal motor seizures in RUE. Intermittently with hiccups. Does not open eyes, no verbal output.     PAST MEDICAL & SURGICAL HISTORY:  Hypertension  Hyperlipidemia  Seizures  Diabetes  Glioblastoma multiforme  CMV infection  History of SIADH  H/O colonoscopy  History of laryngoscopy  Status post stereotactic brain biopsy    FAMILY HISTORY:  FH: myocardial infarction (Father)    Social Hx:  Nonsmoker, no drug or alcohol use    Home Medications:  amantadine 50 mg/5 mL oral syrup: 10 milliliter(s) orally 2 times a day (05 Jun 2021 10:00)  atorvastatin 10 mg oral tablet: 1 tab(s) orally once a day (05 Jun 2021 10:00)  citalopram 20 mg oral tablet: 1 tab(s) orally once a day (05 Jun 2021 10:00)  Keppra 100 mg/mL oral solution: 7.5 milliliter(s) orally 2 times a day (05 Jun 2021 10:00)  losartan 100 mg oral tablet: 1 tab(s) orally once a day (05 Jun 2021 10:00)  methylphenidate 5 mg oral tablet: 1 tab(s) orally @ 8am and 2 pm (15 Abel 2021 12:19)  Norvasc 5 mg oral tablet: 1 tab(s) orally once a day (05 Jun 2021 10:00)  nystatin 100,000 units/mL oral suspension: 4 milliliter(s) orally 4 times a day (05 Jun 2021 10:00)  ramelteon 8 mg oral tablet: 1 tab(s) orally once a day (at bedtime) (05 Jun 2021 10:00)    MEDICATIONS  (STANDING):  amantadine Syrup 100 milliGRAM(s) Oral two times a day  amLODIPine   Tablet 5 milliGRAM(s) Oral daily  atorvastatin 10 milliGRAM(s) Oral at bedtime  dexAMETHasone     Tablet 4 milliGRAM(s) Oral every 8 hours  enoxaparin Injectable 40 milliGRAM(s) SubCutaneous daily  lacosamide IVPB 150 milliGRAM(s) IV Intermittent every 12 hours  levETIRAcetam  IVPB 1000 milliGRAM(s) IV Intermittent <User Schedule>  losartan 100 milliGRAM(s) Oral daily  methylphenidate 5 milliGRAM(s) Oral <User Schedule>  pantoprazole  Injectable 40 milliGRAM(s) IV Push daily  polyethylene glycol 3350 17 Gram(s) Oral daily  senna 2 Tablet(s) Oral at bedtime    MEDICATIONS  (PRN):  LORazepam   Injectable 1 milliGRAM(s) IV Push once PRN GTC  ramelteon 8 milliGRAM(s) Oral at bedtime PRN Insomnia    Allergies  No Known Allergies	    ROS: Unable to obtain due to patient's mental status.    Vital Signs Last 24 Hrs  T(C): 36.6 (06 Jul 2021 07:25), Max: 36.6 (06 Jul 2021 07:25)  T(F): 97.8 (06 Jul 2021 07:25), Max: 97.8 (06 Jul 2021 07:25)  HR: 80 (06 Jul 2021 07:25) (60 - 97)  BP: 122/74 (06 Jul 2021 07:25) (121/66 - 139/68)  RR: 18 (06 Jul 2021 07:25) (18 - 18)  SpO2: 97% (06 Jul 2021 07:25) (97% - 99%)    GENERAL EXAM:  Constitutional: Lying in bed. Does not open eyes or respond to questions. Still with myoclonic jerking in RUE.  HEENT: PERRL. Atraumatic.    NEUROLOGICAL EXAM:  MS: Eyes closed. Do not open to voice or noxious stimuli. No verbal output. Does not follow commands.  CN: PERRL. Eyes midline, cross midline with OCR. Face grossly symmetric. Intermittent hiccups.  Motor: Myoclonic jerks in RUE, worse with activation. Some effort against gravity in LUE. RLE/LLE contracted, no spontaneous movement noted.  Coordination/Gait: Unable to assess.    Labs:   cbc                      10.9   9.84  )-----------( 171      ( 06 Jul 2021 05:16 )             33.8     [id number]    134<L>  |  99  |  17  ----------------------------<  130<H>  4.2   |  23  |  0.31<L>    Ca    8.8      06 Jul 2021 05:16  Phos  2.4     07-04  Mg     2.2     07-04    Radiology/EEGs:  -06/29 CTH: No CT evidence of acute intracranial hemorrhage, mass effect, or midline shift. Unchanged posttreatment changes.    -07/03 EEG Summary / Classification:  Abnormal EEG in comatose patient  Lateralized periodic discharges, left hemisphere, max over centroparietal region  Disorganization  Moderate background slowing, generalized.  EEG Impression / Clinical Correlate:  The presence of LPDs is often associated with subacute structural injury in the left hemisphere. LPDs also indicate heightened risk for seizures.  There is also evidence of moderate diffuse cerebral dysfunction.    -07/04 EEG Summary / Classification:  Abnormal EEG in comatose patient  4.	Lateralized periodic discharges, left hemisphere, max over centroparietal region.  5.	Disorganization  6.	Moderate background slowing, generalized.  EEG Impression / Clinical Correlate:  The presence of LPDs is often associated with subacute structural injury in the left hemisphere. LPDs also indicate heightened risk for seizures.  There is also evidence of moderate diffuse cerebral dysfunction.    -07/05 EEG Summary / Classification:  Abnormal EEG in comatose patient  7.	Lateralized periodic discharges, left hemisphere, max over centroparietal region.  8.	Disorganization  9.	Moderate background slowing, generalized.  EEG Impression / Clinical Correlate:  The presence of LPDs is often associated with subacute structural injury in the left hemisphere. LPDs also indicate heightened risk for seizures.  There is also evidence of moderate diffuse cerebral dysfunction.    -07/06 EEG Summary / Classification:  Abnormal EEG in comatose patient  10.	Lateralized periodic discharges, left hemisphere, max over temporo-centroparietal region with time locked clonus of the right arm.  11.	Disorganization  12.	Moderate background slowing, generalized.  EEG Impression / Clinical Correlate:  The presence of LPDs with associated time locked clonus indicative of focal motor seizures ("EPC").  There is also evidence of moderate diffuse cerebral dysfunction.

## 2021-07-06 NOTE — EEG REPORT - NS EEG TEXT BOX
[facility]  [facility]  Report of Continuous Video EEG    [facility]: [address] [facility] , [address], [place] [address], Ph#: [phone]  [facility]: [id number] [place] [address], Ph#: [phone]  Office: [address] [place] [address], [place] [address] Ph#: [phone]    Patient Name: Ronald Rider    Age: 71 year, : 1950  Patient ID: -, MRN #: MRN: [id number], Floyd: [address] [address] [address]  EEG #: [id number]    Study Date: 2021   Start Time: 08:00 AM    End Date: 2021    End Time: 11:48 AM     Study Duration: ~4 HOURS    Study Information:    EEG Recording Technique:  The patient underwent continuous Video-EEG monitoring, using Telemetry System hardware on the XLTek Digital System. EEG and video data were stored on a computer hard drive with important events saved in digital archive files. The material was reviewed by a physician (electroencephalographer / epileptologist) on a daily basis. Luis and seizure detection algorithms were utilized and reviewed. An EEG Technician attended to the patient, and was available throughout daytime work hours.  The epilepsy center neurologist was available in person or on call 24-hours per day.    EEG Placement and Labeling of Electrodes:  The EEG was performed utilizing 20 channel referential EEG connections (coronal over temporal over parasagittal montage) using all standard 10-20 electrode placements with EKG, with additional electrodes placed in the inferior temporal region using the modified 10-10 montage electrode placements for elective admissions, or if deemed necessary. Recording was at a sampling rate of 256 samples per second per channel. Time synchronized digital video recording was done simultaneously with EEG recording. A low light infrared camera was used for low light recording.     History:   glioblastoma multiforme resected, multiple focal seizures            MEDICATIONS  (STANDING):  amantadine Syrup 100 milliGRAM(s) Oral two times a day  amLODIPine   Tablet 5 milliGRAM(s) Oral daily  atorvastatin 10 milliGRAM(s) Oral at bedtime  dexAMETHasone     Tablet 4 milliGRAM(s) Oral every 8 hours  enoxaparin Injectable 40 milliGRAM(s) SubCutaneous daily  famotidine   Suspension 20 milliGRAM(s) Oral daily  lacosamide IVPB 200 milliGRAM(s) IV Intermittent every 12 hours  levETIRAcetam  IVPB 1000 milliGRAM(s) IV Intermittent <User Schedule>  losartan 100 milliGRAM(s) Oral daily  methylphenidate 5 milliGRAM(s) Oral <User Schedule>    Interpretation:    [Abbreviation Key:  PDR=alpha rhythm/posterior dominant rhythm. A-P=anterior posterior gradient.  Amplitude: ‘very low’:<20; ‘low’:20-50; ‘medium’:; ‘high’:>200uV.  Persistence for periodic/rhythmic patterns (% of epoch) ‘rare’:<1%; ‘occasional’:1-10%; ‘frequent’:10-50%; ‘abundant’:50-90%; ‘continuous’:>90%.  Persistence for sporadic discharges: ‘rare’:<1/hr; ‘occasional’:1/min-1/hr; ‘frequent’:>1/min; ‘abundant’:>1/10 sec.  GRDA=generalized rhythmic delta activity, LRDA=lateralized rhythmic delta activity, TIRDA=temporal intermittent rhythmic delta activity, FIRDA=frontal intermittent rhythmic activity. LPD=PLED=lateralized periodic discharges, GPD=generalized periodic discharges, BiPDs=BiPLEDs=bilateral independent periodic epileptiform discharges, SIRPID=stimulus induced rhythmic, periodic, or ictal appearing discharges.  Modifiers: +F=with fast component, +S=with spike component, +R=with rhythmic component.  S-B=burst suppression pattern.  PFA: paroxysmal fast activity. Max=maximal. N1-drowsy, N2-stage II sleep, N3-slow wave sleep.  HV=hyperventilation, PS=photic stimulation]    Daily EEG Visual Analysis  FINDINGS: The background was continuous, minimally reactive. During wakefulness the PDR was present over the right near 8hz and 30uV.     Background:  disorganized, generalized slowing,  in theta/delta range     Sleep Background:  Stage II sleep transients were not recorded.    Other Non-Epileptiform Findings:  None were present.    Epileptiform Activity:   There are continuous lateralized periodic discharges at 1hz over the left hemisphere greatest over the left central and parietal region max P3, variably with initial spike component at T7, there was time locked right arm clonus with a 20msec delay.     Activation Procedures:   Hyperventilation was not performed.    Photic stimulation was not performed.    Artifacts:  Intermittent myogenic and movement artifacts were noted.    ECG:  The heart rate on single channel ECG was predominantly between  BPM.    EEG Summary / Classification:  Abnormal EEG in comatose patient  Lateralized periodic discharges, left hemisphere, max over temporo-centroparietal region with time locked clonus of the right arm.  Disorganization  Moderate background slowing, generalized.    EEG Impression / Clinical Correlate:  The presence of LPDs with associated time locked clonus indicative of focal motor seizures ("EPC").  There is also evidence of moderate diffuse cerebral dysfunction.      Darnell Stinson MD  Epilepsy Fellow    Reading Room: [phone]  On Call Service After Hours: [phone]   [facility]  Comprehensive Epilepsy Center  Report of Continuous Video EEG    [facility]: [address] [facility] , [address], [place] [address], Ph#: [phone]  [facility]: [id number] [place] [address], Ph#: [phone]  Office: [address] [place] [address], [place] [address] Ph#: [phone]    Patient Name: Ronald Rider    Age: 71 year, : 1950  Patient ID: -, MRN #: MRN: [id number], Floyd: [address] [address] [address]  EEG #: [id number]    Study Date: 2021   Start Time: 08:00 AM    End Date: 2021    End Time: 11:48 AM     Study Duration: ~4 HOURS    Study Information:    EEG Recording Technique:  The patient underwent continuous Video-EEG monitoring, using Telemetry System hardware on the XLTek Digital System. EEG and video data were stored on a computer hard drive with important events saved in digital archive files. The material was reviewed by a physician (electroencephalographer / epileptologist) on a daily basis. Luis and seizure detection algorithms were utilized and reviewed. An EEG Technician attended to the patient, and was available throughout daytime work hours.  The epilepsy center neurologist was available in person or on call 24-hours per day.    EEG Placement and Labeling of Electrodes:  The EEG was performed utilizing 20 channel referential EEG connections (coronal over temporal over parasagittal montage) using all standard 10-20 electrode placements with EKG, with additional electrodes placed in the inferior temporal region using the modified 10-10 montage electrode placements for elective admissions, or if deemed necessary. Recording was at a sampling rate of 256 samples per second per channel. Time synchronized digital video recording was done simultaneously with EEG recording. A low light infrared camera was used for low light recording.     History:   glioblastoma multiforme resected, multiple focal seizures            MEDICATIONS  (STANDING):  amantadine Syrup 100 milliGRAM(s) Oral two times a day  amLODIPine   Tablet 5 milliGRAM(s) Oral daily  atorvastatin 10 milliGRAM(s) Oral at bedtime  dexAMETHasone     Tablet 4 milliGRAM(s) Oral every 8 hours  enoxaparin Injectable 40 milliGRAM(s) SubCutaneous daily  famotidine   Suspension 20 milliGRAM(s) Oral daily  lacosamide IVPB 200 milliGRAM(s) IV Intermittent every 12 hours  levETIRAcetam  IVPB 1000 milliGRAM(s) IV Intermittent <User Schedule>  losartan 100 milliGRAM(s) Oral daily  methylphenidate 5 milliGRAM(s) Oral <User Schedule>    Interpretation:    [Abbreviation Key:  PDR=alpha rhythm/posterior dominant rhythm. A-P=anterior posterior gradient.  Amplitude: ‘very low’:<20; ‘low’:20-50; ‘medium’:; ‘high’:>200uV.  Persistence for periodic/rhythmic patterns (% of epoch) ‘rare’:<1%; ‘occasional’:1-10%; ‘frequent’:10-50%; ‘abundant’:50-90%; ‘continuous’:>90%.  Persistence for sporadic discharges: ‘rare’:<1/hr; ‘occasional’:1/min-1/hr; ‘frequent’:>1/min; ‘abundant’:>1/10 sec.  GRDA=generalized rhythmic delta activity, LRDA=lateralized rhythmic delta activity, TIRDA=temporal intermittent rhythmic delta activity, FIRDA=frontal intermittent rhythmic activity. LPD=PLED=lateralized periodic discharges, GPD=generalized periodic discharges, BiPDs=BiPLEDs=bilateral independent periodic epileptiform discharges, SIRPID=stimulus induced rhythmic, periodic, or ictal appearing discharges.  Modifiers: +F=with fast component, +S=with spike component, +R=with rhythmic component.  S-B=burst suppression pattern.  PFA: paroxysmal fast activity. Max=maximal. N1-drowsy, N2-stage II sleep, N3-slow wave sleep.  HV=hyperventilation, PS=photic stimulation]    Daily EEG Visual Analysis  FINDINGS: The background was continuous, minimally reactive. During wakefulness the PDR was present over the right near 8hz and 30uV.     Background:  disorganized, generalized slowing,  in theta/delta range     Sleep Background:  Stage II sleep transients were not recorded.    Other Non-Epileptiform Findings:  None were present.    Epileptiform Activity:   There are continuous lateralized periodic discharges at 1hz over the left hemisphere greatest over the left central and parietal region max P3, variably with initial spike component at T7, there was time locked right arm clonus with a 20msec delay.     Activation Procedures:   Hyperventilation was not performed.    Photic stimulation was not performed.    Artifacts:  Intermittent myogenic and movement artifacts were noted.    ECG:  The heart rate on single channel ECG was predominantly between  BPM.    EEG Summary / Classification:  Abnormal EEG in comatose patient  Lateralized periodic discharges, left hemisphere, max over temporo-centroparietal region with time locked clonus of the right arm.  Disorganization  Moderate background slowing, generalized.    EEG Impression / Clinical Correlate:  The presence of LPDs with associated time locked clonus indicative of focal motor seizures ("EPC").  There is also evidence of moderate diffuse cerebral dysfunction.    Darnell Stinson MD  Epilepsy Fellow    Reading Room: [phone]  On Call Service After Hours: [phone]

## 2021-07-07 LAB
ALBUMIN SERPL ELPH-MCNC: 3 G/DL — LOW (ref 3.3–5)
ALP SERPL-CCNC: 88 U/L — SIGNIFICANT CHANGE UP (ref 40–120)
ALT FLD-CCNC: 31 U/L — SIGNIFICANT CHANGE UP (ref 10–45)
ANION GAP SERPL CALC-SCNC: 15 MMOL/L — SIGNIFICANT CHANGE UP (ref 5–17)
AST SERPL-CCNC: 20 U/L — SIGNIFICANT CHANGE UP (ref 10–40)
BILIRUB SERPL-MCNC: 0.2 MG/DL — SIGNIFICANT CHANGE UP (ref 0.2–1.2)
BUN SERPL-MCNC: 23 MG/DL — SIGNIFICANT CHANGE UP (ref 7–23)
CALCIUM SERPL-MCNC: 8.6 MG/DL — SIGNIFICANT CHANGE UP (ref 8.4–10.5)
CHLORIDE SERPL-SCNC: 98 MMOL/L — SIGNIFICANT CHANGE UP (ref 96–108)
CO2 SERPL-SCNC: 22 MMOL/L — SIGNIFICANT CHANGE UP (ref 22–31)
CREAT SERPL-MCNC: 0.36 MG/DL — LOW (ref 0.5–1.3)
GLUCOSE SERPL-MCNC: 126 MG/DL — HIGH (ref 70–99)
HCT VFR BLD CALC: 35.8 % — LOW (ref 39–50)
HGB BLD-MCNC: 11.4 G/DL — LOW (ref 13–17)
MAGNESIUM SERPL-MCNC: 2.2 MG/DL — SIGNIFICANT CHANGE UP (ref 1.6–2.6)
MCHC RBC-ENTMCNC: 27.3 PG — SIGNIFICANT CHANGE UP (ref 27–34)
MCHC RBC-ENTMCNC: 31.8 GM/DL — LOW (ref 32–36)
MCV RBC AUTO: 85.9 FL — SIGNIFICANT CHANGE UP (ref 80–100)
NRBC # BLD: 0 /100 WBCS — SIGNIFICANT CHANGE UP (ref 0–0)
PHOSPHATE SERPL-MCNC: 3.6 MG/DL — SIGNIFICANT CHANGE UP (ref 2.5–4.5)
PLATELET # BLD AUTO: 210 K/UL — SIGNIFICANT CHANGE UP (ref 150–400)
POTASSIUM SERPL-MCNC: 4.4 MMOL/L — SIGNIFICANT CHANGE UP (ref 3.5–5.3)
POTASSIUM SERPL-SCNC: 4.4 MMOL/L — SIGNIFICANT CHANGE UP (ref 3.5–5.3)
PROT SERPL-MCNC: 6.7 G/DL — SIGNIFICANT CHANGE UP (ref 6–8.3)
RBC # BLD: 4.17 M/UL — LOW (ref 4.2–5.8)
RBC # FLD: 17 % — HIGH (ref 10.3–14.5)
SODIUM SERPL-SCNC: 135 MMOL/L — SIGNIFICANT CHANGE UP (ref 135–145)
WBC # BLD: 7.72 K/UL — SIGNIFICANT CHANGE UP (ref 3.8–10.5)
WBC # FLD AUTO: 7.72 K/UL — SIGNIFICANT CHANGE UP (ref 3.8–10.5)

## 2021-07-07 PROCEDURE — 99231 SBSQ HOSP IP/OBS SF/LOW 25: CPT | Mod: GC

## 2021-07-07 RX ORDER — SODIUM CHLORIDE 9 MG/ML
500 INJECTION INTRAMUSCULAR; INTRAVENOUS; SUBCUTANEOUS ONCE
Refills: 0 | Status: COMPLETED | OUTPATIENT
Start: 2021-07-07 | End: 2021-07-07

## 2021-07-07 RX ADMIN — Medication 5 MILLIGRAM(S): at 10:08

## 2021-07-07 RX ADMIN — LEVETIRACETAM 400 MILLIGRAM(S): 250 TABLET, FILM COATED ORAL at 12:10

## 2021-07-07 RX ADMIN — LACOSAMIDE 140 MILLIGRAM(S): 50 TABLET ORAL at 17:28

## 2021-07-07 RX ADMIN — SENNA PLUS 2 TABLET(S): 8.6 TABLET ORAL at 21:54

## 2021-07-07 RX ADMIN — Medication 5 MILLIGRAM(S): at 17:28

## 2021-07-07 RX ADMIN — LOSARTAN POTASSIUM 100 MILLIGRAM(S): 100 TABLET, FILM COATED ORAL at 05:40

## 2021-07-07 RX ADMIN — Medication 100 MILLIGRAM(S): at 05:39

## 2021-07-07 RX ADMIN — FAMOTIDINE 20 MILLIGRAM(S): 10 INJECTION INTRAVENOUS at 12:53

## 2021-07-07 RX ADMIN — ATORVASTATIN CALCIUM 10 MILLIGRAM(S): 80 TABLET, FILM COATED ORAL at 21:54

## 2021-07-07 RX ADMIN — LEVETIRACETAM 400 MILLIGRAM(S): 250 TABLET, FILM COATED ORAL at 21:53

## 2021-07-07 RX ADMIN — PANTOPRAZOLE SODIUM 40 MILLIGRAM(S): 20 TABLET, DELAYED RELEASE ORAL at 12:09

## 2021-07-07 RX ADMIN — Medication 4 MILLIGRAM(S): at 21:54

## 2021-07-07 RX ADMIN — Medication 4 MILLIGRAM(S): at 12:10

## 2021-07-07 RX ADMIN — Medication 4 MILLIGRAM(S): at 05:40

## 2021-07-07 RX ADMIN — LEVETIRACETAM 400 MILLIGRAM(S): 250 TABLET, FILM COATED ORAL at 05:44

## 2021-07-07 RX ADMIN — SODIUM CHLORIDE 500 MILLILITER(S): 9 INJECTION INTRAMUSCULAR; INTRAVENOUS; SUBCUTANEOUS at 16:33

## 2021-07-07 RX ADMIN — Medication 100 MILLIGRAM(S): at 17:28

## 2021-07-07 RX ADMIN — ENOXAPARIN SODIUM 40 MILLIGRAM(S): 100 INJECTION SUBCUTANEOUS at 12:10

## 2021-07-07 RX ADMIN — AMLODIPINE BESYLATE 5 MILLIGRAM(S): 2.5 TABLET ORAL at 05:41

## 2021-07-07 RX ADMIN — LACOSAMIDE 140 MILLIGRAM(S): 50 TABLET ORAL at 05:39

## 2021-07-07 RX ADMIN — POLYETHYLENE GLYCOL 3350 17 GRAM(S): 17 POWDER, FOR SOLUTION ORAL at 12:10

## 2021-07-07 NOTE — PROGRESS NOTE ADULT - SUBJECTIVE AND OBJECTIVE BOX
Patient is a 71y old  Male who presents with a chief complaint of seizure episode (07 Jul 2021 07:59)    Patient seen today.   No continuous EEG at this time.  No acute changes.      MEDICATIONS  (STANDING):  amantadine Syrup 100 milliGRAM(s) Oral two times a day  amLODIPine   Tablet 5 milliGRAM(s) Oral daily  atorvastatin 10 milliGRAM(s) Oral at bedtime  dexAMETHasone     Tablet 4 milliGRAM(s) Oral every 8 hours  enoxaparin Injectable 40 milliGRAM(s) SubCutaneous daily  famotidine   Suspension 20 milliGRAM(s) Oral daily  lacosamide IVPB 200 milliGRAM(s) IV Intermittent every 12 hours  levETIRAcetam  IVPB 1000 milliGRAM(s) IV Intermittent <User Schedule>  losartan 100 milliGRAM(s) Oral daily  methylphenidate 5 milliGRAM(s) Oral <User Schedule>  pantoprazole  Injectable 40 milliGRAM(s) IV Push daily  polyethylene glycol 3350 17 Gram(s) Oral daily  senna 2 Tablet(s) Oral at bedtime    MEDICATIONS  (PRN):  LORazepam   Injectable 1 milliGRAM(s) IV Push once PRN GTC  ramelteon 8 milliGRAM(s) Oral at bedtime PRN Insomnia    Vital Signs Last 24 Hrs  T(C): 36.4 (07 Jul 2021 07:36), Max: 36.8 (07 Jul 2021 04:41)  T(F): 97.5 (07 Jul 2021 07:36), Max: 98.2 (07 Jul 2021 04:41)  HR: 94 (07 Jul 2021 07:36) (79 - 100)  BP: 132/79 (07 Jul 2021 07:36) (125/68 - 147/84)  BP(mean): --  RR: 20 (07 Jul 2021 07:36) (18 - 20)  SpO2: 100% (07 Jul 2021 07:36) (96% - 100%)    PE  NAD-Remains nonverbal  Anicteric    Abd soft, NT, ND  No c/c/e  No rash grossly                            11.4   7.72  )-----------( 210      ( 07 Jul 2021 06:21 )             35.8       07-07    135  |  98  |  23  ----------------------------<  126<H>  4.4   |  22  |  0.36<L>    Ca    8.6      07 Jul 2021 06:21  Phos  3.6     07-07  Mg     2.2     07-07    TPro  6.7  /  Alb  3.0<L>  /  TBili  0.2  /  DBili  x   /  AST  20  /  ALT  31  /  AlkPhos  88  07-07

## 2021-07-07 NOTE — PROGRESS NOTE ADULT - ASSESSMENT
Assessment: 71 year old RH male with a past medical history of HTN, HLD, hx of SIADH, glioblastoma multiforme (diagnosed in 2019), and seizures presenting after an episode of depressed level of consciousness and suspected seizure episode.    Impression: Likely breakthrough seizures in the setting of progression of disease of glioblastoma multiforme.    Plan:  [x] vEEG: LPDs, left hemisphere, max over temporo-centroparietal region with time locked clonus of the right arm.  [x] MRI Brain w/wo contrast:  [] c/w Vimpat to 200MG IV BID.  [] c/w Decadron to 4MG PO Q8HR.  [] c/w Keppra 1000MG IV TID.  [] Hyponatremia likely secondary to SIADH. Fluid restriction < 1.5L daily. (135 on 7/7).  [x] Holding home Celexa as this could contribute to hyponatremia.  [] Palliative care was consulted with permission of patient's daughter, patient's son declined service.  [] Called daughter to provide updates (Telephone# [phone]).  [x] IM on board, Dr. Godfrey. Recs appreciated.  [x] NGT placed by ENT on 06/30.   [] c/w Bowel regimen, Pepcid.  [] S&S to see patient when he is more alert and able to participate in eval.  [x] Discussed patient's treatment plan and goals of care with neuro-oncologist Dr. Martínez - currently on Avastin monotherapy (last dose 6/24, next dose due around 7/9).  [x] CPK, B12, folate: wnl.  [] Diet: Glucerna 1.5 via NGT, goal rate 60cc's/hr.  [] DVT PPx: Lovenox SC.  [] GI PPx: Protonix IV QD.  [] Seizure rescue: Ativan 1MG IVP ONLY FOR CONVULSIONS/GTC LASTING > 3 MINUTES. Not treating focal seizures.    Case discussed with neurology attending Dr. Samaniego. Assessment: 71 year old RH male with a past medical history of HTN, HLD, hx of SIADH, glioblastoma multiforme (diagnosed in 2019), and seizures presenting after an episode of depressed level of consciousness and suspected seizure episode.    Impression: Likely breakthrough seizures in the setting of progression of disease of glioblastoma multiforme.    Plan:  [x] vEEG: LPDs, left hemisphere, max over temporo-centroparietal region with time locked clonus of the right arm.  [x] MRI Brain w/wo contrast:   [] c/w Vimpat to 200MG IV BID.  [] c/w Decadron to 4MG PO Q8HR.  [] c/w Keppra 1000MG IV TID.  [] Hyponatremia likely secondary to SIADH. Fluid restriction < 1.5L daily. (135 on 7/7).  [x] Holding home Celexa as this could contribute to hyponatremia.  [] Palliative care was consulted with permission of patient's daughter, patient's son declined service.  [] Called daughter to provide updates (Telephone# [phone]).  [x] IM on board, Dr. Godfrey. Recs appreciated.  [x] NGT placed by ENT on 06/30.   [] c/w Bowel regimen, Pepcid.  [] S&S to see patient when he is more alert and able to participate in eval.  [x] Discussed patient's treatment plan and goals of care with neuro-oncologist Dr. Martínez - currently on Avastin monotherapy (last dose 6/24, next dose due around 7/9).  [x] CPK, B12, folate: wnl.  [] Diet: Glucerna 1.5 via NGT, goal rate 60cc's/hr.  [] DVT PPx: Lovenox SC.  [] GI PPx: Protonix IV QD.  [] Seizure rescue: Ativan 1MG IVP ONLY FOR CONVULSIONS/GTC LASTING > 3 MINUTES. Not treating focal seizures.    Case discussed with neurology attending Dr. Samaniego. Assessment: 71 year old RH male with a past medical history of HTN, HLD, hx of SIADH, glioblastoma multiforme (diagnosed in 2019), and seizures presenting after an episode of depressed level of consciousness and suspected seizure episode.    Impression: Likely breakthrough seizures in the setting of progression of disease of glioblastoma multiforme.    Plan:  [x] vEEG: LPDs, left hemisphere, max over temporo-centroparietal region with time locked clonus of the right arm.  [x] MRI Brain w/wo contrast:   [] c/w Vimpat to 200MG IV BID.  [] c/w Decadron to 4MG PO Q8HR.  [] c/w Keppra 1000MG IV TID.  [] Hyponatremia likely secondary to SIADH. Fluid restriction < 1.5L daily. (135 on 7/7).  [x] Holding home Celexa as this could contribute to hyponatremia.  [] Palliative care was consulted with permission of patient's daughter, patient's son declined service.  [] Called daughter to provide updates (Telephone# [phone]).  [] Team spoke with son at bedside to provide updates. He requests a repeat S&S eval tomorrow, then will reassess GOC with family.  [x] IM on board, Dr. Godfrey. Recs appreciated.  [x] NGT placed by ENT on 06/30.   [] c/w Bowel regimen, Pepcid.  [] S&S to see patient when he is more alert and able to participate in eval.  [x] Discussed patient's treatment plan and goals of care with neuro-oncologist Dr. Martínez - currently on Avastin monotherapy (last dose 6/24, next dose due around 7/9).  [x] CPK, B12, folate: wnl.  [] Diet: Glucerna 1.5 via NGT, goal rate 60cc's/hr.  [] DVT PPx: Lovenox SC.  [] GI PPx: Protonix IV QD.  [] Seizure rescue: Ativan 1MG IVP ONLY FOR CONVULSIONS/GTC LASTING > 3 MINUTES. Not treating focal seizures.    Case discussed with neurology attending Dr. Samaniego. Assessment: 71 year old RH male with a past medical history of HTN, HLD, hx of SIADH, glioblastoma multiforme (diagnosed in 2019), and seizures presenting after an episode of depressed level of consciousness and suspected seizure episode.    Impression: Likely breakthrough seizures in the setting of progression of disease of glioblastoma multiforme.    Plan:  [x] vEEG: LPDs, left hemisphere, max over temporo-centroparietal region with time locked clonus of the right arm.  [x] MRI Brain w/wo contrast: no changed compared to prior study.   [] c/w Vimpat to 200MG IV BID.  [] c/w Decadron to 4MG PO Q8HR.  [] c/w Keppra 1000MG IV TID.  [] Hyponatremia likely secondary to SIADH. Fluid restriction < 1.5L daily. (135 on 7/7).  [x] Holding home Celexa as this could contribute to hyponatremia.  [] Palliative care was consulted with permission of patient's daughter, patient's son declined service.  [] Called daughter to provide updates (Telephone# [phone]).  [] Team spoke with son at bedside to provide updates. He requests a repeat S&S eval tomorrow, then will reassess GOC with family.  [x] IM on board, Dr. Godfrey. Recs appreciated.  [x] NGT placed by ENT on 06/30.   [] c/w Bowel regimen, Pepcid.  [] S&S to see patient when he is more alert and able to participate in eval.  [x] Discussed patient's treatment plan and goals of care with neuro-oncologist Dr. Martínez - currently on Avastin monotherapy (last dose 6/24, next dose due around 7/9).  [x] CPK, B12, folate: wnl.  [] Diet: Glucerna 1.5 via NGT, goal rate 60cc's/hr.  [] DVT PPx: Lovenox SC.  [] GI PPx: Protonix IV QD.  [] Seizure rescue: Ativan 1MG IVP ONLY FOR CONVULSIONS/GTC LASTING > 3 MINUTES. Not treating focal seizures.    Case discussed with neurology attending Dr. Samaniego. Patent

## 2021-07-07 NOTE — PROGRESS NOTE ADULT - ASSESSMENT
72 year old RH male with a past medical history of HTN, HLD, hx of SIADH, gliobastoma multiforme (diagnosed in 2019) and seizures presenting after an episode of depressed level of consciousness and suspected seizure episode.    hyponatremia  - likely SIADH  - d/c iv fluids  - fluid restrict    dysphagia  - failed swallow eval  - would keep NPO   - cont NG tube feeds  - peg pending clarification of GOC    leukocytosis  - resolved  - observe off abx    GBM  - treatment as per neuroncology  - c/w decadron    seizure - on keppra    depression  - off celexa  - could be contributing to hyponatremia    hyponartemia  - resolved.    dvt px  - Lovenox

## 2021-07-07 NOTE — PROGRESS NOTE ADULT - SUBJECTIVE AND OBJECTIVE BOX
DATE OF SERVICE: 07-07-21 @ 13:46    Patient is a 71y old  Male who presents with a chief complaint of seizure episode (07 Jul 2021 12:15)      SUBJECTIVE / OVERNIGHT EVENTS:  alert, opens eyes on verbal stimuli,  does not follow commands    MEDICATIONS  (STANDING):  amantadine Syrup 100 milliGRAM(s) Oral two times a day  amLODIPine   Tablet 5 milliGRAM(s) Oral daily  atorvastatin 10 milliGRAM(s) Oral at bedtime  dexAMETHasone     Tablet 4 milliGRAM(s) Oral every 8 hours  enoxaparin Injectable 40 milliGRAM(s) SubCutaneous daily  famotidine   Suspension 20 milliGRAM(s) Oral daily  lacosamide IVPB 200 milliGRAM(s) IV Intermittent every 12 hours  levETIRAcetam  IVPB 1000 milliGRAM(s) IV Intermittent <User Schedule>  losartan 100 milliGRAM(s) Oral daily  methylphenidate 5 milliGRAM(s) Oral <User Schedule>  pantoprazole  Injectable 40 milliGRAM(s) IV Push daily  polyethylene glycol 3350 17 Gram(s) Oral daily  senna 2 Tablet(s) Oral at bedtime    MEDICATIONS  (PRN):  LORazepam   Injectable 1 milliGRAM(s) IV Push once PRN GTC  ramelteon 8 milliGRAM(s) Oral at bedtime PRN Insomnia      Vital Signs Last 24 Hrs  T(C): 36.4 (07 Jul 2021 12:19), Max: 36.8 (07 Jul 2021 04:41)  T(F): 97.5 (07 Jul 2021 12:19), Max: 98.2 (07 Jul 2021 04:41)  HR: 91 (07 Jul 2021 12:19) (79 - 100)  BP: 132/78 (07 Jul 2021 12:19) (125/68 - 147/84)  BP(mean): --  RR: 20 (07 Jul 2021 12:19) (18 - 20)  SpO2: 96% (07 Jul 2021 12:19) (96% - 100%)  CAPILLARY BLOOD GLUCOSE        I&O's Summary    06 Jul 2021 07:01  -  07 Jul 2021 07:00  --------------------------------------------------------  IN: 0 mL / OUT: 1200 mL / NET: -1200 mL        PHYSICAL EXAM:  GENERAL: NAD, well-developed  HEAD:  Atraumatic, Normocephalic  EYES: EOMI, PERRLA, conjunctiva and sclera clear  NECK: Supple, No JVD  CHEST/LUNG: Clear to auscultation bilaterally; No wheeze  HEART: Regular rate and rhythm; No murmurs, rubs, or gallops  ABDOMEN: Soft, Nontender, Nondistended; Bowel sounds present  EXTREMITIES: contracted all ext  PSYCH: AAOx0  NEUROLOGY: see neuro note  SKIN: No rashes or lesions    LABS:                        11.4   7.72  )-----------( 210      ( 07 Jul 2021 06:21 )             35.8     07-07    135  |  98  |  23  ----------------------------<  126<H>  4.4   |  22  |  0.36<L>    Ca    8.6      07 Jul 2021 06:21  Phos  3.6     07-07  Mg     2.2     07-07    TPro  6.7  /  Alb  3.0<L>  /  TBili  0.2  /  DBili  x   /  AST  20  /  ALT  31  /  AlkPhos  88  07-07              RADIOLOGY & ADDITIONAL TESTS:    Imaging Personally Reviewed:    Consultant(s) Notes Reviewed:      Care Discussed with Consultants/Other Providers:

## 2021-07-07 NOTE — PROGRESS NOTE ADULT - SUBJECTIVE AND OBJECTIVE BOX
MRN-[id number]    Subjective:    PAST MEDICAL & SURGICAL HISTORY:  Hypertension    Hyperlipidemia    Seizures    Diabetes    Glioblastoma multiforme    CMV infection    History of SIADH    H/O colonoscopy    History of laryngoscopy    Status post stereotactic brain biopsy    FAMILY HISTORY:  FH: myocardial infarction (Father)    Social Hx:  Nonsmoker, no drug or alcohol use    Home Medications:  amantadine 50 mg/5 mL oral syrup: 10 milliliter(s) orally 2 times a day (05 Jun 2021 10:00)  atorvastatin 10 mg oral tablet: 1 tab(s) orally once a day (05 Jun 2021 10:00)  citalopram 20 mg oral tablet: 1 tab(s) orally once a day (05 Jun 2021 10:00)  Keppra 100 mg/mL oral solution: 7.5 milliliter(s) orally 2 times a day (05 Jun 2021 10:00)  losartan 100 mg oral tablet: 1 tab(s) orally once a day (05 Jun 2021 10:00)  methylphenidate 5 mg oral tablet: 1 tab(s) orally @ 8am and 2 pm (15 Abel 2021 12:19)  Norvasc 5 mg oral tablet: 1 tab(s) orally once a day (05 Jun 2021 10:00)  nystatin 100,000 units/mL oral suspension: 4 milliliter(s) orally 4 times a day (05 Jun 2021 10:00)  ramelteon 8 mg oral tablet: 1 tab(s) orally once a day (at bedtime) (05 Jun 2021 10:00)    MEDICATIONS  (STANDING):  amantadine Syrup 100 milliGRAM(s) Oral two times a day  amLODIPine   Tablet 5 milliGRAM(s) Oral daily  atorvastatin 10 milliGRAM(s) Oral at bedtime  dexAMETHasone     Tablet 4 milliGRAM(s) Oral every 8 hours  enoxaparin Injectable 40 milliGRAM(s) SubCutaneous daily  famotidine   Suspension 20 milliGRAM(s) Oral daily  lacosamide IVPB 200 milliGRAM(s) IV Intermittent every 12 hours  levETIRAcetam  IVPB 1000 milliGRAM(s) IV Intermittent <User Schedule>  losartan 100 milliGRAM(s) Oral daily  methylphenidate 5 milliGRAM(s) Oral <User Schedule>  pantoprazole  Injectable 40 milliGRAM(s) IV Push daily  polyethylene glycol 3350 17 Gram(s) Oral daily  senna 2 Tablet(s) Oral at bedtime    MEDICATIONS  (PRN):  LORazepam   Injectable 1 milliGRAM(s) IV Push once PRN GTC  ramelteon 8 milliGRAM(s) Oral at bedtime PRN Insomnia    Allergies  No Known Allergies	    ROS: Unable to obtain due to patient's mental status.      Vital Signs Last 24 Hrs  T(C): 36.4 (07 Jul 2021 07:36), Max: 36.8 (07 Jul 2021 04:41)  T(F): 97.5 (07 Jul 2021 07:36), Max: 98.2 (07 Jul 2021 04:41)  HR: 94 (07 Jul 2021 07:36) (79 - 100)  BP: 132/79 (07 Jul 2021 07:36) (125/68 - 147/84)  RR: 20 (07 Jul 2021 07:36) (18 - 20)  SpO2: 100% (07 Jul 2021 07:36) (96% - 100%)    GENERAL EXAM:  Constitutional:  HEENT:    NEUROLOGICAL EXAM:  MS:    CN:  Motor:  Sensation:  Coordination/Gait: Unable to assess.    Labs:   cbc                      11.4   7.72  )-----------( 210      ( 07 Jul 2021 06:21 )             35.8     [id number]    135  |  98  |  23  ----------------------------<  126<H>  4.4   |  22  |  0.36<L>    Ca    8.6      07 Jul 2021 06:21  Phos  3.6     07-07  Mg     2.2     07-07    TPro  6.7  /  Alb  3.0<L>  /  TBili  0.2  /  DBili  x   /  AST  20  /  ALT  31  /  AlkPhos  88  07-07    LIVER FUNCTIONS - ( 07 Jul 2021 06:21 )  Alb: 3.0 g/dL / Pro: 6.7 g/dL / ALK PHOS: 88 U/L / ALT: 31 U/L / AST: 20 U/L / GGT: x           Radiology/EEGs:  -06/29 CTH: No CT evidence of acute intracranial hemorrhage, mass effect, or midline shift. Unchanged posttreatment changes.    -07/03 EEG Summary / Classification:  Abnormal EEG in comatose patient  Lateralized periodic discharges, left hemisphere, max over centroparietal region  Disorganization  Moderate background slowing, generalized.  EEG Impression / Clinical Correlate:  The presence of LPDs is often associated with subacute structural injury in the left hemisphere. LPDs also indicate heightened risk for seizures.  There is also evidence of moderate diffuse cerebral dysfunction.    -07/04 EEG Summary / Classification:  Abnormal EEG in comatose patient  4.	Lateralized periodic discharges, left hemisphere, max over centroparietal region.  5.	Disorganization  6.	Moderate background slowing, generalized.  EEG Impression / Clinical Correlate:  The presence of LPDs is often associated with subacute structural injury in the left hemisphere. LPDs also indicate heightened risk for seizures.  There is also evidence of moderate diffuse cerebral dysfunction.    -07/05 EEG Summary / Classification:  Abnormal EEG in comatose patient  7.	Lateralized periodic discharges, left hemisphere, max over centroparietal region.  8.	Disorganization  9.	Moderate background slowing, generalized.  EEG Impression / Clinical Correlate:  The presence of LPDs is often associated with subacute structural injury in the left hemisphere. LPDs also indicate heightened risk for seizures.  There is also evidence of moderate diffuse cerebral dysfunction.    -07/06 EEG Summary / Classification:  Abnormal EEG in comatose patient  10.	Lateralized periodic discharges, left hemisphere, max over temporo-centroparietal region with time locked clonus of the right arm.  11.	Disorganization  12.	Moderate background slowing, generalized.  EEG Impression / Clinical Correlate:  The presence of LPDs with associated time locked clonus indicative of focal motor seizures ("EPC").  There is also evidence of moderate diffuse cerebral dysfunction.   MRN-[id number]    Subjective: 70 yo male seen and examined at bedside. No events overnight.    PAST MEDICAL & SURGICAL HISTORY:  Hypertension    Hyperlipidemia    Seizures    Diabetes    Glioblastoma multiforme    CMV infection    History of SIADH    H/O colonoscopy    History of laryngoscopy    Status post stereotactic brain biopsy    FAMILY HISTORY:  FH: myocardial infarction (Father)    Social Hx:  Nonsmoker, no drug or alcohol use    Home Medications:  amantadine 50 mg/5 mL oral syrup: 10 milliliter(s) orally 2 times a day (05 Jun 2021 10:00)  atorvastatin 10 mg oral tablet: 1 tab(s) orally once a day (05 Jun 2021 10:00)  citalopram 20 mg oral tablet: 1 tab(s) orally once a day (05 Jun 2021 10:00)  Keppra 100 mg/mL oral solution: 7.5 milliliter(s) orally 2 times a day (05 Jun 2021 10:00)  losartan 100 mg oral tablet: 1 tab(s) orally once a day (05 Jun 2021 10:00)  methylphenidate 5 mg oral tablet: 1 tab(s) orally @ 8am and 2 pm (15 Abel 2021 12:19)  Norvasc 5 mg oral tablet: 1 tab(s) orally once a day (05 Jun 2021 10:00)  nystatin 100,000 units/mL oral suspension: 4 milliliter(s) orally 4 times a day (05 Jun 2021 10:00)  ramelteon 8 mg oral tablet: 1 tab(s) orally once a day (at bedtime) (05 Jun 2021 10:00)    MEDICATIONS  (STANDING):  amantadine Syrup 100 milliGRAM(s) Oral two times a day  amLODIPine   Tablet 5 milliGRAM(s) Oral daily  atorvastatin 10 milliGRAM(s) Oral at bedtime  dexAMETHasone     Tablet 4 milliGRAM(s) Oral every 8 hours  enoxaparin Injectable 40 milliGRAM(s) SubCutaneous daily  famotidine   Suspension 20 milliGRAM(s) Oral daily  lacosamide IVPB 200 milliGRAM(s) IV Intermittent every 12 hours  levETIRAcetam  IVPB 1000 milliGRAM(s) IV Intermittent <User Schedule>  losartan 100 milliGRAM(s) Oral daily  methylphenidate 5 milliGRAM(s) Oral <User Schedule>  pantoprazole  Injectable 40 milliGRAM(s) IV Push daily  polyethylene glycol 3350 17 Gram(s) Oral daily  senna 2 Tablet(s) Oral at bedtime    MEDICATIONS  (PRN):  LORazepam   Injectable 1 milliGRAM(s) IV Push once PRN GTC  ramelteon 8 milliGRAM(s) Oral at bedtime PRN Insomnia    Allergies  No Known Allergies	    ROS: Unable to obtain due to patient's mental status.      Vital Signs Last 24 Hrs  T(C): 36.4 (07 Jul 2021 07:36), Max: 36.8 (07 Jul 2021 04:41)  T(F): 97.5 (07 Jul 2021 07:36), Max: 98.2 (07 Jul 2021 04:41)  HR: 94 (07 Jul 2021 07:36) (79 - 100)  BP: 132/79 (07 Jul 2021 07:36) (125/68 - 147/84)  RR: 20 (07 Jul 2021 07:36) (18 - 20)  SpO2: 100% (07 Jul 2021 07:36) (96% - 100%)    GENERAL EXAM:  Constitutional: Lying in bed. NAD.  HEENT: PERRL. Atraumatic.    NEUROLOGICAL EXAM:  MS: Eyes closed. Do not open to voice or noxious stimuli. Holds eyes open after manually opened by examiner. No verbal output. Does not follow commands.  CN: PERRL. Eyes midline, cross midline with OCR. Face grossly symmetric. Intermittent hiccups.  Motor: No spontaneous movement noted x4. RLE/LLE contracted. No twitching noted in RUE.  Coordination/Gait: Unable to assess.    Labs:   cbc                      11.4   7.72  )-----------( 210      ( 07 Jul 2021 06:21 )             35.8     [id number]    135  |  98  |  23  ----------------------------<  126<H>  4.4   |  22  |  0.36<L>    Ca    8.6      07 Jul 2021 06:21  Phos  3.6     07-07  Mg     2.2     07-07    TPro  6.7  /  Alb  3.0<L>  /  TBili  0.2  /  DBili  x   /  AST  20  /  ALT  31  /  AlkPhos  88  07-07    LIVER FUNCTIONS - ( 07 Jul 2021 06:21 )  Alb: 3.0 g/dL / Pro: 6.7 g/dL / ALK PHOS: 88 U/L / ALT: 31 U/L / AST: 20 U/L / GGT: x           Radiology/EEGs:  -06/29 CTH: No CT evidence of acute intracranial hemorrhage, mass effect, or midline shift. Unchanged posttreatment changes.    -07/03 EEG Summary / Classification:  Abnormal EEG in comatose patient  Lateralized periodic discharges, left hemisphere, max over centroparietal region  Disorganization  Moderate background slowing, generalized.  EEG Impression / Clinical Correlate:  The presence of LPDs is often associated with subacute structural injury in the left hemisphere. LPDs also indicate heightened risk for seizures.  There is also evidence of moderate diffuse cerebral dysfunction.    -07/04 EEG Summary / Classification:  Abnormal EEG in comatose patient  4.	Lateralized periodic discharges, left hemisphere, max over centroparietal region.  5.	Disorganization  6.	Moderate background slowing, generalized.  EEG Impression / Clinical Correlate:  The presence of LPDs is often associated with subacute structural injury in the left hemisphere. LPDs also indicate heightened risk for seizures.  There is also evidence of moderate diffuse cerebral dysfunction.    -07/05 EEG Summary / Classification:  Abnormal EEG in comatose patient  7.	Lateralized periodic discharges, left hemisphere, max over centroparietal region.  8.	Disorganization  9.	Moderate background slowing, generalized.  EEG Impression / Clinical Correlate:  The presence of LPDs is often associated with subacute structural injury in the left hemisphere. LPDs also indicate heightened risk for seizures.  There is also evidence of moderate diffuse cerebral dysfunction.    -07/06 EEG Summary / Classification:  Abnormal EEG in comatose patient  10.	Lateralized periodic discharges, left hemisphere, max over temporo-centroparietal region with time locked clonus of the right arm.  11.	Disorganization  12.	Moderate background slowing, generalized.  EEG Impression / Clinical Correlate:  The presence of LPDs with associated time locked clonus indicative of focal motor seizures ("EPC").  There is also evidence of moderate diffuse cerebral dysfunction.

## 2021-07-07 NOTE — PROGRESS NOTE ADULT - ASSESSMENT
Ronald Rider is a 72 year old RH male with a past medical history of HTN, HLD, hx of SIADH, gliobastoma multiforme (diagnosed in 2019) and seizures presenting after an episode of depressed level of consciousness and suspected seizure episode. At baseline, patient has contractions throughout and is bedbound, he is non-verbal but can respond to some simple commands. Patient was recently admitted for increased somnolence, difficulty breathing, decreased alertness, and poor PO intake. Was briefly on antibiotics but discontinued once no infection was found, had hypernatremia which resolved, briefly on steroid which were tapered and then subsequently discontinued. Was discharged for [place] [facility] [place] Currently patient was on Keppra 750mg tid and as per son was compliant. Patient now presenting with after an episode where he was sitting in his chair and then noted be slumping over to the R, having increased tonic contractions of the RUE, and some twitching of his face. Patient was noted to be incontinent during the episode and was less responsive than usual. He was taken by EMS to the hospital and given Versed 5mg IM en route. Patient gradually improved and was able to follow some command to squeeze a finger with his son in the room. On examination, the patient was not able to contribute to history, ROS, or follow commands to exam.     Glioblastoma History:  Diagnosed in 2019 with MR noting involvement of the splenium of the corpus callosum and bilateral parenchymal involvement. Had stereotactic biopsy in 2019 with pathology noting glioblastoma. Was started on Keppra and Decadron. Was [place] at [facility] [place] Chavis with radiation, temozolomide, and Bactrim. Temozolamide was stopped due to myelosuppression. Has had numerous admission for somnolence and seizure episodes. Currently on Avastin maintenance. MRI in April 2021 and June 2021 show stable mass, progression of RT gliosis. Patient was administered steroids at the time of last admission in early June 2021 and was tapered off as an outpatient.    Hematology/Oncology called to follow patient who is known to Dr. Lyle Chavis [place] [facility] [place] the [place] of glioblastoma multiforme. He was discharged from [facility] as noted above. As per available records from [facility] last treatment with Avastin was 6/11/2021 (will obtain updated records once [facility] has been notified of admission).    Glioblastoma Multiforme  --Under care at [facility] [place]. Lyle Chavis  --Currently on maintenance Avastin next due 7/9, at this time will continue after discharge  --Appears to have stable disease    AMS, Seizure like activity  --No edema, mass effect, hemorrhage or midline shift on CT  --MRI may be of more benefit to assess disease but would defer to neurology  --Patient had UTI 4/2021 which caused AMS  --Patient on Dexamethasone now q8 hours  --Keppra has been increased to 1000 mg BID  --Further management per Neurology  --Vimpat dose increased     Hyponatremia  --Management per primary team  --Has improved overall - Na 128 on 7/2. now 136    GOC  --Palliative met with family (son) on last admission at [facility]  --Patient still full code at that time  --NG tube in place - patient unable to participate in swallow study  --As per Dr. Martínez who is covering for Dr. Chavis - if mental status unable to be improved on current management, hospice may be appropriate    We will continue to follow patient and coordinate with [facility].    Kaia Rivas NP-C  Hematolgy/Oncology  [facility]  [phone] (Cell)  [phone] (Office)  [phone] ( Alt office)  Evenings and weekends, please call MD on call or office     Ronald Rider is a 72 year old RH male with a past medical history of HTN, HLD, hx of SIADH, gliobastoma multiforme (diagnosed in 2019) and seizures presenting after an episode of depressed level of consciousness and suspected seizure episode. At baseline, patient has contractions throughout and is bedbound, he is non-verbal but can respond to some simple commands. Patient was recently admitted for increased somnolence, difficulty breathing, decreased alertness, and poor PO intake. Was briefly on antibiotics but discontinued once no infection was found, had hypernatremia which resolved, briefly on steroid which were tapered and then subsequently discontinued. Was discharged for follow up with his neuro-oncologist at [facility]. Currently patient was on Keppra 750mg tid and as per son was compliant. Patient now presenting with after an episode where he was sitting in his chair and then noted be slumping over to the R, having increased tonic contractions of the RUE, and some twitching of his face. Patient was noted to be incontinent during the episode and was less responsive than usual. He was taken by EMS to the hospital and given Versed 5mg IM en route. Patient gradually improved and was able to follow some command to squeeze a finger with his son in the room. On examination, the patient was not able to contribute to history, ROS, or follow commands to exam.     Glioblastoma History:  Diagnosed in 2019 with MR noting involvement of the splenium of the corpus callosum and bilateral parenchymal involvement. Had stereotactic biopsy in 2019 with pathology noting glioblastoma. Was started on Keppra and Decadron. Was treated at [facility] with Dr. Chavis with radiation, temozolomide, and Bactrim. Temozolamide was stopped due to myelosuppression. Has had numerous admission for somnolence and seizure episodes. Currently on Avastin maintenance. MRI in April 2021 and June 2021 show stable mass, progression of RT gliosis. Patient was administered steroids at the time of last admission in early June 2021 and was tapered off as an outpatient.    Hematology/Oncology called to follow patient who is known to Dr. Lyle Chavis at [facility] [place] for the treatment of glioblastoma multiforme. He was discharged from [facility] as noted above. As per available records from [facility] [place] last treatment with Avastin was 6/11/2021 (will obtain updated records once [facility] has been notified of admission).    Glioblastoma Multiforme  --Under care at [facility] [place] Dr. Lyle Chavis  --Currently on maintenance Avastin next due 7/9, at this time will continue after discharge  --Appears to have stable disease    AMS, Seizure like activity  --No edema, mass effect, hemorrhage or midline shift on CT  -- Repeat MRI stable   --Patient had UTI 4/2021 which caused AMS  --Patient on Dexamethasone now q8 hours  --Keppra has been increased to 1000 mg BID  --Further management per Neurology  --Vimpat dose increased     Hyponatremia  --Management per primary team  --Has improved overall - Na 128 on 7/2. now 136    GOC  --Palliative met with family (son) on last admission at [facility]  --Patient still full code at that time  --NG tube in place - patient unable to participate in swallow study  --As per Dr. Martínez who is covering for Dr. Chavis - if mental status unable to be improved on current management, hospice may be appropriate    We will continue to follow patient and coordinate with [facility] [place].    Kaia Rivas  NP-C  Hematolgy/Oncology  [facility]  [phone] (Cell)  [phone] (Office)  [phone] ( Alt office)  Evenings and weekends, please call MD on call or office     No

## 2021-07-07 NOTE — PROGRESS NOTE ADULT - ATTENDING COMMENTS
Pt not improving with nutrition and seizure control. Pt awakens but not alert enough to take in food. Discussed with pt's son at bedside who expresses that the family would like to see him improve back to his baseline of being able to speak and eat on his own. Discussed that this was unlikely. Would like to reattempt S&S eval with him present and see if he is able to take PO. Then will discuss further plans.

## 2021-07-08 LAB
ANION GAP SERPL CALC-SCNC: 14 MMOL/L — SIGNIFICANT CHANGE UP (ref 5–17)
BUN SERPL-MCNC: 18 MG/DL — SIGNIFICANT CHANGE UP (ref 7–23)
CALCIUM SERPL-MCNC: 8.6 MG/DL — SIGNIFICANT CHANGE UP (ref 8.4–10.5)
CHLORIDE SERPL-SCNC: 99 MMOL/L — SIGNIFICANT CHANGE UP (ref 96–108)
CO2 SERPL-SCNC: 22 MMOL/L — SIGNIFICANT CHANGE UP (ref 22–31)
CREAT SERPL-MCNC: 0.34 MG/DL — LOW (ref 0.5–1.3)
GLUCOSE SERPL-MCNC: 120 MG/DL — HIGH (ref 70–99)
HCT VFR BLD CALC: 36.2 % — LOW (ref 39–50)
HGB BLD-MCNC: 11.6 G/DL — LOW (ref 13–17)
MCHC RBC-ENTMCNC: 27.7 PG — SIGNIFICANT CHANGE UP (ref 27–34)
MCHC RBC-ENTMCNC: 32 GM/DL — SIGNIFICANT CHANGE UP (ref 32–36)
MCV RBC AUTO: 86.4 FL — SIGNIFICANT CHANGE UP (ref 80–100)
NRBC # BLD: 0 /100 WBCS — SIGNIFICANT CHANGE UP (ref 0–0)
PLATELET # BLD AUTO: 189 K/UL — SIGNIFICANT CHANGE UP (ref 150–400)
POTASSIUM SERPL-MCNC: 4.3 MMOL/L — SIGNIFICANT CHANGE UP (ref 3.5–5.3)
POTASSIUM SERPL-SCNC: 4.3 MMOL/L — SIGNIFICANT CHANGE UP (ref 3.5–5.3)
RBC # BLD: 4.19 M/UL — LOW (ref 4.2–5.8)
RBC # FLD: 17.1 % — HIGH (ref 10.3–14.5)
SODIUM SERPL-SCNC: 135 MMOL/L — SIGNIFICANT CHANGE UP (ref 135–145)
WBC # BLD: 12.7 K/UL — HIGH (ref 3.8–10.5)
WBC # FLD AUTO: 12.7 K/UL — HIGH (ref 3.8–10.5)

## 2021-07-08 PROCEDURE — 99232 SBSQ HOSP IP/OBS MODERATE 35: CPT | Mod: GC

## 2021-07-08 RX ORDER — BACLOFEN 100 %
5 POWDER (GRAM) MISCELLANEOUS
Refills: 0 | Status: DISCONTINUED | OUTPATIENT
Start: 2021-07-08 | End: 2021-07-10

## 2021-07-08 RX ADMIN — LACOSAMIDE 140 MILLIGRAM(S): 50 TABLET ORAL at 17:32

## 2021-07-08 RX ADMIN — Medication 5 MILLIGRAM(S): at 08:26

## 2021-07-08 RX ADMIN — ATORVASTATIN CALCIUM 10 MILLIGRAM(S): 80 TABLET, FILM COATED ORAL at 21:10

## 2021-07-08 RX ADMIN — LEVETIRACETAM 400 MILLIGRAM(S): 250 TABLET, FILM COATED ORAL at 06:22

## 2021-07-08 RX ADMIN — Medication 4 MILLIGRAM(S): at 21:09

## 2021-07-08 RX ADMIN — LEVETIRACETAM 400 MILLIGRAM(S): 250 TABLET, FILM COATED ORAL at 21:09

## 2021-07-08 RX ADMIN — Medication 5 MILLIGRAM(S): at 15:44

## 2021-07-08 RX ADMIN — Medication 100 MILLIGRAM(S): at 17:32

## 2021-07-08 RX ADMIN — LEVETIRACETAM 400 MILLIGRAM(S): 250 TABLET, FILM COATED ORAL at 15:45

## 2021-07-08 RX ADMIN — FAMOTIDINE 20 MILLIGRAM(S): 10 INJECTION INTRAVENOUS at 17:33

## 2021-07-08 RX ADMIN — Medication 100 MILLIGRAM(S): at 06:23

## 2021-07-08 RX ADMIN — Medication 4 MILLIGRAM(S): at 06:23

## 2021-07-08 RX ADMIN — ENOXAPARIN SODIUM 40 MILLIGRAM(S): 100 INJECTION SUBCUTANEOUS at 12:56

## 2021-07-08 RX ADMIN — AMLODIPINE BESYLATE 5 MILLIGRAM(S): 2.5 TABLET ORAL at 06:23

## 2021-07-08 RX ADMIN — POLYETHYLENE GLYCOL 3350 17 GRAM(S): 17 POWDER, FOR SOLUTION ORAL at 12:56

## 2021-07-08 RX ADMIN — PANTOPRAZOLE SODIUM 40 MILLIGRAM(S): 20 TABLET, DELAYED RELEASE ORAL at 12:56

## 2021-07-08 RX ADMIN — LACOSAMIDE 140 MILLIGRAM(S): 50 TABLET ORAL at 06:47

## 2021-07-08 RX ADMIN — Medication 4 MILLIGRAM(S): at 12:56

## 2021-07-08 RX ADMIN — LOSARTAN POTASSIUM 100 MILLIGRAM(S): 100 TABLET, FILM COATED ORAL at 06:23

## 2021-07-08 RX ADMIN — SENNA PLUS 2 TABLET(S): 8.6 TABLET ORAL at 21:10

## 2021-07-08 NOTE — CONSULT NOTE ADULT - SUBJECTIVE AND OBJECTIVE BOX
Chief Complaint:  Patient is a 71y old  Male who presents with a chief complaint of seizure episode (08 Jul 2021 22:18)      Date of service: 07-09-21 @ 00:17    HPI:    The patient is a 71 year old man with hx of GBM on avastin who presented with seizure like activity. He has remained lethargic and unable to participate in a swallow evaluation.     The patient does not participate in the interview.    Allergies:  No Known Allergies      Home Medications:    Hospital Medications:  amantadine Syrup 100 milliGRAM(s) Oral two times a day  amLODIPine   Tablet 5 milliGRAM(s) Oral daily  atorvastatin 10 milliGRAM(s) Oral at bedtime  baclofen 5 milliGRAM(s) Oral two times a day  dexAMETHasone     Tablet 4 milliGRAM(s) Oral every 8 hours  enoxaparin Injectable 40 milliGRAM(s) SubCutaneous daily  famotidine   Suspension 20 milliGRAM(s) Oral daily  lacosamide IVPB 200 milliGRAM(s) IV Intermittent every 12 hours  levETIRAcetam  IVPB 1000 milliGRAM(s) IV Intermittent <User Schedule>  LORazepam   Injectable 1 milliGRAM(s) IV Push once PRN  losartan 100 milliGRAM(s) Oral daily  methylphenidate 5 milliGRAM(s) Oral <User Schedule>  pantoprazole  Injectable 40 milliGRAM(s) IV Push daily  polyethylene glycol 3350 17 Gram(s) Oral daily  ramelteon 8 milliGRAM(s) Oral at bedtime PRN  senna 2 Tablet(s) Oral at bedtime      PMHX/PSHX:  No pertinent past medical history    Hypertension    Neoplasm of unspecified behavior of brain    Hyperlipidemia    Seizures    Diabetes    Glioblastoma multiforme    CMV infection    History of SIADH    History of hyperlipidemia    H/O colonoscopy    History of laryngoscopy    Status post stereotactic brain biopsy        Family history:  No pertinent family history in first degree relatives    FH: myocardial infarction    No pertinent family history in first degree relatives    FH: myocardial infarction (Father)        Social History:   Denies ethanol use.  Denies illicit drug use.    ROS:     nonverbal      PHYSICAL EXAM:     GENERAL:  Appears stated age, well-groomed, well-nourished, no distress  HEENT:  NC/AT,  conjunctivae anicteric, clear and pink,   NECK: supple, trachea midline  CHEST:  Full & symmetric excursion, no increased effort, breath sounds clear  HEART:  Regular rhythm, no JVD  ABDOMEN:  Soft, non-tender, non-distended, normoactive bowel sounds,  no masses , no hepatosplenomegaly  EXTREMITIES:  no cyanosis,clubbing or edema  SKIN:  No rash, erythema, or, ecchymoses, no jaundice  NEURO:  lethargic non-focal, no asterixis  PSYCH: calm, nonverbal  RECTAL: Deferred      Vital Signs:  Vital Signs Last 24 Hrs  T(C): 36.5 (09 Jul 2021 00:06), Max: 36.7 (08 Jul 2021 20:13)  T(F): 97.7 (09 Jul 2021 00:06), Max: 98.1 (08 Jul 2021 20:13)  HR: 95 (09 Jul 2021 00:06) (67 - 98)  BP: 118/73 (09 Jul 2021 00:06) (106/68 - 136/84)  BP(mean): --  RR: 18 (09 Jul 2021 00:06) (18 - 18)  SpO2: 99% (09 Jul 2021 00:06) (95% - 99%)  Daily     Daily     LABS: Labs personally reviewed by me:                        11.6   12.70 )-----------( 189      ( 08 Jul 2021 05:58 )             36.2     07-08    135  |  99  |  18  ----------------------------<  120<H>  4.3   |  22  |  0.34<L>    Ca    8.6      08 Jul 2021 05:58  Phos  3.6     07-07  Mg     2.2     07-07    TPro  6.7  /  Alb  3.0<L>  /  TBili  0.2  /  DBili  x   /  AST  20  /  ALT  31  /  AlkPhos  88  07-07    LIVER FUNCTIONS - ( 07 Jul 2021 06:21 )  Alb: 3.0 g/dL / Pro: 6.7 g/dL / ALK PHOS: 88 U/L / ALT: 31 U/L / AST: 20 U/L / GGT: x                   Imaging personally reviewed by me:

## 2021-07-08 NOTE — PROGRESS NOTE ADULT - ASSESSMENT
Ronald Rider is a 72 year old RH male with a past medical history of HTN, HLD, hx of SIADH, gliobastoma multiforme (diagnosed in 2019) and seizures presenting after an episode of depressed level of consciousness and suspected seizure episode. At baseline, patient has contractions throughout and is bedbound, he is non-verbal but can respond to some simple commands. Patient was recently admitted for increased somnolence, difficulty breathing, decreased alertness, and poor PO intake. Was briefly on antibiotics but discontinued once no infection was found, had hypernatremia which resolved, briefly on steroid which were tapered and then subsequently discontinued. Was discharged for [place] up [place] [facility] Currently patient was on Keppra 750mg tid and as per son was compliant. Patient now presenting with after an episode where he was sitting in his chair and then noted be slumping over to the R, having increased tonic contractions of the RUE, and some twitching of his face. Patient was noted to be incontinent during the episode and was less responsive than usual. He was taken by EMS to the hospital and given Versed 5mg IM en route. Patient gradually improved and was able to follow some command to squeeze a finger with his son in the room. On examination, the patient was not able to contribute to history, ROS, or follow commands to exam.     Glioblastoma History:  Diagnosed in 2019 with MR noting involvement of the splenium of the corpus callosum and bilateral parenchymal involvement. Had stereotactic biopsy in 2019 with pathology noting glioblastoma. Was started on Keppra and Decadron. [place] [facility] [place]avis with radiation, temozolomide, and Bactrim. Temozolamide was stopped due to myelosuppression. Has had numerous admission for somnolence and seizure episodes. Currently on Avastin maintenance. MRI in April 2021 and June 2021 show stable mass, progression of RT gliosis. Patient was administered steroids at the time of last admission in early June 2021 and was tapered off as an outpatient.    Hematology/Oncology called to follow patient who is [place] [facility] [place]. He was discharged from [facility] as noted above. As per available [place] [facility] [place] with Avastin was 6/11/2021 (will obtain updated records once [facility] has been notified of admission).    Glioblastoma Multiforme  --Under [place] at [facility] [place]avis  --Currently on maintenance Avastin next due 7/9, at this time will continue after discharge  --Appears to have stable disease    AMS, Seizure like activity  --No edema, mass effect, hemorrhage or midline shift on CT  -- Repeat MRI stable   --Patient had UTI 4/2021 which caused AMS  --Patient on Dexamethasone now q8 hours  --Keppra has been increased to 1000 mg BID  --Further management per Neurology  --Vimpat dose increased     Hyponatremia  --Management per primary team  --Has improved overall - Na 128 on 7/2. now 136    GOC  --Palliative met with family (son) on last admission at [facility]  --Patient still full code at that time  --NG tube in place - patient unable to participate in swallow study  --As per Dr. Martínez who is covering for Dr. Chavis - if mental status unable to be improved on current management, hospice may be appropriate    We will continue to follow patient and coordinate with [facility].    Kaia Rivas  NP-C  Hematolgy/Oncology  [facility]  [phone] (Cell)  [phone] (Office)  [phone] ( Alt office)  Evenings and weekends, please call MD on call or office

## 2021-07-08 NOTE — CHART NOTE - NSCHARTNOTEFT_GEN_A_CORE
Nutrition Follow Up Note  Patient seen for: malnutrition follow up.     Chart reviewed, events noted. Pt is a 71 year old RH male with a past medical history of HTN, HLD, hx of SIADH, glioblastoma multiforme (diagnosed in 2019), and seizures presenting after an episode of depressed level of consciousness and suspected seizure episode. Likely breakthrough seizures in the setting of progression of disease of glioblastoma multiforme.     Source: [] Patient       [x] EMR        [x] RN        [] Family at bedside       [] Other:    -If unable to interview patient: [] Trach/Vent/BiPAP  [x] Disoriented/confused/inappropriate to interview    Diet Order:   Diet, NPO with Tube Feed:   Tube Feeding Modality: Nasogastric  Glucerna 1.5 Davey (GLUCERNA1.5RTH)  Total Volume for 24 Hours (mL): 1440  Continuous  Starting Tube Feed Rate {mL per Hour}: 10  Increase Tube Feed Rate by (mL): 10     Every 4 hours  Until Goal Tube Feed Rate (mL per Hour): 60  Tube Feed Duration (in Hours): 24  Tube Feed Start Time: 18:30 (07-01-21)    - Is current order appropriate/adequate? [x] Yes  []  No:     - Current EN regimen provides: 1440cc, 1728 kcals, 86 gm protein, and 1159 cc free water. Meeds 37 kcals/kG and 1.9 gm protein/kG based on RD obtained wt 46.4 kG (7/1).    - Nutrition-related concerns:  - Pt s/p SLP f/u evaluation with recommendations for NPO  - Per chart, TF on hold 7/6 for hiccups  - TF now infusing at 50mL/hr titrating towards goal rate of 60mL/hr, pt appears to be tolerating    GI:  Last BM 7/1 - constipation.   Bowel Regimen? [x] Yes   [] No    Weights:   no updated weights available at this time, will continue to monitor weights for changes if any     Nutritionally Pertinent MEDICATIONS  (STANDING):  amLODIPine   Tablet  atorvastatin  dexAMETHasone     Tablet  famotidine   Suspension  losartan  pantoprazole  Injectable  polyethylene glycol 3350  senna    Pertinent Labs: 07-08 @ 05:58: Na 135, BUN 18, Cr 0.34<L>, <H>, K+ 4.3, Phos --, Mg --, Alk Phos --, ALT/SGPT --, AST/SGOT --, HbA1c --    A1C with Estimated Average Glucose Result: 5.6 % (06-05-21 @ 10:07)    Finger Sticks: n/a    Skin per nursing documentation:   Edema:     Estimated Needs: Based on dosing wt 46.4 kG.  [x] no change since previous assessment  Estimated Energy Needs: 1624-1856kcal (35-40kcal/kg)  Estimated Protein Needs: 70-93 (1.5-2.0g/kg)    Previous Nutrition Diagnosis: Severe Malnutrition + Underweight  Nutrition Diagnosis is: [x] ongoing  [] resolved [] not applicable     New Nutrition Diagnosis: [x] Not applicable    Nutrition Care Plan:  [x] In Progress -  being addressed with EN feedings  [] Achieved  [] Not applicable    Nutrition Interventions:    Recommendations:      1. Continue Glucerna 1.2 at goal rate 60 cc/hr x24 hours to provide total volume 1440cc, 1728 kcals, 86 gm protein, and 1159 cc free water. Meeds 37 kcals/kG and 1.9 gm protein/kG based on RD obtained wt 46.4 kG. Provides 100% RDIs. Defer additional free water to team.  2. RD remains available to adjust TF regimen/formulary upon request/PRN.  3. Bowel regimen per team however consider more aggressive bowel regimen as no documented BM in 1 week.    Monitoring and Evaluation:   Continue to monitor nutritional intake, tolerance to diet prescription, weights, labs, skin integrity    RD remains available upon request and will follow up per protocol    Hawa Cervantes MS, RD, CDN Pager# [phone]

## 2021-07-08 NOTE — PROGRESS NOTE ADULT - ATTENDING COMMENTS
71 year old RH male with a past medical history of HTN, HLD, hx of SIADH, glioblastoma multiforme, and dysphagia presenting with faiure to thrive and seizure. Pt's seizures have been under control and after increasing Vimpat the myoclonic status has resolved. His mental status has not returned and he is not alert and aware enough to feed. Pt has a very poor prognosis. Per family's wishes he is full code. Pt was reevaluated by [facility] and he was not able to participate and thus they are recommending he be kept NPO due to HIGH aspiration risk. Discussed with son and he is agreeing to have PEG placed. GI consulted. Will discuss disposition after PEG is placed. He will not be a rehab candidate since he is not able to participate.

## 2021-07-08 NOTE — PROGRESS NOTE ADULT - SUBJECTIVE AND OBJECTIVE BOX
DATE OF SERVICE: 07-08-21 @ 22:19    Patient is a 71y old  Male who presents with a chief complaint of seizure episode (08 Jul 2021 12:55)      SUBJECTIVE / OVERNIGHT EVENTS:  nad    MEDICATIONS  (STANDING):  amantadine Syrup 100 milliGRAM(s) Oral two times a day  amLODIPine   Tablet 5 milliGRAM(s) Oral daily  atorvastatin 10 milliGRAM(s) Oral at bedtime  baclofen 5 milliGRAM(s) Oral two times a day  dexAMETHasone     Tablet 4 milliGRAM(s) Oral every 8 hours  enoxaparin Injectable 40 milliGRAM(s) SubCutaneous daily  famotidine   Suspension 20 milliGRAM(s) Oral daily  lacosamide IVPB 200 milliGRAM(s) IV Intermittent every 12 hours  levETIRAcetam  IVPB 1000 milliGRAM(s) IV Intermittent <User Schedule>  losartan 100 milliGRAM(s) Oral daily  methylphenidate 5 milliGRAM(s) Oral <User Schedule>  pantoprazole  Injectable 40 milliGRAM(s) IV Push daily  polyethylene glycol 3350 17 Gram(s) Oral daily  senna 2 Tablet(s) Oral at bedtime    MEDICATIONS  (PRN):  LORazepam   Injectable 1 milliGRAM(s) IV Push once PRN GTC  ramelteon 8 milliGRAM(s) Oral at bedtime PRN Insomnia      Vital Signs Last 24 Hrs  T(C): 36.7 (08 Jul 2021 20:13), Max: 36.8 (07 Jul 2021 23:45)  T(F): 98.1 (08 Jul 2021 20:13), Max: 98.3 (07 Jul 2021 23:45)  HR: 67 (08 Jul 2021 20:13) (67 - 98)  BP: 128/83 (08 Jul 2021 20:13) (106/68 - 136/84)  BP(mean): --  RR: 18 (08 Jul 2021 20:13) (18 - 18)  SpO2: 99% (08 Jul 2021 20:13) (95% - 99%)  CAPILLARY BLOOD GLUCOSE        I&O's Summary    07 Jul 2021 07:01  -  08 Jul 2021 07:00  --------------------------------------------------------  IN: 0 mL / OUT: 550 mL / NET: -550 mL    08 Jul 2021 07:01  -  08 Jul 2021 22:19  --------------------------------------------------------  IN: 0 mL / OUT: 900 mL / NET: -900 mL        PHYSICAL EXAM:  GENERAL: NAD, well-developed  HEAD:  Atraumatic, Normocephalic  EYES: EOMI, PERRLA, conjunctiva and sclera clear  NECK: Supple, No JVD  CHEST/LUNG: Clear to auscultation bilaterally; No wheeze  HEART: Regular rate and rhythm; No murmurs, rubs, or gallops  ABDOMEN: Soft, Nontender, Nondistended; Bowel sounds present  EXTREMITIES:  2+ Peripheral Pulses, No clubbing, cyanosis, or edema, contracted all ext  PSYCH: eyes open  NEUROLOGY: does not follow commands, nonverbal  SKIN: No rashes or lesions    LABS:                        11.6   12.70 )-----------( 189      ( 08 Jul 2021 05:58 )             36.2     07-08    135  |  99  |  18  ----------------------------<  120<H>  4.3   |  22  |  0.34<L>    Ca    8.6      08 Jul 2021 05:58  Phos  3.6     07-07  Mg     2.2     07-07    TPro  6.7  /  Alb  3.0<L>  /  TBili  0.2  /  DBili  x   /  AST  20  /  ALT  31  /  AlkPhos  88  07-07      < from: MR Head w/wo IV Cont (07.06.21 @ 20:13) >  FINDINGS:  Redemonstration of posttreatment changes in the bilateral occipital lobes and periventricular regions. There is a lobular areas of high T1 signal centered in the splenium of the corpus callosum. No gross enhancement. The overall size and appearance is unchanged. Areas are restricted diffusion again noted likely related to tumor. Redemonstration Of diffuse T2 prolongation throughout the periventricular white matter, unchanged from the prior exam, likely representing radiation changes, gliosis and/or edema      The ventricles, cisternal spaces, and cortical sulci to be appropriate for the patient's stated age. There is no midline shift or extra-axial collection.        IMPRESSION:    Stable exam    < end of copied text >          RADIOLOGY & ADDITIONAL TESTS:    Imaging Personally Reviewed:    Consultant(s) Notes Reviewed:      Care Discussed with Consultants/Other Providers:

## 2021-07-08 NOTE — PROGRESS NOTE ADULT - SUBJECTIVE AND OBJECTIVE BOX
MRN-[id number]    Subjective:    PAST MEDICAL & SURGICAL HISTORY:  Hypertension    Hyperlipidemia    Seizures    Diabetes    Glioblastoma multiforme    CMV infection    History of SIADH    H/O colonoscopy    History of laryngoscopy    Status post stereotactic brain biopsy    FAMILY HISTORY:  FH: myocardial infarction (Father)    Social Hx:  Nonsmoker, no drug or alcohol use    Home Medications:  amantadine 50 mg/5 mL oral syrup: 10 milliliter(s) orally 2 times a day (05 Jun 2021 10:00)  atorvastatin 10 mg oral tablet: 1 tab(s) orally once a day (05 Jun 2021 10:00)  citalopram 20 mg oral tablet: 1 tab(s) orally once a day (05 Jun 2021 10:00)  Keppra 100 mg/mL oral solution: 7.5 milliliter(s) orally 2 times a day (05 Jun 2021 10:00)  losartan 100 mg oral tablet: 1 tab(s) orally once a day (05 Jun 2021 10:00)  methylphenidate 5 mg oral tablet: 1 tab(s) orally @ 8am and 2 pm (15 Abel 2021 12:19)  Norvasc 5 mg oral tablet: 1 tab(s) orally once a day (05 Jun 2021 10:00)  nystatin 100,000 units/mL oral suspension: 4 milliliter(s) orally 4 times a day (05 Jun 2021 10:00)  ramelteon 8 mg oral tablet: 1 tab(s) orally once a day (at bedtime) (05 Jun 2021 10:00)    MEDICATIONS  (STANDING):  amantadine Syrup 100 milliGRAM(s) Oral two times a day  amLODIPine   Tablet 5 milliGRAM(s) Oral daily  atorvastatin 10 milliGRAM(s) Oral at bedtime  dexAMETHasone     Tablet 4 milliGRAM(s) Oral every 8 hours  enoxaparin Injectable 40 milliGRAM(s) SubCutaneous daily  famotidine   Suspension 20 milliGRAM(s) Oral daily  lacosamide IVPB 200 milliGRAM(s) IV Intermittent every 12 hours  levETIRAcetam  IVPB 1000 milliGRAM(s) IV Intermittent <User Schedule>  losartan 100 milliGRAM(s) Oral daily  methylphenidate 5 milliGRAM(s) Oral <User Schedule>  pantoprazole  Injectable 40 milliGRAM(s) IV Push daily  polyethylene glycol 3350 17 Gram(s) Oral daily  senna 2 Tablet(s) Oral at bedtime    MEDICATIONS  (PRN):  LORazepam   Injectable 1 milliGRAM(s) IV Push once PRN GTC  ramelteon 8 milliGRAM(s) Oral at bedtime PRN Insomnia    Allergies  No Known Allergies	    ROS: Pertinent positives above, all other ROS were reviewed and are negative.      Vital Signs Last 24 Hrs  T(C): 36.5 (08 Jul 2021 07:18), Max: 36.8 (07 Jul 2021 16:11)  T(F): 97.7 (08 Jul 2021 07:18), Max: 98.3 (07 Jul 2021 23:45)  HR: 93 (08 Jul 2021 07:18) (90 - 109)  BP: 131/78 (08 Jul 2021 07:18) (122/78 - 139/84)  RR: 18 (08 Jul 2021 07:18) (18 - 20)  SpO2: 98% (08 Jul 2021 07:18) (95% - 98%)    GENERAL EXAM:  Constitutional: Lying in bed. NAD.  HEENT: PERRL. Atraumatic.    NEUROLOGICAL EXAM:  MS: Eyes closed. Do not open to voice or noxious stimuli. Holds eyes open after manually opened by examiner. No verbal output. Does not follow commands.  CN: PERRL. Eyes midline, cross midline with OCR. Face grossly symmetric. Intermittent hiccups.  Motor: No spontaneous movement noted x4. RLE/LLE contracted. No twitching noted in RUE.  Coordination/Gait: Unable to assess.    Labs:   cbc                      11.6   12.70 )-----------( 189      ( 08 Jul 2021 05:58 )             36.2     [id number]    135  |  99  |  18  ----------------------------<  120<H>  4.3   |  22  |  0.34<L>    Ca    8.6      08 Jul 2021 05:58  Phos  3.6     07-07  Mg     2.2     07-07    TPro  6.7  /  Alb  3.0<L>  /  TBili  0.2  /  DBili  x   /  AST  20  /  ALT  31  /  AlkPhos  88  07-07    LIVER FUNCTIONS - ( 07 Jul 2021 06:21 )  Alb: 3.0 g/dL / Pro: 6.7 g/dL / ALK PHOS: 88 U/L / ALT: 31 U/L / AST: 20 U/L / GGT: x           Radiology/EEGs:  -06/29 CTH: No CT evidence of acute intracranial hemorrhage, mass effect, or midline shift. Unchanged posttreatment changes.    -07/03 EEG Summary / Classification:  Abnormal EEG in comatose patient  Lateralized periodic discharges, left hemisphere, max over centroparietal region  Disorganization  Moderate background slowing, generalized.  EEG Impression / Clinical Correlate:  The presence of LPDs is often associated with subacute structural injury in the left hemisphere. LPDs also indicate heightened risk for seizures.  There is also evidence of moderate diffuse cerebral dysfunction.    -07/04 EEG Summary / Classification:  Abnormal EEG in comatose patient  4.	Lateralized periodic discharges, left hemisphere, max over centroparietal region.  5.	Disorganization  6.	Moderate background slowing, generalized.  EEG Impression / Clinical Correlate:  The presence of LPDs is often associated with subacute structural injury in the left hemisphere. LPDs also indicate heightened risk for seizures.  There is also evidence of moderate diffuse cerebral dysfunction.    -07/05 EEG Summary / Classification:  Abnormal EEG in comatose patient  7.	Lateralized periodic discharges, left hemisphere, max over centroparietal region.  8.	Disorganization  9.	Moderate background slowing, generalized.  EEG Impression / Clinical Correlate:  The presence of LPDs is often associated with subacute structural injury in the left hemisphere. LPDs also indicate heightened risk for seizures.  There is also evidence of moderate diffuse cerebral dysfunction.    -07/06 EEG Summary / Classification:  Abnormal EEG in comatose patient  10.	Lateralized periodic discharges, left hemisphere, max over temporo-centroparietal region with time locked clonus of the right arm.  11.	Disorganization  12.	Moderate background slowing, generalized.  EEG Impression / Clinical Correlate:  The presence of LPDs with associated time locked clonus indicative of focal motor seizures ("EPC").  There is also evidence of moderate diffuse cerebral dysfunction.   MRN-[id number]    Subjective: unable to obtain from patient. No overnight or morning events.     PAST MEDICAL & SURGICAL HISTORY:  Hypertension    Hyperlipidemia    Seizures    Diabetes    Glioblastoma multiforme    CMV infection    History of SIADH    H/O colonoscopy    History of laryngoscopy    Status post stereotactic brain biopsy    FAMILY HISTORY:  FH: myocardial infarction (Father)    Social Hx:  Nonsmoker, no drug or alcohol use    Home Medications:  amantadine 50 mg/5 mL oral syrup: 10 milliliter(s) orally 2 times a day (05 Jun 2021 10:00)  atorvastatin 10 mg oral tablet: 1 tab(s) orally once a day (05 Jun 2021 10:00)  citalopram 20 mg oral tablet: 1 tab(s) orally once a day (05 Jun 2021 10:00)  Keppra 100 mg/mL oral solution: 7.5 milliliter(s) orally 2 times a day (05 Jun 2021 10:00)  losartan 100 mg oral tablet: 1 tab(s) orally once a day (05 Jun 2021 10:00)  methylphenidate 5 mg oral tablet: 1 tab(s) orally @ 8am and 2 pm (15 Abel 2021 12:19)  Norvasc 5 mg oral tablet: 1 tab(s) orally once a day (05 Jun 2021 10:00)  nystatin 100,000 units/mL oral suspension: 4 milliliter(s) orally 4 times a day (05 Jun 2021 10:00)  ramelteon 8 mg oral tablet: 1 tab(s) orally once a day (at bedtime) (05 Jun 2021 10:00)    MEDICATIONS  (STANDING):  amantadine Syrup 100 milliGRAM(s) Oral two times a day  amLODIPine   Tablet 5 milliGRAM(s) Oral daily  atorvastatin 10 milliGRAM(s) Oral at bedtime  dexAMETHasone     Tablet 4 milliGRAM(s) Oral every 8 hours  enoxaparin Injectable 40 milliGRAM(s) SubCutaneous daily  famotidine   Suspension 20 milliGRAM(s) Oral daily  lacosamide IVPB 200 milliGRAM(s) IV Intermittent every 12 hours  levETIRAcetam  IVPB 1000 milliGRAM(s) IV Intermittent <User Schedule>  losartan 100 milliGRAM(s) Oral daily  methylphenidate 5 milliGRAM(s) Oral <User Schedule>  pantoprazole  Injectable 40 milliGRAM(s) IV Push daily  polyethylene glycol 3350 17 Gram(s) Oral daily  senna 2 Tablet(s) Oral at bedtime    MEDICATIONS  (PRN):  LORazepam   Injectable 1 milliGRAM(s) IV Push once PRN GTC  ramelteon 8 milliGRAM(s) Oral at bedtime PRN Insomnia    Allergies  No Known Allergies	    ROS: Pertinent positives above, all other ROS were reviewed and are negative.      Vital Signs Last 24 Hrs  T(C): 36.5 (08 Jul 2021 07:18), Max: 36.8 (07 Jul 2021 16:11)  T(F): 97.7 (08 Jul 2021 07:18), Max: 98.3 (07 Jul 2021 23:45)  HR: 93 (08 Jul 2021 07:18) (90 - 109)  BP: 131/78 (08 Jul 2021 07:18) (122/78 - 139/84)  RR: 18 (08 Jul 2021 07:18) (18 - 20)  SpO2: 98% (08 Jul 2021 07:18) (95% - 98%)    GENERAL EXAM:  Constitutional: Lying in bed. NAD.  HEENT: PERRL. Atraumatic.    NEUROLOGICAL EXAM:  MS: Eyes closed. Do not open to voice or noxious stimuli. Holds eyes open after manually opened by examiner. No verbal output. Does not follow commands.  CN: PERRL. Eyes midline, cross midline with OCR. Face grossly symmetric. Intermittent hiccups.  Motor: No spontaneous movement noted x4. RLE/LLE contracted. No twitching noted in RUE.  Coordination/Gait: Unable to assess.    Labs:   cbc                      11.6   12.70 )-----------( 189      ( 08 Jul 2021 05:58 )             36.2     [id number]    135  |  99  |  18  ----------------------------<  120<H>  4.3   |  22  |  0.34<L>    Ca    8.6      08 Jul 2021 05:58  Phos  3.6     07-07  Mg     2.2     07-07    TPro  6.7  /  Alb  3.0<L>  /  TBili  0.2  /  DBili  x   /  AST  20  /  ALT  31  /  AlkPhos  88  07-07    LIVER FUNCTIONS - ( 07 Jul 2021 06:21 )  Alb: 3.0 g/dL / Pro: 6.7 g/dL / ALK PHOS: 88 U/L / ALT: 31 U/L / AST: 20 U/L / GGT: x           Radiology/EEGs:  -06/29 CTH: No CT evidence of acute intracranial hemorrhage, mass effect, or midline shift. Unchanged posttreatment changes.    -07/03 EEG Summary / Classification:  Abnormal EEG in comatose patient  Lateralized periodic discharges, left hemisphere, max over centroparietal region  Disorganization  Moderate background slowing, generalized.  EEG Impression / Clinical Correlate:  The presence of LPDs is often associated with subacute structural injury in the left hemisphere. LPDs also indicate heightened risk for seizures.  There is also evidence of moderate diffuse cerebral dysfunction.    -07/04 EEG Summary / Classification:  Abnormal EEG in comatose patient  4.	Lateralized periodic discharges, left hemisphere, max over centroparietal region.  5.	Disorganization  6.	Moderate background slowing, generalized.  EEG Impression / Clinical Correlate:  The presence of LPDs is often associated with subacute structural injury in the left hemisphere. LPDs also indicate heightened risk for seizures.  There is also evidence of moderate diffuse cerebral dysfunction.    -07/05 EEG Summary / Classification:  Abnormal EEG in comatose patient  7.	Lateralized periodic discharges, left hemisphere, max over centroparietal region.  8.	Disorganization  9.	Moderate background slowing, generalized.  EEG Impression / Clinical Correlate:  The presence of LPDs is often associated with subacute structural injury in the left hemisphere. LPDs also indicate heightened risk for seizures.  There is also evidence of moderate diffuse cerebral dysfunction.    -07/06 EEG Summary / Classification:  Abnormal EEG in comatose patient  10.	Lateralized periodic discharges, left hemisphere, max over temporo-centroparietal region with time locked clonus of the right arm.  11.	Disorganization  12.	Moderate background slowing, generalized.  EEG Impression / Clinical Correlate:  The presence of LPDs with associated time locked clonus indicative of focal motor seizures ("EPC").  There is also evidence of moderate diffuse cerebral dysfunction.

## 2021-07-08 NOTE — CHART NOTE - NSCHARTNOTEFT_GEN_A_CORE
72 y/o RH M with PMH of HTN, HLD, h/o SIADH, gliobastoma multiforme (dx in 2019) and seizures presenting after an episode of depressed level of consciousness and suspected seizure episode likely iso progression of disease of glioblastoma multiforme.     Dysphagia Hx:  6/30: Pt was seen for initial bedside swallow evaluation. Pt would not awaken for participation despite maximal verbal and tactile cueing as well as environment modifications. PO trials contraindicated as pt unable to arouse. Recs at that time: NPO, with non-oral nutrition/hydration/medications. NGT placed by ENT following evaluation. Per discussion with Junie ROB, difficulty passing NGT given pt's inability to perform volitional swallow.   7/6: Pt seen for reassessment of swallow with son present. Per son, on puree and honey thick liquids prior to admission. SLP placed 1/2 tsp of honey thick liquid in anterior sulci, absent response noted. No spontaneous movement of material, no awareness demonstrated for material despite verbal/tactile stimulation. SLP suctioned material from mouth x2 attempts. Recs at that time: continue NPO, with non-oral nutrition/hydration/medications.     Pt seen today for re-assessment of swallow per request of family. Per providers, pt's family to be present from 13:00-16:00; however, when clinician arrived at 14:25, family not present. Called sonJim; however, no answer. Pt found in bed, awake upon encounter. +Flat affect, +blank stare, +room air, +KFT, +open mouth posture with severe xerostomia noted (dried secretions on palate), + hiccups. Pt did not follow any commands. Pt is non-verbal. Clinician placed honey-coated tsp on labial structures. Pt with delayed response to tactile cue. Pt began to demonstrate repetitive oral movement (rotary chew). Small piece of crushed ice chip via tsp presented to labial structure. Pt demonstrated absent oral grading and absent stripping of spoon despite verbal/tactile stimulation. SLP placed small piece of crushed ice chip in anterior sulci, absent response noted. No spontaneous movement of material, no awareness demonstrated for material despite verbal/tactile cueing. SLP suctioned material from anterior sulci with [place]. No further PO trials administered 2/2 HIGH RISK FOR ASPIRATION. Purposeful proactive rounding completed. 5Ps addressed. Pt left in no distress.     Impressions:  Pt continues to present with an insufficient mental status to support PO feeding. Poor orientation to feeding task despite verbal/tactile cues. Exam unchanged since last seen, 7/6/21.     Recommendations:  1) Continue NPO, with non-oral nutrition/hydration/medications via KFT. Recommend continued GOC discussion re: long term provision of nutrition/hydration in this pt with severe nishant-pharyngeal dysphagia.   2) Excellent oral hygiene and aspiration precautions for secretions and enteral feeds. MD to assess for oral thrush.    Discussed with RNLynette, and Amanda ROB. Attempted to contact pt's son, Jim; however, did not answer.     Kimber Diaz M.S., CCC-SLP ext. [phone]

## 2021-07-08 NOTE — PROGRESS NOTE ADULT - ASSESSMENT
Assessment: 71 year old RH male with a past medical history of HTN, HLD, hx of SIADH, glioblastoma multiforme (diagnosed in 2019), and seizures presenting after an episode of depressed level of consciousness and suspected seizure episode.    Impression: Likely breakthrough seizures in the setting of progression of disease of glioblastoma multiforme.    Plan:  [x] vEEG: LPDs, left hemisphere, max over temporo-centroparietal region with time locked clonus of the right arm.  [x] MRI Brain w/wo contrast: no changed compared to prior study.   [] c/w Vimpat to 200MG IV BID.  [] c/w Decadron to 4MG PO Q8HR.  [] c/w Keppra 1000MG IV TID.  [] Hyponatremia likely secondary to SIADH. Fluid restriction < 1.5L daily. (135 on 7/8).  [x] Holding home Celexa as this could contribute to hyponatremia.  [] Palliative care was consulted with permission of patient's daughter, patient's son declined service.  [] Repeat S&S eval today, then will reassess GOC with family.  [x] IM on board, Dr. Godfrey. Recs appreciated.  [x] NGT placed by ENT on 06/30.   [] c/w Bowel regimen, Pepcid.  [] S&S to see patient when he is more alert and able to participate in eval.  [x] Discussed patient's treatment plan and goals of care with neuro-oncologist Dr. Martínez - currently on Avastin monotherapy (last dose 6/24, next dose due around 7/9).  [x] CPK, B12, folate: wnl.  [] Diet: Glucerna 1.5 via NGT, goal rate 60cc's/hr.  [] DVT PPx: Lovenox SC.  [] GI PPx: Protonix IV QD.  [] Seizure rescue: Ativan 1MG IVP ONLY FOR CONVULSIONS/GTC LASTING > 3 MINUTES. Not treating focal seizures.    Case discussed with neurology attending Dr. Samaniego.

## 2021-07-08 NOTE — PROGRESS NOTE ADULT - SUBJECTIVE AND OBJECTIVE BOX
Patient is a 71y old  Male who presents with a chief complaint of seizure episode (08 Jul 2021 08:29)  Patient seen today, no changes in status      MEDICATIONS  (STANDING):  amantadine Syrup 100 milliGRAM(s) Oral two times a day  amLODIPine   Tablet 5 milliGRAM(s) Oral daily  atorvastatin 10 milliGRAM(s) Oral at bedtime  dexAMETHasone     Tablet 4 milliGRAM(s) Oral every 8 hours  enoxaparin Injectable 40 milliGRAM(s) SubCutaneous daily  famotidine   Suspension 20 milliGRAM(s) Oral daily  lacosamide IVPB 200 milliGRAM(s) IV Intermittent every 12 hours  levETIRAcetam  IVPB 1000 milliGRAM(s) IV Intermittent <User Schedule>  losartan 100 milliGRAM(s) Oral daily  methylphenidate 5 milliGRAM(s) Oral <User Schedule>  pantoprazole  Injectable 40 milliGRAM(s) IV Push daily  polyethylene glycol 3350 17 Gram(s) Oral daily  senna 2 Tablet(s) Oral at bedtime    MEDICATIONS  (PRN):  LORazepam   Injectable 1 milliGRAM(s) IV Push once PRN GTC  ramelteon 8 milliGRAM(s) Oral at bedtime PRN Insomnia    Vital Signs Last 24 Hrs  T(C): 36.6 (08 Jul 2021 12:18), Max: 36.8 (07 Jul 2021 16:11)  T(F): 97.9 (08 Jul 2021 12:18), Max: 98.3 (07 Jul 2021 23:45)  HR: 97 (08 Jul 2021 12:18) (90 - 109)  BP: 122/77 (08 Jul 2021 12:18) (122/77 - 139/84)  BP(mean): --  RR: 18 (08 Jul 2021 12:18) (18 - 18)  SpO2: 98% (08 Jul 2021 12:18) (95% - 98%)    PE  Eyes open today, unable to follow commands  NAD  Anicteric, MMM  No c/c/e  No rash grossly  FROM                          11.6   12.70 )-----------( 189      ( 08 Jul 2021 05:58 )             36.2       07-08    135  |  99  |  18  ----------------------------<  120<H>  4.3   |  22  |  0.34<L>    Ca    8.6      08 Jul 2021 05:58  Phos  3.6     07-07  Mg     2.2     07-07    TPro  6.7  /  Alb  3.0<L>  /  TBili  0.2  /  DBili  x   /  AST  20  /  ALT  31  /  AlkPhos  88  07-07

## 2021-07-08 NOTE — PROGRESS NOTE ADULT - ASSESSMENT
72 year old RH male with a past medical history of HTN, HLD, hx of SIADH, gliobastoma multiforme (diagnosed in 2019) and seizures presenting after an episode of depressed level of consciousness and suspected seizure episode.    hyponatremia  - likely SIADH  - d/c iv fluids  - fluid restrict    dysphagia  - failed swallow eval  - would keep NPO   - cont NG tube feeds  - peg pending clarification of GOC    leukocytosis  - resolved  - observe off abx    GBM  - treatment as per neuroncology  - stable MRI  - c/w decadron    seizure - on keppra    depression  - off celexa    hyponartemia  - resolved.    dvt px  - Lovenox

## 2021-07-09 LAB
ALBUMIN SERPL ELPH-MCNC: 2.7 G/DL — LOW (ref 3.3–5)
ALP SERPL-CCNC: 100 U/L — SIGNIFICANT CHANGE UP (ref 40–120)
ALT FLD-CCNC: 23 U/L — SIGNIFICANT CHANGE UP (ref 10–45)
ANION GAP SERPL CALC-SCNC: 14 MMOL/L — SIGNIFICANT CHANGE UP (ref 5–17)
APTT BLD: 30.4 SEC — SIGNIFICANT CHANGE UP (ref 27.5–35.5)
AST SERPL-CCNC: 14 U/L — SIGNIFICANT CHANGE UP (ref 10–40)
BILIRUB SERPL-MCNC: 0.2 MG/DL — SIGNIFICANT CHANGE UP (ref 0.2–1.2)
BUN SERPL-MCNC: 21 MG/DL — SIGNIFICANT CHANGE UP (ref 7–23)
CALCIUM SERPL-MCNC: 8.7 MG/DL — SIGNIFICANT CHANGE UP (ref 8.4–10.5)
CHLORIDE SERPL-SCNC: 99 MMOL/L — SIGNIFICANT CHANGE UP (ref 96–108)
CO2 SERPL-SCNC: 22 MMOL/L — SIGNIFICANT CHANGE UP (ref 22–31)
CREAT SERPL-MCNC: 0.32 MG/DL — LOW (ref 0.5–1.3)
GLUCOSE SERPL-MCNC: 156 MG/DL — HIGH (ref 70–99)
HCT VFR BLD CALC: 36.9 % — LOW (ref 39–50)
HGB BLD-MCNC: 11.6 G/DL — LOW (ref 13–17)
INR BLD: 1.08 RATIO — SIGNIFICANT CHANGE UP (ref 0.88–1.16)
MAGNESIUM SERPL-MCNC: 2.2 MG/DL — SIGNIFICANT CHANGE UP (ref 1.6–2.6)
MCHC RBC-ENTMCNC: 27.3 PG — SIGNIFICANT CHANGE UP (ref 27–34)
MCHC RBC-ENTMCNC: 31.4 GM/DL — LOW (ref 32–36)
MCV RBC AUTO: 86.8 FL — SIGNIFICANT CHANGE UP (ref 80–100)
NRBC # BLD: 0 /100 WBCS — SIGNIFICANT CHANGE UP (ref 0–0)
PHOSPHATE SERPL-MCNC: 3.2 MG/DL — SIGNIFICANT CHANGE UP (ref 2.5–4.5)
PLATELET # BLD AUTO: 183 K/UL — SIGNIFICANT CHANGE UP (ref 150–400)
POTASSIUM SERPL-MCNC: 4.4 MMOL/L — SIGNIFICANT CHANGE UP (ref 3.5–5.3)
POTASSIUM SERPL-SCNC: 4.4 MMOL/L — SIGNIFICANT CHANGE UP (ref 3.5–5.3)
PROT SERPL-MCNC: 6.7 G/DL — SIGNIFICANT CHANGE UP (ref 6–8.3)
PROTHROM AB SERPL-ACNC: 12.9 SEC — SIGNIFICANT CHANGE UP (ref 10.6–13.6)
RBC # BLD: 4.25 M/UL — SIGNIFICANT CHANGE UP (ref 4.2–5.8)
RBC # FLD: 17.2 % — HIGH (ref 10.3–14.5)
SODIUM SERPL-SCNC: 135 MMOL/L — SIGNIFICANT CHANGE UP (ref 135–145)
WBC # BLD: 13.03 K/UL — HIGH (ref 3.8–10.5)
WBC # FLD AUTO: 13.03 K/UL — HIGH (ref 3.8–10.5)

## 2021-07-09 PROCEDURE — 99231 SBSQ HOSP IP/OBS SF/LOW 25: CPT

## 2021-07-09 RX ORDER — ATORVASTATIN CALCIUM 80 MG/1
10 TABLET, FILM COATED ORAL AT BEDTIME
Refills: 0 | Status: DISCONTINUED | OUTPATIENT
Start: 2021-07-09 | End: 2021-07-20

## 2021-07-09 RX ORDER — POLYETHYLENE GLYCOL 3350 17 G/17G
17 POWDER, FOR SOLUTION ORAL DAILY
Refills: 0 | Status: DISCONTINUED | OUTPATIENT
Start: 2021-07-09 | End: 2021-07-20

## 2021-07-09 RX ORDER — AMANTADINE HCL 100 MG
100 CAPSULE ORAL
Refills: 0 | Status: DISCONTINUED | OUTPATIENT
Start: 2021-07-09 | End: 2021-07-20

## 2021-07-09 RX ORDER — LOSARTAN POTASSIUM 100 MG/1
100 TABLET, FILM COATED ORAL DAILY
Refills: 0 | Status: DISCONTINUED | OUTPATIENT
Start: 2021-07-09 | End: 2021-07-20

## 2021-07-09 RX ORDER — SENNA PLUS 8.6 MG/1
2 TABLET ORAL AT BEDTIME
Refills: 0 | Status: DISCONTINUED | OUTPATIENT
Start: 2021-07-09 | End: 2021-07-20

## 2021-07-09 RX ORDER — DEXAMETHASONE 0.5 MG/5ML
4 ELIXIR ORAL ONCE
Refills: 0 | Status: COMPLETED | OUTPATIENT
Start: 2021-07-09 | End: 2021-07-09

## 2021-07-09 RX ORDER — AMLODIPINE BESYLATE 2.5 MG/1
5 TABLET ORAL DAILY
Refills: 0 | Status: DISCONTINUED | OUTPATIENT
Start: 2021-07-09 | End: 2021-07-20

## 2021-07-09 RX ORDER — DEXAMETHASONE 0.5 MG/5ML
4 ELIXIR ORAL EVERY 8 HOURS
Refills: 0 | Status: DISCONTINUED | OUTPATIENT
Start: 2021-07-09 | End: 2021-07-12

## 2021-07-09 RX ORDER — METHYLPHENIDATE HCL 5 MG
5 TABLET ORAL
Refills: 0 | Status: DISCONTINUED | OUTPATIENT
Start: 2021-07-09 | End: 2021-07-12

## 2021-07-09 RX ORDER — SODIUM CHLORIDE 9 MG/ML
500 INJECTION INTRAMUSCULAR; INTRAVENOUS; SUBCUTANEOUS
Refills: 0 | Status: COMPLETED | OUTPATIENT
Start: 2021-07-09 | End: 2021-07-11

## 2021-07-09 RX ADMIN — Medication 100 MILLIGRAM(S): at 06:22

## 2021-07-09 RX ADMIN — Medication 100 MILLIGRAM(S): at 21:59

## 2021-07-09 RX ADMIN — LACOSAMIDE 140 MILLIGRAM(S): 50 TABLET ORAL at 06:23

## 2021-07-09 RX ADMIN — LOSARTAN POTASSIUM 100 MILLIGRAM(S): 100 TABLET, FILM COATED ORAL at 06:23

## 2021-07-09 RX ADMIN — LEVETIRACETAM 400 MILLIGRAM(S): 250 TABLET, FILM COATED ORAL at 05:06

## 2021-07-09 RX ADMIN — ENOXAPARIN SODIUM 40 MILLIGRAM(S): 100 INJECTION SUBCUTANEOUS at 18:22

## 2021-07-09 RX ADMIN — Medication 4 MILLIGRAM(S): at 14:59

## 2021-07-09 RX ADMIN — Medication 4 MILLIGRAM(S): at 21:59

## 2021-07-09 RX ADMIN — Medication 4 MILLIGRAM(S): at 06:22

## 2021-07-09 RX ADMIN — Medication 5 MILLIGRAM(S): at 21:58

## 2021-07-09 RX ADMIN — LACOSAMIDE 140 MILLIGRAM(S): 50 TABLET ORAL at 18:18

## 2021-07-09 RX ADMIN — AMLODIPINE BESYLATE 5 MILLIGRAM(S): 2.5 TABLET ORAL at 06:22

## 2021-07-09 RX ADMIN — ATORVASTATIN CALCIUM 10 MILLIGRAM(S): 80 TABLET, FILM COATED ORAL at 21:58

## 2021-07-09 RX ADMIN — Medication 5 MILLIGRAM(S): at 06:22

## 2021-07-09 RX ADMIN — PANTOPRAZOLE SODIUM 40 MILLIGRAM(S): 20 TABLET, DELAYED RELEASE ORAL at 12:48

## 2021-07-09 RX ADMIN — LEVETIRACETAM 400 MILLIGRAM(S): 250 TABLET, FILM COATED ORAL at 21:58

## 2021-07-09 RX ADMIN — SENNA PLUS 2 TABLET(S): 8.6 TABLET ORAL at 21:58

## 2021-07-09 RX ADMIN — LEVETIRACETAM 400 MILLIGRAM(S): 250 TABLET, FILM COATED ORAL at 13:21

## 2021-07-09 NOTE — PROGRESS NOTE ADULT - SUBJECTIVE AND OBJECTIVE BOX
Patient is a 71y old  Male who presents with a chief complaint of seizure episode (09 Jul 2021 10:05)  Patient seen today, lethargic, no events      MEDICATIONS  (STANDING):  amantadine Syrup 100 milliGRAM(s) Oral two times a day  amLODIPine   Tablet 5 milliGRAM(s) Oral daily  atorvastatin 10 milliGRAM(s) Oral at bedtime  baclofen 5 milliGRAM(s) Oral two times a day  dexAMETHasone     Tablet 4 milliGRAM(s) Oral every 8 hours  enoxaparin Injectable 40 milliGRAM(s) SubCutaneous daily  lacosamide IVPB 200 milliGRAM(s) IV Intermittent every 12 hours  levETIRAcetam  IVPB 1000 milliGRAM(s) IV Intermittent <User Schedule>  losartan 100 milliGRAM(s) Oral daily  methylphenidate 5 milliGRAM(s) Oral <User Schedule>  pantoprazole  Injectable 40 milliGRAM(s) IV Push daily  polyethylene glycol 3350 17 Gram(s) Oral daily  senna 2 Tablet(s) Oral at bedtime    MEDICATIONS  (PRN):  LORazepam   Injectable 1 milliGRAM(s) IV Push once PRN GTC  ramelteon 8 milliGRAM(s) Oral at bedtime PRN Insomnia      Vital Signs Last 24 Hrs  T(C): 36.4 (09 Jul 2021 11:43), Max: 36.7 (08 Jul 2021 20:13)  T(F): 97.5 (09 Jul 2021 11:43), Max: 98.1 (08 Jul 2021 20:13)  HR: 89 (09 Jul 2021 11:43) (67 - 98)  BP: 113/65 (09 Jul 2021 11:43) (106/68 - 128/83)  BP(mean): --  RR: 18 (09 Jul 2021 11:43) (18 - 20)  SpO2: 98% (09 Jul 2021 11:43) (95% - 99%)    PE  NAD  Awake, alert  Anicteric, MMM  No c/c/e  No rash grossly  FROM                          11.6   13.03 )-----------( 183      ( 09 Jul 2021 07:06 )             36.9       07-09    135  |  99  |  21  ----------------------------<  156<H>  4.4   |  22  |  0.32<L>    Ca    8.7      09 Jul 2021 07:03  Phos  3.2     07-09  Mg     2.2     07-09    TPro  6.7  /  Alb  2.7<L>  /  TBili  0.2  /  DBili  x   /  AST  14  /  ALT  23  /  AlkPhos  100  07-09

## 2021-07-09 NOTE — PROGRESS NOTE ADULT - ASSESSMENT
Ronald Rider is a 72 year old RH male with a past medical history of HTN, HLD, hx of SIADH, gliobastoma multiforme (diagnosed in 2019) and seizures presenting after an episode of depressed level of consciousness and suspected seizure episode. At baseline, patient has contractions throughout and is bedbound, he is non-verbal but can respond to some simple commands. Patient was recently admitted for increased somnolence, difficulty breathing, decreased alertness, and poor PO intake. Was briefly on antibiotics but discontinued once no infection was found, had hypernatremia which resolved, briefly on steroid which were tapered and then subsequently discontinued. Was discharged for follow up with his neuro[place] [facility] Currently patient was on Keppra 750mg tid and as per son was compliant. Patient now presenting with after an episode where he was sitting in his chair and then noted be slumping over to the R, having increased tonic contractions of the RUE, and some twitching of his face. Patient was noted to be incontinent during the episode and was less responsive than usual. He was taken by EMS to the hospital and given Versed 5mg IM en route. Patient gradually improved and was able to follow some command to squeeze a finger with his son in the room. On examination, the patient was not able to contribute to history, ROS, or follow commands to exam.     Glioblastoma History:  Diagnosed in 2019 with MR noting involvement of the splenium of the corpus callosum and bilateral parenchymal involvement. Had stereotactic biopsy in 2019 with pathology noting glioblastoma. Was started on Keppra and Decadron. Was [place] at [facility] [place] Chavis with radiation, temozolomide, and Bactrim. Temozolamide was stopped due to myelosuppression. Has had numerous admission for somnolence and seizure episodes. Currently on Avastin maintenance. MRI in April 2021 and June 2021 show stable mass, progression of RT gliosis. Patient was administered steroids at the time of last admission in early June 2021 and was tapered off as an outpatient.    Hematology/Oncology called to follow patient who is known to Dr. Lyle Chavis at [facility] for the treatment of glioblastoma multiforme. He was discharged from [facility] as noted above. As per available records from [facility] last treatment with Avastin was 6/11/2021 (will obtain updated records once [facility] has been notified of admission).    Glioblastoma Multiforme  --Under care at [facility] Dr. Lyle Chavis  --Currently on maintenance Avastin next due 7/9, at this time will continue after discharge  --Appears to have stable disease    AMS, Seizure like activity  --No edema, mass effect, hemorrhage or midline shift on CT  -- Repeat MRI stable   --Patient had UTI 4/2021 which caused AMS  --Patient on Dexamethasone now q8 hours  --Keppra has been increased to 1000 mg BID  --Further management per Neurology  --Vimpat dose increased     Hyponatremia  --Management per primary team  --Has improved overall - Na 128 on 7/2. now 136    GOC  --Palliative met with family (son) on last admission at [facility]  --Patient still full code at that time  --PEG tube to be placed today  --As per Dr. Martínez who is covering for Dr. Chavis - if mental status unable to be improved on current management, hospice may be appropriate    We will continue to follow patient and coordinate with [facility].    Kaia Rivas NP-C  Hematolgy/Oncology  [facility]  [phone] (Cell)  [phone] (Office)  [phone] ( [place] office)  Evenings and weekends, please call MD on call or office

## 2021-07-09 NOTE — PROGRESS NOTE ADULT - ASSESSMENT
72 year old RH male with a past medical history of HTN, HLD, hx of SIADH, gliobastoma multiforme (diagnosed in 2019) and seizures presenting after an episode of depressed level of consciousness and suspected seizure episode.    hyponatremia  - likely SIADH  - d/c iv fluids  - fluid restrict    dysphagia  - failed swallow eval  - would keep NPO   - cont NG tube feeds  - peg scheduled for today    leukocytosis  - resolved  - observe off abx    GBM  - treatment as per neuroncology  - stable MRI  - c/w decadron    seizure - on keppra    depression  - off celexa    hyponartemia  - resolved.    dvt px  - Lovenox

## 2021-07-09 NOTE — PROGRESS NOTE ADULT - SUBJECTIVE AND OBJECTIVE BOX
MRN-[id number]    Subjective:    PAST MEDICAL & SURGICAL HISTORY:  Hypertension    Hyperlipidemia    Seizures    Diabetes    Glioblastoma multiforme    CMV infection    History of SIADH    H/O colonoscopy    History of laryngoscopy    Status post stereotactic brain biopsy    FAMILY HISTORY:  FH: myocardial infarction (Father)    Social Hx:  Nonsmoker, no drug or alcohol use    Home Medications:  amantadine 50 mg/5 mL oral syrup: 10 milliliter(s) orally 2 times a day (05 Jun 2021 10:00)  atorvastatin 10 mg oral tablet: 1 tab(s) orally once a day (05 Jun 2021 10:00)  citalopram 20 mg oral tablet: 1 tab(s) orally once a day (05 Jun 2021 10:00)  Keppra 100 mg/mL oral solution: 7.5 milliliter(s) orally 2 times a day (05 Jun 2021 10:00)  losartan 100 mg oral tablet: 1 tab(s) orally once a day (05 Jun 2021 10:00)  methylphenidate 5 mg oral tablet: 1 tab(s) orally @ 8am and 2 pm (15 Abel 2021 12:19)  Norvasc 5 mg oral tablet: 1 tab(s) orally once a day (05 Jun 2021 10:00)  nystatin 100,000 units/mL oral suspension: 4 milliliter(s) orally 4 times a day (05 Jun 2021 10:00)  ramelteon 8 mg oral tablet: 1 tab(s) orally once a day (at bedtime) (05 Jun 2021 10:00)    MEDICATIONS  (STANDING):  amantadine Syrup 100 milliGRAM(s) Oral two times a day  amLODIPine   Tablet 5 milliGRAM(s) Oral daily  atorvastatin 10 milliGRAM(s) Oral at bedtime  baclofen 5 milliGRAM(s) Oral two times a day  dexAMETHasone     Tablet 4 milliGRAM(s) Oral every 8 hours  enoxaparin Injectable 40 milliGRAM(s) SubCutaneous daily  famotidine   Suspension 20 milliGRAM(s) Oral daily  lacosamide IVPB 200 milliGRAM(s) IV Intermittent every 12 hours  levETIRAcetam  IVPB 1000 milliGRAM(s) IV Intermittent <User Schedule>  losartan 100 milliGRAM(s) Oral daily  methylphenidate 5 milliGRAM(s) Oral <User Schedule>  pantoprazole  Injectable 40 milliGRAM(s) IV Push daily  polyethylene glycol 3350 17 Gram(s) Oral daily  senna 2 Tablet(s) Oral at bedtime    MEDICATIONS  (PRN):  LORazepam   Injectable 1 milliGRAM(s) IV Push once PRN GTC  ramelteon 8 milliGRAM(s) Oral at bedtime PRN Insomnia    Allergies  No Known Allergies	    ROS: Pertinent positives above, all other ROS were reviewed and are negative.      Vital Signs Last 24 Hrs  T(C): 36.4 (09 Jul 2021 07:25), Max: 36.7 (08 Jul 2021 20:13)  T(F): 97.6 (09 Jul 2021 07:25), Max: 98.1 (08 Jul 2021 20:13)  HR: 90 (09 Jul 2021 07:25) (67 - 98)  BP: 121/73 (09 Jul 2021 07:25) (106/68 - 128/83)  RR: 20 (09 Jul 2021 07:25) (18 - 20)  SpO2: 99% (09 Jul 2021 07:25) (95% - 99%)    GENERAL EXAM:  Constitutional:  HEENT:  Extremities:    NEUROLOGICAL EXAM:  MS:  CN:  Motor:  Sensation:  Coordination/Gait: Not assessed.    Labs:   cbc                      11.6   13.03 )-----------( 183      ( 09 Jul 2021 07:06 )             36.9     [id number]    135  |  99  |  21  ----------------------------<  156<H>  4.4   |  22  |  0.32<L>    Ca    8.7      09 Jul 2021 07:03  Phos  3.2     07-09  Mg     2.2     07-09    TPro  6.7  /  Alb  2.7<L>  /  TBili  0.2  /  DBili  x   /  AST  14  /  ALT  23  /  AlkPhos  100  07-09    Coags: PT/INR - ( 09 Jul 2021 07:01 )   PT: 12.9 sec;   INR: 1.08 ratio      PTT - ( 09 Jul 2021 07:01 )  PTT:30.4 sec    LIVER FUNCTIONS - ( 09 Jul 2021 07:03 )  Alb: 2.7 g/dL / Pro: 6.7 g/dL / ALK PHOS: 100 U/L / ALT: 23 U/L / AST: 14 U/L / GGT: x           Radiology/EEGs:  -06/29 CTH: No CT evidence of acute intracranial hemorrhage, mass effect, or midline shift. Unchanged posttreatment changes.    -07/03 EEG Summary / Classification:  Abnormal EEG in comatose patient  Lateralized periodic discharges, left hemisphere, max over centroparietal region  Disorganization  Moderate background slowing, generalized.  EEG Impression / Clinical Correlate:  The presence of LPDs is often associated with subacute structural injury in the left hemisphere. LPDs also indicate heightened risk for seizures.  There is also evidence of moderate diffuse cerebral dysfunction.    -07/04 EEG Summary / Classification:  Abnormal EEG in comatose patient  4.	Lateralized periodic discharges, left hemisphere, max over centroparietal region.  5.	Disorganization  6.	Moderate background slowing, generalized.  EEG Impression / Clinical Correlate:  The presence of LPDs is often associated with subacute structural injury in the left hemisphere. LPDs also indicate heightened risk for seizures.  There is also evidence of moderate diffuse cerebral dysfunction.    -07/05 EEG Summary / Classification:  Abnormal EEG in comatose patient  7.	Lateralized periodic discharges, left hemisphere, max over centroparietal region.  8.	Disorganization  9.	Moderate background slowing, generalized.  EEG Impression / Clinical Correlate:  The presence of LPDs is often associated with subacute structural injury in the left hemisphere. LPDs also indicate heightened risk for seizures.  There is also evidence of moderate diffuse cerebral dysfunction.    -07/06 EEG Summary / Classification:  Abnormal EEG in comatose patient  10.	Lateralized periodic discharges, left hemisphere, max over temporo-centroparietal region with time locked clonus of the right arm.  11.	Disorganization  12.	Moderate background slowing, generalized.  EEG Impression / Clinical Correlate:  The presence of LPDs with associated time locked clonus indicative of focal motor seizures ("EPC").  There is also evidence of moderate diffuse cerebral dysfunction.   MRN-[id number]    Subjective: 72 yo male seen and examined at bedside.    PAST MEDICAL & SURGICAL HISTORY:  Hypertension    Hyperlipidemia    Seizures    Diabetes    Glioblastoma multiforme    CMV infection    History of SIADH    H/O colonoscopy    History of laryngoscopy    Status post stereotactic brain biopsy    FAMILY HISTORY:  FH: myocardial infarction (Father)    Social Hx:  Nonsmoker, no drug or alcohol use    Home Medications:  amantadine 50 mg/5 mL oral syrup: 10 milliliter(s) orally 2 times a day (05 Jun 2021 10:00)  atorvastatin 10 mg oral tablet: 1 tab(s) orally once a day (05 Jun 2021 10:00)  citalopram 20 mg oral tablet: 1 tab(s) orally once a day (05 Jun 2021 10:00)  Keppra 100 mg/mL oral solution: 7.5 milliliter(s) orally 2 times a day (05 Jun 2021 10:00)  losartan 100 mg oral tablet: 1 tab(s) orally once a day (05 Jun 2021 10:00)  methylphenidate 5 mg oral tablet: 1 tab(s) orally @ 8am and 2 pm (15 Abel 2021 12:19)  Norvasc 5 mg oral tablet: 1 tab(s) orally once a day (05 Jun 2021 10:00)  nystatin 100,000 units/mL oral suspension: 4 milliliter(s) orally 4 times a day (05 Jun 2021 10:00)  ramelteon 8 mg oral tablet: 1 tab(s) orally once a day (at bedtime) (05 Jun 2021 10:00)    MEDICATIONS  (STANDING):  amantadine Syrup 100 milliGRAM(s) Oral two times a day  amLODIPine   Tablet 5 milliGRAM(s) Oral daily  atorvastatin 10 milliGRAM(s) Oral at bedtime  baclofen 5 milliGRAM(s) Oral two times a day  dexAMETHasone     Tablet 4 milliGRAM(s) Oral every 8 hours  enoxaparin Injectable 40 milliGRAM(s) SubCutaneous daily  famotidine   Suspension 20 milliGRAM(s) Oral daily  lacosamide IVPB 200 milliGRAM(s) IV Intermittent every 12 hours  levETIRAcetam  IVPB 1000 milliGRAM(s) IV Intermittent <User Schedule>  losartan 100 milliGRAM(s) Oral daily  methylphenidate 5 milliGRAM(s) Oral <User Schedule>  pantoprazole  Injectable 40 milliGRAM(s) IV Push daily  polyethylene glycol 3350 17 Gram(s) Oral daily  senna 2 Tablet(s) Oral at bedtime    MEDICATIONS  (PRN):  LORazepam   Injectable 1 milliGRAM(s) IV Push once PRN GTC  ramelteon 8 milliGRAM(s) Oral at bedtime PRN Insomnia    Allergies  No Known Allergies	    ROS: Pertinent positives above, all other ROS were reviewed and are negative.      Vital Signs Last 24 Hrs  T(C): 36.4 (09 Jul 2021 07:25), Max: 36.7 (08 Jul 2021 20:13)  T(F): 97.6 (09 Jul 2021 07:25), Max: 98.1 (08 Jul 2021 20:13)  HR: 90 (09 Jul 2021 07:25) (67 - 98)  BP: 121/73 (09 Jul 2021 07:25) (106/68 - 128/83)  RR: 20 (09 Jul 2021 07:25) (18 - 20)  SpO2: 99% (09 Jul 2021 07:25) (95% - 99%)    GENERAL EXAM:  Constitutional: Lying in bed, NAD.  HEENT: PERRL. Normocephalic.  Extremities: No edema.    NEUROLOGICAL EXAM:  MS: Eyes closed, do not open to verbal/noxious stimuli. No verbal output. Does not follow commands.  CN: PERRL. Eyes at primary gaze. No facial asymmetry.  Motor: No spontaneous movement noted. No twitching/jerking observed. No hiccups.  Coordination/Gait: Not assessed.    Labs:   cbc                      11.6   13.03 )-----------( 183      ( 09 Jul 2021 07:06 )             36.9     [id number]    135  |  99  |  21  ----------------------------<  156<H>  4.4   |  22  |  0.32<L>    Ca    8.7      09 Jul 2021 07:03  Phos  3.2     07-09  Mg     2.2     07-09    TPro  6.7  /  Alb  2.7<L>  /  TBili  0.2  /  DBili  x   /  AST  14  /  ALT  23  /  AlkPhos  100  07-09    Coags: PT/INR - ( 09 Jul 2021 07:01 )   PT: 12.9 sec;   INR: 1.08 ratio      PTT - ( 09 Jul 2021 07:01 )  PTT:30.4 sec    LIVER FUNCTIONS - ( 09 Jul 2021 07:03 )  Alb: 2.7 g/dL / Pro: 6.7 g/dL / ALK PHOS: 100 U/L / ALT: 23 U/L / AST: 14 U/L / GGT: x           Radiology/EEGs:  -06/29 CTH: No CT evidence of acute intracranial hemorrhage, mass effect, or midline shift. Unchanged posttreatment changes.    -07/03 EEG Summary / Classification:  Abnormal EEG in comatose patient  Lateralized periodic discharges, left hemisphere, max over centroparietal region  Disorganization  Moderate background slowing, generalized.  EEG Impression / Clinical Correlate:  The presence of LPDs is often associated with subacute structural injury in the left hemisphere. LPDs also indicate heightened risk for seizures.  There is also evidence of moderate diffuse cerebral dysfunction.    -07/04 EEG Summary / Classification:  Abnormal EEG in comatose patient  4.	Lateralized periodic discharges, left hemisphere, max over centroparietal region.  5.	Disorganization  6.	Moderate background slowing, generalized.  EEG Impression / Clinical Correlate:  The presence of LPDs is often associated with subacute structural injury in the left hemisphere. LPDs also indicate heightened risk for seizures.  There is also evidence of moderate diffuse cerebral dysfunction.    -07/05 EEG Summary / Classification:  Abnormal EEG in comatose patient  7.	Lateralized periodic discharges, left hemisphere, max over centroparietal region.  8.	Disorganization  9.	Moderate background slowing, generalized.  EEG Impression / Clinical Correlate:  The presence of LPDs is often associated with subacute structural injury in the left hemisphere. LPDs also indicate heightened risk for seizures.  There is also evidence of moderate diffuse cerebral dysfunction.    -07/06 EEG Summary / Classification:  Abnormal EEG in comatose patient  10.	Lateralized periodic discharges, left hemisphere, max over temporo-centroparietal region with time locked clonus of the right arm.  11.	Disorganization  12.	Moderate background slowing, generalized.  EEG Impression / Clinical Correlate:  The presence of LPDs with associated time locked clonus indicative of focal motor seizures ("EPC").  There is also evidence of moderate diffuse cerebral dysfunction.

## 2021-07-09 NOTE — PROGRESS NOTE ADULT - ASSESSMENT
71 M w GBM, GI consulted for enteral nutrition    1. GBM. Unclear if will be candidate for further treatment    2. Oropharyngeal dysphagia  - NPO for PEG today, family is agreeable     3. Seizure  - care per neurology appreciated  - neuro suspects breakthrough seizures in the setting of progression of disease of glioblastoma multiforme        The plan of care was discussed with the physician assistant and modifications were made to the notation where appropriate.   Differential diagnosis and plan of care discussed with patient after the evaluation  35 minutes spent on total encounter of which more than fifty percent of the encounter was spent counseling and/or coordinating care by the attending physician.    [facility]  Gastroenterology and Hepatology  [address] [place], [place]  Office: [phone]  Cell: [phone]   71 M w GBM, GI consulted for enteral nutrition    1. GBM. Unclear if will be candidate for further treatment    2. Oropharyngeal dysphagia  - NPO for PEG today, family is agreeable     3. Seizure  - care per neurology appreciated  - neuro suspects breakthrough seizures in the setting of progression of disease of glioblastoma multiforme      Attending supervision statement: I have personally seen and examined the patient. I fully participated in the care of this patient. I have made amendments to the documentation where necessary, and agree with the history, physical exam, and plan as outlined by the ACP.  The plan of care was discussed with the physician assistant and modifications were made to the notation where appropriate.   Differential diagnosis and plan of care discussed with patient after the evaluation  35 minutes spent on total encounter of which more than fifty percent of the encounter was spent counseling and/or coordinating care by the attending physician.    [facility]  Gastroenterology and Hepatology  [address] [place], [place]  Office: [phone]  Cell: [phone]

## 2021-07-09 NOTE — PROGRESS NOTE ADULT - ASSESSMENT
Assessment: 71 year old RH male with a past medical history of HTN, HLD, hx of SIADH, glioblastoma multiforme (diagnosed in 2019), and seizures presenting after an episode of depressed level of consciousness and suspected seizure episode.    Impression: Likely breakthrough seizures in the setting of progression of disease of glioblastoma multiforme.    Plan:  [x] vEEG: LPDs, left hemisphere, max over temporo-centroparietal region with time locked clonus of the right arm.  [x] MRI Brain w/wo contrast: no changed compared to prior study.   [] c/w Vimpat to 200MG IV BID.  [] c/w Decadron to 4MG PO Q8HR.  [] c/w Keppra 1000MG IV TID.  [] Hyponatremia likely secondary to SIADH. Fluid restriction < 1.5L daily. (135 on 7/9).  [x] Holding home Celexa as this could contribute to hyponatremia.  [] NPO after MN for PEG placement today.  [x] IM on board, Dr. Godfrey. Recs appreciated.  [x] NGT placed by ENT on 06/30.   [] c/w Bowel regimen, Pepcid.  [] S&S to see patient when he is more alert and able to participate in eval.  [x] Discussed patient's treatment plan and goals of care with neuro-oncologist Dr. Martínez - currently on Avastin monotherapy (last dose 6/24, next dose due around 7/9).  [x] CPK, B12, folate: wnl.  [] Diet: NPO for PEG.  [] DVT PPx: Lovenox SC.  [] GI PPx: Protonix IV QD.  [] Seizure rescue: Ativan 1MG IVP ONLY FOR CONVULSIONS/GTC LASTING > 3 MINUTES. Not treating focal seizures.    Case discussed with neurology attending Dr. Samaniego.   Assessment: 71 year old RH male with a past medical history of HTN, HLD, hx of SIADH, glioblastoma multiforme (diagnosed in 2019), and seizures presenting after an episode of depressed level of consciousness and suspected seizure episode.    Impression: Likely breakthrough seizures in the setting of progression of disease of glioblastoma multiforme.    Plan:  [x] vEEG: LPDs, left hemisphere, max over temporo-centroparietal region with time locked clonus of the right arm.  [x] MRI Brain w/wo contrast: no changed compared to prior study.   [] c/w Vimpat to 200MG IV BID.  [] c/w Decadron to 4MG PO Q8HR.  [] c/w Keppra 1000MG IV TID.  [] Hyponatremia likely secondary to SIADH. Fluid restriction < 1.5L daily. (135 on 7/9).  [x] Holding home Celexa as this could contribute to hyponatremia.  [] NPO for PEG placement today.  [x] IM on board, Dr. Godfrey. Recs appreciated.  [x] NGT placed by ENT on 06/30.   [] c/w Bowel regimen, Pepcid.  [x] Discussed patient's treatment plan and goals of care with neuro-oncologist Dr. Martínez - currently on Avastin monotherapy (last dose 6/24, next dose due around 7/9).  [x] CPK, B12, folate: wnl.  [] Diet: NPO for PEG.  [] DVT PPx: Lovenox SC.  [] GI PPx: Protonix IV QD.  [] Seizure rescue: Ativan 1MG IVP ONLY FOR CONVULSIONS/GTC LASTING > 3 MINUTES. Not treating focal seizures.    Case discussed with neurology attending Dr. Samaniego.   Assessment: 71 year old RH male with a past medical history of HTN, HLD, hx of SIADH, glioblastoma multiforme (diagnosed in 2019), and seizures presenting after an episode of depressed level of consciousness and suspected seizure episode.    Impression: Failure to thrive and progression of disease. Cachexia due to malignancy and poor nutrition.     Plan:  [x] vEEG: LPDs, left hemisphere, max over temporo-centroparietal region with time locked clonus of the right arm.  [x] MRI Brain w/wo contrast: no changed compared to prior study.   [] c/w Vimpat to 200MG IV BID.  [] c/w Decadron to 4MG PO Q8HR.  [] c/w Keppra 1000MG IV TID.  [] Hyponatremia likely secondary to SIADH. Fluid restriction < 1.5L daily. (135 on 7/9).  [x] Holding home Celexa as this could contribute to hyponatremia.  [] NPO for PEG placement today.  [x] IM on board, Dr. Godfrey. Recs appreciated.  [x] NGT placed by ENT on 06/30.   [] c/w Bowel regimen, Pepcid.  [x] Discussed patient's treatment plan and goals of care with neuro-oncologist Dr. Martínez - currently on Avastin monotherapy (last dose 6/24, next dose due around 7/9).  [x] CPK, B12, folate: wnl.  [] Diet: NPO for PEG.  [] DVT PPx: Lovenox SC.  [] GI PPx: Protonix IV QD.  [] Seizure rescue: Ativan 1MG IVP ONLY FOR CONVULSIONS/GTC LASTING > 3 MINUTES. Not treating focal seizures.    Case discussed with neurology attending Dr. Samaniego.

## 2021-07-09 NOTE — PROGRESS NOTE ADULT - SUBJECTIVE AND OBJECTIVE BOX
INTERVAL HPI/OVERNIGHT EVENTS:    pt seen and examined  tube feeds on hold for EGD/PEG today    MEDICATIONS  (STANDING):  amantadine Syrup 100 milliGRAM(s) Oral two times a day  amLODIPine   Tablet 5 milliGRAM(s) Oral daily  atorvastatin 10 milliGRAM(s) Oral at bedtime  baclofen 5 milliGRAM(s) Oral two times a day  dexAMETHasone     Tablet 4 milliGRAM(s) Oral every 8 hours  enoxaparin Injectable 40 milliGRAM(s) SubCutaneous daily  lacosamide IVPB 200 milliGRAM(s) IV Intermittent every 12 hours  levETIRAcetam  IVPB 1000 milliGRAM(s) IV Intermittent <User Schedule>  losartan 100 milliGRAM(s) Oral daily  methylphenidate 5 milliGRAM(s) Oral <User Schedule>  pantoprazole  Injectable 40 milliGRAM(s) IV Push daily  polyethylene glycol 3350 17 Gram(s) Oral daily  senna 2 Tablet(s) Oral at bedtime    MEDICATIONS  (PRN):  LORazepam   Injectable 1 milliGRAM(s) IV Push once PRN GTC  ramelteon 8 milliGRAM(s) Oral at bedtime PRN Insomnia      Allergies    No Known Allergies    Intolerances        Review of Systems:    non- verbal       Vital Signs Last 24 Hrs  T(C): 36.4 (09 Jul 2021 07:25), Max: 36.7 (08 Jul 2021 20:13)  T(F): 97.6 (09 Jul 2021 07:25), Max: 98.1 (08 Jul 2021 20:13)  HR: 90 (09 Jul 2021 07:25) (67 - 98)  BP: 121/73 (09 Jul 2021 07:25) (106/68 - 128/83)  BP(mean): --  RR: 20 (09 Jul 2021 07:25) (18 - 20)  SpO2: 99% (09 Jul 2021 07:25) (95% - 99%)    PHYSICAL EXAM:    GENERAL:  ill- appearing, no distress  HEENT:  NC/AT,  conjunctivae anicteric, clear and pink,   NECK: supple, trachea midline  CHEST:  Full & symmetric excursion, no increased effort, breath sounds clear  HEART:  Regular rhythm, no JVD  ABDOMEN:  Soft, non-tender, non-distended, normoactive bowel sounds,  no masses , no hepatosplenomegaly  EXTREMITIES:  no cyanosis,clubbing or edema  SKIN:  No rash, erythema, or, ecchymoses, no jaundice  NEURO:  lethargic non-focal, no asterixis  PSYCH: calm, nonverbal  RECTAL: Deferred      LABS:                        11.6   13.03 )-----------( 183      ( 09 Jul 2021 07:06 )             36.9     07-09    135  |  99  |  21  ----------------------------<  156<H>  4.4   |  22  |  0.32<L>    Ca    8.7      09 Jul 2021 07:03  Phos  3.2     07-09  Mg     2.2     07-09    TPro  6.7  /  Alb  2.7<L>  /  TBili  0.2  /  DBili  x   /  AST  14  /  ALT  23  /  AlkPhos  100  07-09    PT/INR - ( 09 Jul 2021 07:01 )   PT: 12.9 sec;   INR: 1.08 ratio         PTT - ( 09 Jul 2021 07:01 )  PTT:30.4 sec      RADIOLOGY & ADDITIONAL TESTS:

## 2021-07-09 NOTE — CHART NOTE - NSCHARTNOTEFT_GEN_A_CORE
Nutrition Services:    Verbal consult received for tube feeding recommendations. Chart reviewed, events noted. Pt s/p PEG today, request for bolus feeds by Neurology team.    Recommendations:  1. When medically feasible, recommend bolus feeds of Glucerna 1.2 355mL (1.5 cans) 4 x daily or q6H to provide 1420mL feed, 1704kcal, 85g protein, 1143mL free water. Meets ~37kcal/kg, 1.8g/kg protein based on RD obtained bedscale 46.4kG. Defer additional free water flushes to team.  -- Recommend initiating feeds at 50mL/feed, advancing each feed by 50mL until goal rate of 355mL/feed achieved (ex. provide 50mL bolus first feed, 100mL bolus second feed, 150mL bolus third feed, etc.). Monitor tolerance to regimen.  2. RD remains available to adjust TF regimen/formulary upon request/PRN.    Monitor tolerance to EN regimen, skin integrity, labs, GI status     Hawa Cervantes, MS, RD, CDN #[phone] Nutrition Services:    Verbal consult received for tube feeding recommendations. Chart reviewed, events noted. Pt for PEG today, request for bolus feeds by Neurology team.    Recommendations:  1. When medically feasible, recommend bolus feeds of Glucerna 1.2 355mL (1.5 cans) 4 x daily or q6H to provide 1420mL feed, 1704kcal, 85g protein, 1143mL free water. Meets ~37kcal/kg, 1.8g/kg protein based on RD obtained bedscale 46.4kG. Defer additional free water flushes to team.  -- Recommend initiating feeds at 50mL/feed, advancing each feed by 50mL until goal rate of 355mL/feed achieved (ex. provide 50mL bolus first feed, 100mL bolus second feed, 150mL bolus third feed, etc.). Monitor tolerance to regimen.  2. RD remains available to adjust TF regimen/formulary upon request/PRN.    Monitor tolerance to EN regimen, skin integrity, labs, GI status     Hawa Cervantes, MS, RD, CDN #[phone]

## 2021-07-09 NOTE — PROGRESS NOTE ADULT - SUBJECTIVE AND OBJECTIVE BOX
DATE OF SERVICE: 07-09-21 @ 18:37    Patient is a 71y old  Male who presents with a chief complaint of seizure episode (09 Jul 2021 12:39)      SUBJECTIVE / OVERNIGHT EVENTS:  pt is unresponsive    MEDICATIONS  (STANDING):  amantadine Syrup 100 milliGRAM(s) Oral two times a day  amLODIPine   Tablet 5 milliGRAM(s) Oral daily  atorvastatin 10 milliGRAM(s) Oral at bedtime  baclofen 5 milliGRAM(s) Oral two times a day  bisacodyl 5 milliGRAM(s) Oral at bedtime  dexAMETHasone     Tablet 4 milliGRAM(s) Oral every 8 hours  enoxaparin Injectable 40 milliGRAM(s) SubCutaneous daily  lacosamide IVPB 200 milliGRAM(s) IV Intermittent every 12 hours  levETIRAcetam  IVPB 1000 milliGRAM(s) IV Intermittent <User Schedule>  losartan 100 milliGRAM(s) Oral daily  methylphenidate 5 milliGRAM(s) Oral <User Schedule>  pantoprazole  Injectable 40 milliGRAM(s) IV Push daily  polyethylene glycol 3350 17 Gram(s) Oral daily  senna 2 Tablet(s) Oral at bedtime  sodium chloride 0.9%. 500 milliLiter(s) (30 mL/Hr) IV Continuous <Continuous>    MEDICATIONS  (PRN):  LORazepam   Injectable 1 milliGRAM(s) IV Push once PRN GTC  ramelteon 8 milliGRAM(s) Oral at bedtime PRN Insomnia      Vital Signs Last 24 Hrs  T(C): 36.3 (09 Jul 2021 15:41), Max: 36.7 (08 Jul 2021 20:13)  T(F): 97.3 (09 Jul 2021 15:41), Max: 98.1 (08 Jul 2021 20:13)  HR: 78 (09 Jul 2021 17:05) (65 - 98)  BP: 126/77 (09 Jul 2021 17:05) (98/60 - 128/83)  BP(mean): --  RR: 15 (09 Jul 2021 17:05) (15 - 20)  SpO2: 98% (09 Jul 2021 17:05) (97% - 100%)  CAPILLARY BLOOD GLUCOSE        I&O's Summary    08 Jul 2021 07:01  -  09 Jul 2021 07:00  --------------------------------------------------------  IN: 0 mL / OUT: 900 mL / NET: -900 mL    09 Jul 2021 07:01  -  09 Jul 2021 18:37  --------------------------------------------------------  IN: 0 mL / OUT: 50 mL / NET: -50 mL        PHYSICAL EXAM:  GENERAL: NAD, cachectic  HEAD:  Atraumatic, Normocephalic  EYES: EOMI, PERRLA, conjunctiva and sclera clear  NECK: Supple, No JVD  CHEST/LUNG: Clear to auscultation bilaterally; No wheeze  HEART: Regular rate and rhythm; No murmurs, rubs, or gallops  ABDOMEN: Soft, Nontender, Nondistended; Bowel sounds present  EXTREMITIES:  2+ Peripheral Pulses, No clubbing, cyanosis, or edema  PSYCH: AAOx0  NEUROLOGY: non-verbal, does not follow commands  SKIN: No rashes or lesions    LABS:                        11.6   13.03 )-----------( 183      ( 09 Jul 2021 07:06 )             36.9     07-09    135  |  99  |  21  ----------------------------<  156<H>  4.4   |  22  |  0.32<L>    Ca    8.7      09 Jul 2021 07:03  Phos  3.2     07-09  Mg     2.2     07-09    TPro  6.7  /  Alb  2.7<L>  /  TBili  0.2  /  DBili  x   /  AST  14  /  ALT  23  /  AlkPhos  100  07-09    PT/INR - ( 09 Jul 2021 07:01 )   PT: 12.9 sec;   INR: 1.08 ratio         PTT - ( 09 Jul 2021 07:01 )  PTT:30.4 sec          RADIOLOGY & ADDITIONAL TESTS:    Imaging Personally Reviewed:    Consultant(s) Notes Reviewed:      Care Discussed with Consultants/Other Providers:

## 2021-07-09 NOTE — PRE PROCEDURE NOTE - PRE PROCEDURE EVALUATION
Attending Physician:    Dr. Paulmbo                        Procedure:   EGD/PEG placement    Indication for Procedure:   Dysphagia  ________________________________________________________  PAST MEDICAL & SURGICAL HISTORY:    Hypertension  Hyperlipidemia  Seizures  Diabetes  Glioblastoma multiformation  CMV infection  History of SIADH    H/O colonoscopy  History of laryngoscopy  Status post stereotactic brain biopsy      ALLERGIES:  No Known Allergies    HOME MEDICATIONS:  amantadine 50 mg/5 mL oral syrup: 10 milliliter(s) orally 2 times a day  atorvastatin 10 mg oral tablet: 1 tab(s) orally once a day  citalopram 20 mg oral tablet: 1 tab(s) orally once a day  Keppra 100 mg/mL oral solution: 7.5 milliliter(s) orally 2 times a day  losartan 100 mg oral tablet: 1 tab(s) orally once a day  methylphenidate 5 mg oral tablet: 1 tab(s) orally @ 8am and 2 pm  Norvasc 5 mg oral tablet: 1 tab(s) orally once a day  nystatin 100,000 units/mL oral suspension: 4 milliliter(s) orally 4 times a day  ramelteon 8 mg oral tablet: 1 tab(s) orally once a day (at bedtime)    AICD/PPM: [ ] yes   [X ] no    PERTINENT LAB DATA:                        11.6   13.03 )-----------( 183      ( 09 Jul 2021 07:06 )             36.9     07-09    135  |  99  |  21  ----------------------------<  156<H>  4.4   |  22  |  0.32<L>    Ca    8.7      09 Jul 2021 07:03  Phos  3.2     07-09  Mg     2.2     07-09  TPro  6.7  /  Alb  2.7<L>  /  TBili  0.2  /  DBili  x   /  AST  14  /  ALT  23  /  AlkPhos  100  07-09  PT/INR - ( 09 Jul 2021 07:01 )   PT: 12.9 sec;   INR: 1.08 ratio    PTT - ( 09 Jul 2021 07:01 )  PTT:30.4 sec    PHYSICAL EXAMINATION:    Height (cm): 180.3T(C): 36.4  HR: 89  BP: 113/65  RR: 18  SpO2: 98%    Constitutional: NAD    Neck:  No JVD  Respiratory: CTAB/L  Cardiovascular: S1 and S2  Gastrointestinal: BS+, soft, NT/ND  Extremities: No peripheral edema  Neurological: A/O x 0, pt is non verbal and does not follow commands.      COMMENTS:    The patient is a suitable candidate for the planned procedure unless box checked [ ]  No, explain:

## 2021-07-10 LAB
ANION GAP SERPL CALC-SCNC: 12 MMOL/L — SIGNIFICANT CHANGE UP (ref 5–17)
BUN SERPL-MCNC: 19 MG/DL — SIGNIFICANT CHANGE UP (ref 7–23)
CALCIUM SERPL-MCNC: 8.8 MG/DL — SIGNIFICANT CHANGE UP (ref 8.4–10.5)
CHLORIDE SERPL-SCNC: 101 MMOL/L — SIGNIFICANT CHANGE UP (ref 96–108)
CO2 SERPL-SCNC: 23 MMOL/L — SIGNIFICANT CHANGE UP (ref 22–31)
CREAT SERPL-MCNC: 0.33 MG/DL — LOW (ref 0.5–1.3)
GLUCOSE SERPL-MCNC: 112 MG/DL — HIGH (ref 70–99)
HCT VFR BLD CALC: 38.1 % — LOW (ref 39–50)
HCT VFR BLD CALC: 39.9 % — SIGNIFICANT CHANGE UP (ref 39–50)
HGB BLD-MCNC: 11.9 G/DL — LOW (ref 13–17)
HGB BLD-MCNC: 12.4 G/DL — LOW (ref 13–17)
MCHC RBC-ENTMCNC: 26.8 PG — LOW (ref 27–34)
MCHC RBC-ENTMCNC: 27.1 PG — SIGNIFICANT CHANGE UP (ref 27–34)
MCHC RBC-ENTMCNC: 31.1 GM/DL — LOW (ref 32–36)
MCHC RBC-ENTMCNC: 31.2 GM/DL — LOW (ref 32–36)
MCV RBC AUTO: 86.2 FL — SIGNIFICANT CHANGE UP (ref 80–100)
MCV RBC AUTO: 86.8 FL — SIGNIFICANT CHANGE UP (ref 80–100)
NRBC # BLD: 0 /100 WBCS — SIGNIFICANT CHANGE UP (ref 0–0)
NRBC # BLD: 0 /100 WBCS — SIGNIFICANT CHANGE UP (ref 0–0)
PLATELET # BLD AUTO: 187 K/UL — SIGNIFICANT CHANGE UP (ref 150–400)
PLATELET # BLD AUTO: 197 K/UL — SIGNIFICANT CHANGE UP (ref 150–400)
POTASSIUM SERPL-MCNC: 4.3 MMOL/L — SIGNIFICANT CHANGE UP (ref 3.5–5.3)
POTASSIUM SERPL-SCNC: 4.3 MMOL/L — SIGNIFICANT CHANGE UP (ref 3.5–5.3)
RBC # BLD: 4.39 M/UL — SIGNIFICANT CHANGE UP (ref 4.2–5.8)
RBC # BLD: 4.63 M/UL — SIGNIFICANT CHANGE UP (ref 4.2–5.8)
RBC # FLD: 16.9 % — HIGH (ref 10.3–14.5)
RBC # FLD: 17.1 % — HIGH (ref 10.3–14.5)
SODIUM SERPL-SCNC: 136 MMOL/L — SIGNIFICANT CHANGE UP (ref 135–145)
WBC # BLD: 19.49 K/UL — HIGH (ref 3.8–10.5)
WBC # BLD: 20.54 K/UL — HIGH (ref 3.8–10.5)
WBC # FLD AUTO: 19.49 K/UL — HIGH (ref 3.8–10.5)
WBC # FLD AUTO: 20.54 K/UL — HIGH (ref 3.8–10.5)

## 2021-07-10 RX ORDER — DIPHENHYDRAMINE HCL 50 MG
25 CAPSULE ORAL ONCE
Refills: 0 | Status: COMPLETED | OUTPATIENT
Start: 2021-07-10 | End: 2021-07-10

## 2021-07-10 RX ADMIN — Medication 4 MILLIGRAM(S): at 06:23

## 2021-07-10 RX ADMIN — LEVETIRACETAM 400 MILLIGRAM(S): 250 TABLET, FILM COATED ORAL at 13:21

## 2021-07-10 RX ADMIN — LOSARTAN POTASSIUM 100 MILLIGRAM(S): 100 TABLET, FILM COATED ORAL at 06:22

## 2021-07-10 RX ADMIN — ENOXAPARIN SODIUM 40 MILLIGRAM(S): 100 INJECTION SUBCUTANEOUS at 13:21

## 2021-07-10 RX ADMIN — Medication 4 MILLIGRAM(S): at 13:21

## 2021-07-10 RX ADMIN — POLYETHYLENE GLYCOL 3350 17 GRAM(S): 17 POWDER, FOR SOLUTION ORAL at 13:21

## 2021-07-10 RX ADMIN — LACOSAMIDE 140 MILLIGRAM(S): 50 TABLET ORAL at 17:33

## 2021-07-10 RX ADMIN — Medication 25 MILLIGRAM(S): at 18:33

## 2021-07-10 RX ADMIN — PANTOPRAZOLE SODIUM 40 MILLIGRAM(S): 20 TABLET, DELAYED RELEASE ORAL at 13:21

## 2021-07-10 RX ADMIN — Medication 5 MILLIGRAM(S): at 13:21

## 2021-07-10 RX ADMIN — LEVETIRACETAM 400 MILLIGRAM(S): 250 TABLET, FILM COATED ORAL at 21:24

## 2021-07-10 RX ADMIN — Medication 5 MILLIGRAM(S): at 08:20

## 2021-07-10 RX ADMIN — SENNA PLUS 2 TABLET(S): 8.6 TABLET ORAL at 21:25

## 2021-07-10 RX ADMIN — Medication 4 MILLIGRAM(S): at 21:25

## 2021-07-10 RX ADMIN — Medication 5 MILLIGRAM(S): at 21:25

## 2021-07-10 RX ADMIN — LEVETIRACETAM 400 MILLIGRAM(S): 250 TABLET, FILM COATED ORAL at 06:22

## 2021-07-10 RX ADMIN — Medication 5 MILLIGRAM(S): at 06:22

## 2021-07-10 RX ADMIN — AMLODIPINE BESYLATE 5 MILLIGRAM(S): 2.5 TABLET ORAL at 06:22

## 2021-07-10 RX ADMIN — Medication 100 MILLIGRAM(S): at 06:22

## 2021-07-10 RX ADMIN — LACOSAMIDE 140 MILLIGRAM(S): 50 TABLET ORAL at 06:22

## 2021-07-10 RX ADMIN — Medication 100 MILLIGRAM(S): at 17:33

## 2021-07-10 RX ADMIN — ATORVASTATIN CALCIUM 10 MILLIGRAM(S): 80 TABLET, FILM COATED ORAL at 21:25

## 2021-07-10 NOTE — PROGRESS NOTE ADULT - SUBJECTIVE AND OBJECTIVE BOX
Pt is seen and examined  pt is awake and lying in bed   Not interactive  family at bedside  PEG placed yesterday  No further sezure activity      PAST MEDICAL & SURGICAL HISTORY:  Hypertension    Hyperlipidemia    Seizures    Diabetes    Glioblastoma multiforme    CMV infection    History of SIADH    H/O colonoscopy    History of laryngoscopy    Status post stereotactic brain biopsy        ROS:  Negative except for:    MEDICATIONS  (STANDING):  amantadine Syrup 100 milliGRAM(s) Oral two times a day  amLODIPine   Tablet 5 milliGRAM(s) Oral daily  atorvastatin 10 milliGRAM(s) Oral at bedtime  baclofen 5 milliGRAM(s) Oral two times a day  bisacodyl 5 milliGRAM(s) Oral at bedtime  dexAMETHasone     Tablet 4 milliGRAM(s) Oral every 8 hours  enoxaparin Injectable 40 milliGRAM(s) SubCutaneous daily  lacosamide IVPB 200 milliGRAM(s) IV Intermittent every 12 hours  levETIRAcetam  IVPB 1000 milliGRAM(s) IV Intermittent <User Schedule>  losartan 100 milliGRAM(s) Oral daily  methylphenidate 5 milliGRAM(s) Oral <User Schedule>  pantoprazole  Injectable 40 milliGRAM(s) IV Push daily  polyethylene glycol 3350 17 Gram(s) Oral daily  senna 2 Tablet(s) Oral at bedtime  sodium chloride 0.9%. 500 milliLiter(s) (30 mL/Hr) IV Continuous <Continuous>    MEDICATIONS  (PRN):  LORazepam   Injectable 1 milliGRAM(s) IV Push once PRN GTC  ramelteon 8 milliGRAM(s) Oral at bedtime PRN Insomnia      Allergies    No Known Allergies    Intolerances        Vital Signs Last 24 Hrs  T(C): 36.6 (10 Jul 2021 08:34), Max: 36.7 (09 Jul 2021 19:31)  T(F): 97.9 (10 Jul 2021 08:34), Max: 98.1 (09 Jul 2021 19:31)  HR: 77 (10 Jul 2021 08:34) (65 - 94)  BP: 118/76 (10 Jul 2021 08:34) (98/60 - 128/74)  BP(mean): --  RR: 18 (10 Jul 2021 08:34) (15 - 18)  SpO2: 99% (10 Jul 2021 08:34) (95% - 100%)    PHYSICAL EXAM  Chronically ill, wasted appearing  Contracted, Not Interactive  Chest clear Bilat  CVS S1,S2+ RRR  Abd Soft, NT/ND, + BS + PEG  Ext No edema  LABS:                          12.4   19.49 )-----------( 187      ( 10 Jul 2021 06:38 )             39.9     Serial CBC's  07-10 @ 06:38  Hct-39.9 / Hgb-12.4 / Plat-187 / RBC-4.63 / WBC-19.49          Serial CBC's  07-09 @ 07:06  Hct-36.9 / Hgb-11.6 / Plat-183 / RBC-4.25 / WBC-13.03            07-10    136  |  101  |  19  ----------------------------<  112<H>  4.3   |  23  |  0.33<L>    Ca    8.8      10 Jul 2021 06:36  Phos  3.2     07-09  Mg     2.2     07-09    TPro  6.7  /  Alb  2.7<L>  /  TBili  0.2  /  DBili  x   /  AST  14  /  ALT  23  /  AlkPhos  100  07-09      PT/INR - ( 09 Jul 2021 07:01 )   PT: 12.9 sec;   INR: 1.08 ratio         PTT - ( 09 Jul 2021 07:01 )  PTT:30.4 sec    WBC Count: 19.49 K/uL (07-10 @ 06:38)  Hemoglobin: 12.4 g/dL (07-10 @ 06:38)            RADIOLOGY & ADDITIONAL STUDIES:

## 2021-07-10 NOTE — PROGRESS NOTE ADULT - ASSESSMENT
Glioblastoma Multiforme    --[place] [facility] [place]  --Currently on maintenance Avastin    --Appears to have stable disease but MS has deteriorated   --As per Dr. Martínez who is covering for Dr. Chavis - if mental status unable to be improved on current management, hospice may be appropriate  -- PEG Placed  -- Pall Care eval    AMS, Seizure like activity    --No edema, mass effect, hemorrhage or midline shift on CT  -- Repeat MRI stable   --Patient had UTI 4/2021 which caused AMS  --Patient on Dexamethasone now q8 hours  --Keppra has been increased to 1000 mg BID  --[facility]  --Vimpat dose increased     Hyponatremia Resolved

## 2021-07-10 NOTE — PROVIDER CONTACT NOTE (OTHER) - REASON
Pt exhibiting RUE jerking/twitching
pt with RUE RLE tonic movements and staring
Hiccups
Pt seizing
Cheek and Upper lip redness and warm to touch
Patient PEG Surgical site bleeding

## 2021-07-10 NOTE — PROVIDER CONTACT NOTE (OTHER) - BACKGROUND
HX of GBM change in mental status lethargy sz
Pt is a 71 y/o Male presenting to [facility] after a episode of depressed LOC and suspected seizure episodes. Pt is non verbal and contracted but responds at times to simple commands in native language.
Patient here for decreased LOC and seizures. Patient has Hx of GBM
Patient here for decreased loc and seizure activity. Patient has a hx of GBM
Pt hx of GBM from 2019, presenting for increasing seizures and lethargy.
Pt hx of GBM from 2019, presenting for increasing seizures and lethargy. Pt received keppra dose early to combat jerking motions, followed by regularly scheduled vimpat.

## 2021-07-10 NOTE — PROVIDER CONTACT NOTE (OTHER) - ACTION/TREATMENT ORDERED:
Additional dose 1mg ativan ordered and given. PA at bedside to assess pt.
Feedings on hold at this time.
PA at bedside to assess and remained at bedside. Pt given 2 rounds of 1mg ativan each time without resolution. Pt already had evening dose of keppra running. Extra dose of vimpat given as per order.
Assess patient at bedside. Contact surgeon. Stat cbc.
as per PA in at  pt bedside give Ativan 1mg x1  start cont pulse ox monitoring and monitor vital signs. As per PA give scheduled   keppra as ordered. Cont to monitor
MD will assess patient at bedside

## 2021-07-10 NOTE — PROGRESS NOTE ADULT - ASSESSMENT
71 M w GBM, GI consulted for enteral nutrition    1. GBM.     2. Oropharyngeal dysphagia  - s/p PEG, scant bleeding at site, if continues will cauterize tomorrow, may initiate tube feeds    3. Seizure  - care per neurology appreciated          [facility]  Gastroenterology and Hepatology  [address] [place], [place]  Office: [phone]  Cell: [phone]

## 2021-07-10 NOTE — PROVIDER CONTACT NOTE (OTHER) - ASSESSMENT
Pt nonverbal/doesn't follow commands at baseline. Pt looks at staff when called even while seizing. Vitals WDL (see flowsheet) pt satting well.
Pt nonverbal/doesn't follow commands at baseline. Vitals WDL (see flowsheet) pt satting well. For this particular long episode lasting about 16min, pt vitals WDL. Pt continuously exhibiting jerking motions for long periods of time at times, and others for brief seconds.
Patients vs stable. Neurologically at baseline a&ox0 and nonverbal
Patient neurologically at baseline. No nonverbal indicators of pain or discomfort. VSS
pt eyes open does not follow right U/L ext tonic movement .
Pt is alert [employer] orientation, pt unable to speak.  No s/s of distress or non verbal indicators of pain noted. Suctioned x2 with thick secretion noted.    B/P 127/80   HR 93  Temp 97.7 axillary  O2 Sat 98% via pulse oximetry

## 2021-07-10 NOTE — PROGRESS NOTE ADULT - ATTENDING COMMENTS
I personally interviewed, examined, and participated in the care of this patient, on rounds 7/10/21 with the neurology borg[facility] service team.  Reviewed findings and management plan with the team.  In addition to or instead of the Hx and findings and plan reported above, or in particular, I note as follows:    Pt occasionally moans softly; moves UE spontaneously to a minimal degree; has partial flexion contactures of the LUE - no spontaenous movements noted but does move slightly after it is passively moved.      Pt in terminal stage of sequale from GBM.

## 2021-07-10 NOTE — PROGRESS NOTE ADULT - SUBJECTIVE AND OBJECTIVE BOX
INTERVAL HPI/OVERNIGHT EVENTS:    pt seen and examined  s/p PEG  scant bleeding    MEDICATIONS  (STANDING):  amantadine Syrup 100 milliGRAM(s) Oral two times a day  amLODIPine   Tablet 5 milliGRAM(s) Oral daily  atorvastatin 10 milliGRAM(s) Oral at bedtime  baclofen 5 milliGRAM(s) Oral two times a day  dexAMETHasone     Tablet 4 milliGRAM(s) Oral every 8 hours  enoxaparin Injectable 40 milliGRAM(s) SubCutaneous daily  lacosamide IVPB 200 milliGRAM(s) IV Intermittent every 12 hours  levETIRAcetam  IVPB 1000 milliGRAM(s) IV Intermittent <User Schedule>  losartan 100 milliGRAM(s) Oral daily  methylphenidate 5 milliGRAM(s) Oral <User Schedule>  pantoprazole  Injectable 40 milliGRAM(s) IV Push daily  polyethylene glycol 3350 17 Gram(s) Oral daily  senna 2 Tablet(s) Oral at bedtime    MEDICATIONS  (PRN):  LORazepam   Injectable 1 milliGRAM(s) IV Push once PRN GTC  ramelteon 8 milliGRAM(s) Oral at bedtime PRN Insomnia      Allergies    No Known Allergies    Intolerances        Review of Systems:    non- verbal       Vital Signs Last 24 Hrs  T(C): 36.4 (09 Jul 2021 07:25), Max: 36.7 (08 Jul 2021 20:13)  T(F): 97.6 (09 Jul 2021 07:25), Max: 98.1 (08 Jul 2021 20:13)  HR: 90 (09 Jul 2021 07:25) (67 - 98)  BP: 121/73 (09 Jul 2021 07:25) (106/68 - 128/83)  BP(mean): --  RR: 20 (09 Jul 2021 07:25) (18 - 20)  SpO2: 99% (09 Jul 2021 07:25) (95% - 99%)    PHYSICAL EXAM:    GENERAL:  ill- appearing, no distress  HEENT:  NC/AT,  conjunctivae anicteric, clear and pink,   NECK: supple, trachea midline  CHEST:  Full & symmetric excursion, no increased effort, breath sounds clear  HEART:  Regular rhythm, no JVD  ABDOMEN:  Soft, non-tender, non-distended, normoactive bowel sounds,  no masses , no hepatosplenomegaly, scant bleeding at site of g tube  EXTREMITIES:  no cyanosis,clubbing or edema  SKIN:  No rash, erythema, or, ecchymoses, no jaundice  NEURO:  lethargic non-focal, no asterixis  PSYCH: calm, nonverbal  RECTAL: Deferred      LABS:                        11.6   13.03 )-----------( 183      ( 09 Jul 2021 07:06 )             36.9     07-09    135  |  99  |  21  ----------------------------<  156<H>  4.4   |  22  |  0.32<L>    Ca    8.7      09 Jul 2021 07:03  Phos  3.2     07-09  Mg     2.2     07-09    TPro  6.7  /  Alb  2.7<L>  /  TBili  0.2  /  DBili  x   /  AST  14  /  ALT  23  /  AlkPhos  100  07-09    PT/INR - ( 09 Jul 2021 07:01 )   PT: 12.9 sec;   INR: 1.08 ratio         PTT - ( 09 Jul 2021 07:01 )  PTT:30.4 sec      RADIOLOGY & ADDITIONAL TESTS:

## 2021-07-10 NOTE — PROGRESS NOTE ADULT - ASSESSMENT
72 year old RH male with a past medical history of HTN, HLD, hx of SIADH, gliobastoma multiforme (diagnosed in 2019) and seizures presenting after an episode of depressed level of consciousness and suspected seizure episode.    hyponatremia  - likely SIADH  - d/c iv fluids  - fluid restrict    dysphagia  - failed swallow eval  - would keep NPO   - peg placed  - start feeds if ok with GI    leukocytosis  - resolved  - observe off abx    GBM  - treatment as per neuroncology  - stable MRI  - c/w decadron    seizure - on keppra    depression  - off celexa    hyponartemia  - resolved.    dvt px  - Lovenox

## 2021-07-10 NOTE — PROGRESS NOTE ADULT - SUBJECTIVE AND OBJECTIVE BOX
ADDIS SCHWAB  Male  [id number]    Interval:    -No acute events overnight  -PEG placed yesterday; Family concerned about blood at PEG site. There appears to be mild bright red blood at PEG site but not profusely bleeding. Hgb stable. Hemodnamically stable. Discussed w/ Dr. Palumbo who stated to start feeds and plan will be to cauterize the site if needed.       VITAL SIGNS:  T(F): 97.8  HR: 84  BP: 111/70  RR: 18  SpO2: 98%      NEUROLOGICAL EXAM:  MS: Eyes closed. Do not open to voice or noxious stimuli. Holds eyes open after manually opened by examiner. No verbal output. Does not follow commands.  CN: PERRL. Eyes midline, cross midline with OCR. Face grossly symmetric. Intermittent hiccups.  Motor: No spontaneous movement noted x4. RLE/LLE contracted. No twitching noted in RUE.  Coordination/Gait: Unable to assess.    LABS:                          12.4   19.49 )-----------( 187      ( 10 Jul 2021 06:38 )             39.9     07-10    136  |  101  |  19  ----------------------------<  112<H>  4.3   |  23  |  0.33<L>    Ca    8.8      10 Jul 2021 06:36  Phos  3.2     07-09  Mg     2.2     07-09    TPro  6.7  /  Alb  2.7<L>  /  TBili  0.2  /  DBili  x   /  AST  14  /  ALT  23  /  AlkPhos  100  07-09    PT/INR - ( 09 Jul 2021 07:01 )   PT: 12.9 sec;   INR: 1.08 ratio         PTT - ( 09 Jul 2021 07:01 )  PTT:30.4 sec    MEDICATIONS:   amantadine Syrup 100 milliGRAM(s) Oral two times a day  amLODIPine   Tablet 5 milliGRAM(s) Oral daily  atorvastatin 10 milliGRAM(s) Oral at bedtime  baclofen 5 milliGRAM(s) Oral two times a day  bisacodyl 5 milliGRAM(s) Oral at bedtime  dexAMETHasone     Tablet 4 milliGRAM(s) Oral every 8 hours  enoxaparin Injectable 40 milliGRAM(s) SubCutaneous daily  lacosamide IVPB 200 milliGRAM(s) IV Intermittent every 12 hours  levETIRAcetam  IVPB 1000 milliGRAM(s) IV Intermittent <User Schedule>  LORazepam   Injectable 1 milliGRAM(s) IV Push once PRN  losartan 100 milliGRAM(s) Oral daily  methylphenidate 5 milliGRAM(s) Oral <User Schedule>  pantoprazole  Injectable 40 milliGRAM(s) IV Push daily  polyethylene glycol 3350 17 Gram(s) Oral daily  ramelteon 8 milliGRAM(s) Oral at bedtime PRN  senna 2 Tablet(s) Oral at bedtime  sodium chloride 0.9%. 500 milliLiter(s) IV Continuous <Continuous>          RADIOLOGY & ADDITIONAL STUDIES:             ADDIS SCHWAB  Male  [id number]    Interval:    -No acute events overnight  -PEG placed yesterday; Family concerned about blood at PEG site. There appears to be mild bright red blood at PEG site but not profusely bleeding. Hgb stable. Hemodnamically stable. Discussed w/ Dr. Palumbo who stated to start feeds and plan will be to cauterize the site if needed.       VITAL SIGNS:  T(F): 97.8  HR: 84  BP: 111/70  RR: 18  SpO2: 98%      NEUROLOGICAL EXAM:  MS: Eyes open. Tracks examiner inconsistently. No verbal output. Does not follow commands.  CN: PERRL. Eyes midline, cross midline with OCR. Face grossly symmetric. Intermittent hiccups.  Motor: No spontaneous movement noted x4. RLE/LLE contracted. No twitching noted in RUE.  Coordination/Gait: Unable to assess.    LABS:                          12.4   19.49 )-----------( 187      ( 10 Jul 2021 06:38 )             39.9     07-10    136  |  101  |  19  ----------------------------<  112<H>  4.3   |  23  |  0.33<L>    Ca    8.8      10 Jul 2021 06:36  Phos  3.2     07-09  Mg     2.2     07-09    TPro  6.7  /  Alb  2.7<L>  /  TBili  0.2  /  DBili  x   /  AST  14  /  ALT  23  /  AlkPhos  100  07-09    PT/INR - ( 09 Jul 2021 07:01 )   PT: 12.9 sec;   INR: 1.08 ratio         PTT - ( 09 Jul 2021 07:01 )  PTT:30.4 sec    MEDICATIONS:   amantadine Syrup 100 milliGRAM(s) Oral two times a day  amLODIPine   Tablet 5 milliGRAM(s) Oral daily  atorvastatin 10 milliGRAM(s) Oral at bedtime  baclofen 5 milliGRAM(s) Oral two times a day  bisacodyl 5 milliGRAM(s) Oral at bedtime  dexAMETHasone     Tablet 4 milliGRAM(s) Oral every 8 hours  enoxaparin Injectable 40 milliGRAM(s) SubCutaneous daily  lacosamide IVPB 200 milliGRAM(s) IV Intermittent every 12 hours  levETIRAcetam  IVPB 1000 milliGRAM(s) IV Intermittent <User Schedule>  LORazepam   Injectable 1 milliGRAM(s) IV Push once PRN  losartan 100 milliGRAM(s) Oral daily  methylphenidate 5 milliGRAM(s) Oral <User Schedule>  pantoprazole  Injectable 40 milliGRAM(s) IV Push daily  polyethylene glycol 3350 17 Gram(s) Oral daily  ramelteon 8 milliGRAM(s) Oral at bedtime PRN  senna 2 Tablet(s) Oral at bedtime  sodium chloride 0.9%. 500 milliLiter(s) IV Continuous <Continuous>          RADIOLOGY & ADDITIONAL STUDIES:

## 2021-07-10 NOTE — PROGRESS NOTE ADULT - SUBJECTIVE AND OBJECTIVE BOX
DATE OF SERVICE: 07-10-21 @ 10:21    Patient is a 71y old  Male who presents with a chief complaint of seizure episode (09 Jul 2021 18:37)      SUBJECTIVE / OVERNIGHT EVENTS:  unresponsive    MEDICATIONS  (STANDING):  amantadine Syrup 100 milliGRAM(s) Oral two times a day  amLODIPine   Tablet 5 milliGRAM(s) Oral daily  atorvastatin 10 milliGRAM(s) Oral at bedtime  baclofen 5 milliGRAM(s) Oral two times a day  bisacodyl 5 milliGRAM(s) Oral at bedtime  dexAMETHasone     Tablet 4 milliGRAM(s) Oral every 8 hours  enoxaparin Injectable 40 milliGRAM(s) SubCutaneous daily  lacosamide IVPB 200 milliGRAM(s) IV Intermittent every 12 hours  levETIRAcetam  IVPB 1000 milliGRAM(s) IV Intermittent <User Schedule>  losartan 100 milliGRAM(s) Oral daily  methylphenidate 5 milliGRAM(s) Oral <User Schedule>  pantoprazole  Injectable 40 milliGRAM(s) IV Push daily  polyethylene glycol 3350 17 Gram(s) Oral daily  senna 2 Tablet(s) Oral at bedtime  sodium chloride 0.9%. 500 milliLiter(s) (30 mL/Hr) IV Continuous <Continuous>    MEDICATIONS  (PRN):  LORazepam   Injectable 1 milliGRAM(s) IV Push once PRN GTC  ramelteon 8 milliGRAM(s) Oral at bedtime PRN Insomnia      Vital Signs Last 24 Hrs  T(C): 36.6 (10 Jul 2021 08:34), Max: 36.7 (09 Jul 2021 19:31)  T(F): 97.9 (10 Jul 2021 08:34), Max: 98.1 (09 Jul 2021 19:31)  HR: 77 (10 Jul 2021 08:34) (65 - 94)  BP: 118/76 (10 Jul 2021 08:34) (98/60 - 128/74)  BP(mean): --  RR: 18 (10 Jul 2021 08:34) (15 - 18)  SpO2: 99% (10 Jul 2021 08:34) (95% - 100%)  CAPILLARY BLOOD GLUCOSE        I&O's Summary    09 Jul 2021 07:01  -  10 Jul 2021 07:00  --------------------------------------------------------  IN: 0 mL / OUT: 1050 mL / NET: -1050 mL    10 Jul 2021 07:01  -  10 Jul 2021 10:21  --------------------------------------------------------  IN: 0 mL / OUT: 0 mL / NET: 0 mL        PHYSICAL EXAM:  GENERAL: NAD, well-developed  HEAD:  Atraumatic, Normocephalic  EYES: EOMI, PERRLA, conjunctiva and sclera clear  NECK: Supple, No JVD  CHEST/LUNG: Clear to auscultation bilaterally; No wheeze  HEART: Regular rate and rhythm; No murmurs, rubs, or gallops  ABDOMEN: Soft, Nontender, Nondistended; Bowel sounds present  EXTREMITIES:  contracted  PSYCH: unresponsive  NEUROLOGY: does not follow commands  SKIN: No rashes or lesions    LABS:                        12.4   19.49 )-----------( 187      ( 10 Jul 2021 06:38 )             39.9     07-10    136  |  101  |  19  ----------------------------<  112<H>  4.3   |  23  |  0.33<L>    Ca    8.8      10 Jul 2021 06:36  Phos  3.2     07-09  Mg     2.2     07-09    TPro  6.7  /  Alb  2.7<L>  /  TBili  0.2  /  DBili  x   /  AST  14  /  ALT  23  /  AlkPhos  100  07-09    PT/INR - ( 09 Jul 2021 07:01 )   PT: 12.9 sec;   INR: 1.08 ratio         PTT - ( 09 Jul 2021 07:01 )  PTT:30.4 sec          RADIOLOGY & ADDITIONAL TESTS:    Imaging Personally Reviewed:    Consultant(s) Notes Reviewed:      Care Discussed with Consultants/Other Providers:

## 2021-07-10 NOTE — PROGRESS NOTE ADULT - ASSESSMENT
71 RHM w/ PMH of HTN, HLD, SAIDH, GBM dx in 2019 on Avastin, seizure disorder presented w/ depressed level of consciousness, found to have focal motor seizures.    Impression: Failure to thrive due to progression of GBM.     Plan:    #GBM   [] c/w Decadron to 4MG PO Q8HR.  [] C/w Avastin monotherapy    #Seizures :: previously in focal motor status, appears better controlled. recent EEG w/ LPDs  [] Continue Vimpat 200mg IV BID and Keppra 1g IV TID    #Hyponatremia :: Likely secondary to SIADH. Fluid restriction <1.5L daily   -Improved. Now 136    #Dysphagia   -s/p PEG 7/9  -Starting feeds today  -Blood at PEG site on 7/10 AM noted; Dr. Palumbo aware; May cauterize at site if continues to bleed. Hgb stable.   -Nutrition consult      71 RHM w/ PMH of HTN, HLD, SAIDH, GBM dx in 2019 on Avastin, seizure disorder presented w/ depressed level of consciousness, found to have focal motor seizures.    Impression: Failure to thrive due to progression of GBM.     Plan:    #GBM   [] c/w Decadron to 4MG PO Q8HR.  [] C/w Avastin monotherapy    #Seizures :: previously in focal motor status, appears better controlled. recent EEG w/ LPDs  [] Continue Vimpat 200mg IV BID and Keppra 1g IV TID    #Hyponatremia :: Likely secondary to SIADH. Fluid restriction <1.5L daily   -Improved. Now 136    #Dysphagia   -s/p PEG 7/9  -Starting feeds today  -Blood at PEG site on 7/10 AM noted; Dr. Palumbo aware; May cauterize at site if continues to bleed. Hgb stable.   -Nutrition consult     #HTN  Amlodipine 5mg QD via PEG  Losartan 100mg QD via PEG  Medicine ([facility]) following    #DVT ppx: Lovenox SQ  #DIET: TF via PEG  #Dispo: Home with family on Monday        71 RHM w/ PMH of HTN, HLD, SAIDH, GBM dx in 2019 on Avastin, seizure disorder presented w/ depressed level of consciousness, found to have focal motor seizures.    Impression: Failure to thrive due to progression of GBM.     Plan:    #GBM   [] c/w Decadron to 4MG PO Q8HR.  [] C/w Avastin monotherapy    #Seizures :: previously in focal motor status, appears better controlled. recent EEG w/ LPDs  [] Continue Vimpat 200mg IV BID and Keppra 1g IV TID    #Hyponatremia :: Likely secondary to SIADH. Fluid restriction <1.5L daily   -Improved. Now 136    #Dysphagia   -s/p PEG 7/9  -Starting feeds today  -Blood at PEG site on 7/10 AM noted; Dr. Palumbo aware; May cauterize at site if continues to bleed. Hgb stable.   -Nutrition consult     #HTN  Amlodipine 5mg QD via PEG  Losartan 100mg QD via PEG  Medicine ([facility]) following    #DVT ppx: MEchanical compression stockings; Holding Lovenox SQ per Dr. Palumbo given blood at PEG site. Will discuss with Dr. Palumbo regarding timing to resume.   #DIET: TF via PEG  #Dispo: Home with family on Monday

## 2021-07-10 NOTE — PROVIDER CONTACT NOTE (OTHER) - SITUATION
Patient had peg placement 7/9. Surgical site is still bleeding. Changed dressing twice this shift once at 8 and at 3:30. Dressing semi saturated with sanguinous discharge. Coagulated clot around site.
Pt found to be seizing by staff, exhibited by RUE and R hand repetitive jerking. This particular episode lasted for ~28 min. Shortly after pt continued to seize again in the same manner for 30 min.
Patient had new onset facial redness in cheeks and upper lip in past 20mins
Pt found to be seizing by staff, exhibited by RUE and R hand repetitive jerking. Pt noted to have some episodes of pausing for a few minutes, followed by some episodes of twitching for a few seconds.
Pt noted on two occasions to have persistent hiccuping. Pt is currently on Glucerna 1.5 @ 60ml/hr. No residual noted. Pt suctioned twice with thick white secretions noted.
pt with RUE RLE tonic movements and staring

## 2021-07-10 NOTE — PROVIDER CONTACT NOTE (OTHER) - DATE AND TIME:
05-Jul-2021 17:30
01-Jul-2021 22:53
10-Jul-2021 17:40
10-Jul-2021 16:50
04-Jul-2021 04:50
03-Jul-2021 22:28

## 2021-07-10 NOTE — PROVIDER CONTACT NOTE (OTHER) - RECOMMENDATIONS
Assess patient at bedside
Notify provider
Notify provider for possible interventions.
eval/tx
Assess patient at bedside. Contact surgeon. Stat cbc.

## 2021-07-11 LAB
TSH SERPL-MCNC: 1.63 UIU/ML — SIGNIFICANT CHANGE UP (ref 0.27–4.2)
VIT B12 SERPL-MCNC: 780 PG/ML — SIGNIFICANT CHANGE UP (ref 232–1245)

## 2021-07-11 RX ADMIN — Medication 100 MILLIGRAM(S): at 05:46

## 2021-07-11 RX ADMIN — ATORVASTATIN CALCIUM 10 MILLIGRAM(S): 80 TABLET, FILM COATED ORAL at 21:21

## 2021-07-11 RX ADMIN — LEVETIRACETAM 400 MILLIGRAM(S): 250 TABLET, FILM COATED ORAL at 05:45

## 2021-07-11 RX ADMIN — Medication 100 MILLIGRAM(S): at 17:50

## 2021-07-11 RX ADMIN — Medication 1 APPLICATION(S): at 10:24

## 2021-07-11 RX ADMIN — AMLODIPINE BESYLATE 5 MILLIGRAM(S): 2.5 TABLET ORAL at 05:46

## 2021-07-11 RX ADMIN — LEVETIRACETAM 400 MILLIGRAM(S): 250 TABLET, FILM COATED ORAL at 14:38

## 2021-07-11 RX ADMIN — SENNA PLUS 2 TABLET(S): 8.6 TABLET ORAL at 21:21

## 2021-07-11 RX ADMIN — Medication 5 MILLIGRAM(S): at 08:17

## 2021-07-11 RX ADMIN — LACOSAMIDE 140 MILLIGRAM(S): 50 TABLET ORAL at 17:49

## 2021-07-11 RX ADMIN — Medication 4 MILLIGRAM(S): at 05:45

## 2021-07-11 RX ADMIN — POLYETHYLENE GLYCOL 3350 17 GRAM(S): 17 POWDER, FOR SOLUTION ORAL at 11:44

## 2021-07-11 RX ADMIN — Medication 5 MILLIGRAM(S): at 14:38

## 2021-07-11 RX ADMIN — LOSARTAN POTASSIUM 100 MILLIGRAM(S): 100 TABLET, FILM COATED ORAL at 05:45

## 2021-07-11 RX ADMIN — Medication 4 MILLIGRAM(S): at 14:39

## 2021-07-11 RX ADMIN — Medication 4 MILLIGRAM(S): at 22:00

## 2021-07-11 RX ADMIN — SODIUM CHLORIDE 30 MILLILITER(S): 9 INJECTION INTRAMUSCULAR; INTRAVENOUS; SUBCUTANEOUS at 08:17

## 2021-07-11 RX ADMIN — PANTOPRAZOLE SODIUM 40 MILLIGRAM(S): 20 TABLET, DELAYED RELEASE ORAL at 11:43

## 2021-07-11 RX ADMIN — LEVETIRACETAM 400 MILLIGRAM(S): 250 TABLET, FILM COATED ORAL at 21:22

## 2021-07-11 RX ADMIN — LACOSAMIDE 140 MILLIGRAM(S): 50 TABLET ORAL at 05:45

## 2021-07-11 NOTE — PROGRESS NOTE ADULT - ASSESSMENT
71 M w GBM, GI consulted for enteral nutrition    1. GBM.     2. Oropharyngeal dysphagia  - s/p PEG, scant bleeding at site, cauterized at bedside today    3. Seizure  - care per neurology appreciated          [facility]  Gastroenterology and Hepatology  [address] [place], [place]  Office: [phone]  Cell: [phone]

## 2021-07-11 NOTE — PROGRESS NOTE ADULT - SUBJECTIVE AND OBJECTIVE BOX
DATE OF SERVICE: 07-11-21 @ 13:16    Patient is a 71y old  Male who presents with a chief complaint of seizure episode (11 Jul 2021 09:53)      SUBJECTIVE / OVERNIGHT EVENTS:  unresponsive    MEDICATIONS  (STANDING):  amantadine Syrup 100 milliGRAM(s) Oral two times a day  amLODIPine   Tablet 5 milliGRAM(s) Oral daily  atorvastatin 10 milliGRAM(s) Oral at bedtime  bisacodyl 5 milliGRAM(s) Oral at bedtime  dexAMETHasone     Tablet 4 milliGRAM(s) Oral every 8 hours  lacosamide IVPB 200 milliGRAM(s) IV Intermittent every 12 hours  levETIRAcetam  IVPB 1000 milliGRAM(s) IV Intermittent <User Schedule>  losartan 100 milliGRAM(s) Oral daily  methylphenidate 5 milliGRAM(s) Oral <User Schedule>  pantoprazole  Injectable 40 milliGRAM(s) IV Push daily  polyethylene glycol 3350 17 Gram(s) Oral daily  senna 2 Tablet(s) Oral at bedtime  silver nitrate Applicator 1 Application(s) Topical once    MEDICATIONS  (PRN):  LORazepam   Injectable 1 milliGRAM(s) IV Push once PRN GTC  ramelteon 8 milliGRAM(s) Oral at bedtime PRN Insomnia      Vital Signs Last 24 Hrs  T(C): 36.4 (11 Jul 2021 12:38), Max: 36.7 (11 Jul 2021 04:45)  T(F): 97.5 (11 Jul 2021 12:38), Max: 98.1 (11 Jul 2021 04:45)  HR: 87 (11 Jul 2021 12:38) (87 - 96)  BP: 103/57 (11 Jul 2021 12:38) (103/57 - 127/82)  BP(mean): --  RR: 18 (11 Jul 2021 12:38) (18 - 18)  SpO2: 97% (11 Jul 2021 12:38) (96% - 99%)  CAPILLARY BLOOD GLUCOSE        I&O's Summary    10 Jul 2021 07:01  -  11 Jul 2021 07:00  --------------------------------------------------------  IN: 0 mL / OUT: 900 mL / NET: -900 mL        PHYSICAL EXAM:  GENERAL: NAD, well-developed  HEAD:  Atraumatic, Normocephalic  EYES: EOMI, PERRLA, conjunctiva and sclera clear  NECK: Supple, No JVD  CHEST/LUNG: Clear to auscultation bilaterally; No wheeze  HEART: Regular rate and rhythm; No murmurs, rubs, or gallops  ABDOMEN: Soft, Nontender, Nondistended; Bowel sounds present  EXTREMITIES:  contracted  PSYCH: AAOx3  NEUROLOGY: non-focal  SKIN: No rashes or lesions    LABS:                        11.9   20.54 )-----------( 197      ( 10 Jul 2021 17:00 )             38.1     07-10    136  |  101  |  19  ----------------------------<  112<H>  4.3   |  23  |  0.33<L>    Ca    8.8      10 Jul 2021 06:36                RADIOLOGY & ADDITIONAL TESTS:    Imaging Personally Reviewed:    Consultant(s) Notes Reviewed:      Care Discussed with Consultants/Other Providers:

## 2021-07-11 NOTE — CHART NOTE - NSCHARTNOTEFT_GEN_A_CORE
Nutrition Follow Up Note  Patient seen for: consult for tube feedings. Chart reviewed, events noted.     Pt is a 71 year old RH male with a past medical history of HTN, HLD, hx of SIADH, glioblastoma multiforme (diagnosed in 2019), and seizures presenting after an episode of depressed level of consciousness and suspected seizure episode. Likely breakthrough seizures in the setting of progression of disease of glioblastoma multiforme. s/p PEG 7/9    Source: [] Patient       [x] Medical Record        [x] RN        [x] Family at bedside       [] Other:    -If unable to interview patient: [] Trach/Vent/BiPAP  [x] Disoriented/confused/inappropriate to interview    Diet Order:   Diet, NPO:   Tube Feeding Modality: Gastrostomy  Vital High Protein (VITALHP)  Total Volume for 24 Hours (mL): 960  Continuous  Starting Tube Feed Rate {mL per Hour}: 10  Increase Tube Feed Rate by (mL): 10     Every 6 hours  Until Goal Tube Feed Rate (mL per Hour): 40  Tube Feed Duration (in Hours): 24  Tube Feed Start Time: 14:00 (07-10-21 @ 13:37)    - Is current order appropriate/adequate? [] Yes  [x]  No: Change tube feeding    - Current EN regimen provides: 1440cc, 1728 kcals, 86 gm protein, and 1159 cc free water. Meeds 37 kcals/kG and 1.9 gm protein/kG based on RD obtained wt 46.4 kG (7/1).    - Nutrition-related concerns:       - Pt s/p PEG 7/9; last SLP f/u evaluation with recommendation 7/8, "Continue NPO, with non-oral nutrition/hydration/medications via KFT"       - RD observed Vital HP running at 30 cc/hr. No known intolerances to current provision.       - Pt previously ordered for Glucerna 1.5 at 60 cc/hr x24 hours with no known intolerances.        - Pt noted on dexamethasone with varying blood glucose during admission (highest 185 mg/dL thus far on 7/3)    GI: No known recent N/V, constipation, or diarrhea. Last BM 7/10.   Bowel Regimen? [x] Yes   [] No    Weights:   No daily or dosing wts to address.  RD unable to obtain bed scale wt as pt not fully laying in bed. Wt obtained previously by RD: 46.4 kG  RD will continue to trend as new wts available/able.     Nutritionally Pertinent MEDICATIONS  (STANDING):  amLODIPine   Tablet  atorvastatin  bisacodyl  dexAMETHasone     Tablet  losartan  pantoprazole  Injectable  polyethylene glycol 3350  senna    Pertinent Labs: 07-11 Gluc 112 <H> Cr 0.33 <L>   A1C with Estimated Average Glucose Result: 5.6 % (06-05-21 @ 10:07)    Skin per nursing documentation: Pressure injury Stage II L foot  Edema per nursing documentation: None noted.     Estimated Needs:   [x] no change since previous assessment  Based on RD obtained bed scale wt 46.4 kG  Estimated Energy Needs: 1624-1856kcal (35-40kcal/kg)  Estimated Protein Needs: 70-93 (1.5-2.0g/kg)  Fluid needs deferred to provider.     Previous Nutrition Diagnosis: Severe Malnutrition + Underweight  Nutrition Diagnosis is: [x] ongoing  [] resolved [] not applicable     New Nutrition Diagnosis: [x] Not applicable    Nutrition Care Plan:  [x] In Progress -  being addressed with EN feedings  [] Achieved  [] Not applicable    Nutrition Interventions:     Education Provided   [x] Yes:  [] No: Described different tube feeding formulas to family at bedside. Son is concerned regarding rate of tube feeds for risk of aspiration. Explained need to keep HOB elevated >=45 degrees during feeds. Pt made aware RD to remain available.     Recommendations:      1) Change tube feeding to Glucerna 1.2 at goal rate 60 cc/hr x24 hours to provide total volume 1440cc, 1728 kcals, 86 gm protein, and 1159 cc free water. Meeds 37 kcals/kG and 1.9 gm protein/kG based on RD obtained wt 46.4 kG. Provides 100% RDIs. Defer additional free water to team.  -Keep HOB >=45 degrees during feeds  -Continue to trend blood glucose  -Trend for tolerance to EN  2) If pt to be on bolus feeds, recommend feeds of Glucerna 1.2 355 cc (1.5 cans) 4 x daily or q6H to provide 1420cc feed, 1704 kcals, 85 gm protein, 1143 cc free water. Meets ~37 kcasl/kg, 1.8gm/kg protein based on RD obtained bedscale 46.4 kG. Defer additional free water flushes to team.  -Recommend initiating feeds at 50cc/feed, advancing each feed by 50cc until goal rate of 355cc/feed achieved (ex. provide 50cc bolus first feed, 100cc  bolus second feed, 150cc bolus third feed, etc.).     Monitoring and Evaluation:   Continue to monitor nutritional intake, tolerance to diet prescription, weights, labs, skin integrity    RD remains available upon request and will follow up per protocol  Amee Mcintyre MS, RD, CDN Pager #[phone]

## 2021-07-11 NOTE — PROGRESS NOTE ADULT - ASSESSMENT
71 RHM w/ PMH of HTN, HLD, SAIDH, GBM dx in 2019 on Avastin, seizure disorder presented w/ depressed level of consciousness, found to have focal motor seizures.    Impression: Failure to thrive due to progression of GBM.     Plan:    #GBM   [] c/w Decadron to 4MG PO Q8HR.  [] C/w Avastin monotherapy    #Seizures :: previously in focal motor status, appears better controlled. recent EEG w/ LPDs  [] Continue Vimpat 200mg IV BID and Keppra 1g IV TID    #Hyponatremia :: Likely secondary to SIADH. Fluid restriction <1.5L daily   -Improved. Last 136, will continue to trend Na    #Dysphagia   -s/p PEG 7/9  -Starting feeds 07/10  -Blood at PEG site on 7/10 AM noted; Dr. Palumbo aware; May cauterize at site if continues to bleed. Hgb stable.   -Nutrition consult appreciated    #HTN  Amlodipine 5mg QD via PEG  Losartan 100mg QD via PEG  Medicine ([facility]) following    #DVT ppx: Mechanical compression stockings; Holding Lovenox SQ per Dr. Palumbo given blood at PEG site. Will discuss with Dr. Palumbo regarding timing to resume.   #DIET: TF via PEG  #Dispo: Home with family on Monday 07/12       71 RHM w/ PMH of HTN, HLD, SAIDH, GBM dx in 2019 on Avastin, seizure disorder presented w/ depressed level of consciousness, found to have focal motor seizures.    Impression: Failure to thrive due to progression of GBM now s/p PEG.     Plan:  #GBM   [] c/w Decadron to 4MG PO Q8HR.  [] C/w Avastin monotherapy    #Seizures :: previously in focal motor status, appears better controlled. recent EEG w/ LPDs  [] Continue Vimpat 200mg IV BID and Keppra 1g IV TID    #Hyponatremia :: Likely secondary to SIADH. Fluid restriction <1.5L daily   -Improved. Will continue to trend     #Dysphagia   -s/p PEG 7/9  -Starting feeds 07/10  -Blood at PEG site on 7/10 AM noted; Dr. Palumbo aware; May cauterize at site if continues to bleed. Hgb stable.   -Nutrition consult appreciated    #HTN  Amlodipine 5mg QD via PEG  Losartan 100mg QD via PEG  Medicine ([facility]) following    #Paliative care meeting  PTs family wishes to meet with palliative care regarding options.     #DVT ppx: Mechanical compression stockings; Holding Lovenox SQ per Dr. Palumbo given blood at PEG site. Will discuss with Dr. Palumbo regarding timing to resume.   #DIET: TF via PEG  #Dispo: Home with family on Monday 07/12       71 RHM w/ PMH of HTN, HLD, SAIDH, GBM dx in 2019 on Avastin, seizure disorder presented w/ depressed level of consciousness, found to have focal motor seizures.    Impression: Failure to thrive due to progression of GBM now s/p PEG.     Plan:  #GBM   [] c/w Decadron to 4MG PO Q8HR.  [] C/w Avastin monotherapy    #Seizures :: previously in focal motor status, appears better controlled. recent EEG w/ LPDs  [] Continue Vimpat 200mg IV BID and Keppra 1g IV TID    #Hyponatremia :: Likely secondary to SIADH. Fluid restriction <1.5L daily   -Improved. Will continue to trend     #Dysphagia   -s/p PEG 7/9  -Starting feeds 07/10  -Blood at PEG site on 7/10 AM noted; Dr. Palumbo aware; May cauterize at site if continues to bleed. Hgb stable.   -Nutrition consult appreciated    #HTN  Amlodipine 5mg QD via PEG  Losartan 100mg QD via PEG  Medicine ([facility]) following    #Paliative care meeting  PTs family wishes to meet with palliative care regarding options.     #DVT ppx: Mechanical compression stockings; Holding Lovenox SQ per Dr. Palumbo given blood at PEG site. Will discuss with Dr. Palumbo regarding timing to resume.   #DIET: TF via PEG  #Dispo: Home with family on Monday 07/12      ATTENDING ADDENDUM    I personally interviewed, examined, and participated in the care of this patient, on rounds 7/11/21 with the neurology borges service team.  Reviewed findings and management plan with the team.  I agree with the above findings assessment and management plan for this unfortunate Pt in the end stage of sequelae from GBM.

## 2021-07-11 NOTE — PROGRESS NOTE ADULT - ASSESSMENT
Glioblastoma Multiforme    --Under care at [facility] Dr. Lyle Chavis  --Currently on maintenance Avastin    --Appears to have stable disease but MS has deteriorated   --As per Dr. Martínez who is covering for Dr. Chavis - if mental status unable to be improved on current management, hospice may be appropriate  -- PEG Placed  -- Pall Care eval    AMS, Seizure like activity    --No edema, mass effect, hemorrhage or midline shift on CT  -- Repeat MRI stable   --Patient had UTI 4/2021 which caused AMS  --Patient on Dexamethasone now q8 hours  --Keppra has been increased to 1000 mg TID   --[facility]  --Vimpat dose 200 mg q12     Hyponatremia Resolved     Dereck Mclain MD  Hematology Oncology   O: [phone]  C: [phone]

## 2021-07-11 NOTE — PROGRESS NOTE ADULT - SUBJECTIVE AND OBJECTIVE BOX
Patient seen and examined at bedside. remains unengaged     MEDICATIONS  (STANDING):  amantadine Syrup 100 milliGRAM(s) Oral two times a day  amLODIPine   Tablet 5 milliGRAM(s) Oral daily  atorvastatin 10 milliGRAM(s) Oral at bedtime  bisacodyl 5 milliGRAM(s) Oral at bedtime  dexAMETHasone     Tablet 4 milliGRAM(s) Oral every 8 hours  lacosamide IVPB 200 milliGRAM(s) IV Intermittent every 12 hours  levETIRAcetam  IVPB 1000 milliGRAM(s) IV Intermittent <User Schedule>  losartan 100 milliGRAM(s) Oral daily  methylphenidate 5 milliGRAM(s) Oral <User Schedule>  pantoprazole  Injectable 40 milliGRAM(s) IV Push daily  polyethylene glycol 3350 17 Gram(s) Oral daily  senna 2 Tablet(s) Oral at bedtime  silver nitrate Applicator 1 Application(s) Topical once    MEDICATIONS  (PRN):  LORazepam   Injectable 1 milliGRAM(s) IV Push once PRN GTC  ramelteon 8 milliGRAM(s) Oral at bedtime PRN Insomnia        Vital Signs Last 24 Hrs  T(C): 36.3 (11 Jul 2021 07:27), Max: 36.7 (11 Jul 2021 04:45)  T(F): 97.3 (11 Jul 2021 07:27), Max: 98.1 (11 Jul 2021 04:45)  HR: 91 (11 Jul 2021 07:27) (84 - 96)  BP: 127/82 (11 Jul 2021 07:27) (110/69 - 127/82)  BP(mean): --  RR: 18 (11 Jul 2021 07:27) (18 - 18)  SpO2: 98% (11 Jul 2021 07:27) (96% - 99%)      PHYSICAL EXAM:     GENERAL:  Appears ill   CHEST:  poor air entry   HEART:  S1 s2+   ABDOMEN:  Soft, non-tender, non-distended  EXTEREMITIES:  no cyanosis,clubbing or edema                              11.9   20.54 )-----------( 197      ( 10 Jul 2021 17:00 )             38.1       07-10    136  |  101  |  19  ----------------------------<  112<H>  4.3   |  23  |  0.33<L>    Ca    8.8      10 Jul 2021 06:36

## 2021-07-11 NOTE — PROGRESS NOTE ADULT - ASSESSMENT
72 year old RH male with a past medical history of HTN, HLD, hx of SIADH, gliobastoma multiforme (diagnosed in 2019) and seizures presenting after an episode of depressed level of consciousness and suspected seizure episode.    leukocytosis  - may be 2/2 steroids    hyponatremia resolved  - likely SIADH  - fluid restrict    dysphagia  - failed swallow eval  - would keep NPO   - peg placed  -  cont feeds    GBM  - treatment as per neurooncology  - stable MRI  - c/w decadron    seizure - on keppra    depression  - off celexa    dvt px  - Lovenox

## 2021-07-11 NOTE — PROGRESS NOTE ADULT - SUBJECTIVE AND OBJECTIVE BOX
ADDIS SCHWAB  Male  MRN-[id number]    Interval:   -No acute events overnight    MEDICATIONS  (STANDING):  amantadine Syrup 100 milliGRAM(s) Oral two times a day  amLODIPine   Tablet 5 milliGRAM(s) Oral daily  atorvastatin 10 milliGRAM(s) Oral at bedtime  bisacodyl 5 milliGRAM(s) Oral at bedtime  dexAMETHasone     Tablet 4 milliGRAM(s) Oral every 8 hours  lacosamide IVPB 200 milliGRAM(s) IV Intermittent every 12 hours  levETIRAcetam  IVPB 1000 milliGRAM(s) IV Intermittent <User Schedule>  losartan 100 milliGRAM(s) Oral daily  methylphenidate 5 milliGRAM(s) Oral <User Schedule>  pantoprazole  Injectable 40 milliGRAM(s) IV Push daily  polyethylene glycol 3350 17 Gram(s) Oral daily  senna 2 Tablet(s) Oral at bedtime  silver nitrate Applicator 1 Application(s) Topical once    MEDICATIONS  (PRN):  LORazepam   Injectable 1 milliGRAM(s) IV Push once PRN GTC  ramelteon 8 milliGRAM(s) Oral at bedtime PRN Insomnia      Vital Signs Last 24 Hrs  T(C): 36.3 (11 Jul 2021 07:27), Max: 36.7 (11 Jul 2021 04:45)  T(F): 97.3 (11 Jul 2021 07:27), Max: 98.1 (11 Jul 2021 04:45)  HR: 91 (11 Jul 2021 07:27) (84 - 96)  BP: 127/82 (11 Jul 2021 07:27) (110/69 - 127/82)  BP(mean): --  RR: 18 (11 Jul 2021 07:27) (18 - 18)  SpO2: 98% (11 Jul 2021 07:27) (96% - 99%)    NEUROLOGICAL EXAM:  MS: Eyes open. Tracks examiner inconsistently. No verbal output. Does not follow commands.  CN: PERRL. Eyes midline, cross midline with OCR. Face grossly symmetric. Intermittent hiccups.  Motor: No spontaneous movement noted x4. RLE/LLE contracted. No twitching noted in RUE.  Coordination/Gait: Unable to assess.                          11.9   20.54 )-----------( 197      ( 10 Jul 2021 17:00 )             38.1     07-10    136  |  101  |  19  ----------------------------<  112<H>  4.3   |  23  |  0.33<L>    Ca    8.8      10 Jul 2021 06:36    MR Head w/wo IV Cont (07.06.21 @ 20:13)  Redemonstration of posttreatment changes in the bilateral occipital lobes and periventricular regions. There is a lobular areas of high T1 signal centered in the splenium of the corpus callosum. No gross enhancement. The overall size and appearance is unchanged. Areas are restricted diffusion again noted likely related to tumor. Redemonstration Of diffuse T2 prolongation throughout the periventricular white matter, unchanged from the prior exam, likely representing radiation changes, gliosis and/or edema.  The ventricles, cisternal spaces, and cortical sulci to be appropriate for the patient's stated age. There is no midline shift or extra-axial collection.    IMPRESSION: Stable exam    07/06/21 vEEG Summary / Classification:  Abnormal EEG in comatose patient  Lateralized periodic discharges, left hemisphere, max over temporo-centroparietal region with time locked clonus of the right arm.  Disorganization  Moderate background slowing, generalized.    EEG Impression / Clinical Correlate:  The presence of LPDs with associated time locked clonus indicative of focal motor seizures ("EPC").  There is also evidence of moderate diffuse cerebral dysfunction.     ADDIS SCHWAB  Male  MRN-[id number]    Interval:   -No acute events overnight.  Family discussed and wish to make the PT DNR and also wish to speak to palliative care    MEDICATIONS  (STANDING):  amantadine Syrup 100 milliGRAM(s) Oral two times a day  amLODIPine   Tablet 5 milliGRAM(s) Oral daily  atorvastatin 10 milliGRAM(s) Oral at bedtime  bisacodyl 5 milliGRAM(s) Oral at bedtime  dexAMETHasone     Tablet 4 milliGRAM(s) Oral every 8 hours  lacosamide IVPB 200 milliGRAM(s) IV Intermittent every 12 hours  levETIRAcetam  IVPB 1000 milliGRAM(s) IV Intermittent <User Schedule>  losartan 100 milliGRAM(s) Oral daily  methylphenidate 5 milliGRAM(s) Oral <User Schedule>  pantoprazole  Injectable 40 milliGRAM(s) IV Push daily  polyethylene glycol 3350 17 Gram(s) Oral daily  senna 2 Tablet(s) Oral at bedtime  silver nitrate Applicator 1 Application(s) Topical once    MEDICATIONS  (PRN):  LORazepam   Injectable 1 milliGRAM(s) IV Push once PRN GTC  ramelteon 8 milliGRAM(s) Oral at bedtime PRN Insomnia    Vital Signs Last 24 Hrs  T(C): 36.3 (11 Jul 2021 07:27), Max: 36.7 (11 Jul 2021 04:45)  T(F): 97.3 (11 Jul 2021 07:27), Max: 98.1 (11 Jul 2021 04:45)  HR: 91 (11 Jul 2021 07:27) (84 - 96)  BP: 127/82 (11 Jul 2021 07:27) (110/69 - 127/82)  BP(mean): --  RR: 18 (11 Jul 2021 07:27) (18 - 18)  SpO2: 98% (11 Jul 2021 07:27) (96% - 99%)    NEUROLOGICAL EXAM:  MS: Eyes open. Tracks examiner inconsistently. No verbal output. Does not follow commands.  CN: PERRL. Eyes midline, cross midline with OCR. Face grossly symmetric. Intermittent hiccups.  Motor: No spontaneous movement noted x4. RLE/LLE contracted. No twitching noted in RUE.  Coordination/Gait: Unable to assess.                          11.9   20.54 )-----------( 197      ( 10 Jul 2021 17:00 )             38.1     07-10    136  |  101  |  19  ----------------------------<  112<H>  4.3   |  23  |  0.33<L>    Ca    8.8      10 Jul 2021 06:36    MR Head w/wo IV Cont (07.06.21 @ 20:13)  Redemonstration of posttreatment changes in the bilateral occipital lobes and periventricular regions. There is a lobular areas of high T1 signal centered in the splenium of the corpus callosum. No gross enhancement. The overall size and appearance is unchanged. Areas are restricted diffusion again noted likely related to tumor. Redemonstration Of diffuse T2 prolongation throughout the periventricular white matter, unchanged from the prior exam, likely representing radiation changes, gliosis and/or edema.  The ventricles, cisternal spaces, and cortical sulci to be appropriate for the patient's stated age. There is no midline shift or extra-axial collection.    IMPRESSION: Stable exam    07/06/21 vEEG Summary / Classification:  Abnormal EEG in comatose patient  Lateralized periodic discharges, left hemisphere, max over temporo-centroparietal region with time locked clonus of the right arm.  Disorganization  Moderate background slowing, generalized.    EEG Impression / Clinical Correlate:  The presence of LPDs with associated time locked clonus indicative of focal motor seizures ("EPC").  There is also evidence of moderate diffuse cerebral dysfunction.

## 2021-07-12 ENCOUNTER — TRANSCRIPTION ENCOUNTER (OUTPATIENT)
Age: 71
End: 2021-07-12

## 2021-07-12 LAB
ALBUMIN SERPL ELPH-MCNC: 2.5 G/DL — LOW (ref 3.3–5)
ALP SERPL-CCNC: 114 U/L — SIGNIFICANT CHANGE UP (ref 40–120)
ALT FLD-CCNC: 24 U/L — SIGNIFICANT CHANGE UP (ref 10–45)
ANION GAP SERPL CALC-SCNC: 12 MMOL/L — SIGNIFICANT CHANGE UP (ref 5–17)
APPEARANCE UR: ABNORMAL
AST SERPL-CCNC: 26 U/L — SIGNIFICANT CHANGE UP (ref 10–40)
BACTERIA # UR AUTO: ABNORMAL
BASOPHILS # BLD AUTO: 0.02 K/UL — SIGNIFICANT CHANGE UP (ref 0–0.2)
BASOPHILS NFR BLD AUTO: 0.1 % — SIGNIFICANT CHANGE UP (ref 0–2)
BILIRUB SERPL-MCNC: 0.3 MG/DL — SIGNIFICANT CHANGE UP (ref 0.2–1.2)
BILIRUB UR-MCNC: NEGATIVE — SIGNIFICANT CHANGE UP
BUN SERPL-MCNC: 23 MG/DL — SIGNIFICANT CHANGE UP (ref 7–23)
CALCIUM SERPL-MCNC: 8.7 MG/DL — SIGNIFICANT CHANGE UP (ref 8.4–10.5)
CHLORIDE SERPL-SCNC: 97 MMOL/L — SIGNIFICANT CHANGE UP (ref 96–108)
CO2 SERPL-SCNC: 23 MMOL/L — SIGNIFICANT CHANGE UP (ref 22–31)
COLOR SPEC: YELLOW — SIGNIFICANT CHANGE UP
CREAT SERPL-MCNC: 0.37 MG/DL — LOW (ref 0.5–1.3)
DIFF PNL FLD: NEGATIVE — SIGNIFICANT CHANGE UP
EOSINOPHIL # BLD AUTO: 0.01 K/UL — SIGNIFICANT CHANGE UP (ref 0–0.5)
EOSINOPHIL NFR BLD AUTO: 0 % — SIGNIFICANT CHANGE UP (ref 0–6)
EPI CELLS # UR: 1 /HPF — SIGNIFICANT CHANGE UP
GLUCOSE SERPL-MCNC: 174 MG/DL — HIGH (ref 70–99)
GLUCOSE UR QL: NEGATIVE — SIGNIFICANT CHANGE UP
HCT VFR BLD CALC: 38.7 % — LOW (ref 39–50)
HGB BLD-MCNC: 12.2 G/DL — LOW (ref 13–17)
HYALINE CASTS # UR AUTO: 19 /LPF — HIGH (ref 0–2)
IMM GRANULOCYTES NFR BLD AUTO: 0.6 % — SIGNIFICANT CHANGE UP (ref 0–1.5)
KETONES UR-MCNC: NEGATIVE — SIGNIFICANT CHANGE UP
LEUKOCYTE ESTERASE UR-ACNC: ABNORMAL
LYMPHOCYTES # BLD AUTO: 10.5 % — LOW (ref 13–44)
LYMPHOCYTES # BLD AUTO: 2.3 K/UL — SIGNIFICANT CHANGE UP (ref 1–3.3)
MAGNESIUM SERPL-MCNC: 2.2 MG/DL — SIGNIFICANT CHANGE UP (ref 1.6–2.6)
MCHC RBC-ENTMCNC: 27.5 PG — SIGNIFICANT CHANGE UP (ref 27–34)
MCHC RBC-ENTMCNC: 31.5 GM/DL — LOW (ref 32–36)
MCV RBC AUTO: 87.4 FL — SIGNIFICANT CHANGE UP (ref 80–100)
MONOCYTES # BLD AUTO: 0.84 K/UL — SIGNIFICANT CHANGE UP (ref 0–0.9)
MONOCYTES NFR BLD AUTO: 3.8 % — SIGNIFICANT CHANGE UP (ref 2–14)
NEUTROPHILS # BLD AUTO: 18.51 K/UL — HIGH (ref 1.8–7.4)
NEUTROPHILS NFR BLD AUTO: 85 % — HIGH (ref 43–77)
NITRITE UR-MCNC: NEGATIVE — SIGNIFICANT CHANGE UP
NRBC # BLD: 0 /100 WBCS — SIGNIFICANT CHANGE UP (ref 0–0)
PH UR: 8 — SIGNIFICANT CHANGE UP (ref 5–8)
PHOSPHATE SERPL-MCNC: 2.8 MG/DL — SIGNIFICANT CHANGE UP (ref 2.5–4.5)
PLATELET # BLD AUTO: 182 K/UL — SIGNIFICANT CHANGE UP (ref 150–400)
POTASSIUM SERPL-MCNC: 5.1 MMOL/L — SIGNIFICANT CHANGE UP (ref 3.5–5.3)
POTASSIUM SERPL-SCNC: 5.1 MMOL/L — SIGNIFICANT CHANGE UP (ref 3.5–5.3)
PROT SERPL-MCNC: 6.9 G/DL — SIGNIFICANT CHANGE UP (ref 6–8.3)
PROT UR-MCNC: ABNORMAL
RBC # BLD: 4.43 M/UL — SIGNIFICANT CHANGE UP (ref 4.2–5.8)
RBC # FLD: 16.8 % — HIGH (ref 10.3–14.5)
RBC CASTS # UR COMP ASSIST: 6 /HPF — HIGH (ref 0–4)
SODIUM SERPL-SCNC: 132 MMOL/L — LOW (ref 135–145)
SP GR SPEC: 1.02 — SIGNIFICANT CHANGE UP (ref 1.01–1.02)
T PALLIDUM AB TITR SER: NEGATIVE — SIGNIFICANT CHANGE UP
UROBILINOGEN FLD QL: ABNORMAL
WBC # BLD: 21.82 K/UL — HIGH (ref 3.8–10.5)
WBC # FLD AUTO: 21.82 K/UL — HIGH (ref 3.8–10.5)
WBC UR QL: 110 /HPF — HIGH (ref 0–5)

## 2021-07-12 PROCEDURE — 71045 X-RAY EXAM CHEST 1 VIEW: CPT | Mod: 26

## 2021-07-12 PROCEDURE — 99223 1ST HOSP IP/OBS HIGH 75: CPT

## 2021-07-12 PROCEDURE — 99232 SBSQ HOSP IP/OBS MODERATE 35: CPT

## 2021-07-12 RX ORDER — PANTOPRAZOLE SODIUM 20 MG/1
40 TABLET, DELAYED RELEASE ORAL
Refills: 0 | Status: DISCONTINUED | OUTPATIENT
Start: 2021-07-12 | End: 2021-07-12

## 2021-07-12 RX ORDER — LEVETIRACETAM 250 MG/1
1000 TABLET, FILM COATED ORAL
Refills: 0 | Status: DISCONTINUED | OUTPATIENT
Start: 2021-07-12 | End: 2021-07-20

## 2021-07-12 RX ORDER — ENOXAPARIN SODIUM 100 MG/ML
40 INJECTION SUBCUTANEOUS DAILY
Refills: 0 | Status: DISCONTINUED | OUTPATIENT
Start: 2021-07-12 | End: 2021-07-19

## 2021-07-12 RX ORDER — LEVETIRACETAM 250 MG/1
1000 TABLET, FILM COATED ORAL
Refills: 0 | Status: DISCONTINUED | OUTPATIENT
Start: 2021-07-12 | End: 2021-07-12

## 2021-07-12 RX ORDER — LACOSAMIDE 50 MG/1
200 TABLET ORAL EVERY 12 HOURS
Refills: 0 | Status: DISCONTINUED | OUTPATIENT
Start: 2021-07-12 | End: 2021-07-12

## 2021-07-12 RX ORDER — LACOSAMIDE 50 MG/1
200 TABLET ORAL
Refills: 0 | Status: DISCONTINUED | OUTPATIENT
Start: 2021-07-12 | End: 2021-07-20

## 2021-07-12 RX ORDER — PANTOPRAZOLE SODIUM 20 MG/1
40 TABLET, DELAYED RELEASE ORAL
Refills: 0 | Status: DISCONTINUED | OUTPATIENT
Start: 2021-07-12 | End: 2021-07-20

## 2021-07-12 RX ORDER — DEXAMETHASONE 0.5 MG/5ML
4 ELIXIR ORAL EVERY 12 HOURS
Refills: 0 | Status: DISCONTINUED | OUTPATIENT
Start: 2021-07-12 | End: 2021-07-15

## 2021-07-12 RX ADMIN — PANTOPRAZOLE SODIUM 40 MILLIGRAM(S): 20 TABLET, DELAYED RELEASE ORAL at 11:45

## 2021-07-12 RX ADMIN — AMLODIPINE BESYLATE 5 MILLIGRAM(S): 2.5 TABLET ORAL at 05:09

## 2021-07-12 RX ADMIN — LACOSAMIDE 140 MILLIGRAM(S): 50 TABLET ORAL at 05:09

## 2021-07-12 RX ADMIN — Medication 4 MILLIGRAM(S): at 17:11

## 2021-07-12 RX ADMIN — ATORVASTATIN CALCIUM 10 MILLIGRAM(S): 80 TABLET, FILM COATED ORAL at 23:10

## 2021-07-12 RX ADMIN — POLYETHYLENE GLYCOL 3350 17 GRAM(S): 17 POWDER, FOR SOLUTION ORAL at 11:43

## 2021-07-12 RX ADMIN — LEVETIRACETAM 1000 MILLIGRAM(S): 250 TABLET, FILM COATED ORAL at 23:13

## 2021-07-12 RX ADMIN — LEVETIRACETAM 400 MILLIGRAM(S): 250 TABLET, FILM COATED ORAL at 05:08

## 2021-07-12 RX ADMIN — Medication 100 MILLIGRAM(S): at 05:09

## 2021-07-12 RX ADMIN — LEVETIRACETAM 1000 MILLIGRAM(S): 250 TABLET, FILM COATED ORAL at 13:16

## 2021-07-12 RX ADMIN — SENNA PLUS 2 TABLET(S): 8.6 TABLET ORAL at 23:09

## 2021-07-12 RX ADMIN — Medication 4 MILLIGRAM(S): at 05:10

## 2021-07-12 RX ADMIN — Medication 100 MILLIGRAM(S): at 17:11

## 2021-07-12 RX ADMIN — LACOSAMIDE 200 MILLIGRAM(S): 50 TABLET ORAL at 17:12

## 2021-07-12 RX ADMIN — ENOXAPARIN SODIUM 40 MILLIGRAM(S): 100 INJECTION SUBCUTANEOUS at 11:45

## 2021-07-12 RX ADMIN — Medication 5 MILLIGRAM(S): at 08:05

## 2021-07-12 RX ADMIN — LOSARTAN POTASSIUM 100 MILLIGRAM(S): 100 TABLET, FILM COATED ORAL at 05:10

## 2021-07-12 NOTE — PROGRESS NOTE ADULT - SUBJECTIVE AND OBJECTIVE BOX
INTERVAL HPI/OVERNIGHT EVENTS:    no acute events    MEDICATIONS  (STANDING):  amantadine Syrup 100 milliGRAM(s) Oral two times a day  amLODIPine   Tablet 5 milliGRAM(s) Oral daily  atorvastatin 10 milliGRAM(s) Oral at bedtime  baclofen 5 milliGRAM(s) Oral two times a day  dexAMETHasone     Tablet 4 milliGRAM(s) Oral every 8 hours  enoxaparin Injectable 40 milliGRAM(s) SubCutaneous daily  lacosamide IVPB 200 milliGRAM(s) IV Intermittent every 12 hours  levETIRAcetam  IVPB 1000 milliGRAM(s) IV Intermittent <User Schedule>  losartan 100 milliGRAM(s) Oral daily  methylphenidate 5 milliGRAM(s) Oral <User Schedule>  pantoprazole  Injectable 40 milliGRAM(s) IV Push daily  polyethylene glycol 3350 17 Gram(s) Oral daily  senna 2 Tablet(s) Oral at bedtime    MEDICATIONS  (PRN):  LORazepam   Injectable 1 milliGRAM(s) IV Push once PRN GTC  ramelteon 8 milliGRAM(s) Oral at bedtime PRN Insomnia      Allergies    No Known Allergies    Intolerances        Review of Systems:    non- verbal       Vital Signs Last 24 Hrs  T(C): 36.3 (12 Jul 2021 20:02), Max: 37.1 (12 Jul 2021 07:51)  T(F): 97.3 (12 Jul 2021 20:02), Max: 98.8 (12 Jul 2021 07:51)  HR: 99 (12 Jul 2021 20:02) (84 - 103)  BP: 108/69 (12 Jul 2021 20:02) (108/69 - 134/72)  BP(mean): --  RR: 18 (12 Jul 2021 20:02) (18 - 18)  SpO2: 97% (12 Jul 2021 20:02) (97% - 99%)    PHYSICAL EXAM:    GENERAL:  ill- appearing, no distress  HEENT:  NC/AT,  conjunctivae anicteric, clear and pink,   NECK: supple, trachea midline  CHEST:  Full & symmetric excursion, no increased effort, breath sounds clear  HEART:  Regular rhythm, no JVD  ABDOMEN:  Soft, non-tender, non-distended, normoactive bowel sounds,  no masses , no hepatosplenomegaly, scant bleeding at site of g tube  EXTREMITIES:  no cyanosis,clubbing or edema  SKIN:  No rash, erythema, or, ecchymoses, no jaundice  NEURO:  lethargic non-focal, no asterixis  PSYCH: calm, nonverbal  RECTAL: Deferred      LABS:           CBC Full  -  ( 12 Jul 2021 06:22 )  WBC Count : 21.82 K/uL  RBC Count : 4.43 M/uL  Hemoglobin : 12.2 g/dL  Hematocrit : 38.7 %  Platelet Count - Automated : 182 K/uL  Mean Cell Volume : 87.4 fl  Mean Cell Hemoglobin : 27.5 pg  Mean Cell Hemoglobin Concentration : 31.5 gm/dL  Auto Neutrophil # : 18.51 K/uL  Auto Lymphocyte # : 2.30 K/uL  Auto Monocyte # : 0.84 K/uL  Auto Eosinophil # : 0.01 K/uL  Auto Basophil # : 0.02 K/uL  Auto Neutrophil % : 85.0 %  Auto Lymphocyte % : 10.5 %  Auto Monocyte % : 3.8 %  Auto Eosinophil % : 0.0 %  Auto Basophil % : 0.1 %        RADIOLOGY & ADDITIONAL TESTS:

## 2021-07-12 NOTE — CHART NOTE - NSCHARTNOTEFT_GEN_A_CORE
Palliative reconsulted to assist in GOC discussion in the setting of patient with advanced illness. Team informed that patient's son requesting palliative involvement again in case. Chart reviewed, previous notes read and appreciated.    LMSW contacted patient's son via telephone today and first introduced self and role of palliative . Patient's son verbalized understanding and was receptive to call today. LMSW inquired into family meeting this week to discuss palliative/hospice options available to patient, and son first requested information today regarding palliative vs hospice, and hospice program overview. LMSW discussed palliative vs hospice at length today, and provided detailed overview of home hospice program. All questions appropriately answered. Patient's son states he is aware that patient with limited life expectancy and overall poor prognosis, and states he is hopeful to create a plan that will maximize patient's quality of life for however much time patient has left. Patient's son requested palliative meet with him at bedside tomorrow, 7/13, at 10 AM to further discuss options and work towards d/c decision-making. Palliative to meet with patient's son at 10 AM, and will continue to follow this case.

## 2021-07-12 NOTE — PROGRESS NOTE ADULT - ASSESSMENT
Assessment: 71 year old RH male with a past medical history of HTN, HLD, hx of SIADH, glioblastoma multiforme (diagnosed in 2019), and seizures presenting after an episode of depressed level of consciousness and suspected seizure episode.    Impression: Failure to thrive and progression of disease. Cachexia due to malignancy and poor nutrition.     Plan:  [x] vEEG: LPDs, left hemisphere, max over temporo-centroparietal region with time locked clonus of the right arm.  [x] MRI Brain w/wo contrast: no changed compared to prior study.   [] c/w Vimpat to 200MG IV BID.  [] c/w Decadron to 4MG PO Q8HR.  [] c/w Keppra 1000MG IV TID.  [] Hyponatremia likely secondary to SIADH. Fluid restriction < 1.5L daily. (132 on 7/12).  [x] Holding home Celexa as this could contribute to hyponatremia.  [x] s/p PEG placement on 07/09.  [x] IM on board, Dr. Godfrey. Recs appreciated.  [] c/w Bowel regimen.  [x] Discussed patient's treatment plan and goals of care with neuro-oncologist Dr. Martínez - currently on Avastin monotherapy (last dose 6/24, next dose due around 7/9).  [] Family intially declined Palliative consult, now would like to speak to Palliative team. Made patient DNR.  [x] CPK, B12, folate: wnl.  [] Diet: Glucerna 1.2 via PEG, goal rate 60.  [] DVT PPx: Lovenox SC.  [] GI PPx: Protonix IV QD.  [] Seizure rescue: Ativan 1MG IVP ONLY FOR CONVULSIONS/GTC LASTING > 3 MINUTES. Not treating focal seizures.    Case discussed with neurology attending Dr. Angeles. Assessment: 71 year old RH male with a past medical history of HTN, HLD, hx of SIADH, glioblastoma multiforme (diagnosed in 2019), and seizures presenting after an episode of depressed level of consciousness and suspected seizure episode.    Impression: Failure to thrive and progression of disease. Cachexia due to malignancy and poor nutrition.     Plan:  [x] vEEG: LPDs, left hemisphere, max over temporo-centroparietal region with time locked clonus of the right arm.  [x] MRI Brain w/wo contrast: no changed compared to prior study.   [] c/w Vimpat to 200MG PO BID.  [] decreased Decadron to 4MG PO Q12HR.  [] c/w Keppra 1000MG PO TID.  [] Hyponatremia likely secondary to SIADH. Fluid restriction < 1.5L daily. (132 on 7/12).  [] d/c'd Ritalin, Baclofen.  [x] Holding home Celexa as this could contribute to hyponatremia.  [x] s/p PEG placement on 07/09.  [x] IM on board, Dr. Godfrey. Recs appreciated.  [] c/w Bowel regimen.  [x] Discussed patient's treatment plan and goals of care with neuro-oncologist Dr. Martínez - currently on Avastin monotherapy (last dose 6/24, next dose due around 7/9).  [] Family initially declined Palliative consult, now would like to speak to Palliative team. Made patient DNR.  [x] CPK, B12, folate: wnl.  [] Diet: Glucerna 1.2 via PEG, goal rate 60.  [] DVT PPx: Lovenox SC.  [] GI PPx: Protonix PO QD.  [] Seizure rescue: Ativan 1MG IVP ONLY FOR CONVULSIONS/GTC LASTING > 3 MINUTES. Not treating focal seizures.    Case discussed with neurology attending Dr. Angeles. Assessment: 71 year old RH male with a past medical history of HTN, HLD, hx of SIADH, glioblastoma multiforme (diagnosed in 2019), and seizures presenting after an episode of depressed level of consciousness and suspected seizure episode.    Impression: Failure to thrive and progression of disease. Cachexia due to malignancy and poor nutrition.     Plan:  [x] vEEG: LPDs, left hemisphere, max over temporo-centroparietal region with time locked clonus of the right arm.  [x] MRI Brain w/wo contrast: no changed compared to prior study.   [] c/w Vimpat to 200MG PO BID.  [] decreased Decadron to 4MG PO Q12HR.  [] c/w Keppra 1000MG PO TID.  [] Hyponatremia likely secondary to SIADH. Fluid restriction < 1.5L daily. (132 on 7/12).  [] d/c'd Ritalin, Baclofen.  [x] Holding home Celexa as this could contribute to hyponatremia.  [x] s/p PEG placement on 07/09.  [x] IM on board, Dr. Godfrey. Recs appreciated.  [] c/w Bowel regimen.  [x] Discussed patient's treatment plan and goals of care with neuro-oncologist Dr. Martínez - currently on Avastin monotherapy (last dose 6/24, next dose due around 7/9).  [] Family initially declined Palliative consult, now would like to speak to Palliative team. Made patient DNR. Palliative team aware, will try to arrange for family meeting tomorrow.  [x] CPK, B12, folate: wnl.  [] Diet: Glucerna 1.2 via PEG, goal rate 60.  [] DVT PPx: Lovenox SC.  [] GI PPx: Protonix PO QD.  [] Seizure rescue: Ativan 1MG IVP ONLY FOR CONVULSIONS/GTC LASTING > 3 MINUTES. Not treating focal seizures.    Case discussed with neurology attending Dr. Angeles.

## 2021-07-12 NOTE — CONSULT NOTE ADULT - SUBJECTIVE AND OBJECTIVE BOX
Patient is a 71y old  Male who presents with a chief complaint of seizure episode (12 Jul 2021 13:55)      HPI:  HPI:  Ronald Rider is a 72 year old RH male with a past medical history of HTN, HLD, hx of SIADH, gliobastoma multiforme (diagnosed in 2019) and seizures presenting after an episode of depressed level of consciousness and suspected seizure episode. At baseline, patient has contractions throughout and is bedbound, he is non-verbal but can respond to some simple commands. Patient was recently admitted for increased somnolence, difficulty breathing, decreased alertness, and poor PO intake. Was briefly on antibiotics but discontinued once no infection was found, had hypernatremia which resolved, briefly on steroid which were tapered and then subsequently discontinued. Was discharged for follow up with his neuro-oncologist at [facility]. Currently patient was on Keppra 750mg tid and as per son was compliant. Patient now presenting with after an episode where he was sitting in his chair and then noted be slumping over to the R, having increased tonic contractions of the RUE, and some twitching of his face. Patient was noted to be incontinent during the episode and was less responsive than usual. He was taken by EMS to the hospital and given Versed 5mg IM en route. Patient gradually improved and was able to follow some command to squeeze a finger with his son in the room. On examination, the patient was not able to contribute to history, ROS, or follow commands to exam.     Glioblastoma History:  Diagnosed in 2019 with MR noting involvement of the splenium of the corpus callosum and bilateral parenchymal involvement. Had stereotactic biopsy in 2019 with pathology noting glioblastoma. Was started on Keppra and Decadron. Was treated at [facility] with Dr. Chavis with radiation, temozolomide, and Bactrim. Temozolamide was stopped due to myelosuppression. Has had numerous admission for somnolence and seizure episodes. Currently on Avastin maintenance. MRI in April 2021 and June 2021 show stable mass, progression of RT gliosis. Patient was administered steroids at the time of last admission in early June 2021 and was tapered off as an outpatient.    In the ED afebrile, normotensive but tachycardic > 90. On presentation WBC of 8.34 with 62.8% PMN. U/A on 7/1 with 358 WBC. treated with CTX 7/1 -> 7/3. MRI Brain (7/6) (stable compared to 6/11/21 MRI) with posttreatment changes in the bilateral occipital lobes and periventricular regions and lobular areas of high T1 signal centered in the splenium of the corpus callosum. On 7/9 s/p PEG placement.     Dexamethasone:  IV: 6/29 --> 7/3  PO: 7/3 -->    ALLERGIES/INTOLERANCES:  Allergies  No Known Allergies    Intolerances    VITALS & EXAMINATION:  Vital Signs Last 24 Hrs  T(C): 36.7 (29 Jun 2021 03:00), Max: 36.7 (29 Jun 2021 03:00)  T(F): 98 (29 Jun 2021 03:00), Max: 98 (29 Jun 2021 03:00)  HR: 99 (29 Jun 2021 03:00) (99 - 100)  BP: 135/71 (29 Jun 2021 03:00) (117/46 - 146/82)  BP(mean): --  RR: 17 (29 Jun 2021 03:00) (17 - 22)  SpO2: 100% (29 Jun 2021 03:00) (100% - 100%) (29 Jun 2021 03:53)     prior hospital charts reviewed [  ]  primary team notes reviewed [  ]  other consultant notes reviewed [  ]    PAST MEDICAL & SURGICAL HISTORY:  Hypertension    Hyperlipidemia    Seizures    Diabetes    Glioblastoma multiforme    CMV infection    History of SIADH    H/O colonoscopy    History of laryngoscopy    Status post stereotactic brain biopsy        Allergies  No Known Allergies    ANTIMICROBIALS (past 90 days)  MEDICATIONS  (STANDING):  cefTRIAXone   IVPB   100 mL/Hr IV Intermittent (07-03-21 @ 16:48)   100 mL/Hr IV Intermittent (07-02-21 @ 20:05)   100 mL/Hr IV Intermittent (07-01-21 @ 17:31)    nystatin    Suspension   [id number] Unit(s) Oral (07-06-21 @ 05:05)   [id number] Unit(s) Oral (07-05-21 @ 23:09)   [id number] Unit(s) Oral (07-05-21 @ 17:26)   [id number] Unit(s) Oral (07-05-21 @ 13:09)   [id number] Unit(s) Oral (07-05-21 @ 05:14)   [id number] Unit(s) Oral (07-04-21 @ 23:35)   [id number] Unit(s) Oral (07-04-21 @ 17:58)   [id number] Unit(s) Oral (07-04-21 @ 11:29)   [id number] Unit(s) Oral (07-04-21 @ 06:01)   [id number] Unit(s) Oral (07-03-21 @ 18:11)   [id number] Unit(s) Oral (07-03-21 @ 12:15)   [id number] Unit(s) Oral (07-03-21 @ 05:14)   [id number] Unit(s) Oral (07-03-21 @ 00:27)   [id number] Unit(s) Oral (07-02-21 @ 18:20)   [id number] Unit(s) Oral (07-02-21 @ 12:03)   [id number] Unit(s) Oral (07-02-21 @ 06:09)   [id number] Unit(s) Oral (07-02-21 @ 06:02)   [id number] Unit(s) Oral (07-01-21 @ 17:32)   [id number] Unit(s) Oral (07-01-21 @ 12:22)   [id number] Unit(s) Oral (07-01-21 @ 05:54)   [id number] Unit(s) Oral (06-30-21 @ 18:05)   [id number] Unit(s) Oral (06-30-21 @ 12:11)      ANTIMICROBIALS:      OTHER MEDS: MEDICATIONS  (STANDING):  amantadine Syrup 100 two times a day  amLODIPine   Tablet 5 daily  atorvastatin 10 at bedtime  bisacodyl Suppository 10 at bedtime PRN  dexAMETHasone     Tablet 4 every 12 hours  enoxaparin Injectable 40 daily  lacosamide Solution 200 two times a day  levETIRAcetam  Solution 1000 <User Schedule>  LORazepam   Injectable 1 once PRN  losartan 100 daily  pantoprazole   Suspension 40 before breakfast  polyethylene glycol 3350 17 daily  ramelteon 8 at bedtime PRN  senna 2 at bedtime    SOCIAL HISTORY:   hx smoking  non-smoker    FAMILY HISTORY:  FH: myocardial infarction (Father)      REVIEW OF SYSTEMS  [  ] ROS unobtainable because:    [  ] All other systems negative except as noted below:	    Constitutional:  [ ] fever [ ] chills  [ ] weight loss  [ ] weakness  Skin:  [ ] rash [ ] phlebitis	  Eyes: [ ] icterus [ ] pain  [ ] discharge	  ENMT: [ ] sore throat  [ ] thrush [ ] ulcers [ ] exudates  Respiratory: [ ] dyspnea [ ] hemoptysis [ ] cough [ ] sputum	  Cardiovascular:  [ ] chest pain [ ] palpitations [ ] edema	  Gastrointestinal:  [ ] nausea [ ] vomiting [ ] diarrhea [ ] constipation [ ] pain	  Genitourinary:  [ ] dysuria [ ] frequency [ ] hematuria [ ] discharge [ ] flank pain  [ ] incontinence  Musculoskeletal:  [ ] myalgias [ ] arthralgias [ ] arthritis  [ ] back pain  Neurological:  [ ] headache [ ] seizures  [ ] confusion/altered mental status  Psychiatric:  [ ] anxiety [ ] depression	  Hematology/Lymphatics:  [ ] lymphadenopathy  Endocrine:  [ ] adrenal [ ] thyroid  Allergic/Immunologic:	 [ ] transplant [ ] seasonal    Vital Signs Last 24 Hrs  T(F): 97.9 (07-12-21 @ 11:32), Max: 98.8 (07-12-21 @ 07:51)  Vital Signs Last 24 Hrs  HR: 88 (07-12-21 @ 11:32) (84 - 92)  BP: 115/75 (07-12-21 @ 11:32) (112/70 - 134/72)  RR: 18 (07-12-21 @ 11:32)  SpO2: 98% (07-12-21 @ 11:32) (98% - 100%)  Wt(kg): --    PHYSICAL EXAMINATION:  General: Alert and Awake, NAD  HEENT: PERRL, EOMI, No subconjunctival hemorrhages, Oropharynx Clear, MMM  Neck: Supple, No STEFANIA  Cardiac: RRR, No M/R/G  Resp: CTAB, No Wh/Rh/Ra  Abdomen: NBS, NT/ND, No HSM, No rigidity or guarding  MSK: No LE edema. No stigmata of IE. No evidence of phlebitis. No evidence of synovitis.  : No diaz  Skin: No rashes or lesions. Skin is warm and dry to the touch.   Neuro: Alert and Awake. CN 2-12 Grossly intact. Moves all four extremities spontaneously.  Psych: Calm, Pleasant, Cooperative                          12.2   21.82 )-----------( 182      ( 12 Jul 2021 06:22 )             38.7     07-12    132<L>  |  97  |  23  ----------------------------<  174<H>  5.1   |  23  |  0.37<L>    Ca    8.7      12 Jul 2021 06:21  Phos  2.8     07-12  Mg     2.2     07-12    TPro  6.9  /  Alb  2.5<L>  /  TBili  0.3  /  DBili  x   /  AST  26  /  ALT  24  /  AlkPhos  114  07-12    MICROBIOLOGY:    COVID-19 Luis Domain Antibody (06.30.21 @ 09:38)   COVID-19 Luis Domain Antibody Result: >250.00:    RADIOLOGY:    <The imaging below has been reviewed and visualized by me independently. Findings as detailed in report below>    EXAM:  MR BRAIN WAW IC                        PROCEDURE DATE:  07/06/2021    Redemonstration of posttreatment changes in the bilateral occipital lobes and periventricular regions. There is a lobular areas of high T1 signal centered in the splenium of the corpus callosum. No gross enhancement. The overall size and appearance is unchanged. Areas are restricted diffusion again noted likely related to tumor. Redemonstration Of diffuse T2 prolongation throughout the periventricular white matter, unchanged from the prior exam, likely representing radiation changes, gliosis and/or edema  The ventricles, cisternal spaces, and cortical sulci to be appropriate for the patient's stated age. There is no midline shift or extra-axial collection.         Patient is a 71y old  Male who presents with a chief complaint of seizure episode (12 Jul 2021 13:55)    HPI:    72 year old RH male with a past medical history of HTN, HLD, hx of SIADH, gliobastoma multiforme (diagnosed in 2019) and seizures presenting after an episode of depressed level of consciousness and suspected seizure episode. At baseline, patient has contractions throughout and is bedbound, he is non-verbal but can respond to some simple commands. Patient was recently admitted for increased somnolence, difficulty breathing, decreased alertness, and poor PO intake. Was briefly on antibiotics but discontinued once no infection was found, had hypernatremia which resolved, briefly on steroid which were tapered and then subsequently discontinued. Was discharged for follow up with his neuro-oncologist at [facility]. Currently patient was on Keppra 750mg tid and as per son was compliant. Patient now presenting with after an episode where he was sitting in his chair and then noted be slumping over to the R, having increased tonic contractions of the RUE, and some twitching of his face. Patient was noted to be incontinent during the episode and was less responsive than usual. He was taken by EMS to the hospital and given Versed 5mg IM en route. Patient gradually improved and was able to follow some command to squeeze a finger with his son in the room. On examination, the patient was not able to contribute to history, ROS, or follow commands to exam.     Glioblastoma History:  Diagnosed in 2019 with MR noting involvement of the splenium of the corpus callosum and bilateral parenchymal involvement. Had stereotactic biopsy in 2019 with pathology noting glioblastoma. Was started on Keppra and Decadron. Was treated at [facility] with Dr. Chavis with radiation, temozolomide, and Bactrim. Temozolamide was stopped due to myelosuppression. Has had numerous admission for somnolence and seizure episodes. Currently on Avastin maintenance. MRI in April 2021 and June 2021 show stable mass, progression of RT gliosis. Patient was administered steroids at the time of last admission in early June 2021 and was tapered off as an outpatient.    In the ED afebrile, normotensive but tachycardic > 90. On presentation WBC of 8.34 with 62.8% PMN. U/A on 7/1 with 358 WBC. Treated with CTX 7/1 -> 7/3. MRI Brain (7/6) (stable compared to 6/11/21 MRI) with posttreatment changes in the bilateral occipital lobes and periventricular regions and lobular areas of high T1 signal centered in the splenium of the corpus callosum. On 7/9 s/p PEG placement.     Dexamethasone:  IV: 6/29 --> 7/3  PO: 7/3 -->    ALLERGIES/INTOLERANCES:  Allergies  No Known Allergies    Intolerances    VITALS & EXAMINATION:  Vital Signs Last 24 Hrs  T(C): 36.7 (29 Jun 2021 03:00), Max: 36.7 (29 Jun 2021 03:00)  T(F): 98 (29 Jun 2021 03:00), Max: 98 (29 Jun 2021 03:00)  HR: 99 (29 Jun 2021 03:00) (99 - 100)  BP: 135/71 (29 Jun 2021 03:00) (117/46 - 146/82)  BP(mean): --  RR: 17 (29 Jun 2021 03:00) (17 - 22)  SpO2: 100% (29 Jun 2021 03:00) (100% - 100%) (29 Jun 2021 03:53)     prior hospital charts reviewed [  ]  primary team notes reviewed [ x ]  other consultant notes reviewed [ x ]    PAST MEDICAL & SURGICAL HISTORY:  Hypertension    Hyperlipidemia    Seizures    Diabetes    Glioblastoma multiforme    CMV infection    History of SIADH    H/O colonoscopy    History of laryngoscopy    Status post stereotactic brain biopsy        Allergies  No Known Allergies    ANTIMICROBIALS (past 90 days)  MEDICATIONS  (STANDING):  cefTRIAXone   IVPB   100 mL/Hr IV Intermittent (07-03-21 @ 16:48)   100 mL/Hr IV Intermittent (07-02-21 @ 20:05)   100 mL/Hr IV Intermittent (07-01-21 @ 17:31)    nystatin    Suspension   [id number] Unit(s) Oral (07-06-21 @ 05:05)   [id number] Unit(s) Oral (07-05-21 @ 23:09)   [id number] Unit(s) Oral (07-05-21 @ 17:26)   [id number] Unit(s) Oral (07-05-21 @ 13:09)   [id number] Unit(s) Oral (07-05-21 @ 05:14)   [id number] Unit(s) Oral (07-04-21 @ 23:35)   [id number] Unit(s) Oral (07-04-21 @ 17:58)   [id number] Unit(s) Oral (07-04-21 @ 11:29)   [id number] Unit(s) Oral (07-04-21 @ 06:01)   [id number] Unit(s) Oral (07-03-21 @ 18:11)   [id number] Unit(s) Oral (07-03-21 @ 12:15)   [id number] Unit(s) Oral (07-03-21 @ 05:14)   [id number] Unit(s) Oral (07-03-21 @ 00:27)   [id number] Unit(s) Oral (07-02-21 @ 18:20)   [id number] Unit(s) Oral (07-02-21 @ 12:03)   [id number] Unit(s) Oral (07-02-21 @ 06:09)   [id number] Unit(s) Oral (07-02-21 @ 06:02)   [id number] Unit(s) Oral (07-01-21 @ 17:32)   [id number] Unit(s) Oral (07-01-21 @ 12:22)   [id number] Unit(s) Oral (07-01-21 @ 05:54)   [id number] Unit(s) Oral (06-30-21 @ 18:05)   [id number] Unit(s) Oral (06-30-21 @ 12:11)      ANTIMICROBIALS:      OTHER MEDS: MEDICATIONS  (STANDING):  amantadine Syrup 100 two times a day  amLODIPine   Tablet 5 daily  atorvastatin 10 at bedtime  bisacodyl Suppository 10 at bedtime PRN  dexAMETHasone     Tablet 4 every 12 hours  enoxaparin Injectable 40 daily  lacosamide Solution 200 two times a day  levETIRAcetam  Solution 1000 <User Schedule>  LORazepam   Injectable 1 once PRN  losartan 100 daily  pantoprazole   Suspension 40 before breakfast  polyethylene glycol 3350 17 daily  ramelteon 8 at bedtime PRN  senna 2 at bedtime    SOCIAL HISTORY: unable to obtain due to mental status / nonverbal state    FAMILY HISTORY:  FH: myocardial infarction (Father)      REVIEW OF SYSTEMS  [ x ] ROS unobtainable because:  unable to obtain due to mental status / nonverbal state  [  ] All other systems negative except as noted below:	    Constitutional:  [ ] fever [ ] chills  [ ] weight loss  [ ] weakness  Skin:  [ ] rash [ ] phlebitis	  Eyes: [ ] icterus [ ] pain  [ ] discharge	  ENMT: [ ] sore throat  [ ] thrush [ ] ulcers [ ] exudates  Respiratory: [ ] dyspnea [ ] hemoptysis [ ] cough [ ] sputum	  Cardiovascular:  [ ] chest pain [ ] palpitations [ ] edema	  Gastrointestinal:  [ ] nausea [ ] vomiting [ ] diarrhea [ ] constipation [ ] pain	  Genitourinary:  [ ] dysuria [ ] frequency [ ] hematuria [ ] discharge [ ] flank pain  [ ] incontinence  Musculoskeletal:  [ ] myalgias [ ] arthralgias [ ] arthritis  [ ] back pain  Neurological:  [ ] headache [ ] seizures  [ ] confusion/altered mental status  Psychiatric:  [ ] anxiety [ ] depression	  Hematology/Lymphatics:  [ ] lymphadenopathy  Endocrine:  [ ] adrenal [ ] thyroid  Allergic/Immunologic:	 [ ] transplant [ ] seasonal    Vital Signs Last 24 Hrs  T(F): 97.9 (07-12-21 @ 11:32), Max: 98.8 (07-12-21 @ 07:51)  Vital Signs Last 24 Hrs  HR: 88 (07-12-21 @ 11:32) (84 - 92)  BP: 115/75 (07-12-21 @ 11:32) (112/70 - 134/72)  RR: 18 (07-12-21 @ 11:32)  SpO2: 98% (07-12-21 @ 11:32) (98% - 100%)  Wt(kg): --    PHYSICAL EXAMINATION:  General: Awake but not alert, NAD  HEENT: PERRL, EOMI  Neck: Supple  Cardiac: RRR, No M/R/G  Resp: CTAB, No Wh/Rh/Ra  Abdomen: +PEG (site is clean and without drainage) NBS, NT/ND, No HSM, No rigidity or guarding  MSK: Contracted LE. No LE edema. LLE foot with large bullae at plantar aspect (small amount of erythema at lateral aspect of bullae)  : + diaz  Skin: Findings per MSK Section. Skin is warm and dry to the touch.   Neuro: Awake but not alert. Contracted LE   Psych: Encephalopathic - unable to assess                          12.2   21.82 )-----------( 182      ( 12 Jul 2021 06:22 )             38.7     07-12    132<L>  |  97  |  23  ----------------------------<  174<H>  5.1   |  23  |  0.37<L>    Ca    8.7      12 Jul 2021 06:21  Phos  2.8     07-12  Mg     2.2     07-12    TPro  6.9  /  Alb  2.5<L>  /  TBili  0.3  /  DBili  x   /  AST  26  /  ALT  24  /  AlkPhos  114  07-12    MICROBIOLOGY:    COVID-19 Luis Domain Antibody (06.30.21 @ 09:38)   COVID-19 Luis Domain Antibody Result: >250.00:    RADIOLOGY:    <The imaging below has been reviewed and visualized by me independently. Findings as detailed in report below>    EXAM:  MR BRAIN WAW IC                        PROCEDURE DATE:  07/06/2021    Redemonstration of posttreatment changes in the bilateral occipital lobes and periventricular regions. There is a lobular areas of high T1 signal centered in the splenium of the corpus callosum. No gross enhancement. The overall size and appearance is unchanged. Areas are restricted diffusion again noted likely related to tumor. Redemonstration Of diffuse T2 prolongation throughout the periventricular white matter, unchanged from the prior exam, likely representing radiation changes, gliosis and/or edema  The ventricles, cisternal spaces, and cortical sulci to be appropriate for the patient's stated age. There is no midline shift or extra-axial collection.

## 2021-07-12 NOTE — PROGRESS NOTE ADULT - SUBJECTIVE AND OBJECTIVE BOX
MRN-[id number]    Subjective: 72 yo male seen and examined at bedside.    PAST MEDICAL & SURGICAL HISTORY:  Hypertension    Hyperlipidemia    Seizures    Diabetes    Glioblastoma multiforme    CMV infection    History of SIADH    H/O colonoscopy    History of laryngoscopy    Status post stereotactic brain biopsy    FAMILY HISTORY:  FH: myocardial infarction (Father)    Social Hx:  Nonsmoker, no drug or alcohol use    Home Medications:  amantadine 50 mg/5 mL oral syrup: 10 milliliter(s) orally 2 times a day (05 Jun 2021 10:00)  atorvastatin 10 mg oral tablet: 1 tab(s) orally once a day (05 Jun 2021 10:00)  citalopram 20 mg oral tablet: 1 tab(s) orally once a day (05 Jun 2021 10:00)  Keppra 100 mg/mL oral solution: 7.5 milliliter(s) orally 2 times a day (05 Jun 2021 10:00)  losartan 100 mg oral tablet: 1 tab(s) orally once a day (05 Jun 2021 10:00)  methylphenidate 5 mg oral tablet: 1 tab(s) orally @ 8am and 2 pm (15 Abel 2021 12:19)  Norvasc 5 mg oral tablet: 1 tab(s) orally once a day (05 Jun 2021 10:00)  nystatin 100,000 units/mL oral suspension: 4 milliliter(s) orally 4 times a day (05 Jun 2021 10:00)  ramelteon 8 mg oral tablet: 1 tab(s) orally once a day (at bedtime) (05 Jun 2021 10:00)    MEDICATIONS  (STANDING):  amantadine Syrup 100 milliGRAM(s) Oral two times a day  amLODIPine   Tablet 5 milliGRAM(s) Oral daily  atorvastatin 10 milliGRAM(s) Oral at bedtime  dexAMETHasone     Tablet 4 milliGRAM(s) Oral every 8 hours  lacosamide IVPB 200 milliGRAM(s) IV Intermittent every 12 hours  levETIRAcetam  IVPB 1000 milliGRAM(s) IV Intermittent <User Schedule>  losartan 100 milliGRAM(s) Oral daily  methylphenidate 5 milliGRAM(s) Oral <User Schedule>  pantoprazole  Injectable 40 milliGRAM(s) IV Push daily  polyethylene glycol 3350 17 Gram(s) Oral daily  senna 2 Tablet(s) Oral at bedtime    MEDICATIONS  (PRN):  bisacodyl Suppository 10 milliGRAM(s) Rectal at bedtime PRN Constipation  LORazepam   Injectable 1 milliGRAM(s) IV Push once PRN GTC  ramelteon 8 milliGRAM(s) Oral at bedtime PRN Insomnia    Allergies  No Known Allergies	    ROS: Unable to obtain due to patient's mental status.      Vital Signs Last 24 Hrs  T(C): 36.7 (12 Jul 2021 05:08), Max: 36.7 (12 Jul 2021 05:08)  T(F): 98.1 (12 Jul 2021 05:08), Max: 98.1 (12 Jul 2021 05:08)  HR: 91 (12 Jul 2021 05:08) (84 - 92)  BP: 125/78 (12 Jul 2021 05:08) (103/57 - 134/72)  RR: 18 (12 Jul 2021 05:08) (18 - 18)  SpO2: 98% (12 Jul 2021 05:08) (97% - 100%)    GENERAL EXAM:  Constitutional: Lying in bed, NAD.  HEENT: PERRL. Normocephalic.  Extremities: No edema.    NEUROLOGICAL EXAM:  MS: Eyes closed, do not open to verbal/noxious stimuli. No verbal output. Does not follow commands.  CN: PERRL. Eyes at primary gaze, cross midline with OCR. No facial asymmetry.  Motor: No spontaneous movement noted. No twitching/jerking observed. Intermittent hiccups. Contractures in b/l lower extremities.  Coordination/Gait: Not assessed.    Labs:   cbc                      12.2   21.82 )-----------( 182      ( 12 Jul 2021 06:22 )             38.7     [id number]    132<L>  |  97  |  23  ----------------------------<  174<H>  5.1   |  23  |  0.37<L>    Ca    8.7      12 Jul 2021 06:21  Phos  2.8     07-12  Mg     2.2     07-12    TPro  6.9  /  Alb  2.5<L>  /  TBili  0.3  /  DBili  x   /  AST  26  /  ALT  24  /  AlkPhos  114  07-12    LIVER FUNCTIONS - ( 12 Jul 2021 06:21 )  Alb: 2.5 g/dL / Pro: 6.9 g/dL / ALK PHOS: 114 U/L / ALT: 24 U/L / AST: 26 U/L / GGT: x           Radiology/EEGs:  -06/29 CTH: No CT evidence of acute intracranial hemorrhage, mass effect, or midline shift. Unchanged posttreatment changes.    -07/03 EEG Summary / Classification:  Abnormal EEG in comatose patient  Lateralized periodic discharges, left hemisphere, max over centroparietal region  Disorganization  Moderate background slowing, generalized.  EEG Impression / Clinical Correlate:  The presence of LPDs is often associated with subacute structural injury in the left hemisphere. LPDs also indicate heightened risk for seizures.  There is also evidence of moderate diffuse cerebral dysfunction.    -07/04 EEG Summary / Classification:  Abnormal EEG in comatose patient  4.	Lateralized periodic discharges, left hemisphere, max over centroparietal region.  5.	Disorganization  6.	Moderate background slowing, generalized.  EEG Impression / Clinical Correlate:  The presence of LPDs is often associated with subacute structural injury in the left hemisphere. LPDs also indicate heightened risk for seizures.  There is also evidence of moderate diffuse cerebral dysfunction.    -07/05 EEG Summary / Classification:  Abnormal EEG in comatose patient  7.	Lateralized periodic discharges, left hemisphere, max over centroparietal region.  8.	Disorganization  9.	Moderate background slowing, generalized.  EEG Impression / Clinical Correlate:  The presence of LPDs is often associated with subacute structural injury in the left hemisphere. LPDs also indicate heightened risk for seizures.  There is also evidence of moderate diffuse cerebral dysfunction.    -07/06 EEG Summary / Classification:  Abnormal EEG in comatose patient  10.	Lateralized periodic discharges, left hemisphere, max over temporo-centroparietal region with time locked clonus of the right arm.  11.	Disorganization  12.	Moderate background slowing, generalized.  EEG Impression / Clinical Correlate:  The presence of LPDs with associated time locked clonus indicative of focal motor seizures ("EPC").  There is also evidence of moderate diffuse cerebral dysfunction.    -07/06 MRI Brain w/wo Cont: Redemonstration of posttreatment changes in the bilateral occipital lobes and periventricular regions. There is a lobular areas of high T1 signal centered in the splenium of the corpus callosum. No gross enhancement. The overall size and appearance is unchanged. Areas are restricted diffusion again noted likely related to tumor. Redemonstration Of diffuse T2 prolongation throughout the periventricular white matter, unchanged from the prior exam, likely representing radiation changes, gliosis and/or edema. Stable exam. [id number]    Subjective: 70 yo male seen and examined at bedside.    PAST MEDICAL & SURGICAL HISTORY:  Hypertension    Hyperlipidemia    Seizures    Diabetes    Glioblastoma multiforme    CMV infection    History of SIADH    H/O colonoscopy    History of laryngoscopy    Status post stereotactic brain biopsy    FAMILY HISTORY:  FH: myocardial infarction (Father)    Social Hx:  Nonsmoker, no drug or alcohol use    Home Medications:  amantadine 50 mg/5 mL oral syrup: 10 milliliter(s) orally 2 times a day (05 Jun 2021 10:00)  atorvastatin 10 mg oral tablet: 1 tab(s) orally once a day (05 Jun 2021 10:00)  citalopram 20 mg oral tablet: 1 tab(s) orally once a day (05 Jun 2021 10:00)  Keppra 100 mg/mL oral solution: 7.5 milliliter(s) orally 2 times a day (05 Jun 2021 10:00)  losartan 100 mg oral tablet: 1 tab(s) orally once a day (05 Jun 2021 10:00)  methylphenidate 5 mg oral tablet: 1 tab(s) orally @ 8am and 2 pm (15 Abel 2021 12:19)  Norvasc 5 mg oral tablet: 1 tab(s) orally once a day (05 Jun 2021 10:00)  nystatin 100,000 units/mL oral suspension: 4 milliliter(s) orally 4 times a day (05 Jun 2021 10:00)  ramelteon 8 mg oral tablet: 1 tab(s) orally once a day (at bedtime) (05 Jun 2021 10:00)    MEDICATIONS  (STANDING):  amantadine Syrup 100 milliGRAM(s) Oral two times a day  amLODIPine   Tablet 5 milliGRAM(s) Oral daily  atorvastatin 10 milliGRAM(s) Oral at bedtime  dexAMETHasone     Tablet 4 milliGRAM(s) Oral every 8 hours  lacosamide IVPB 200 milliGRAM(s) IV Intermittent every 12 hours  levETIRAcetam  IVPB 1000 milliGRAM(s) IV Intermittent <User Schedule>  losartan 100 milliGRAM(s) Oral daily  methylphenidate 5 milliGRAM(s) Oral <User Schedule>  pantoprazole  Injectable 40 milliGRAM(s) IV Push daily  polyethylene glycol 3350 17 Gram(s) Oral daily  senna 2 Tablet(s) Oral at bedtime    MEDICATIONS  (PRN):  bisacodyl Suppository 10 milliGRAM(s) Rectal at bedtime PRN Constipation  LORazepam   Injectable 1 milliGRAM(s) IV Push once PRN GTC  ramelteon 8 milliGRAM(s) Oral at bedtime PRN Insomnia    Allergies  No Known Allergies	    ROS: Unable to obtain due to patient's mental status.      Vital Signs Last 24 Hrs  T(C): 36.7 (12 Jul 2021 05:08), Max: 36.7 (12 Jul 2021 05:08)  T(F): 98.1 (12 Jul 2021 05:08), Max: 98.1 (12 Jul 2021 05:08)  HR: 91 (12 Jul 2021 05:08) (84 - 92)  BP: 125/78 (12 Jul 2021 05:08) (103/57 - 134/72)  RR: 18 (12 Jul 2021 05:08) (18 - 18)  SpO2: 98% (12 Jul 2021 05:08) (97% - 100%)    GENERAL EXAM:  Constitutional: Lying in bed, NAD.  HEENT: PERRL. Normocephalic.  Extremities: No edema.    NEUROLOGICAL EXAM:  MS: Eyes closed, open to verbal stimuli. No verbal output. Does not follow commands.  CN: PERRL. Eyes at primary gaze, cross midline with OCR. No facial asymmetry.  Motor: No spontaneous movement noted. No twitching/jerking observed. Contractures in b/l lower extremities. No clonus in b/l ankles.  Coordination/Gait: Not assessed.    Labs:   cbc                      12.2   21.82 )-----------( 182      ( 12 Jul 2021 06:22 )             38.7     [id number]    132<L>  |  97  |  23  ----------------------------<  174<H>  5.1   |  23  |  0.37<L>    Ca    8.7      12 Jul 2021 06:21  Phos  2.8     07-12  Mg     2.2     07-12    TPro  6.9  /  Alb  2.5<L>  /  TBili  0.3  /  DBili  x   /  AST  26  /  ALT  24  /  AlkPhos  114  07-12    LIVER FUNCTIONS - ( 12 Jul 2021 06:21 )  Alb: 2.5 g/dL / Pro: 6.9 g/dL / ALK PHOS: 114 U/L / ALT: 24 U/L / AST: 26 U/L / GGT: x           Radiology/EEGs:  -06/29 CTH: No CT evidence of acute intracranial hemorrhage, mass effect, or midline shift. Unchanged posttreatment changes.    -07/03 EEG Summary / Classification:  Abnormal EEG in comatose patient  Lateralized periodic discharges, left hemisphere, max over centroparietal region  Disorganization  Moderate background slowing, generalized.  EEG Impression / Clinical Correlate:  The presence of LPDs is often associated with subacute structural injury in the left hemisphere. LPDs also indicate heightened risk for seizures.  There is also evidence of moderate diffuse cerebral dysfunction.    -07/04 EEG Summary / Classification:  Abnormal EEG in comatose patient  4.	Lateralized periodic discharges, left hemisphere, max over centroparietal region.  5.	Disorganization  6.	Moderate background slowing, generalized.  EEG Impression / Clinical Correlate:  The presence of LPDs is often associated with subacute structural injury in the left hemisphere. LPDs also indicate heightened risk for seizures.  There is also evidence of moderate diffuse cerebral dysfunction.    -07/05 EEG Summary / Classification:  Abnormal EEG in comatose patient  7.	Lateralized periodic discharges, left hemisphere, max over centroparietal region.  8.	Disorganization  9.	Moderate background slowing, generalized.  EEG Impression / Clinical Correlate:  The presence of LPDs is often associated with subacute structural injury in the left hemisphere. LPDs also indicate heightened risk for seizures.  There is also evidence of moderate diffuse cerebral dysfunction.    -07/06 EEG Summary / Classification:  Abnormal EEG in comatose patient  10.	Lateralized periodic discharges, left hemisphere, max over temporo-centroparietal region with time locked clonus of the right arm.  11.	Disorganization  12.	Moderate background slowing, generalized.  EEG Impression / Clinical Correlate:  The presence of LPDs with associated time locked clonus indicative of focal motor seizures ("EPC").  There is also evidence of moderate diffuse cerebral dysfunction.    -07/06 MRI Brain w/wo Cont: Redemonstration of posttreatment changes in the bilateral occipital lobes and periventricular regions. There is a lobular areas of high T1 signal centered in the splenium of the corpus callosum. No gross enhancement. The overall size and appearance is unchanged. Areas are restricted diffusion again noted likely related to tumor. Redemonstration Of diffuse T2 prolongation throughout the periventricular white matter, unchanged from the prior exam, likely representing radiation changes, gliosis and/or edema. Stable exam.

## 2021-07-12 NOTE — PROGRESS NOTE ADULT - ATTENDING COMMENTS
Mr Tereso is a 71 year old RH male with a past medical history of HTN, HLD, hx of SIADH, glioblastoma multiforme (diagnosed in 2019, on avastin), and seizures presenting after an episode of depressed level of consciousness and suspected seizure episode. currently on keppra 1000mg tid  and vimpat 200mg bid, MRI brain w/wo contrast: no changed compared to prior study.     decrease Decadron to 4MG PO Q12HR.(from tid)  awaiting palliative consultation

## 2021-07-12 NOTE — PROGRESS NOTE ADULT - ASSESSMENT
72 year old RH male with a past medical history of HTN, HLD, hx of SIADH, gliobastoma multiforme (diagnosed in 2019) and seizures presenting after an episode of depressed level of consciousness and suspected seizure episode.    leukocytosis  - may be 2/2 steroids    hyponatremia resolved  - likely SIADH  - fluid restrict    dysphagia  - failed swallow eval  - would keep NPO   - peg placed  -  cont feeds    GBM  - treatment as per neurooncology  - stable MRI  - c/w decadron    seizure - on keppra    depression  - off celexa    dvt px  - Lovenox     dispo  - poss home hospice  - family meeting tomorrow

## 2021-07-12 NOTE — DISCHARGE NOTE PROVIDER - NSDCFUADDINST_GEN_ALL_CORE_FT
Podiatry Discharge Instructions:  Follow up: Please follow up with Dr. Waterhouse within 1 week of discharge from the hospital, please call [phone] for appointment and discuss that you recently were seen in the hospital.  Wound Care: Please apply Mupirocin to left foot wound and cover with allevyn pad  daily  Weight bearing: Please use z flows to both feet while in bed  Antibiotics: Please continue as instructed.

## 2021-07-12 NOTE — DISCHARGE NOTE PROVIDER - DETAILS OF MALNUTRITION DIAGNOSIS/DIAGNOSES
This patient has been assessed with a concern for Malnutrition and was treated during this hospitalization for the following Nutrition diagnosis/diagnoses:     -  07/01/2021: Severe protein-calorie malnutrition   -  07/01/2021: Underweight (BMI < 19)

## 2021-07-12 NOTE — DISCHARGE NOTE PROVIDER - PROVIDER TOKENS
PROVIDER:[TOKEN:[[id number]],FOLLOWUP:[2 weeks]] FREE:[LAST:[Home],FIRST:[Hospice],PHONE:[(   )    -],FAX:[(   )    -]]

## 2021-07-12 NOTE — PROGRESS NOTE ADULT - ASSESSMENT
Glioblastoma Multiforme    --Under care at [facility] Dr. Lyle Chavis  --Currently on maintenance Avastin    --Appears to have stable disease but MS has deteriorated   --As per Dr. Martínez who is covering for Dr. Chavis - if mental status unable to be improved on current management, hospice may be appropriate  -- PEG Placed  -- Pall Care eval  --Patient now DNR    AMS, Seizure like activity    --No edema, mass effect, hemorrhage or midline shift on CT  -- Repeat MRI stable   --Patient had UTI 4/2021 which caused AMS  --Patient on Dexamethasone now 4mg q8 hours  --Keppra has been increased to 1000 mg TID   --[facility]  --Vimpat dose 200 mg q12     Hyponatremia Resolved    Shashi Cason PA-C  Hematology/Oncology  [facility]   [phone] (cell)  [phone] (office)  [phone] (alt office)  Evenings and weekends please call MD on call or office

## 2021-07-12 NOTE — DISCHARGE NOTE PROVIDER - NSDCCPCAREPLAN_GEN_ALL_CORE_FT
PRINCIPAL DISCHARGE DIAGNOSIS  Diagnosis: Failure to thrive in adult  Assessment and Plan of Treatment: Follow up with your Primary Care Physician within the next 1-2 weeks.   Follow up with your Neurologist within the next 1-2 weeks.  Follow up with your oncologist within the next 1-2 weeks.  Continue medications as prescribed:  Monitor your blood pressure. Reduce fat, cholesterol, and salt in your diet. Increase intake of fruits and vegetables. Limit alcohol to a minimum and do not smoke. You may be at risk for falling, make changes to your home to help you walk easier. Keep up to date on vaccinations.  Contact your Neurologist, Primary Care Provider, or report to the Emergency Room if you experience new or worsening symptoms including sudden severe headache, new numbness/tingling, new weakness, and difficulty speaking.      SECONDARY DISCHARGE DIAGNOSES  Diagnosis: GBM (glioblastoma multiforme)  Assessment and Plan of Treatment:     Diagnosis: Focal seizures  Assessment and Plan of Treatment: Focal seizure with impaired awareness.  Continue Keppra 1G PO via PEG TID, Vimpat 200MG PO via PEG BID.     PRINCIPAL DISCHARGE DIAGNOSIS  Diagnosis: Failure to thrive in adult  Assessment and Plan of Treatment: To be discharged home with Home Hospice.  The following medications are new or were increased:  1. Keppra 1G PO via PEG TID.  2. Vimpat 200MG PO via PEG BID.  3. Decadron 4MG PO via PEG BID.  The following medications were discontinued:  1. Ritalin  Continue your other home medications as prescribed.  Monitor your blood pressure. Reduce fat, cholesterol, and salt in your diet. Increase intake of fruits and vegetables. Limit alcohol to a minimum and do not smoke. You may be at risk for falling, make changes to your home to help you walk easier. Keep up to date on vaccinations.  Contact your Neurologist, Primary Care Provider, or report to the Emergency Room if you experience new or worsening symptoms including sudden severe headache, new numbness/tingling, new weakness, and difficulty speaking.      SECONDARY DISCHARGE DIAGNOSES  Diagnosis: GBM (glioblastoma multiforme)  Assessment and Plan of Treatment:     Diagnosis: Focal seizures  Assessment and Plan of Treatment: Focal seizure with impaired awareness.  Continue Keppra 1G PO via PEG TID, Vimpat 200MG PO via PEG BID.     PRINCIPAL DISCHARGE DIAGNOSIS  Diagnosis: Failure to thrive in adult  Assessment and Plan of Treatment: To be discharged home with Home Hospice.  The following medications are new or were increased:  1. Keppra 1G PO via PEG TID.  2. Vimpat 200MG PO via PEG BID.  3. Decadron 1MG PO via PEG for 3 days (7/21-7/23), then stop.  The following medications were discontinued:  1. Ritalin  2. Atorvastatin  Continue your other home medications as prescribed.      SECONDARY DISCHARGE DIAGNOSES  Diagnosis: GBM (glioblastoma multiforme)  Assessment and Plan of Treatment:     Diagnosis: Focal seizures  Assessment and Plan of Treatment: Focal seizure with impaired awareness.  Continue Keppra 1G PO via PEG TID, Vimpat 200MG PO via PEG BID.

## 2021-07-12 NOTE — DISCHARGE NOTE PROVIDER - NSRESEARCHGRANT_OVERRIDEREC_GEN_A_CORE
Active cancer (i.e. undergoing acute, in-hospital cancer treatment)/IMPROVE-DD Application Not Available

## 2021-07-12 NOTE — PROGRESS NOTE ADULT - ASSESSMENT
71 M w GBM, GI consulted for enteral nutrition    1. GBM.     2. Oropharyngeal dysphagia  - s/p PEG, no further bleeding    3. Seizure  - care per neurology appreciated    4. Leukocytosis      [facility]  Gastroenterology and Hepatology  [address] [place], [place]  Office: [phone]  Cell: [phone]   71 M w GBM, GI consulted for enteral nutrition    1. GBM.     2. Oropharyngeal dysphagia  - s/p PEG, no further bleeding    3. Seizure  - care per neurology appreciated    4. Leukocytosis  appreciate ID input, ?due to steroids. No signs of infection at G tube    [facility]  Gastroenterology and Hepatology  [address] [place], [place]  Office: [phone]  Cell: [phone]

## 2021-07-12 NOTE — PROGRESS NOTE ADULT - SUBJECTIVE AND OBJECTIVE BOX
DATE OF SERVICE: 07-12-21 @ 16:33    Patient is a 71y old  Male who presents with a chief complaint of seizure episode (12 Jul 2021 14:16)      SUBJECTIVE / OVERNIGHT EVENTS:  awake and alert, nonresponsive    MEDICATIONS  (STANDING):  amantadine Syrup 100 milliGRAM(s) Oral two times a day  amLODIPine   Tablet 5 milliGRAM(s) Oral daily  atorvastatin 10 milliGRAM(s) Oral at bedtime  dexAMETHasone     Tablet 4 milliGRAM(s) Oral every 12 hours  enoxaparin Injectable 40 milliGRAM(s) SubCutaneous daily  lacosamide Solution 200 milliGRAM(s) Oral two times a day  levETIRAcetam  Solution 1000 milliGRAM(s) Oral <User Schedule>  losartan 100 milliGRAM(s) Oral daily  pantoprazole   Suspension 40 milliGRAM(s) Oral before breakfast  polyethylene glycol 3350 17 Gram(s) Oral daily  senna 2 Tablet(s) Oral at bedtime    MEDICATIONS  (PRN):  bisacodyl Suppository 10 milliGRAM(s) Rectal at bedtime PRN Constipation  LORazepam   Injectable 1 milliGRAM(s) IV Push once PRN GTC  ramelteon 8 milliGRAM(s) Oral at bedtime PRN Insomnia      Vital Signs Last 24 Hrs  T(C): 36.3 (12 Jul 2021 15:01), Max: 37.1 (12 Jul 2021 07:51)  T(F): 97.3 (12 Jul 2021 15:01), Max: 98.8 (12 Jul 2021 07:51)  HR: 103 (12 Jul 2021 15:01) (84 - 103)  BP: 113/71 (12 Jul 2021 15:01) (112/70 - 134/72)  BP(mean): --  RR: 18 (12 Jul 2021 15:01) (18 - 18)  SpO2: 98% (12 Jul 2021 15:01) (98% - 100%)  CAPILLARY BLOOD GLUCOSE        I&O's Summary    11 Jul 2021 07:01  -  12 Jul 2021 07:00  --------------------------------------------------------  IN: 1010 mL / OUT: 1850 mL / NET: -840 mL    12 Jul 2021 07:01  -  12 Jul 2021 16:33  --------------------------------------------------------  IN: 0 mL / OUT: 1000 mL / NET: -1000 mL        PHYSICAL EXAM:  GENERAL: NAD, cachectic  HEAD:  Atraumatic, Normocephalic  EYES: EOMI, PERRLA, conjunctiva and sclera clear  NECK: Supple, No JVD  CHEST/LUNG: Clear to auscultation bilaterally; No wheeze  HEART: Regular rate and rhythm; No murmurs, rubs, or gallops  ABDOMEN: Soft, Nontender, Nondistended; Bowel sounds present  EXTREMITIES:  2+ Peripheral Pulses, No clubbing, cyanosis, or edema, contracted    LABS:                        12.2   21.82 )-----------( 182      ( 12 Jul 2021 06:22 )             38.7     07-12    132<L>  |  97  |  23  ----------------------------<  174<H>  5.1   |  23  |  0.37<L>    Ca    8.7      12 Jul 2021 06:21  Phos  2.8     07-12  Mg     2.2     07-12    TPro  6.9  /  Alb  2.5<L>  /  TBili  0.3  /  DBili  x   /  AST  26  /  ALT  24  /  AlkPhos  114  07-12              RADIOLOGY & ADDITIONAL TESTS:    Imaging Personally Reviewed:    Consultant(s) Notes Reviewed:      Care Discussed with Consultants/Other Providers:

## 2021-07-12 NOTE — DISCHARGE NOTE PROVIDER - NSDCMRMEDTOKEN_GEN_ALL_CORE_FT
amantadine 50 mg/5 mL oral syrup: 10 milliliter(s) orally 2 times a day  atorvastatin 10 mg oral tablet: 1 tab(s) orally once a day  citalopram 20 mg oral tablet: 1 tab(s) orally once a day  Home bedside suctioning kit: Home bedside suctioning kit  Keppra 100 mg/mL oral solution: 7.5 milliliter(s) orally 2 times a day  losartan 100 mg oral tablet: 1 tab(s) orally once a day  methylphenidate 5 mg oral tablet: 1 tab(s) orally @ 8am and 2 pm  Norvasc 5 mg oral tablet: 1 tab(s) orally once a day  nystatin 100,000 units/mL oral suspension: 4 milliliter(s) orally 4 times a day  PHOS-NaK oral powder for reconstitution: 1 packet(s) orally 3 times a day (with meals)   Protonix 40 mg oral delayed release tablet: 1 tab(s) orally once a day  ramelteon 8 mg oral tablet: 1 tab(s) orally once a day (at bedtime)  Specialty mattress as per PT/OT recs: Specialty mattress as per PT/OT recs  Specialty Pillow : Specialty Pillow as per PT recommendations  Tube Feed Pump: Tube Feed Pump   amantadine 50 mg/5 mL oral syrup: 10 milliliter(s) orally 2 times a day  atorvastatin 10 mg oral tablet: 1 tab(s) orally once a day  citalopram 20 mg oral tablet: 1 tab(s) orally once a day  Glucerna 1.2 farida: Glucerna 1.2 farida tube feeds via PEG at 60mL per hour for 16 hours per day  Total: 960mL per day  Home bedside suctioning kit: Home bedside suctioning kit  Keppra 100 mg/mL oral solution: 7.5 milliliter(s) orally 2 times a day  losartan 100 mg oral tablet: 1 tab(s) orally once a day  methylphenidate 5 mg oral tablet: 1 tab(s) orally @ 8am and 2 pm  Norvasc 5 mg oral tablet: 1 tab(s) orally once a day  nystatin 100,000 units/mL oral suspension: 4 milliliter(s) orally 4 times a day  PHOS-NaK oral powder for reconstitution: 1 packet(s) orally 3 times a day (with meals)   Protonix 40 mg oral delayed release tablet: 1 tab(s) orally once a day  ramelteon 8 mg oral tablet: 1 tab(s) orally once a day (at bedtime)  Specialty mattress as per PT/OT recs: Specialty mattress as per PT/OT recs  Specialty Pillow : Specialty Pillow as per PT recommendations  Tube Feed Pump: Tube Feed Pump   amantadine 50 mg/5 mL oral syrup: 10 milliliter(s) by gastrostomy tube 2 times a day   Baciguent 500 units/g topical ointment: 1 application topically at gastrostomy site 2 times a day  dexamethasone 1 mg oral tablet: 1 tab(s) by gastrostomy tube once a day   Dulcolax Laxative 10 mg rectal suppository: 1 suppository(ies) rectal once a day (at bedtime), As needed, Constipation  Glucerna 1.2 farida: Glucerna 1.2 farida tube feeds via PEG at 60mL per hour for 16 hours per day  Total: 960mL per day  Home bedside suctioning kit: Home bedside suctioning kit  Innerclean oral tablet: 2 tab(s) by gastrostomy tube once a day (at bedtime)   levETIRAcetam 100 mg/mL oral solution: 10 milliliter(s) by gastrostomy tube 3 times a day   losartan 100 mg oral tablet: 1 tab(s) by gastrostomy tube once a day   MiraLax oral powder for reconstitution: 17 gram(s) by gastrostomy tube once a day   morphine 10 mg/5 mL oral solution: 1.5 milliliter(s) by gastrostomy tube every 3 hours, As Needed -for severe pain MDD:24MG  Norvasc 5 mg oral tablet: 1 tab(s) by gastrostomy tube once a day   Protonix 40 mg oral delayed release tablet: 1 tab(s) by gastrostomy tube once a day   Specialty mattress as per PT/OT recs: Specialty mattress as per PT/OT recs  Specialty Pillow : Specialty Pillow as per PT recommendations  Tube Feed Pump: Tube Feed Pump  Vimpat 10 mg/mL oral solution: 20 milliliter(s) by gastrostomy tube 2 times a day MDD:400MG

## 2021-07-12 NOTE — PROGRESS NOTE ADULT - SUBJECTIVE AND OBJECTIVE BOX
Patient is a 71y old  Male who presents with a chief complaint of seizure episode (12 Jul 2021 07:30)    Patient seen this morning. Remains unresponsive. Patient is now DNR. S/P PEG placement    MEDICATIONS  (STANDING):  amantadine Syrup 100 milliGRAM(s) Oral two times a day  amLODIPine   Tablet 5 milliGRAM(s) Oral daily  atorvastatin 10 milliGRAM(s) Oral at bedtime  dexAMETHasone     Tablet 4 milliGRAM(s) Oral every 12 hours  enoxaparin Injectable 40 milliGRAM(s) SubCutaneous daily  lacosamide Solution 200 milliGRAM(s) Oral two times a day  levETIRAcetam  Solution 1000 milliGRAM(s) Oral <User Schedule>  losartan 100 milliGRAM(s) Oral daily  pantoprazole   Suspension 40 milliGRAM(s) Oral before breakfast  polyethylene glycol 3350 17 Gram(s) Oral daily  senna 2 Tablet(s) Oral at bedtime    MEDICATIONS  (PRN):  bisacodyl Suppository 10 milliGRAM(s) Rectal at bedtime PRN Constipation  LORazepam   Injectable 1 milliGRAM(s) IV Push once PRN GTC  ramelteon 8 milliGRAM(s) Oral at bedtime PRN Insomnia        Vital Signs Last 24 Hrs  T(C): 37.1 (12 Jul 2021 07:51), Max: 37.1 (12 Jul 2021 07:51)  T(F): 98.8 (12 Jul 2021 07:51), Max: 98.8 (12 Jul 2021 07:51)  HR: 84 (12 Jul 2021 07:51) (84 - 92)  BP: 124/74 (12 Jul 2021 07:51) (103/57 - 134/72)  BP(mean): --  RR: 18 (12 Jul 2021 07:51) (18 - 18)  SpO2: 98% (12 Jul 2021 07:51) (97% - 100%)    PE  NAD  Awake, alert  Anicteric  No c/c/e  No rash grossly                            12.2   21.82 )-----------( 182      ( 12 Jul 2021 06:22 )             38.7       07-12    132<L>  |  97  |  23  ----------------------------<  174<H>  5.1   |  23  |  0.37<L>    Ca    8.7      12 Jul 2021 06:21  Phos  2.8     07-12  Mg     2.2     07-12    TPro  6.9  /  Alb  2.5<L>  /  TBili  0.3  /  DBili  x   /  AST  26  /  ALT  24  /  AlkPhos  114  07-12

## 2021-07-12 NOTE — DISCHARGE NOTE PROVIDER - CARE PROVIDER_API CALL
Waterhouse, Joseph C (DPM)  Surgery  [address] [place] [address]  Phone: [phone]  Fax: [phone]  Follow Up Time: 2 weeks   Home, Hospice  Phone: (   )    -  Fax: (   )    -  Follow Up Time:

## 2021-07-12 NOTE — DISCHARGE NOTE PROVIDER - NSDCCAREPROVSEEN_GEN_ALL_CORE_FT
Raegan, Jaime Samaniego, Sadie Palumbo, James Raegan, Jaime Samaniego, Sadie Angeles, Deandre Moreno

## 2021-07-12 NOTE — DISCHARGE NOTE PROVIDER - HOSPITAL COURSE
HPI: 72 year old RH male with a past medical history of HTN, HLD, hx of SIADH, gliobastoma multiforme (diagnosed in 2019) and seizures presenting after an episode of depressed level of consciousness and suspected seizure episode. At baseline, patient has contractions throughout and is bedbound, he is non-verbal but can respond to some simple commands. Patient was recently admitted for increased somnolence, difficulty breathing, decreased alertness, and poor PO intake. Was briefly on antibiotics but discontinued once no infection was found, had hypernatremia which resolved, briefly on steroid which were tapered and then subsequently discontinued. Was discharged for follow up with his neuro-oncologist at [facility] [place]. Currently patient was on Keppra 750mg tid and as per son was compliant. Patient now presenting with after an episode where he was sitting in his chair and then noted be slumping over to the R, having increased tonic contractions of the RUE, and some twitching of his face. Patient was noted to be incontinent during the episode and was less responsive than usual. He was taken by EMS to the hospital and given Versed 5mg IM en route. Patient gradually improved and was able to follow some command to squeeze a finger with his son in the room. On examination, the patient was not able to contribute to history, ROS, or follow commands to exam.     Glioblastoma History:  Diagnosed in 2019 with MR noting involvement of the splenium of the corpus callosum and bilateral parenchymal involvement. Had stereotactic biopsy in 2019 with pathology noting glioblastoma. Was started on Keppra and Decadron. Was treated at [facility] [place] with Dr. Chavis with radiation, temozolomide, and Bactrim. Temozolamide was stopped due to myelosuppression. Has had numerous admission for somnolence and seizure episodes. Currently on Avastin maintenance. MRI in April 2021 and June 2021 show stable mass, progression of RT gliosis. Patient was administered steroids at the time of last admission in early June 2021 and was tapered off as an outpatient.    Hospital Course: Patient was admitted on 06/29. Keppra was increased to 1000MG IV TID on admission. Family desired full measures, agreed to NGT placement as patient was too lethargic to swallow safely. ENT was consulted for safe NGT placement on 06/30. On 07/01 evening patient was noted to be clinically seizing, likely focal motor seizure with impaired awareness involving the RUE/RLE. Patient was given Ativan 1MG IVP x1, loaded with Vimpat 100MG IV x1 and started on Vimpat 50MG IV BID in addition to Keppra. On 07/02 afternoon patient had another clinical focal motor seizure with impaired awareness involving the RUE. Ativan 1MG IVP x1 was given and Vimpat was increased to 100MG IV BID. Patient was placed on video EEG. Video EEG prelim read showed LPDs concerning for increased risk of seizures, Vimpat was again increased to 150MG IV BID. On 07/03 evening patient was in focal motor status epilepticus. Vimpat 200MG IV x1 was given and Vimpat standing dose was increased to 200MG IV BID. Patient had resolution of focal status, still experiencing intermittent twitching of RUE that was not treated. EEG findings correlated with clinical findings. HPI: 72 year old RH male with a past medical history of HTN, HLD, hx of SIADH, gliobastoma multiforme (diagnosed in 2019) and seizures presenting after an episode of depressed level of consciousness and suspected seizure episode. At baseline, patient has contractions throughout and is bedbound, he is non-verbal but can respond to some simple commands. Patient was recently admitted for increased somnolence, difficulty breathing, decreased alertness, and poor PO intake. Was briefly on antibiotics but discontinued once no infection was found, had hypernatremia which resolved, briefly on steroid which were tapered and then subsequently discontinued. Was discharged for follow up with his neuro-oncologist at [facility] [place]. Currently patient was on Keppra 750mg tid and as per son was compliant. Patient now presenting with after an episode where he was sitting in his chair and then noted be slumping over to the R, having increased tonic contractions of the RUE, and some twitching of his face. Patient was noted to be incontinent during the episode and was less responsive than usual. He was taken by EMS to the hospital and given Versed 5mg IM en route. Patient gradually improved and was able to follow some command to squeeze a finger with his son in the room. On examination, the patient was not able to contribute to history, ROS, or follow commands to exam.     Glioblastoma History:  Diagnosed in 2019 with MR noting involvement of the splenium of the corpus callosum and bilateral parenchymal involvement. Had stereotactic biopsy in 2019 with pathology noting glioblastoma. Was started on Keppra and Decadron. Was treated at [facility] with Dr. Chavis with radiation, temozolomide, and Bactrim. Temozolamide was stopped due to myelosuppression. Has had numerous admission for somnolence and seizure episodes. Currently on Avastin maintenance. MRI in April 2021 and June 2021 show stable mass, progression of RT gliosis. Patient was administered steroids at the time of last admission in early June 2021 and was tapered off as an outpatient.    Hospital Course: Patient was admitted on 06/29. Keppra was increased to 1000MG IV TID on admission. Family desired full measures, agreed to NGT placement as patient was too lethargic to swallow safely. ENT was consulted for safe NGT placement on 06/30. Patient developed hyponatremia which was corrected with fluid restriction of <1.5L daily. IM was consulted to assist with management. On 07/01 evening patient was noted to be clinically seizing, likely focal motor seizure with impaired awareness involving the RUE/RLE. Patient was given Ativan 1MG IVP x1, loaded with Vimpat 100MG IV x1 and started on Vimpat 50MG IV BID in addition to Keppra. On 07/02 afternoon patient had another clinical focal motor seizure with impaired awareness involving the RUE. Ativan 1MG IVP x1 was given and Vimpat was increased to 100MG IV BID. Patient was placed on video EEG. Video EEG prelim read showed LPDs concerning for increased risk of seizures, Vimpat was again increased to 150MG IV BID. On 07/03 evening patient was in focal motor status epilepticus. Vimpat 200MG IV x1 was given and Vimpat standing dose was increased to 200MG IV BID. Patient had resolution of focal status, still experiencing intermittent twitching of RUE that was not treated. MRI brain w/wo contrast was obtained and showed stable disease. Despite controlling focal seizures, providing adequate nutrition via NGT, and standing Ritalin BID, patient's mental status did not improve enough to be able to participate with speech and swallow. Family agreed to PEG placement, which was done on 07/09. Patient is now receiving all nutrition and medications via PEG. Family also agreed to speak with Palliative Care Team. After discussing their options with the Palliative Team, the family decided ___________.    Imaging/EEGs:  -06/29 CTH: No CT evidence of acute intracranial hemorrhage, mass effect, or midline shift. Unchanged posttreatment changes.    -07/03 EEG Summary / Classification:  Abnormal EEG in comatose patient  Lateralized periodic discharges, left hemisphere, max over centroparietal region  Disorganization  Moderate background slowing, generalized.  EEG Impression / Clinical Correlate:  The presence of LPDs is often associated with subacute structural injury in the left hemisphere. LPDs also indicate heightened risk for seizures.  There is also evidence of moderate diffuse cerebral dysfunction.    -07/04 EEG Summary / Classification:  Abnormal EEG in comatose patient  Lateralized periodic discharges, left hemisphere, max over centroparietal region.  Disorganization  Moderate background slowing, generalized.  EEG Impression / Clinical Correlate:  The presence of LPDs is often associated with subacute structural injury in the left hemisphere. LPDs also indicate heightened risk for seizures.  There is also evidence of moderate diffuse cerebral dysfunction.    -07/05 EEG Summary / Classification:  Abnormal EEG in comatose patient  Lateralized periodic discharges, left hemisphere, max over centroparietal region.  Disorganization  Moderate background slowing, generalized.  EEG Impression / Clinical Correlate:  The presence of LPDs is often associated with subacute structural injury in the left hemisphere. LPDs also indicate heightened risk for seizures.  There is also evidence of moderate diffuse cerebral dysfunction.    -07/06 EEG Summary / Classification:  Abnormal EEG in comatose patient  Lateralized periodic discharges, left hemisphere, max over temporo-centroparietal region with time locked clonus of the right arm.  Disorganization  Moderate background slowing, generalized.  EEG Impression / Clinical Correlate:  The presence of LPDs with associated time locked clonus indicative of focal motor seizures ("EPC").  There is also evidence of moderate diffuse cerebral dysfunction.    -07/06 MRI Brain w/wo Cont: Redemonstration of posttreatment changes in the bilateral occipital lobes and periventricular regions. There is a lobular areas of high T1 signal centered in the splenium of the corpus callosum. No gross enhancement. The overall size and appearance is unchanged. Areas are restricted diffusion again noted likely related to tumor. Redemonstration Of diffuse T2 prolongation throughout the periventricular white matter, unchanged from the prior exam, likely representing radiation changes, gliosis and/or edema. Stable exam.    Impression: Failure to thrive and progression of disease. Cachexia due to malignancy and poor nutrition.     Disposition: ____________________________________________________ HPI: 72 year old RH male with a past medical history of HTN, HLD, hx of SIADH, gliobastoma multiforme (diagnosed in 2019) and seizures presenting after an episode of depressed level of consciousness and suspected seizure episode. At baseline, patient has contractions throughout and is bedbound, he is non-verbal but can respond to some simple commands. Patient was recently admitted for increased somnolence, difficulty breathing, decreased alertness, and poor PO intake. Was briefly on antibiotics but discontinued once no infection was found, had hypernatremia which resolved, briefly on steroid which were tapered and then subsequently discontinued. Was discharged for follow up with his neuro-oncologist at [facility] [place]. Currently patient was on Keppra 750mg tid and as per son was compliant. Patient now presenting with after an episode where he was sitting in his chair and then noted be slumping over to the R, having increased tonic contractions of the RUE, and some twitching of his face. Patient was noted to be incontinent during the episode and was less responsive than usual. He was taken by EMS to the hospital and given Versed 5mg IM en route. Patient gradually improved and was able to follow some command to squeeze a finger with his son in the room. On examination, the patient was not able to contribute to history, ROS, or follow commands to exam.     Glioblastoma History:  Diagnosed in 2019 with MR noting involvement of the splenium of the corpus callosum and bilateral parenchymal involvement. Had stereotactic biopsy in 2019 with pathology noting glioblastoma. Was started on Keppra and Decadron. Was treated at [facility] [place] with Dr. Chavis with radiation, temozolomide, and Bactrim. Temozolamide was stopped due to myelosuppression. Has had numerous admission for somnolence and seizure episodes. Currently on Avastin maintenance. MRI in April 2021 and June 2021 show stable mass, progression of RT gliosis. Patient was administered steroids at the time of last admission in early June 2021 and was tapered off as an outpatient.    Hospital Course: Patient was admitted on 06/29. Keppra was increased to 1000MG IV TID on admission. Family desired full measures, agreed to NGT placement as patient was too lethargic to swallow safely. ENT was consulted for safe NGT placement on 06/30. Patient developed hyponatremia which was corrected with fluid restriction of <1.5L daily. IM was consulted to assist with management. On 07/01 evening patient was noted to be clinically seizing, likely focal motor seizure with impaired awareness involving the RUE/RLE. Patient was given Ativan 1MG IVP x1, loaded with Vimpat 100MG IV x1 and started on Vimpat 50MG IV BID in addition to Keppra. On 07/02 afternoon patient had another clinical focal motor seizure with impaired awareness involving the RUE. Ativan 1MG IVP x1 was given and Vimpat was increased to 100MG IV BID. Patient was placed on video EEG. Video EEG prelim read showed LPDs concerning for increased risk of seizures, Vimpat was again increased to 150MG IV BID. On 07/03 evening patient was in focal motor status epilepticus. Vimpat 200MG IV x1 was given and Vimpat standing dose was increased to 200MG IV BID. Patient had resolution of focal status, still experiencing intermittent twitching of RUE that was not treated. MRI brain w/wo contrast was obtained and showed stable disease. Despite controlling focal seizures, providing adequate nutrition via NGT, and standing Ritalin BID, patient's mental status did not improve enough to be able to participate with speech and swallow. Family agreed to PEG placement, which was done on 07/09. Patient is now receiving all nutrition and medications via PEG. Leukocytosis was noted, however likely secondary to steroid use. ID workup negative for occult infection. Podiatry consulted for L plantar pressure would, does not appear infected and provided wound care recs. Family also agreed to speak with Palliative Care Team. After discussing their options with the Palliative Team, the family decided to take the patient home with home Hospice.    Imaging/EEGs:  -06/29 CTH: No CT evidence of acute intracranial hemorrhage, mass effect, or midline shift. Unchanged posttreatment changes.    -07/03 EEG Summary / Classification:  Abnormal EEG in comatose patient  Lateralized periodic discharges, left hemisphere, max over centroparietal region  Disorganization  Moderate background slowing, generalized.  EEG Impression / Clinical Correlate:  The presence of LPDs is often associated with subacute structural injury in the left hemisphere. LPDs also indicate heightened risk for seizures.  There is also evidence of moderate diffuse cerebral dysfunction.    -07/04 EEG Summary / Classification:  Abnormal EEG in comatose patient  Lateralized periodic discharges, left hemisphere, max over centroparietal region.  Disorganization  Moderate background slowing, generalized.  EEG Impression / Clinical Correlate:  The presence of LPDs is often associated with subacute structural injury in the left hemisphere. LPDs also indicate heightened risk for seizures.  There is also evidence of moderate diffuse cerebral dysfunction.    -07/05 EEG Summary / Classification:  Abnormal EEG in comatose patient  Lateralized periodic discharges, left hemisphere, max over centroparietal region.  Disorganization  Moderate background slowing, generalized.  EEG Impression / Clinical Correlate:  The presence of LPDs is often associated with subacute structural injury in the left hemisphere. LPDs also indicate heightened risk for seizures.  There is also evidence of moderate diffuse cerebral dysfunction.    -07/06 EEG Summary / Classification:  Abnormal EEG in comatose patient  Lateralized periodic discharges, left hemisphere, max over temporo-centroparietal region with time locked clonus of the right arm.  Disorganization  Moderate background slowing, generalized.  EEG Impression / Clinical Correlate:  The presence of LPDs with associated time locked clonus indicative of focal motor seizures ("EPC").  There is also evidence of moderate diffuse cerebral dysfunction.    -07/06 MRI Brain w/wo Cont: Redemonstration of posttreatment changes in the bilateral occipital lobes and periventricular regions. There is a lobular areas of high T1 signal centered in the splenium of the corpus callosum. No gross enhancement. The overall size and appearance is unchanged. Areas are restricted diffusion again noted likely related to tumor. Redemonstration Of diffuse T2 prolongation throughout the periventricular white matter, unchanged from the prior exam, likely representing radiation changes, gliosis and/or edema. Stable exam.    Impression: Failure to thrive and progression of disease. Cachexia due to malignancy and poor nutrition.     Disposition: Patient with poor prognosis secondary to known GBM, now with failure to thrive. Cleared to be discharged home with home Hospice. HPI: 72 year old RH male with a past medical history of HTN, HLD, hx of SIADH, gliobastoma multiforme (diagnosed in 2019) and seizures presenting after an episode of depressed level of consciousness and suspected seizure episode. At baseline, patient has contractions throughout and is bedbound, he is non-verbal but can respond to some simple commands. Patient was recently admitted for increased somnolence, difficulty breathing, decreased alertness, and poor PO intake. Was briefly on antibiotics but discontinued once no infection was found, had hypernatremia which resolved, briefly on steroid which were tapered and then subsequently discontinued. Was discharged for follow up with his neuro-onc[facility]. Currently patient was on Keppra 750mg tid and as per son was compliant. Patient now presenting with after an episode where he was sitting in his chair and then noted be slumping over to the R, having increased tonic contractions of the RUE, and some twitching of his face. Patient was noted to be incontinent during the episode and was less responsive than usual. He was taken by EMS to the hospital and given Versed 5mg IM en route. Patient gradually improved and was able to follow some command to squeeze a finger with his son in the room. On examination, the patient was not able to contribute to history, ROS, or follow commands to exam.     Glioblastoma History:  Diagnosed in 2019 with MR noting involvement of the splenium of the corpus callosum and bilateral parenchymal involvement. Had stereotactic biopsy in 2019 with pathology noting glioblastoma. Was started on Keppra and Decadron. Was treated at [facility] with Dr. Chavis with radiation, temozolomide, and Bactrim. Temozolamide was stopped due to myelosuppression. Has had numerous admission for somnolence and seizure episodes. Currently on Avastin maintenance. MRI in April 2021 and June 2021 show stable mass, progression of RT gliosis. Patient was administered steroids at the time of last admission in early June 2021 and was tapered off as an outpatient.    Hospital Course: Patient was admitted on 06/29. Keppra was increased to 1000MG IV TID on admission. Family desired full measures, agreed to NGT placement as patient was too lethargic to swallow safely. ENT was consulted for safe NGT placement on 06/30. Patient developed hyponatremia which was corrected with fluid restriction of <1.5L daily. IM was consulted to assist with management. On 07/01 evening patient was noted to be clinically seizing, likely focal motor seizure with impaired awareness involving the RUE/RLE. Patient was given Ativan 1MG IVP x1, loaded with Vimpat 100MG IV x1 and started on Vimpat 50MG IV BID in addition to Keppra. On 07/02 afternoon patient had another clinical focal motor seizure with impaired awareness involving the RUE. Ativan 1MG IVP x1 was given and Vimpat was increased to 100MG IV BID. Patient was placed on video EEG. Video EEG prelim read showed LPDs concerning for increased risk of seizures, Vimpat was again increased to 150MG IV BID. On 07/03 evening patient was in focal motor status epilepticus. Vimpat 200MG IV x1 was given and Vimpat standing dose was increased to 200MG IV BID. Patient had resolution of focal status, still experiencing intermittent twitching of RUE that was not treated. MRI brain w/wo contrast was obtained and showed stable disease. Despite controlling focal seizures, providing adequate nutrition via NGT, and standing Ritalin BID, patient's mental status did not improve enough to be able to participate with speech and swallow. Family agreed to PEG placement, which was done on 07/09. Patient is now receiving all nutrition and medications via PEG. Leukocytosis was noted, however likely secondary to steroid use. ID workup negative for occult infection. Podiatry consulted for L plantar pressure would, does not appear infected and provided wound care recs. Family also agreed to speak with Palliative Care Team. After discussing their options with the Palliative Team, the family decided to take the patient home with home Hospice. Steroids to be tapered off after discharge. All necessary equipment has been delivered to the home and appropriate patient care teaching has been preformed. 07/20: GI with concern for PEG site infection, however CT abdomen/pelvis unremarkable. No clinical signs of infection, labs unremarkable.     Imaging/EEGs:  -06/29 CTH: No CT evidence of acute intracranial hemorrhage, mass effect, or midline shift. Unchanged posttreatment changes.    -07/03 EEG Summary / Classification:  Abnormal EEG in comatose patient  Lateralized periodic discharges, left hemisphere, max over centroparietal region  Disorganization  Moderate background slowing, generalized.  EEG Impression / Clinical Correlate:  The presence of LPDs is often associated with subacute structural injury in the left hemisphere. LPDs also indicate heightened risk for seizures.  There is also evidence of moderate diffuse cerebral dysfunction.    -07/04 EEG Summary / Classification:  Abnormal EEG in comatose patient  Lateralized periodic discharges, left hemisphere, max over centroparietal region.  Disorganization  Moderate background slowing, generalized.  EEG Impression / Clinical Correlate:  The presence of LPDs is often associated with subacute structural injury in the left hemisphere. LPDs also indicate heightened risk for seizures.  There is also evidence of moderate diffuse cerebral dysfunction.    -07/05 EEG Summary / Classification:  Abnormal EEG in comatose patient  Lateralized periodic discharges, left hemisphere, max over centroparietal region.  Disorganization  Moderate background slowing, generalized.  EEG Impression / Clinical Correlate:  The presence of LPDs is often associated with subacute structural injury in the left hemisphere. LPDs also indicate heightened risk for seizures.  There is also evidence of moderate diffuse cerebral dysfunction.    -07/06 EEG Summary / Classification:  Abnormal EEG in comatose patient  Lateralized periodic discharges, left hemisphere, max over temporo-centroparietal region with time locked clonus of the right arm.  Disorganization  Moderate background slowing, generalized.  EEG Impression / Clinical Correlate:  The presence of LPDs with associated time locked clonus indicative of focal motor seizures ("EPC").  There is also evidence of moderate diffuse cerebral dysfunction.    -07/06 MRI Brain w/wo Cont: Redemonstration of posttreatment changes in the bilateral occipital lobes and periventricular regions. There is a lobular areas of high T1 signal centered in the splenium of the corpus callosum. No gross enhancement. The overall size and appearance is unchanged. Areas are restricted diffusion again noted likely related to tumor. Redemonstration Of diffuse T2 prolongation throughout the periventricular white matter, unchanged from the prior exam, likely representing radiation changes, gliosis and/or edema. Stable exam.    Impression: Failure to thrive and progression of disease. Hospital course complicated by focal motor status epilepticus, now resolved. Cachexia due to malignancy and poor nutrition.     Disposition: Patient with poor prognosis secondary to known GBM, now with failure to thrive. Cleared to be discharged home with home Hospice.

## 2021-07-13 DIAGNOSIS — R56.9 UNSPECIFIED CONVULSIONS: ICD-10-CM

## 2021-07-13 DIAGNOSIS — R62.7 ADULT FAILURE TO THRIVE: ICD-10-CM

## 2021-07-13 DIAGNOSIS — Z51.5 ENCOUNTER FOR PALLIATIVE CARE: ICD-10-CM

## 2021-07-13 DIAGNOSIS — C71.9 MALIGNANT NEOPLASM OF BRAIN, UNSPECIFIED: ICD-10-CM

## 2021-07-13 LAB
ANION GAP SERPL CALC-SCNC: 11 MMOL/L — SIGNIFICANT CHANGE UP (ref 5–17)
BUN SERPL-MCNC: 15 MG/DL — SIGNIFICANT CHANGE UP (ref 7–23)
CALCIUM SERPL-MCNC: 8.9 MG/DL — SIGNIFICANT CHANGE UP (ref 8.4–10.5)
CHLORIDE SERPL-SCNC: 96 MMOL/L — SIGNIFICANT CHANGE UP (ref 96–108)
CO2 SERPL-SCNC: 24 MMOL/L — SIGNIFICANT CHANGE UP (ref 22–31)
CREAT SERPL-MCNC: <0.3 MG/DL — LOW (ref 0.5–1.3)
GLUCOSE BLDC GLUCOMTR-MCNC: 135 MG/DL — HIGH (ref 70–99)
GLUCOSE SERPL-MCNC: 123 MG/DL — HIGH (ref 70–99)
HCT VFR BLD CALC: 34.1 % — LOW (ref 39–50)
HGB BLD-MCNC: 10.8 G/DL — LOW (ref 13–17)
MCHC RBC-ENTMCNC: 27.3 PG — SIGNIFICANT CHANGE UP (ref 27–34)
MCHC RBC-ENTMCNC: 31.7 GM/DL — LOW (ref 32–36)
MCV RBC AUTO: 86.1 FL — SIGNIFICANT CHANGE UP (ref 80–100)
NRBC # BLD: 0 /100 WBCS — SIGNIFICANT CHANGE UP (ref 0–0)
PLATELET # BLD AUTO: 189 K/UL — SIGNIFICANT CHANGE UP (ref 150–400)
POTASSIUM SERPL-MCNC: 4.2 MMOL/L — SIGNIFICANT CHANGE UP (ref 3.5–5.3)
POTASSIUM SERPL-SCNC: 4.2 MMOL/L — SIGNIFICANT CHANGE UP (ref 3.5–5.3)
RBC # BLD: 3.96 M/UL — LOW (ref 4.2–5.8)
RBC # FLD: 17 % — HIGH (ref 10.3–14.5)
SODIUM SERPL-SCNC: 131 MMOL/L — LOW (ref 135–145)
WBC # BLD: 18.54 K/UL — HIGH (ref 3.8–10.5)
WBC # FLD AUTO: 18.54 K/UL — HIGH (ref 3.8–10.5)

## 2021-07-13 PROCEDURE — 99232 SBSQ HOSP IP/OBS MODERATE 35: CPT

## 2021-07-13 PROCEDURE — 99223 1ST HOSP IP/OBS HIGH 75: CPT

## 2021-07-13 RX ORDER — MUPIROCIN 20 MG/G
1 OINTMENT TOPICAL ONCE
Refills: 0 | Status: COMPLETED | OUTPATIENT
Start: 2021-07-13 | End: 2021-07-13

## 2021-07-13 RX ADMIN — ATORVASTATIN CALCIUM 10 MILLIGRAM(S): 80 TABLET, FILM COATED ORAL at 22:00

## 2021-07-13 RX ADMIN — ENOXAPARIN SODIUM 40 MILLIGRAM(S): 100 INJECTION SUBCUTANEOUS at 13:35

## 2021-07-13 RX ADMIN — LEVETIRACETAM 1000 MILLIGRAM(S): 250 TABLET, FILM COATED ORAL at 13:38

## 2021-07-13 RX ADMIN — AMLODIPINE BESYLATE 5 MILLIGRAM(S): 2.5 TABLET ORAL at 06:03

## 2021-07-13 RX ADMIN — LEVETIRACETAM 1000 MILLIGRAM(S): 250 TABLET, FILM COATED ORAL at 06:02

## 2021-07-13 RX ADMIN — POLYETHYLENE GLYCOL 3350 17 GRAM(S): 17 POWDER, FOR SOLUTION ORAL at 13:37

## 2021-07-13 RX ADMIN — Medication 4 MILLIGRAM(S): at 06:03

## 2021-07-13 RX ADMIN — LEVETIRACETAM 1000 MILLIGRAM(S): 250 TABLET, FILM COATED ORAL at 22:00

## 2021-07-13 RX ADMIN — Medication 100 MILLIGRAM(S): at 06:03

## 2021-07-13 RX ADMIN — LACOSAMIDE 200 MILLIGRAM(S): 50 TABLET ORAL at 06:38

## 2021-07-13 RX ADMIN — Medication 4 MILLIGRAM(S): at 17:09

## 2021-07-13 RX ADMIN — LACOSAMIDE 200 MILLIGRAM(S): 50 TABLET ORAL at 17:11

## 2021-07-13 RX ADMIN — LOSARTAN POTASSIUM 100 MILLIGRAM(S): 100 TABLET, FILM COATED ORAL at 06:03

## 2021-07-13 RX ADMIN — MUPIROCIN 1 APPLICATION(S): 20 OINTMENT TOPICAL at 13:38

## 2021-07-13 RX ADMIN — SENNA PLUS 2 TABLET(S): 8.6 TABLET ORAL at 22:00

## 2021-07-13 RX ADMIN — PANTOPRAZOLE SODIUM 40 MILLIGRAM(S): 20 TABLET, DELAYED RELEASE ORAL at 06:03

## 2021-07-13 RX ADMIN — Medication 100 MILLIGRAM(S): at 17:08

## 2021-07-13 NOTE — PROGRESS NOTE ADULT - SUBJECTIVE AND OBJECTIVE BOX
DATE OF SERVICE: 21 @ 20:14    Patient is a 71y old  Male who presents with a chief complaint of seizure episode (2021 16:16)      SUBJECTIVE / OVERNIGHT EVENTS:  awake and alert.  spoke with son at bedside    MEDICATIONS  (STANDING):  amantadine Syrup 100 milliGRAM(s) Oral two times a day  amLODIPine   Tablet 5 milliGRAM(s) Oral daily  atorvastatin 10 milliGRAM(s) Oral at bedtime  dexAMETHasone     Tablet 4 milliGRAM(s) Oral every 12 hours  enoxaparin Injectable 40 milliGRAM(s) SubCutaneous daily  lacosamide Solution 200 milliGRAM(s) Oral two times a day  levETIRAcetam  Solution 1000 milliGRAM(s) Oral <User Schedule>  losartan 100 milliGRAM(s) Oral daily  pantoprazole   Suspension 40 milliGRAM(s) Oral before breakfast  polyethylene glycol 3350 17 Gram(s) Oral daily  senna 2 Tablet(s) Oral at bedtime    MEDICATIONS  (PRN):  bisacodyl Suppository 10 milliGRAM(s) Rectal at bedtime PRN Constipation  LORazepam   Injectable 1 milliGRAM(s) IV Push once PRN GTC  ramelteon 8 milliGRAM(s) Oral at bedtime PRN Insomnia      Vital Signs Last 24 Hrs  T(C): 36.5 (2021 15:40), Max: 37.2 (2021 23:16)  T(F): 97.7 (2021 15:40), Max: 99 (2021 23:16)  HR: 98 (2021 15:40) (84 - 101)  BP: 112/64 (2021 15:40) (112/64 - 119/74)  BP(mean): --  RR: 18 (2021 15:40) (18 - 18)  SpO2: 98% (2021 15:40) (97% - 99%)  CAPILLARY BLOOD GLUCOSE      POCT Blood Glucose.: 135 mg/dL (2021 16:45)    I&O's Summary    2021 07:01  -  2021 07:00  --------------------------------------------------------  IN: 2260 mL / OUT: 2050 mL / NET: 210 mL    2021 07:01  -  2021 20:14  --------------------------------------------------------  IN: 0 mL / OUT: 450 mL / NET: -450 mL        PHYSICAL EXAM:  GENERAL: NAD, well-developed  HEAD:  Atraumatic, Normocephalic  EYES: EOMI, PERRLA, conjunctiva and sclera clear  NECK: Supple, No JVD  CHEST/LUNG: Clear to auscultation bilaterally; No wheeze  HEART: Regular rate and rhythm; No murmurs, rubs, or gallops  ABDOMEN: Soft, Nontender, Nondistended; Bowel sounds present  EXTREMITIES:  2+ Peripheral Pulses, No clubbing, cyanosis, or edema  PSYCH: AAOx0  NEUROLOGY: unresponsive, does not follow commands  SKIN: No rashes or lesions    LABS:                        10.8   18.54 )-----------( 189      ( 2021 05:36 )             34.1     07-13    131<L>  |  96  |  15  ----------------------------<  123<H>  4.2   |  24  |  <0.30<L>    Ca    8.9      2021 05:37  Phos  2.8     07-12  Mg     2.2     07-12    TPro  6.9  /  Alb  2.5<L>  /  TBili  0.3  /  DBili  x   /  AST  26  /  ALT  24  /  AlkPhos  114  07-12          Urinalysis Basic - ( 2021 21:44 )    Color: Yellow / Appearance: Slightly Turbid / S.024 / pH: x  Gluc: x / Ketone: Negative  / Bili: Negative / Urobili: 2 mg/dL   Blood: x / Protein: 30 mg/dL / Nitrite: Negative   Leuk Esterase: Large / RBC: 6 /hpf /  /HPF   Sq Epi: x / Non Sq Epi: 1 /hpf / Bacteria: Many        RADIOLOGY & ADDITIONAL TESTS:    Imaging Personally Reviewed:    Consultant(s) Notes Reviewed:      Care Discussed with Consultants/Other Providers:

## 2021-07-13 NOTE — GOALS OF CARE CONVERSATION - ADVANCED CARE PLANNING - NS PRO AD ANY ON CHART
From: Nisreen Caballero  To: Lyn Collier DO  Sent: 12/5/2019 7:57 AM CST  Subject: Non-Urgent Medical Question    Good Morning, Nacho Leach (son) and I finally got in for our labs yesterday.  Once you receive them can you please send in the prescription for Yes

## 2021-07-13 NOTE — CONSULT NOTE ADULT - PROBLEM SELECTOR RECOMMENDATION 4
- hospice referral to be made, to d/w family and potentially set up home hospice  - will sign off, please reconsult PRN

## 2021-07-13 NOTE — PROGRESS NOTE ADULT - SUBJECTIVE AND OBJECTIVE BOX
Patient is a 71y old  Male who presents with a chief complaint of seizure episode (13 Jul 2021 09:40)    Patient seen this morning. No changes in clinical status.    MEDICATIONS  (STANDING):  amantadine Syrup 100 milliGRAM(s) Oral two times a day  amLODIPine   Tablet 5 milliGRAM(s) Oral daily  atorvastatin 10 milliGRAM(s) Oral at bedtime  dexAMETHasone     Tablet 4 milliGRAM(s) Oral every 12 hours  enoxaparin Injectable 40 milliGRAM(s) SubCutaneous daily  lacosamide Solution 200 milliGRAM(s) Oral two times a day  levETIRAcetam  Solution 1000 milliGRAM(s) Oral <User Schedule>  losartan 100 milliGRAM(s) Oral daily  mupirocin 2% Ointment 1 Application(s) Topical once  pantoprazole   Suspension 40 milliGRAM(s) Oral before breakfast  polyethylene glycol 3350 17 Gram(s) Oral daily  senna 2 Tablet(s) Oral at bedtime    MEDICATIONS  (PRN):  bisacodyl Suppository 10 milliGRAM(s) Rectal at bedtime PRN Constipation  LORazepam   Injectable 1 milliGRAM(s) IV Push once PRN GTC  ramelteon 8 milliGRAM(s) Oral at bedtime PRN Insomnia      Vital Signs Last 24 Hrs  T(C): 37.1 (13 Jul 2021 08:27), Max: 37.2 (12 Jul 2021 23:16)  T(F): 98.7 (13 Jul 2021 08:27), Max: 99 (12 Jul 2021 23:16)  HR: 86 (13 Jul 2021 08:27) (86 - 103)  BP: 114/72 (13 Jul 2021 08:27) (108/69 - 119/72)  BP(mean): --  RR: 18 (13 Jul 2021 08:27) (18 - 18)  SpO2: 99% (13 Jul 2021 08:27) (97% - 99%)    PE  NAD  PEG in place  Awake, non-responsive  Anicteric, MMM  No c/c/e  No rash grossly                            10.8   18.54 )-----------( 189      ( 13 Jul 2021 05:36 )             34.1       07-13    131<L>  |  96  |  15  ----------------------------<  123<H>  4.2   |  24  |  <0.30<L>    Ca    8.9      13 Jul 2021 05:37  Phos  2.8     07-12  Mg     2.2     07-12    TPro  6.9  /  Alb  2.5<L>  /  TBili  0.3  /  DBili  x   /  AST  26  /  ALT  24  /  AlkPhos  114  07-12

## 2021-07-13 NOTE — GOALS OF CARE CONVERSATION - ADVANCED CARE PLANNING - CONVERSATION DETAILS
Palliative reconsulted to assist in GOC discussion in the setting of patient with advanced illness. LMSW met with patient's son at bedside today for GOC discussion.    LMSW reintroduced self and role of palliative . Son Jim verbalized understanding and was receptive to consult today. LMSW inquired into Jim's thoughts regarding discussion yesterday including hospice vs home care services. Jim states he and his family would prefer for patient to be d/c home and remain at home, as patient has had multiple admissions since April and the family does not feel this is an acceptable quality of life. Jim inquired into suctioning, enteral feeds, and abx at home on hospice, and LMSW discussed all to some extent, but deferred discussion to hospice directly to inquire what the service can and cannot manage/cover. LMSW reiterated that patient cannot receive DMT on home hospice services, and Jim verbalized agreement. Jim states that in patient's current condition, he would not benefit from further treatment, and inquired if patient could rescind hospice and resume DMT should his medical status improve once home. LMSW confirmed this with Jim, who verbalized understanding. Jim verbalized agreement with hospice referral today for further information and discussion of services, and CM made aware. LMSW offered choice in hospice agency, and Jim identified [facility] as preference. LMSW to submit referral via [facility] today for review. Jim without additional questions or concerns at this time.    Patient appears comfortable at bedside today. No symptom management needs identified at this time. Palliative will continue to follow this case. Palliative reconsulted to assist in GOC discussion in the setting of patient with advanced illness. LMSW met with patient's son at bedside today for GOC discussion.    LMSW reintroduced self and role of palliative . Son Jim verbalized understanding and was receptive to consult today. LMSW inquired into Jim's thoughts regarding discussion yesterday including hospice vs home care services. Jim states he and his family would prefer for patient to be d/c home and remain at home, as patient has had multiple admissions since April and the family does not feel this is an acceptable quality of life. Jim inquired into suctioning, enteral feeds, and abx at home on hospice, and LMSW discussed all to some extent, but deferred discussion to hospice directly to inquire what the service can and cannot manage/cover. LMSW reiterated that patient cannot receive DMT on home hospice services, and Jim verbalized agreement. Jim states that in patient's current condition, he would not benefit from further treatment, and inquired if patient could rescind hospice and resume DMT should his medical status improve once home. LMSW confirmed this with Jim, who verbalized understanding. Jim verbalized agreement with hospice referral today for further information and discussion of services, and CM made aware. LMSW offered choice in hospice agency, and Jim identified [facility] as preference. LMSW to submit referral via [facility] today for review. Jim without additional questions or concerns at this time.    Palliative signing off, as goals are clearly defined and patient pending hospice referral. Please reconsult should any needs arise.

## 2021-07-13 NOTE — PROGRESS NOTE ADULT - SUBJECTIVE AND OBJECTIVE BOX
Podiatry pager #: [phone] ([place])/ [phone] ([facility])    Patient is a 71y old  Male who presents with a chief complaint of seizure episode (13 Jul 2021 08:49)      HPI:  HPI:  Ronald Rider is a 72 year old RH male with a past medical history of HTN, HLD, hx of SIADH, gliobastoma multiforme (diagnosed in 2019) and seizures presenting after an episode of depressed level of consciousness and suspected seizure episode. At baseline, patient has contractions throughout and is bedbound, he is non-verbal but can respond to some simple commands. Patient was recently admitted for increased somnolence, difficulty breathing, decreased alertness, and poor PO intake. Was briefly on antibiotics but discontinued once no infection was found, had hypernatremia which resolved, briefly on steroid which were tapered and then subsequently discontinued. Was discharged for [place] [facility] [place] Currently patient was on Keppra 750mg tid and as per son was compliant. Patient now presenting with after an episode where he was sitting in his chair and then noted be slumping over to the R, having increased tonic contractions of the RUE, and some twitching of his face. Patient was noted to be incontinent during the episode and was less responsive than usual. He was taken by EMS to the hospital and given Versed 5mg IM en route. Patient gradually improved and was able to follow some command to squeeze a finger with his son in the room. On examination, the patient was not able to contribute to history, ROS, or follow commands to exam.     Glioblastoma History:  Diagnosed in 2019 with MR noting involvement of the splenium of the corpus callosum and bilateral parenchymal involvement. Had stereotactic biopsy in 2019 with pathology noting glioblastoma. Was started on Keppra and Decadron. [place] [facility] [place] Chavis with radiation, temozolomide, and Bactrim. Temozolamide was stopped due to myelosuppression. Has had numerous admission for somnolence and seizure episodes. Currently on Avastin maintenance. MRI in April 2021 and June 2021 show stable mass, progression of RT gliosis. Patient was administered steroids at the time of last admission in early June 2021 and was tapered off as an outpatient.      ALLERGIES/INTOLERANCES:  Allergies  No Known Allergies    Intolerances    VITALS & EXAMINATION:  Vital Signs Last 24 Hrs  T(C): 36.7 (29 Jun 2021 03:00), Max: 36.7 (29 Jun 2021 03:00)  T(F): 98 (29 Jun 2021 03:00), Max: 98 (29 Jun 2021 03:00)  HR: 99 (29 Jun 2021 03:00) (99 - 100)  BP: 135/71 (29 Jun 2021 03:00) (117/46 - 146/82)  BP(mean): --  RR: 17 (29 Jun 2021 03:00) (17 - 22)  SpO2: 100% (29 Jun 2021 03:00) (100% - 100%) (29 Jun 2021 03:53)      PAST MEDICAL & SURGICAL HISTORY:  Hypertension    Hyperlipidemia    Seizures    Diabetes    Glioblastoma multiforme    CMV infection    History of SIADH    H/O colonoscopy    History of laryngoscopy    Status post stereotactic brain biopsy        MEDICATIONS  (STANDING):  amantadine Syrup 100 milliGRAM(s) Oral two times a day  amLODIPine   Tablet 5 milliGRAM(s) Oral daily  atorvastatin 10 milliGRAM(s) Oral at bedtime  dexAMETHasone     Tablet 4 milliGRAM(s) Oral every 12 hours  enoxaparin Injectable 40 milliGRAM(s) SubCutaneous daily  lacosamide Solution 200 milliGRAM(s) Oral two times a day  levETIRAcetam  Solution 1000 milliGRAM(s) Oral <User Schedule>  losartan 100 milliGRAM(s) Oral daily  pantoprazole   Suspension 40 milliGRAM(s) Oral before breakfast  polyethylene glycol 3350 17 Gram(s) Oral daily  senna 2 Tablet(s) Oral at bedtime    MEDICATIONS  (PRN):  bisacodyl Suppository 10 milliGRAM(s) Rectal at bedtime PRN Constipation  LORazepam   Injectable 1 milliGRAM(s) IV Push once PRN GTC  ramelteon 8 milliGRAM(s) Oral at bedtime PRN Insomnia      Allergies    No Known Allergies    Intolerances        VITALS:    Vital Signs Last 24 Hrs  T(C): 37.1 (13 Jul 2021 08:27), Max: 37.2 (12 Jul 2021 23:16)  T(F): 98.7 (13 Jul 2021 08:27), Max: 99 (12 Jul 2021 23:16)  HR: 86 (13 Jul 2021 08:27) (86 - 103)  BP: 114/72 (13 Jul 2021 08:27) (108/69 - 119/72)  BP(mean): --  RR: 18 (13 Jul 2021 08:27) (18 - 18)  SpO2: 99% (13 Jul 2021 08:27) (97% - 99%)    LABS:                          10.8   18.54 )-----------( 189      ( 13 Jul 2021 05:36 )             34.1       07-13    131<L>  |  96  |  15  ----------------------------<  123<H>  4.2   |  24  |  <0.30<L>    Ca    8.9      13 Jul 2021 05:37  Phos  2.8     07-12  Mg     2.2     07-12    TPro  6.9  /  Alb  2.5<L>  /  TBili  0.3  /  DBili  x   /  AST  26  /  ALT  24  /  AlkPhos  114  07-12      CAPILLARY BLOOD GLUCOSE              LOWER EXTREMITY PHYSICAL EXAM:    Vascular: DP/PT 2/4 B/L, CFT <3 seconds B/L, Temperature gradient warm to cool B/L  Neuro: unable to assess   Musculoskeletal/Ortho: b/l LE contracted   Skin: Left plantar lateral foot serous blister, no purulence, no probing or tracking, no open wounds or lesions, no acute signs of infection     RADIOLOGY & ADDITIONAL STUDIES:

## 2021-07-13 NOTE — CONSULT NOTE ADULT - PROBLEM SELECTOR RECOMMENDATION 9
- CXR to confirm placement   - No further ENT intervention at this time  - Call with questions or concerns
- c/w decadron, keppra  - plan to c/w meds via PEG

## 2021-07-13 NOTE — CONSULT NOTE ADULT - ASSESSMENT
72 year old RH male with a past medical history of HTN, HLD, hx of SIADH, gliobastoma multiforme (diagnosed in 2019) and seizures presenting after an episode of depressed level of consciousness and suspected seizure episode.    hyponatremia  - likely SIADH  - check urine osm and lytes  - for now keep pt on maintenance fluids NS as he is NPO    dysphagia  - failed swallow eval  - would keep NPO   - consider NG tube feeds  - peg pending clarification of GOC    leukocytosis  - check UA    GBM  - treatment as per oncology  - c/w decadron    seizure   - on keppra    depression  - on celexa  - would hold as pt has not been getting  - could be contributing to hyponatremia    dvt px  - lovenox       
72M with PMH of HTN, HLD, SIADH, GMB and seizures here with suspected seizures; palliative care consulted for GOC, and goals are home with hospice at this time.  
Ronald Rider is a 72 year old RH male with a past medical history of HTN, HLD, hx of SIADH, gliobastoma multiforme (diagnosed in 2019) and seizures presenting after an episode of depressed level of consciousness and suspected seizure episode. At baseline, patient has contractions throughout and is bedbound, he is non-verbal but can respond to some simple commands. Patient was recently admitted for increased somnolence, difficulty breathing, decreased alertness, and poor PO intake. Was briefly on antibiotics but discontinued once no infection was found, had hypernatremia which resolved, briefly on steroid which were tapered and then subsequently discontinued. Was discharged for follow up with his neuro-oncologist at [facility]. Currently patient was on Keppra 750mg tid and as per son was compliant. Patient now presenting with after an episode where he was sitting in his chair and then noted be slumping over to the R, having increased tonic contractions of the RUE, and some twitching of his face. Patient was noted to be incontinent during the episode and was less responsive than usual. He was taken by EMS to the hospital and given Versed 5mg IM en route. Patient gradually improved and was able to follow some command to squeeze a finger with his son in the room. On examination, the patient was not able to contribute to history, ROS, or follow commands to exam.     Glioblastoma History:  Diagnosed in 2019 with MR noting involvement of the splenium of the corpus callosum and bilateral parenchymal involvement. Had stereotactic biopsy in 2019 with pathology noting glioblastoma. Was started on Keppra and Decadron. Was treated at [facility] with Dr. Chavis with radiation, temozolomide, and Bactrim. Temozolamide was stopped due to myelosuppression. Has had numerous admission for somnolence and seizure episodes. Currently on Avastin maintenance. MRI in April 2021 and June 2021 show stable mass, progression of RT gliosis. Patient was administered steroids at the time of last admission in early June 2021 and was tapered off as an outpatient.    Hematology/Oncology called to follow patient who is known to Dr. Lyle Chavis at [facility] [place] for the treatment of glioblastoma multiforme. He was discharged from [facility] as noted above. As per available records from [facility] [place] [facility] last treatment with Avastin was 6/11/2021 (will obtain updated records once [facility] has been notified of admission).    Glioblastoma Multiforme  --Under care at [facility] [place] Dr. Lyle Chavis  --Currently on maintenance Avastin  --Appears to have stable disease    AMS, Seizure like activity  --No edema, mass effect, hemorrhage or midline shift on CT  --MRI may be of more benefit to assess disease  --Patient had UTI 4/2021 which caused AMS  --UA ordered   --Patient on Dexamethasone  --Keppra has been increased  --Further management per Neurology    [facility]  --Palliative met with family (son) on last admission at [facility]  --Patient still full code at that time    We will continue to follow patient and coordinate with [facility]    Shashi Cason PA-C  Hematology/Oncology  [facility]   [phone] (cell)  [phone] (office)  [phone] (alt office)  Evenings and weekends please call MD on call or office  
71 M w GBM, GI consulted for enteral nutrition    1. GBM. Unclear if will be candidate for further treatment    2. Oropharyngeal dysphagia  -could perform PEG tomorrow pending coags, but need to clarify if hospice/comfort care is still being weighed as an option    3. Seizure        Advanced care planning forms were discussed. Code status including forceful chest compressions, defibrillation and intubation were discussed. The risks benefits and alternatives to pertinent gastrointestinal procedures and interventions were discussed in detail and all questions were answered. Duration: 15 Minutes.      James Palumbo M.D.   Gastroenterology and Hepatology  [address] [place], [place]  Office: [phone]  Cell: [phone]
72 year old RH male with a past medical history of HTN, HLD, hx of SIADH, gliobastoma multiforme (diagnosed in 2019) and seizures presenting after an episode of depressed level of consciousness and suspected seizure episode. In the ED afebrile, normotensive but tachycardic > 90. On presentation WBC of 8.34 with 62.8% PMN.     U/A on 7/1 with 358 WBC.   Treated with CTX 7/1 -> 7/3.   On 7/9 s/p PEG placement.     MRI Brain (7/6) (stable compared to 6/11/21 MRI) with posttreatment changes in the bilateral occipital lobes and periventricular regions and lobular areas of high T1 signal centered in the splenium of the corpus callosum.     At this point I suspect the leukocytosis is secondary to patient's Dexamethasone course  However, there is an uptrend in WBC despite stable dexamethasone dose  I would send 2x BCx and U/A at minimum for workup  I would obtain Podiatry evaluation given large bullae on plantar aspect of LLE (?role for debriding/lancing)  At present minimal erythema around bullae and do not suspect cellulitis     RECOMMENDATIONS:    #Leukocytosis, Glioblastoma Multiforme (on Dexamethasone)  --Recommend 2x BCx  --Recommend U/A (UCx if pyuria)  --Monitor off of antibiotics  --Continue to follow CBC with diff  --Continue to follow transaminases  --Continue to follow temperature curve  --Follow up on preliminary blood cultures    #LLE Foot Plantar Bullae  --Recommend Podiatry consult  --Monitor for erythema expansion    I will continue to follow. Please feel free to contact me with any further questions.    Jack Keller M.D.  [facility] Division of Infectious Disease  8AM-5PM: Pager Number [phone]  After Hours (or if no response): Please contact the Infectious Diseases Office at [phone]     The above assessment and plan were discussed with neurology resident 
72 year old RH male with a past medical history of HTN, HLD, hx of SIADH, gliobastoma multiforme (diagnosed in 2019) and seizures presenting after an episode of depressed level of consciousness and suspected seizure episode. ENT consulted for NG tube placement. Indirect laryngoscopy performed which was normal, NG tube placed successfully under fiberoptic guidance.

## 2021-07-13 NOTE — CONSULT NOTE ADULT - PROBLEM SELECTOR RECOMMENDATION 2
- if family agrees to hospice, would have to stop DDT  - family aware of above  - c/w supportive care

## 2021-07-13 NOTE — PROGRESS NOTE ADULT - ASSESSMENT
72yo M with left plantar foot blister   - pt seen and evaluated  - afebrile, WBC 18   - foot is not the source of infection  - Left plantar lateral foot serous blister, no open wounds or lesions, no acute signs of infection   - Deroofed blister to level of dermis, but not beyond dermis, using sterile suture removal kit, serous drainage only, no probing or tracking, no purulence or fluctuance  - recommend mupirocin and allevyn pad to be applied daily to left foot blister site  - pt to wear z-flows at all times when in bed b/l feet   - discussed w/ attending

## 2021-07-13 NOTE — PROGRESS NOTE ADULT - ASSESSMENT
72 year old RH male with a past medical history of HTN, HLD, hx of SIADH, gliobastoma multiforme (diagnosed in 2019) and seizures presenting after an episode of depressed level of consciousness and suspected seizure episode. In the ED afebrile, normotensive but tachycardic > 90. On presentation WBC of 8.34 with 62.8% PMN.     U/A on 7/1 with 358 WBC.   Treated with CTX 7/1 -> 7/3.   On 7/9 s/p PEG placement.     MRI Brain (7/6) (stable compared to 6/11/21 MRI) with posttreatment changes in the bilateral occipital lobes and periventricular regions and lobular areas of high T1 signal centered in the splenium of the corpus callosum.     At this point I suspect the leukocytosis is secondary to patient's Dexamethasone course  However, there is an uptrend in WBC despite stable dexamethasone dose  pyuria in context of indwelling diaz is anticipated - in any case leukocytosis is downtrending - UCx is pending  CXR (7/12) with no infiltrates  Podiatry without concern for superinfection of LLE foot bullae     RECOMMENDATIONS:    #Leukocytosis, Glioblastoma Multiforme (on Dexamethasone)  --Follow up on UCx - pyuria in context of indwelling diaz is anticipated - in any case leukocytosis is downtrending  --Monitor off of antibiotics  --Continue to follow CBC with diff  --Continue to follow transaminases  --Continue to follow temperature curve  --Follow up on preliminary blood cultures    #LLE Foot Plantar Bullae  --Podiatry recommendations noted - Deroofed blister to level of dermis, but not beyond dermis    I will continue to follow. Please feel free to contact me with any further questions.    Jack Keller M.D.  [facility] Division of Infectious Disease  8AM-5PM: Pager Number [phone]  After Hours (or if no response): Please contact the Infectious Diseases Office at [phone]

## 2021-07-13 NOTE — PROGRESS NOTE ADULT - ASSESSMENT
Glioblastoma Multiforme    --Under care at [facility] Dr. Lyle Chavis  --Currently on maintenance Avastin    --Appears to have stable disease but MS has deteriorated   --As per Dr. Martínez who is covering for Dr. Chavis - if mental status unable to be improved on current management, hospice may be appropriate  -- PEG Placed    AMS, Seizure like activity    --No edema, mass effect, hemorrhage or midline shift on CT  -- Repeat MRI stable   --Patient had UTI 4/2021 which caused AMS  --Patient on Dexamethasone now 4mg q8 hours  --Keppra has been increased to 1000 mg TID   --Fu Neurology  --Vimpat dose 200 mg q12     GOC  -- Pall Care eval  --Patient now DNR  --Family meeting with Palliative scheduled for today    Hyponatremia Resolved    Shashi Cason PA-C  Hematology/Oncology  [facility]   [phone] (cell)  [phone] (office)  [phone] (alt office)  Evenings and weekends please call MD on call or office

## 2021-07-13 NOTE — PROGRESS NOTE ADULT - ASSESSMENT
Assessment: 71 year old RH male with a past medical history of HTN, HLD, hx of SIADH, glioblastoma multiforme (diagnosed in 2019), and seizures presenting after an episode of depressed level of consciousness and suspected seizure episode.    Impression: Failure to thrive and progression of disease. Cachexia due to malignancy and poor nutrition.     Plan:  [x] vEEG: LPDs, left hemisphere, max over temporo-centroparietal region with time locked clonus of the right arm.  [x] MRI Brain w/wo contrast: no changed compared to prior study.   [] c/w Vimpat to 200MG PO BID.  [] c/w Decadron to 4MG PO Q12HR.  [] c/w Keppra 1000MG PO TID.  [] Hyponatremia likely secondary to SIADH. Fluid restriction < 1.5L daily. (131 on 7/13).  [x] d/c'd Ritalin, Baclofen.  [x] Holding home Celexa as this could contribute to hyponatremia.  [x] s/p PEG placement on 07/09.  [x] IM on board, Dr. Godfrey. Recs appreciated.  [] ID consulted for leukocytosis. Likely related to steroid use. UA appears positive, but patient with Franks. Monitoring off abx for now per ID.  [] Podiatry consulted for L plantar bullae.  [] c/w Bowel regimen.  [x] Discussed patient's treatment plan and goals of care with neuro-oncologist Dr. Martínez - currently on Avastin monotherapy (last dose 6/24, next dose due around 7/9).  [] Family initially declined Palliative consult, now would like to speak to Palliative team. Made patient DNR. Palliative team will have family meeting this morning at 10AM.  [x] CPK, B12, folate: wnl.  [] Diet: Glucerna 1.2 via PEG, goal rate 60.  [] DVT PPx: Lovenox SC.  [] GI PPx: Protonix PO QD.  [] Seizure rescue: Ativan 1MG IVP ONLY FOR CONVULSIONS/GTC LASTING > 3 MINUTES. Not treating focal seizures.    Case discussed with neurology attending Dr. Angeles.

## 2021-07-13 NOTE — PROGRESS NOTE ADULT - ASSESSMENT
S PROGRESS NOTE    Subjective   No acute overnight events  BP's improved  BS better this am  Mild cough  No SOB  Good appetite    Objective     Current Facility-Administered Medications   Medication Dose Route Frequency Provider Last Rate Last Admin   • insulin glargine (LANTUS) injection 75 Units  75 Units Subcutaneous Nightly Carrillo Malagon DO   75 Units at 01/06/21 2145   • insulin lispro (HumaLOG) injection 30 Units  30 Units Subcutaneous TID AC Carrillo Malagon DO   30 Units at 01/06/21 1741   • docusate sodium (COLACE) capsule 100 mg  100 mg Oral Daily PRN Francisco Norwood MD   100 mg at 01/05/21 0846   • polyethylene glycol (MIRALAX) packet 17 g  17 g Oral Daily PRN Francisco Norwood MD       • losartan (COZAAR) tablet 100 mg  100 mg Oral Daily Augusta Coon MD   100 mg at 01/06/21 0915   • acetaminophen (TYLENOL) tablet 650 mg  650 mg Oral Q4H PRN Arcadio Wong DO   650 mg at 01/05/21 1119   • labetalol (NORMODYNE) injection 10 mg  10 mg Intravenous Q4H PRN Arcadio Wong DO   10 mg at 01/07/21 0319   • hydrALAZINE (APRESOLINE) tablet 10 mg  10 mg Oral Q4H PRN Arcadio Wong DO       • TACROlimus (PROGRAF) capsule 0.5 mg  0.5 mg Oral BIDTX Augusta Coon MD   0.5 mg at 01/06/21 2006   • mycophenolate (MYFORTIC) DR tablet 360 mg  360 mg Oral BIDTX Augusta Coon MD   360 mg at 01/06/21 2006   • hydrALAZINE (APRESOLINE) tablet 100 mg  100 mg Oral TID Augusta Coon MD   100 mg at 01/06/21 2006   • dexamethasone (DECADRON) tablet 6 mg  6 mg Oral Q24H Risa Cheek MD   6 mg at 01/06/21 2145   • sodium chloride 0.9 % flush bag 25 mL  25 mL Intravenous PRN Arcadio Wong DO       • sodium chloride (PF) 0.9 % injection 2 mL  2 mL Intracatheter 2 times per day Arcadio Wong DO   2 mL at 01/06/21 2008   • heparin (porcine) injection 5,000 Units  5,000 Units Subcutaneous 3 times per day Arcadio Wong DO   5,000 Units at 01/07/21 0546   • AMIODarone (PACERONE) tablet 100 mg  100 mg Oral Daily Arcadio Wong DO   100 mg at  01/06/21 0915   • aspirin chewable 81 mg  81 mg Oral Daily Arcadio Kyi, DO   81 mg at 01/06/21 0915   • atorvastatin (LIPITOR) tablet 20 mg  20 mg Oral Q Evening Arcadio Kyi, DO   20 mg at 01/06/21 1742   • carvedilol (COREG) tablet 25 mg  25 mg Oral Q12H Arcadio Kyi, DO   25 mg at 01/06/21 2146   • furosemide (LASIX) tablet 40 mg  40 mg Oral Q12H Arcadio Kyi, DO   40 mg at 01/06/21 2145   • pantoprazole (PROTONIX) EC tablet 40 mg  40 mg Oral Daily Arcadio Kyi, DO   40 mg at 01/06/21 0914   • insulin lispro (HumaLOG) scheduled dose AND correction dose   Subcutaneous TID WC Carlos A Guzman MD   6 Units at 01/06/21 1742   • insulin lispro (HumaLOG) scheduled dose AND correction dose   Subcutaneous Nightly Carlos A Guzman MD   2 Units at 01/04/21 2040   • dextrose 50 % injection 25 g  25 g Intravenous PRN Arcadio Kyi, DO       • dextrose 50 % injection 12.5 g  12.5 g Intravenous PRN Arcadio Kyi, DO       • glucagon (GLUCAGEN) injection 1 mg  1 mg Intramuscular PRN Arcadio Kyi, DO            Review of Systems   Constitutional: Negative for activity change, chills, diaphoresis, fatigue and fever.   HENT: Negative for ear pain, hearing loss, mouth sores, postnasal drip, rhinorrhea, sore throat and trouble swallowing.    Eyes: Negative for pain, discharge and visual disturbance.   Respiratory: Negative for cough, shortness of breath, wheezing and stridor.    Cardiovascular: Negative for chest pain, palpitations and leg swelling.   Gastrointestinal: Negative for abdominal distention, abdominal pain, blood in stool, constipation, diarrhea, nausea and vomiting.   Endocrine: Negative for cold intolerance, heat intolerance, polydipsia, polyphagia and polyuria.   Genitourinary: Negative for dysuria, frequency, hematuria and urgency.   Skin: Negative.    Neurological: Negative for dizziness, weakness and headaches.   Psychiatric/Behavioral: Negative.         I/O's    Intake/Output Summary (Last 24 hours) at 1/7/2021  72 year old RH male with a past medical history of HTN, HLD, hx of SIADH, gliobastoma multiforme (diagnosed in 2019) and seizures presenting after an episode of depressed level of consciousness and suspected seizure episode.    leukocytosis  - may be 2/2 steroids  - 0bserve off abx  - f/u cultures      hyponatremia resolved  - likely SIADH  - fluid restrict    dysphagia  - failed swallow eval  - would keep NPO   - peg placed  -  cont feeds    GBM  - treatment as per neurooncology  - stable MRI  - c/w decadron    seizure - on keppra    depression  - off celexa    dvt px  - Lovenox     dispo  - family interested in home hopsice         0722  Last data filed at 1/7/2021 0300  Gross per 24 hour   Intake 600 ml   Output 1000 ml   Net -400 ml       Last Recorded Vitals  Blood pressure (!) 147/80, pulse (!) 49, temperature 98.2 °F (36.8 °C), temperature source Axillary, resp. rate 18, height 5' 10\" (1.778 m), weight 97.5 kg (214 lb 15.2 oz), SpO2 97 %.  Body mass index is 30.84 kg/m².    Physical Exam  Vitals signs and nursing note reviewed.   Constitutional:       General: He is not in acute distress.  HENT:      Head: Normocephalic and atraumatic.      Right Ear: External ear normal.      Left Ear: External ear normal.      Nose: Nose normal.      Mouth/Throat:      Mouth: Mucous membranes are moist.      Pharynx: Oropharynx is clear.   Eyes:      Extraocular Movements: Extraocular movements intact.      Pupils: Pupils are equal, round, and reactive to light.   Neck:      Musculoskeletal: Normal range of motion and neck supple.      Vascular: No carotid bruit.   Cardiovascular:      Rate and Rhythm: Normal rate and regular rhythm.      Pulses: Normal pulses.      Heart sounds: No murmur.   Pulmonary:      Effort: Pulmonary effort is normal. No respiratory distress.      Breath sounds: Normal breath sounds. No wheezing, rhonchi or rales.   Abdominal:      General: Bowel sounds are normal. There is no distension.      Palpations: Abdomen is soft. There is no mass.      Tenderness: There is no abdominal tenderness. There is no guarding or rebound.   Musculoskeletal: Normal range of motion.      Right lower leg: No edema.      Left lower leg: No edema.   Lymphadenopathy:      Cervical: No cervical adenopathy.   Skin:     General: Skin is warm and dry.      Capillary Refill: Capillary refill takes less than 2 seconds.      Findings: No rash.   Neurological:      General: No focal deficit present.      Mental Status: He is alert and oriented to person, place, and time.      Motor: No weakness.      Gait: Gait normal.      Deep Tendon Reflexes: Reflexes  normal.   Psychiatric:         Mood and Affect: Mood normal.         Behavior: Behavior normal.         Labs   Recent Results (from the past 24 hour(s))   GLUCOSE, BEDSIDE - POINT OF CARE    Collection Time: 01/06/21  8:04 AM   Result Value Ref Range    GLUCOSE, BEDSIDE - POINT OF CARE 252 (H) 70 - 99 mg/dL   GLUCOSE, BEDSIDE - POINT OF CARE    Collection Time: 01/06/21 11:56 AM   Result Value Ref Range    GLUCOSE, BEDSIDE - POINT OF CARE 386 (H) 70 - 99 mg/dL   GLUCOSE, BEDSIDE - POINT OF CARE    Collection Time: 01/06/21  5:22 PM   Result Value Ref Range    GLUCOSE, BEDSIDE - POINT OF CARE 243 (H) 70 - 99 mg/dL   GLUCOSE, BEDSIDE - POINT OF CARE    Collection Time: 01/06/21  9:16 PM   Result Value Ref Range    GLUCOSE, BEDSIDE - POINT OF CARE 195 (H) 70 - 99 mg/dL   CBC with Automated Differential (performable only)    Collection Time: 01/07/21  5:19 AM   Result Value Ref Range    WBC 9.9 4.2 - 11.0 K/mcL    RBC 5.20 4.50 - 5.90 mil/mcL    HGB 15.1 13.0 - 17.0 g/dL    HCT 47.6 39.0 - 51.0 %    MCV 91.5 78.0 - 100.0 fl    MCH 29.0 26.0 - 34.0 pg    MCHC 31.7 (L) 32.0 - 36.5 g/dL    RDW-CV 14.4 11.0 - 15.0 %    RDW-SD 47.5 39.0 - 50.0 fL     140 - 450 K/mcL    NRBC 0 <=0 /100 WBC    Neutrophil, Percent 80 %    Lymphocytes, Percent 12 %    Mono, Percent 6 %    Eosinophils, Percent 0 %    Basophils, Percent 0 %    Immature Granulocytes 2 %    Absolute Neutrophils 7.8 (H) 1.8 - 7.7 K/mcL    Absolute Lymphocytes 1.2 1.0 - 4.0 K/mcL    Absolute Monocytes 0.6 0.3 - 0.9 K/mcL    Absolute Eosinophils  0.0 0.0 - 0.5 K/mcL    Absolute Basophils 0.0 0.0 - 0.3 K/mcL    Absolute Immmature Granulocytes 0.2 0.0 - 0.2 K/mcL   Comprehensive Metabolic Panel    Collection Time: 01/07/21  5:19 AM   Result Value Ref Range    Fasting Status      Sodium 140 135 - 145 mmol/L    Potassium 3.8 3.4 - 5.1 mmol/L    Chloride 105 98 - 107 mmol/L    Carbon Dioxide 30 21 - 32 mmol/L    Anion Gap 9 (L) 10 - 20 mmol/L    Glucose 166 (H) 65 -  99 mg/dL    BUN 36 (H) 6 - 20 mg/dL    Creatinine 1.26 (H) 0.67 - 1.17 mg/dL    Glomerular Filtration Rate 55 (L) >90 mL/min/1.73m2    BUN/ Creatinine Ratio 29 (H) 7 - 25    Calcium 8.7 8.4 - 10.2 mg/dL    Bilirubin, Total 0.4 0.2 - 1.0 mg/dL    GOT/AST 23 <=37 Units/L    GPT/ALT 28 <64 Units/L    Alkaline Phosphatase 82 45 - 117 Units/L    Albumin 3.0 (L) 3.6 - 5.1 g/dL    Protein, Total 5.7 (L) 6.4 - 8.2 g/dL    Globulin 2.7 2.0 - 4.0 g/dL    A/G Ratio 1.1 1.0 - 2.4   GLUCOSE, BEDSIDE - POINT OF CARE    Collection Time: 01/07/21  6:35 AM   Result Value Ref Range    GLUCOSE, BEDSIDE - POINT OF CARE 172 (H) 70 - 99 mg/dL       Imaging  No results found.    Assessment & Plan   Active Problems:    Benign essential hypertension    Type 2 diabetes, uncontrolled, with renal manifestation (CMS/HCC)    Paroxysmal atrial fibrillation (CMS/HCC)    COVID-19 virus infection    Renal transplant recipient    Myalgia    Troponin level elevated    Hypertensive urgency    Troponin level elevated  Assessment & Plan  Min trop elev 0.07> 0.06 > 0.5  pt is asymptomatic suspect trop leak secondary severe htn  Trop normalized    Myalgia  Assessment & Plan  resolved with tylenol - exam is benign - CPK is neg - ? Secondary COVID infection     Renal transplant recipient  Assessment & Plan  Pt ho renal transplant and creat mildly elevated over baseline  on tacro mycophenylate and prednisone  Prednisone held and given dexamethasone  renal consulted- tacro level is low 3.4 on admission  Creatinine is stable    COVID-19 virus infection  Assessment & Plan  Pt had exposure 12/25 to 3 family members who subsequently tested positive  Patient initially tested negative on 12/27  Presented to the ER with body aches and joint pains  Covid test positive here at [facility]  Currently satting in the low 90s on room air  Patient denies any chest pain, cough, shortness of breath  Upon admission:  CXR is clear  inflamm markers only show mild elev CRP 1.3   ID  consult obtained and patient started on dexamethasone and remdesivir  Finished 5 day course of Remdesevir  Continue dexamethasone day #6/10  Inflammatory markers (1/4/21):  Ferritin 252    Procalcitonin <0.05    CK 0.8  CBC with normal WBC count    Repeat inflammatory markers this am: all normal    Encourage use in incentive spirometry  Encourage ambulation    Dc home today  Will d/w ID if we should finish Dexamethasone for 10 day course of resume outpatient Prednisone dose    Paroxysmal atrial fibrillation (CMS/HCC)  Assessment & Plan  Stable on amio and coreg  follows with cardiology   EKG in sinus     Type 2 diabetes, uncontrolled, with renal manifestation (CMS/HCC)  Assessment & Plan     Recent Labs   Lab 01/06/21  0804 01/06/21  1156 01/06/21  1722 01/06/21  2116 01/07/21  0635   GLUCOSE BEDSIDE 252* 386* 243* 195* 172*       Sugars are still elevated but improving  Will increase Lantus to 75 U today  Increase Humalog to 30U TID + sliding scale  D/c home today, resume outpatient dosing    Benign essential hypertension  Assessment & Plan  Renal on consult and appreciate help in management  BP's improving  We will continue losartan 100 mg daily, hydralazine 100 mg 3 times daily, carvedilol 25 mg every 12 hrs, Lasix 40 mg every 12 hrs  Will d/c home today  Continue current regimen after discussing with renal      Hypertensive urgency  Assessment & Plan  BP's severely elevated on admission  Renal on consult  Medications adjusted  BP's had been difficult to control as outpatient as well  BP's improving  resolved      DVT Prophylaxis  SQ heparin    Smoking Cessation  Counseling given: Not Answered            Carlos A Guzman MD

## 2021-07-13 NOTE — PROGRESS NOTE ADULT - SUBJECTIVE AND OBJECTIVE BOX
MRN-[id number]    Subjective: 72 yo male seen and examined at bedside.    PAST MEDICAL & SURGICAL HISTORY:  Hypertension    Hyperlipidemia    Seizures    Diabetes    Glioblastoma multiforme    CMV infection    History of SIADH    H/O colonoscopy    History of laryngoscopy    Status post stereotactic brain biopsy    FAMILY HISTORY:  FH: myocardial infarction (Father)    Social Hx:  Nonsmoker, no drug or alcohol use    Home Medications:  amantadine 50 mg/5 mL oral syrup: 10 milliliter(s) orally 2 times a day (2021 10:00)  atorvastatin 10 mg oral tablet: 1 tab(s) orally once a day (2021 10:00)  citalopram 20 mg oral tablet: 1 tab(s) orally once a day (2021 10:00)  Keppra 100 mg/mL oral solution: 7.5 milliliter(s) orally 2 times a day (2021 10:00)  losartan 100 mg oral tablet: 1 tab(s) orally once a day (2021 10:00)  methylphenidate 5 mg oral tablet: 1 tab(s) orally @ 8am and 2 pm (15 Abel 2021 12:19)  Norvasc 5 mg oral tablet: 1 tab(s) orally once a day (2021 10:00)  nystatin 100,000 units/mL oral suspension: 4 milliliter(s) orally 4 times a day (2021 10:00)  ramelteon 8 mg oral tablet: 1 tab(s) orally once a day (at bedtime) (2021 10:00)    MEDICATIONS  (STANDING):  amantadine Syrup 100 milliGRAM(s) Oral two times a day  amLODIPine   Tablet 5 milliGRAM(s) Oral daily  atorvastatin 10 milliGRAM(s) Oral at bedtime  dexAMETHasone     Tablet 4 milliGRAM(s) Oral every 12 hours  enoxaparin Injectable 40 milliGRAM(s) SubCutaneous daily  lacosamide Solution 200 milliGRAM(s) Oral two times a day  levETIRAcetam  Solution 1000 milliGRAM(s) Oral <User Schedule>  losartan 100 milliGRAM(s) Oral daily  pantoprazole   Suspension 40 milliGRAM(s) Oral before breakfast  polyethylene glycol 3350 17 Gram(s) Oral daily  senna 2 Tablet(s) Oral at bedtime    MEDICATIONS  (PRN):  bisacodyl Suppository 10 milliGRAM(s) Rectal at bedtime PRN Constipation  LORazepam   Injectable 1 milliGRAM(s) IV Push once PRN GTC  ramelteon 8 milliGRAM(s) Oral at bedtime PRN Insomnia    Allergies  No Known Allergies    ROS: Unable to assess due to patient's mental status.     Vital Signs Last 24 Hrs  T(C): 37.1 (2021 08:27), Max: 37.2 (2021 23:16)  T(F): 98.7 (2021 08:27), Max: 99 (2021 23:16)  HR: 86 (2021 08:27) (86 - 103)  BP: 114/72 (2021 08:27) (108/69 - 119/72)  RR: 18 (2021 08:27) (18 - 18)  SpO2: 99% (2021 08:27) (97% - 99%)    GENERAL EXAM:  Constitutional: Lying in bed, NAD.  HEENT: PERRL. Normocephalic.  Extremities: No edema. L foot bullae on plantar aspect.    NEUROLOGICAL EXAM:  MS: Eyes closed, does not open to verbal/noxious stimuli. No verbal output. Does not follow commands.  CN: PERRL. No facial asymmetry.  Motor: No spontaneous movement noted. No twitching/jerking observed. Contractures in b/l lower extremities.  Coordination/Gait: Not assessed.    Labs:   cbc                      10.8   18.54 )-----------( 189      ( 2021 05:36 )             34.1     [id number]    131<L>  |  96  |  15  ----------------------------<  123<H>  4.2   |  24  |  <0.30<L>    Ca    8.9      2021 05:37  Phos  2.8     07-12  Mg     2.2     07-12    TPro  6.9  /  Alb  2.5<L>  /  TBili  0.3  /  DBili  x   /  AST  26  /  ALT  24  /  AlkPhos  114  07-12    LIVER FUNCTIONS - ( 2021 06:21 )  Alb: 2.5 g/dL / Pro: 6.9 g/dL / ALK PHOS: 114 U/L / ALT: 24 U/L / AST: 26 U/L / GGT: x           UA: Urinalysis Basic - ( 2021 21:44 )    Color: Yellow / Appearance: Slightly Turbid / S.024 / pH: x  Gluc: x / Ketone: Negative  / Bili: Negative / Urobili: 2 mg/dL   Blood: x / Protein: 30 mg/dL / Nitrite: Negative   Leuk Esterase: Large / RBC: 6 /hpf /  /HPF   Sq Epi: x / Non Sq Epi: 1 /hpf / Bacteria: Many    Radiology/EEGs:  - CTH: No CT evidence of acute intracranial hemorrhage, mass effect, or midline shift. Unchanged posttreatment changes.    - EEG Summary / Classification:  Abnormal EEG in comatose patient  Lateralized periodic discharges, left hemisphere, max over centroparietal region  Disorganization  Moderate background slowing, generalized.  EEG Impression / Clinical Correlate:  The presence of LPDs is often associated with subacute structural injury in the left hemisphere. LPDs also indicate heightened risk for seizures.  There is also evidence of moderate diffuse cerebral dysfunction.    - EEG Summary / Classification:  Abnormal EEG in comatose patient  4.	Lateralized periodic discharges, left hemisphere, max over centroparietal region.  5.	Disorganization  6.	Moderate background slowing, generalized.  EEG Impression / Clinical Correlate:  The presence of LPDs is often associated with subacute structural injury in the left hemisphere. LPDs also indicate heightened risk for seizures.  There is also evidence of moderate diffuse cerebral dysfunction.    - EEG Summary / Classification:  Abnormal EEG in comatose patient  7.	Lateralized periodic discharges, left hemisphere, max over centroparietal region.  8.	Disorganization  9.	Moderate background slowing, generalized.  EEG Impression / Clinical Correlate:  The presence of LPDs is often associated with subacute structural injury in the left hemisphere. LPDs also indicate heightened risk for seizures.  There is also evidence of moderate diffuse cerebral dysfunction.    - EEG Summary / Classification:  Abnormal EEG in comatose patient  10.	Lateralized periodic discharges, left hemisphere, max over temporo-centroparietal region with time locked clonus of the right arm.  11.	Disorganization  12.	Moderate background slowing, generalized.  EEG Impression / Clinical Correlate:  The presence of LPDs with associated time locked clonus indicative of focal motor seizures ("EPC").  There is also evidence of moderate diffuse cerebral dysfunction.    - MRI Brain w/wo Cont: Redemonstration of posttreatment changes in the bilateral occipital lobes and periventricular regions. There is a lobular areas of high T1 signal centered in the splenium of the corpus callosum. No gross enhancement. The overall size and appearance is unchanged. Areas are restricted diffusion again noted likely related to tumor. Redemonstration Of diffuse T2 prolongation throughout the periventricular white matter, unchanged from the prior exam, likely representing radiation changes, gliosis and/or edema. Stable exam.

## 2021-07-13 NOTE — CONSULT NOTE ADULT - SUBJECTIVE AND OBJECTIVE BOX
HPI:  Ronald Rider is a 72 year old RH male with a past medical history of HTN, HLD, hx of SIADH, gliobastoma multiforme (diagnosed in 2019) and seizures presenting after an episode of depressed level of consciousness and suspected seizure episode. At baseline, patient has contractions throughout and is bedbound, he is non-verbal but can respond to some simple commands. Patient was recently admitted for increased somnolence, difficulty breathing, decreased alertness, and poor PO intake. Was briefly on antibiotics but discontinued once no infection was found, had hypernatremia which resolved, briefly on steroid which were tapered and then subsequently discontinued. Was [place] [facility] [place] Currently patient was on Keppra 750mg tid and as per son was compliant. Patient now presenting with after an episode where he was sitting in his chair and then noted be slumping over to the R, having increased tonic contractions of the RUE, and some twitching of his face. Patient was noted to be incontinent during the episode and was less responsive than usual. He was taken by EMS to the hospital and given Versed 5mg IM en route. Patient gradually improved and was able to follow some command to squeeze a finger with his son in the room. On examination, the patient was not able to contribute to history, ROS, or follow commands to exam.     Glioblastoma History:  Diagnosed in 2019 with MR noting involvement of the splenium of the corpus callosum and bilateral parenchymal involvement. Had stereotactic biopsy in 2019 with pathology noting glioblastoma. Was started on Keppra and Decadron. [place] [facility] [place] with radiation, temozolomide, and Bactrim. Temozolamide was stopped due to myelosuppression. Has had numerous admission for somnolence and seizure episodes. Currently on Avastin maintenance. MRI in 2021 and 2021 show stable mass, progression of RT gliosis. Patient was administered steroids at the time of last admission in early 2021 and was tapered off as an outpatient.    INTERVAL EVENTS:  : palliative care consulted for [facility], patient appears comfortable      PERTINENT PM/SXH:   No pertinent past medical history    Hypertension    Neoplasm of unspecified behavior of brain    Hyperlipidemia    Seizures    Diabetes    Glioblastoma multiforme    CMV infection    History of SIADH      History of hyperlipidemia    H/O colonoscopy    History of laryngoscopy    Status post stereotactic brain biopsy      FAMILY HISTORY:  FH: myocardial infarction (Father)      ITEMS NOT CHECKED ARE NOT PRESENT    SOCIAL HISTORY:   Significant other/partner[X ]  Children[X ]  Caodaism/Spirituality:  Substance hx:  [ ]   Tobacco hx:  [ ]   Alcohol hx: [ ]   Home Opioid hx:  [ ] I-Stop Reference No:  Living Situation: [X ]Home  [ ]Long term care  [ ]Rehab [ ]Other  Home Services: [ ] HHA [ ] Visting RN [ ] Hospice    ADVANCE DIRECTIVES:    DNR  Yes    MOLST  [ ]  Living Will  [ ]   DECISION MAKER(s):  [ ] Health Care Proxy(s)  [ ] Surrogate(s)  [ ] Guardian           Name(s): Phone Number(s): mak Fernandez [phone]    BASELINE (I)ADL(s) (prior to admission):  [place]: [ ]Total  [ ] Moderate [ ]Dependent    Allergies    No Known Allergies    Intolerances    MEDICATIONS  (STANDING):  amantadine Syrup 100 milliGRAM(s) Oral two times a day  amLODIPine   Tablet 5 milliGRAM(s) Oral daily  atorvastatin 10 milliGRAM(s) Oral at bedtime  dexAMETHasone     Tablet 4 milliGRAM(s) Oral every 12 hours  enoxaparin Injectable 40 milliGRAM(s) SubCutaneous daily  lacosamide Solution 200 milliGRAM(s) Oral two times a day  levETIRAcetam  Solution 1000 milliGRAM(s) Oral <User Schedule>  losartan 100 milliGRAM(s) Oral daily  pantoprazole   Suspension 40 milliGRAM(s) Oral before breakfast  polyethylene glycol 3350 17 Gram(s) Oral daily  senna 2 Tablet(s) Oral at bedtime    MEDICATIONS  (PRN):  bisacodyl Suppository 10 milliGRAM(s) Rectal at bedtime PRN Constipation  LORazepam   Injectable 1 milliGRAM(s) IV Push once PRN GTC  ramelteon 8 milliGRAM(s) Oral at bedtime PRN Insomnia    PRESENT SYMPTOMS: [X ]Unable to obtain due to poor mentation   Source if other than patient:  [ ]Family   [ ]Team     Pain: [ ]yes [ ]no  QOL impact -   Location -                    Aggravating factors -  Quality -  Radiation -  Timing-  Severity (0-10 scale):  Minimal acceptable level (0-10 scale):     CPOT:    https://www.[email or web address]/getattachment/[email or web address]/Critical-Care-Pain-Observation-Tool-(CPOT)      PAIN AD Score: 0    http://geriatrictoolkit.[email or web address]/painad.pdf (press ctrl +  left click to view)    Dyspnea:                           [ ]Mild [ ]Moderate [ ]Severe  Anxiety:                             [ ]Mild [ ]Moderate [ ]Severe  Fatigue:                             [ ]Mild [ ]Moderate [ ]Severe  Nausea:                             [ ]Mild [ ]Moderate [ ]Severe  Loss of appetite:              [ ]Mild [ ]Moderate [ ]Severe  Constipation:                    [ ]Mild [ ]Moderate [ ]Severe    Other Symptoms:  [ ]All other review of systems negative     Palliative Performance Status Version 2:         %    http://[facility].org/files/news/palliative_performance_scale_ppsv2.pdf  PHYSICAL EXAM:  Vital Signs Last 24 Hrs  T(C): 37.1 (2021 12:06), Max: 37.2 (2021 23:16)  T(F): 98.7 (2021 12:06), Max: 99 (2021 23:16)  HR: 84 (2021 12:06) (84 - 101)  BP: 119/74 (2021 12:06) (108/69 - 119/74)  BP(mean): --  RR: 18 (2021 12:06) (18 - 18)  SpO2: 98% (2021 12:06) (97% - 99%) I&O's Summary    2021 07:01  -  2021 07:00  --------------------------------------------------------  IN: 2260 mL / OUT: 2050 mL / NET: 210 mL      GENERAL:  [ ]Alert  [ ]Oriented x   [X ]Lethargic  [ ]Cachexia  [ ]Unarousable  [ ]Verbal  [ X]Non-Verbal  Behavioral:   [ ] Anxiety  [ ] Delirium [ ] Agitation [ X] Other calm  HEENT:  [ X]Normal   [ ]Dry mouth   [ ]ET Tube/Trach  [ ]Oral lesions  PULMONARY:   [X ]Clear [ ]Tachypnea  [ ]Audible excessive secretions   [ ]Rhonchi        [ ]Right [ ]Left [ ]Bilateral  [ ]Crackles        [ ]Right [ ]Left [ ]Bilateral  [ ]Wheezing     [ ]Right [ ]Left [ ]Bilateral  [ ]Diminished breath sounds [ ]right [ ]left [ ]bilateral  CARDIOVASCULAR:    [X ]Regular [ ]Irregular [ ]Tachy  [ ]Edison [ ]Murmur [ ]Other  GASTROINTESTINAL:  [X ]Soft  [ ]Distended   [X ]+BS  [X ]Non tender [ ]Tender  [ ]PEG [ ]OGT/ NGT  Last BM:     GENITOURINARY:  [ ]Normal [ ] Incontinent   [ ]Oliguria/Anuria   [X ]Franks not present  MUSCULOSKELETAL:   [ ]Normal   [ ]Weakness  [ ]Bed/Wheelchair bound [ ]Edema  NEUROLOGIC:   [ ]No focal deficits  [X ]Cognitive impairment  [ ]Dysphagia [ ]Dysarthria [ ]Paresis [ ]Other   SKIN:   [ ]Normal    [ ]Rash  [ X]Pressure ulcer(s) (see RN documentation)       Present on admission [ ]y [ ]n    CRITICAL CARE:  [ ] Shock Present  [ ]Septic [ ]Cardiogenic [ ]Neurologic [ ]Hypovolemic  [ ]  Vasopressors [ ]  Inotropes   [ ]Respiratory failure present [ ]Mechanical ventilation [ ]Non-invasive ventilatory support [ ]High flow  [ ]Acute  [ ]Chronic [ ]Hypoxic  [ ]Hypercarbic [ ]Other  [ ]Other organ failure     LABS:                        10.8   18.54 )-----------( 189      ( 2021 05:36 )             34.1   07-13    131<L>  |  96  |  15  ----------------------------<  123<H>  4.2   |  24  |  <0.30<L>    Ca    8.9      2021 05:37  Phos  2.8     07-12  Mg     2.2     07-12    TPro  6.9  /  Alb  2.5<L>  /  TBili  0.3  /  DBili  x   /  AST  26  /  ALT  24  /  AlkPhos  114  07-12      Urinalysis Basic - ( 2021 21:44 )    Color: Yellow / Appearance: Slightly Turbid / S.024 / pH: x  Gluc: x / Ketone: Negative  / Bili: Negative / Urobili: 2 mg/dL   Blood: x / Protein: 30 mg/dL / Nitrite: Negative   Leuk Esterase: Large / RBC: 6 /hpf /  /HPF   Sq Epi: x / Non Sq Epi: 1 /hpf / Bacteria: Many      RADIOLOGY & ADDITIONAL STUDIES:    PROTEIN CALORIE MALNUTRITION PRESENT: [ ]mild [ ]moderate [ ]severe [ ]underweight [ ]morbid obesity  https://www.andeal.org/vault/2440/web/files/ONC/Table_Clinical%20Characteristics%20to%20Document%20Malnutrition-White%20JV%20et%20al%2020.pdf    Height (cm): 180.3 (21 @ 14:42), 180.3 (21 @ 22:21)  Weight (kg): 51 (21 @ 11:27), 50 (21 @ 09:57)  BMI (kg/m2): 15.7 (21 @ 11:27), 15.4 (21 @ 14:42), 15.4 (21 @ 09:57)    [ ]PPSV2 < or = to 30% [ ]significant weight loss  [ ]poor nutritional intake  [ ]anasarca     Albumin, Serum: 2.5 g/dL (21 @ 06:21)   [ ]Artificial Nutrition      REFERRALS:   [ ]Chaplaincy  [ ]Hospice  [ ]Child Life  [ ]Social Work  [ ]Case management [ ]Holistic Therapy     Goals of Care Document: CARMINA Garcia (06-15-21 @ 13:35)  Goals of Care Conversation:   Participants:  · Participants  Patient; Family  · Child(lily)  Son Jim    Advance Directives:  · Does patient have Advance Directive  No  · Does Patient Have a Surrogate  Yes  Jim Son  · Does the Patient have a Court Appointed Guardian ([address])  No  · Caregiver:  no    Conversation Discussion:  · Conversation  Diagnosis  · Conversation Details  - Son HCP no PEG at this point. Discussed code status he reports dad is still full code.      Electronic Signatures:  Chastity Garcia)  (Signed 15-Abel-2021 13:38)  	Authored: Goals of Care Conversation      Last Updated: 15-Abel-2021 13:38 by Chastity Garcia)        ______________  Chris Villalobos MD   of Geriatric and Palliative Medicine  [facility]     Please page the following number for clinical matters between the hours of 9AM and 5PM   from Monday through Friday : [phone]    After 5PM and on weekends, please page: [phone]. The Geriatric and Palliative Medicine consult service has  coverage for medical recommendations, including for symptom management needs.

## 2021-07-13 NOTE — PROGRESS NOTE ADULT - SUBJECTIVE AND OBJECTIVE BOX
Follow Up:  leukocytosis    Interval History: afebrile, no acute overnight events     REVIEW OF SYSTEMS  [  ] ROS unobtainable because:    [ x ] All other systems negative except as noted below    Constitutional:  [ ] fever [ ] chills  [ ] weight loss  [ ] weakness  Skin:  [ ] rash [ ] phlebitis	  Eyes: [ ] icterus [ ] pain  [ ] discharge	  ENMT: [ ] sore throat  [ ] thrush [ ] ulcers [ ] exudates  Respiratory: [ ] dyspnea [ ] hemoptysis [ ] cough [ ] sputum	  Cardiovascular:  [ ] chest pain [ ] palpitations [ ] edema	  Gastrointestinal:  [ ] nausea [ ] vomiting [ ] diarrhea [ ] constipation [ ] pain	  Genitourinary:  [ ] dysuria [ ] frequency [ ] hematuria [ ] discharge [ ] flank pain  [ ] incontinence  Musculoskeletal:  [ ] myalgias [ ] arthralgias [ ] arthritis  [ ] back pain  Neurological:  [ ] headache [ ] seizures  [ ] confusion/altered mental status    Allergies  No Known Allergies        ANTIMICROBIALS:      OTHER MEDS:  MEDICATIONS  (STANDING):  amantadine Syrup 100 two times a day  amLODIPine   Tablet 5 daily  atorvastatin 10 at bedtime  bisacodyl Suppository 10 at bedtime PRN  dexAMETHasone     Tablet 4 every 12 hours  enoxaparin Injectable 40 daily  lacosamide Solution 200 two times a day  levETIRAcetam  Solution 1000 <User Schedule>  LORazepam   Injectable 1 once PRN  losartan 100 daily  pantoprazole   Suspension 40 before breakfast  polyethylene glycol 3350 17 daily  ramelteon 8 at bedtime PRN  senna 2 at bedtime      Vital Signs Last 24 Hrs  T(C): 37.1 (2021 12:06), Max: 37.2 (2021 23:16)  T(F): 98.7 (2021 12:06), Max: 99 (2021 23:16)  HR: 84 (2021 12:06) (84 - 101)  BP: 119/74 (2021 12:06) (108/69 - 119/74)  BP(mean): --  RR: 18 (2021 12:06) (18 - 18)  SpO2: 98% (2021 12:06) (97% - 99%)    PHYSICAL EXAMINATION:  General: Awake but not alert, NAD  Cardiac: RRR, No M/R/G  Resp: CTAB, No Wh/Rh/Ra  Abdomen: +PEG (site is clean and without drainage) NBS, NT/ND, No HSM, No rigidity or guarding  MSK: Contracted LE. No LE edema. LLE foot with large bullae at plantar aspect (small amount of erythema at lateral aspect of bullae)  : + diaz  Skin: Findings per MSK Section. Skin is warm and dry to the touch.   Neuro: Awake but not alert. Contracted LE   Psych: Encephalopathic - unable to assess                        10.8   18.54 )-----------( 189      ( 2021 05:36 )             34.1       07-13    131<L>  |  96  |  15  ----------------------------<  123<H>  4.2   |  24  |  <0.30<L>    Ca    8.9      2021 05:37  Phos  2.8     07-12  Mg     2.2     07-12    TPro  6.9  /  Alb  2.5<L>  /  TBili  0.3  /  DBili  x   /  AST  26  /  ALT  24  /  AlkPhos  114  07-12      Urinalysis Basic - ( 2021 21:44 )    Color: Yellow / Appearance: Slightly Turbid / S.024 / pH: x  Gluc: x / Ketone: Negative  / Bili: Negative / Urobili: 2 mg/dL   Blood: x / Protein: 30 mg/dL / Nitrite: Negative   Leuk Esterase: Large / RBC: 6 /hpf /  /HPF   Sq Epi: x / Non Sq Epi: 1 /hpf / Bacteria: Many        MICROBIOLOGY:    COVID-19 PCR . (21 @ 00:54)   COVID-19 PCR: NotDetec    RADIOLOGY:    <The imaging below has been reviewed and visualized by me independently. Findings as detailed in report below>    EXAM:  MR BRAIN WAW IC                        PROCEDURE DATE:  2021    Redemonstration of posttreatment changes in the bilateral occipital lobes and periventricular regions. There is a lobular areas of high T1 signal centered in the splenium of the corpus callosum. No gross enhancement. The overall size and appearance is unchanged. Areas are restricted diffusion again noted likely related to tumor. Redemonstration Of diffuse T2 prolongation throughout the periventricular white matter, unchanged from the prior exam, likely representing radiation changes, gliosis and/or edema  The ventricles, cisternal spaces, and cortical sulci to be appropriate for the patient's stated age. There is no midline shift or extra-axial collection.

## 2021-07-14 LAB
ANION GAP SERPL CALC-SCNC: 11 MMOL/L — SIGNIFICANT CHANGE UP (ref 5–17)
BASOPHILS # BLD AUTO: 0 K/UL — SIGNIFICANT CHANGE UP (ref 0–0.2)
BASOPHILS NFR BLD AUTO: 0 % — SIGNIFICANT CHANGE UP (ref 0–2)
BUN SERPL-MCNC: 19 MG/DL — SIGNIFICANT CHANGE UP (ref 7–23)
CALCIUM SERPL-MCNC: 8.6 MG/DL — SIGNIFICANT CHANGE UP (ref 8.4–10.5)
CHLORIDE SERPL-SCNC: 97 MMOL/L — SIGNIFICANT CHANGE UP (ref 96–108)
CO2 SERPL-SCNC: 23 MMOL/L — SIGNIFICANT CHANGE UP (ref 22–31)
CREAT SERPL-MCNC: <0.3 MG/DL — LOW (ref 0.5–1.3)
EOSINOPHIL # BLD AUTO: 0 K/UL — SIGNIFICANT CHANGE UP (ref 0–0.5)
EOSINOPHIL NFR BLD AUTO: 0 % — SIGNIFICANT CHANGE UP (ref 0–6)
GLUCOSE SERPL-MCNC: 167 MG/DL — HIGH (ref 70–99)
HCT VFR BLD CALC: 34.9 % — LOW (ref 39–50)
HGB BLD-MCNC: 10.9 G/DL — LOW (ref 13–17)
LYMPHOCYTES # BLD AUTO: 1.63 K/UL — SIGNIFICANT CHANGE UP (ref 1–3.3)
LYMPHOCYTES # BLD AUTO: 13.2 % — SIGNIFICANT CHANGE UP (ref 13–44)
MANUAL SMEAR VERIFICATION: SIGNIFICANT CHANGE UP
MCHC RBC-ENTMCNC: 27.1 PG — SIGNIFICANT CHANGE UP (ref 27–34)
MCHC RBC-ENTMCNC: 31.2 GM/DL — LOW (ref 32–36)
MCV RBC AUTO: 86.8 FL — SIGNIFICANT CHANGE UP (ref 80–100)
MONOCYTES # BLD AUTO: 0.21 K/UL — SIGNIFICANT CHANGE UP (ref 0–0.9)
MONOCYTES NFR BLD AUTO: 1.7 % — LOW (ref 2–14)
MYELOCYTES NFR BLD: 1.8 % — HIGH (ref 0–0)
NEUTROPHILS # BLD AUTO: 10.26 K/UL — HIGH (ref 1.8–7.4)
NEUTROPHILS NFR BLD AUTO: 83.3 % — HIGH (ref 43–77)
PLAT MORPH BLD: NORMAL — SIGNIFICANT CHANGE UP
PLATELET # BLD AUTO: 176 K/UL — SIGNIFICANT CHANGE UP (ref 150–400)
POTASSIUM SERPL-MCNC: 4.6 MMOL/L — SIGNIFICANT CHANGE UP (ref 3.5–5.3)
POTASSIUM SERPL-SCNC: 4.6 MMOL/L — SIGNIFICANT CHANGE UP (ref 3.5–5.3)
RBC # BLD: 4.02 M/UL — LOW (ref 4.2–5.8)
RBC # FLD: 17.2 % — HIGH (ref 10.3–14.5)
RBC BLD AUTO: SIGNIFICANT CHANGE UP
SODIUM SERPL-SCNC: 131 MMOL/L — LOW (ref 135–145)
WBC # BLD: 12.32 K/UL — HIGH (ref 3.8–10.5)
WBC # FLD AUTO: 12.32 K/UL — HIGH (ref 3.8–10.5)

## 2021-07-14 PROCEDURE — 99232 SBSQ HOSP IP/OBS MODERATE 35: CPT

## 2021-07-14 RX ADMIN — LACOSAMIDE 200 MILLIGRAM(S): 50 TABLET ORAL at 05:26

## 2021-07-14 RX ADMIN — ENOXAPARIN SODIUM 40 MILLIGRAM(S): 100 INJECTION SUBCUTANEOUS at 11:22

## 2021-07-14 RX ADMIN — LEVETIRACETAM 1000 MILLIGRAM(S): 250 TABLET, FILM COATED ORAL at 05:26

## 2021-07-14 RX ADMIN — ATORVASTATIN CALCIUM 10 MILLIGRAM(S): 80 TABLET, FILM COATED ORAL at 21:10

## 2021-07-14 RX ADMIN — AMLODIPINE BESYLATE 5 MILLIGRAM(S): 2.5 TABLET ORAL at 05:26

## 2021-07-14 RX ADMIN — SENNA PLUS 2 TABLET(S): 8.6 TABLET ORAL at 21:10

## 2021-07-14 RX ADMIN — LACOSAMIDE 200 MILLIGRAM(S): 50 TABLET ORAL at 17:36

## 2021-07-14 RX ADMIN — LEVETIRACETAM 1000 MILLIGRAM(S): 250 TABLET, FILM COATED ORAL at 13:47

## 2021-07-14 RX ADMIN — Medication 4 MILLIGRAM(S): at 17:37

## 2021-07-14 RX ADMIN — LOSARTAN POTASSIUM 100 MILLIGRAM(S): 100 TABLET, FILM COATED ORAL at 05:27

## 2021-07-14 RX ADMIN — Medication 100 MILLIGRAM(S): at 17:37

## 2021-07-14 RX ADMIN — PANTOPRAZOLE SODIUM 40 MILLIGRAM(S): 20 TABLET, DELAYED RELEASE ORAL at 05:26

## 2021-07-14 RX ADMIN — LEVETIRACETAM 1000 MILLIGRAM(S): 250 TABLET, FILM COATED ORAL at 21:10

## 2021-07-14 RX ADMIN — Medication 100 MILLIGRAM(S): at 05:26

## 2021-07-14 RX ADMIN — Medication 4 MILLIGRAM(S): at 05:26

## 2021-07-14 NOTE — PROGRESS NOTE ADULT - SUBJECTIVE AND OBJECTIVE BOX
Neurology  Progress Note  07-14-21    Name:  ADDIS CSHWAB; 71y    Interval History: No overnight events. Patient seen and examined at bedside.      VITALS & EXAMINATION:  Vital Signs Last 24 Hrs  T(C): 36.7 (29 Jun 2021 03:00), Max: 36.7 (29 Jun 2021 03:00)  T(F): 98 (29 Jun 2021 03:00), Max: 98 (29 Jun 2021 03:00)  HR: 99 (29 Jun 2021 03:00) (99 - 100)  BP: 135/71 (29 Jun 2021 03:00) (117/46 - 146/82)  BP(mean): --  RR: 17 (29 Jun 2021 03:00) (17 - 22)  SpO2: 100% (29 Jun 2021 03:00) (100% - 100%) (29 Jun 2021 03:53)    REVIEW OF SYSTEMS:  As states in HPI.    Medications:  acetaminophen   Tablet .. 650 milliGRAM(s) Oral once PRN  acetaminophen  IVPB .. 1000 milliGRAM(s) IV Intermittent once  amantadine Syrup 100 milliGRAM(s) Oral two times a day  amLODIPine   Tablet 5 milliGRAM(s) Oral daily  atorvastatin 10 milliGRAM(s) Oral at bedtime  bisacodyl Suppository 10 milliGRAM(s) Rectal at bedtime PRN  ceFAZolin 1 Gram(s) Injectable (Rx Quick Charge) 2000 milliGRAM(s) IV Push   cefTRIAXone   IVPB 1000 milliGRAM(s) IV Intermittent every 24 hours  dexAMETHasone     Tablet 4 milliGRAM(s) Oral every 12 hours  dexAMETHasone  Injectable 4 milliGRAM(s) IV Push once  dexAMETHasone  Injectable 4 milliGRAM(s) IV Push once  diphenhydrAMINE   Injectable 25 milliGRAM(s) IV Push once  enoxaparin Injectable 40 milliGRAM(s) SubCutaneous daily  lacosamide Injectable 100 milliGRAM(s) IV Push once  lacosamide Injectable 200 milliGRAM(s) IV Push once  lacosamide IVPB 200 milliGRAM(s) IV Intermittent once  lacosamide Solution 200 milliGRAM(s) Oral two times a day  levETIRAcetam  Solution 1000 milliGRAM(s) Oral <User Schedule>  LORazepam   Injectable 1 milliGRAM(s) IV Push once  LORazepam   Injectable 1 milliGRAM(s) IV Push once  LORazepam   Injectable 1 milliGRAM(s) IV Push once  LORazepam   Injectable 1 milliGRAM(s) IV Push once  LORazepam   Injectable 1 milliGRAM(s) IV Push once  LORazepam   Injectable 1 milliGRAM(s) IV Push once  LORazepam   Injectable 1 milliGRAM(s) IV Push once  LORazepam   Injectable 1 milliGRAM(s) IV Push once PRN  losartan 100 milliGRAM(s) Oral daily  mupirocin 2% Ointment 1 Application(s) Topical once  pantoprazole   Suspension 40 milliGRAM(s) Oral before breakfast  polyethylene glycol 3350 17 Gram(s) Oral daily  potassium phosphate / sodium phosphate Powder (PHOS-NaK) 2 Packet(s) Oral once  potassium phosphate / sodium phosphate Tablet (K-PHOS No. 2) 1 Tablet(s) Oral three times a day with meals  propofol (10 mg/mL) 500 mG/50 mL Infusion (Rx Quick Charge) 78.3466 milliGRAM(s) IV Intermittent   propofol 10 mG/mL Injectable (Rx Quick Charge) 50 milliGRAM(s) IV Bolus   ramelteon 8 milliGRAM(s) Oral at bedtime PRN  senna 2 Tablet(s) Oral at bedtime  silver nitrate Applicator 1 Application(s) Topical once  sodium chloride 0.9% Bolus 500 milliLiter(s) IV Bolus once  sodium chloride 0.9%. 500 milliLiter(s) IV Continuous <Continuous>      Vitals:  T(C): 36.6 (07-14-21 @ 12:53), Max: 36.7 (07-13-21 @ 20:42)  HR: 86 (07-14-21 @ 12:53) (86 - 98)  BP: 121/74 (07-14-21 @ 12:53) (104/64 - 121/74)  RR: 18 (07-14-21 @ 12:53) (18 - 18)  SpO2: 98% (07-14-21 @ 12:53) (95% - 98%)    NEUROLOGICAL EXAM:  MS: Eyes closed, does not open to verbal/noxious stimuli. No verbal output. Does not follow commands.  CN: PERRL. No facial asymmetry.  Motor: No spontaneous movement noted. No twitching/jerking observed. Contractures in b/l lower extremities.  Coordination/Gait: Not assessed.      Labs:                        10.9   12.32 )-----------( 176      ( 14 Jul 2021 06:29 )             34.9     07-14    131<L>  |  97  |  19  ----------------------------<  167<H>  4.6   |  23  |  <0.30<L>    Ca    8.6      14 Jul 2021 06:29      CAPILLARY BLOOD GLUCOSE      POCT Blood Glucose.: 135 mg/dL (13 Jul 2021 16:45)        Culture - Blood (collected 12 Jul 2021 23:03)  Source: .Blood Blood-Peripheral  Preliminary Report (14 Jul 2021 01:02):    No growth to date.    Culture - Urine (collected 12 Jul 2021 22:59)  Source: .Urine Clean Catch (Midstream)  Preliminary Report (14 Jul 2021 10:19):    >100,000 CFU/ml Enterobacter cloacae complex        CSF:                    Radiology:     Neurology  Progress Note  07-14-21    Name:  ADDIS SCHWAB; 71y    Interval History: No overnight events. Patient seen and examined at bedside.      VITALS & EXAMINATION:  Vital Signs Last 24 Hrs  T(C): 36.7 (29 Jun 2021 03:00), Max: 36.7 (29 Jun 2021 03:00)  T(F): 98 (29 Jun 2021 03:00), Max: 98 (29 Jun 2021 03:00)  HR: 99 (29 Jun 2021 03:00) (99 - 100)  BP: 135/71 (29 Jun 2021 03:00) (117/46 - 146/82)  BP(mean): --  RR: 17 (29 Jun 2021 03:00) (17 - 22)  SpO2: 100% (29 Jun 2021 03:00) (100% - 100%) (29 Jun 2021 03:53)    REVIEW OF SYSTEMS:  As states in HPI.    Medications:  acetaminophen   Tablet .. 650 milliGRAM(s) Oral once PRN  acetaminophen  IVPB .. 1000 milliGRAM(s) IV Intermittent once  amantadine Syrup 100 milliGRAM(s) Oral two times a day  amLODIPine   Tablet 5 milliGRAM(s) Oral daily  atorvastatin 10 milliGRAM(s) Oral at bedtime  bisacodyl Suppository 10 milliGRAM(s) Rectal at bedtime PRN  ceFAZolin 1 Gram(s) Injectable (Rx Quick Charge) 2000 milliGRAM(s) IV Push   cefTRIAXone   IVPB 1000 milliGRAM(s) IV Intermittent every 24 hours  dexAMETHasone     Tablet 4 milliGRAM(s) Oral every 12 hours  dexAMETHasone  Injectable 4 milliGRAM(s) IV Push once  dexAMETHasone  Injectable 4 milliGRAM(s) IV Push once  diphenhydrAMINE   Injectable 25 milliGRAM(s) IV Push once  enoxaparin Injectable 40 milliGRAM(s) SubCutaneous daily  lacosamide Injectable 100 milliGRAM(s) IV Push once  lacosamide Injectable 200 milliGRAM(s) IV Push once  lacosamide IVPB 200 milliGRAM(s) IV Intermittent once  lacosamide Solution 200 milliGRAM(s) Oral two times a day  levETIRAcetam  Solution 1000 milliGRAM(s) Oral <User Schedule>  LORazepam   Injectable 1 milliGRAM(s) IV Push once  LORazepam   Injectable 1 milliGRAM(s) IV Push once  LORazepam   Injectable 1 milliGRAM(s) IV Push once  LORazepam   Injectable 1 milliGRAM(s) IV Push once  LORazepam   Injectable 1 milliGRAM(s) IV Push once  LORazepam   Injectable 1 milliGRAM(s) IV Push once  LORazepam   Injectable 1 milliGRAM(s) IV Push once  LORazepam   Injectable 1 milliGRAM(s) IV Push once PRN  losartan 100 milliGRAM(s) Oral daily  mupirocin 2% Ointment 1 Application(s) Topical once  pantoprazole   Suspension 40 milliGRAM(s) Oral before breakfast  polyethylene glycol 3350 17 Gram(s) Oral daily  potassium phosphate / sodium phosphate Powder (PHOS-NaK) 2 Packet(s) Oral once  potassium phosphate / sodium phosphate Tablet (K-PHOS No. 2) 1 Tablet(s) Oral three times a day with meals  propofol (10 mg/mL) 500 mG/50 mL Infusion (Rx Quick Charge) 78.3466 milliGRAM(s) IV Intermittent   propofol 10 mG/mL Injectable (Rx Quick Charge) 50 milliGRAM(s) IV Bolus   ramelteon 8 milliGRAM(s) Oral at bedtime PRN  senna 2 Tablet(s) Oral at bedtime  silver nitrate Applicator 1 Application(s) Topical once  sodium chloride 0.9% Bolus 500 milliLiter(s) IV Bolus once  sodium chloride 0.9%. 500 milliLiter(s) IV Continuous <Continuous>      Vitals:  T(C): 36.6 (07-14-21 @ 12:53), Max: 36.7 (07-13-21 @ 20:42)  HR: 86 (07-14-21 @ 12:53) (86 - 98)  BP: 121/74 (07-14-21 @ 12:53) (104/64 - 121/74)  RR: 18 (07-14-21 @ 12:53) (18 - 18)  SpO2: 98% (07-14-21 @ 12:53) (95% - 98%)    NEUROLOGICAL EXAM:  MS: Eyes closed, does not open to verbal/noxious stimuli. No verbal output. Does not follow commands.  CN: PERRL. No facial asymmetry.  Motor: No spontaneous movement noted. No twitching/jerking observed. Contractures in b/l lower extremities. Hiccuping after motor stimulation.  Coordination/Gait: Not assessed.      Labs:                        10.9   12.32 )-----------( 176      ( 14 Jul 2021 06:29 )             34.9     07-14    131<L>  |  97  |  19  ----------------------------<  167<H>  4.6   |  23  |  <0.30<L>    Ca    8.6      14 Jul 2021 06:29      CAPILLARY BLOOD GLUCOSE  POCT Blood Glucose.: 135 mg/dL (13 Jul 2021 16:45)        Culture - Blood (collected 12 Jul 2021 23:03)  Source: .Blood Blood-Peripheral  Preliminary Report (14 Jul 2021 01:02):    No growth to date.    Culture - Urine (collected 12 Jul 2021 22:59)  Source: .Urine Clean Catch (Midstream)  Preliminary Report (14 Jul 2021 10:19):    >100,000 CFU/ml Enterobacter cloacae complex            Radiology:  MR Head w/wo IV Cont (07.06.21 @ 20:13)  IMPRESSION:  Stable exam    MR Head w/ IV Cont (06.11.21 @ 18:23)  IMPRESSION:  Stable mass lesion. Progressive radiation gliosis.

## 2021-07-14 NOTE — PROGRESS NOTE ADULT - SUBJECTIVE AND OBJECTIVE BOX
Follow Up:  Leukocytosis    Interval History: afebrile. no acute overnight events.     REVIEW OF SYSTEMS  [  ] ROS unobtainable because:    [ x ] All other systems negative except as noted below    Constitutional:  [ ] fever [ ] chills  [ ] weight loss  [ ] weakness  Skin:  [ ] rash [ ] phlebitis	  Eyes: [ ] icterus [ ] pain  [ ] discharge	  ENMT: [ ] sore throat  [ ] thrush [ ] ulcers [ ] exudates  Respiratory: [ ] dyspnea [ ] hemoptysis [ ] cough [ ] sputum	  Cardiovascular:  [ ] chest pain [ ] palpitations [ ] edema	  Gastrointestinal:  [ ] nausea [ ] vomiting [ ] diarrhea [ ] constipation [ ] pain	  Genitourinary:  [ ] dysuria [ ] frequency [ ] hematuria [ ] discharge [ ] flank pain  [ ] incontinence  Musculoskeletal:  [ ] myalgias [ ] arthralgias [ ] arthritis  [ ] back pain  Neurological:  [ ] headache [ ] seizures  [ ] confusion/altered mental status      Allergies  No Known Allergies        ANTIMICROBIALS:      OTHER MEDS:  MEDICATIONS  (STANDING):  amantadine Syrup 100 two times a day  amLODIPine   Tablet 5 daily  atorvastatin 10 at bedtime  bisacodyl Suppository 10 at bedtime PRN  dexAMETHasone     Tablet 4 every 12 hours  enoxaparin Injectable 40 daily  lacosamide Solution 200 two times a day  levETIRAcetam  Solution 1000 <User Schedule>  LORazepam   Injectable 1 once PRN  losartan 100 daily  pantoprazole   Suspension 40 before breakfast  polyethylene glycol 3350 17 daily  ramelteon 8 at bedtime PRN  senna 2 at bedtime      Vital Signs Last 24 Hrs  T(C): 36.6 (2021 12:53), Max: 36.7 (2021 20:42)  T(F): 97.9 (2021 12:53), Max: 98 (2021 20:42)  HR: 86 (2021 12:53) (86 - 98)  BP: 121/74 (2021 12:53) (104/64 - 121/74)  BP(mean): --  RR: 18 (2021 12:53) (18 - 18)  SpO2: 98% (2021 12:53) (95% - 98%)    PHYSICAL EXAMINATION:  General: Awake but not alert, NAD  Cardiac: RRR, No M/R/G  Resp: CTAB, No Wh/Rh/Ra  Abdomen: +PEG (site is clean and without drainage) NBS, NT/ND, No HSM, No rigidity or guarding  MSK: Contracted LE. No LE edema. LLE foot with large bullae at plantar aspect (small amount of erythema at lateral aspect of bullae)  : + diaz  Skin: Findings per MSK Section. Skin is warm and dry to the touch.   Neuro: Awake but not alert. Contracted LE   Psych: Encephalopathic - unable to assess                                     10.9   12.32 )-----------( 176      ( 2021 06:29 )             34.9       07-14    131<L>  |  97  |  19  ----------------------------<  167<H>  4.6   |  23  |  <0.30<L>    Ca    8.6      2021 06:29        Urinalysis Basic - ( 2021 21:44 )    Color: Yellow / Appearance: Slightly Turbid / S.024 / pH: x  Gluc: x / Ketone: Negative  / Bili: Negative / Urobili: 2 mg/dL   Blood: x / Protein: 30 mg/dL / Nitrite: Negative   Leuk Esterase: Large / RBC: 6 /hpf /  /HPF   Sq Epi: x / Non Sq Epi: 1 /hpf / Bacteria: Many        MICROBIOLOGY:  v  .Blood Blood-Peripheral  21   No growth to date.  --  --      .Urine Clean Catch (Midstream)  21   >100,000 CFU/ml Enterobacter cloacae complex  --  --    RADIOLOGY:    <The imaging below has been reviewed and visualized by me independently. Findings as detailed in report below>  EXAM:  MR BRAIN WAW IC                        PROCEDURE DATE:  2021    Redemonstration of posttreatment changes in the bilateral occipital lobes and periventricular regions. There is a lobular areas of high T1 signal centered in the splenium of the corpus callosum. No gross enhancement. The overall size and appearance is unchanged. Areas are restricted diffusion again noted likely related to tumor. Redemonstration Of diffuse T2 prolongation throughout the periventricular white matter, unchanged from the prior exam, likely representing radiation changes, gliosis and/or edema  The ventricles, cisternal spaces, and cortical sulci to be appropriate for the patient's stated age. There is no midline shift or extra-axial collection.

## 2021-07-14 NOTE — PROGRESS NOTE ADULT - ASSESSMENT
71 year old RH male with a past medical history of HTN, HLD, hx of SIADH, glioblastoma multiforme (diagnosed in 2019), and seizures presenting after an episode of depressed level of consciousness and suspected seizure episode.    Impression: Failure to thrive and progression of disease. Cachexia due to malignancy and poor nutrition.     Plan:  [x] vEEG: LPDs, left hemisphere, max over temporo-centroparietal region with time locked clonus of the right arm.  [x] MRI Brain w/wo contrast: no changed compared to prior study.   [] c/w Vimpat to 200MG PO BID.  [] c/w Decadron to 4MG PO Q12HR.  [] c/w Keppra 1000MG PO TID.  [] Hyponatremia likely secondary to SIADH. Fluid restriction < 1.5L daily. (131 on 7/13).  [x] d/c'd Ritalin, Baclofen.  [x] Holding home Celexa as this could contribute to hyponatremia.  [x] s/p PEG placement on 07/09.  [x] IM on board, Dr. Godfrey. Recs appreciated.  [] ID consulted for leukocytosis. Likely related to steroid use. UA appears positive, but patient with Franks. Monitoring off abx for now per ID.  [] Podiatry consulted for L plantar bullae.  [] c/w Bowel regimen.  [x] Discussed patient's treatment plan and goals of care with neuro-oncologist Dr. Martínez - currently on Avastin monotherapy (last dose 6/24, next dose due around 7/9).  [] Family initially declined Palliative consult, now spoke with palliative and C include home palliative services.  [x] CPK, B12, folate: wnl.  [] Diet: Glucerna 1.2 via PEG, goal rate 60.  [] DVT PPx: Lovenox SC.  [] GI PPx: Protonix PO QD.  [] Seizure rescue: Ativan 1MG IVP ONLY FOR CONVULSIONS/GTC LASTING > 3 MINUTES. Not treating focal seizures.    Case discussed with neurology attending Dr. Angeles.

## 2021-07-14 NOTE — PROGRESS NOTE ADULT - ASSESSMENT
Glioblastoma Multiforme    --Under care at [facility] Dr. Lyle Chavis  --Had been on Avastin as outpatient  --Appears to have stable disease but MS has deteriorated   --As per Dr. Martínez who is covering for Dr. Chavis - Hospice may be appropriate due to deterioration of MS  -- PEG Placed    AMS, Seizure like activity    --No edema, mass effect, hemorrhage or midline shift on CT  -- Repeat MRI stable   --Patient on Dexamethasone now 4mg q8 hours  --Keppra has been increased to 1000 mg TID   --Fu Neurology  --Vimpat dose 200 mg q12     GOC  -- Pall Care eval 7/13, family considering Hospice    --Patient now DNR    Kaia Rivas NP  Hematology/Oncology  [facility]  [phone] (Cell)  [phone] (Office)  [phone] (Alt office)  Evenings and weekends please call MD on call or office

## 2021-07-14 NOTE — PROGRESS NOTE ADULT - SUBJECTIVE AND OBJECTIVE BOX
DATE OF SERVICE: 07-14-21 @ 23:54    Patient is a 71y old  Male who presents with a chief complaint of seizure episode (14 Jul 2021 15:37)      SUBJECTIVE / OVERNIGHT EVENTS:  alert , nonverbal    MEDICATIONS  (STANDING):  amantadine Syrup 100 milliGRAM(s) Oral two times a day  amLODIPine   Tablet 5 milliGRAM(s) Oral daily  atorvastatin 10 milliGRAM(s) Oral at bedtime  dexAMETHasone     Tablet 4 milliGRAM(s) Oral every 12 hours  enoxaparin Injectable 40 milliGRAM(s) SubCutaneous daily  lacosamide Solution 200 milliGRAM(s) Oral two times a day  levETIRAcetam  Solution 1000 milliGRAM(s) Oral <User Schedule>  losartan 100 milliGRAM(s) Oral daily  pantoprazole   Suspension 40 milliGRAM(s) Oral before breakfast  polyethylene glycol 3350 17 Gram(s) Oral daily  senna 2 Tablet(s) Oral at bedtime    MEDICATIONS  (PRN):  bisacodyl Suppository 10 milliGRAM(s) Rectal at bedtime PRN Constipation  LORazepam   Injectable 1 milliGRAM(s) IV Push once PRN GTC  ramelteon 8 milliGRAM(s) Oral at bedtime PRN Insomnia      Vital Signs Last 24 Hrs  T(C): 36.4 (14 Jul 2021 23:32), Max: 36.8 (14 Jul 2021 15:18)  T(F): 97.5 (14 Jul 2021 23:32), Max: 98.2 (14 Jul 2021 15:18)  HR: 94 (14 Jul 2021 23:32) (86 - 99)  BP: 112/77 (14 Jul 2021 23:32) (99/64 - 121/74)  BP(mean): --  RR: 18 (14 Jul 2021 23:32) (18 - 18)  SpO2: 99% (14 Jul 2021 23:32) (98% - 99%)  CAPILLARY BLOOD GLUCOSE        I&O's Summary    13 Jul 2021 07:01  -  14 Jul 2021 07:00  --------------------------------------------------------  IN: 1080 mL / OUT: 1050 mL / NET: 30 mL    14 Jul 2021 07:01  -  14 Jul 2021 23:54  --------------------------------------------------------  IN: 0 mL / OUT: 401 mL / NET: -401 mL        PHYSICAL EXAM:  GENERAL: NAD, well-developed  HEAD:  Atraumatic, Normocephalic  EYES: EOMI, PERRLA, conjunctiva and sclera clear  NECK: Supple, No JVD  CHEST/LUNG: Clear to auscultation bilaterally; No wheeze  HEART: Regular rate and rhythm; No murmurs, rubs, or gallops  ABDOMEN: Soft, Nontender, Nondistended; Bowel sounds present  EXTREMITIES:  2+ Peripheral Pulses, No clubbing, cyanosis, or edema, contracted    LABS:                        10.9   12.32 )-----------( 176      ( 14 Jul 2021 06:29 )             34.9     07-14    131<L>  |  97  |  19  ----------------------------<  167<H>  4.6   |  23  |  <0.30<L>    Ca    8.6      14 Jul 2021 06:29                RADIOLOGY & ADDITIONAL TESTS:    Imaging Personally Reviewed:    Consultant(s) Notes Reviewed:      Care Discussed with Consultants/Other Providers:

## 2021-07-14 NOTE — PROGRESS NOTE ADULT - ASSESSMENT
72 year old RH male with a past medical history of HTN, HLD, hx of SIADH, gliobastoma multiforme (diagnosed in 2019) and seizures presenting after an episode of depressed level of consciousness and suspected seizure episode.    leukocytosis  - may be 2/2 steroids  - 0bserve off abx  - f/u cultures      hyponatremia resolved  - likely SIADH  - fluid restrict    dysphagia  - failed swallow eval  - would keep NPO   - peg placed  -  cont feeds    GBM  - treatment as per neurooncology  - stable MRI  - c/w decadron    seizure - on keppra    depression  - off celexa    dvt px  - Lovenox     dispo  - family interested in home hopsice

## 2021-07-14 NOTE — PROGRESS NOTE ADULT - ASSESSMENT
72 year old RH male with a past medical history of HTN, HLD, hx of SIADH, gliobastoma multiforme (diagnosed in 2019) and seizures presenting after an episode of depressed level of consciousness and suspected seizure episode. In the ED afebrile, normotensive but tachycardic > 90. On presentation WBC of 8.34 with 62.8% PMN.     U/A on 7/1 with 358 WBC.   Treated with CTX 7/1 -> 7/3.   On 7/9 s/p PEG placement.     MRI Brain (7/6) (stable compared to 6/11/21 MRI) with posttreatment changes in the bilateral occipital lobes and periventricular regions and lobular areas of high T1 signal centered in the splenium of the corpus callosum.     At this point I suspect the leukocytosis is secondary to patient's Dexamethasone course  CXR (7/12) with no infiltrates  Podiatry without concern for superinfection of LLE foot bullae     U/A with pyuria and UCx with >100K Enterobacter but in context of indwelling diaz catheter  Leukocytosis is downtrending off of antibiotics  I would continue to monitor off antibiotics   If clinical status changes would start Cefepime    RECOMMENDATIONS:    #Leukocytosis, Positive UCx (Enterobacter), Glioblastoma Multiforme (on Dexamethasone)  --Monitor off of antibiotics  --If clinical status changes would start Cefepime  --Continue to follow CBC with diff  --Continue to follow transaminases  --Continue to follow temperature curve  --Follow up on preliminary blood cultures    #LLE Foot Plantar Bullae  --Podiatry recommendations noted - Deroofed blister to level of dermis, but not beyond dermis    I will continue to follow. Please feel free to contact me with any further questions.    Jack Keller M.D.  [facility] Division of Infectious Disease  8AM-5PM: Pager Number [phone]  After Hours (or if no response): Please contact the Infectious Diseases Office at [phone]

## 2021-07-14 NOTE — PROGRESS NOTE ADULT - SUBJECTIVE AND OBJECTIVE BOX
Patient is a 71y old  Male who presents with a chief complaint of seizure episode (13 Jul 2021 20:13)  Patient seen today, no new events      MEDICATIONS  (STANDING):  amantadine Syrup 100 milliGRAM(s) Oral two times a day  amLODIPine   Tablet 5 milliGRAM(s) Oral daily  atorvastatin 10 milliGRAM(s) Oral at bedtime  dexAMETHasone     Tablet 4 milliGRAM(s) Oral every 12 hours  enoxaparin Injectable 40 milliGRAM(s) SubCutaneous daily  lacosamide Solution 200 milliGRAM(s) Oral two times a day  levETIRAcetam  Solution 1000 milliGRAM(s) Oral <User Schedule>  losartan 100 milliGRAM(s) Oral daily  pantoprazole   Suspension 40 milliGRAM(s) Oral before breakfast  polyethylene glycol 3350 17 Gram(s) Oral daily  senna 2 Tablet(s) Oral at bedtime    MEDICATIONS  (PRN):  bisacodyl Suppository 10 milliGRAM(s) Rectal at bedtime PRN Constipation  LORazepam   Injectable 1 milliGRAM(s) IV Push once PRN GTC  ramelteon 8 milliGRAM(s) Oral at bedtime PRN Insomnia      Vital Signs Last 24 Hrs  T(C): 36.6 (14 Jul 2021 08:52), Max: 37.1 (13 Jul 2021 12:06)  T(F): 97.8 (14 Jul 2021 08:52), Max: 98.7 (13 Jul 2021 12:06)  HR: 91 (14 Jul 2021 08:52) (84 - 98)  BP: 118/64 (14 Jul 2021 08:52) (104/64 - 120/73)  BP(mean): --  RR: 18 (14 Jul 2021 08:52) (18 - 18)  SpO2: 98% (14 Jul 2021 08:52) (95% - 98%)    PE  NAD  Lethargic, obtunded                            10.9   12.32 )-----------( 176      ( 14 Jul 2021 06:29 )             34.9       07-14    131<L>  |  97  |  19  ----------------------------<  167<H>  4.6   |  23  |  <0.30<L>    Ca    8.6      14 Jul 2021 06:29

## 2021-07-15 PROCEDURE — 99232 SBSQ HOSP IP/OBS MODERATE 35: CPT

## 2021-07-15 RX ORDER — DEXAMETHASONE 0.5 MG/5ML
4 ELIXIR ORAL DAILY
Refills: 0 | Status: DISCONTINUED | OUTPATIENT
Start: 2021-07-15 | End: 2021-07-19

## 2021-07-15 RX ADMIN — LACOSAMIDE 200 MILLIGRAM(S): 50 TABLET ORAL at 05:50

## 2021-07-15 RX ADMIN — ATORVASTATIN CALCIUM 10 MILLIGRAM(S): 80 TABLET, FILM COATED ORAL at 21:11

## 2021-07-15 RX ADMIN — Medication 100 MILLIGRAM(S): at 17:40

## 2021-07-15 RX ADMIN — LOSARTAN POTASSIUM 100 MILLIGRAM(S): 100 TABLET, FILM COATED ORAL at 05:13

## 2021-07-15 RX ADMIN — Medication 4 MILLIGRAM(S): at 13:28

## 2021-07-15 RX ADMIN — LACOSAMIDE 200 MILLIGRAM(S): 50 TABLET ORAL at 17:40

## 2021-07-15 RX ADMIN — Medication 4 MILLIGRAM(S): at 05:13

## 2021-07-15 RX ADMIN — ENOXAPARIN SODIUM 40 MILLIGRAM(S): 100 INJECTION SUBCUTANEOUS at 13:28

## 2021-07-15 RX ADMIN — PANTOPRAZOLE SODIUM 40 MILLIGRAM(S): 20 TABLET, DELAYED RELEASE ORAL at 05:51

## 2021-07-15 RX ADMIN — LEVETIRACETAM 1000 MILLIGRAM(S): 250 TABLET, FILM COATED ORAL at 05:13

## 2021-07-15 RX ADMIN — SENNA PLUS 2 TABLET(S): 8.6 TABLET ORAL at 21:11

## 2021-07-15 RX ADMIN — Medication 100 MILLIGRAM(S): at 05:13

## 2021-07-15 RX ADMIN — LEVETIRACETAM 1000 MILLIGRAM(S): 250 TABLET, FILM COATED ORAL at 21:12

## 2021-07-15 RX ADMIN — AMLODIPINE BESYLATE 5 MILLIGRAM(S): 2.5 TABLET ORAL at 05:13

## 2021-07-15 RX ADMIN — LEVETIRACETAM 1000 MILLIGRAM(S): 250 TABLET, FILM COATED ORAL at 13:28

## 2021-07-15 NOTE — PROGRESS NOTE ADULT - ASSESSMENT
71 M w GBM, GI consulted for enteral nutrition    1. GBM.     2. Oropharyngeal dysphagia  - s/p PEG, no further bleeding    3. Seizure  - care per neurology appreciated    4. Leukocytosis  appreciate ID input, ?due to steroids. No signs of infection at G tube    [facility]  Gastroenterology and Hepatology  [address] [place], [place]  Office: [phone]  Cell: [phone]

## 2021-07-15 NOTE — PROGRESS NOTE ADULT - ASSESSMENT
Glioblastoma Multiforme    --Under care at [facility] Dr. Lyle Chavis  --Had been on Avastin as outpatient  --Appears to have stable disease but MS has deteriorated   --As per Dr. Martínez who is covering for Dr. Chavis - Hospice may be appropriate due to deterioration of MS  -- PEG Placed    AMS, Seizure like activity    --No edema, mass effect, hemorrhage or midline shift on CT  -- Repeat MRI stable   --Patient on Dexamethasone now 4mg q8 hours  --Keppra has been increased to 1000 mg TID   --Fu Neurology  --Vimpat dose 200 mg q12     [facility]  --Hospice referral has been placed  --Family wants to bring him home    --Patient now DNR    We will continue to follow patient and coordinate with [facility]    Shashi Cason PA-C  Hematology/Oncology  [facility]   [phone] (cell)  [phone] (office)  [phone] (alt office)  Evenings and weekends please call MD on call or office

## 2021-07-15 NOTE — PROGRESS NOTE ADULT - SUBJECTIVE AND OBJECTIVE BOX
DATE OF SERVICE: 07-15-21 @ 15:15    Patient is a 71y old  Male who presents with a chief complaint of seizure episode (15 Jul 2021 11:04)      SUBJECTIVE / OVERNIGHT EVENTS:  alert but does not communicate    MEDICATIONS  (STANDING):  amantadine Syrup 100 milliGRAM(s) Oral two times a day  amLODIPine   Tablet 5 milliGRAM(s) Oral daily  atorvastatin 10 milliGRAM(s) Oral at bedtime  dexAMETHasone     Tablet 4 milliGRAM(s) Oral daily  enoxaparin Injectable 40 milliGRAM(s) SubCutaneous daily  lacosamide Solution 200 milliGRAM(s) Oral two times a day  levETIRAcetam  Solution 1000 milliGRAM(s) Oral <User Schedule>  losartan 100 milliGRAM(s) Oral daily  pantoprazole   Suspension 40 milliGRAM(s) Oral before breakfast  polyethylene glycol 3350 17 Gram(s) Oral daily  senna 2 Tablet(s) Oral at bedtime    MEDICATIONS  (PRN):  bisacodyl Suppository 10 milliGRAM(s) Rectal at bedtime PRN Constipation  LORazepam   Injectable 1 milliGRAM(s) IV Push once PRN GTC  ramelteon 8 milliGRAM(s) Oral at bedtime PRN Insomnia      Vital Signs Last 24 Hrs  T(C): 37.1 (15 Jul 2021 11:12), Max: 37.1 (15 Jul 2021 11:12)  T(F): 98.8 (15 Jul 2021 11:12), Max: 98.8 (15 Jul 2021 11:12)  HR: 89 (15 Jul 2021 11:12) (84 - 99)  BP: 110/70 (15 Jul 2021 11:12) (99/64 - 115/74)  BP(mean): --  RR: 18 (15 Jul 2021 11:12) (18 - 18)  SpO2: 98% (15 Jul 2021 11:12) (98% - 99%)  CAPILLARY BLOOD GLUCOSE        I&O's Summary    14 Jul 2021 07:01  -  15 Jul 2021 07:00  --------------------------------------------------------  IN: 0 mL / OUT: 601 mL / NET: -601 mL        PHYSICAL EXAM:  GENERAL: NAD, cachectic  HEAD:  Atraumatic, Normocephalic  EYES: EOMI, PERRLA, conjunctiva and sclera clear  NECK: Supple, No JVD  CHEST/LUNG: Clear to auscultation bilaterally; No wheeze  HEART: Regular rate and rhythm; No murmurs, rubs, or gallops  ABDOMEN: Soft, Nontender, Nondistended; Bowel sounds present  EXTREMITIES:  2+ Peripheral Pulses, No clubbing, cyanosis, or edema, contracted      SKIN: No rashes or lesions    LABS:                        10.9   12.32 )-----------( 176      ( 14 Jul 2021 06:29 )             34.9     07-14    131<L>  |  97  |  19  ----------------------------<  167<H>  4.6   |  23  |  <0.30<L>    Ca    8.6      14 Jul 2021 06:29                RADIOLOGY & ADDITIONAL TESTS:    Imaging Personally Reviewed:    Consultant(s) Notes Reviewed:      Care Discussed with Consultants/Other Providers:

## 2021-07-15 NOTE — PROGRESS NOTE ADULT - SUBJECTIVE AND OBJECTIVE BOX
MRN-[id number]    Subjective: 72 yo male seen and examined at bedside.    PAST MEDICAL & SURGICAL HISTORY:  Hypertension    Hyperlipidemia    Seizures    Diabetes    Glioblastoma multiforme    CMV infection    History of SIADH    H/O colonoscopy    History of laryngoscopy    Status post stereotactic brain biopsy    FAMILY HISTORY:  FH: myocardial infarction (Father)    Social Hx:  Nonsmoker, no drug or alcohol use    Home Medications:  amantadine 50 mg/5 mL oral syrup: 10 milliliter(s) orally 2 times a day (05 Jun 2021 10:00)  atorvastatin 10 mg oral tablet: 1 tab(s) orally once a day (05 Jun 2021 10:00)  citalopram 20 mg oral tablet: 1 tab(s) orally once a day (05 Jun 2021 10:00)  Keppra 100 mg/mL oral solution: 7.5 milliliter(s) orally 2 times a day (05 Jun 2021 10:00)  losartan 100 mg oral tablet: 1 tab(s) orally once a day (05 Jun 2021 10:00)  methylphenidate 5 mg oral tablet: 1 tab(s) orally @ 8am and 2 pm (15 Abel 2021 12:19)  Norvasc 5 mg oral tablet: 1 tab(s) orally once a day (05 Jun 2021 10:00)  nystatin 100,000 units/mL oral suspension: 4 milliliter(s) orally 4 times a day (05 Jun 2021 10:00)  ramelteon 8 mg oral tablet: 1 tab(s) orally once a day (at bedtime) (05 Jun 2021 10:00)    MEDICATIONS  (STANDING):  amantadine Syrup 100 milliGRAM(s) Oral two times a day  amLODIPine   Tablet 5 milliGRAM(s) Oral daily  atorvastatin 10 milliGRAM(s) Oral at bedtime  dexAMETHasone     Tablet 4 milliGRAM(s) Oral every 12 hours  enoxaparin Injectable 40 milliGRAM(s) SubCutaneous daily  lacosamide Solution 200 milliGRAM(s) Oral two times a day  levETIRAcetam  Solution 1000 milliGRAM(s) Oral <User Schedule>  losartan 100 milliGRAM(s) Oral daily  pantoprazole   Suspension 40 milliGRAM(s) Oral before breakfast  polyethylene glycol 3350 17 Gram(s) Oral daily  senna 2 Tablet(s) Oral at bedtime    MEDICATIONS  (PRN):  bisacodyl Suppository 10 milliGRAM(s) Rectal at bedtime PRN Constipation  LORazepam   Injectable 1 milliGRAM(s) IV Push once PRN GTC  ramelteon 8 milliGRAM(s) Oral at bedtime PRN Insomnia    Allergies  No Known Allergies	    ROS: Unable to obtain due to patient's cognitive status.      Vital Signs Last 24 Hrs  T(C): 36.4 (15 Jul 2021 04:27), Max: 36.8 (14 Jul 2021 15:18)  T(F): 97.6 (15 Jul 2021 04:27), Max: 98.2 (14 Jul 2021 15:18)  HR: 93 (15 Jul 2021 04:27) (86 - 99)  BP: 108/71 (15 Jul 2021 04:27) (99/64 - 121/74)  RR: 18 (15 Jul 2021 04:27) (18 - 18)  SpO2: 99% (15 Jul 2021 04:27) (98% - 99%)    GENERAL EXAM:  Constitutional: Lying in bed, NAD.  HEENT: PERRL. Normocephalic.  Extremities: No edema. L foot bullae on plantar aspect.    NEUROLOGICAL EXAM:  MS: Eyes closed, does not open to verbal/noxious stimuli. No verbal output. Does not follow commands.  CN: PERRL. No facial asymmetry.  Motor: No spontaneous movement noted. No twitching/jerking observed. Contractures in b/l lower extremities.  Coordination/Gait: Not assessed.    Labs:   cbc                      10.9   12.32 )-----------( 176      ( 14 Jul 2021 06:29 )             34.9     [id number]    131<L>  |  97  |  19  ----------------------------<  167<H>  4.6   |  23  |  <0.30<L>    Ca    8.6      14 Jul 2021 06:29    Radiology/EEGs:  -06/29 CTH: No CT evidence of acute intracranial hemorrhage, mass effect, or midline shift. Unchanged posttreatment changes.    -07/03 EEG Summary / Classification:  Abnormal EEG in comatose patient  Lateralized periodic discharges, left hemisphere, max over centroparietal region  Disorganization  Moderate background slowing, generalized.  EEG Impression / Clinical Correlate:  The presence of LPDs is often associated with subacute structural injury in the left hemisphere. LPDs also indicate heightened risk for seizures.  There is also evidence of moderate diffuse cerebral dysfunction.    -07/04 EEG Summary / Classification:  Abnormal EEG in comatose patient  4.	Lateralized periodic discharges, left hemisphere, max over centroparietal region.  5.	Disorganization  6.	Moderate background slowing, generalized.  EEG Impression / Clinical Correlate:  The presence of LPDs is often associated with subacute structural injury in the left hemisphere. LPDs also indicate heightened risk for seizures.  There is also evidence of moderate diffuse cerebral dysfunction.    -07/05 EEG Summary / Classification:  Abnormal EEG in comatose patient  7.	Lateralized periodic discharges, left hemisphere, max over centroparietal region.  8.	Disorganization  9.	Moderate background slowing, generalized.  EEG Impression / Clinical Correlate:  The presence of LPDs is often associated with subacute structural injury in the left hemisphere. LPDs also indicate heightened risk for seizures.  There is also evidence of moderate diffuse cerebral dysfunction.    -07/06 EEG Summary / Classification:  Abnormal EEG in comatose patient  10.	Lateralized periodic discharges, left hemisphere, max over temporo-centroparietal region with time locked clonus of the right arm.  11.	Disorganization  12.	Moderate background slowing, generalized.  EEG Impression / Clinical Correlate:  The presence of LPDs with associated time locked clonus indicative of focal motor seizures ("EPC").  There is also evidence of moderate diffuse cerebral dysfunction.    -07/06 MRI Brain w/wo Cont: Redemonstration of posttreatment changes in the bilateral occipital lobes and periventricular regions. There is a lobular areas of high T1 signal centered in the splenium of the corpus callosum. No gross enhancement. The overall size and appearance is unchanged. Areas are restricted diffusion again noted likely related to tumor. Redemonstration Of diffuse T2 prolongation throughout the periventricular white matter, unchanged from the prior exam, likely representing radiation changes, gliosis and/or edema. Stable exam.

## 2021-07-15 NOTE — PROGRESS NOTE ADULT - SUBJECTIVE AND OBJECTIVE BOX
INTERVAL HPI/OVERNIGHT EVENTS:    no acute events    MEDICATIONS  (STANDING):  amantadine Syrup 100 milliGRAM(s) Oral two times a day  amLODIPine   Tablet 5 milliGRAM(s) Oral daily  atorvastatin 10 milliGRAM(s) Oral at bedtime  baclofen 5 milliGRAM(s) Oral two times a day  dexAMETHasone     Tablet 4 milliGRAM(s) Oral every 8 hours  enoxaparin Injectable 40 milliGRAM(s) SubCutaneous daily  lacosamide IVPB 200 milliGRAM(s) IV Intermittent every 12 hours  levETIRAcetam  IVPB 1000 milliGRAM(s) IV Intermittent <User Schedule>  losartan 100 milliGRAM(s) Oral daily  methylphenidate 5 milliGRAM(s) Oral <User Schedule>  pantoprazole  Injectable 40 milliGRAM(s) IV Push daily  polyethylene glycol 3350 17 Gram(s) Oral daily  senna 2 Tablet(s) Oral at bedtime    MEDICATIONS  (PRN):  LORazepam   Injectable 1 milliGRAM(s) IV Push once PRN GTC  ramelteon 8 milliGRAM(s) Oral at bedtime PRN Insomnia      Allergies    No Known Allergies    Intolerances        Review of Systems:    non- verbal       Vital Signs Last 24 Hrs  Vital Signs Last 24 Hrs  T(C): 36.4 (15 Jul 2021 16:06), Max: 37.1 (15 Jul 2021 11:12)  T(F): 97.5 (15 Jul 2021 16:06), Max: 98.8 (15 Jul 2021 11:12)  HR: 93 (15 Jul 2021 16:06) (84 - 94)  BP: 91/54 (15 Jul 2021 16:06) (91/54 - 112/77)  BP(mean): --  RR: 18 (15 Jul 2021 16:06) (18 - 18)  SpO2: 100% (15 Jul 2021 16:06) (98% - 100%)    PHYSICAL EXAM:    GENERAL:  ill- appearing, no distress  HEENT:  NC/AT,  conjunctivae anicteric, clear and pink,   NECK: supple, trachea midline  CHEST:  Full & symmetric excursion, no increased effort, breath sounds clear  HEART:  Regular rhythm, no JVD  ABDOMEN:  Soft, non-tender, non-distended, normoactive bowel sounds,  no masses , no hepatosplenomegaly, scant bleeding at site of g tube  EXTREMITIES:  no cyanosis,clubbing or edema  SKIN:  No rash, erythema, or, ecchymoses, no jaundice  NEURO:  lethargic non-focal, no asterixis  PSYCH: calm, nonverbal  RECTAL: Deferred    CBC Full  -  ( 14 Jul 2021 06:29 )  WBC Count : 12.32 K/uL  RBC Count : 4.02 M/uL  Hemoglobin : 10.9 g/dL  Hematocrit : 34.9 %  Platelet Count - Automated : 176 K/uL  Mean Cell Volume : 86.8 fl  Mean Cell Hemoglobin : 27.1 pg  Mean Cell Hemoglobin Concentration : 31.2 gm/dL  Auto Neutrophil # : 10.26 K/uL  Auto Lymphocyte # : 1.63 K/uL  Auto Monocyte # : 0.21 K/uL  Auto Eosinophil # : 0.00 K/uL  Auto Basophil # : 0.00 K/uL  Auto Neutrophil % : 83.3 %  Auto Lymphocyte % : 13.2 %  Auto Monocyte % : 1.7 %  Auto Eosinophil % : 0.0 %  Auto Basophil % : 0.0 %    RADIOLOGY & ADDITIONAL TESTS:

## 2021-07-15 NOTE — PROGRESS NOTE ADULT - ASSESSMENT
70yo M with left plantar foot blister   - pt seen and evaluated  - foot is not the source of infection, remains stable  - Left plantar lateral foot serous blister, no open wounds or lesions, no acute signs of infection   - recommend mupirocin and allevyn pad to be applied daily to left foot blister site  - pt to wear z-flows at all times when in bed b/l feet

## 2021-07-15 NOTE — PROGRESS NOTE ADULT - SUBJECTIVE AND OBJECTIVE BOX
Patient is a 71y old  Male who presents with a chief complaint of seizure episode (15 Jul 2021 07:57)    Patient seen this morning. Eyes were open but not able to follow movement. He was also having some involuntary mouth movements    MEDICATIONS  (STANDING):  amantadine Syrup 100 milliGRAM(s) Oral two times a day  amLODIPine   Tablet 5 milliGRAM(s) Oral daily  atorvastatin 10 milliGRAM(s) Oral at bedtime  dexAMETHasone     Tablet 4 milliGRAM(s) Oral daily  enoxaparin Injectable 40 milliGRAM(s) SubCutaneous daily  lacosamide Solution 200 milliGRAM(s) Oral two times a day  levETIRAcetam  Solution 1000 milliGRAM(s) Oral <User Schedule>  losartan 100 milliGRAM(s) Oral daily  pantoprazole   Suspension 40 milliGRAM(s) Oral before breakfast  polyethylene glycol 3350 17 Gram(s) Oral daily  senna 2 Tablet(s) Oral at bedtime    MEDICATIONS  (PRN):  bisacodyl Suppository 10 milliGRAM(s) Rectal at bedtime PRN Constipation  LORazepam   Injectable 1 milliGRAM(s) IV Push once PRN GTC  ramelteon 8 milliGRAM(s) Oral at bedtime PRN Insomnia      Vital Signs Last 24 Hrs  T(C): 36.6 (15 Jul 2021 09:15), Max: 36.8 (14 Jul 2021 15:18)  T(F): 97.9 (15 Jul 2021 09:15), Max: 98.2 (14 Jul 2021 15:18)  HR: 84 (15 Jul 2021 09:15) (84 - 99)  BP: 104/66 (15 Jul 2021 09:15) (99/64 - 121/74)  BP(mean): --  RR: 18 (15 Jul 2021 09:15) (18 - 18)  SpO2: 98% (15 Jul 2021 09:15) (98% - 99%)    PE  NAD  Obtunded  Anicteric  No c/c/e  No rash grossly                            10.9   12.32 )-----------( 176      ( 14 Jul 2021 06:29 )             34.9       07-14    131<L>  |  97  |  19  ----------------------------<  167<H>  4.6   |  23  |  <0.30<L>    Ca    8.6      14 Jul 2021 06:29

## 2021-07-15 NOTE — PROGRESS NOTE ADULT - ASSESSMENT
Assessment: 71 year old RH male with a past medical history of HTN, HLD, hx of SIADH, glioblastoma multiforme (diagnosed in 2019), and seizures presenting after an episode of depressed level of consciousness and suspected seizure episode.    Impression: Failure to thrive and progression of disease. Cachexia due to malignancy and poor nutrition.     Plan:  [x] vEEG: LPDs, left hemisphere, max over temporo-centroparietal region with time locked clonus of the right arm.  [x] MRI Brain w/wo contrast: no changed compared to prior study.   [] c/w Vimpat to 200MG PO BID.  [] c/w Decadron to 4MG PO Q12HR.  [] c/w Keppra 1000MG PO TID.  [] Hyponatremia likely secondary to SIADH. Fluid restriction < 1.5L daily. (131 on 7/14).  [x] d/c'd Ritalin, Baclofen.  [x] Holding home Celexa as this could contribute to hyponatremia.  [x] s/p PEG placement on 07/09.  [x] IM on board, Dr. Godfrey. Recs appreciated.  [] ID consulted for leukocytosis. Likely related to steroid use. UA appears positive, but patient with Franks. Monitoring off abx for now per ID.  [] Podiatry consulted for L plantar bullae. Serous blister, not a source of infection. Bilateral Z-flows.  [] c/w Bowel regimen.  [x] Discussed patient's treatment plan and goals of care with neuro-oncologist Dr. Martínez - currently on Avastin monotherapy (last dose 6/24, next dose due around 7/9).  [] Family made patient DNR. Palliative team on board, family wants home hospice.  [x] CPK, B12, folate: wnl.  [] Diet: Glucerna 1.2 via PEG, goal rate 60.  [] DVT PPx: Lovenox SC.  [] GI PPx: Protonix PO QD.  [] Seizure rescue: Ativan 1MG IVP ONLY FOR CONVULSIONS/GTC LASTING > 3 MINUTES. Not treating focal seizures.    Case discussed with neurology attending Dr. Angeles.   Assessment: 71 year old RH male with a past medical history of HTN, HLD, hx of SIADH, glioblastoma multiforme (diagnosed in 2019), and seizures presenting after an episode of depressed level of consciousness and suspected seizure episode.    Impression: Failure to thrive and progression of disease. Cachexia due to malignancy and poor nutrition.     Plan:  [x] vEEG: LPDs, left hemisphere, max over temporo-centroparietal region with time locked clonus of the right arm.  [x] MRI Brain w/wo contrast: no changed compared to prior study.   [] c/w Vimpat to 200MG PO BID.  [] decreased Decadron to 4MG PO QD.  [] c/w Keppra 1000MG PO TID.  [] Hyponatremia likely secondary to SIADH. Fluid restriction < 1.5L daily. (131 on 7/14).  [x] d/c'd Ritalin, Baclofen.  [x] Holding home Celexa as this could contribute to hyponatremia.  [x] s/p PEG placement on 07/09.  [x] IM on board, Dr. Godfrey. Recs appreciated.  [] ID consulted for leukocytosis. Likely related to steroid use. UA appears positive, but patient with Franks. Monitoring off abx for now per ID.  [] Podiatry consulted for L plantar bullae. Serous blister, not a source of infection. Bilateral Z-flows.  [] c/w Bowel regimen.  [x] Discussed patient's treatment plan and goals of care with neuro-oncologist Dr. Martínez - currently on Avastin monotherapy (last dose 6/24, next dose due around 7/9).  [] Family made patient DNR. Palliative team on board, family wants home hospice.  [x] CPK, B12, folate: wnl.  [] Diet: Glucerna 1.2 via PEG, goal rate 60.  [] DVT PPx: Lovenox SC.  [] GI PPx: Protonix PO QD.  [] Seizure rescue: Ativan 1MG IVP ONLY FOR CONVULSIONS/GTC LASTING > 3 MINUTES. Not treating focal seizures.    Case discussed with neurology attending Dr. Angeles.

## 2021-07-15 NOTE — CHART NOTE - NSCHARTNOTEFT_GEN_A_CORE
Nutrition Follow Up Note  Patient seen for: malnutrition follow up.     Chart reviewed, events noted. Pt is a 71 year old RH male with a past medical history of HTN, HLD, hx of SIADH, glioblastoma multiforme (diagnosed in 2019), and seizures presenting after an episode of depressed level of consciousness and suspected seizure episode. Likely breakthrough seizures in the setting of progression of disease of glioblastoma multiforme.     Source: [] Patient       [x] EMR        [x] RN        [] Family at bedside       [] Other:    -If unable to interview patient: [] Trach/Vent/BiPAP  [] Disoriented/confused/inappropriate to interview    Diet Order:   Diet, NPO:   Tube Feeding Modality: Gastrostomy  Glucerna 1.2 Davey (GLUCERNARTH)  Total Volume for 24 Hours (mL): 1440  Continuous  Starting Tube Feed Rate {mL per Hour}: 10  Increase Tube Feed Rate by (mL): 10     Every 6 hours  Until Goal Tube Feed Rate (mL per Hour): 60  Tube Feed Duration (in Hours): 24  Tube Feed Start Time: 14:00 (21)    - Is current order appropriate/adequate? [x] Yes  []  No:     - Current EN regimen provides: 1440cc, 1728 kcals, 86 gm protein, and 1159 cc free water. Meeds 37 kcals/kG and 1.9 gm protein/kG based on RD obtained wt 46.4 kG ().    - Nutrition-related concerns:  - Glucerna 1.2 observed infusing at 60mL/hr  - Per RN, pt tolerating formula as provided.  - Pt remains on PO Dexamethasone     GI:  Last BM ___.   Bowel Regimen? [] Yes   [] No    Weights: Dosing wt 51kg  Daily Weight in k.4 ()    Nutritionally Pertinent MEDICATIONS  (STANDING):  amLODIPine   Tablet  atorvastatin  dexAMETHasone     Tablet  losartan  pantoprazole   Suspension  polyethylene glycol 3350  senna    Pertinent Labs:   A1C with Estimated Average Glucose Result: 5.6 % (21 @ 10:07)    Finger Sticks: n/a    Skin per nursing documentation: stage II left foot  Edema: No noted edema as per flow sheets.     Estimated Needs: based lowest obtained bedscale 56.5kg ()  [x] no change since previous assessment  Estimated Energy Needs: 1624-1856kcal (35-40kcal/kg)  Estimated Protein Needs: 70-93 (1.5-2.0g/kg)    Previous Nutrition Diagnosis: Severe Malnutrition + Underweight  Nutrition Diagnosis is: [x] ongoing  [] resolved [] not applicable     New Nutrition Diagnosis: [x] Not applicable    Nutrition Care Plan:  [x] In Progress -  being addressed with EN feedings  [] Achieved  [] Not applicable    Nutrition Interventions:    Recommendations:      1. Continue Glucerna 1.2 at goal rate 60mL/hr x24 hours to provide total volume 1440mL, 1728kcal, 86gm protein, and 1159mL free water. Meeds 37kcal/kg and 1.9gm protein/kG based on RD obtained wt 46.4 kG. Provides 100% RDIs. Defer additional free water to team.  2. RD remains available to adjust TF regimen/formulary upon request/PRN.    Monitoring and Evaluation:   Continue to monitor nutritional intake, tolerance to diet prescription, weights, labs, skin integrity    RD remains available upon request and will follow up per protocol    Hawa Cervantes MS, RD, CDN Pager# [phone]

## 2021-07-16 PROCEDURE — 99231 SBSQ HOSP IP/OBS SF/LOW 25: CPT

## 2021-07-16 PROCEDURE — 99232 SBSQ HOSP IP/OBS MODERATE 35: CPT

## 2021-07-16 RX ORDER — BACLOFEN 100 %
5 POWDER (GRAM) MISCELLANEOUS ONCE
Refills: 0 | Status: COMPLETED | OUTPATIENT
Start: 2021-07-16 | End: 2021-07-17

## 2021-07-16 RX ADMIN — Medication 100 MILLIGRAM(S): at 05:26

## 2021-07-16 RX ADMIN — ATORVASTATIN CALCIUM 10 MILLIGRAM(S): 80 TABLET, FILM COATED ORAL at 21:04

## 2021-07-16 RX ADMIN — AMLODIPINE BESYLATE 5 MILLIGRAM(S): 2.5 TABLET ORAL at 05:26

## 2021-07-16 RX ADMIN — LOSARTAN POTASSIUM 100 MILLIGRAM(S): 100 TABLET, FILM COATED ORAL at 05:26

## 2021-07-16 RX ADMIN — LEVETIRACETAM 1000 MILLIGRAM(S): 250 TABLET, FILM COATED ORAL at 21:04

## 2021-07-16 RX ADMIN — SENNA PLUS 2 TABLET(S): 8.6 TABLET ORAL at 21:04

## 2021-07-16 RX ADMIN — ENOXAPARIN SODIUM 40 MILLIGRAM(S): 100 INJECTION SUBCUTANEOUS at 13:01

## 2021-07-16 RX ADMIN — PANTOPRAZOLE SODIUM 40 MILLIGRAM(S): 20 TABLET, DELAYED RELEASE ORAL at 05:31

## 2021-07-16 RX ADMIN — Medication 4 MILLIGRAM(S): at 05:26

## 2021-07-16 RX ADMIN — LEVETIRACETAM 1000 MILLIGRAM(S): 250 TABLET, FILM COATED ORAL at 05:26

## 2021-07-16 RX ADMIN — LACOSAMIDE 200 MILLIGRAM(S): 50 TABLET ORAL at 05:26

## 2021-07-16 RX ADMIN — LACOSAMIDE 200 MILLIGRAM(S): 50 TABLET ORAL at 17:04

## 2021-07-16 RX ADMIN — LEVETIRACETAM 1000 MILLIGRAM(S): 250 TABLET, FILM COATED ORAL at 13:01

## 2021-07-16 RX ADMIN — Medication 100 MILLIGRAM(S): at 17:02

## 2021-07-16 NOTE — PROGRESS NOTE ADULT - SUBJECTIVE AND OBJECTIVE BOX
MRN-[id number]    Subjective: 70 yo male seen and examined at bedside.    PAST MEDICAL & SURGICAL HISTORY:  Hypertension    Hyperlipidemia    Seizures    Diabetes    Glioblastoma multiforme    CMV infection    History of SIADH    H/O colonoscopy    History of laryngoscopy    Status post stereotactic brain biopsy    FAMILY HISTORY:  FH: myocardial infarction (Father)    Social Hx:  Nonsmoker, no drug or alcohol use    Home Medications:  amantadine 50 mg/5 mL oral syrup: 10 milliliter(s) orally 2 times a day (05 Jun 2021 10:00)  atorvastatin 10 mg oral tablet: 1 tab(s) orally once a day (05 Jun 2021 10:00)  citalopram 20 mg oral tablet: 1 tab(s) orally once a day (05 Jun 2021 10:00)  Keppra 100 mg/mL oral solution: 7.5 milliliter(s) orally 2 times a day (05 Jun 2021 10:00)  losartan 100 mg oral tablet: 1 tab(s) orally once a day (05 Jun 2021 10:00)  methylphenidate 5 mg oral tablet: 1 tab(s) orally @ 8am and 2 pm (15 Abel 2021 12:19)  Norvasc 5 mg oral tablet: 1 tab(s) orally once a day (05 Jun 2021 10:00)  nystatin 100,000 units/mL oral suspension: 4 milliliter(s) orally 4 times a day (05 Jun 2021 10:00)  ramelteon 8 mg oral tablet: 1 tab(s) orally once a day (at bedtime) (05 Jun 2021 10:00)    MEDICATIONS  (STANDING):  amantadine Syrup 100 milliGRAM(s) Oral two times a day  amLODIPine   Tablet 5 milliGRAM(s) Oral daily  atorvastatin 10 milliGRAM(s) Oral at bedtime  dexAMETHasone     Tablet 4 milliGRAM(s) Oral daily  enoxaparin Injectable 40 milliGRAM(s) SubCutaneous daily  lacosamide Solution 200 milliGRAM(s) Oral two times a day  levETIRAcetam  Solution 1000 milliGRAM(s) Oral <User Schedule>  losartan 100 milliGRAM(s) Oral daily  pantoprazole   Suspension 40 milliGRAM(s) Oral before breakfast  polyethylene glycol 3350 17 Gram(s) Oral daily  senna 2 Tablet(s) Oral at bedtime    MEDICATIONS  (PRN):  bisacodyl Suppository 10 milliGRAM(s) Rectal at bedtime PRN Constipation  LORazepam   Injectable 1 milliGRAM(s) IV Push once PRN GTC  ramelteon 8 milliGRAM(s) Oral at bedtime PRN Insomnia    Allergies  No Known Allergies	    ROS: Unable to assess due to patient's mental status.     Vital Signs Last 24 Hrs  T(C): 36.2 (16 Jul 2021 07:56), Max: 37.1 (15 Jul 2021 11:12)  T(F): 97.2 (16 Jul 2021 07:56), Max: 98.8 (15 Jul 2021 11:12)  HR: 90 (16 Jul 2021 07:56) (84 - 99)  BP: 113/75 (16 Jul 2021 07:56) (91/54 - 113/75)  RR: 18 (16 Jul 2021 07:56) (18 - 18)  SpO2: 96% (16 Jul 2021 07:56) (96% - 100%)    GENERAL EXAM:  Constitutional: Lying in bed, NAD.  HEENT: PERRL. Normocephalic.  Extremities: No edema. L foot bullae on plantar aspect.    NEUROLOGICAL EXAM:  MS: Eyes closed, does not open to verbal/noxious stimuli. No verbal output. Does not follow commands.  CN: PERRL. No facial asymmetry.  Motor: No spontaneous movement noted. No twitching/jerking observed. Contractures in b/l lower extremities.  Coordination/Gait: Not assessed.    Radiology/EEGs:  -06/29 CTH: No CT evidence of acute intracranial hemorrhage, mass effect, or midline shift. Unchanged posttreatment changes.    -07/03 EEG Summary / Classification:  Abnormal EEG in comatose patient  Lateralized periodic discharges, left hemisphere, max over centroparietal region  Disorganization  Moderate background slowing, generalized.  EEG Impression / Clinical Correlate:  The presence of LPDs is often associated with subacute structural injury in the left hemisphere. LPDs also indicate heightened risk for seizures.  There is also evidence of moderate diffuse cerebral dysfunction.    -07/04 EEG Summary / Classification:  Abnormal EEG in comatose patient  4.	Lateralized periodic discharges, left hemisphere, max over centroparietal region.  5.	Disorganization  6.	Moderate background slowing, generalized.  EEG Impression / Clinical Correlate:  The presence of LPDs is often associated with subacute structural injury in the left hemisphere. LPDs also indicate heightened risk for seizures.  There is also evidence of moderate diffuse cerebral dysfunction.    -07/05 EEG Summary / Classification:  Abnormal EEG in comatose patient  7.	Lateralized periodic discharges, left hemisphere, max over centroparietal region.  8.	Disorganization  9.	Moderate background slowing, generalized.  EEG Impression / Clinical Correlate:  The presence of LPDs is often associated with subacute structural injury in the left hemisphere. LPDs also indicate heightened risk for seizures.  There is also evidence of moderate diffuse cerebral dysfunction.    -07/06 EEG Summary / Classification:  Abnormal EEG in comatose patient  10.	Lateralized periodic discharges, left hemisphere, max over temporo-centroparietal region with time locked clonus of the right arm.  11.	Disorganization  12.	Moderate background slowing, generalized.  EEG Impression / Clinical Correlate:  The presence of LPDs with associated time locked clonus indicative of focal motor seizures ("EPC").  There is also evidence of moderate diffuse cerebral dysfunction.    -07/06 MRI Brain w/wo Cont: Redemonstration of posttreatment changes in the bilateral occipital lobes and periventricular regions. There is a lobular areas of high T1 signal centered in the splenium of the corpus callosum. No gross enhancement. The overall size and appearance is unchanged. Areas are restricted diffusion again noted likely related to tumor. Redemonstration Of diffuse T2 prolongation throughout the periventricular white matter, unchanged from the prior exam, likely representing radiation changes, gliosis and/or edema. Stable exam.

## 2021-07-16 NOTE — PROGRESS NOTE ADULT - SUBJECTIVE AND OBJECTIVE BOX
Patient is a 71y old  Male who presents with a chief complaint of seizure episode (16 Jul 2021 10:55)    Patient seen this morning. No change in mental status.    MEDICATIONS  (STANDING):  amantadine Syrup 100 milliGRAM(s) Oral two times a day  amLODIPine   Tablet 5 milliGRAM(s) Oral daily  atorvastatin 10 milliGRAM(s) Oral at bedtime  dexAMETHasone     Tablet 4 milliGRAM(s) Oral daily  enoxaparin Injectable 40 milliGRAM(s) SubCutaneous daily  lacosamide Solution 200 milliGRAM(s) Oral two times a day  levETIRAcetam  Solution 1000 milliGRAM(s) Oral <User Schedule>  losartan 100 milliGRAM(s) Oral daily  pantoprazole   Suspension 40 milliGRAM(s) Oral before breakfast  polyethylene glycol 3350 17 Gram(s) Oral daily  senna 2 Tablet(s) Oral at bedtime    MEDICATIONS  (PRN):  bisacodyl Suppository 10 milliGRAM(s) Rectal at bedtime PRN Constipation  LORazepam   Injectable 1 milliGRAM(s) IV Push once PRN GTC  ramelteon 8 milliGRAM(s) Oral at bedtime PRN Insomnia        Vital Signs Last 24 Hrs  T(C): 36.2 (16 Jul 2021 07:56), Max: 37.1 (15 Jul 2021 11:12)  T(F): 97.2 (16 Jul 2021 07:56), Max: 98.8 (15 Jul 2021 11:12)  HR: 90 (16 Jul 2021 07:56) (89 - 99)  BP: 113/75 (16 Jul 2021 07:56) (91/54 - 113/75)  BP(mean): --  RR: 18 (16 Jul 2021 07:56) (18 - 18)  SpO2: 96% (16 Jul 2021 07:56) (96% - 100%)    PE  NAD  Obtunded  G tube in place  No c/c/e  No rash grossly

## 2021-07-16 NOTE — CHART NOTE - NSCHARTNOTEFT_GEN_A_CORE
Nutrition Services:    Consult received for tube feeding management pending home hospice. Chart reviewed, events noted. Per most recent case management notes, plan for patient to go home with hospice care from hosp[facility] care [facility]. Plan of care discussed with Susan Sturgess RD at Hospice - recommending at this time for tube feeding regimen change to Glucerna 1.2 at 60mL/hr x 16hr to provide 960mL feed, 1152kcal, 58g protein. Per suggestion of Luba, recommended regimen to better meet needs of both patient and family after discharge with home hospice. Discussed with Neurology team.     RD remains available to make adjustments to tube feeding regimen PRN.    Hawa Cervantes, MS, RD, CDN #[phone]

## 2021-07-16 NOTE — PROGRESS NOTE ADULT - ASSESSMENT
71 M w GBM, GI consulted for enteral nutrition    1. GBM    2. Oropharyngeal dysphagia  - s/p PEG, no further bleeding    3. Seizure  - care per neurology appreciated    4. Leukocytosis  appreciate ID input, ?due to steroids. No signs of infection at G tube  improving     [facility]  Gastroenterology and Hepatology  [address] [place], [place]  Office: [phone]  Cell: [phone]

## 2021-07-16 NOTE — CHART NOTE - NSCHARTNOTESELECT_GEN_ALL_CORE
NEUROLOGY/Event Note
Neurology
Nutrition Services
Nutrition Services
Palliative 
Speech and Swallow
Speech and Swallow
EEG Prelim
Event Note
Neurology/Event Note
Neurology/Event Note
Nutrition Services
Palliative care/Event Note
Speech and Swallow

## 2021-07-16 NOTE — PROGRESS NOTE ADULT - ASSESSMENT
72 year old RH male with a past medical history of HTN, HLD, hx of SIADH, gliobastoma multiforme (diagnosed in 2019) and seizures presenting after an episode of depressed level of consciousness and suspected seizure episode. In the ED afebrile, normotensive but tachycardic > 90. On presentation WBC of 8.34 with 62.8% PMN.     U/A on 7/1 with 358 WBC.   Treated with CTX 7/1 -> 7/3.   On 7/9 s/p PEG placement.     MRI Brain (7/6) (stable compared to 6/11/21 MRI) with posttreatment changes in the bilateral occipital lobes and periventricular regions and lobular areas of high T1 signal centered in the splenium of the corpus callosum.     At this point I suspect the leukocytosis is secondary to patient's Dexamethasone course  CXR (7/12) with no infiltrates  Podiatry without concern for superinfection of LLE foot bullae     U/A with pyuria and UCx with >100K Enterobacter but in context of indwelling diaz catheter  Leukocytosis is downtrending off of antibiotics  I would continue to monitor off antibiotics     RECOMMENDATIONS:    #Leukocytosis, Positive UCx (Enterobacter), Glioblastoma Multiforme (on Dexamethasone)  --Monitor off of antibiotics  --If clinical status changes would start Cipro (to target the urinary enterobacter)  --Continue to follow CBC with diff  --Continue to follow transaminases  --Continue to follow temperature curve  --Follow up on preliminary blood cultures    #LLE Foot Plantar Bullae  --Podiatry recommendations noted - Deroofed blister to level of dermis, but not beyond dermis    I will follow the patient as needed. Please feel free to contact me with any further questions or concerns.    Jack Keller M.D.  [facility] Division of Infectious Disease  8AM-5PM: Pager Number [phone]  After Hours (or if no response): Please contact the Infectious Diseases Office at [phone]

## 2021-07-16 NOTE — PROGRESS NOTE ADULT - ASSESSMENT
Glioblastoma Multiforme    --Under care at [facility] Dr. Lyle Chavis  --Had been on Avastin as outpatient  --Appears to have stable disease but MS has deteriorated   --As per Dr. Martínez who is covering for Dr. Chavis - Hospice may be appropriate due to deterioration of MS  -- PEG Placed    AMS, Seizure like activity    --No edema, mass effect, hemorrhage or midline shift on CT  -- Repeat MRI stable   --Patient on Dexamethasone now 4mg q8 hours  --Keppra has been increased to 1000 mg TID   --[facility]  --Vimpat dose 200 mg q12     [facility]  --Hospice referral has been placed  --Family wants to bring him home  --Services are being placed    --Patient now DNR    We will continue to follow patient and coordinate with [facility]    Shashi Cason PA-C  Hematology/Oncology  [facility]   [phone] (cell)  [phone] (office)  [phone] (alt office)  Evenings and weekends please call MD on call or office

## 2021-07-16 NOTE — PROGRESS NOTE ADULT - SUBJECTIVE AND OBJECTIVE BOX
Follow Up:  Leukocytosis     Interval History:    REVIEW OF SYSTEMS  [  ] ROS unobtainable because:    [  ] All other systems negative except as noted below    Constitutional:  [ ] fever [ ] chills  [ ] weight loss  [ ] weakness  Skin:  [ ] rash [ ] phlebitis	  Eyes: [ ] icterus [ ] pain  [ ] discharge	  ENMT: [ ] sore throat  [ ] thrush [ ] ulcers [ ] exudates  Respiratory: [ ] dyspnea [ ] hemoptysis [ ] cough [ ] sputum	  Cardiovascular:  [ ] chest pain [ ] palpitations [ ] edema	  Gastrointestinal:  [ ] nausea [ ] vomiting [ ] diarrhea [ ] constipation [ ] pain	  Genitourinary:  [ ] dysuria [ ] frequency [ ] hematuria [ ] discharge [ ] flank pain  [ ] incontinence  Musculoskeletal:  [ ] myalgias [ ] arthralgias [ ] arthritis  [ ] back pain  Neurological:  [ ] headache [ ] seizures  [ ] confusion/altered mental status    Allergies  No Known Allergies        ANTIMICROBIALS:      OTHER MEDS:  MEDICATIONS  (STANDING):  amantadine Syrup 100 two times a day  amLODIPine   Tablet 5 daily  atorvastatin 10 at bedtime  bisacodyl Suppository 10 at bedtime PRN  dexAMETHasone     Tablet 4 daily  enoxaparin Injectable 40 daily  lacosamide Solution 200 two times a day  levETIRAcetam  Solution 1000 <User Schedule>  LORazepam   Injectable 1 once PRN  losartan 100 daily  pantoprazole   Suspension 40 before breakfast  polyethylene glycol 3350 17 daily  ramelteon 8 at bedtime PRN  senna 2 at bedtime      Vital Signs Last 24 Hrs  T(C): 36.7 (16 Jul 2021 15:59), Max: 37.1 (16 Jul 2021 11:37)  T(F): 98.1 (16 Jul 2021 15:59), Max: 98.8 (16 Jul 2021 11:37)  HR: 92 (16 Jul 2021 15:59) (90 - 100)  BP: 109/71 (16 Jul 2021 15:59) (103/67 - 113/75)  BP(mean): --  RR: 18 (16 Jul 2021 15:59) (18 - 18)  SpO2: 97% (16 Jul 2021 15:59) (96% - 99%)    PHYSICAL EXAMINATION:  General: Alert and Awake, NAD  HEENT: PERRL, EOMI  Neck: Supple  Cardiac: RRR, No M/R/G  Resp: CTAB, No Wh/Rh/Ra  Abdomen: NBS, NT/ND, No HSM, No rigidity or guarding  MSK: No LE edema. No Calf tenderness  : No diaz  Skin: No rashes or lesions. Skin is warm and dry to the touch.   Neuro: Alert and Awake. CN 2-12 Grossly intact. Moves all four extremities spontaneously.  Psych: Calm, Pleasant, Cooperative                    MICROBIOLOGY:  v  .Blood Blood-Peripheral  07-12-21   No growth to date.  --  --      .Urine Clean Catch (Midstream)  07-12-21   >100,000 CFU/ml Enterobacter cloacae complex  --  Enterobacter cloacae complex                RADIOLOGY:    <The imaging below has been reviewed and visualized by me independently. Findings as detailed in report below>    EXAM:  XR CHEST PORTABLE URGENT 1V                        PROCEDURE DATE:  07/12/2021    The heart is normal in size. The lungs are clear. No pleural effusion. No pneumothorax. No acute bony pathology could be identified. Follow Up:  Leukocytosis     Interval History: afebrile. no acute overnight events.     REVIEW OF SYSTEMS  [  ] ROS unobtainable because:    [ x ] All other systems negative except as noted below    Constitutional:  [ ] fever [ ] chills  [ ] weight loss  [ ] weakness  Skin:  [ ] rash [ ] phlebitis	  Eyes: [ ] icterus [ ] pain  [ ] discharge	  ENMT: [ ] sore throat  [ ] thrush [ ] ulcers [ ] exudates  Respiratory: [ ] dyspnea [ ] hemoptysis [ ] cough [ ] sputum	  Cardiovascular:  [ ] chest pain [ ] palpitations [ ] edema	  Gastrointestinal:  [ ] nausea [ ] vomiting [ ] diarrhea [ ] constipation [ ] pain	  Genitourinary:  [ ] dysuria [ ] frequency [ ] hematuria [ ] discharge [ ] flank pain  [ ] incontinence  Musculoskeletal:  [ ] myalgias [ ] arthralgias [ ] arthritis  [ ] back pain  Neurological:  [ ] headache [ ] seizures  [ ] confusion/altered mental status    Allergies  No Known Allergies        ANTIMICROBIALS:      OTHER MEDS:  MEDICATIONS  (STANDING):  amantadine Syrup 100 two times a day  amLODIPine   Tablet 5 daily  atorvastatin 10 at bedtime  bisacodyl Suppository 10 at bedtime PRN  dexAMETHasone     Tablet 4 daily  enoxaparin Injectable 40 daily  lacosamide Solution 200 two times a day  levETIRAcetam  Solution 1000 <User Schedule>  LORazepam   Injectable 1 once PRN  losartan 100 daily  pantoprazole   Suspension 40 before breakfast  polyethylene glycol 3350 17 daily  ramelteon 8 at bedtime PRN  senna 2 at bedtime      Vital Signs Last 24 Hrs  T(C): 36.7 (16 Jul 2021 15:59), Max: 37.1 (16 Jul 2021 11:37)  T(F): 98.1 (16 Jul 2021 15:59), Max: 98.8 (16 Jul 2021 11:37)  HR: 92 (16 Jul 2021 15:59) (90 - 100)  BP: 109/71 (16 Jul 2021 15:59) (103/67 - 113/75)  BP(mean): --  RR: 18 (16 Jul 2021 15:59) (18 - 18)  SpO2: 97% (16 Jul 2021 15:59) (96% - 99%)    PHYSICAL EXAMINATION:  General: Awake but not alert, NAD  Cardiac: RRR, No M/R/G  Resp: CTAB, No Wh/Rh/Ra  Abdomen: +PEG (site is clean and without drainage) NBS, NT/ND, No HSM, No rigidity or guarding  MSK: Contracted LE. No LE edema. LLE foot with large bullae at plantar aspect (small amount of erythema at lateral aspect of bullae)  : + diaz  Skin: Findings per MSK Section. Skin is warm and dry to the touch.   Neuro: Awake but not alert. Contracted LE   Psych: Encephalopathic - unable to assess        MICROBIOLOGY:    .Blood Blood-Peripheral  07-12-21   No growth to date.  --  --    .Urine Clean Catch (Midstream)  07-12-21   >100,000 CFU/ml Enterobacter cloacae complex  --  Enterobacter cloacae complex    RADIOLOGY:    <The imaging below has been reviewed and visualized by me independently. Findings as detailed in report below>    EXAM:  XR CHEST PORTABLE URGENT 1V                        PROCEDURE DATE:  07/12/2021    The heart is normal in size. The lungs are clear. No pleural effusion. No pneumothorax. No acute bony pathology could be identified.

## 2021-07-16 NOTE — PROGRESS NOTE ADULT - SUBJECTIVE AND OBJECTIVE BOX
INTERVAL HPI/OVERNIGHT EVENTS:    patient seen and examined  tolerating PEG feeds at 60 cc/hour      MEDICATIONS  (STANDING):  amantadine Syrup 100 milliGRAM(s) Oral two times a day  amLODIPine   Tablet 5 milliGRAM(s) Oral daily  atorvastatin 10 milliGRAM(s) Oral at bedtime  dexAMETHasone     Tablet 4 milliGRAM(s) Oral daily  enoxaparin Injectable 40 milliGRAM(s) SubCutaneous daily  lacosamide Solution 200 milliGRAM(s) Oral two times a day  levETIRAcetam  Solution 1000 milliGRAM(s) Oral <User Schedule>  losartan 100 milliGRAM(s) Oral daily  pantoprazole   Suspension 40 milliGRAM(s) Oral before breakfast  polyethylene glycol 3350 17 Gram(s) Oral daily  senna 2 Tablet(s) Oral at bedtime    MEDICATIONS  (PRN):  bisacodyl Suppository 10 milliGRAM(s) Rectal at bedtime PRN Constipation  LORazepam   Injectable 1 milliGRAM(s) IV Push once PRN GTC  ramelteon 8 milliGRAM(s) Oral at bedtime PRN Insomnia      Allergies    No Known Allergies    Intolerances        Review of Systems:    non- verbal       Vital Signs Last 24 Hrs  T(C): 36.2 (16 Jul 2021 07:56), Max: 37.1 (15 Jul 2021 11:12)  T(F): 97.2 (16 Jul 2021 07:56), Max: 98.8 (15 Jul 2021 11:12)  HR: 90 (16 Jul 2021 07:56) (89 - 99)  BP: 113/75 (16 Jul 2021 07:56) (91/54 - 113/75)  BP(mean): --  RR: 18 (16 Jul 2021 07:56) (18 - 18)  SpO2: 96% (16 Jul 2021 07:56) (96% - 100%)    PHYSICAL EXAM:    GENERAL:  ill- appearing, no distress  HEENT:  NC/AT,  conjunctivae anicteric, clear and pink,   NECK: supple, trachea midline  CHEST:  Full & symmetric excursion, no increased effort, breath sounds clear  HEART:  Regular rhythm, no JVD  ABDOMEN:  Soft, non-tender, non-distended, normoactive bowel sounds,  no masses , no hepatosplenomegaly, g tube unremarkable  EXTREMITIES:  no cyanosis,clubbing or edema  SKIN:  No rash, erythema, or, ecchymoses, no jaundice  NEURO:  lethargic non-focal, no asterixis  PSYCH: calm, nonverbal  RECTAL: Deferred      LABS:                RADIOLOGY & ADDITIONAL TESTS:

## 2021-07-16 NOTE — HOSPICE CARE NOTE - DME DETAILS
ADIS, Suction machine w/[employer] cath, Oxygen.  Delivery scheduled for Saturday, 7/17/2021.
Lyndsey, Suction machine w/[employer] garduno. - Pending delivery.

## 2021-07-16 NOTE — PROGRESS NOTE ADULT - ASSESSMENT
Assessment: 71 year old RH male with a past medical history of HTN, HLD, hx of SIADH, glioblastoma multiforme (diagnosed in 2019), and seizures presenting after an episode of depressed level of consciousness and suspected seizure episode.    Impression: Failure to thrive and progression of disease. Cachexia due to malignancy and poor nutrition.     Plan:  [x] vEEG: LPDs, left hemisphere, max over temporo-centroparietal region with time locked clonus of the right arm.  [x] MRI Brain w/wo contrast: no changed compared to prior study.   [] c/w Vimpat to 200MG PO BID.  [] c/w Decadron to 4MG PO QD.  [] c/w Keppra 1000MG PO TID.  [] Hyponatremia likely secondary to SIADH. Fluid restriction < 1.5L daily. (131 on 7/14).  [x] d/c'd Ritalin, Baclofen.  [x] Holding home Celexa as this could contribute to hyponatremia.  [x] s/p PEG placement on 07/09.  [x] IM on board, Dr. Godfrey. Recs appreciated.  [] ID consulted for leukocytosis. Likely related to steroid use. UA appears positive, but patient with Franks. Monitoring off abx for now per ID.  [] Podiatry consulted for L plantar bullae. Serous blister, not a source of infection. Bilateral Z-flows.  [] c/w Bowel regimen.  [x] Discussed patient's treatment plan and goals of care with neuro-oncologist Dr. Martínez - currently on Avastin monotherapy (last dose 6/24, next dose due around 7/9).  [] Family made patient DNR. Palliative team on board, family wants home hospice.  [x] CPK, B12, folate: wnl.  [] Diet: Glucerna 1.2 via PEG, goal rate 60.  [] DVT PPx: Lovenox SC.  [] GI PPx: Protonix PO QD.  [] Dispo: Patient approved for Home Hospice - pending [facility] request, family education regarding PEG/wound care, delivery of supplies to the home.  [] Seizure rescue: Ativan 1MG IVP ONLY FOR CONVULSIONS/GTC LASTING > 3 MINUTES. Not treating focal seizures.    Case discussed with neurology attending Dr. Angeles.

## 2021-07-16 NOTE — PROGRESS NOTE ADULT - SUBJECTIVE AND OBJECTIVE BOX
DATE OF SERVICE: 07-16-21 @ 14:32    Patient is a 71y old  Male who presents with a chief complaint of seizure episode (16 Jul 2021 11:05)      SUBJECTIVE / OVERNIGHT EVENTS:  alert but does not respond    MEDICATIONS  (STANDING):  amantadine Syrup 100 milliGRAM(s) Oral two times a day  amLODIPine   Tablet 5 milliGRAM(s) Oral daily  atorvastatin 10 milliGRAM(s) Oral at bedtime  dexAMETHasone     Tablet 4 milliGRAM(s) Oral daily  enoxaparin Injectable 40 milliGRAM(s) SubCutaneous daily  lacosamide Solution 200 milliGRAM(s) Oral two times a day  levETIRAcetam  Solution 1000 milliGRAM(s) Oral <User Schedule>  losartan 100 milliGRAM(s) Oral daily  pantoprazole   Suspension 40 milliGRAM(s) Oral before breakfast  polyethylene glycol 3350 17 Gram(s) Oral daily  senna 2 Tablet(s) Oral at bedtime    MEDICATIONS  (PRN):  bisacodyl Suppository 10 milliGRAM(s) Rectal at bedtime PRN Constipation  LORazepam   Injectable 1 milliGRAM(s) IV Push once PRN GTC  ramelteon 8 milliGRAM(s) Oral at bedtime PRN Insomnia      Vital Signs Last 24 Hrs  T(C): 37.1 (16 Jul 2021 11:37), Max: 37.1 (16 Jul 2021 11:37)  T(F): 98.8 (16 Jul 2021 11:37), Max: 98.8 (16 Jul 2021 11:37)  HR: 100 (16 Jul 2021 11:37) (90 - 100)  BP: 106/71 (16 Jul 2021 11:37) (91/54 - 113/75)  BP(mean): --  RR: 18 (16 Jul 2021 11:37) (18 - 18)  SpO2: 99% (16 Jul 2021 11:37) (96% - 100%)  CAPILLARY BLOOD GLUCOSE        I&O's Summary    15 Jul 2021 07:01  -  16 Jul 2021 07:00  --------------------------------------------------------  IN: 1060 mL / OUT: 500 mL / NET: 560 mL    16 Jul 2021 07:01  -  16 Jul 2021 14:32  --------------------------------------------------------  IN: 0 mL / OUT: 200 mL / NET: -200 mL        PHYSICAL EXAM:  GENERAL: NAD, well-developed  HEAD:  Atraumatic, Normocephalic  EYES: EOMI, PERRLA, conjunctiva and sclera clear  NECK: Supple, No JVD  CHEST/LUNG: Clear to auscultation bilaterally; No wheeze  HEART: Regular rate and rhythm; No murmurs, rubs, or gallops  ABDOMEN: Soft, Nontender, Nondistended; Bowel sounds present  EXTREMITIES:  2+ Peripheral Pulses, No clubbing, cyanosis, or edema    LABS:                    RADIOLOGY & ADDITIONAL TESTS:    Imaging Personally Reviewed:    Consultant(s) Notes Reviewed:      Care Discussed with Consultants/Other Providers:

## 2021-07-17 LAB
ANION GAP SERPL CALC-SCNC: 10 MMOL/L — SIGNIFICANT CHANGE UP (ref 5–17)
BUN SERPL-MCNC: 18 MG/DL — SIGNIFICANT CHANGE UP (ref 7–23)
CALCIUM SERPL-MCNC: 8.1 MG/DL — LOW (ref 8.4–10.5)
CHLORIDE SERPL-SCNC: 97 MMOL/L — SIGNIFICANT CHANGE UP (ref 96–108)
CO2 SERPL-SCNC: 27 MMOL/L — SIGNIFICANT CHANGE UP (ref 22–31)
CREAT SERPL-MCNC: 0.31 MG/DL — LOW (ref 0.5–1.3)
GLUCOSE SERPL-MCNC: 95 MG/DL — SIGNIFICANT CHANGE UP (ref 70–99)
HCT VFR BLD CALC: 35 % — LOW (ref 39–50)
HGB BLD-MCNC: 11.1 G/DL — LOW (ref 13–17)
MAGNESIUM SERPL-MCNC: 2 MG/DL — SIGNIFICANT CHANGE UP (ref 1.6–2.6)
MCHC RBC-ENTMCNC: 27.6 PG — SIGNIFICANT CHANGE UP (ref 27–34)
MCHC RBC-ENTMCNC: 31.7 GM/DL — LOW (ref 32–36)
MCV RBC AUTO: 87.1 FL — SIGNIFICANT CHANGE UP (ref 80–100)
NRBC # BLD: 0 /100 WBCS — SIGNIFICANT CHANGE UP (ref 0–0)
PHOSPHATE SERPL-MCNC: 2.6 MG/DL — SIGNIFICANT CHANGE UP (ref 2.5–4.5)
PLATELET # BLD AUTO: 166 K/UL — SIGNIFICANT CHANGE UP (ref 150–400)
POTASSIUM SERPL-MCNC: 4.1 MMOL/L — SIGNIFICANT CHANGE UP (ref 3.5–5.3)
POTASSIUM SERPL-SCNC: 4.1 MMOL/L — SIGNIFICANT CHANGE UP (ref 3.5–5.3)
RBC # BLD: 4.02 M/UL — LOW (ref 4.2–5.8)
RBC # FLD: 16.9 % — HIGH (ref 10.3–14.5)
SODIUM SERPL-SCNC: 134 MMOL/L — LOW (ref 135–145)
WBC # BLD: 15.8 K/UL — HIGH (ref 3.8–10.5)
WBC # FLD AUTO: 15.8 K/UL — HIGH (ref 3.8–10.5)

## 2021-07-17 PROCEDURE — 99231 SBSQ HOSP IP/OBS SF/LOW 25: CPT

## 2021-07-17 RX ADMIN — LEVETIRACETAM 1000 MILLIGRAM(S): 250 TABLET, FILM COATED ORAL at 22:01

## 2021-07-17 RX ADMIN — LOSARTAN POTASSIUM 100 MILLIGRAM(S): 100 TABLET, FILM COATED ORAL at 05:28

## 2021-07-17 RX ADMIN — LACOSAMIDE 200 MILLIGRAM(S): 50 TABLET ORAL at 17:35

## 2021-07-17 RX ADMIN — PANTOPRAZOLE SODIUM 40 MILLIGRAM(S): 20 TABLET, DELAYED RELEASE ORAL at 06:22

## 2021-07-17 RX ADMIN — ATORVASTATIN CALCIUM 10 MILLIGRAM(S): 80 TABLET, FILM COATED ORAL at 22:02

## 2021-07-17 RX ADMIN — Medication 5 MILLIGRAM(S): at 17:32

## 2021-07-17 RX ADMIN — AMLODIPINE BESYLATE 5 MILLIGRAM(S): 2.5 TABLET ORAL at 05:28

## 2021-07-17 RX ADMIN — Medication 4 MILLIGRAM(S): at 05:28

## 2021-07-17 RX ADMIN — Medication 100 MILLIGRAM(S): at 17:32

## 2021-07-17 RX ADMIN — LACOSAMIDE 200 MILLIGRAM(S): 50 TABLET ORAL at 05:27

## 2021-07-17 RX ADMIN — LEVETIRACETAM 1000 MILLIGRAM(S): 250 TABLET, FILM COATED ORAL at 13:36

## 2021-07-17 RX ADMIN — SENNA PLUS 2 TABLET(S): 8.6 TABLET ORAL at 22:02

## 2021-07-17 RX ADMIN — Medication 100 MILLIGRAM(S): at 05:28

## 2021-07-17 RX ADMIN — LEVETIRACETAM 1000 MILLIGRAM(S): 250 TABLET, FILM COATED ORAL at 05:27

## 2021-07-17 RX ADMIN — ENOXAPARIN SODIUM 40 MILLIGRAM(S): 100 INJECTION SUBCUTANEOUS at 11:41

## 2021-07-17 NOTE — PROGRESS NOTE ADULT - ASSESSMENT
Glioblastoma Multiforme    --Under care at [facility] [place] Dr. Lyle Chavis  --Had been on Avastin as outpatient  --Appears to have stable disease but MS has deteriorated   --As per Dr. Martínez who is covering for Dr. Chavis - Hospice may be appropriate due to deterioration of MS  --PEG Placed      AMS, Seizure like activity    --No edema, mass effect, hemorrhage or midline shift on CT  -- Repeat MRI stable   --Patient on Dexamethasone now 4mg q8 hours  --Keppra has been increased to 1000 mg TID   --[facility]  --Vimpat dose 200 mg q12     [facility]  --Hospice referral has been placed  --Family wants to bring him home  --Services are being placed  --Home hospice plan for Monday    --Patient now DNR    We will continue to follow patient and coordinate with [facility] [place]      Davi Napier MD  Hematology/Oncology Consultant  [facility]  Cell: [phone]

## 2021-07-17 NOTE — PROGRESS NOTE ADULT - SUBJECTIVE AND OBJECTIVE BOX
Patient is a 71y old  Male who presents with a chief complaint of seizure episode (16 Jul 2021 17:09)      MEDICATIONS  (STANDING):  amantadine Syrup 100 milliGRAM(s) Oral two times a day  amLODIPine   Tablet 5 milliGRAM(s) Oral daily  atorvastatin 10 milliGRAM(s) Oral at bedtime  baclofen 5 milliGRAM(s) Oral once  dexAMETHasone     Tablet 4 milliGRAM(s) Oral daily  enoxaparin Injectable 40 milliGRAM(s) SubCutaneous daily  lacosamide Solution 200 milliGRAM(s) Oral two times a day  levETIRAcetam  Solution 1000 milliGRAM(s) Oral <User Schedule>  losartan 100 milliGRAM(s) Oral daily  pantoprazole   Suspension 40 milliGRAM(s) Oral before breakfast  polyethylene glycol 3350 17 Gram(s) Oral daily  senna 2 Tablet(s) Oral at bedtime    MEDICATIONS  (PRN):  bisacodyl Suppository 10 milliGRAM(s) Rectal at bedtime PRN Constipation  LORazepam   Injectable 1 milliGRAM(s) IV Push once PRN GTC  ramelteon 8 milliGRAM(s) Oral at bedtime PRN Insomnia      Interim History:  No acute events over night. Vitals stable. Patient in no acute distress      Vital Signs Last 24 Hrs  T(C): 36.6 (17 Jul 2021 07:47), Max: 36.7 (16 Jul 2021 15:59)  T(F): 97.8 (17 Jul 2021 07:47), Max: 98.1 (16 Jul 2021 15:59)  HR: 92 (17 Jul 2021 07:47) (92 - 98)  BP: 118/69 (17 Jul 2021 07:47) (100/66 - 118/69)  BP(mean): --  RR: 18 (17 Jul 2021 07:47) (18 - 18)  SpO2: 99% (17 Jul 2021 07:47) (97% - 99%)    PE  NAD  Awake, alert  Anicteric, MMM  RRR  CTAB  Abd soft, NT, ND  No c/c/e  No rash grossly                            11.1   15.80 )-----------( 166      ( 17 Jul 2021 05:18 )             35.0       07-17    134<L>  |  97  |  18  ----------------------------<  95  4.1   |  27  |  0.31<L>    Ca    8.1<L>      17 Jul 2021 05:18  Phos  2.6     07-17  Mg     2.0     07-17

## 2021-07-17 NOTE — PROGRESS NOTE ADULT - SUBJECTIVE AND OBJECTIVE BOX
DATE OF SERVICE: 07-17-21 @ 17:58    Patient is a 71y old  Male who presents with a chief complaint of seizure episode (17 Jul 2021 12:54)      SUBJECTIVE / OVERNIGHT EVENTS:  awake and alert, nonverbal      MEDICATIONS  (STANDING):  amantadine Syrup 100 milliGRAM(s) Oral two times a day  amLODIPine   Tablet 5 milliGRAM(s) Oral daily  atorvastatin 10 milliGRAM(s) Oral at bedtime  dexAMETHasone     Tablet 4 milliGRAM(s) Oral daily  enoxaparin Injectable 40 milliGRAM(s) SubCutaneous daily  lacosamide Solution 200 milliGRAM(s) Oral two times a day  levETIRAcetam  Solution 1000 milliGRAM(s) Oral <User Schedule>  losartan 100 milliGRAM(s) Oral daily  pantoprazole   Suspension 40 milliGRAM(s) Oral before breakfast  polyethylene glycol 3350 17 Gram(s) Oral daily  senna 2 Tablet(s) Oral at bedtime    MEDICATIONS  (PRN):  bisacodyl Suppository 10 milliGRAM(s) Rectal at bedtime PRN Constipation  LORazepam   Injectable 1 milliGRAM(s) IV Push once PRN GTC  ramelteon 8 milliGRAM(s) Oral at bedtime PRN Insomnia      Vital Signs Last 24 Hrs  T(C): 37.3 (17 Jul 2021 15:37), Max: 37.3 (17 Jul 2021 15:37)  T(F): 99.2 (17 Jul 2021 15:37), Max: 99.2 (17 Jul 2021 15:37)  HR: 94 (17 Jul 2021 15:37) (92 - 98)  BP: 96/60 (17 Jul 2021 15:37) (96/60 - 118/69)  BP(mean): --  RR: 18 (17 Jul 2021 15:37) (18 - 18)  SpO2: 99% (17 Jul 2021 15:37) (98% - 100%)  CAPILLARY BLOOD GLUCOSE        I&O's Summary    16 Jul 2021 07:01  -  17 Jul 2021 07:00  --------------------------------------------------------  IN: 1100 mL / OUT: 200 mL / NET: 900 mL        PHYSICAL EXAM:  GENERAL: NAD, well-developed  HEAD:  Atraumatic, Normocephalic  EYES: EOMI, PERRLA, conjunctiva and sclera clear  NECK: Supple, No JVD  CHEST/LUNG: Clear to auscultation bilaterally; No wheeze  HEART: Regular rate and rhythm; No murmurs, rubs, or gallops  ABDOMEN: Soft, Nontender, Nondistended; Bowel sounds present  EXTREMITIES:  2+ Peripheral Pulses, No clubbing, cyanosis, or edema    SKIN: No rashes or lesions    LABS:                        11.1   15.80 )-----------( 166      ( 17 Jul 2021 05:18 )             35.0     07-17    134<L>  |  97  |  18  ----------------------------<  95  4.1   |  27  |  0.31<L>    Ca    8.1<L>      17 Jul 2021 05:18  Phos  2.6     07-17  Mg     2.0     07-17                RADIOLOGY & ADDITIONAL TESTS:    Imaging Personally Reviewed:    Consultant(s) Notes Reviewed:      Care Discussed with Consultants/Other Providers:

## 2021-07-17 NOTE — PROGRESS NOTE ADULT - SUBJECTIVE AND OBJECTIVE BOX
Patient is a 71y old  Male who presents with a chief complaint of seizure episode (17 Jul 2021 11:51)       INTERVAL HPI/OVERNIGHT EVENTS:  Patient seen and evaluated at bedside.  Pt is resting comfortable in NAD. Denies N/V/F/C.  Pain rated at X/10    Allergies    No Known Allergies    Intolerances        Vital Signs Last 24 Hrs  T(C): 36.6 (17 Jul 2021 07:47), Max: 36.7 (16 Jul 2021 15:59)  T(F): 97.8 (17 Jul 2021 07:47), Max: 98.1 (16 Jul 2021 15:59)  HR: 92 (17 Jul 2021 07:47) (92 - 98)  BP: 118/69 (17 Jul 2021 07:47) (100/66 - 118/69)  BP(mean): --  RR: 18 (17 Jul 2021 07:47) (18 - 18)  SpO2: 99% (17 Jul 2021 07:47) (97% - 99%)    LABS:                        11.1   15.80 )-----------( 166      ( 17 Jul 2021 05:18 )             35.0     07-17    134<L>  |  97  |  18  ----------------------------<  95  4.1   |  27  |  0.31<L>    Ca    8.1<L>      17 Jul 2021 05:18  Phos  2.6     07-17  Mg     2.0     07-17          CAPILLARY BLOOD GLUCOSE          Lower Extremity Physical Exam:  Vascular: DP/PT 2/4 B/L, CFT <3 seconds B/L, Temperature gradient warm to cool B/L  Neuro: unable to assess   Musculoskeletal/Ortho: b/l LE contracted   Skin: Left plantar lateral foot serous blister - lanced continues to improve with epithelialization, no purulence, no probing or tracking, no open wounds or lesions, no acute signs of infection     RADIOLOGY & ADDITIONAL TESTS:

## 2021-07-17 NOTE — PROGRESS NOTE ADULT - ASSESSMENT
72yo M with left plantar foot blister   - pt seen and evaluated  - foot is not the source of infection, remains stable  - Left plantar lateral foot serous blister, no open wounds or lesions, no acute signs of infection   - recommend mupirocin and allevyn pad to be applied daily to left foot blister site, adaptic dsd  - pt to wear z-flows at all times when in bed b/l feet   - will follow

## 2021-07-17 NOTE — PROGRESS NOTE ADULT - SUBJECTIVE AND OBJECTIVE BOX
SUBJECTIVE: Patient seen and examined at the bedside. Not verbally communicative.     INTERVAL HISTORY:    PAST MEDICAL & SURGICAL HISTORY:  Hypertension    Hyperlipidemia    Seizures    Diabetes    Glioblastoma multiforme    CMV infection    History of SIADH    H/O colonoscopy    History of laryngoscopy    Status post stereotactic brain biopsy      FAMILY HISTORY:  FH: myocardial infarction (Father)      SOCIAL HISTORY:   T/E/D:   Occupation:   Lives with:     MEDICATIONS (HOME):  Home Medications:  amantadine 50 mg/5 mL oral syrup: 10 milliliter(s) orally 2 times a day (05 Jun 2021 10:00)  atorvastatin 10 mg oral tablet: 1 tab(s) orally once a day (05 Jun 2021 10:00)  citalopram 20 mg oral tablet: 1 tab(s) orally once a day (05 Jun 2021 10:00)  Keppra 100 mg/mL oral solution: 7.5 milliliter(s) orally 2 times a day (05 Jun 2021 10:00)  losartan 100 mg oral tablet: 1 tab(s) orally once a day (05 Jun 2021 10:00)  methylphenidate 5 mg oral tablet: 1 tab(s) orally @ 8am and 2 pm (15 Abel 2021 12:19)  Norvasc 5 mg oral tablet: 1 tab(s) orally once a day (05 Jun 2021 10:00)  nystatin 100,000 units/mL oral suspension: 4 milliliter(s) orally 4 times a day (05 Jun 2021 10:00)  ramelteon 8 mg oral tablet: 1 tab(s) orally once a day (at bedtime) (05 Jun 2021 10:00)    MEDICATIONS  (STANDING):  amantadine Syrup 100 milliGRAM(s) Oral two times a day  amLODIPine   Tablet 5 milliGRAM(s) Oral daily  atorvastatin 10 milliGRAM(s) Oral at bedtime  baclofen 5 milliGRAM(s) Oral once  dexAMETHasone     Tablet 4 milliGRAM(s) Oral daily  enoxaparin Injectable 40 milliGRAM(s) SubCutaneous daily  lacosamide Solution 200 milliGRAM(s) Oral two times a day  levETIRAcetam  Solution 1000 milliGRAM(s) Oral <User Schedule>  losartan 100 milliGRAM(s) Oral daily  pantoprazole   Suspension 40 milliGRAM(s) Oral before breakfast  polyethylene glycol 3350 17 Gram(s) Oral daily  senna 2 Tablet(s) Oral at bedtime    MEDICATIONS  (PRN):  bisacodyl Suppository 10 milliGRAM(s) Rectal at bedtime PRN Constipation  LORazepam   Injectable 1 milliGRAM(s) IV Push once PRN GTC  ramelteon 8 milliGRAM(s) Oral at bedtime PRN Insomnia    ALLERGIES/INTOLERANCES:  Allergies  No Known Allergies    Intolerances    VITALS & EXAMINATION:  Vital Signs Last 24 Hrs  T(C): 36.5 (17 Jul 2021 12:15), Max: 36.7 (16 Jul 2021 15:59)  T(F): 97.7 (17 Jul 2021 12:15), Max: 98.1 (16 Jul 2021 15:59)  HR: 96 (17 Jul 2021 12:15) (92 - 98)  BP: 105/64 (17 Jul 2021 12:15) (100/66 - 118/69)  BP(mean): --  RR: 18 (17 Jul 2021 12:15) (18 - 18)  SpO2: 100% (17 Jul 2021 12:15) (97% - 100%)    General: Male, appears stated age, in no apparent distress including pain, intermittent hiccups  ENT: mouth open    Neurological (>12):  MS: eyes partially open, not following commands    Language: no intelligible verbal output    CNs: no gross facial asymmetry, eyes in primary position    Fundoscopic: deferred     Motor: no spontaneous movement of extremities	     Sensation: no clear response to tactile stimuli    Cortical: Extinction on DSS (neglect): deferred    Reflexes: deferred    Coordination: patient could not participate    Gait: patient could not participate    LABORATORY:  CBC                       11.1   15.80 )-----------( 166      ( 17 Jul 2021 05:18 )             35.0     Chem 07-17    134<L>  |  97  |  18  ----------------------------<  95  4.1   |  27  |  0.31<L>    Ca    8.1<L>      17 Jul 2021 05:18  Phos  2.6     07-17  Mg     2.0     07-17      LFTs   Coagulopathy   Lipid Panel   A1c   Cardiac enzymes     U/A   CSF  Immunological  Other    STUDIES & IMAGING:  Studies (EKG, EEG, EMG, etc):     Radiology (XR, CT, MR, U/S, TTE/VARINDER):

## 2021-07-17 NOTE — PROGRESS NOTE ADULT - ASSESSMENT
71 year old RH male with a past medical history of HTN, HLD, hx of SIADH, glioblastoma multiforme (diagnosed in 2019), and seizures presenting after an episode of depressed level of consciousness and suspected seizure episode.    Impression: Failure to thrive and progression of disease. Cachexia due to malignancy and poor nutrition.     Plan:  [x] vEEG: LPDs, left hemisphere, max over temporo-centroparietal region with time locked clonus of the right arm.  [x] MRI Brain w/wo contrast: no changed compared to prior study.   [] c/w Vimpat to 200MG PO BID.  [] c/w Decadron to 4MG PO QD.  [] c/w Keppra 1000MG PO TID.  [] Hyponatremia likely secondary to SIADH. Fluid restriction < 1.5L daily. (131 on 7/14).  [x] d/c'd Ritalin, Baclofen.  [x] Holding home Celexa as this could contribute to hyponatremia.  [x] s/p PEG placement on 07/09.  [x] IM on board, Dr. Godfrey. Recs appreciated.  [] ID consulted for leukocytosis. Likely related to steroid use. UA appears positive, but patient with Franks. Monitoring off abx for now per ID.  [] Podiatry consulted for L plantar bullae. Serous blister, not a source of infection. Bilateral Z-flows.  [] c/w Bowel regimen.  [x] Discussed patient's treatment plan and goals of care with neuro-oncologist Dr. Martínez - currently on Avastin monotherapy (last dose 6/24, next dose due around 7/9).  [] Family made patient DNR. Palliative team on board, family wants home hospice.  [x] CPK, B12, folate: wnl.  [] Diet: Glucerna 1.2 via PEG, goal rate 60.  [] DVT PPx: Lovenox SC.  [] GI PPx: Protonix PO QD.  [] Dispo: Patient approved for Home Hospice - pending [facility] request, family education regarding PEG/wound care, delivery of supplies to the home.  [] Seizure rescue: Ativan 1MG IVP ONLY FOR CONVULSIONS/GTC LASTING > 3 MINUTES. Not treating focal seizures.    Case discussed with neurology attending Dr. Angeles.

## 2021-07-18 LAB
CULTURE RESULTS: SIGNIFICANT CHANGE UP
SPECIMEN SOURCE: SIGNIFICANT CHANGE UP

## 2021-07-18 PROCEDURE — 99231 SBSQ HOSP IP/OBS SF/LOW 25: CPT

## 2021-07-18 RX ORDER — BACLOFEN 100 %
5 POWDER (GRAM) MISCELLANEOUS DAILY
Refills: 0 | Status: DISCONTINUED | OUTPATIENT
Start: 2021-07-18 | End: 2021-07-19

## 2021-07-18 RX ADMIN — ENOXAPARIN SODIUM 40 MILLIGRAM(S): 100 INJECTION SUBCUTANEOUS at 11:48

## 2021-07-18 RX ADMIN — Medication 100 MILLIGRAM(S): at 18:36

## 2021-07-18 RX ADMIN — PANTOPRAZOLE SODIUM 40 MILLIGRAM(S): 20 TABLET, DELAYED RELEASE ORAL at 08:02

## 2021-07-18 RX ADMIN — LEVETIRACETAM 1000 MILLIGRAM(S): 250 TABLET, FILM COATED ORAL at 21:23

## 2021-07-18 RX ADMIN — LEVETIRACETAM 1000 MILLIGRAM(S): 250 TABLET, FILM COATED ORAL at 05:53

## 2021-07-18 RX ADMIN — ATORVASTATIN CALCIUM 10 MILLIGRAM(S): 80 TABLET, FILM COATED ORAL at 21:23

## 2021-07-18 RX ADMIN — Medication 5 MILLIGRAM(S): at 21:23

## 2021-07-18 RX ADMIN — LACOSAMIDE 200 MILLIGRAM(S): 50 TABLET ORAL at 17:19

## 2021-07-18 RX ADMIN — Medication 100 MILLIGRAM(S): at 05:52

## 2021-07-18 RX ADMIN — LACOSAMIDE 200 MILLIGRAM(S): 50 TABLET ORAL at 05:56

## 2021-07-18 RX ADMIN — LEVETIRACETAM 1000 MILLIGRAM(S): 250 TABLET, FILM COATED ORAL at 13:58

## 2021-07-18 RX ADMIN — Medication 4 MILLIGRAM(S): at 05:52

## 2021-07-18 NOTE — PROGRESS NOTE ADULT - ASSESSMENT
71 year old RH male with a past medical history of HTN, HLD, hx of SIADH, glioblastoma multiforme (diagnosed in 2019), and seizures presenting after an episode of depressed level of consciousness and suspected seizure episode.    Impression: Failure to thrive and progression of disease. Cachexia due to malignancy and poor nutrition.     Plan:  [x] vEEG: LPDs, left hemisphere, max over temporo-centroparietal region with time locked clonus of the right arm.  [x] MRI Brain w/wo contrast: no changed compared to prior study.   [] c/w Vimpat to 200MG PO BID.  [] c/w Decadron to 4MG PO QD.  [] c/w Keppra 1000MG PO TID.  [] Hyponatremia likely secondary to SIADH. Fluid restriction < 1.5L daily. (131 on 7/14).  [x] d/c'd Ritalin, Baclofen.  [x] Holding home Celexa as this could contribute to hyponatremia.  [x] s/p PEG placement on 07/09.  [x] IM on board, Dr. Godfrey. Recs appreciated.  [x] ID consulted for leukocytosis. Likely related to steroid use. UA appears positive, but patient with Franks. Monitoring off abx for now per ID.  [] Podiatry consulted for L plantar bullae. Serous blister, not a source of infection. Bilateral Z-flows.  [] c/w Bowel regimen.  [x] Discussed patient's treatment plan and goals of care with neuro-oncologist Dr. Martínez - currently on Avastin monotherapy (last dose 6/24, next dose due around 7/9).  [] Family made patient DNR. Palliative team on board, family wants home hospice.  [x] CPK, B12, folate: wnl.  [] Diet: Glucerna 1.2 via PEG, goal rate 60.  [] DVT PPx: Lovenox SC.  [] GI PPx: Protonix PO QD.  [] Dispo: Patient approved for Home Hospice - pending [facility] request, family education regarding PEG/wound care, delivery of supplies to the home.  [] Seizure rescue: Ativan 1MG IVP ONLY FOR CONVULSIONS/GTC LASTING > 3 MINUTES. Not treating focal seizures.    Case discussed with neurology attending Dr. Angeles.

## 2021-07-18 NOTE — PROGRESS NOTE ADULT - SUBJECTIVE AND OBJECTIVE BOX
no new changes    amantadine Syrup 100 milliGRAM(s) Oral two times a day  amLODIPine   Tablet 5 milliGRAM(s) Oral daily  atorvastatin 10 milliGRAM(s) Oral at bedtime  baclofen 5 milliGRAM(s) Oral daily PRN  bisacodyl Suppository 10 milliGRAM(s) Rectal at bedtime PRN  dexAMETHasone     Tablet 4 milliGRAM(s) Oral daily  enoxaparin Injectable 40 milliGRAM(s) SubCutaneous daily  lacosamide Solution 200 milliGRAM(s) Oral two times a day  levETIRAcetam  Solution 1000 milliGRAM(s) Oral <User Schedule>  LORazepam   Injectable 1 milliGRAM(s) IV Push once PRN  losartan 100 milliGRAM(s) Oral daily  pantoprazole   Suspension 40 milliGRAM(s) Oral before breakfast  polyethylene glycol 3350 17 Gram(s) Oral daily  ramelteon 8 milliGRAM(s) Oral at bedtime PRN  senna 2 Tablet(s) Oral at bedtime      No Known Allergies      ROS otherwise negative     T(C): 36.6 (07-18-21 @ 11:10), Max: 37.3 (07-17-21 @ 15:37)  HR: 106 (07-18-21 @ 11:10) (94 - 106)  BP: 113/66 (07-18-21 @ 11:10) (96/60 - 114/67)  RR: 19 (07-18-21 @ 11:10) (18 - 19)  SpO2: 98% (07-18-21 @ 11:10) (97% - 99%)  PHYSICAL EXAM  Gen:  laying in bed, nad, cachectic, not responsive, awake  Ext:  no edema                          11.1   15.80 )-----------( 166      ( 17 Jul 2021 05:18 )             35.0                         10.9   12.32 )-----------( 176      ( 14 Jul 2021 06:29 )             34.9

## 2021-07-18 NOTE — PROGRESS NOTE ADULT - SUBJECTIVE AND OBJECTIVE BOX
DATE OF SERVICE: 07-18-21 @ 12:51    Patient is a 71y old  Male who presents with a chief complaint of seizure episode (18 Jul 2021 08:22)      SUBJECTIVE / OVERNIGHT EVENTS:  awake and alert but unresponsive    MEDICATIONS  (STANDING):  amantadine Syrup 100 milliGRAM(s) Oral two times a day  amLODIPine   Tablet 5 milliGRAM(s) Oral daily  atorvastatin 10 milliGRAM(s) Oral at bedtime  dexAMETHasone     Tablet 4 milliGRAM(s) Oral daily  enoxaparin Injectable 40 milliGRAM(s) SubCutaneous daily  lacosamide Solution 200 milliGRAM(s) Oral two times a day  levETIRAcetam  Solution 1000 milliGRAM(s) Oral <User Schedule>  losartan 100 milliGRAM(s) Oral daily  pantoprazole   Suspension 40 milliGRAM(s) Oral before breakfast  polyethylene glycol 3350 17 Gram(s) Oral daily  senna 2 Tablet(s) Oral at bedtime    MEDICATIONS  (PRN):  bisacodyl Suppository 10 milliGRAM(s) Rectal at bedtime PRN Constipation  LORazepam   Injectable 1 milliGRAM(s) IV Push once PRN GTC  ramelteon 8 milliGRAM(s) Oral at bedtime PRN Insomnia      Vital Signs Last 24 Hrs  T(C): 36.6 (18 Jul 2021 11:10), Max: 37.3 (17 Jul 2021 15:37)  T(F): 97.9 (18 Jul 2021 11:10), Max: 99.2 (17 Jul 2021 15:37)  HR: 106 (18 Jul 2021 11:10) (94 - 106)  BP: 113/66 (18 Jul 2021 11:10) (96/60 - 114/67)  BP(mean): --  RR: 19 (18 Jul 2021 11:10) (18 - 19)  SpO2: 98% (18 Jul 2021 11:10) (97% - 99%)  CAPILLARY BLOOD GLUCOSE        I&O's Summary      PHYSICAL EXAM:  GENERAL: NAD, well-developed  HEAD:  Atraumatic, Normocephalic  EYES: EOMI, PERRLA, conjunctiva and sclera clear  NECK: Supple, No JVD  CHEST/LUNG: Clear to auscultation bilaterally; No wheeze  HEART: Regular rate and rhythm; No murmurs, rubs, or gallops  ABDOMEN: Soft, Nontender, Nondistended; Bowel sounds present  EXTREMITIES:  contracted  PSYCH: AAOx3  NEUROLOGY: non-focal  SKIN: No rashes or lesions    LABS:                        11.1   15.80 )-----------( 166      ( 17 Jul 2021 05:18 )             35.0     07-17    134<L>  |  97  |  18  ----------------------------<  95  4.1   |  27  |  0.31<L>    Ca    8.1<L>      17 Jul 2021 05:18  Phos  2.6     07-17  Mg     2.0     07-17                RADIOLOGY & ADDITIONAL TESTS:    Imaging Personally Reviewed:    Consultant(s) Notes Reviewed:      Care Discussed with Consultants/Other Providers:

## 2021-07-18 NOTE — PROGRESS NOTE ADULT - SUBJECTIVE AND OBJECTIVE BOX
SUBJECTIVE: Patient seen and examined at the bedside on the morning of 7/18/21. No acute events noted overnight    INTERVAL HISTORY:    PAST MEDICAL & SURGICAL HISTORY:  Hypertension    Hyperlipidemia    Seizures    Diabetes    Glioblastoma multiforme    CMV infection    History of SIADH    H/O colonoscopy    History of laryngoscopy    Status post stereotactic brain biopsy      FAMILY HISTORY:  FH: myocardial infarction (Father)      SOCIAL HISTORY:   T/E/D:   Occupation:   Lives with:     MEDICATIONS (HOME):  Home Medications:  amantadine 50 mg/5 mL oral syrup: 10 milliliter(s) orally 2 times a day (05 Jun 2021 10:00)  atorvastatin 10 mg oral tablet: 1 tab(s) orally once a day (05 Jun 2021 10:00)  citalopram 20 mg oral tablet: 1 tab(s) orally once a day (05 Jun 2021 10:00)  Keppra 100 mg/mL oral solution: 7.5 milliliter(s) orally 2 times a day (05 Jun 2021 10:00)  losartan 100 mg oral tablet: 1 tab(s) orally once a day (05 Jun 2021 10:00)  methylphenidate 5 mg oral tablet: 1 tab(s) orally @ 8am and 2 pm (15 Abel 2021 12:19)  Norvasc 5 mg oral tablet: 1 tab(s) orally once a day (05 Jun 2021 10:00)  nystatin 100,000 units/mL oral suspension: 4 milliliter(s) orally 4 times a day (05 Jun 2021 10:00)  ramelteon 8 mg oral tablet: 1 tab(s) orally once a day (at bedtime) (05 Jun 2021 10:00)    MEDICATIONS  (STANDING):  amantadine Syrup 100 milliGRAM(s) Oral two times a day  amLODIPine   Tablet 5 milliGRAM(s) Oral daily  atorvastatin 10 milliGRAM(s) Oral at bedtime  baclofen 5 milliGRAM(s) Oral once  dexAMETHasone     Tablet 4 milliGRAM(s) Oral daily  enoxaparin Injectable 40 milliGRAM(s) SubCutaneous daily  lacosamide Solution 200 milliGRAM(s) Oral two times a day  levETIRAcetam  Solution 1000 milliGRAM(s) Oral <User Schedule>  losartan 100 milliGRAM(s) Oral daily  pantoprazole   Suspension 40 milliGRAM(s) Oral before breakfast  polyethylene glycol 3350 17 Gram(s) Oral daily  senna 2 Tablet(s) Oral at bedtime    MEDICATIONS  (PRN):  bisacodyl Suppository 10 milliGRAM(s) Rectal at bedtime PRN Constipation  LORazepam   Injectable 1 milliGRAM(s) IV Push once PRN GTC  ramelteon 8 milliGRAM(s) Oral at bedtime PRN Insomnia    ALLERGIES/INTOLERANCES:  Allergies  No Known Allergies    Intolerances    VITALS & EXAMINATION:  Vital Signs Last 24 Hrs  T(C): 36.7 (18 Jul 2021 04:53), Max: 37.3 (17 Jul 2021 15:37)  T(F): 98 (18 Jul 2021 04:53), Max: 99.2 (17 Jul 2021 15:37)  HR: 99 (18 Jul 2021 04:53) (94 - 102)  BP: 96/65 (18 Jul 2021 04:53) (96/60 - 110/72)  BP(mean): --  RR: 18 (18 Jul 2021 04:53) (18 - 18)  SpO2: 98% (18 Jul 2021 04:53) (98% - 100%)    General: Male, appears stated age, in no apparent distress including pain, intermittent hiccups  ENT: mouth open    Neurological (>12):  MS: eyes partially open, not following commands    Language: no intelligible verbal output    CNs: no gross facial asymmetry, eyes in primary position    Fundoscopic: deferred     Motor: no spontaneous movement of extremities	     Sensation: no clear response to tactile stimuli    Cortical: Extinction on DSS (neglect): deferred    Reflexes: deferred    Coordination: patient could not participate    Gait: patient could not participate    LABORATORY:  CBC                       11.1   15.80 )-----------( 166      ( 17 Jul 2021 05:18 )             35.0     Chem 07-17    134<L>  |  97  |  18  ----------------------------<  95  4.1   |  27  |  0.31<L>    Ca    8.1<L>      17 Jul 2021 05:18  Phos  2.6     07-17  Mg     2.0     07-17      LFTs   Coagulopathy   Lipid Panel   A1c   Cardiac enzymes     U/A   CSF  Immunological  Other    STUDIES & IMAGING:  Studies (EKG, EEG, EMG, etc):     Radiology (XR, CT, MR, U/S, TTE/VARINDER):

## 2021-07-18 NOTE — PROGRESS NOTE ADULT - ASSESSMENT
Glioblastoma Multiforme    --Under care at [facility] Dr. Lyle Chavis  --Had been on Avastin as outpatient  --Appears to have stable disease but MS has deteriorated   --As per Dr. Martínez who is covering for Dr. Chavis - Hospice may be appropriate due to deterioration of MS  --PEG Placed      AMS, Seizure like activity    --No edema, mass effect, hemorrhage or midline shift on CT  -- Repeat MRI stable   --Patient on Dexamethasone now 4mg q8 hours  --Keppra has been increased to 1000 mg TID   --[facility]  --Vimpat dose 200 mg q12     [facility]  --Hospice referral has been placed  --Family wants to bring him home  --Services are being placed  --Home hospice plan for Monday    --Patient now DNR    We will continue to follow patient and coordinate with [facility]    Mitchell López MD  Hematology/Oncology  Cell:  [phone]  Office Phone: [phone]  Office Fax:  [phone] [address] [place] [address]

## 2021-07-19 DIAGNOSIS — R52 PAIN, UNSPECIFIED: ICD-10-CM

## 2021-07-19 PROCEDURE — 99233 SBSQ HOSP IP/OBS HIGH 50: CPT

## 2021-07-19 PROCEDURE — 99231 SBSQ HOSP IP/OBS SF/LOW 25: CPT | Mod: GC

## 2021-07-19 RX ORDER — MORPHINE SULFATE 50 MG/1
1 CAPSULE, EXTENDED RELEASE ORAL
Refills: 0 | Status: DISCONTINUED | OUTPATIENT
Start: 2021-07-19 | End: 2021-07-20

## 2021-07-19 RX ORDER — DEXAMETHASONE 0.5 MG/5ML
1 ELIXIR ORAL DAILY
Refills: 0 | Status: CANCELLED | OUTPATIENT
Start: 2021-07-22 | End: 2021-07-20

## 2021-07-19 RX ORDER — BACITRACIN ZINC 500 UNIT/G
1 OINTMENT IN PACKET (EA) TOPICAL
Refills: 0 | Status: DISCONTINUED | OUTPATIENT
Start: 2021-07-19 | End: 2021-07-20

## 2021-07-19 RX ORDER — DEXAMETHASONE 0.5 MG/5ML
2 ELIXIR ORAL DAILY
Refills: 0 | Status: DISCONTINUED | OUTPATIENT
Start: 2021-07-19 | End: 2021-07-20

## 2021-07-19 RX ORDER — ENOXAPARIN SODIUM 100 MG/ML
40 INJECTION SUBCUTANEOUS DAILY
Refills: 0 | Status: DISCONTINUED | OUTPATIENT
Start: 2021-07-19 | End: 2021-07-20

## 2021-07-19 RX ORDER — MORPHINE SULFATE 50 MG/1
1 CAPSULE, EXTENDED RELEASE ORAL ONCE
Refills: 0 | Status: DISCONTINUED | OUTPATIENT
Start: 2021-07-19 | End: 2021-07-19

## 2021-07-19 RX ADMIN — AMLODIPINE BESYLATE 5 MILLIGRAM(S): 2.5 TABLET ORAL at 05:54

## 2021-07-19 RX ADMIN — Medication 100 MILLIGRAM(S): at 05:53

## 2021-07-19 RX ADMIN — MORPHINE SULFATE 1 MILLIGRAM(S): 50 CAPSULE, EXTENDED RELEASE ORAL at 17:29

## 2021-07-19 RX ADMIN — MORPHINE SULFATE 1 MILLIGRAM(S): 50 CAPSULE, EXTENDED RELEASE ORAL at 12:49

## 2021-07-19 RX ADMIN — PANTOPRAZOLE SODIUM 40 MILLIGRAM(S): 20 TABLET, DELAYED RELEASE ORAL at 05:53

## 2021-07-19 RX ADMIN — Medication 100 MILLIGRAM(S): at 17:33

## 2021-07-19 RX ADMIN — LEVETIRACETAM 1000 MILLIGRAM(S): 250 TABLET, FILM COATED ORAL at 13:37

## 2021-07-19 RX ADMIN — MORPHINE SULFATE 1 MILLIGRAM(S): 50 CAPSULE, EXTENDED RELEASE ORAL at 22:10

## 2021-07-19 RX ADMIN — LEVETIRACETAM 1000 MILLIGRAM(S): 250 TABLET, FILM COATED ORAL at 05:53

## 2021-07-19 RX ADMIN — ATORVASTATIN CALCIUM 10 MILLIGRAM(S): 80 TABLET, FILM COATED ORAL at 21:31

## 2021-07-19 RX ADMIN — Medication 4 MILLIGRAM(S): at 05:54

## 2021-07-19 RX ADMIN — MORPHINE SULFATE 1 MILLIGRAM(S): 50 CAPSULE, EXTENDED RELEASE ORAL at 21:40

## 2021-07-19 RX ADMIN — Medication 2 MILLIGRAM(S): at 13:38

## 2021-07-19 RX ADMIN — MORPHINE SULFATE 1 MILLIGRAM(S): 50 CAPSULE, EXTENDED RELEASE ORAL at 12:34

## 2021-07-19 RX ADMIN — LACOSAMIDE 200 MILLIGRAM(S): 50 TABLET ORAL at 17:33

## 2021-07-19 RX ADMIN — LACOSAMIDE 200 MILLIGRAM(S): 50 TABLET ORAL at 05:53

## 2021-07-19 RX ADMIN — LOSARTAN POTASSIUM 100 MILLIGRAM(S): 100 TABLET, FILM COATED ORAL at 05:54

## 2021-07-19 RX ADMIN — ENOXAPARIN SODIUM 40 MILLIGRAM(S): 100 INJECTION SUBCUTANEOUS at 13:37

## 2021-07-19 RX ADMIN — LEVETIRACETAM 1000 MILLIGRAM(S): 250 TABLET, FILM COATED ORAL at 21:31

## 2021-07-19 RX ADMIN — MORPHINE SULFATE 1 MILLIGRAM(S): 50 CAPSULE, EXTENDED RELEASE ORAL at 17:44

## 2021-07-19 NOTE — PROGRESS NOTE ADULT - SUBJECTIVE AND OBJECTIVE BOX
HPI:  Ronald Rider is a 72 year old RH male with a past medical history of HTN, HLD, hx of SIADH, gliobastoma multiforme (diagnosed in 2019) and seizures presenting after an episode of depressed level of consciousness and suspected seizure episode. At baseline, patient has contractions throughout and is bedbound, he is non-verbal but can respond to some simple commands. Patient was recently admitted for increased somnolence, difficulty breathing, decreased alertness, and poor PO intake. Was briefly on antibiotics but discontinued once no infection was found, had hypernatremia which resolved, briefly on steroid which were tapered and then subsequently discontinued. Was discharged for follow up with his neuro-oncologist at [facility]. Currently patient was on Keppra 750mg tid and as per son was compliant. Patient now presenting with after an episode where he was sitting in his chair and then noted be slumping over to the R, having increased tonic contractions of the RUE, and some twitching of his face. Patient was noted to be incontinent during the episode and was less responsive than usual. He was taken by EMS to the hospital and given Versed 5mg IM en route. Patient gradually improved and was able to follow some command to squeeze a finger with his son in the room. On examination, the patient was not able to contribute to history, ROS, or follow commands to exam.     Glioblastoma History:  Diagnosed in 2019 with MR noting involvement of the splenium of the corpus callosum and bilateral parenchymal involvement. Had stereotactic biopsy in 2019 with pathology noting glioblastoma. Was started on Keppra and Decadron. Was treated at [facility] with Dr. Chavis with radiation, temozolomide, and Bactrim. Temozolamide was stopped due to myelosuppression. Has had numerous admission for somnolence and seizure episodes. Currently on Avastin maintenance. MRI in April 2021 and June 2021 show stable mass, progression of RT gliosis. Patient was administered steroids at the time of last admission in early June 2021 and was tapered off as an outpatient.    INTERVAL EVENTS:  7/13: palliative care consulted for GOC, patient appears comfortable  7/19: palliative care re-consulted for pain management, as patient moaning per team    ADVANCE DIRECTIVES:    DNR  Yes    MOLST  [ ]  Living Will  [ ]   DECISION MAKER(s):  [ ] Health Care Proxy(s)  [ ] Surrogate(s)  [ ] Guardian           Name(s): Phone Number(s): mak Fernandez [phone]    BASELINE (I)ADL(s) (prior to admission):  [place]: [ ]Total  [ ] Moderate [ ]Dependent    Allergies    No Known Allergies    Intolerances    MEDICATIONS  (STANDING):  amantadine Syrup 100 milliGRAM(s) Oral two times a day  amLODIPine   Tablet 5 milliGRAM(s) Oral daily  atorvastatin 10 milliGRAM(s) Oral at bedtime  BACItracin   Ointment 1 Application(s) Topical two times a day  dexAMETHasone     Tablet 2 milliGRAM(s) Oral daily  enoxaparin Injectable 40 milliGRAM(s) SubCutaneous daily  lacosamide Solution 200 milliGRAM(s) Oral two times a day  levETIRAcetam  Solution 1000 milliGRAM(s) Oral <User Schedule>  losartan 100 milliGRAM(s) Oral daily  pantoprazole   Suspension 40 milliGRAM(s) Oral before breakfast  polyethylene glycol 3350 17 Gram(s) Oral daily  senna 2 Tablet(s) Oral at bedtime    MEDICATIONS  (PRN):  bisacodyl Suppository 10 milliGRAM(s) Rectal at bedtime PRN Constipation  LORazepam   Injectable 1 milliGRAM(s) IV Push once PRN GTC  morphine  - Injectable 1 milliGRAM(s) IV Push every 3 hours PRN Moderate Pain (4 - 6)  ramelteon 8 milliGRAM(s) Oral at bedtime PRN Insomnia    PRESENT SYMPTOMS: [X ]Unable to obtain due to poor mentation   Source if other than patient:  [ ]Family   [ ]Team     Pain: [ ]yes [ ]no  QOL impact -   Location -                    Aggravating factors -  Quality -  Radiation -  Timing-  Severity (0-10 scale):  Minimal acceptable level (0-10 scale):     CPOT:    https://www.sccm.org/getattachment/ddt06t07-6w2j-4v6h-9p0v-6423t6889h4i/Critical-Care-Pain-Observation-Tool-(CPOT)      PAIN AD Score: 0    http://geriatrictoolkit.[place].[email or web address]/painad.pdf (press ctrl +  left click to view)    Dyspnea:                           [ ]Mild [ ]Moderate [ ]Severe  Anxiety:                             [ ]Mild [ ]Moderate [ ]Severe  Fatigue:                             [ ]Mild [ ]Moderate [ ]Severe  Nausea:                             [ ]Mild [ ]Moderate [ ]Severe  Loss of appetite:              [ ]Mild [ ]Moderate [ ]Severe  Constipation:                    [ ]Mild [ ]Moderate [ ]Severe    Other Symptoms:  [ ]All other review of systems negative     Palliative Performance Status Version 2:         %    http://[facility].org/files/news/palliative_performance_scale_ppsv2.pdf  PHYSICAL EXAM:  Vital Signs Last 24 Hrs  T(C): 36.5 (19 Jul 2021 15:15), Max: 36.7 (18 Jul 2021 20:47)  T(F): 97.7 (19 Jul 2021 15:15), Max: 98.1 (18 Jul 2021 20:47)  HR: 95 (19 Jul 2021 15:15) (95 - 103)  BP: 103/66 (19 Jul 2021 15:15) (99/65 - 119/74)  BP(mean): --  RR: 20 (19 Jul 2021 15:15) (19 - 24)  SpO2: 100% (19 Jul 2021 15:15) (97% - 100%)      GENERAL:  [ ]Alert  [ ]Oriented x   [X ]Lethargic  [ ]Cachexia  [ ]Unarousable  [ ]Verbal  [ X]Non-Verbal  Behavioral:   [ ] Anxiety  [ ] Delirium [ ] Agitation [ X] Other calm  HEENT:  [ X]Normal   [ ]Dry mouth   [ ]ET Tube/Trach  [ ]Oral lesions  PULMONARY:   [ ]Clear [ ]Tachypnea  [ ]Audible excessive secretions [X] No labored breathing  [ ]Rhonchi        [ ]Right [ ]Left [ ]Bilateral  [ ]Crackles        [ ]Right [ ]Left [ ]Bilateral  [ ]Wheezing     [ ]Right [ ]Left [ ]Bilateral  [ ]Diminished breath sounds [ ]right [ ]left [ ]bilateral  CARDIOVASCULAR:    [ ]Regular [ ]Irregular [ ]Tachy  [ ]Edison [ ]Murmur [ ]Other  GASTROINTESTINAL:  [ ]Soft  [X]Nondistended   [ ]+BS  [ ]Non tender [ ]Tender  [ ]PEG [ ]OGT/ NGT  Last BM:     GENITOURINARY:  [ ]Normal [ ] Incontinent   [ ]Oliguria/Anuria   [ ]Franks  MUSCULOSKELETAL:   [ ]Normal   [ ]Weakness  [ ]Bed/Wheelchair bound [ ]Edema  NEUROLOGIC:   [ ]No focal deficits  [X ]Cognitive impairment  [ ]Dysphagia [ ]Dysarthria [ ]Paresis [ ]Other   SKIN:   [ ]Normal    [ ]Rash  [ X]Pressure ulcer(s) (see RN documentation)       Present on admission [ ]y [ ]n    CRITICAL CARE:  [ ] Shock Present  [ ]Septic [ ]Cardiogenic [ ]Neurologic [ ]Hypovolemic  [ ]  Vasopressors [ ]  Inotropes   [ ]Respiratory failure present [ ]Mechanical ventilation [ ]Non-invasive ventilatory support [ ]High flow  [ ]Acute  [ ]Chronic [ ]Hypoxic  [ ]Hypercarbic [ ]Other  [ ]Other organ failure     LABS:        RADIOLOGY & ADDITIONAL STUDIES:    PROTEIN CALORIE MALNUTRITION PRESENT: [ ]mild [ ]moderate [ ]severe [ ]underweight [ ]morbid obesity  https://www.andeal.org/vault/2440/web/files/ONC/Table_Clinical%20Characteristics%20to%20Document%20Malnutrition-White%20JV%20et%20al%699549.pdf    Height (cm): 180.3 (07-09-21 @ 14:42), 180.3 (06-04-21 @ 22:21)  Weight (kg): 51 (07-12-21 @ 11:27), 50 (06-05-21 @ 09:57)  BMI (kg/m2): 15.7 (07-12-21 @ 11:27), 15.4 (07-09-21 @ 14:42), 15.4 (06-05-21 @ 09:57)    [ ]PPSV2 < or = to 30% [ ]significant weight loss  [ ]poor nutritional intake  [ ]anasarca     Albumin, Serum: 2.5 g/dL (07-12-21 @ 06:21)   [ ]Artificial Nutrition      REFERRALS:   [ ]Chaplaincy  [ ]Hospice  [ ]Child Life  [ ]Social Work  [ ]Case management [ ]Holistic Therapy     Goals of Care Document: CARMINA Garcia (06-15-21 @ 13:35)  Goals of Care Conversation:   Participants:  · Participants  Patient; Family  · Child(lily)  Son Jim    Advance Directives:  · Does patient have Advance Directive  No  · Does Patient Have a Surrogate  Yes  Jim Son  · Does the Patient have a Court Appointed Guardian ([id number][address])  No  · Caregiver:  no    Conversation Discussion:  · Conversation  Diagnosis  · Conversation Details  - Son HCP no PEG at this point. Discussed code status he reports dad is still full code.      Electronic Signatures:  Chastity Garcia)  (Signed 15-Abel-2021 13:38)  	Authored: Goals of Care Conversation      Last Updated: 15-Abel-2021 13:38 by Chastity Garcia)        ______________  Chris Villalobos MD   of Geriatric and Palliative Medicine  [facility]     Please page the following number for clinical matters between the hours of 9AM and 5PM   from Monday through Friday : [phone]    After 5PM and on weekends, please page: [phone]. The Geriatric and Palliative Medicine consult service has 24/7 coverage for medical recommendations, including for symptom management needs.

## 2021-07-19 NOTE — PROGRESS NOTE ADULT - ATTENDING COMMENTS
NEUROLOGY ATTENDING:  BERTA [phone]  	  CC: GLIOBLASTOMA MULTIFORME, SEIZURES, DECREASED RESPONSIVENESS.    PATIENT SEEN & EXAMINED ON 7/19/2021.  SUNRISE RECORDS REVIEWED   ALLSCRIPTS RECORDS REVIEWED  LABS REVIEWED  IMAGING REVIEWED  DISCUSSED WITH NEUROLOGY HOUSESTAFF AT BEDSIDE.    Briefly, 73 yo RH man with GBM, who presented to [facility] with mild memory deficits, was found to have a left temporal abnormality, underwent diagnostic biopsy (MARLENA) on 9/27/2019 and then [place] his [place] [facility] ([facility]). He last received AVASTIN over a month ago. He suffered status epilepticus and was brought to [facility] by his family. He has since undergone PEG placement and will return home with palliative care. His PEG placement will facilitate administration of anti-epilepsy medications and hopefully avoid further suffering.    EXAMINATION  mouth open, no eye contact, no blink to threat, no response to commands.    IMPRESSION  END OF LIFE CARE -- Appreciate palliative care input. Once home services are coordinated, no objection to discharge.    SEIZURES -- To continue with AEDs via PEG. will discuss with family importance of shaking liquid formulations well for effective dosing

## 2021-07-19 NOTE — PROGRESS NOTE ADULT - ASSESSMENT
Assessment: 71 year old RH male with a past medical history of HTN, HLD, hx of SIADH, glioblastoma multiforme (diagnosed in 2019), and seizures presenting after an episode of depressed level of consciousness and suspected seizure episode.    Impression: Failure to thrive and progression of disease. Hospital course c/b focal motor status epilepticus, now controlled. Cachexia due to malignancy and poor nutrition.    Plan:  [x] vEEG: LPDs, left hemisphere, max over temporo-centroparietal region with time locked clonus of the right arm.  [x] MRI Brain w/wo contrast: no change compared to prior study.   [] c/w Vimpat to 200MG PO BID.  [] decrease Decadron to 2MG PO QD x3 days (7/19-7/21), then 1MG po QD x3 days (7/22-7/24), then stop.  [] c/w Keppra 1000MG PO TID.  [] Hyponatremia likely secondary to SIADH. Fluid restriction < 1.5L daily. (134 on 7/17).  [x] d/c'd Ritalin, Baclofen.  [x] Holding home Celexa as this could contribute to hyponatremia.  [x] s/p PEG placement on 07/09.  [x] IM on board, Dr. Godfrey. Recs appreciated.  [] ID consulted for leukocytosis. Likely related to steroid use. UA appears positive, but patient with Franks. Monitoring off abx for now per ID.  [] Podiatry consulted for L plantar bullae. Serous blister, not a source of infection. Bilateral Z-flows.  [] c/w Bowel regimen.  [x] Discussed patient's treatment plan and goals of care with neuro-oncologist Dr. Martínez - currently on Avastin monotherapy (last dose 6/24, next dose due around 7/9).  [] Family made patient DNR. Palliative team on board, family wants home hospice.  [x] CPK, B12, folate: wnl.  [] Diet: Glucerna 1.2 via PEG, goal rate 60.  [] DVT PPx: Lovenox SC.  [] GI PPx: Protonix PO QD.  [] Dispo: Patient approved for Home Hospice - [facility] approved, supplies delivered to home; pending family education regarding PEG/wound care.  [] Seizure rescue: Ativan 1MG IVP ONLY FOR CONVULSIONS/GTC LASTING > 3 MINUTES. Not treating focal seizures.    Case discussed with neurology attending Dr. Giron.

## 2021-07-19 NOTE — HOSPICE CARE NOTE - CONVESATION DETAILS
Pt has been approved for [facility] home care.    TC to Pt's son Jim Rider, left voice message requesting call back.  Received call back from the Pt's dtr Haily Rider. Son Jim requested Haily call, son is unable to make the call.  Discussed the following w/Haily Rider:    - [facility] services/POC. Dtr in agreement.    - [facility] HHA request may take a few weeks to place.    - Need for family education regarding PEG Tube Feeds, and care.      Dtr in agreement.    - Need for family education regarding any wound care.       Dtr in agreement.    - DME requested: [employer], Suction machine w/Gilmar garduno      Pt has a hospital bed in the home.    - Consents emailed to Haily: [email or web address].    - Dtr provided with [facility] RN contact information    TC w/JASE Geller for Referral update.    - CM will f/u with Med Team regarding Rx for PEG TF.    - Will f/u with Unit RN regarding Pt/Fam teaching regarding PEG, and any wound care.    [facility] RN will f/u w/HCN Dietician regarding TF setup, supplies.    HC[facility] RN will continue to follow.  Stefanie Shah, RN  [phone]    
TC to Pt's son Jim Rider. Left voice message requesting call back.    Pt is pending [facility] MD review for tomorrow AM.   - Home hospice eval.     Yamileth Geller has been apprised.    HCN RN will continue to follow.  Stefanie Shah RN  [phone]
Tube feeding supplies will be delivered to the Pt's home today.    DME was delivered on Saturday, 7/19/2021.    Family is pending education regarding PEG tube feeds.    - family unavailable during the weekend.    - email sent to the Pt's dtr Haily Tereso, reminding her of the required family education, prior to the Pt's d/c to home.    JASE Suero has been apprised.    No response has been received from the family.    HCN RN will continue to follow.  Stefanie Shah RN  [phone]
The current plan is for d/c to home on Monday, 7/19/2021, w/HCN, pending medical clearance.    HCN Dietician Susan Sturgess will have the Tube Feeding supplies delivered to the Pt's home on Monday, 7/19/2021.    JASE Geller has been apprised.    HCN RN will continue to follow.  Stefanie Shah RN  [phone]

## 2021-07-19 NOTE — PROGRESS NOTE ADULT - ASSESSMENT
71 M w GBM, GI consulted for enteral nutrition    1. GBM.     2. Oropharyngeal dysphagia  - s/p PEG, no further bleeding, some drainage at G tube site, will add bacitracin    3. Seizure  - care per neurology appreciated    4. Leukocytosis  appreciate ID input,    [facility]  Gastroenterology and Hepatology  [address] [place], [place]  Office: [phone]  Cell: [phone]

## 2021-07-19 NOTE — PROGRESS NOTE ADULT - SUBJECTIVE AND OBJECTIVE BOX
Patient is a 71y old  Male who presents with a chief complaint of seizure episode (19 Jul 2021 10:55)  Patient seen today, no new events    MEDICATIONS  (STANDING):  amantadine Syrup 100 milliGRAM(s) Oral two times a day  amLODIPine   Tablet 5 milliGRAM(s) Oral daily  atorvastatin 10 milliGRAM(s) Oral at bedtime  BACItracin   Ointment 1 Application(s) Topical two times a day  dexAMETHasone     Tablet 2 milliGRAM(s) Oral daily  enoxaparin Injectable 40 milliGRAM(s) SubCutaneous daily  lacosamide Solution 200 milliGRAM(s) Oral two times a day  levETIRAcetam  Solution 1000 milliGRAM(s) Oral <User Schedule>  losartan 100 milliGRAM(s) Oral daily  pantoprazole   Suspension 40 milliGRAM(s) Oral before breakfast  polyethylene glycol 3350 17 Gram(s) Oral daily  senna 2 Tablet(s) Oral at bedtime    MEDICATIONS  (PRN):  bisacodyl Suppository 10 milliGRAM(s) Rectal at bedtime PRN Constipation  LORazepam   Injectable 1 milliGRAM(s) IV Push once PRN GTC  ramelteon 8 milliGRAM(s) Oral at bedtime PRN Insomnia    Vital Signs Last 24 Hrs  T(C): 36.5 (19 Jul 2021 08:48), Max: 36.7 (18 Jul 2021 20:47)  T(F): 97.7 (19 Jul 2021 08:48), Max: 98.1 (18 Jul 2021 20:47)  HR: 102 (19 Jul 2021 08:48) (97 - 103)  BP: 99/65 (19 Jul 2021 08:48) (99/65 - 119/74)  BP(mean): --  RR: 23 (19 Jul 2021 08:48) (19 - 23)  SpO2: 98% (19 Jul 2021 08:48) (97% - 98%)      NAD  Obtunded  Comfortable

## 2021-07-19 NOTE — PROGRESS NOTE ADULT - SUBJECTIVE AND OBJECTIVE BOX
DATE OF SERVICE: 07-19-21 @ 14:40    Patient is a 71y old  Male who presents with a chief complaint of seizure episode (19 Jul 2021 11:24)      SUBJECTIVE / OVERNIGHT EVENTS:  NAD, nonverbal, does not follow commands    MEDICATIONS  (STANDING):  amantadine Syrup 100 milliGRAM(s) Oral two times a day  amLODIPine   Tablet 5 milliGRAM(s) Oral daily  atorvastatin 10 milliGRAM(s) Oral at bedtime  BACItracin   Ointment 1 Application(s) Topical two times a day  dexAMETHasone     Tablet 2 milliGRAM(s) Oral daily  enoxaparin Injectable 40 milliGRAM(s) SubCutaneous daily  lacosamide Solution 200 milliGRAM(s) Oral two times a day  levETIRAcetam  Solution 1000 milliGRAM(s) Oral <User Schedule>  losartan 100 milliGRAM(s) Oral daily  pantoprazole   Suspension 40 milliGRAM(s) Oral before breakfast  polyethylene glycol 3350 17 Gram(s) Oral daily  senna 2 Tablet(s) Oral at bedtime    MEDICATIONS  (PRN):  bisacodyl Suppository 10 milliGRAM(s) Rectal at bedtime PRN Constipation  LORazepam   Injectable 1 milliGRAM(s) IV Push once PRN GTC  ramelteon 8 milliGRAM(s) Oral at bedtime PRN Insomnia      Vital Signs Last 24 Hrs  T(C): 36.3 (19 Jul 2021 11:52), Max: 36.7 (18 Jul 2021 20:47)  T(F): 97.4 (19 Jul 2021 11:52), Max: 98.1 (18 Jul 2021 20:47)  HR: 97 (19 Jul 2021 11:52) (97 - 103)  BP: 105/64 (19 Jul 2021 11:52) (99/65 - 119/74)  BP(mean): --  RR: 24 (19 Jul 2021 11:52) (19 - 24)  SpO2: 98% (19 Jul 2021 11:52) (97% - 98%)  CAPILLARY BLOOD GLUCOSE        I&O's Summary    18 Jul 2021 07:01  -  19 Jul 2021 07:00  --------------------------------------------------------  IN: 0 mL / OUT: 300 mL / NET: -300 mL        PHYSICAL EXAM:  GENERAL: NAD, well-developed  HEAD:  Atraumatic, Normocephalic  EYES: EOMI, PERRLA, conjunctiva and sclera clear  NECK: Supple, No JVD  CHEST/LUNG: Clear to auscultation bilaterally; No wheeze  HEART: Regular rate and rhythm; No murmurs, rubs, or gallops  ABDOMEN: Soft, Nontender, Nondistended; Bowel sounds present  EXTREMITIES:  2+ Peripheral Pulses, No clubbing, cyanosis, or edema, contracted  SKIN: No rashes or lesions    LABS:                    RADIOLOGY & ADDITIONAL TESTS:    Imaging Personally Reviewed:    Consultant(s) Notes Reviewed:      Care Discussed with Consultants/Other Providers:

## 2021-07-19 NOTE — PROGRESS NOTE ADULT - PROBLEM SELECTOR PLAN 1
- patient appears comfortable after 1mg morphine  - SCr WNL  - c/w morphine 1mg Q3 PRN, will reassess meds based on PRN usage  - d/w team

## 2021-07-19 NOTE — PROGRESS NOTE ADULT - ASSESSMENT
72M with PMH of HTN, HLD, SIADH, GMB and seizures here with suspected seizures; palliative care consulted for GOC, and goals are home with hospice at this time.

## 2021-07-19 NOTE — PROGRESS NOTE ADULT - ASSESSMENT
Glioblastoma Multiforme    --Under care at [facility] Dr. Lyle Chavis  --Had been on Avastin as outpatient  --Appears to have stable disease but MS has deteriorated   --As per Dr. Martínez who is covering for Dr. Chavis - Hospice may be appropriate due to deterioration of MS  --PEG Placed      AMS, Seizure like activity    --No edema, mass effect, hemorrhage or midline shift on CT  -- Repeat MRI stable   --Patient on Dexamethasone now 4mg q8 hours  --Keppra has been increased to 1000 mg TID   --[facility] Neurology  --Vimpat dose 200 mg q12     [facility]  --Hospice referral has been placed  --Family wants to bring him home  --Services are being placed  --Home hospice plan for Monday    --Patient now DNR    We will continue to follow patient and coordinate with [facility]    Kaia Rivas NP  Hematology/Oncology  [facility]  [phone] (Cell)  [phone] (Office)  [phone] (Alt office)  Evenings and weekends please call MD on call or office

## 2021-07-19 NOTE — PROGRESS NOTE ADULT - SUBJECTIVE AND OBJECTIVE BOX
MRN-[id number]    Subjective: 70 yo male seen and examined at bedside. No overnight events.    PAST MEDICAL & SURGICAL HISTORY:  Hypertension    Hyperlipidemia    Seizures    Diabetes    Glioblastoma multiforme    CMV infection    History of SIADH    H/O colonoscopy    History of laryngoscopy    Status post stereotactic brain biopsy    FAMILY HISTORY:  FH: myocardial infarction (Father)    Social Hx:  Nonsmoker, no drug or alcohol use    Home Medications:  amantadine 50 mg/5 mL oral syrup: 10 milliliter(s) orally 2 times a day (05 Jun 2021 10:00)  atorvastatin 10 mg oral tablet: 1 tab(s) orally once a day (05 Jun 2021 10:00)  citalopram 20 mg oral tablet: 1 tab(s) orally once a day (05 Jun 2021 10:00)  Keppra 100 mg/mL oral solution: 7.5 milliliter(s) orally 2 times a day (05 Jun 2021 10:00)  losartan 100 mg oral tablet: 1 tab(s) orally once a day (05 Jun 2021 10:00)  methylphenidate 5 mg oral tablet: 1 tab(s) orally @ 8am and 2 pm (15 Abel 2021 12:19)  Norvasc 5 mg oral tablet: 1 tab(s) orally once a day (05 Jun 2021 10:00)  nystatin 100,000 units/mL oral suspension: 4 milliliter(s) orally 4 times a day (05 Jun 2021 10:00)  ramelteon 8 mg oral tablet: 1 tab(s) orally once a day (at bedtime) (05 Jun 2021 10:00)    MEDICATIONS  (STANDING):  amantadine Syrup 100 milliGRAM(s) Oral two times a day  amLODIPine   Tablet 5 milliGRAM(s) Oral daily  atorvastatin 10 milliGRAM(s) Oral at bedtime  dexAMETHasone     Tablet 4 milliGRAM(s) Oral daily  enoxaparin Injectable 40 milliGRAM(s) SubCutaneous daily  lacosamide Solution 200 milliGRAM(s) Oral two times a day  levETIRAcetam  Solution 1000 milliGRAM(s) Oral <User Schedule>  losartan 100 milliGRAM(s) Oral daily  pantoprazole   Suspension 40 milliGRAM(s) Oral before breakfast  polyethylene glycol 3350 17 Gram(s) Oral daily  senna 2 Tablet(s) Oral at bedtime    MEDICATIONS  (PRN):  baclofen 5 milliGRAM(s) Oral daily PRN Cumbersome Hiccups  bisacodyl Suppository 10 milliGRAM(s) Rectal at bedtime PRN Constipation  LORazepam   Injectable 1 milliGRAM(s) IV Push once PRN GTC  ramelteon 8 milliGRAM(s) Oral at bedtime PRN Insomnia    Allergies  No Known Allergies    ROS: Unable to obtain due to patient's mental status.     Vital Signs Last 24 Hrs  T(C): 36.5 (19 Jul 2021 08:48), Max: 36.7 (18 Jul 2021 20:47)  T(F): 97.7 (19 Jul 2021 08:48), Max: 98.1 (18 Jul 2021 20:47)  HR: 102 (19 Jul 2021 08:48) (97 - 106)  BP: 99/65 (19 Jul 2021 08:48) (99/65 - 119/74)  RR: 23 (19 Jul 2021 08:48) (19 - 23)  SpO2: 98% (19 Jul 2021 08:48) (97% - 98%)    GENERAL EXAM:  Constitutional: Lying in bed, NAD. Cachectic.  Head: Normocephalic. Atraumatic.  Extremities: No edema. L foot serous blister on plantar aspect.    NEUROLOGICAL EXAM:  MS: Awake. Eyes open spontaneously. No verbal output. Does not follow commands.  CN: PERRL. BTT intact b/l. Does not track examiner. No facial asymmetry.  Motor: No spontaneous movement noted. No twitching/jerking observed. Increased tone in b/l upper extremities, L>R. Contractures in b/l lower extremities.  Coordination/Gait: Not assessed.    Radiology/EEGs:  -06/29 CTH: No CT evidence of acute intracranial hemorrhage, mass effect, or midline shift. Unchanged posttreatment changes.    -07/03 EEG Summary / Classification:  Abnormal EEG in comatose patient  Lateralized periodic discharges, left hemisphere, max over centroparietal region  Disorganization  Moderate background slowing, generalized.  EEG Impression / Clinical Correlate:  The presence of LPDs is often associated with subacute structural injury in the left hemisphere. LPDs also indicate heightened risk for seizures.  There is also evidence of moderate diffuse cerebral dysfunction.    -07/04 EEG Summary / Classification:  Abnormal EEG in comatose patient  4.	Lateralized periodic discharges, left hemisphere, max over centroparietal region.  5.	Disorganization  6.	Moderate background slowing, generalized.  EEG Impression / Clinical Correlate:  The presence of LPDs is often associated with subacute structural injury in the left hemisphere. LPDs also indicate heightened risk for seizures.  There is also evidence of moderate diffuse cerebral dysfunction.    -07/05 EEG Summary / Classification:  Abnormal EEG in comatose patient  7.	Lateralized periodic discharges, left hemisphere, max over centroparietal region.  8.	Disorganization  9.	Moderate background slowing, generalized.  EEG Impression / Clinical Correlate:  The presence of LPDs is often associated with subacute structural injury in the left hemisphere. LPDs also indicate heightened risk for seizures.  There is also evidence of moderate diffuse cerebral dysfunction.    -07/06 EEG Summary / Classification:  Abnormal EEG in comatose patient  10.	Lateralized periodic discharges, left hemisphere, max over temporo-centroparietal region with time locked clonus of the right arm.  11.	Disorganization  12.	Moderate background slowing, generalized.  EEG Impression / Clinical Correlate:  The presence of LPDs with associated time locked clonus indicative of focal motor seizures ("EPC").  There is also evidence of moderate diffuse cerebral dysfunction.    -07/06 MRI Brain w/wo Cont: Redemonstration of posttreatment changes in the bilateral occipital lobes and periventricular regions. There is a lobular areas of high T1 signal centered in the splenium of the corpus callosum. No gross enhancement. The overall size and appearance is unchanged. Areas are restricted diffusion again noted likely related to tumor. Redemonstration Of diffuse T2 prolongation throughout the periventricular white matter, unchanged from the prior exam, likely representing radiation changes, gliosis and/or edema. Stable exam.

## 2021-07-19 NOTE — PROGRESS NOTE ADULT - SUBJECTIVE AND OBJECTIVE BOX
Chief Complaint:  Patient is a 71y old  Male who presents with a chief complaint of seizure episode (19 Jul 2021 08:59)      Date of service 07-19-21 @ 10:56      Interval Events:     Hospital Medications:  amantadine Syrup 100 milliGRAM(s) Oral two times a day  amLODIPine   Tablet 5 milliGRAM(s) Oral daily  atorvastatin 10 milliGRAM(s) Oral at bedtime  BACItracin   Ointment 1 Application(s) Topical two times a day  bisacodyl Suppository 10 milliGRAM(s) Rectal at bedtime PRN  dexAMETHasone     Tablet 2 milliGRAM(s) Oral daily  enoxaparin Injectable 40 milliGRAM(s) SubCutaneous daily  lacosamide Solution 200 milliGRAM(s) Oral two times a day  levETIRAcetam  Solution 1000 milliGRAM(s) Oral <User Schedule>  LORazepam   Injectable 1 milliGRAM(s) IV Push once PRN  losartan 100 milliGRAM(s) Oral daily  pantoprazole   Suspension 40 milliGRAM(s) Oral before breakfast  polyethylene glycol 3350 17 Gram(s) Oral daily  ramelteon 8 milliGRAM(s) Oral at bedtime PRN  senna 2 Tablet(s) Oral at bedtime        Review of Systems:  General:  No wt loss, fevers, chills, night sweats, fatigue,   Eyes:  Good vision, no reported pain  ENT:  No sore throat, pain, runny nose, dysphagia  CV:  No pain, palpitations, hypo/hypertension  Resp:  No dyspnea, cough, tachypnea, wheezing  GI:  See HPI  :  No pain, bleeding, incontinence, nocturia  Muscle:  No pain, weakness  Neuro:  No weakness, tingling, memory problems  Psych:  No fatigue, insomnia, mood problems, depression  Endocrine:  No polyuria, polydipsia, cold/heat intolerance  Heme:  No petechiae, ecchymosis, easy bruisability  Integumentary:  No rash, edema    PHYSICAL EXAM:   Vital Signs:  Vital Signs Last 24 Hrs  T(C): 36.5 (19 Jul 2021 08:48), Max: 36.7 (18 Jul 2021 20:47)  T(F): 97.7 (19 Jul 2021 08:48), Max: 98.1 (18 Jul 2021 20:47)  HR: 102 (19 Jul 2021 08:48) (97 - 106)  BP: 99/65 (19 Jul 2021 08:48) (99/65 - 119/74)  BP(mean): --  RR: 23 (19 Jul 2021 08:48) (19 - 23)  SpO2: 98% (19 Jul 2021 08:48) (97% - 98%)  Daily     Daily       PHYSICAL EXAM:     GENERAL:  Appears stated age, well-groomed, well-nourished, no distress  HEENT:  NC/AT,  conjunctivae anicteric, clear and pink,   NECK: supple, trachea midline  CHEST:  Full & symmetric excursion, no increased effort, breath sounds clear  HEART:  Regular rhythm, no JVD  ABDOMEN:  Soft, non-tender, non-distended, normoactive bowel sounds,  no masses , no hepatosplenomegaly  EXTREMITIES:  no cyanosis,clubbing or edema  SKIN:  No rash, erythema, or, ecchymoses, no jaundice  NEURO:  Alert, non-focal, no asterixis  PSYCH: Appropriate affect, oriented to place and time  RECTAL: Deferred      LABS Personally reviewed by me:                                          11.1   15.80 )-----------( 166      ( 17 Jul 2021 05:18 )             35.0       Imaging personally reviewed by me:

## 2021-07-20 ENCOUNTER — TRANSCRIPTION ENCOUNTER (OUTPATIENT)
Age: 71
End: 2021-07-20

## 2021-07-20 VITALS
TEMPERATURE: 98 F | OXYGEN SATURATION: 100 % | RESPIRATION RATE: 18 BRPM | DIASTOLIC BLOOD PRESSURE: 77 MMHG | SYSTOLIC BLOOD PRESSURE: 127 MMHG | HEART RATE: 93 BPM

## 2021-07-20 LAB
ANION GAP SERPL CALC-SCNC: 11 MMOL/L — SIGNIFICANT CHANGE UP (ref 5–17)
BUN SERPL-MCNC: 21 MG/DL — SIGNIFICANT CHANGE UP (ref 7–23)
CALCIUM SERPL-MCNC: 8.5 MG/DL — SIGNIFICANT CHANGE UP (ref 8.4–10.5)
CHLORIDE SERPL-SCNC: 99 MMOL/L — SIGNIFICANT CHANGE UP (ref 96–108)
CO2 SERPL-SCNC: 27 MMOL/L — SIGNIFICANT CHANGE UP (ref 22–31)
CREAT SERPL-MCNC: <0.3 MG/DL — LOW (ref 0.5–1.3)
GLUCOSE SERPL-MCNC: 135 MG/DL — HIGH (ref 70–99)
HCT VFR BLD CALC: 33 % — LOW (ref 39–50)
HGB BLD-MCNC: 10.5 G/DL — LOW (ref 13–17)
MAGNESIUM SERPL-MCNC: 2.1 MG/DL — SIGNIFICANT CHANGE UP (ref 1.6–2.6)
MCHC RBC-ENTMCNC: 28 PG — SIGNIFICANT CHANGE UP (ref 27–34)
MCHC RBC-ENTMCNC: 31.8 GM/DL — LOW (ref 32–36)
MCV RBC AUTO: 88 FL — SIGNIFICANT CHANGE UP (ref 80–100)
NRBC # BLD: 0 /100 WBCS — SIGNIFICANT CHANGE UP (ref 0–0)
PHOSPHATE SERPL-MCNC: 3.1 MG/DL — SIGNIFICANT CHANGE UP (ref 2.5–4.5)
PLATELET # BLD AUTO: 164 K/UL — SIGNIFICANT CHANGE UP (ref 150–400)
POTASSIUM SERPL-MCNC: 4.6 MMOL/L — SIGNIFICANT CHANGE UP (ref 3.5–5.3)
POTASSIUM SERPL-SCNC: 4.6 MMOL/L — SIGNIFICANT CHANGE UP (ref 3.5–5.3)
RBC # BLD: 3.75 M/UL — LOW (ref 4.2–5.8)
RBC # FLD: 16.8 % — HIGH (ref 10.3–14.5)
SODIUM SERPL-SCNC: 137 MMOL/L — SIGNIFICANT CHANGE UP (ref 135–145)
WBC # BLD: 11.97 K/UL — HIGH (ref 3.8–10.5)
WBC # FLD AUTO: 11.97 K/UL — HIGH (ref 3.8–10.5)

## 2021-07-20 PROCEDURE — 87186 SC STD MICRODIL/AGAR DIL: CPT

## 2021-07-20 PROCEDURE — 71045 X-RAY EXAM CHEST 1 VIEW: CPT

## 2021-07-20 PROCEDURE — L8699: CPT

## 2021-07-20 PROCEDURE — 87086 URINE CULTURE/COLONY COUNT: CPT

## 2021-07-20 PROCEDURE — 81003 URINALYSIS AUTO W/O SCOPE: CPT

## 2021-07-20 PROCEDURE — 99285 EMERGENCY DEPT VISIT HI MDM: CPT

## 2021-07-20 PROCEDURE — 85027 COMPLETE CBC AUTOMATED: CPT

## 2021-07-20 PROCEDURE — 87040 BLOOD CULTURE FOR BACTERIA: CPT

## 2021-07-20 PROCEDURE — 95714 VEEG EA 12-26 HR UNMNTR: CPT

## 2021-07-20 PROCEDURE — 81001 URINALYSIS AUTO W/SCOPE: CPT

## 2021-07-20 PROCEDURE — 84100 ASSAY OF PHOSPHORUS: CPT

## 2021-07-20 PROCEDURE — C9254: CPT

## 2021-07-20 PROCEDURE — 83090 ASSAY OF HOMOCYSTEINE: CPT

## 2021-07-20 PROCEDURE — 99239 HOSP IP/OBS DSCHRG MGMT >30: CPT

## 2021-07-20 PROCEDURE — 95700 EEG CONT REC W/VID EEG TECH: CPT

## 2021-07-20 PROCEDURE — 87635 SARS-COV-2 COVID-19 AMP PRB: CPT

## 2021-07-20 PROCEDURE — 74177 CT ABD & PELVIS W/CONTRAST: CPT | Mod: 26

## 2021-07-20 PROCEDURE — 86780 TREPONEMA PALLIDUM: CPT

## 2021-07-20 PROCEDURE — 84300 ASSAY OF URINE SODIUM: CPT

## 2021-07-20 PROCEDURE — 85730 THROMBOPLASTIN TIME PARTIAL: CPT

## 2021-07-20 PROCEDURE — 99233 SBSQ HOSP IP/OBS HIGH 50: CPT

## 2021-07-20 PROCEDURE — 82607 VITAMIN B-12: CPT

## 2021-07-20 PROCEDURE — 80048 BASIC METABOLIC PNL TOTAL CA: CPT

## 2021-07-20 PROCEDURE — 83935 ASSAY OF URINE OSMOLALITY: CPT

## 2021-07-20 PROCEDURE — 83921 ORGANIC ACID SINGLE QUANT: CPT

## 2021-07-20 PROCEDURE — 74177 CT ABD & PELVIS W/CONTRAST: CPT

## 2021-07-20 PROCEDURE — 70450 CT HEAD/BRAIN W/O DYE: CPT

## 2021-07-20 PROCEDURE — 87077 CULTURE AEROBIC IDENTIFY: CPT

## 2021-07-20 PROCEDURE — 82550 ASSAY OF CK (CPK): CPT

## 2021-07-20 PROCEDURE — 70553 MRI BRAIN STEM W/O & W/DYE: CPT

## 2021-07-20 PROCEDURE — 95711 VEEG 2-12 HR UNMONITORED: CPT

## 2021-07-20 PROCEDURE — 96374 THER/PROPH/DIAG INJ IV PUSH: CPT

## 2021-07-20 PROCEDURE — A9585: CPT

## 2021-07-20 PROCEDURE — 92526 ORAL FUNCTION THERAPY: CPT

## 2021-07-20 PROCEDURE — 85610 PROTHROMBIN TIME: CPT

## 2021-07-20 PROCEDURE — 86769 SARS-COV-2 COVID-19 ANTIBODY: CPT

## 2021-07-20 PROCEDURE — 85025 COMPLETE CBC W/AUTO DIFF WBC: CPT

## 2021-07-20 PROCEDURE — 82746 ASSAY OF FOLIC ACID SERUM: CPT

## 2021-07-20 PROCEDURE — 83735 ASSAY OF MAGNESIUM: CPT

## 2021-07-20 PROCEDURE — 92610 EVALUATE SWALLOWING FUNCTION: CPT

## 2021-07-20 PROCEDURE — 84443 ASSAY THYROID STIM HORMONE: CPT

## 2021-07-20 PROCEDURE — 80053 COMPREHEN METABOLIC PANEL: CPT

## 2021-07-20 PROCEDURE — 82962 GLUCOSE BLOOD TEST: CPT

## 2021-07-20 RX ORDER — MORPHINE SULFATE 50 MG/1
1.5 CAPSULE, EXTENDED RELEASE ORAL
Qty: 84 | Refills: 0
Start: 2021-07-20 | End: 2021-07-26

## 2021-07-20 RX ORDER — AMLODIPINE BESYLATE 2.5 MG/1
1 TABLET ORAL
Qty: 30 | Refills: 0
Start: 2021-07-20 | End: 2021-08-18

## 2021-07-20 RX ORDER — METRONIDAZOLE 500 MG
1 TABLET ORAL
Qty: 14 | Refills: 0
Start: 2021-07-20 | End: 2021-07-26

## 2021-07-20 RX ORDER — NYSTATIN 500MM UNIT
4 POWDER (EA) MISCELLANEOUS
Qty: 0 | Refills: 0 | DISCHARGE

## 2021-07-20 RX ORDER — ATORVASTATIN CALCIUM 80 MG/1
1 TABLET, FILM COATED ORAL
Qty: 0 | Refills: 0 | DISCHARGE

## 2021-07-20 RX ORDER — LOSARTAN POTASSIUM 100 MG/1
1 TABLET, FILM COATED ORAL
Qty: 0 | Refills: 0 | DISCHARGE

## 2021-07-20 RX ORDER — MUPIROCIN 20 MG/G
1 OINTMENT TOPICAL
Refills: 0 | Status: DISCONTINUED | OUTPATIENT
Start: 2021-07-20 | End: 2021-07-20

## 2021-07-20 RX ORDER — LOSARTAN POTASSIUM 100 MG/1
1 TABLET, FILM COATED ORAL
Qty: 30 | Refills: 0
Start: 2021-07-20 | End: 2021-08-18

## 2021-07-20 RX ORDER — LACOSAMIDE 50 MG/1
20 TABLET ORAL
Qty: 1200 | Refills: 0
Start: 2021-07-20 | End: 2021-08-18

## 2021-07-20 RX ORDER — POLYETHYLENE GLYCOL 3350 17 G/17G
17 POWDER, FOR SOLUTION ORAL
Qty: 510 | Refills: 0
Start: 2021-07-20 | End: 2021-08-18

## 2021-07-20 RX ORDER — LEVETIRACETAM 250 MG/1
10 TABLET, FILM COATED ORAL
Qty: 900 | Refills: 0
Start: 2021-07-20 | End: 2021-08-18

## 2021-07-20 RX ORDER — PANTOPRAZOLE SODIUM 20 MG/1
1 TABLET, DELAYED RELEASE ORAL
Qty: 14 | Refills: 0
Start: 2021-07-20 | End: 2021-08-02

## 2021-07-20 RX ORDER — AMANTADINE HCL 100 MG
10 CAPSULE ORAL
Qty: 600 | Refills: 0
Start: 2021-07-20 | End: 2021-08-18

## 2021-07-20 RX ORDER — BACITRACIN ZINC 500 UNIT/G
1 OINTMENT IN PACKET (EA) TOPICAL
Qty: 1 | Refills: 0
Start: 2021-07-20 | End: 2021-08-18

## 2021-07-20 RX ORDER — AMANTADINE HCL 100 MG
10 CAPSULE ORAL
Qty: 0 | Refills: 0 | DISCHARGE

## 2021-07-20 RX ORDER — CITALOPRAM 10 MG/1
1 TABLET, FILM COATED ORAL
Qty: 0 | Refills: 0 | DISCHARGE

## 2021-07-20 RX ORDER — SENNA PLUS 8.6 MG/1
2 TABLET ORAL
Qty: 60 | Refills: 0
Start: 2021-07-20 | End: 2021-08-18

## 2021-07-20 RX ORDER — LEVETIRACETAM 250 MG/1
7.5 TABLET, FILM COATED ORAL
Qty: 0 | Refills: 0 | DISCHARGE

## 2021-07-20 RX ORDER — AMLODIPINE BESYLATE 2.5 MG/1
1 TABLET ORAL
Qty: 0 | Refills: 0 | DISCHARGE

## 2021-07-20 RX ORDER — RAMELTEON 8 MG
1 TABLET ORAL
Qty: 0 | Refills: 0 | DISCHARGE

## 2021-07-20 RX ADMIN — Medication 100 MILLIGRAM(S): at 05:42

## 2021-07-20 RX ADMIN — MORPHINE SULFATE 1 MILLIGRAM(S): 50 CAPSULE, EXTENDED RELEASE ORAL at 08:35

## 2021-07-20 RX ADMIN — Medication 2 MILLIGRAM(S): at 05:41

## 2021-07-20 RX ADMIN — ENOXAPARIN SODIUM 40 MILLIGRAM(S): 100 INJECTION SUBCUTANEOUS at 13:29

## 2021-07-20 RX ADMIN — LACOSAMIDE 200 MILLIGRAM(S): 50 TABLET ORAL at 05:42

## 2021-07-20 RX ADMIN — LEVETIRACETAM 1000 MILLIGRAM(S): 250 TABLET, FILM COATED ORAL at 13:29

## 2021-07-20 RX ADMIN — Medication 1 APPLICATION(S): at 05:41

## 2021-07-20 RX ADMIN — PANTOPRAZOLE SODIUM 40 MILLIGRAM(S): 20 TABLET, DELAYED RELEASE ORAL at 05:42

## 2021-07-20 RX ADMIN — LEVETIRACETAM 1000 MILLIGRAM(S): 250 TABLET, FILM COATED ORAL at 05:41

## 2021-07-20 RX ADMIN — MORPHINE SULFATE 1 MILLIGRAM(S): 50 CAPSULE, EXTENDED RELEASE ORAL at 08:05

## 2021-07-20 NOTE — PROGRESS NOTE ADULT - SUBJECTIVE AND OBJECTIVE BOX
Follow Up:  PEG Site Drainage    Interval History: afebrile. noted to have drainage around the PEG site.     REVIEW OF SYSTEMS  [ x ] ROS unobtainable because:  encephalopathic  [  ] All other systems negative except as noted below    Constitutional:  [ ] fever [ ] chills  [ ] weight loss  [ ] weakness  Skin:  [ ] rash [ ] phlebitis	  Eyes: [ ] icterus [ ] pain  [ ] discharge	  ENMT: [ ] sore throat  [ ] thrush [ ] ulcers [ ] exudates  Respiratory: [ ] dyspnea [ ] hemoptysis [ ] cough [ ] sputum	  Cardiovascular:  [ ] chest pain [ ] palpitations [ ] edema	  Gastrointestinal:  [ ] nausea [ ] vomiting [ ] diarrhea [ ] constipation [ ] pain	  Genitourinary:  [ ] dysuria [ ] frequency [ ] hematuria [ ] discharge [ ] flank pain  [ ] incontinence  Musculoskeletal:  [ ] myalgias [ ] arthralgias [ ] arthritis  [ ] back pain  Neurological:  [ ] headache [ ] seizures  [ ] confusion/altered mental status    Allergies  No Known Allergies        ANTIMICROBIALS:      OTHER MEDS:  MEDICATIONS  (STANDING):  amantadine Syrup 100 two times a day  amLODIPine   Tablet 5 daily  bisacodyl Suppository 10 at bedtime PRN  dexAMETHasone     Tablet 2 daily  enoxaparin Injectable 40 daily  lacosamide Solution 200 two times a day  levETIRAcetam  Solution 1000 <User Schedule>  LORazepam   Injectable 1 once PRN  losartan 100 daily  morphine  - Injectable 1 every 3 hours PRN  pantoprazole   Suspension 40 before breakfast  polyethylene glycol 3350 17 daily  ramelteon 8 at bedtime PRN  senna 2 at bedtime      Vital Signs Last 24 Hrs  T(C): 36.4 (20 Jul 2021 12:10), Max: 36.7 (19 Jul 2021 23:55)  T(F): 97.6 (20 Jul 2021 12:10), Max: 98 (19 Jul 2021 23:55)  HR: 93 (20 Jul 2021 12:10) (91 - 98)  BP: 127/77 (20 Jul 2021 12:10) (103/70 - 135/87)  BP(mean): --  RR: 18 (20 Jul 2021 12:10) (18 - 20)  SpO2: 100% (20 Jul 2021 12:10) (100% - 100%)    PHYSICAL EXAMINATION:  General: Awake but not alert, NAD  Cardiac: RRR, No M/R/G  Resp: CTAB, No Wh/Rh/Ra  Abdomen: +PEG (site with yellow drainage but no surrounding erythema or tenderness to palpation) NBS, NT/ND, No HSM, No rigidity or guarding  MSK: Contracted LE. No LE edema. LLE foot with large bullae at plantar aspect (small amount of erythema at lateral aspect of bullae)  : + diaz  Skin: Findings per MSK Section. Skin is warm and dry to the touch.   Neuro: Awake but not alert. Contracted LE   Psych: Encephalopathic - unable to assess                              10.5   11.97 )-----------( 164      ( 20 Jul 2021 05:51 )             33.0       07-20    137  |  99  |  21  ----------------------------<  135<H>  4.6   |  27  |  <0.30<L>    Ca    8.5      20 Jul 2021 05:51  Phos  3.1     07-20  Mg     2.1     07-20            MICROBIOLOGY:  v  .Blood Blood-Peripheral  07-12-21   No Growth Final  --  --      .Urine Clean Catch (Midstream)  07-12-21   >100,000 CFU/ml Enterobacter cloacae complex  --  Enterobacter cloacae complex    RADIOLOGY:    <The imaging below has been reviewed and visualized by me independently. Findings as detailed in report below>    EXAM:  CT ABDOMEN AND PELVIS IC                        PROCEDURE DATE:  07/20/2021    1.  Tree in bud airspace opacities, compatible with mild terminal bronchiolitis.  2.  Gastrostomy tube within the distal stomach. No evidence of abscess.

## 2021-07-20 NOTE — PROGRESS NOTE ADULT - SUBJECTIVE AND OBJECTIVE BOX
Chief Complaint:  Patient is a 71y old  Male who presents with a chief complaint of seizure episode (20 Jul 2021 16:42)      Date of service 07-20-21 @ 20:08      Interval Events:   no acute events  Hospital Medications:  amantadine Syrup 100 milliGRAM(s) Oral two times a day  amLODIPine   Tablet 5 milliGRAM(s) Oral daily  BACItracin   Ointment 1 Application(s) Topical two times a day  bisacodyl Suppository 10 milliGRAM(s) Rectal at bedtime PRN  dexAMETHasone     Tablet 2 milliGRAM(s) Oral daily  enoxaparin Injectable 40 milliGRAM(s) SubCutaneous daily  lacosamide Solution 200 milliGRAM(s) Oral two times a day  levETIRAcetam  Solution 1000 milliGRAM(s) Oral <User Schedule>  LORazepam   Injectable 1 milliGRAM(s) IV Push once PRN  losartan 100 milliGRAM(s) Oral daily  morphine  - Injectable 1 milliGRAM(s) IV Push every 3 hours PRN  mupirocin 2% Ointment 1 Application(s) Topical two times a day  pantoprazole   Suspension 40 milliGRAM(s) Oral before breakfast  polyethylene glycol 3350 17 Gram(s) Oral daily  ramelteon 8 milliGRAM(s) Oral at bedtime PRN  senna 2 Tablet(s) Oral at bedtime        Review of Systems:  does not provide    PHYSICAL EXAM:   Vital Signs:  Vital Signs Last 24 Hrs  T(C): 36.4 (20 Jul 2021 12:10), Max: 36.7 (19 Jul 2021 23:55)  T(F): 97.6 (20 Jul 2021 12:10), Max: 98 (19 Jul 2021 23:55)  HR: 93 (20 Jul 2021 12:10) (91 - 98)  BP: 127/77 (20 Jul 2021 12:10) (103/70 - 127/77)  BP(mean): --  RR: 18 (20 Jul 2021 12:10) (18 - 19)  SpO2: 100% (20 Jul 2021 12:10) (100% - 100%)  Daily     Daily       PHYSICAL EXAM:     GENERAL:  Appears stated age, well-groomed, well-nourished, no distress  HEENT:  NC/AT,  conjunctivae anicteric, clear and pink,   NECK: supple, trachea midline  CHEST:  Full & symmetric excursion, no increased effort, breath sounds clear  HEART:  Regular rhythm, no JVD  ABDOMEN:  Soft, non-tender, non-distended, normoactive bowel sounds,  no masses , no hepatosplenomegaly, gastorstomy w some drainage  EXTREMITIES:  no cyanosis,clubbing or edema  SKIN:  No rash, erythema, or, ecchymoses, no jaundice  NEURO:  non-focal, no asterixis  RECTAL: Deferred      LABS Personally reviewed by me:                        10.5 11.97 )-----------( 164      ( 20 Jul 2021 05:51 )             33.0     Mean Cell Volume: 88.0 fl (07-20-21 @ 05:51)    07-20    137  |  99  |  21  ----------------------------<  135<H>  4.6   |  27  |  <0.30<L>    Ca    8.5      20 Jul 2021 05:51  Phos  3.1     07-20  Mg     2.1     07-20                                    10.5 11.97 )-----------( 164      ( 20 Jul 2021 05:51 )             33.0       Imaging personally reviewed by me:

## 2021-07-20 NOTE — PROGRESS NOTE ADULT - ATTENDING SUPERVISION STATEMENT
Resident
Resident/Student
Resident

## 2021-07-20 NOTE — PROGRESS NOTE ADULT - SUBJECTIVE AND OBJECTIVE BOX
Patient is a 71y old  Male who presents with a chief complaint of seizure episode (20 Jul 2021 07:35)  Patient seen today, no new events      MEDICATIONS  (STANDING):  amantadine Syrup 100 milliGRAM(s) Oral two times a day  amLODIPine   Tablet 5 milliGRAM(s) Oral daily  atorvastatin 10 milliGRAM(s) Oral at bedtime  BACItracin   Ointment 1 Application(s) Topical two times a day  dexAMETHasone     Tablet 2 milliGRAM(s) Oral daily  enoxaparin Injectable 40 milliGRAM(s) SubCutaneous daily  lacosamide Solution 200 milliGRAM(s) Oral two times a day  levETIRAcetam  Solution 1000 milliGRAM(s) Oral <User Schedule>  losartan 100 milliGRAM(s) Oral daily  pantoprazole   Suspension 40 milliGRAM(s) Oral before breakfast  polyethylene glycol 3350 17 Gram(s) Oral daily  senna 2 Tablet(s) Oral at bedtime    MEDICATIONS  (PRN):  bisacodyl Suppository 10 milliGRAM(s) Rectal at bedtime PRN Constipation  LORazepam   Injectable 1 milliGRAM(s) IV Push once PRN GTC  morphine  - Injectable 1 milliGRAM(s) IV Push every 3 hours PRN Moderate Pain (4 - 6)  ramelteon 8 milliGRAM(s) Oral at bedtime PRN Insomnia      Vital Signs Last 24 Hrs  T(C): 36.4 (20 Jul 2021 08:21), Max: 36.7 (19 Jul 2021 23:55)  T(F): 97.6 (20 Jul 2021 08:21), Max: 98 (19 Jul 2021 23:55)  HR: 92 (20 Jul 2021 08:21) (91 - 102)  BP: 125/75 (20 Jul 2021 08:21) (99/65 - 135/87)  BP(mean): --  RR: 18 (20 Jul 2021 08:21) (18 - 24)  SpO2: 100% (20 Jul 2021 08:21) (98% - 100%)    PE  NAD  Obtunded                          10.5   11.97 )-----------( 164      ( 20 Jul 2021 05:51 )             33.0       07-20    137  |  99  |  21  ----------------------------<  135<H>  4.6   |  27  |  <0.30<L>    Ca    8.5      20 Jul 2021 05:51  Phos  3.1     07-20  Mg     2.1     07-20

## 2021-07-20 NOTE — DISCHARGE NOTE NURSING/CASE MANAGEMENT/SOCIAL WORK - PATIENT PORTAL LINK FT
You can access the FollowMyHealth Patient Portal offered by [facility] by registering at the following website: [email or web address]. By joining [employer]’s FollowMyHealth portal, you will also be able to view your health information using other applications (apps) compatible with our system.

## 2021-07-20 NOTE — PROGRESS NOTE ADULT - TIME BILLING
PT SEEN AND EXAMINED WITH HOUSESTAFF 7/20/2021  SUNRISE RECORDS REVIEWED   ALLSCRIPTS RECORDS REVIEWED  LABS REVIEWED

## 2021-07-20 NOTE — PROGRESS NOTE ADULT - ASSESSMENT
72yo M with left plantar foot blister   - pt seen and evaluated  - foot is not the source of infection, remains stable  - Left plantar lateral foot serous blister, no open wounds or lesions, no acute signs of infection - much improved  - recommend mupirocin and allevyn pad to be applied daily to left foot blister site, adaptic dsd  - pt to wear z-flows at all times when in bed b/l feet   - will follow

## 2021-07-20 NOTE — PROGRESS NOTE ADULT - ASSESSMENT
Glioblastoma Multiforme    --Under care at [facility] [place] Dr. Lyle Chavis  --Had been on Avastin as outpatient  --Appears to have stable disease but MS has deteriorated   --As per Dr. Martínez who is covering for Dr. Chavis - Hospice may be appropriate due to deterioration of MS  --PEG Placed-Receiving TF      AMS, Seizure like activity    --No edema, mass effect, hemorrhage or midline shift on CT  -- Repeat MRI stable   --Patient on Dexamethasone now 4mg q8 hours  --Keppra has been increased to 1000 mg TID   --Fu Neurology  --Vimpat dose 200 mg q12     GO  --Home Hospice pending  --Services in place  --Patient now DNR    We will continue to follow patient and coordinate with [facility]    Kaia Rivas NP  Hematology/Oncology  [facility]  [phone] (Cell)  [phone] (Office)  [phone] (Alt office)  Evenings and weekends please call MD on call or office

## 2021-07-20 NOTE — PROGRESS NOTE ADULT - SUBJECTIVE AND OBJECTIVE BOX
DATE OF SERVICE: 07-20-21 @ 14:50    Patient is a 71y old  Male who presents with a chief complaint of seizure episode (20 Jul 2021 12:34)      SUBJECTIVE / OVERNIGHT EVENTS:  alert but nonverbal    MEDICATIONS  (STANDING):  amantadine Syrup 100 milliGRAM(s) Oral two times a day  amLODIPine   Tablet 5 milliGRAM(s) Oral daily  BACItracin   Ointment 1 Application(s) Topical two times a day  dexAMETHasone     Tablet 2 milliGRAM(s) Oral daily  enoxaparin Injectable 40 milliGRAM(s) SubCutaneous daily  lacosamide Solution 200 milliGRAM(s) Oral two times a day  levETIRAcetam  Solution 1000 milliGRAM(s) Oral <User Schedule>  losartan 100 milliGRAM(s) Oral daily  mupirocin 2% Ointment 1 Application(s) Topical two times a day  pantoprazole   Suspension 40 milliGRAM(s) Oral before breakfast  polyethylene glycol 3350 17 Gram(s) Oral daily  senna 2 Tablet(s) Oral at bedtime    MEDICATIONS  (PRN):  bisacodyl Suppository 10 milliGRAM(s) Rectal at bedtime PRN Constipation  LORazepam   Injectable 1 milliGRAM(s) IV Push once PRN GTC  morphine  - Injectable 1 milliGRAM(s) IV Push every 3 hours PRN Moderate Pain (4 - 6)  ramelteon 8 milliGRAM(s) Oral at bedtime PRN Insomnia      Vital Signs Last 24 Hrs  T(C): 36.4 (20 Jul 2021 12:10), Max: 36.7 (19 Jul 2021 23:55)  T(F): 97.6 (20 Jul 2021 12:10), Max: 98 (19 Jul 2021 23:55)  HR: 93 (20 Jul 2021 12:10) (91 - 98)  BP: 127/77 (20 Jul 2021 12:10) (103/66 - 135/87)  BP(mean): --  RR: 18 (20 Jul 2021 12:10) (18 - 20)  SpO2: 100% (20 Jul 2021 12:10) (100% - 100%)  CAPILLARY BLOOD GLUCOSE        I&O's Summary    19 Jul 2021 07:01  -  20 Jul 2021 07:00  --------------------------------------------------------  IN: 0 mL / OUT: 1250 mL / NET: -1250 mL        PHYSICAL EXAM:  GENERAL: NAD, well-developed  HEAD:  Atraumatic, Normocephalic  EYES: EOMI, PERRLA, conjunctiva and sclera clear  NECK: Supple, No JVD  CHEST/LUNG: Clear to auscultation bilaterally; No wheeze  HEART: Regular rate and rhythm; No murmurs, rubs, or gallops  ABDOMEN: Soft, Nontender, Nondistended; Bowel sounds present  EXTREMITIES:  2+ Peripheral Pulses, No clubbing, cyanosis, or edema    LABS:                        10.5   11.97 )-----------( 164      ( 20 Jul 2021 05:51 )             33.0     07-20    137  |  99  |  21  ----------------------------<  135<H>  4.6   |  27  |  <0.30<L>    Ca    8.5      20 Jul 2021 05:51  Phos  3.1     07-20  Mg     2.1     07-20      < from: CT Abdomen and Pelvis w/ IV Cont (07.20.21 @ 12:56) >    CONTRAST/COMPLICATIONS:  IV Contrast: Omnipaque 350, 90 cc administered and 10 cc discarded.  Oral Contrast: None.  Complications: None.    PROCEDURE:  CT of the Abdomen and Pelvis was performed.  Sagittal and coronal reformats were performed.  Limited bymotion artifact.    FINDINGS:  LOWER CHEST: Calcified coronary arteries. Partially visualized diffuse tree-in-bud opacities predominantly involving the lower lobes. Mild concentric low-attenuation wall thickening of the visualized distal esophagus, which may represent esophagitis.    LIVER: Within normal limits.  BILE DUCTS: Normal caliber.  GALLBLADDER: Within normal limits.  SPLEEN: Within normal limits.  PANCREAS: Within normal limits.  ADRENALS: Within normal limits.  KIDNEYS/URETERS: Within normal limits.    BLADDER: Within normal limits.  REPRODUCTIVE ORGANS: Prostate is not enlarged and measures 4.2 cm in transverse dimension.    BOWEL: No bowel obstruction. Dense calcification in the cecum may correspond to a ingested pill fragment orfecalith. The stomach is underdistended, limiting assessment. Gastrostomy tube is visualized within the distal stomach. There is stool throughout the colon. No evidence of bowel obstruction.  PERITONEUM: No ascites.  VESSELS: Atherosclerotic changes.  RETROPERITONEUM/LYMPH NODES: No lymphadenopathy.  ABDOMINAL WALL: Mild soft tissue infiltration of the abdominal wall at the gastrostomy site without evidence of discrete collection.  BONES: Degenerative changes. Mild skin thickening overlying the sacrum. Correlate clinically for decubitus ulceration.    IMPRESSION:  1.  Tree in bud airspace opacities, compatible with mild terminal bronchiolitis.  2.  Gastrostomy tube within the distal stomach. No evidence of abscess.      < end of copied text >            RADIOLOGY & ADDITIONAL TESTS:    Imaging Personally Reviewed:    Consultant(s) Notes Reviewed:      Care Discussed with Consultants/Other Providers:

## 2021-07-20 NOTE — PROGRESS NOTE ADULT - REASON FOR ADMISSION
seizure episode

## 2021-07-20 NOTE — DISCHARGE NOTE NURSING/CASE MANAGEMENT/SOCIAL WORK - NSDCVIVACCINE_GEN_ALL_CORE_FT
Tdap; 08-Dec-2014 12:30; Roxana Mora); [employer]; [id number]; IntraMuscular; Deltoid Left.; 0.5 milliLiter(s);

## 2021-07-20 NOTE — PROGRESS NOTE ADULT - NSICDXPILOT_GEN_ALL_CORE
[place]

## 2021-07-20 NOTE — PROGRESS NOTE ADULT - ASSESSMENT
72 year old RH male with a past medical history of HTN, HLD, hx of SIADH, gliobastoma multiforme (diagnosed in 2019) and seizures presenting after an episode of depressed level of consciousness and suspected seizure episode. In the ED afebrile, normotensive but tachycardic > 90. On presentation WBC of 8.34 with 62.8% PMN.     U/A on 7/1 with 358 WBC.   Treated with CTX 7/1 -> 7/3.   On 7/9 s/p PEG placement.     MRI Brain (7/6) (stable compared to 6/11/21 MRI) with posttreatment changes in the bilateral occipital lobes and periventricular regions and lobular areas of high T1 signal centered in the splenium of the corpus callosum.     At this point I suspect the leukocytosis is secondary to patient's Dexamethasone course  CXR (7/12) with no infiltrates  Podiatry without concern for superinfection of LLE foot bullae     U/A with pyuria and UCx with >100K Enterobacter but in context of indwelling diaz catheter  Leukocytosis is downtrending off of antibiotics    RECOMMENDATIONS:    #PEG Site Drainage  --CT A/P with no abscess; unclear if drainage is gastric contents or infection. Patient to be discharged today to home hospice. Not unreasonable to give empiric 7 day antibiotic course.  --Recommend Levofloxacin 500 mg PO Through PEG Q24H (should be spaced 2 hours from tube feeds) and Metronidazole 500 mg PO through PEG Q12H x 7 days     #Leukocytosis, Positive UCx (Enterobacter), Glioblastoma Multiforme (on Dexamethasone)  --No need for antibiotics for UTI  --Continue to follow CBC with diff    #LLE Foot Plantar Bullae  --Podiatry recommendations noted - Deroofed blister to level of dermis, but not beyond dermis    I will continue to follow. Please feel free to contact me with any further questions.    Jack Keller M.D.  [facility] Division of Infectious Disease  8AM-5PM: Pager Number [phone]  After Hours (or if no response): Please contact the Infectious Diseases Office at [phone]     The above assessment and plan were discussed with neurology team and Dr Palumbo (GI)

## 2021-07-20 NOTE — PROGRESS NOTE ADULT - SUBJECTIVE AND OBJECTIVE BOX
Patient is a 71y old  Male who presents with a chief complaint of seizure episode (20 Jul 2021 08:43)       INTERVAL HPI/OVERNIGHT EVENTS:  Patient seen and evaluated at bedside.  Pt is resting comfortable in NAD.    Allergies    No Known Allergies    Intolerances        Vital Signs Last 24 Hrs  T(C): 36.4 (20 Jul 2021 12:10), Max: 36.7 (19 Jul 2021 23:55)  T(F): 97.6 (20 Jul 2021 12:10), Max: 98 (19 Jul 2021 23:55)  HR: 93 (20 Jul 2021 12:10) (91 - 98)  BP: 127/77 (20 Jul 2021 12:10) (103/66 - 135/87)  BP(mean): --  RR: 18 (20 Jul 2021 12:10) (18 - 20)  SpO2: 100% (20 Jul 2021 12:10) (100% - 100%)    LABS:                        10.5   11.97 )-----------( 164      ( 20 Jul 2021 05:51 )             33.0     07-20    137  |  99  |  21  ----------------------------<  135<H>  4.6   |  27  |  <0.30<L>    Ca    8.5      20 Jul 2021 05:51  Phos  3.1     07-20  Mg     2.1     07-20          CAPILLARY BLOOD GLUCOSE          Lower Extremity Physical Exam:  Vascular: DP/PT 2/4 B/L, CFT <3 seconds B/L, Temperature gradient warm to cool B/L  Neuro: unable to assess   Musculoskeletal/Ortho: b/l LE contracted   Skin: Left plantar lateral foot serous blister - lanced continues to improve with epithelialization much smaller 2.0cm x 2.0cm, no purulence, no probing or tracking, no open wounds or lesions, no acute signs of infection     RADIOLOGY & ADDITIONAL TESTS:

## 2021-07-20 NOTE — PROGRESS NOTE ADULT - SUBJECTIVE AND OBJECTIVE BOX
[id number]    Subjective:    PAST MEDICAL & SURGICAL HISTORY:  Hypertension    Hyperlipidemia    Seizures    Diabetes    Glioblastoma multiforme    CMV infection    History of SIADH    H/O colonoscopy    History of laryngoscopy    Status post stereotactic brain biopsy    FAMILY HISTORY:  FH: myocardial infarction (Father)    Social Hx:  Nonsmoker, no drug or alcohol use    Home Medications:  amantadine 50 mg/5 mL oral syrup: 10 milliliter(s) orally 2 times a day (05 Jun 2021 10:00)  atorvastatin 10 mg oral tablet: 1 tab(s) orally once a day (05 Jun 2021 10:00)  citalopram 20 mg oral tablet: 1 tab(s) orally once a day (05 Jun 2021 10:00)  Keppra 100 mg/mL oral solution: 7.5 milliliter(s) orally 2 times a day (05 Jun 2021 10:00)  losartan 100 mg oral tablet: 1 tab(s) orally once a day (05 Jun 2021 10:00)  methylphenidate 5 mg oral tablet: 1 tab(s) orally @ 8am and 2 pm (15 Abel 2021 12:19)  Norvasc 5 mg oral tablet: 1 tab(s) orally once a day (05 Jun 2021 10:00)  nystatin 100,000 units/mL oral suspension: 4 milliliter(s) orally 4 times a day (05 Jun 2021 10:00)  ramelteon 8 mg oral tablet: 1 tab(s) orally once a day (at bedtime) (05 Jun 2021 10:00)    MEDICATIONS  (STANDING):  amantadine Syrup 100 milliGRAM(s) Oral two times a day  amLODIPine   Tablet 5 milliGRAM(s) Oral daily  atorvastatin 10 milliGRAM(s) Oral at bedtime  BACItracin   Ointment 1 Application(s) Topical two times a day  dexAMETHasone     Tablet 2 milliGRAM(s) Oral daily  enoxaparin Injectable 40 milliGRAM(s) SubCutaneous daily  lacosamide Solution 200 milliGRAM(s) Oral two times a day  levETIRAcetam  Solution 1000 milliGRAM(s) Oral <User Schedule>  losartan 100 milliGRAM(s) Oral daily  pantoprazole   Suspension 40 milliGRAM(s) Oral before breakfast  polyethylene glycol 3350 17 Gram(s) Oral daily  senna 2 Tablet(s) Oral at bedtime    MEDICATIONS  (PRN):  bisacodyl Suppository 10 milliGRAM(s) Rectal at bedtime PRN Constipation  LORazepam   Injectable 1 milliGRAM(s) IV Push once PRN GTC  morphine  - Injectable 1 milliGRAM(s) IV Push every 3 hours PRN Moderate Pain (4 - 6)  ramelteon 8 milliGRAM(s) Oral at bedtime PRN Insomnia    Allergies  No Known Allergies    ROS: Unable to obtain due to patient's mental status.      Vital Signs Last 24 Hrs  T(C): 36.7 (20 Jul 2021 05:00), Max: 36.7 (19 Jul 2021 23:55)  T(F): 98 (20 Jul 2021 05:00), Max: 98 (19 Jul 2021 23:55)  HR: 91 (20 Jul 2021 05:00) (91 - 102)  BP: 103/70 (20 Jul 2021 05:00) (99/65 - 135/87)  RR: 18 (20 Jul 2021 05:00) (18 - 24)  SpO2: 100% (20 Jul 2021 05:00) (98% - 100%)    GENERAL EXAM:  Constitutional: Lying in bed, NAD. Cachectic.  Head: Normocephalic. Atraumatic.  Extremities: No edema. L foot serous blister on plantar aspect.    NEUROLOGICAL EXAM:  MS: Awake. Eyes open spontaneously. No verbal output. Does not follow commands.  CN: PERRL. BTT intact b/l. Does not track examiner. No facial asymmetry.  Motor: No spontaneous movement noted. No twitching/jerking observed. Increased tone in b/l upper extremities, L>R. Contractures in b/l lower extremities.  Coordination/Gait: Not assessed.    Labs:   cbc                      10.5   11.97 )-----------( 164      ( 20 Jul 2021 05:51 )             33.0     [id number]    137  |  99  |  21  ----------------------------<  135<H>  4.6   |  27  |  <0.30<L>    Ca    8.5      20 Jul 2021 05:51  Phos  3.1     07-20  Mg     2.1     07-20    Radiology/EEGs:  -06/29 CTH: No CT evidence of acute intracranial hemorrhage, mass effect, or midline shift. Unchanged posttreatment changes.    -07/03 EEG Summary / Classification:  Abnormal EEG in comatose patient  Lateralized periodic discharges, left hemisphere, max over centroparietal region  Disorganization  Moderate background slowing, generalized.  EEG Impression / Clinical Correlate:  The presence of LPDs is often associated with subacute structural injury in the left hemisphere. LPDs also indicate heightened risk for seizures.  There is also evidence of moderate diffuse cerebral dysfunction.    -07/04 EEG Summary / Classification:  Abnormal EEG in comatose patient  4.	Lateralized periodic discharges, left hemisphere, max over centroparietal region.  5.	Disorganization  6.	Moderate background slowing, generalized.  EEG Impression / Clinical Correlate:  The presence of LPDs is often associated with subacute structural injury in the left hemisphere. LPDs also indicate heightened risk for seizures.  There is also evidence of moderate diffuse cerebral dysfunction.    -07/05 EEG Summary / Classification:  Abnormal EEG in comatose patient  7.	Lateralized periodic discharges, left hemisphere, max over centroparietal region.  8.	Disorganization  9.	Moderate background slowing, generalized.  EEG Impression / Clinical Correlate:  The presence of LPDs is often associated with subacute structural injury in the left hemisphere. LPDs also indicate heightened risk for seizures.  There is also evidence of moderate diffuse cerebral dysfunction.    -07/06 EEG Summary / Classification:  Abnormal EEG in comatose patient  10.	Lateralized periodic discharges, left hemisphere, max over temporo-centroparietal region with time locked clonus of the right arm.  11.	Disorganization  12.	Moderate background slowing, generalized.  EEG Impression / Clinical Correlate:  The presence of LPDs with associated time locked clonus indicative of focal motor seizures ("EPC").  There is also evidence of moderate diffuse cerebral dysfunction.    -07/06 MRI Brain w/wo Cont: Redemonstration of posttreatment changes in the bilateral occipital lobes and periventricular regions. There is a lobular areas of high T1 signal centered in the splenium of the corpus callosum. No gross enhancement. The overall size and appearance is unchanged. Areas are restricted diffusion again noted likely related to tumor. Redemonstration Of diffuse T2 prolongation throughout the periventricular white matter, unchanged from the prior exam, likely representing radiation changes, gliosis and/or edema. Stable exam. [id number]    Subjective: 70 yo male seen and examined at bedside. No events overnight. Per GI, concern for PEG site infection because site is oozing. Will obtain CT A/P w/ contrast to r/o abscess at PEG site.    PAST MEDICAL & SURGICAL HISTORY:  Hypertension    Hyperlipidemia    Seizures    Diabetes    Glioblastoma multiforme    CMV infection    History of SIADH    H/O colonoscopy    History of laryngoscopy    Status post stereotactic brain biopsy    FAMILY HISTORY:  FH: myocardial infarction (Father)    Social Hx:  Nonsmoker, no drug or alcohol use    Home Medications:  amantadine 50 mg/5 mL oral syrup: 10 milliliter(s) orally 2 times a day (05 Jun 2021 10:00)  atorvastatin 10 mg oral tablet: 1 tab(s) orally once a day (05 Jun 2021 10:00)  citalopram 20 mg oral tablet: 1 tab(s) orally once a day (05 Jun 2021 10:00)  Keppra 100 mg/mL oral solution: 7.5 milliliter(s) orally 2 times a day (05 Jun 2021 10:00)  losartan 100 mg oral tablet: 1 tab(s) orally once a day (05 Jun 2021 10:00)  methylphenidate 5 mg oral tablet: 1 tab(s) orally @ 8am and 2 pm (15 Abel 2021 12:19)  Norvasc 5 mg oral tablet: 1 tab(s) orally once a day (05 Jun 2021 10:00)  nystatin 100,000 units/mL oral suspension: 4 milliliter(s) orally 4 times a day (05 Jun 2021 10:00)  ramelteon 8 mg oral tablet: 1 tab(s) orally once a day (at bedtime) (05 Jun 2021 10:00)    MEDICATIONS  (STANDING):  amantadine Syrup 100 milliGRAM(s) Oral two times a day  amLODIPine   Tablet 5 milliGRAM(s) Oral daily  atorvastatin 10 milliGRAM(s) Oral at bedtime  BACItracin   Ointment 1 Application(s) Topical two times a day  dexAMETHasone     Tablet 2 milliGRAM(s) Oral daily  enoxaparin Injectable 40 milliGRAM(s) SubCutaneous daily  lacosamide Solution 200 milliGRAM(s) Oral two times a day  levETIRAcetam  Solution 1000 milliGRAM(s) Oral <User Schedule>  losartan 100 milliGRAM(s) Oral daily  pantoprazole   Suspension 40 milliGRAM(s) Oral before breakfast  polyethylene glycol 3350 17 Gram(s) Oral daily  senna 2 Tablet(s) Oral at bedtime    MEDICATIONS  (PRN):  bisacodyl Suppository 10 milliGRAM(s) Rectal at bedtime PRN Constipation  LORazepam   Injectable 1 milliGRAM(s) IV Push once PRN GTC  morphine  - Injectable 1 milliGRAM(s) IV Push every 3 hours PRN Moderate Pain (4 - 6)  ramelteon 8 milliGRAM(s) Oral at bedtime PRN Insomnia    Allergies  No Known Allergies    ROS: Unable to obtain due to patient's mental status.      Vital Signs Last 24 Hrs  T(C): 36.7 (20 Jul 2021 05:00), Max: 36.7 (19 Jul 2021 23:55)  T(F): 98 (20 Jul 2021 05:00), Max: 98 (19 Jul 2021 23:55)  HR: 91 (20 Jul 2021 05:00) (91 - 102)  BP: 103/70 (20 Jul 2021 05:00) (99/65 - 135/87)  RR: 18 (20 Jul 2021 05:00) (18 - 24)  SpO2: 100% (20 Jul 2021 05:00) (98% - 100%)    GENERAL EXAM:  Constitutional: Lying in bed, NAD. Cachectic.  Head: Normocephalic. Atraumatic.  Extremities: No edema. L foot serous blister on plantar aspect.    NEUROLOGICAL EXAM:  MS: Awake. Eyes open spontaneously. No verbal output. Does not follow commands.  CN: PERRL. BTT intact b/l. Does not track examiner. No facial asymmetry.  Motor: No spontaneous movement noted. No twitching/jerking observed. Increased tone in b/l upper extremities, L>R. Contractures in b/l lower extremities.  Coordination/Gait: Not assessed.    Labs:   cbc                      10.5   11.97 )-----------( 164      ( 20 Jul 2021 05:51 )             33.0     [id number]    137  |  99  |  21  ----------------------------<  135<H>  4.6   |  27  |  <0.30<L>    Ca    8.5      20 Jul 2021 05:51  Phos  3.1     07-20  Mg     2.1     07-20    Radiology/EEGs:  -06/29 CTH: No CT evidence of acute intracranial hemorrhage, mass effect, or midline shift. Unchanged posttreatment changes.    -07/03 EEG Summary / Classification:  Abnormal EEG in comatose patient  Lateralized periodic discharges, left hemisphere, max over centroparietal region  Disorganization  Moderate background slowing, generalized.  EEG Impression / Clinical Correlate:  The presence of LPDs is often associated with subacute structural injury in the left hemisphere. LPDs also indicate heightened risk for seizures.  There is also evidence of moderate diffuse cerebral dysfunction.    -07/04 EEG Summary / Classification:  Abnormal EEG in comatose patient  4.	Lateralized periodic discharges, left hemisphere, max over centroparietal region.  5.	Disorganization  6.	Moderate background slowing, generalized.  EEG Impression / Clinical Correlate:  The presence of LPDs is often associated with subacute structural injury in the left hemisphere. LPDs also indicate heightened risk for seizures.  There is also evidence of moderate diffuse cerebral dysfunction.    -07/05 EEG Summary / Classification:  Abnormal EEG in comatose patient  7.	Lateralized periodic discharges, left hemisphere, max over centroparietal region.  8.	Disorganization  9.	Moderate background slowing, generalized.  EEG Impression / Clinical Correlate:  The presence of LPDs is often associated with subacute structural injury in the left hemisphere. LPDs also indicate heightened risk for seizures.  There is also evidence of moderate diffuse cerebral dysfunction.    -07/06 EEG Summary / Classification:  Abnormal EEG in comatose patient  10.	Lateralized periodic discharges, left hemisphere, max over temporo-centroparietal region with time locked clonus of the right arm.  11.	Disorganization  12.	Moderate background slowing, generalized.  EEG Impression / Clinical Correlate:  The presence of LPDs with associated time locked clonus indicative of focal motor seizures ("EPC").  There is also evidence of moderate diffuse cerebral dysfunction.    -07/06 MRI Brain w/wo Cont: Redemonstration of posttreatment changes in the bilateral occipital lobes and periventricular regions. There is a lobular areas of high T1 signal centered in the splenium of the corpus callosum. No gross enhancement. The overall size and appearance is unchanged. Areas are restricted diffusion again noted likely related to tumor. Redemonstration Of diffuse T2 prolongation throughout the periventricular white matter, unchanged from the prior exam, likely representing radiation changes, gliosis and/or edema. Stable exam.

## 2021-07-20 NOTE — PROGRESS NOTE ADULT - ASSESSMENT
Assessment: 71 year old RH male with a past medical history of HTN, HLD, hx of SIADH, glioblastoma multiforme (diagnosed in 2019), and seizures presenting after an episode of depressed level of consciousness and suspected seizure episode.    Impression: Failure to thrive and progression of disease. Hospital course c/b focal motor status epilepticus, now controlled. Cachexia due to malignancy and poor nutrition.    Plan:  [x] vEEG: LPDs, left hemisphere, max over temporo-centroparietal region with time locked clonus of the right arm.  [x] MRI Brain w/wo contrast: no change compared to prior study.   [] c/w Vimpat to 200MG PO BID.  [] decrease Decadron to 2MG PO QD x3 days (7/19-7/21), then 1MG po QD x3 days (7/22-7/24), then stop.  [] c/w Keppra 1000MG PO TID.  [] Hyponatremia likely secondary to SIADH. Fluid restriction < 1.5L daily. (137 on 7/20).  [x] d/c'd Ritalin, Baclofen.  [x] Holding home Celexa as this could contribute to hyponatremia.  [x] s/p PEG placement on 07/09.  [x] IM on board, Dr. Godfrey. Recs appreciated.  [] ID consulted for leukocytosis. Likely related to steroid use. Monitoring off abx for now per ID.  [] Podiatry consulted for L plantar bullae. Serous blister, not a source of infection. Bilateral Z-flows.  [] c/w Bowel regimen.  [x] Discussed patient's treatment plan and goals of care with neuro-oncologist Dr. Martínez - currently on Avastin monotherapy (last dose 6/24, next dose due around 7/9).  [] Family made patient DNR. Palliative team on board, family wants home hospice. On Morphine 1MG IVP Q3HR PRN for pain.  [x] CPK, B12, folate: wnl.  [] Diet: Glucerna 1.2 via PEG, goal rate 60.  [] DVT PPx: Lovenox SC.  [] GI PPx: Protonix PO QD.  [] Dispo: Patient approved for Home Hospice - [facility] approved, supplies delivered to home; pending family education regarding PEG/wound care.  [] Seizure rescue: Ativan 1MG IVP ONLY FOR CONVULSIONS/GTC LASTING > 3 MINUTES. Not treating focal seizures.    Case discussed with neurology attending Dr. Giron. Assessment: 71 year old RH male with a past medical history of HTN, HLD, hx of SIADH, glioblastoma multiforme (diagnosed in 2019), and seizures presenting after an episode of depressed level of consciousness and suspected seizure episode.    Impression: Failure to thrive and progression of disease. Hospital course c/b focal motor status epilepticus, now controlled. Cachexia due to malignancy and poor nutrition.    Plan:  [x] vEEG: LPDs, left hemisphere, max over temporo-centroparietal region with time locked clonus of the right arm.  [x] MRI Brain w/wo contrast: no change compared to prior study.   [] c/w Vimpat to 200MG PO BID.  [] decrease Decadron to 2MG PO QD x3 days (7/19-7/21), then 1MG po QD x3 days (7/22-7/24), then stop.  [] c/w Keppra 1000MG PO TID.  [] Hyponatremia likely secondary to SIADH. Fluid restriction < 1.5L daily. (137 on 7/20).  [x] d/c'd Ritalin, Baclofen.  [x] Holding home Celexa as this could contribute to hyponatremia.  [] s/p PEG placement on 07/09. Concern for PEG site infection, will obtain CT A/P w/ contrast to r/o PEG site abscess.  [x] IM on board, Dr. Godfrey. Recs appreciated.  [] ID consulted for leukocytosis. Likely related to steroid use. Monitoring off abx for now per ID.  [] Podiatry consulted for L plantar bullae. Serous blister, not a source of infection. Bilateral Z-flows.  [] c/w Bowel regimen.  [x] Discussed patient's treatment plan and goals of care with neuro-oncologist Dr. Martínez - currently on Avastin monotherapy (last dose 6/24).  [] Family made patient DNR. Palliative team on board, family wants home hospice. On Morphine 1MG IVP Q3HR PRN for pain.  [x] CPK, B12, folate: wnl.  [] Diet: Glucerna 1.2 via PEG, goal rate 60.  [] DVT PPx: Lovenox SC.  [] GI PPx: Protonix PO QD.  [] Dispo: Patient approved for Home Hospice - [facility] approved, supplies delivered to home; pending family education regarding PEG/wound care.  [] Seizure rescue: Ativan 1MG IVP ONLY FOR CONVULSIONS/GTC LASTING > 3 MINUTES. Not treating focal seizures.    Case discussed with neurology attending Dr. Giron.

## 2021-07-20 NOTE — PROGRESS NOTE ADULT - ATTENDING COMMENTS
NEUROLOGY: BERTA [phone]    PT SEEN AND EXAMINED WITH HOUSESTAJERRY 7/20/2021  [facility] RECORDS REVIEWED   ALLSCRIPTS RECORDS REVIEWED  LABS REVIEWED    Briefly, unfortunate 72 yo RH man with terminal malignant brain tumor, admit for PEG, now set up to return on home hospice.    Patient nonverbal. Not clearly conscious. lies comfortably with mouth open, eye staring straight ahead.  PEG site examined and there is no inflammation nor evidence of infection. Wound is c/d/i.    IMPRESSION  Plan for d/c to home hospice as available.

## 2021-07-20 NOTE — PROGRESS NOTE ADULT - PROVIDER SPECIALTY LIST ADULT
Gastroenterology
Heme/Onc
Internal Medicine
Neurology
Neurology
Gastroenterology
Gastroenterology
Heme/Onc
Heme/Onc
Infectious Disease
Internal Medicine
Internal Medicine
Neurology
Podiatry
Gastroenterology
Gastroenterology
Heme/Onc
Internal Medicine
Neurology
Heme/Onc
Infectious Disease
Infectious Disease
Internal Medicine
Podiatry
Podiatry
Gastroenterology
Heme/Onc
Hospitalist
Infectious Disease
Internal Medicine
Internal Medicine
Neurology
Podiatry
Heme/Onc
Heme/Onc
Internal Medicine
Internal Medicine
Neurology
Neurology
Palliative Care

## 2021-07-20 NOTE — PROGRESS NOTE ADULT - NUTRITIONAL ASSESSMENT
This patient has been assessed with a concern for Malnutrition and has been determined to have a diagnosis/diagnoses of Severe protein-calorie malnutrition and Underweight (BMI < 19).    This patient is being managed with:   Diet NPO with Tube Feed-  Tube Feeding Modality: Nasogastric  Glucerna 1.5 Davey (GLUCERNA1.5RTH)  Total Volume for 24 Hours (mL): 1440  Continuous  Starting Tube Feed Rate {mL per Hour}: 10  Increase Tube Feed Rate by (mL): 10     Every 4 hours  Until Goal Tube Feed Rate (mL per Hour): 60  Tube Feed Duration (in Hours): 24  Tube Feed Start Time: 18:30  Entered: Jul 1 2021  4:34PM    
This patient has been assessed with a concern for Malnutrition and has been determined to have a diagnosis/diagnoses of Severe protein-calorie malnutrition and Underweight (BMI < 19).    This patient is being managed with:   Diet NPO-  Except Medications  Entered: Jul 9 2021 12:16AM    
This patient has been assessed with a concern for Malnutrition and has been determined to have a diagnosis/diagnoses of Severe protein-calorie malnutrition and Underweight (BMI < 19).    This patient is being managed with:   Diet NPO-  Except Medications  Entered: Jul 9 2021 12:16AM    
This patient has been assessed with a concern for Malnutrition and has been determined to have a diagnosis/diagnoses of Severe protein-calorie malnutrition and Underweight (BMI < 19).    This patient is being managed with:   Diet NPO-  Tube Feeding Modality: Gastrostomy  Glucerna 1.2 Davey (GLUCERNARTH)  Total Volume for 24 Hours (mL): 1440  Continuous  Starting Tube Feed Rate {mL per Hour}: 10  Increase Tube Feed Rate by (mL): 10     Every 6 hours  Until Goal Tube Feed Rate (mL per Hour): 60  Tube Feed Duration (in Hours): 24  Tube Feed Start Time: 14:00  Entered: Jul 11 2021  3:38PM      This patient has been assessed with a concern for Malnutrition and has been determined to have a diagnosis/diagnoses of Severe protein-calorie malnutrition and Underweight (BMI < 19).    This patient is being managed with:   Diet NPO-  Tube Feeding Modality: Gastrostomy  Glucerna 1.2 Davey (GLUCERNARTH)  Total Volume for 24 Hours (mL): 1440  Continuous  Starting Tube Feed Rate {mL per Hour}: 10  Increase Tube Feed Rate by (mL): 10     Every 6 hours  Until Goal Tube Feed Rate (mL per Hour): 60  Tube Feed Duration (in Hours): 24  Tube Feed Start Time: 14:00  Entered: Jul 11 2021  3:38PM    
This patient has been assessed with a concern for Malnutrition and has been determined to have a diagnosis/diagnoses of Severe protein-calorie malnutrition and Underweight (BMI < 19).    This patient is being managed with:   Diet NPO-  Tube Feeding Modality: Gastrostomy  Glucerna 1.2 Davey (GLUCERNARTH)  Total Volume for 24 Hours (mL): 960  Continuous  Starting Tube Feed Rate {mL per Hour}: 60  Until Goal Tube Feed Rate (mL per Hour): 60  Tube Feed Duration (in Hours): 16  Tube Feed Start Time: 13:00  Entered: Jul 16 2021 12:23PM    
This patient has been assessed with a concern for Malnutrition and has been determined to have a diagnosis/diagnoses of Severe protein-calorie malnutrition and Underweight (BMI < 19).    This patient is being managed with:   Diet NPO-  Tube Feeding Modality: Gastrostomy  Glucerna 1.2 Davey (GLUCERNARTH)  Total Volume for 24 Hours (mL): 960  Continuous  Starting Tube Feed Rate {mL per Hour}: 60  Until Goal Tube Feed Rate (mL per Hour): 60  Tube Feed Duration (in Hours): 16  Tube Feed Start Time: 13:00  Entered: Jul 16 2021 12:23PM    
This patient has been assessed with a concern for Malnutrition and has been determined to have a diagnosis/diagnoses of Severe protein-calorie malnutrition and Underweight (BMI < 19).    This patient is being managed with:   Diet NPO with Tube Feed-  Tube Feeding Modality: Nasogastric  Glucerna 1.5 Davey (GLUCERNA1.5RTH)  Total Volume for 24 Hours (mL): 1440  Continuous  Starting Tube Feed Rate {mL per Hour}: 10  Increase Tube Feed Rate by (mL): 10     Every 4 hours  Until Goal Tube Feed Rate (mL per Hour): 60  Tube Feed Duration (in Hours): 24  Tube Feed Start Time: 18:30  Entered: Jul 1 2021  4:34PM    
This patient has been assessed with a concern for Malnutrition and has been determined to have a diagnosis/diagnoses of Severe protein-calorie malnutrition and Underweight (BMI < 19).    This patient is being managed with:   Diet NPO-  Tube Feeding Modality: Gastrostomy  Glucerna 1.2 Davey (GLUCERNARTH)  Total Volume for 24 Hours (mL): 1440  Continuous  Starting Tube Feed Rate {mL per Hour}: 10  Increase Tube Feed Rate by (mL): 10     Every 6 hours  Until Goal Tube Feed Rate (mL per Hour): 60  Tube Feed Duration (in Hours): 24  Tube Feed Start Time: 14:00  Entered: Jul 11 2021  3:38PM    
This patient has been assessed with a concern for Malnutrition and has been determined to have a diagnosis/diagnoses of Severe protein-calorie malnutrition and Underweight (BMI < 19).    This patient is being managed with:   Diet NPO-  Tube Feeding Modality: Gastrostomy  Glucerna 1.2 Davey (GLUCERNARTH)  Total Volume for 24 Hours (mL): 1440  Continuous  Starting Tube Feed Rate {mL per Hour}: 10  Increase Tube Feed Rate by (mL): 10     Every 6 hours  Until Goal Tube Feed Rate (mL per Hour): 60  Tube Feed Duration (in Hours): 24  Tube Feed Start Time: 14:00  Entered: Jul 11 2021  3:38PM    
This patient has been assessed with a concern for Malnutrition and has been determined to have a diagnosis/diagnoses of Severe protein-calorie malnutrition and Underweight (BMI < 19).    This patient is being managed with:   Diet NPO with Tube Feed-  Tube Feeding Modality: Nasogastric  Glucerna 1.5 Davey (GLUCERNA1.5RTH)  Total Volume for 24 Hours (mL): 1440  Continuous  Starting Tube Feed Rate {mL per Hour}: 10  Increase Tube Feed Rate by (mL): 10     Every 4 hours  Until Goal Tube Feed Rate (mL per Hour): 60  Tube Feed Duration (in Hours): 24  Tube Feed Start Time: 18:30  Entered: Jul 1 2021  4:34PM    
This patient has been assessed with a concern for Malnutrition and has been determined to have a diagnosis/diagnoses of Severe protein-calorie malnutrition and Underweight (BMI < 19).    This patient is being managed with:   Diet NPO with Tube Feed-  Tube Feeding Modality: Nasogastric  Glucerna 1.5 Davey (GLUCERNA1.5RTH)  Total Volume for 24 Hours (mL): 1440  Continuous  Starting Tube Feed Rate {mL per Hour}: 10  Increase Tube Feed Rate by (mL): 10     Every 4 hours  Until Goal Tube Feed Rate (mL per Hour): 60  Tube Feed Duration (in Hours): 24  Tube Feed Start Time: 18:30  Entered: Jul 1 2021  4:34PM    
This patient has been assessed with a concern for Malnutrition and has been determined to have a diagnosis/diagnoses of Severe protein-calorie malnutrition and Underweight (BMI < 19).    This patient is being managed with:   Diet NPO-  Except Medications  Entered: Jul 9 2021 12:16AM    
This patient has been assessed with a concern for Malnutrition and has been determined to have a diagnosis/diagnoses of Severe protein-calorie malnutrition and Underweight (BMI < 19).    This patient is being managed with:   Diet NPO-  Tube Feeding Modality: Gastrostomy  Glucerna 1.2 Davey (GLUCERNARTH)  Total Volume for 24 Hours (mL): 1440  Continuous  Starting Tube Feed Rate {mL per Hour}: 10  Increase Tube Feed Rate by (mL): 10     Every 6 hours  Until Goal Tube Feed Rate (mL per Hour): 60  Tube Feed Duration (in Hours): 24  Tube Feed Start Time: 14:00  Entered: Jul 11 2021  3:38PM    
This patient has been assessed with a concern for Malnutrition and has been determined to have a diagnosis/diagnoses of Severe protein-calorie malnutrition and Underweight (BMI < 19).    This patient is being managed with:   Diet NPO-  Tube Feeding Modality: Gastrostomy  Glucerna 1.2 Davey (GLUCERNARTH)  Total Volume for 24 Hours (mL): 1440  Continuous  Starting Tube Feed Rate {mL per Hour}: 10  Increase Tube Feed Rate by (mL): 10     Every 6 hours  Until Goal Tube Feed Rate (mL per Hour): 60  Tube Feed Duration (in Hours): 24  Tube Feed Start Time: 14:00  Entered: Jul 11 2021  3:38PM      This patient has been assessed with a concern for Malnutrition and has been determined to have a diagnosis/diagnoses of Severe protein-calorie malnutrition and Underweight (BMI < 19).    This patient is being managed with:   Diet NPO-  Tube Feeding Modality: Gastrostomy  Glucerna 1.2 Davye (GLUCERNARTH)  Total Volume for 24 Hours (mL): 1440  Continuous  Starting Tube Feed Rate {mL per Hour}: 10  Increase Tube Feed Rate by (mL): 10     Every 6 hours  Until Goal Tube Feed Rate (mL per Hour): 60  Tube Feed Duration (in Hours): 24  Tube Feed Start Time: 14:00  Entered: Jul 11 2021  3:38PM    
This patient has been assessed with a concern for Malnutrition and has been determined to have a diagnosis/diagnoses of Severe protein-calorie malnutrition and Underweight (BMI < 19).    This patient is being managed with:   Diet NPO with Tube Feed-  Tube Feeding Modality: Nasogastric  Glucerna 1.5 Davey (GLUCERNA1.5RTH)  Total Volume for 24 Hours (mL): 1440  Continuous  Starting Tube Feed Rate {mL per Hour}: 10  Increase Tube Feed Rate by (mL): 10     Every 4 hours  Until Goal Tube Feed Rate (mL per Hour): 60  Tube Feed Duration (in Hours): 24  Tube Feed Start Time: 18:30  Entered: Jul 1 2021  4:34PM    
This patient has been assessed with a concern for Malnutrition and has been determined to have a diagnosis/diagnoses of Severe protein-calorie malnutrition and Underweight (BMI < 19).    This patient is being managed with:   Diet NPO-  Except Medications  Entered: Jul 9 2021 12:16AM    
This patient has been assessed with a concern for Malnutrition and has been determined to have a diagnosis/diagnoses of Severe protein-calorie malnutrition and Underweight (BMI < 19).    This patient is being managed with:   Diet NPO with Tube Feed-  Tube Feeding Modality: Nasogastric  Glucerna 1.5 Davey (GLUCERNA1.5RTH)  Total Volume for 24 Hours (mL): 1440  Continuous  Starting Tube Feed Rate {mL per Hour}: 10  Increase Tube Feed Rate by (mL): 10     Every 4 hours  Until Goal Tube Feed Rate (mL per Hour): 60  Tube Feed Duration (in Hours): 24  Tube Feed Start Time: 18:30  Entered: Jul 1 2021  4:34PM    
This patient has been assessed with a concern for Malnutrition and has been determined to have a diagnosis/diagnoses of Severe protein-calorie malnutrition and Underweight (BMI < 19).    This patient is being managed with:   Diet NPO-  Except Medications  Entered: Jul 9 2021 12:16AM    
This patient has been assessed with a concern for Malnutrition and has been determined to have a diagnosis/diagnoses of Severe protein-calorie malnutrition and Underweight (BMI < 19).    This patient is being managed with:   Diet NPO-  Tube Feeding Modality: Gastrostomy  Glucerna 1.2 Davey (GLUCERNARTH)  Total Volume for 24 Hours (mL): 1440  Continuous  Starting Tube Feed Rate {mL per Hour}: 10  Increase Tube Feed Rate by (mL): 10     Every 6 hours  Until Goal Tube Feed Rate (mL per Hour): 60  Tube Feed Duration (in Hours): 24  Tube Feed Start Time: 14:00  Entered: Jul 11 2021  3:38PM    
This patient has been assessed with a concern for Malnutrition and has been determined to have a diagnosis/diagnoses of Severe protein-calorie malnutrition and Underweight (BMI < 19).    This patient is being managed with:   Diet NPO-  Tube Feeding Modality: Gastrostomy  Glucerna 1.2 Davey (GLUCERNARTH)  Total Volume for 24 Hours (mL): 960  Continuous  Starting Tube Feed Rate {mL per Hour}: 60  Until Goal Tube Feed Rate (mL per Hour): 60  Tube Feed Duration (in Hours): 16  Tube Feed Start Time: 13:00  Entered: Jul 16 2021 12:23PM    
This patient has been assessed with a concern for Malnutrition and has been determined to have a diagnosis/diagnoses of Severe protein-calorie malnutrition and Underweight (BMI < 19).    This patient is being managed with:   Diet NPO-  Tube Feeding Modality: Gastrostomy  Glucerna 1.2 Davey (GLUCERNARTH)  Total Volume for 24 Hours (mL): 960  Continuous  Starting Tube Feed Rate {mL per Hour}: 60  Until Goal Tube Feed Rate (mL per Hour): 60  Tube Feed Duration (in Hours): 16  Tube Feed Start Time: 13:00  Entered: Jul 16 2021 12:23PM    
This patient has been assessed with a concern for Malnutrition and has been determined to have a diagnosis/diagnoses of Severe protein-calorie malnutrition and Underweight (BMI < 19).    This patient is being managed with:   Diet NPO with Tube Feed-  Tube Feeding Modality: Nasogastric  Glucerna 1.5 Davey (GLUCERNA1.5RTH)  Total Volume for 24 Hours (mL): 1440  Continuous  Starting Tube Feed Rate {mL per Hour}: 10  Increase Tube Feed Rate by (mL): 10     Every 4 hours  Until Goal Tube Feed Rate (mL per Hour): 60  Tube Feed Duration (in Hours): 24  Tube Feed Start Time: 18:30  Entered: Jul 1 2021  4:34PM    
This patient has been assessed with a concern for Malnutrition and has been determined to have a diagnosis/diagnoses of Severe protein-calorie malnutrition and Underweight (BMI < 19).    This patient is being managed with:   Diet NPO with Tube Feed-  Tube Feeding Modality: Nasogastric  Glucerna 1.5 Davey (GLUCERNA1.5RTH)  Total Volume for 24 Hours (mL): 1440  Continuous  Starting Tube Feed Rate {mL per Hour}: 10  Increase Tube Feed Rate by (mL): 10     Every 4 hours  Until Goal Tube Feed Rate (mL per Hour): 60  Tube Feed Duration (in Hours): 24  Tube Feed Start Time: 18:30  Entered: Jul 1 2021  4:34PM    
This patient has been assessed with a concern for Malnutrition and has been determined to have a diagnosis/diagnoses of Severe protein-calorie malnutrition and Underweight (BMI < 19).    This patient is being managed with:   Diet NPO-  Tube Feeding Modality: Gastrostomy  Glucerna 1.2 Davey (GLUCERNARTH)  Total Volume for 24 Hours (mL): 1440  Continuous  Starting Tube Feed Rate {mL per Hour}: 10  Increase Tube Feed Rate by (mL): 10     Every 6 hours  Until Goal Tube Feed Rate (mL per Hour): 60  Tube Feed Duration (in Hours): 24  Tube Feed Start Time: 14:00  Entered: Jul 11 2021  3:38PM    
This patient has been assessed with a concern for Malnutrition and has been determined to have a diagnosis/diagnoses of Severe protein-calorie malnutrition and Underweight (BMI < 19).    This patient is being managed with:   Diet NPO-  Tube Feeding Modality: Gastrostomy  Glucerna 1.2 Davey (GLUCERNARTH)  Total Volume for 24 Hours (mL): 960  Continuous  Starting Tube Feed Rate {mL per Hour}: 60  Until Goal Tube Feed Rate (mL per Hour): 60  Tube Feed Duration (in Hours): 16  Tube Feed Start Time: 13:00  Entered: Jul 16 2021 12:23PM    
This patient has been assessed with a concern for Malnutrition and has been determined to have a diagnosis/diagnoses of Severe protein-calorie malnutrition and Underweight (BMI < 19).    This patient is being managed with:   Diet NPO-  Tube Feeding Modality: Gastrostomy  Glucerna 1.2 Davey (GLUCERNARTH)  Total Volume for 24 Hours (mL): 960  Continuous  Starting Tube Feed Rate {mL per Hour}: 60  Until Goal Tube Feed Rate (mL per Hour): 60  Tube Feed Duration (in Hours): 16  Tube Feed Start Time: 13:00  Entered: Jul 16 2021 12:23PM    
This patient has been assessed with a concern for Malnutrition and has been determined to have a diagnosis/diagnoses of Severe protein-calorie malnutrition and Underweight (BMI < 19).    This patient is being managed with:   Diet NPO-  Tube Feeding Modality: Gastrostomy  Vital High Protein (VITALHP)  Total Volume for 24 Hours (mL): 960  Continuous  Starting Tube Feed Rate {mL per Hour}: 10  Increase Tube Feed Rate by (mL): 10     Every 6 hours  Until Goal Tube Feed Rate (mL per Hour): 40  Tube Feed Duration (in Hours): 24  Tube Feed Start Time: 14:00  Entered: Jul 10 2021  1:36PM

## 2021-07-21 RX ORDER — DEXAMETHASONE 0.5 MG/5ML
1 ELIXIR ORAL
Qty: 3 | Refills: 0
Start: 2021-07-21 | End: 2021-07-23

## 2021-08-11 ENCOUNTER — INPATIENT (INPATIENT)
Facility: HOSPITAL | Age: 71
LOS: 7 days | Discharge: HOSPICE | DRG: 871 | End: 2021-08-19
Attending: INTERNAL MEDICINE | Admitting: INTERNAL MEDICINE
Payer: OTHER MISCELLANEOUS

## 2021-08-11 VITALS — HEIGHT: 71 IN | OXYGEN SATURATION: 87 % | RESPIRATION RATE: 28 BRPM

## 2021-08-11 DIAGNOSIS — G93.40 ENCEPHALOPATHY, UNSPECIFIED: ICD-10-CM

## 2021-08-11 DIAGNOSIS — Z98.890 OTHER SPECIFIED POSTPROCEDURAL STATES: Chronic | ICD-10-CM

## 2021-08-11 DIAGNOSIS — A41.9 SEPSIS, UNSPECIFIED ORGANISM: ICD-10-CM

## 2021-08-11 DIAGNOSIS — J18.9 PNEUMONIA, UNSPECIFIED ORGANISM: ICD-10-CM

## 2021-08-11 DIAGNOSIS — C71.9 MALIGNANT NEOPLASM OF BRAIN, UNSPECIFIED: ICD-10-CM

## 2021-08-11 DIAGNOSIS — Z51.5 ENCOUNTER FOR PALLIATIVE CARE: ICD-10-CM

## 2021-08-11 DIAGNOSIS — R52 PAIN, UNSPECIFIED: ICD-10-CM

## 2021-08-11 DIAGNOSIS — E44.1 MILD PROTEIN-CALORIE MALNUTRITION: ICD-10-CM

## 2021-08-11 LAB
ALBUMIN SERPL ELPH-MCNC: 1.8 G/DL — LOW (ref 3.3–5)
ALP SERPL-CCNC: 164 U/L — HIGH (ref 40–120)
ALT FLD-CCNC: 8 U/L — LOW (ref 10–45)
ANION GAP SERPL CALC-SCNC: 15 MMOL/L — SIGNIFICANT CHANGE UP (ref 5–17)
APPEARANCE UR: CLEAR — SIGNIFICANT CHANGE UP
APTT BLD: 39 SEC — HIGH (ref 27.5–35.5)
AST SERPL-CCNC: 23 U/L — SIGNIFICANT CHANGE UP (ref 10–40)
BACTERIA # UR AUTO: NEGATIVE — SIGNIFICANT CHANGE UP
BASE EXCESS BLDV CALC-SCNC: 9.3 MMOL/L — HIGH (ref -2–2)
BASOPHILS # BLD AUTO: 0.18 K/UL — SIGNIFICANT CHANGE UP (ref 0–0.2)
BASOPHILS NFR BLD AUTO: 0.9 % — SIGNIFICANT CHANGE UP (ref 0–2)
BILIRUB SERPL-MCNC: 0.3 MG/DL — SIGNIFICANT CHANGE UP (ref 0.2–1.2)
BILIRUB UR-MCNC: NEGATIVE — SIGNIFICANT CHANGE UP
BUN SERPL-MCNC: 6 MG/DL — LOW (ref 7–23)
CA-I SERPL-SCNC: 1.09 MMOL/L — LOW (ref 1.12–1.3)
CALCIUM SERPL-MCNC: 8.1 MG/DL — LOW (ref 8.4–10.5)
CHLORIDE BLDV-SCNC: 95 MMOL/L — LOW (ref 96–108)
CHLORIDE SERPL-SCNC: 93 MMOL/L — LOW (ref 96–108)
CO2 BLDV-SCNC: 36 MMOL/L — HIGH (ref 22–30)
CO2 SERPL-SCNC: 28 MMOL/L — SIGNIFICANT CHANGE UP (ref 22–31)
COLOR SPEC: YELLOW — SIGNIFICANT CHANGE UP
CREAT SERPL-MCNC: <0.3 MG/DL — LOW (ref 0.5–1.3)
DIFF PNL FLD: NEGATIVE — SIGNIFICANT CHANGE UP
EOSINOPHIL # BLD AUTO: 0.51 K/UL — HIGH (ref 0–0.5)
EOSINOPHIL NFR BLD AUTO: 2.6 % — SIGNIFICANT CHANGE UP (ref 0–6)
EPI CELLS # UR: 1 /HPF — SIGNIFICANT CHANGE UP
GAS PNL BLDV: 134 MMOL/L — LOW (ref 135–145)
GAS PNL BLDV: SIGNIFICANT CHANGE UP
GAS PNL BLDV: SIGNIFICANT CHANGE UP
GLUCOSE BLDV-MCNC: 84 MG/DL — SIGNIFICANT CHANGE UP (ref 70–99)
GLUCOSE SERPL-MCNC: 80 MG/DL — SIGNIFICANT CHANGE UP (ref 70–99)
GLUCOSE UR QL: NEGATIVE — SIGNIFICANT CHANGE UP
HCO3 BLDV-SCNC: 34 MMOL/L — HIGH (ref 21–29)
HCT VFR BLD CALC: 29 % — LOW (ref 39–50)
HCT VFR BLDA CALC: 28 % — LOW (ref 39–50)
HGB BLD CALC-MCNC: 9.1 G/DL — LOW (ref 13–17)
HGB BLD-MCNC: 8.9 G/DL — LOW (ref 13–17)
INR BLD: 1.42 RATIO — HIGH (ref 0.88–1.16)
KETONES UR-MCNC: NEGATIVE — SIGNIFICANT CHANGE UP
LACTATE BLDV-MCNC: 2 MMOL/L — SIGNIFICANT CHANGE UP (ref 0.7–2)
LEUKOCYTE ESTERASE UR-ACNC: NEGATIVE — SIGNIFICANT CHANGE UP
LYMPHOCYTES # BLD AUTO: 10.5 % — LOW (ref 13–44)
LYMPHOCYTES # BLD AUTO: 2.05 K/UL — SIGNIFICANT CHANGE UP (ref 1–3.3)
MANUAL SMEAR VERIFICATION: SIGNIFICANT CHANGE UP
MCHC RBC-ENTMCNC: 26.3 PG — LOW (ref 27–34)
MCHC RBC-ENTMCNC: 30.7 GM/DL — LOW (ref 32–36)
MCV RBC AUTO: 85.5 FL — SIGNIFICANT CHANGE UP (ref 80–100)
MONOCYTES # BLD AUTO: 1.19 K/UL — HIGH (ref 0–0.9)
MONOCYTES NFR BLD AUTO: 6.1 % — SIGNIFICANT CHANGE UP (ref 2–14)
NEUTROPHILS # BLD AUTO: 15.64 K/UL — HIGH (ref 1.8–7.4)
NEUTROPHILS NFR BLD AUTO: 79 % — HIGH (ref 43–77)
NEUTS BAND # BLD: 0.9 % — SIGNIFICANT CHANGE UP (ref 0–8)
NITRITE UR-MCNC: NEGATIVE — SIGNIFICANT CHANGE UP
PCO2 BLDV: 50 MMHG — SIGNIFICANT CHANGE UP (ref 35–50)
PH BLDV: 7.45 — SIGNIFICANT CHANGE UP (ref 7.35–7.45)
PH UR: 8 — SIGNIFICANT CHANGE UP (ref 5–8)
PLAT MORPH BLD: NORMAL — SIGNIFICANT CHANGE UP
PLATELET # BLD AUTO: 221 K/UL — SIGNIFICANT CHANGE UP (ref 150–400)
PO2 BLDV: 41 MMHG — SIGNIFICANT CHANGE UP (ref 25–45)
POIKILOCYTOSIS BLD QL AUTO: SLIGHT — SIGNIFICANT CHANGE UP
POLYCHROMASIA BLD QL SMEAR: SLIGHT — SIGNIFICANT CHANGE UP
POTASSIUM BLDV-SCNC: 3.1 MMOL/L — LOW (ref 3.5–5.3)
POTASSIUM SERPL-MCNC: 4 MMOL/L — SIGNIFICANT CHANGE UP (ref 3.5–5.3)
POTASSIUM SERPL-SCNC: 4 MMOL/L — SIGNIFICANT CHANGE UP (ref 3.5–5.3)
PROT SERPL-MCNC: 6.5 G/DL — SIGNIFICANT CHANGE UP (ref 6–8.3)
PROT UR-MCNC: ABNORMAL
PROTHROM AB SERPL-ACNC: 16.8 SEC — HIGH (ref 10.6–13.6)
RAPID RVP RESULT: SIGNIFICANT CHANGE UP
RBC # BLD: 3.39 M/UL — LOW (ref 4.2–5.8)
RBC # FLD: 17.5 % — HIGH (ref 10.3–14.5)
RBC BLD AUTO: SIGNIFICANT CHANGE UP
RBC CASTS # UR COMP ASSIST: 1 /HPF — SIGNIFICANT CHANGE UP (ref 0–4)
SAO2 % BLDV: 68 % — SIGNIFICANT CHANGE UP (ref 67–88)
SARS-COV-2 RNA SPEC QL NAA+PROBE: SIGNIFICANT CHANGE UP
SODIUM SERPL-SCNC: 136 MMOL/L — SIGNIFICANT CHANGE UP (ref 135–145)
SP GR SPEC: 1.01 — SIGNIFICANT CHANGE UP (ref 1.01–1.02)
UROBILINOGEN FLD QL: NEGATIVE — SIGNIFICANT CHANGE UP
WBC # BLD: 19.57 K/UL — HIGH (ref 3.8–10.5)
WBC # FLD AUTO: 19.57 K/UL — HIGH (ref 3.8–10.5)
WBC UR QL: 1 /HPF — SIGNIFICANT CHANGE UP (ref 0–5)

## 2021-08-11 PROCEDURE — 99291 CRITICAL CARE FIRST HOUR: CPT

## 2021-08-11 PROCEDURE — 99053 MED SERV 10PM-8AM 24 HR FAC: CPT

## 2021-08-11 PROCEDURE — 99223 1ST HOSP IP/OBS HIGH 75: CPT

## 2021-08-11 PROCEDURE — 71045 X-RAY EXAM CHEST 1 VIEW: CPT | Mod: 26

## 2021-08-11 RX ORDER — ACETAMINOPHEN 500 MG
650 TABLET ORAL EVERY 6 HOURS
Refills: 0 | Status: DISCONTINUED | OUTPATIENT
Start: 2021-08-11 | End: 2021-08-19

## 2021-08-11 RX ORDER — VANCOMYCIN HCL 1 G
1000 VIAL (EA) INTRAVENOUS EVERY 12 HOURS
Refills: 0 | Status: DISCONTINUED | OUTPATIENT
Start: 2021-08-11 | End: 2021-08-14

## 2021-08-11 RX ORDER — PIPERACILLIN AND TAZOBACTAM 4; .5 G/20ML; G/20ML
4.5 INJECTION, POWDER, LYOPHILIZED, FOR SOLUTION INTRAVENOUS EVERY 6 HOURS
Refills: 0 | Status: DISCONTINUED | OUTPATIENT
Start: 2021-08-11 | End: 2021-08-11

## 2021-08-11 RX ORDER — HYDROMORPHONE HYDROCHLORIDE 2 MG/ML
0.4 INJECTION INTRAMUSCULAR; INTRAVENOUS; SUBCUTANEOUS
Refills: 0 | Status: DISCONTINUED | OUTPATIENT
Start: 2021-08-11 | End: 2021-08-16

## 2021-08-11 RX ORDER — SODIUM CHLORIDE 9 MG/ML
1000 INJECTION INTRAMUSCULAR; INTRAVENOUS; SUBCUTANEOUS ONCE
Refills: 0 | Status: COMPLETED | OUTPATIENT
Start: 2021-08-11 | End: 2021-08-11

## 2021-08-11 RX ORDER — PIPERACILLIN AND TAZOBACTAM 4; .5 G/20ML; G/20ML
3.38 INJECTION, POWDER, LYOPHILIZED, FOR SOLUTION INTRAVENOUS EVERY 8 HOURS
Refills: 0 | Status: DISCONTINUED | OUTPATIENT
Start: 2021-08-11 | End: 2021-08-18

## 2021-08-11 RX ORDER — ACETAMINOPHEN 500 MG
650 TABLET ORAL ONCE
Refills: 0 | Status: COMPLETED | OUTPATIENT
Start: 2021-08-11 | End: 2021-08-11

## 2021-08-11 RX ORDER — VANCOMYCIN HCL 1 G
1000 VIAL (EA) INTRAVENOUS ONCE
Refills: 0 | Status: COMPLETED | OUTPATIENT
Start: 2021-08-11 | End: 2021-08-11

## 2021-08-11 RX ORDER — PIPERACILLIN AND TAZOBACTAM 4; .5 G/20ML; G/20ML
3.38 INJECTION, POWDER, LYOPHILIZED, FOR SOLUTION INTRAVENOUS ONCE
Refills: 0 | Status: COMPLETED | OUTPATIENT
Start: 2021-08-11 | End: 2021-08-11

## 2021-08-11 RX ORDER — MORPHINE SULFATE 50 MG/1
4 CAPSULE, EXTENDED RELEASE ORAL ONCE
Refills: 0 | Status: DISCONTINUED | OUTPATIENT
Start: 2021-08-11 | End: 2021-08-11

## 2021-08-11 RX ADMIN — Medication 1000 MILLIGRAM(S): at 03:13

## 2021-08-11 RX ADMIN — MORPHINE SULFATE 4 MILLIGRAM(S): 50 CAPSULE, EXTENDED RELEASE ORAL at 01:46

## 2021-08-11 RX ADMIN — Medication 250 MILLIGRAM(S): at 01:47

## 2021-08-11 RX ADMIN — Medication 250 MILLIGRAM(S): at 18:40

## 2021-08-11 RX ADMIN — PIPERACILLIN AND TAZOBACTAM 200 GRAM(S): 4; .5 INJECTION, POWDER, LYOPHILIZED, FOR SOLUTION INTRAVENOUS at 01:25

## 2021-08-11 RX ADMIN — SODIUM CHLORIDE 1000 MILLILITER(S): 9 INJECTION INTRAMUSCULAR; INTRAVENOUS; SUBCUTANEOUS at 02:40

## 2021-08-11 RX ADMIN — Medication 650 MILLIGRAM(S): at 01:30

## 2021-08-11 RX ADMIN — HYDROMORPHONE HYDROCHLORIDE 0.4 MILLIGRAM(S): 2 INJECTION INTRAMUSCULAR; INTRAVENOUS; SUBCUTANEOUS at 22:05

## 2021-08-11 RX ADMIN — PIPERACILLIN AND TAZOBACTAM 25 GRAM(S): 4; .5 INJECTION, POWDER, LYOPHILIZED, FOR SOLUTION INTRAVENOUS at 21:50

## 2021-08-11 RX ADMIN — HYDROMORPHONE HYDROCHLORIDE 0.4 MILLIGRAM(S): 2 INJECTION INTRAMUSCULAR; INTRAVENOUS; SUBCUTANEOUS at 21:50

## 2021-08-11 RX ADMIN — Medication 110 MILLIGRAM(S): at 20:11

## 2021-08-11 RX ADMIN — SODIUM CHLORIDE 1000 MILLILITER(S): 9 INJECTION INTRAMUSCULAR; INTRAVENOUS; SUBCUTANEOUS at 01:27

## 2021-08-11 NOTE — ED ADULT NURSE REASSESSMENT NOTE - NS ED NURSE REASSESS COMMENT FT1
Pt straight cathed using sterile technique. Second RN present to confirm sterility. 250mL collected. Pt tolerated procedure well. Sterile specimens collected and sent to lab.
Received report from Dagoberto CROWELL. Patient AAOX0, non verbal, son at bedside to speak for patient. NS fluids completed, patient on cardiac monitor, HR in the 100s. Will continue to monitor.
Report received from SOCRATES Whiting. PT A/O x0, nonverbal, contracted. Skin- multiple skin abrasions, ED Team made aware do not remove dressings.

## 2021-08-11 NOTE — CONSULT NOTE ADULT - ATTENDING COMMENTS
Discussed with son regarding wishes, and he is open to inpatient hospice for continued abx, understands limits blood draws and daily VS. States wanting antibiotics for now, and is open to pressors if needed (pending further d/w between him and his family), but confirmed DNR/DNI status, MOLST recompleted as prior MOLST was not fully completed, son aware of this. c/w vanco, zosyn  ______________  Chris Villalobos MD   of Geriatric and Palliative Medicine  [facility]     Please page the following number for clinical matters between the hours of 9AM and 5PM   from Monday through Friday : [phone]    After 5PM and on weekends, please page: [phone]. The Geriatric and Palliative Medicine consult service has 24/7 coverage for medical recommendations, including for symptom management needs.

## 2021-08-11 NOTE — ED PROVIDER NOTE - CRITICAL CARE ATTENDING CONTRIBUTION TO CARE
Pt on home hospice here w cough, increased moaning, low OW sat. pt is ill appearing on exam, tachypneic, hypoxic, tachycardic. family is endorsing they want IV, labs etc as long as suspicion is for something potentially treatable - as highest suspicion is for infectious process vs sepsis, pt ok with full septic w/u. w/u significant for pneumonia. pt received iv abx. will be admitted for further care.

## 2021-08-11 NOTE — CONSULT NOTE ADULT - PROBLEM SELECTOR RECOMMENDATION 2
Most  likely  secondary  to  Multifocal  pneumonia; not  in shock  Possible  etiology  aspiration vs suspected  Gram  negative/MRSA pneumonia  Vancomycin,  Zosyn,  doxycycline  Oxygen  supplementation  Dilaudin  PRN for  SOB  tylenol  PRN for  fever

## 2021-08-11 NOTE — ED PROVIDER NOTE - PROGRESS NOTE DETAILS
Srinath Hager DO PGY-3: did not hear back from the palliative fellow. Discussed case with Dr. Godfrey - agrees with admisison.

## 2021-08-11 NOTE — CONSULT NOTE ADULT - CONVERSATION DETAILS
Discussed with son regarding wishes, and he is open to inpatient hospice for continued abx, understands limits blood draws and daily VS. States wanting antibiotics for now, and is open to pressors if needed (pending further d/w between him and his family), but confirmed DNR/DNI status, MOLST recompleted as prior MOLST was not fully completed, son aware of this.

## 2021-08-11 NOTE — CONSULT NOTE ADULT - PROBLEM SELECTOR RECOMMENDATION 9
Multifactorial (septic, hypoxic  baseline  dementia)  If  agitation symptom  management   antibiotics for addressing  possible  aspiration vs  suspected  gram negative pneumonia  O2 supplementation  via NC  4 L Multifactorial (septic, hypoxic  baseline  dementia)  If  agitation symptom  management   antibiotics for addressing  possible  aspiration vs  suspected  gram negative pneumonia - vanco + zosyn, doxy for atypical  O2 supplementation  via NC  4 L

## 2021-08-11 NOTE — ED PROVIDER NOTE - CLINICAL SUMMARY MEDICAL DECISION MAKING FREE TEXT BOX
Concern for sepsis. Labs, imaging, abx, IVF, tylenol. Will likely need to be admitted - family at th bedside is ok with the plan.

## 2021-08-11 NOTE — CONSULT NOTE ADULT - SUBJECTIVE AND OBJECTIVE BOX
HPI:  71 years old Male with code status DNR/DNI, hospice care at home(son at bedside - ok with IV placement, IV meds, IVF), GBM presents with 1 d of moaning and low pulse ox, cough. Pt is unable to verbalize his needs at baseline. Has PEG tube for feeding. Condom cath. (11 Aug 2021 10:45)    PERTINENT PM/SXH:   No pertinent past medical history    Hypertension    Neoplasm of unspecified behavior of brain    Hyperlipidemia    Seizures    Diabetes    Glioblastoma multiforme    CMV infection    History of SIADH      History of hyperlipidemia    H/O colonoscopy    History of laryngoscopy    Status post stereotactic brain biopsy      FAMILY HISTORY:  FH: myocardial infarction (Father)      ITEMS NOT CHECKED ARE NOT PRESENT    SOCIAL HISTORY:   Significant other/partner[ ]  Children[ ]  Sikh/Spirituality:  Substance hx:  [ ]   Tobacco hx:  [ ]   Alcohol hx: [ ]   Home Opioid hx:  [ ] I-Stop Reference No:  Living Situation: [ ]Home  [ ]Long term care  [ ]Rehab [ ]Other    ADVANCE DIRECTIVES:    DNRMOLST  [ ]  Living Will  [ ]   DECISION MAKER(s):  [ ] Health Care Proxy(s)  [ ] Surrogate(s)  [ ] Guardian           Name(s): Phone Number(s):    BASELINE (I)ADL(s) (prior to admission):  [place]: [ ]Total  [ ] Moderate [ ]Dependent    Allergies    No Known Allergies    Intolerances    MEDICATIONS  (STANDING):    MEDICATIONS  (PRN):    PRESENT SYMPTOMS: [ ]Unable to obtain due to poor mentation   Source if other than patient:  [ ]Family   [ ]Team     Pain: [ ]yes [ ]no  QOL impact -   Location -                    Aggravating factors -  Quality -  Radiation -  Timing-  Severity (0-10 scale):  Minimal acceptable level (0-10 scale):     CPOT:    https://www.sccm.org/getattachment/gba33i48-8o5f-0r5v-2m4z-8048q6809w8b/Critical-Care-Pain-Observation-Tool-(CPOT)      PAIN AD Score:     http://geriatrictoolkit.[place].[facility]/cog/painad.pdf (press ctrl +  left click to view)    Dyspnea:                           [ ]Mild [ ]Moderate [ ]Severe  Anxiety:                             [ ]Mild [ ]Moderate [ ]Severe  Fatigue:                             [ ]Mild [ ]Moderate [ ]Severe  Nausea:                             [ ]Mild [ ]Moderate [ ]Severe  Loss of appetite:              [ ]Mild [ ]Moderate [ ]Severe  Constipation:                    [ ]Mild [ ]Moderate [ ]Severe    Other Symptoms:  [ ]All other review of systems negative     Palliative Performance Status Version 2:         %    http://npcrc.org/files/news/palliative_performance_scale_ppsv2.pdf  PHYSICAL EXAM:  Vital Signs Last 24 Hrs  T(C): 36.6 (11 Aug 2021 15:31), Max: 38.3 (11 Aug 2021 01:00)  T(F): 97.8 (11 Aug 2021 15:31), Max: 101 (11 Aug 2021 01:00)  HR: 105 (11 Aug 2021 15:) (99 - 124)  BP: 108/68 (11 Aug 2021 15:) (94/53 - 121/55)  BP(mean): 67 (11 Aug 2021 07:05) (67 - 89)  RR: 18 (11 Aug 2021 15:31) (18 - 28)  SpO2: 95% (11 Aug 2021 15:) (87% - 100%) I&O's Summary    GENERAL:  [ ]Alert  [ ]Oriented x   [ ]Lethargic  [ ]Cachexia  [ ]Unarousable  [ ]Verbal  [ ]Non-Verbal  Behavioral:   [ ] Anxiety  [ ] Delirium [ ] Agitation [ ] Other  HEENT:  [ ]Normal   [ ]Dry mouth   [ ]ET Tube/Trach  [ ]Oral lesions  PULMONARY:   [ ]Clear [ ]Tachypnea  [ ]Audible excessive secretions   [ ]Rhonchi        [ ]Right [ ]Left [ ]Bilateral  [ ]Crackles        [ ]Right [ ]Left [ ]Bilateral  [ ]Wheezing     [ ]Right [ ]Left [ ]Bilateral  [ ]Diminished breath sounds [ ]right [ ]left [ ]bilateral  CARDIOVASCULAR:    [ ]Regular [ ]Irregular [ ]Tachy  [ ]Edison [ ]Murmur [ ]Other  GASTROINTESTINAL:  [ ]Soft  [ ]Distended   [ ]+BS  [ ]Non tender [ ]Tender  [ ]PEG [ ]OGT/ NGT  Last BM:  GENITOURINARY:  [ ]Normal [ ] Incontinent   [ ]Oliguria/Anuria   [ ]Franks  MUSCULOSKELETAL:   [ ]Normal   [ ]Weakness  [ ]Bed/Wheelchair bound [ ]Edema  NEUROLOGIC:   [ ]No focal deficits  [ ]Cognitive impairment  [ ]Dysphagia [ ]Dysarthria [ ]Paresis [ ]Other   SKIN:   [ ]Normal    [ ]Rash  [ ]Pressure ulcer(s)       Present on admission [ ]y [ ]n    CRITICAL CARE:  [ ] Shock Present  [ ]Septic [ ]Cardiogenic [ ]Neurologic [ ]Hypovolemic  [ ]  Vasopressors [ ]  Inotropes   [ ]Respiratory failure present [ ]Mechanical ventilation [ ]Non-invasive ventilatory support [ ]High flow    [ ]Acute  [ ]Chronic [ ]Hypoxic  [ ]Hypercarbic [ ]Other  [ ]Other organ failure     LABS:                        8.9    19.57 )-----------( 221      ( 11 Aug 2021 01:22 )             29.0       136  |  93<L>  |  6<L>  ----------------------------<  80  4.0   |  28  |  <0.30<L>    Ca    8.1<L>      11 Aug 2021 01:22    TPro  6.5  /  Alb  1.8<L>  /  TBili  0.3  /  DBili  x   /  AST  23  /  ALT  8<L>  /  AlkPhos  164<H>    PT/INR - ( 11 Aug 2021 01:22 )   PT: 16.8 sec;   INR: 1.42 ratio         PTT - ( 11 Aug 2021 01:22 )  PTT:39.0 sec    Urinalysis Basic - ( 11 Aug 2021 01:29 )    Color: Yellow / Appearance: Clear / S.012 / pH: x  Gluc: x / Ketone: Negative  / Bili: Negative / Urobili: Negative   Blood: x / Protein: Trace / Nitrite: Negative   Leuk Esterase: Negative / RBC: 1 /hpf / WBC 1 /HPF   Sq Epi: x / Non Sq Epi: 1 /hpf / Bacteria: Negative      RADIOLOGY & ADDITIONAL STUDIES:    PROTEIN CALORIE MALNUTRITION PRESENT: [ ]mild [ ]moderate [ ]severe [ ]underweight [ ]morbid obesity  https://www.andeal.org/vault/2440/web/files/ONC/Table_Clinical%20Characteristics%20to%20Document%20Malnutrition-White%20JV%20et%20al%2020.pdf    Height (cm): 180.3 (21 @ 00:53), 180.3 (21 @ 14:42), 180.3 (21 @ 22:21)  Weight (kg): 51 (21 @ 11:27), 50 (21 @ 09:57)  BMI (kg/m2): 15.7 (21 @ 00:53), 15.7 (21 @ 11:27), 15.4 (21 @ 14:42)    [ ]PPSV2 < or = to 30% [ ]significant weight loss  [ ]poor nutritional intake  [ ]anasarca[ ]Artificial Nutrition      REFERRALS:   [ ]Chaplaincy  [ ]Hospice  [ ]Child Life  [ ]Social Work  [ ]Case management [ ]Holistic Therapy     Goals of Care Document:KRISTA Brandon (21 @ 15:33)   HPI:  71 years old Male with code status DNR/DNI, hospice care at home(son at bedside - ok with IV placement, IV meds, IVF), GBM presents with 1 d of moaning and low pulse ox, cough. Pt is unable to verbalize his needs at baseline. Has PEG tube for feeding. Condom cath. (11 Aug 2021 10:45)    PERTINENT PM/SXH:   No pertinent past medical history    Hypertension    Neoplasm of unspecified behavior of brain    Hyperlipidemia    Seizures    Diabetes    Glioblastoma multiforme    CMV infection    History of SIADH      History of hyperlipidemia    H/O colonoscopy    History of laryngoscopy    Status post stereotactic brain biopsy      FAMILY HISTORY:  FH: myocardial infarction (Father)      ITEMS NOT CHECKED ARE NOT PRESENT    SOCIAL HISTORY:   Significant other/partner[ ]  Children[ ]  Scientologist/Spirituality:  Substance hx:  [ ]   Tobacco hx:  [ ]   Alcohol hx: [ ]   Home Opioid hx:  [ ] I-Stop Reference No:  Living Situation: [ ]Home  [ ]Long term care  [ ]Rehab [ ]Other    ADVANCE DIRECTIVES:    DNRMOLST  [ x]  Living Will  [ ]   DECISION MAKER(s):  [ x] Health Care Proxy(s)  [ ] Surrogate(s)  [ ] Guardian           Name(s): Phone Number(s): Jim Rider     BASELINE (I)ADL(s) (prior to admission):  [place]: [ ]Total  [ ] Moderate [x]Dependent    Allergies    No Known Allergies    Intolerances    MEDICATIONS  (STANDING):    MEDICATIONS  (PRN):    PRESENT SYMPTOMS: [x ]Unable to obtain due to poor mentation   Source if other than patient:  [ ]Family   [ ]Team     Pain: [ ]yes [ ]no  QOL impact -   Location -                    Aggravating factors -  Quality -  Radiation -  Timing-  Severity (0-10 scale):  Minimal acceptable level (0-10 scale):     CPOT:    https://www.sccm.org/getattachment/veg94x01-8l1u-2z1m-9j0p-0791g9753y1d/Critical-Care-Pain-Observation-Tool-(CPOT)      PAIN AD Score: 4  Moderate pain    http://geriatrictoolkit.[place].[facility]/cog/painad.pdf (press ctrl +  left click to view)    Dyspnea:                           [ ]Mild [ ]Moderate [ ]Severe  REsolved  Anxiety:                             [ x]Mild [ ]Moderate [ ]Severe  Fatigue:                             [ ]Mild [ ]Moderate [ ]Severe  Nausea:                             [ ]Mild [ ]Moderate [ ]Severe  Loss of appetite:              [ ]Mild [ ]Moderate [ ]Severe  Constipation:                    [ ]Mild [ ]Moderate [ ]Severe    Other Symptoms:  [x ]All other review of systems negative     Palliative Performance Status Version 2:     20-30    %    http://[facility].org/files/news/palliative_performance_scale_ppsv2.pdf  PHYSICAL EXAM:  Vital Signs Last 24 Hrs  T(C): 36.6 (11 Aug 2021 15:31), Max: 38.3 (11 Aug 2021 01:00)  T(F): 97.8 (11 Aug 2021 15:31), Max: 101 (11 Aug 2021 01:00)  HR: 105 (11 Aug 2021 15:) (99 - 124)  BP: 108/68 (11 Aug 2021 15:) (94/53 - 121/55)  BP(mean): 67 (11 Aug 2021 07:05) (67 - 89)  RR: 18 (11 Aug 2021 15:) (18 - 28)  SpO2: 95% (11 Aug 2021 15:) (87% - 100%) I&O's Summary    GENERAL:  [ x]Awake  [ ]Oriented x0   [ ]Lethargic  [ ]Cachexia  [ ]Unarousable  [ ]Verbal  [ x]Non-Verbal  Behavioral:   [ ] Anxiety  [ ] Delirium [ ] Agitation [ ] Other  HEENT:  [ ]Normal   [ ]Dry mouth   [ ]ET Tube/Trach  [ ]Oral lesions  PULMONARY:   [ ]Clear [ ]Tachypnea  [ ]Audible excessive secretions   [ x]Rhonchi        [ ]Right [ ]Left [ x]Bilateral  [ ]Crackles        [ ]Right [ ]Left [ ]Bilateral  [ ]Wheezing     [ ]Right [ ]Left [ ]Bilateral  [ ]Diminished breath sounds [ ]right [ ]left [ ]bilateral  CARDIOVASCULAR:    [ x]Regular [ ]Irregular [ ]Tachy  [ ]Edison [ ]Murmur [ ]Other  GASTROINTESTINAL:  [ x]Soft  [ ]Distended   [ ]+BS  [ ]Non tender [ ]Tender  [ ]PEG [ ]OGT/ NGT  Last BM:  GENITOURINARY:  [ ]Normal [x ] Incontinent   [ ]Oliguria/Anuria   [ ]Franks  MUSCULOSKELETAL:   [ ]Normal   [ ]Weakness  [x ]Bed/Wheelchair bound [ ]Edema  NEUROLOGIC:   [ x]No focal deficits  [ ]Cognitive impairment  [ ]Dysphagia [ ]Dysarthria [ ]Paresis [ ]Other   SKIN:   [ ]Normal    [ ]Rash  [x ]Pressure ulcer(s)       Present on admission [x ]y [ ]n    CRITICAL CARE:  [ ] Shock Present  [ x]Septic [ ]Cardiogenic [ ]Neurologic [ ]Hypovolemic  [ ]  Vasopressors [ ]  Inotropes   [x ]Respiratory failure present [ ]Mechanical ventilation [ ]Non-invasive ventilatory support [x ]Nasal  cannula 4 L  [ ]Acute  [ ]Chronic [ ]Hypoxic  [ ]Hypercarbic [ ]Other  [ x]Other organ failure skin    LABS:                        8.9    19.57 )-----------( 221      ( 11 Aug 2021 01:22 )             29.0   08    136  |  93<L>  |  6<L>  ----------------------------<  80  4.0   |  28  |  <0.30<L>    Ca    8.1<L>      11 Aug 2021 01:22    TPro  6.5  /  Alb  1.8<L>  /  TBili  0.3  /  DBili  x   /  AST  23  /  ALT  8<L>  /  AlkPhos  164<H>  0811  PT/INR - ( 11 Aug 2021 01:22 )   PT: 16.8 sec;   INR: 1.42 ratio         PTT - ( 11 Aug 2021 01:22 )  PTT:39.0 sec    Urinalysis Basic - ( 11 Aug 2021 01:29 )    Color: Yellow / Appearance: Clear / S.012 / pH: x  Gluc: x / Ketone: Negative  / Bili: Negative / Urobili: Negative   Blood: x / Protein: Trace / Nitrite: Negative   Leuk Esterase: Negative / RBC: 1 /hpf / WBC 1 /HPF   Sq Epi: x / Non Sq Epi: 1 /hpf / Bacteria: Negative      RADIOLOGY & ADDITIONAL STUDIES:    PROTEIN CALORIE MALNUTRITION PRESENT: [x ]mild [ ]moderate [ ]severe [ ]underweight [ ]morbid obesity  https://www.andeal.org/vault/2440/web/files/ONC/Table_Clinical%20Characteristics%20to%20Document%20Malnutrition-White%20JV%20et%20al%2020.pdf    Height (cm): 180.3 (21 @ 00:53), 180.3 (21 @ 14:42), 180.3 (21 @ 22:21)  Weight (kg): 51 (21 @ 11:27), 50 (21 @ 09:57)  BMI (kg/m2): 15.7 (21 @ 00:53), 15.7 (21 @ 11:27), 15.4 (21 @ 14:42)    [ ]PPSV2 < or = to 30% [ ]significant weight loss  [ ]poor nutritional intake  [ ]anasarca[x ]Artificial Nutrition      REFERRALS:   [ ]Chaplaincy  [ ]Hospice  [ ]Child Life  [ ]Social Work  [ ]Case management [ ]Holistic Therapy     Goals of Care Document:KRISTA Brandon (21 @ 15:33)   HPI:  71 years old Male with code status DNR/DNI, hospice care at home(son at bedside - ok with IV placement, IV meds, IVF), GBM presents with 1 d of moaning and low pulse ox, cough. Pt is unable to verbalize his needs at baseline. Has PEG tube for feeding. Condom cath. (11 Aug 2021 10:45)    PERTINENT PM/SXH:   No pertinent past medical history    Hypertension    Neoplasm of unspecified behavior of brain    Hyperlipidemia    Seizures    Diabetes    Glioblastoma multiforme    CMV infection    History of SIADH      History of hyperlipidemia    H/O colonoscopy    History of laryngoscopy    Status post stereotactic brain biopsy      FAMILY HISTORY:  FH: myocardial infarction (Father)      ITEMS NOT CHECKED ARE NOT PRESENT    SOCIAL HISTORY:   Significant other/partner[ ]  Children[X ]  Jain/Spirituality:  Substance hx:  [ ]   Tobacco hx:  [ ]   Alcohol hx: [ ]   Home Opioid hx:  [ ] I-Stop Reference No: [id number]    2021	morphine sulf 10 mg/5 ml soln	84ml	7	Lyle Kim	[id number]	Insurance	[facility]  2021	vimpat 10 mg/ml solution	200ml	5	Lyle Kim	[id number]	Insurance	[facility]    Living Situation: [ ]Home  [ ]Long term care  [ ]Rehab [ ]Other    ADVANCE DIRECTIVES:    DNRMOLST  [ x]  Living Will  [ ]   DECISION MAKER(s):  [ x] Health Care Proxy(s)  [ ] Surrogate(s)  [ ] Guardian           Name(s): Phone Number(s): Jim Rider     BASELINE (I)ADL(s) (prior to admission):  [place]: [ ]Total  [ ] Moderate [x]Dependent    Allergies    No Known Allergies    Intolerances    MEDICATIONS  (STANDING):    MEDICATIONS  (PRN):    PRESENT SYMPTOMS: [x ]Unable to obtain due to poor mentation   Source if other than patient:  [ ]Family   [ ]Team     Pain: [ ]yes [ ]no  QOL impact -   Location -                    Aggravating factors -  Quality -  Radiation -  Timing-  Severity (0-10 scale):  Minimal acceptable level (0-10 scale):     CPOT:    https://www.[facility].org/getattachment/[email or web address]-3m5j-2t4c-[email or web address]/Critical-Care-Pain-Observation-Tool-(CPOT)      PAIN AD Score: 4  Moderate pain    http://geriatrictoolkit.[facility]/cog/painad.pdf (press ctrl +  left click to view)    Dyspnea:                           [ ]Mild [ ]Moderate [ ]Severe  REsolved  Anxiety:                             [ x]Mild [ ]Moderate [ ]Severe  Fatigue:                             [ ]Mild [ ]Moderate [ ]Severe  Nausea:                             [ ]Mild [ ]Moderate [ ]Severe  Loss of appetite:              [ ]Mild [ ]Moderate [ ]Severe  Constipation:                    [ ]Mild [ ]Moderate [ ]Severe    Other Symptoms:  [x ]All other review of systems negative     Palliative Performance Status Version 2:     20-30    %    http://npcrc.org/files/news/palliative_performance_scale_ppsv2.pdf  PHYSICAL EXAM:  Vital Signs Last 24 Hrs  T(C): 36.6 (11 Aug 2021 15:31), Max: 38.3 (11 Aug 2021 01:00)  T(F): 97.8 (11 Aug 2021 15:31), Max: 101 (11 Aug 2021 01:00)  HR: 105 (11 Aug 2021 15:31) (99 - 124)  BP: 108/68 (11 Aug 2021 15:31) (94/53 - 121/55)  BP(mean): 67 (11 Aug 2021 07:05) (67 - 89)  RR: 18 (11 Aug 2021 15:31) (18 - 28)  SpO2: 95% (11 Aug 2021 15:31) (87% - 100%) I&O's Summary    GENERAL:  [ x]Awake  [ ]Oriented x0   [ ]Lethargic  [ ]Cachexia  [ ]Unarousable  [ ]Verbal  [ x]Non-Verbal  Behavioral:   [ ] Anxiety  [ ] Delirium [ ] Agitation[X] Other calm  HEENT:  [X ]Normal   [ ]Dry mouth   [ ]ET Tube/Trach  [ ]Oral lesions  PULMONARY:   [ ]Clear [ ]Tachypnea  [ ]Audible excessive secretions   [ x]Rhonchi        [ ]Right [ ]Left [ x]Bilateral  [ ]Crackles        [ ]Right [ ]Left [ ]Bilateral  [ ]Wheezing     [ ]Right [ ]Left [ ]Bilateral  [ ]Diminished breath sounds [ ]right [ ]left [ ]bilateral  CARDIOVASCULAR:    [ x]Regular [ ]Irregular [ ]Tachy  [ ]Edison [ ]Murmur [ ]Other  GASTROINTESTINAL:  [ x]Soft  [ ]Distended   [ ]+BS  [ ]Non tender [ ]Tender  [ ]PEG [ ]OGT/ NGT  Last BM:  GENITOURINARY:  [ ]Normal [x ] Incontinent   [ ]Oliguria/Anuria   [ ]Franks  MUSCULOSKELETAL:   [ ]Normal   [ ]Weakness  [x ]Bed/Wheelchair bound [ ]Edema  NEUROLOGIC:   [ x]No focal deficits  [ ]Cognitive impairment  [ ]Dysphagia [ ]Dysarthria [ ]Paresis [ ]Other   SKIN:   [ ]Normal    [ ]Rash  [x ]Pressure ulcer(s)       Present on admission [x ]y [ ]n    CRITICAL CARE:  [ ] Shock Present  [ x]Septic [ ]Cardiogenic [ ]Neurologic [ ]Hypovolemic  [ ]  Vasopressors [ ]  Inotropes   [x ]Respiratory failure present [ ]Mechanical ventilation [ ]Non-invasive ventilatory support [x ]Nasal  cannula 4 L  [ ]Acute  [ ]Chronic [ ]Hypoxic  [ ]Hypercarbic [ ]Other  [ x]Other organ failure skin    LABS: reviewed                        8.9    19.57 )-----------( 221      ( 11 Aug 2021 01:22 )             29.0   08    136  |  93<L>  |  6<L>  ----------------------------<  80  4.0   |  28  |  <0.30<L>    Ca    8.1<L>      11 Aug 2021 01:22    TPro  6.5  /  Alb  1.8<L>  /  TBili  0.3  /  DBili  x   /  AST  23  /  ALT  8<L>  /  AlkPhos  164<H>    PT/INR - ( 11 Aug 2021 01:22 )   PT: 16.8 sec;   INR: 1.42 ratio         PTT - ( 11 Aug 2021 01:22 )  PTT:39.0 sec    Urinalysis Basic - ( 11 Aug 2021 01:29 )    Color: Yellow / Appearance: Clear / S.012 / pH: x  Gluc: x / Ketone: Negative  / Bili: Negative / Urobili: Negative   Blood: x / Protein: Trace / Nitrite: Negative   Leuk Esterase: Negative / RBC: 1 /hpf / WBC 1 /HPF   Sq Epi: x / Non Sq Epi: 1 /hpf / Bacteria: Negative      RADIOLOGY & ADDITIONAL STUDIES:    PROTEIN CALORIE MALNUTRITION PRESENT: [x ]mild [ ]moderate [ ]severe [ ]underweight [ ]morbid obesity  https://www.andeal.org/vault/2440/web/files/ONC/Table_Clinical%20Characteristics%20to%20Document%20Malnutrition-White%20JV%20et%20al%839176.pdf    Height (cm): 180.3 (21 @ 00:53), 180.3 (21 @ 14:42), 180.3 (21 @ 22:21)  Weight (kg): 51 (21 @ 11:27), 50 (21 @ 09:57)  BMI (kg/m2): 15.7 (21 @ 00:53), 15.7 (21 @ 11:27), 15.4 (21 @ 14:42)    [ ]PPSV2 < or = to 30% [ ]significant weight loss  [ ]poor nutritional intake  [ ]anasarca[x ]Artificial Nutrition      REFERRALS:   [ ]Chaplaincy  [ ]Hospice  [ ]Child Life  [ ]Social Work  [ ]Case management [ ]Holistic Therapy     Goals of Care Document:KRISTA Brandon (21 @ 15:33)

## 2021-08-11 NOTE — ED PROVIDER NOTE - PHYSICAL EXAMINATION
Gen: moaning, appears uncomfortable, cachectic   Head: NC/NT  Eyes:   anicteric  ENT: airway patent, mmm   CV: tachy, +S1/S2   Resp: CTAB, symmetric breath sounds  GI:  abdomen soft non-distended, PEG tube in place without surrounding signs of infection  Extremities - no edema  Neuro: A&Ox0, does not follow commands, speech clear, moving all four extremities spontaneously

## 2021-08-11 NOTE — CONSULT NOTE ADULT - PROBLEM SELECTOR RECOMMENDATION 3
Possible  etiology  aspiration vs suspected  Gram  negative/MRSA pneumonia  Vancomycin,  Zosyn,  doxicycline  Oxygen  supplementation

## 2021-08-11 NOTE — CONSULT NOTE ADULT - PROBLEM SELECTOR RECOMMENDATION 6
Under  care  by  Dr Chavis as  an  outpatient  Home  hospice  No clinical  signs  of  brain hypertension

## 2021-08-11 NOTE — ED PROVIDER NOTE - OBJECTIVE STATEMENT
72 yo M DNR/DNI, hospice care at home(son at bedside - ok with IV placement, IV meds, IVF), GBM presents with 1 d of moaning and low pulse ox, cough. Pt is unable to verbalize his needs at baseline. Has PEG tube for feeding. Condom cath.

## 2021-08-11 NOTE — ED ADULT NURSE NOTE - OBJECTIVE STATEMENT
71yM BIBEMS from home with complaints of difficulty breathing and generalized pain. 71yM BIBEMS from home with complaints of difficulty breathing and generalized pain. PMHx as per EMAR of SIADH, CMV infection, glioblastoma multiforme, DM, seizures, HLD, HTN. 71yM BIBEMS from home with complaints of difficulty breathing and generalized pain. PMHx as per EMAR of SIADH, CMV infection, glioblastoma multiforme, DM, seizures, HLD, HTN. Pt family called EMS for patient increased moaning and nonverbal indicators of pain. Pt given 5mg Morphine BL at 8pm and 1mg SL ativan at 10pm. Family also found pt spo2 in 80's at home on 5L NC O2. (pt not on home O2 at baseline but has O2 at home PRN). Pt is on home hospice as per family and pt is a DNR/DNR (family did not bring paperwork but states they have filled it out here before). Upon arrival to ED, pt awake, nonverbal (which is pt baseline mental status as per family at bedside). Pt febrile and tachycardic (sinus tachycardia noted, rated 120's). Pt arrived on 15LNRB by EMS, trial RA sat of 87%, pt placed back on 15L NRB with spo2 100%. Mild tachypnea and increased WOB noted. Pt cough with congestion but no sputum production noted. Lung sounds clear BL. Pt nonverbal, groaning in pain. Abdomen soft and nontender to palpation. PEG tube in place. Stage II sacral pressure ulcer, stage I pressure ulcer on L heel and stage I pressure ulcer on L shin. Fall risk precautions in place. Pt repositioned for comfort. Spo2 and cardiac monitor maintained. IV placed and labs drawn and sent. Bed locked in lowest position with appropriate side rails raised. Pt given call bell and explained call bell system. Pt family has no other questions or concerns at this time.

## 2021-08-11 NOTE — CONSULT NOTE ADULT - SUBJECTIVE AND OBJECTIVE BOX
Reason for consult: GBM    HPI: 72 yo M DNR/DNI, hospice care at home(son at bedside - ok with IV placement, IV meds, IVF), GBM presents with 1 d of moaning and low pulse ox, cough. Pt is unable to verbalize his needs at baseline. Has PEG tube for feeding. Condom cath.      Hematology/Oncology consulted on this gentleman with history of GBM who presented to the ED today with shortness of breath, CXR confirmed Pneumonia, Patient with white count 19.57, sepsis work up in progress    PAST MEDICAL & SURGICAL HISTORY:  Hypertension    Hyperlipidemia    Seizures    Diabetes    Glioblastoma multiforme    CMV infection    History of SIADH    H/O colonoscopy    History of laryngoscopy    Status post stereotactic brain biopsy        FAMILY HISTORY:  FH: myocardial infarction (Father)        Alcohol Denied  Smoking: Nonsmoker  Drug Use: Denied  Marital Status:         Allergies    No Known Allergies    Intolerances        MEDICATIONS  (STANDING):    MEDICATIONS  (PRN):      ROS  No fever, sweats, chills  No epistaxis, HA, sore throat  No CP, SOB, cough, sputum  No n/v/d, abd pain, melena, hematochezia  No edema  No rash  No anxiety  No back pain, joint pain  No bleeding, bruising  No dysuria, hematuria    T(C): 36.4 (08-11-21 @ 06:35), Max: 38.3 (08-11-21 @ 01:00)  HR: 100 (08-11-21 @ 09:17) (99 - 124)  BP: 94/53 (08-11-21 @ 09:17) (94/53 - 121/55)  RR: 18 (08-11-21 @ 09:17) (18 - 28)  SpO2: 100% (08-11-21 @ 09:17) (87% - 100%)  Wt(kg): --    PE  NAD  Awake, alert  Anicteric, MMM  RRR  CTAB  Abd soft, NT, ND  No c/c/e  No rash grossly  FROM                          8.9    19.57 )-----------( 221      ( 11 Aug 2021 01:22 )             29.0       08-11    136  |  93<L>  |  6<L>  ----------------------------<  80  4.0   |  28  |  <0.30<L>    Ca    8.1<L>      11 Aug 2021 01:22    TPro  6.5  /  Alb  1.8<L>  /  TBili  0.3  /  DBili  x   /  AST  23  /  ALT  8<L>  /  AlkPhos  164<H>  08-11    EXAM:  XR CHEST PORTABLE URGENT 1V                            PROCEDURE DATE:  08/11/2021            INTERPRETATION:  CLINICAL INFORMATION: Sepsis.    EXAM: AP portable chest    COMPARISON: Chest radiograph from 7/12/2021.    FINDINGS:    Multifocal bilateral patchy opacities involving the right lung and left lower lung. No pleural effusions or pneumothorax.    Heart is normal in size.    Degenerative changes of the bones.    IMPRESSION:    Multifocal pneumonia.      --- End of Report ---               Reason for consult: GBM    HPI: 72 yo M DNR/DNI, hospice care at home(son at bedside - ok with IV placement, IV meds, IVF), GBM presents with 1 d of moaning and low pulse ox, cough. Pt is unable to verbalize his needs at baseline. Has PEG tube for feeding. Condom cath.      Hematology/Oncology consulted on this gentleman with history of GBM who presented to the ED today with shortness of breath, CXR confirmed Pneumonia, Patient with white count 19.57, sepsis work up in progress    PAST MEDICAL & SURGICAL HISTORY:  Hypertension    Hyperlipidemia    Seizures    Diabetes    Glioblastoma multiforme    CMV infection    History of SIADH    H/O colonoscopy    History of laryngoscopy    Status post stereotactic brain biopsy        FAMILY HISTORY:  FH: myocardial infarction (Father)        Alcohol Denied  Smoking: Nonsmoker  Drug Use: Denied  Marital Status:         Allergies    No Known Allergies    Intolerances        MEDICATIONS  (STANDING):    MEDICATIONS  (PRN):      ROS  No fever, sweats, chills  No epistaxis, HA, sore throat  No CP, SOB, cough, sputum  No n/v/d, abd pain, melena, hematochezia  No edema  No rash  No anxiety  No back pain, joint pain  No bleeding, bruising  No dysuria, hematuria    T(C): 36.4 (08-11-21 @ 06:35), Max: 38.3 (08-11-21 @ 01:00)  HR: 100 (08-11-21 @ 09:17) (99 - 124)  BP: 94/53 (08-11-21 @ 09:17) (94/53 - 121/55)  RR: 18 (08-11-21 @ 09:17) (18 - 28)  SpO2: 100% (08-11-21 @ 09:17) (87% - 100%)  Wt(kg): --    Obtunded  Anicteric, MMM  RRR  CTAB  Abd soft, NT, ND  No c/c/e  No rash grossly  FROM                          8.9    19.57 )-----------( 221      ( 11 Aug 2021 01:22 )             29.0       08-11    136  |  93<L>  |  6<L>  ----------------------------<  80  4.0   |  28  |  <0.30<L>    Ca    8.1<L>      11 Aug 2021 01:22    TPro  6.5  /  Alb  1.8<L>  /  TBili  0.3  /  DBili  x   /  AST  23  /  ALT  8<L>  /  AlkPhos  164<H>  08-11    EXAM:  XR CHEST PORTABLE URGENT 1V                            PROCEDURE DATE:  08/11/2021            INTERPRETATION:  CLINICAL INFORMATION: Sepsis.    EXAM: AP portable chest    COMPARISON: Chest radiograph from 7/12/2021.    FINDINGS:    Multifocal bilateral patchy opacities involving the right lung and left lower lung. No pleural effusions or pneumothorax.    Heart is normal in size.    Degenerative changes of the bones.    IMPRESSION:    Multifocal pneumonia.      --- End of Report ---

## 2021-08-11 NOTE — H&P ADULT - HISTORY OF PRESENT ILLNESS
70 yo M DNR/DNI, hospice care at home(son at bedside - ok with IV placement, IV meds, IVF), GBM presents with 1 d of moaning and low pulse ox, cough. Pt is unable to verbalize his needs at baseline. Has PEG tube for feeding. Condom cath.

## 2021-08-11 NOTE — CONSULT NOTE ADULT - PROBLEM SELECTOR RECOMMENDATION 7
Hospice  Care  as  per  family  wishes  NADINE completed  Code  status  DNR  DNI  Hospice  care  symptom management   pain , shortness  of  breath and  agitation Hospice  Care  as  per  family  wishes  NADINE completed  Code  status  DNR  DNI  Hospice  care  symptom management   pain , shortness  of  breath and  agitation  see GOC note above

## 2021-08-11 NOTE — H&P ADULT - NSHPLABSRESULTS_GEN_ALL_CORE
LABS:                        8.9    19.57 )-----------( 221      ( 11 Aug 2021 01:22 )             29.0         136  |  93<L>  |  6<L>  ----------------------------<  80  4.0   |  28  |  <0.30<L>    Ca    8.1<L>      11 Aug 2021 01:22    TPro  6.5  /  Alb  1.8<L>  /  TBili  0.3  /  DBili  x   /  AST  23  /  ALT  8<L>  /  AlkPhos  164<H>      PT/INR - ( 11 Aug 2021 01:22 )   PT: 16.8 sec;   INR: 1.42 ratio         PTT - ( 11 Aug 2021 01:22 )  PTT:39.0 sec  Urinalysis Basic - ( 11 Aug 2021 01:29 )    Color: Yellow / Appearance: Clear / S.012 / pH: x  Gluc: x / Ketone: Negative  / Bili: Negative / Urobili: Negative   Blood: x / Protein: Trace / Nitrite: Negative   Leuk Esterase: Negative / RBC: 1 /hpf / WBC 1 /HPF   Sq Epi: x / Non Sq Epi: 1 /hpf / Bacteria: Negative            RADIOLOGY & ADDITIONAL TESTS:

## 2021-08-11 NOTE — ED ADULT NURSE NOTE - CHIEF COMPLAINT
The medication list included in this document is our record of what you are currently taking, including any changes that were made at today's visit.  If you find any differences when compared to your medications at home, or have any questions that were not answered at your visit, please contact the office. Patient Education         Anxiety: What Is It? (02:05)  Your health professional recommends that you watch this short online health video. Learn how anxiety is different from stress and how it can affect your life. How to watch the video    Scan the QR code   OR Visit the website    [email or web address]   Current as of: September 11, 2018  Content Version: 11.9  © 8280-3029 [employer]. Care instructions adapted under license by [facility] ([facility]). If you have questions about a medical condition or this instruction, always ask your healthcare professional. [place] [address] any warranty or liability for your use of this information. Patient Education        Panic Attacks: Care Instructions  Your Care Instructions    During a panic attack, you may have a feeling of intense fear or terror, trouble breathing, chest pain or tightness, heartbeat changes, dizziness, sweating, and shaking. A panic attack starts suddenly and usually lasts from 5 to 20 minutes but may last even longer. You have the most anxiety about 10 minutes after the attack starts. An attack can begin with a stressful event, or it can happen without a cause. Although panic attacks can cause scary symptoms, you can learn to manage them with self-care, counseling, and medicine. Follow-up care is a key part of your treatment and safety. Be sure to make and go to all appointments, and call your doctor if you are having problems. It's also a good idea to know your test results and keep a list of the medicines you take. How can you care for yourself at home? · Take your medicine exactly as directed.  Call your doctor if you think you are having a problem with your medicine. · Go to your counseling sessions and follow-up appointments. · Recognize and accept your anxiety. Then, when you are in a situation that makes you anxious, say to yourself, \"This is not an emergency. I feel uncomfortable, but I am not in danger. I can keep going even if I feel anxious. \"  · Be kind to your body:  ? Relieve tension with exercise or a massage. ? Get enough rest.  ? Avoid alcohol, caffeine, nicotine, and illegal drugs. They can increase your anxiety level, cause sleep problems, or trigger a panic attack. ? Learn and do relaxation techniques. See below for more about these techniques. · Engage your mind. Get out and do something you enjoy. Go to a funny movie, or take a walk or hike. Plan your day. Having too much or too little to do can make you anxious. · Keep a record of your symptoms. Discuss your fears with a good friend or family member, or join a support group for people with similar problems. Talking to others sometimes relieves stress. · Get involved in social groups, or volunteer to help others. Being alone sometimes makes things seem worse than they are. · Get at least 30 minutes of exercise on most days of the week to relieve stress. Walking is a good choice. You also may want to do other activities, such as running, swimming, cycling, or playing tennis or team sports. Relaxation techniques  Do relaxation exercises for 10 to 20 minutes a day. You can play soothing, relaxing music while you do them, if you wish. · Tell others in your house that you are going to do your relaxation exercises. Ask them not to disturb you. · Find a comfortable place, away from all distractions and noise. · Lie down on your back, or sit with your back straight. · Focus on your breathing. Make it slow and steady. · Breathe in through your nose. Breathe out through either your nose or mouth.   · Breathe deeply, filling up the area The patient is a 71y Male complaining of difficulty breathing. between your navel and your rib cage. Breathe so that your belly goes up and down. · Do not hold your breath. · Breathe like this for 5 to 10 minutes. Notice the feeling of calmness throughout your whole body. As you continue to breathe slowly and deeply, relax by doing the following for another 5 to 10 minutes:  · Tighten and relax each muscle group in your body. You can begin at your toes and work your way up to your head. · Imagine your muscle groups relaxing and becoming heavy. · Empty your mind of all thoughts. · Let yourself relax more and more deeply. · Become aware of the state of calmness that surrounds you. · When your relaxation time is over, you can bring yourself back to alertness by moving your fingers and toes and then your hands and feet and then stretching and moving your entire body. Sometimes people fall asleep during relaxation, but they usually wake up shortly afterward. · Always give yourself time to return to full alertness before you drive a car or do anything that might cause an accident if you are not fully alert. Never play a relaxation tape while driving a car. When should you call for help? Call 911 anytime you think you may need emergency care. For example, call if:    · You feel you cannot stop from hurting yourself or someone else.    Watch closely for changes in your health, and be sure to contact your doctor if:    · Your panic attacks get worse.     · You have new or different anxiety.     · You are not getting better as expected. Where can you learn more? Go to https://Blogvio.Bubbli. org and sign in to your [email or web address] account. Enter H601 in the [employer] box to learn more about \"Panic Attacks: Care Instructions. \"     If you do not have an account, please click on the \"Sign Up Now\" link. Current as of: September 11, 2018  Content Version: 11.9  © 4820-3556 [employer]. Care instructions adapted under license by [facility] ([facility]).  If you have questions about a medical condition or this instruction, always ask your healthcare professional. [address] any warranty or liability for your use of this information. Patient Education         Anxiety: How to Change Anxious Thoughts (03:01)  Your health professional recommends that you watch this short online health video. Practice recognizing and replacing thoughts that cause anxiety. How to watch the video    Scan the QR code   OR Visit the website    [email or web address]   Current as of: September 11, 2018  Content Version: 11.9  © 4692-5364 [employer]. Care instructions adapted under license by [facility]). If you have questions about a medical condition or this instruction, always ask your healthcare professional. [address] any warranty or liability for your use of this information. Patient Education         Anxiety: Treatment Options (02:09)  Your health professional recommends that you watch this short online health video. Learn about different treatment options for anxiety and how they can help. How to watch the video    Scan the QR code   OR Visit the website    [email or web address]   Current as of: September 11, 2018  Content Version: 11.9  © 2006-2018 [employer]. Care instructions adapted under license by [facility] ([facility]). If you have questions about a medical condition or this instruction, always ask your healthcare professional. [address] any warranty or liability for your use of this information. Patient Education         Anxiety: What Is It? (02:05)  Your health professional recommends that you watch this short online health video. Learn how anxiety is different from stress and how it can affect your life. How to watch the video    Scan the QR code   OR Visit the website    [email or web address]   Current as of: September 11, 2018  Content Version: 11.9  © 2006-2018 [employer]. Care instructions adapted under license by [email or web address] ([facility]). If you have questions about a medical condition or this instruction, always ask your healthcare professional. [address] any warranty or liability for your use of this information. Patient Education         Relaxation Exercise: Guided Imagery (02:38)  Your health professional recommends that you watch this short online health video. Learn how to take your mind to a calming beach where stress melts away. How to watch the video    Scan the QR code   OR Visit the website    [email or web address]   Current as of: June 28, 2018  Content Version: 11.9  © 2006-2018 [employer]. Care instructions adapted under license by [email or web address] ([facility]). If you have questions about a medical condition or this instruction, always ask your healthcare professional. [address] any warranty or liability for your use of this information. Patient Education        Learning About Guided Imagery for Stress  What are guided imagery and stress? Stress is what you feel when you have to handle more than you are used to. A lot of things can cause stress. You may feel stress when you go on a job interview, take a test, or run a race. This kind of short-term stress is normal and even useful. It can help you if you need to work hard or react quickly. Stress also can last a long time. Long-term stress is caused by stressful situations or events. Examples of long-term stress include long-term health problems, ongoing problems at work, and conflicts in your family. Long-term stress can harm your health. Guided imagery is a technique of directed thoughts and suggestions that guide your mind toward a relaxed, focused state. This technique helps you use your mind to take you to a calm, peaceful place.  You can use it to relax, which can lower blood pressure and reduce other problems related to stress. How does guided imagery help to relieve stress? Because of the way the mind and body are connected, guided imagery can make you feel like you are experiencing something just by imagining it. You can achieve a relaxed state when you imagine all the details of a safe, comfortable place, such as a beach or a garden. This relaxed state may help you feel more in control of your emotions and thought processes. Feeling in control may improve your attitude, health, and sense of well-being. How do you do guided imagery? You can use a smartphone drea or a video to lead you through the process. You use all of your senses in guided imagery. For example, if you want a tropical setting, you can imagine the warm breeze on your skin, the bright blue of the water, the sound of the surf, the sweet scent of tropical flowers, and the taste of coconut. Imagining those things can make you actually feel like you're there. But you don't have to imagine the tropics to feel peace. Guided imagery can take you to your own restful place. To give guided imagery a try, follow these steps:  · Lean back comfortably in your chair. If you can, close your eyes. Put your arms on the armrests, or fold your hands in your lap. · Take a deep breath through your nose. Breathe in slowly, and then let the air out completely through your mouth. · Do it again slowly. Keep breathing like this. Gather up any tension in your body, and send it out with every breath. · Let a feeling of warmth spread from your lungs to your neck and head, down your arms to your fingertips, through your body and into your legs, all the way down to your toes. Stay this way for a moment. · Now imagine a pleasant day. You're at a park or at a place you've visited in the past where you felt at peace. · In your mind's eye, look at what lies in front of you. Maybe you see the sun, filtered through trees.  Maybe clouds are drifting by. · Look to one side, and then the other. Notice the feel of the air around you. Notice how it feels on your face and on your arms. · Stay here for a while. Let it become real for you. Follow-up care is a key part of your treatment and safety. Be sure to make and go to all appointments, and call your doctor if you are having problems. It's also a good idea to know your test results and keep a list of the medicines you take. Where can you learn more? Go to https://Aternity.12Bis. org and sign in to your [employer] account. Enter N291 in the [employer] box to learn more about \"Learning About Guided Imagery for Stress. \"     If you do not have an account, please click on the \"Sign Up Now\" link. Current as of: June 28, 2018  Content Version: 11.9  © 8318-2827 [employer]. Care instructions adapted under license by [facility] ([facility]). If you have questions about a medical condition or this instruction, always ask your healthcare professional. [address] any warranty or liability for your use of this information. Patient Education        paroxetine  Pronunciation:  nelson BROUSSARD a teen  Brand:  Brisdelle, Paxil, Timo HARRY, Andre Hall  What is the most important information I should know about paroxetine? You should not use paroxetine if you are also taking pimozide or thioridazine. Do not use paroxetine within 14 days before or 14 days after you have used an MAO inhibitor, such as isocarboxazid, linezolid, methylene blue injection, phenelzine, rasagiline, selegiline, or tranylcypromine. Some young people have thoughts about suicide when first taking an antidepressant. Stay alert to changes in your mood or symptoms.  Report any new or worsening symptoms to your doctor  Seek medical attention right away if you have symptoms such as: agitation, hallucinations, muscle stiffness, twitching, loss of coordination, dizziness, warmth or tingly feeling, nausea, vomiting, diarrhea, fever, sweating, tremors, racing heartbeats, or a seizure (convulsions). What is paroxetine? Paroxetine is an antidepressant in a group of drugs called selective serotonin reuptake inhibitors (SSRIs). Paroxetine affects chemicals in the brain that may be unbalanced in people with depression, anxiety, or other disorders. Paroxetine is used to treat depression, obsessive-compulsive disorder, anxiety disorders, post-traumatic stress disorder (PTSD), and premenstrual dysphoric disorder (PMDD). The Brisdelle brand of paroxetine is used to treat hot flashes related to menopause. Dorita Dmitriy is not for treating any other conditions. Paroxetine may also be used for purposes not listed in this medication guide. What should I discuss with my healthcare provider before taking paroxetine? You should not use this medicine if you are allergic to paroxetine, or if you are also taking pimozide or thioridazine. Do not use an MAO inhibitor within 14 days before or 14 days after you take paroxetine. A dangerous drug interaction could occur. MAO inhibitors include isocarboxazid, linezolid, phenelzine, rasagiline, selegiline, and tranylcypromine. After you stop taking paroxetine you must wait at least 14 days before you start taking an MAO inhibitor. To make sure paroxetine is safe for you, tell your doctor if you have:  · heart disease, high blood pressure, history of stroke;  · liver or kidney disease;  · a bleeding or blood clotting disorder;  · seizures or epilepsy;  · bipolar disorder (manic depression), or a history of drug abuse or suicidal thoughts;  · narrow-angle glaucoma; or  · low levels of sodium in your blood. Some young people have thoughts about suicide when first taking an antidepressant. Your doctor should check your progress at regular visits. Your family or other caregivers should also be alert to changes in your mood or symptoms.   Taking an paroxetine during pregnancy may cause serious lung problems, a heart defect, or other complications in the baby. However, you may have a relapse of depression or other treated condition if you stop taking your antidepressant. Tell your doctor right away if you become pregnant. Do not start or stop taking this medicine during pregnancy without your doctor's advice. Do not use Brisdelle if you are pregnant. Paroxetine can pass into breast milk and may cause side effects in the nursing baby. You should not breast-feed while using this medicine. Paroxetine is not approved for use by anyone younger than 25years old. How should I take paroxetine? Follow all directions on your prescription label. Your doctor may occasionally change your dose. Do not take this medicine in larger or smaller amounts or for longer than recommended. Do not crush, chew, or break an extended-release tablet. Swallow it whole. Shake the oral suspension (liquid) well just before you measure a dose. Measure liquid medicine with the dosing syringe provided, or with a special dose-measuring spoon or medicine cup. If you do not have a dose-measuring device, ask your pharmacist for one. It may take up to 4 weeks before your symptoms improve. Keep using the medication as directed and tell your doctor if your symptoms do not improve. Do not stop using paroxetine suddenly, or you could have unpleasant withdrawal symptoms. Ask your doctor how to safely stop using paroxetine. Follow your doctor's instructions about tapering your dose. Store at room temperature away from moisture, heat, and light. What happens if I miss a dose? Take the missed dose as soon as you remember. Skip the missed dose if it is almost time for your next scheduled dose. Do not take extra medicine to make up the missed dose. What happens if I overdose? Seek emergency medical attention or call the Poison Help line at 1-933.448.5632.  An overdose of paroxetine can be fatal.  What should I avoid while taking paroxetine? Drinking alcohol with this medicine can cause side effects. Ask your doctor before taking a nonsteroidal anti-inflammatory drug (NSAID) for pain, arthritis, fever, or swelling. This includes aspirin, ibuprofen (Advil, Motrin), naproxen (Aleve), celecoxib (Celebrex), diclofenac, indomethacin, meloxicam, and others. Using an NSAID with paroxetine may cause you to bruise or bleed easily. Paroxetine may impair your thinking or reactions. Be careful if you drive or do anything that requires you to be alert. What are the possible side effects of paroxetine? Get emergency medical help if you have signs of an allergic reaction: skin rash or hives; difficult breathing; swelling of your face, lips, tongue, or throat. Report any new or worsening symptoms to your doctor, such as: mood or behavior changes, anxiety, panic attacks, trouble sleeping, or if you feel impulsive, irritable, agitated, hostile, aggressive, restless, hyperactive (mentally or physically), more depressed, or have thoughts about suicide or hurting yourself.   Call your doctor at once if you have:  · racing thoughts, decreased need for sleep, unusual risk-taking behavior, feelings of extreme happiness or sadness, being more talkative than usual;  · blurred vision, tunnel vision, eye pain or swelling, or seeing halos around lights;  · unusual bone pain or tenderness, swelling or bruising;  · changes in weight or appetite;  · easy bruising, unusual bleeding (nose, mouth, vagina, or rectum), coughing up blood;  · high levels of serotonin in the body --agitation, hallucinations, fever, fast heart rate, overactive reflexes, nausea, vomiting, diarrhea, loss of coordination, fainting;  · low levels of sodium in the body --headache, confusion, slurred speech, severe weakness, loss of coordination, feeling unsteady;  · severe nervous system reaction --very stiff (rigid) muscles, high fever, sweating, confusion, fast or uneven heartbeats, tremors, fainting; or  · severe skin reaction --fever, sore throat, swelling in your face or tongue, burning in your eyes, skin pain, followed by a red or purple skin rash that spreads (especially in the face or upper body) and causes blistering and peeling. Common side effects may include:  · vision changes;  · weakness, drowsiness, dizziness;  · sweating, anxiety, shaking;  · sleep problems (insomnia);  · loss of appetite, constipation;  · dry mouth, yawning; or  · decreased sex drive, impotence, or difficulty having an orgasm. This is not a complete list of side effects and others may occur. Call your doctor for medical advice about side effects. You may report side effects to FDA at 2-016-FDA-2059. What other drugs will affect paroxetine? Taking paroxetine with other drugs that make you sleepy can worsen this effect. Ask your doctor before taking a sleeping pill, narcotic medication, muscle relaxer, or medicine for anxiety, depression, or seizures. Tell your doctor about all your current medicines and any you start or stop using, especially:  · cimetidine (Tagamet), Rios's wort, tamoxifen, tryptophan (sometimes called L-tryptophan), warfarin (Coumadin, Jantoven);  · heart rhythm medicine;  · HIV or AIDS medications;  · certain medicines to treat narcolepsy or ADHD --amphetamine, atomoxetine, dextroamphetamine, Adderall, Dexedrine, Evekeo, Vyvanse, and others;  · narcotic pain medicine --fentanyl, tramadol;  · medicine to treat anxiety, mood disorders, thought disorders, or mental illness --such as buspirone, lithium, other antidepressants, or antipsychotics;  · migraine headache medicine --sumatriptan, rizatriptan, zolmitriptan, and others; or  · seizure medicine --phenobarbital, phenytoin. This list is not complete. Other drugs may interact with paroxetine, including prescription and over-the-counter medicines, vitamins, and herbal products.  Not all possible interactions are listed in this medication guide.  Where can I get more information? Your pharmacist can provide more information about paroxetine. Remember, keep this and all other medicines out of the reach of children, never share your medicines with others, and use this medication only for the indication prescribed. Every effort has been made to ensure that the information provided by [address] is accurate, up-to-date, and complete, but no guarantee is made to that effect. Drug information contained herein may be time sensitive. [facility] information has been compiled for use by healthcare practitioners and consumers in the [place] and therefore [facility] does not warrant that uses outside of the [place] are appropriate, unless specifically indicated otherwise. [facility]'s drug information does not endorse drugs, diagnose patients or recommend therapy. [facility]'s drug information is an informational resource designed to assist licensed healthcare practitioners in caring for their patients and/or to serve consumers viewing this service as a supplement to, and not a substitute for, the expertise, skill, knowledge and judgment of healthcare practitioners. The absence of a warning for a given drug or drug combination in no way should be construed to indicate that the drug or drug combination is safe, effective or appropriate for any given patient. [facility] does not assume any responsibility for any aspect of healthcare administered with the aid of information [facility] provides. The information contained herein is not intended to cover all possible uses, directions, precautions, warnings, drug interactions, allergic reactions, or adverse effects. If you have questions about the drugs you are taking, check with your doctor, nurse or pharmacist.  Copyright 8506-2638 [place] [address] [place] [address] Avenue: 26.01. Revision date: 5/8/2017. Care instructions adapted under license by [facility] ([facility]).  If you have questions about a medical condition or this instruction, always ask your healthcare professional. [place] [address] any warranty or liability for your use of this information.

## 2021-08-11 NOTE — PATIENT PROFILE ADULT - NSPROPTRIGHTNOTIFYOBTAINDET_GEN_A_NUR
Cardiac Rehab: Per EMR, patient is a current smoker.  Smoking Cessation Program information placed on the AVS.    Wallace Duran RN unable to assess; nonverbal

## 2021-08-11 NOTE — H&P ADULT - ASSESSMENT
70 yo M DNR/DNI, hospice care at home(son at bedside - ok with IV placement, IV meds, IVF), GBM presents with 1 d of moaning and low pulse ox, cough. Pt is unable to verbalize his needs at baseline. Has PEG tube for feeding. Condom cath. 70 yo M DNR/DNI, hospice care at home(son at bedside - ok with IV placement, IV meds, IVF), GBM presents with 1 d of moaning and low pulse ox, cough. Pt is unable to verbalize his needs at baseline. Has PEG tube for feeding. Condom cath.    multifocal pna  - broad spectrum abx    resp failure  - o2 supplements    Ethics   - poor prognosis  - palliative care  - transfer to in pt hospice  - DNR  - Dilaudid prn comfort  - d/w dr Villalobos

## 2021-08-12 DIAGNOSIS — R53.2 FUNCTIONAL QUADRIPLEGIA: ICD-10-CM

## 2021-08-12 LAB
COVID-19 SPIKE DOMAIN AB INTERP: POSITIVE
COVID-19 SPIKE DOMAIN ANTIBODY RESULT: >250 U/ML — HIGH
CULTURE RESULTS: NO GROWTH — SIGNIFICANT CHANGE UP
SARS-COV-2 IGG+IGM SERPL QL IA: >250 U/ML — HIGH
SARS-COV-2 IGG+IGM SERPL QL IA: POSITIVE
SPECIMEN SOURCE: SIGNIFICANT CHANGE UP

## 2021-08-12 PROCEDURE — 99232 SBSQ HOSP IP/OBS MODERATE 35: CPT

## 2021-08-12 RX ORDER — SODIUM CHLORIDE 9 MG/ML
1000 INJECTION INTRAMUSCULAR; INTRAVENOUS; SUBCUTANEOUS
Refills: 0 | Status: DISCONTINUED | OUTPATIENT
Start: 2021-08-12 | End: 2021-08-19

## 2021-08-12 RX ORDER — PETROLATUM,WHITE
1 JELLY (GRAM) TOPICAL EVERY 12 HOURS
Refills: 0 | Status: DISCONTINUED | OUTPATIENT
Start: 2021-08-12 | End: 2021-08-19

## 2021-08-12 RX ADMIN — Medication 250 MILLIGRAM(S): at 05:30

## 2021-08-12 RX ADMIN — PIPERACILLIN AND TAZOBACTAM 25 GRAM(S): 4; .5 INJECTION, POWDER, LYOPHILIZED, FOR SOLUTION INTRAVENOUS at 13:46

## 2021-08-12 RX ADMIN — Medication 110 MILLIGRAM(S): at 05:30

## 2021-08-12 RX ADMIN — Medication 110 MILLIGRAM(S): at 19:43

## 2021-08-12 RX ADMIN — HYDROMORPHONE HYDROCHLORIDE 0.4 MILLIGRAM(S): 2 INJECTION INTRAMUSCULAR; INTRAVENOUS; SUBCUTANEOUS at 09:30

## 2021-08-12 RX ADMIN — PIPERACILLIN AND TAZOBACTAM 25 GRAM(S): 4; .5 INJECTION, POWDER, LYOPHILIZED, FOR SOLUTION INTRAVENOUS at 22:05

## 2021-08-12 RX ADMIN — PIPERACILLIN AND TAZOBACTAM 25 GRAM(S): 4; .5 INJECTION, POWDER, LYOPHILIZED, FOR SOLUTION INTRAVENOUS at 05:30

## 2021-08-12 RX ADMIN — Medication 1 APPLICATION(S): at 18:46

## 2021-08-12 RX ADMIN — Medication 250 MILLIGRAM(S): at 18:23

## 2021-08-12 RX ADMIN — HYDROMORPHONE HYDROCHLORIDE 0.4 MILLIGRAM(S): 2 INJECTION INTRAMUSCULAR; INTRAVENOUS; SUBCUTANEOUS at 09:45

## 2021-08-12 NOTE — CONSULT NOTE ADULT - SUBJECTIVE AND OBJECTIVE BOX
Wound Surgery Consult Note:    HPI:  70 yo M DNR/DNI, hospice care at home(son at bedside - ok with IV placement, IV meds, IVF), GBM presents with 1 d of moaning and low pulse ox, cough. Pt is unable to verbalize his needs at baseline. Has PEG tube for feeding. Condom cath. (11 Aug 2021 10:45)      PAST MEDICAL & SURGICAL HISTORY:  Hypertension  Hyperlipidemia  Seizures  Diabetes  Glioblastoma multiforme  CMV infection  History of SIADH  H/O colonoscopy  History of laryngoscopy  Status post stereotactic brain biopsy    REVIEW OF SYSTEMS  ROS obtained from review of medical record due to patient's condition  General: cachetic, frail 	  Respiratory and Thorax: no SOB or cough  Gastrointestinal:	no vomiting, incontinent of stool  Genitourinary:	incontinent of urine  Musculoskeletal:	 non ambulatory  Neurological:	Glioblastoma multiforme  Endocrine:	DM  Skin: chronic sacral/buttocks wound    MEDICATIONS  (STANDING):  AQUAPHOR (petrolatum Ointment) 1 Application(s) Topical every 12 hours  doxycycline IVPB 100 milliGRAM(s) IV Intermittent every 12 hours  piperacillin/tazobactam IVPB.. 3.375 Gram(s) IV Intermittent every 8 hours  sodium chloride 0.9%. 1000 milliLiter(s) (10 mL/Hr) IV Continuous <Continuous>  vancomycin  IVPB 1000 milliGRAM(s) IV Intermittent every 12 hours    MEDICATIONS  (PRN):  acetaminophen  Suppository .. 650 milliGRAM(s) Rectal every 6 hours PRN Temp greater or equal to 38C (100.4F)  bisacodyl Suppository 10 milliGRAM(s) Rectal daily PRN Constipation  HYDROmorphone  Injectable 0.4 milliGRAM(s) IV Push every 1 hour PRN moderate-severe pain  HYDROmorphone  Injectable 0.4 milliGRAM(s) IV Push every 1 hour PRN dyspnea    Allergies    No Known Allergies    Intolerances    SOCIAL HISTORY:  unable to obtain due to patient's condition    FAMILY HISTORY:  FH: myocardial infarction (Father)    Vital Signs Last 24 Hrs  T(C): 37.2 (12 Aug 2021 09:16), Max: 37.2 (12 Aug 2021 09:16)  T(F): 98.9 (12 Aug 2021 09:16), Max: 98.9 (12 Aug 2021 09:16)  HR: 111 (12 Aug 2021 09:16) (101 - 111)  BP: 97/56 (12 Aug 2021 09:16) (97/56 - 116/70)  BP(mean): --  RR: 18 (12 Aug 2021 09:16) (18 - 20)  SpO2: 97% (12 Aug 2021 09:16) (95% - 100%)    Physical Exam:  General: Obtunded, cachectic, frail  Respiratory: no SOB on room air  Gastrointestinal: soft NT/ND  (+)PEG  Neurology: nonverbal, not following commands  Musculoskeletal: contractured at bilateral knees  Vascular: Bilateral hand and foot edema equal  Skin:  Sacral/bilateral buttocks - L 4cm x W 4cm x D 0.1cm wound bed is pink, intact skin surrounding opening is dark maroon, edges macerated, small amount of serosanguinous drainage, no necrotic tissue  Left elbow - L 2cm xW 4cm x D unknown - wound is covered with top of unroofed blister with deeper pigmented edges, scant serosanguinous drainage  RIght trochanter - L 3cm x W 3cm x D none - deep maroon intact skin with no drainage  Left medial lower leg opening - L 2cm x W 2cm x d 0.1cm - wound bed is pink, macerated edges, no necrotic tissue, scant serosanguinous drainage  Left planter foot - L 6cm x W 5cm x D unknown - area is bright pink, small area os dried scab/crust on the lateral dorsal aspect L 1cm x W1cm x D unknown - +TTP, edge of skin indicating remnants of blister, no active drainage  Left medial malleollus - L 2cm x W 2cm x D none - intact deep maroon discolored skin, no drainage  Right medial malleollus L 2cm x W 2cm x D none - intact deep maroon discolored skin, no drainage  No odor, erythema, increased warmth, induration, fluctuance    LABS:      136  |  93<L>  |  6<L>  ----------------------------<  80  4.0   |  28  |  <0.30<L>    Ca    8.1<L>      11 Aug 2021 01:22    TPro  6.5  /  Alb  1.8<L>  /  TBili  0.3  /  DBili  x   /  AST  23  /  ALT  8<L>  /  AlkPhos  164<H>                            8.9    19.57 )-----------( 221      ( 11 Aug 2021 01:22 )             29.0     PT/INR - ( 11 Aug 2021 01:22 )   PT: 16.8 sec;   INR: 1.42 ratio         PTT - ( 11 Aug 2021 01: )  PTT:39.0 sec  Urinalysis Basic - ( 11 Aug 2021 01:29 )    Color: Yellow / Appearance: Clear / S.012 / pH: x  Gluc: x / Ketone: Negative  / Bili: Negative / Urobili: Negative   Blood: x / Protein: Trace / Nitrite: Negative   Leuk Esterase: Negative / RBC: 1 /hpf / WBC 1 /HPF   Sq Epi: x / Non Sq Epi: 1 /hpf / Bacteria: Negative

## 2021-08-12 NOTE — CONSULT NOTE ADULT - ASSESSMENT
Reason for consult: GBM    HPI: 70 yo M DNR/DNI, hospice care at home(son at bedside - ok with IV placement, IV meds, IVF), GBM presents with 1 d of moaning and low pulse ox, cough. Pt is unable to verbalize his needs at baseline. Has PEG tube for feeding. Condom cath.      Hematology/Oncology consulted on this gentleman with history of GBM who presented to the ED today with shortness of breath, CXR confirmed Pneumonia, Patient with white count 19.57, sepsis work up in progress        Glioblastoma Multiforme  --[place] [facility] [place]  --Home hospice      Pneumonia  --CXR with multifocal pneumonia  --Received Zosyn/Vancomycin in ED  --Blood/Urine cultures pending  --Rec ID and Pulmonary consults    Anemia   --H&H on admission 8.9/29.0  --Likely related to malignancy  --Please transfuse if hgb <7.0    GOC  --Patient on home Hospice      We will follow patient daily while admitted and continue to coordinate [place] [facility] [place]    Kaia Rivas NP  Hematology/Oncology  [facility]  [phone] (Cell)  [phone] (Office)  [phone] (Alt office)  Evenings and weekends please call MD on call or office        
Impression:    Sacral/bilateral buttocks Deep tissue injury in evolution present on admission  Left elbow deep tissue injury in evolution present on admissoin  RIght trochanter deep tissue injury present on admission  Left medial lower leg stage 2 pressure injury vs. abrasion present on admission  Left planter foot deep tissue injury resolving present on admission  Left medial malleollus deep tissue injury present on admission  Right medial malleollus deep tissue injury present on admission  Incontinence of bowel and bladder   Incontinence dermatitis    Recommend:  1.) Topical therapy: Sacral/bilateral buttocks, Left elbow, RIght trochanter, Left medial lower leg, Left medial malleollus, Right medial malleollus injuries - cleanse with NS, pat dry, apply allevyn foam dressings every other day  Left planter foot  - keep clean and dry, leave JESSICA  2.) Incontinence management - incontinence cleanser, pads, pericare BID, consider external male urinary catheter  3.) Maintain on an alternating air with low air loss surface  4.) Turn and Position q2 hours  5.) Offload heels/feet with complete care air fluidized boots, ensure knees and feet are not resting on the rails of the bed  6.) Nutrition optimization as possible  7.) Emollient Therapy: Moisturize intact skin w/ SWEEN cream daily    Care as per medicine. Will not actively follow but will remain available. Please recall for deterioration or new issues  Upon discharge [facility] [address] [place] [address] [phone]  Seen and discussed with clinical nurse  Thank you for this consult  Akilah Edwards, HENRIETTA-C, CWOCN [id number]  
Patient admitted  with  septic encephalopathy  and hypoxic  respiratory  failure secondary to  multifocal pneumonia  (presumptive aspiration vs  suspected  Gram  negative  at  risK  of  MRSA). Not in septic  shock.   As  per  family  wishes CODE status  DNR/DNI  on Hospice care.   HCP would like  to  treat with  antibiotics.   If there is criteria to initiate pressors HCP would like them to be initiated.  IVF are  allowed  Continue PEG feeding if tolerated as per family wishes.

## 2021-08-12 NOTE — PROGRESS NOTE ADULT - SUBJECTIVE AND OBJECTIVE BOX
Patient is a 71y old  Male who presents with a chief complaint of sob (11 Aug 2021 15:36)  Patient seen today, asleep      MEDICATIONS  (STANDING):  doxycycline IVPB 100 milliGRAM(s) IV Intermittent every 12 hours  piperacillin/tazobactam IVPB.. 3.375 Gram(s) IV Intermittent every 8 hours  vancomycin  IVPB 1000 milliGRAM(s) IV Intermittent every 12 hours    MEDICATIONS  (PRN):  acetaminophen  Suppository .. 650 milliGRAM(s) Rectal every 6 hours PRN Temp greater or equal to 38C (100.4F)  bisacodyl Suppository 10 milliGRAM(s) Rectal daily PRN Constipation  HYDROmorphone  Injectable 0.4 milliGRAM(s) IV Push every 1 hour PRN moderate-severe pain  HYDROmorphone  Injectable 0.4 milliGRAM(s) IV Push every 1 hour PRN dyspnea      Vital Signs Last 24 Hrs  T(C): 36.5 (11 Aug 2021 15:40), Max: 36.6 (11 Aug 2021 13:02)  T(F): 97.7 (11 Aug 2021 15:40), Max: 97.9 (11 Aug 2021 13:02)  HR: 105 (11 Aug 2021 15:40) (100 - 105)  BP: 116/70 (11 Aug 2021 15:40) (94/53 - 116/70)  BP(mean): --  RR: 20 (11 Aug 2021 15:40) (18 - 20)  SpO2: 97% (11 Aug 2021 15:40) (95% - 100%)    PE  NAD  Obtunded  Anicteric, MMM  No c/c/e  No rash grossly  FROM                          8.9    19.57 )-----------( 221      ( 11 Aug 2021 01:22 )             29.0       08-11    136  |  93<L>  |  6<L>  ----------------------------<  80  4.0   |  28  |  <0.30<L>    Ca    8.1<L>      11 Aug 2021 01:22    TPro  6.5  /  Alb  1.8<L>  /  TBili  0.3  /  DBili  x   /  AST  23  /  ALT  8<L>  /  AlkPhos  164<H>  08-11

## 2021-08-12 NOTE — PROGRESS NOTE ADULT - PROBLEM SELECTOR PLAN 3
Chest x-ray on 8/11/21showed Multifocal bilateral patchy opacities involving the right lung and left lower lung. No pleural effusions or pneumothorax.  Doxycycline 100mg IVPB X5   Zosyn 3.375gram IVPB q8hr  Vancomycin 1000mg IVPB q12hr

## 2021-08-12 NOTE — PROGRESS NOTE ADULT - ATTENDING COMMENTS
71y M presenting from home hospice ([facility]). PMH SIADH, CMV infection, glioblastoma multiforme, DM, seizures, HLD, HTN. Has PEG tube, brought in by family due to moaning.  Work-up revealed multilobar infiltrates so treatment for PNA started.   Patient transferred to the PCU under [facility] for continued care.  Plan to speak with son (physician) to clarify GOC.

## 2021-08-12 NOTE — HOSPICE CARE NOTE - CONVESATION DETAILS
Pt admitted with Multifocal PNA.  Transferred to PCU for symptom management.    - Pt now on:  Doxycycline 100mg IVPB X5, Zosyn 3.375gram IVPB q8hr, and Vancomycin 1000mg IVPB q12hr.     [facility] PCU Med Team (Dr. Ramo Melchor) called the Pt's son Elian Rider, but no answer.     HCN RN unable to reach the Pt's son via telephone.     Pt to continue with symptom management in the PCU.    HCN RN will continue to follow.  Stefanie Shah RN  [phone]   Pt admitted with Multifocal PNA.  Transferred to PCU for symptom management.    - Pt now on:  Doxycycline 100mg IVPB X5, Zosyn 3.375gram IVPB q8hr, and Vancomycin 1000mg IVPB q12hr.     [facility] PCU Med Team (Dr. Ramo Melchor) called the Pt's son Jim Rider, but no answer.     HCN RN unable to reach the Pt's son via telephone.     Pt to continue with symptom management in the PCU.    HCN RN will continue to follow.  Stefanie Shah RN  [phone]

## 2021-08-12 NOTE — CONSULT NOTE ADULT - CONSULT REASON
GBM
Hospice care  on setting  of  acute hypoxic  respiratory  failure
multiple pressure injuries present on admission

## 2021-08-12 NOTE — PROGRESS NOTE ADULT - SUBJECTIVE AND OBJECTIVE BOX
GAP TEAM PALLIATIVE CARE UNIT PROGRESS NOTE:      [  ] Patient on hospice program.    INDICATION FOR PALLIATIVE CARE UNIT SERVICES: Symptom management in the setting of a 71y M presenting from home hospice ([facility]). PMH SIADH, CMV infection, glioblastoma multiforme, DM, seizures, HLD, HTN. Brought in by family  for low oxygen. Admitted with pneumonia.    INTERVAL HPI/OVERNIGHT EVENTS: Chart reviewed. The patient is seen and examined at the bedside. The patient required PRN Dilaudid 0.4mg IV X1 within the last 24hrs(8am-8am)    DNR on chart:   Allergies    No Known Allergies    Intolerances    MEDICATIONS  (STANDING):  AQUAPHOR (petrolatum Ointment) 1 Application(s) Topical every 12 hours  doxycycline IVPB 100 milliGRAM(s) IV Intermittent every 12 hours  piperacillin/tazobactam IVPB.. 3.375 Gram(s) IV Intermittent every 8 hours  sodium chloride 0.9%. 1000 milliLiter(s) (10 mL/Hr) IV Continuous <Continuous>  vancomycin  IVPB 1000 milliGRAM(s) IV Intermittent every 12 hours    MEDICATIONS  (PRN):  acetaminophen  Suppository .. 650 milliGRAM(s) Rectal every 6 hours PRN Temp greater or equal to 38C (100.4F)  bisacodyl Suppository 10 milliGRAM(s) Rectal daily PRN Constipation  HYDROmorphone  Injectable 0.4 milliGRAM(s) IV Push every 1 hour PRN moderate-severe pain  HYDROmorphone  Injectable 0.4 milliGRAM(s) IV Push every 1 hour PRN dyspnea    ITEMS UNCHECKED ARE NOT PRESENT    PRESENT SYMPTOMS: [X ]Unable to obtain due to poor mentation   Source if other than patient:  [ ]Family   [ X]Team     Pain: [ X] yes [ ] no see pain ad score  QOL impact -   Location -                    Aggravating factors -  Quality -  Radiation -  Timing-  Severity (0-10 scale):  Minimal acceptable level (0-10 scale):     Dyspnea:                           [ ]Mild [ ]Moderate [ ]Severe  Anxiety:                             [ ]Mild [ ]Moderate [ ]Severe  Fatigue:                             [ ]Mild [ ]Moderate [ ]Severe  Nausea:                             [ ]Mild [ ]Moderate [ ]Severe  Loss of appetite:              [ ]Mild [ ]Moderate [ ]Severe  Constipation:                    [ ]Mild [ ]Moderate [ ]Severe    PAINAD Score: a score of 5 within the last 24hrs (8am-8am)    [email or web address] (Ctrl +  left click to view)  		  Other Symptoms:  [ X]All other review of systems negative     Palliative Performance Status Version 2:   10 %         http://[email or web address]/files/[email or web address]_performance_scale_[email or web address].pdf  PHYSICAL EXAM:  Vital Signs Last 24 Hrs  T(C): 37.2 (12 Aug 2021 09:16), Max: 37.2 (12 Aug 2021 09:16)  T(F): 98.9 (12 Aug 2021 09:16), Max: 98.9 (12 Aug 2021 09:16)  HR: 111 (12 Aug 2021 09:16) (101 - 111)  BP: 97/56 (12 Aug 2021 09:16) (97/56 - 116/70)  BP(mean): --  RR: 18 (12 Aug 2021 09:16) (18 - 20)  SpO2: 97% (12 Aug 2021 09:16) (95% - 100%) I&O's Summary  GENERAL:  [ ]Alert  [ ]Oriented x   [X ]Lethargic  [ ]Cachexia  [ ]Unarousable  [ ]Verbal  [X ]Non-Verbal  Behavioral:   [ ] Anxiety  [ ] Delirium [ ] Agitation [ ] Other  HEENT:  [ ]Normal   [X ]Dry mouth   [ ]ET Tube/Trach  [ ]Oral lesions  PULMONARY:   [ ]Clear [ ]Tachypnea  [ ]Audible excessive secretions   [ ]Rhonchi        [ ]Right [ ]Left [ ]Bilateral  [X ]Crackles        [ ]Right [ ]Left [ X]Bilateral  [ ]Wheezing     [ ]Right [ ]Left [ ]Bilateral  [ ]Diminshed BS [ ]Right [ ]Left [ ]Bilateral    CARDIOVASCULAR:    [ ]Regular [ ]Irregular [X ]Tachy  [ ]Edison [ ]Murmur [ ]Other  GASTROINTESTINAL:  [ X]Soft  [ ]Distended   [ ]+BS  [ ]Non tender [ ]Tender  [ X]PEG [ ]OGT/ NGT   Last BM: unknown   GENITOURINARY:  [ ]Normal [X ] Incontinent   [ ]Oliguria/Anuria   [ ]Franks  MUSCULOSKELETAL:   [ ]Normal   [X ]Weakness  [X ]Bed/Wheelchair bound [ ]Edema  NEUROLOGIC:   [ ]No focal deficits  [ X] Cognitive impairment  [ ] Dysphagia [ ]Dysarthria [ ] Paresis [ ]Other   SKIN:   [ ]Normal  [ ]Rash     [X ]Pressure ulcer(s)  [ X]y [ ]n  Present on admission  Multiple pressure ulcers. Please see nursing assessment for further details for which I have reviewed.     CRITICAL CARE:  [ ] Shock Present  [ ]Septic [ ]Cardiogenic [ ]Neurologic [ ]Hypovolemic  [ ]  Vasopressors [ ]  Inotropes   [ ] Respiratory failure present [ ] Mechanical Ventilation [ ] Non-invasive ventilatory support [ ] High-Flow  [ ] Acute  [ ] Chronic [ ] Hypoxic  [ ] Hypercarbic [ ] Other  [X ] Other organ failure- Skin    LABS:                        8.9    19.57 )-----------( 221      ( 11 Aug 2021 01:22 )             29.0   08    136  |  93<L>  |  6<L>  ----------------------------<  80  4.0   |  28  |  <0.30<L>    Ca    8.1<L>      11 Aug 2021 01:22    TPro  6.5  /  Alb  1.8<L>  /  TBili  0.3  /  DBili  x   /  AST  23  /  ALT  8<L>  /  AlkPhos  164<H>  11  PT/INR - ( 11 Aug 2021 01:22 )   PT: 16.8 sec;   INR: 1.42 ratio         PTT - ( 11 Aug 2021 01:22 )  PTT:39.0 sec    Urinalysis Basic - ( 11 Aug 2021 01:29 )    Color: Yellow / Appearance: Clear / S.012 / pH: x  Gluc: x / Ketone: Negative  / Bili: Negative / Urobili: Negative   Blood: x / Protein: Trace / Nitrite: Negative   Leuk Esterase: Negative / RBC: 1 /hpf / WBC 1 /HPF   Sq Epi: x / Non Sq Epi: 1 /hpf / Bacteria: Negative      RADIOLOGY & ADDITIONAL STUDIES: Reviewed    PROTEIN CALORIE MALNUTRITION: [ ] mild [ ] moderate [ ] severe  [ ] underweight [ ] morbid obesity    https://www.andeal.org/vault/5850/web/files/ONC/Table_Clinical%20Characteristics%20to%20Document%20Malnutrition-White%20JV%20et%20al%169282.pdf    Height (cm): 180.3 (21 @ 00:53), 180.3 (21 @ 14:42), 180.3 (21 @ 22:21)  Weight (kg): 51 (21 @ 11:27), 50 (21 @ 09:57)  BMI (kg/m2): 15.7 (21 @ 00:53), 15.7 (21 @ 11:27), 15.4 (21 @ 14:42)    [ X] PPSV2 < or = 30% [ ] significant weight loss [ ] poor nutritional intake [ ] anasarca   Artificial Nutrition [ ]     REFERRALS:   [ ]Chaplaincy  [X ] Hospice  [ ]Child Life  [X ]Social Work  [ ]Case management [ ]Holistic Therapy [ ] Physical Therapy [ ] Dietary   Goals of Care Document:

## 2021-08-12 NOTE — PROGRESS NOTE ADULT - PROBLEM SELECTOR PLAN 5
Hospice patient  Continue with symptom management in the PCU  Team called the patient's son Hospice patient  Team called the patient's son but no answer. Will continue to follow up  Hospice RN and social work continues to follow  Continue with symptom management in the PCU

## 2021-08-13 DIAGNOSIS — R13.10 DYSPHAGIA, UNSPECIFIED: ICD-10-CM

## 2021-08-13 LAB — VANCOMYCIN TROUGH SERPL-MCNC: 27.2 UG/ML — CRITICAL HIGH (ref 10–20)

## 2021-08-13 PROCEDURE — 49465 FLUORO EXAM OF G/COLON TUBE: CPT

## 2021-08-13 PROCEDURE — 99233 SBSQ HOSP IP/OBS HIGH 50: CPT

## 2021-08-13 PROCEDURE — 74018 RADEX ABDOMEN 1 VIEW: CPT | Mod: 26

## 2021-08-13 RX ADMIN — Medication 250 MILLIGRAM(S): at 06:11

## 2021-08-13 RX ADMIN — HYDROMORPHONE HYDROCHLORIDE 0.4 MILLIGRAM(S): 2 INJECTION INTRAMUSCULAR; INTRAVENOUS; SUBCUTANEOUS at 05:42

## 2021-08-13 RX ADMIN — HYDROMORPHONE HYDROCHLORIDE 0.4 MILLIGRAM(S): 2 INJECTION INTRAMUSCULAR; INTRAVENOUS; SUBCUTANEOUS at 05:57

## 2021-08-13 RX ADMIN — Medication 1 APPLICATION(S): at 05:18

## 2021-08-13 RX ADMIN — Medication 110 MILLIGRAM(S): at 07:20

## 2021-08-13 RX ADMIN — PIPERACILLIN AND TAZOBACTAM 25 GRAM(S): 4; .5 INJECTION, POWDER, LYOPHILIZED, FOR SOLUTION INTRAVENOUS at 15:24

## 2021-08-13 RX ADMIN — Medication 1 APPLICATION(S): at 15:24

## 2021-08-13 RX ADMIN — Medication 110 MILLIGRAM(S): at 19:59

## 2021-08-13 RX ADMIN — PIPERACILLIN AND TAZOBACTAM 25 GRAM(S): 4; .5 INJECTION, POWDER, LYOPHILIZED, FOR SOLUTION INTRAVENOUS at 08:25

## 2021-08-13 RX ADMIN — PIPERACILLIN AND TAZOBACTAM 25 GRAM(S): 4; .5 INJECTION, POWDER, LYOPHILIZED, FOR SOLUTION INTRAVENOUS at 22:07

## 2021-08-13 NOTE — PROGRESS NOTE ADULT - PROBLEM SELECTOR PLAN 3
Chest x-ray on 8/11/21showed Multifocal bilateral patchy opacities involving the right lung and left lower lung. No pleural effusions or pneumothorax.  Doxycycline 100mg IVPB X5   Zosyn 3.375gram IVPB q8hr  Vancomycin 1000mg IVPB q12hr Patient with PEG tube.   Son would like for us ro restart her on her feeds  Dietitian following. Waiting on recommendations Patient with PEG tube.   Son would like for us to restart her on her feeds  Dietitian following. Waiting on recommendations

## 2021-08-13 NOTE — DIETITIAN INITIAL EVALUATION ADULT. - OTHER INFO
Son reports pt with hx of significant wt loss, with majority within the last four months. Family states pt weighed >140 lbs last year. Current dosing wt not yet available. Family believes current wt likely ~112 lbs. Per Sheri HIE: (7/12): 112.6 lbs, (6/4): 110.4 lbs; (1/7/20): 143.8 lbs.     Pt currently NPO, awaiting recommendations to resume EN feeds. See below for recommendations.     Skin: left lower leg - stage II vs abrasion, sacrum evolving DTI, right hip DTI, left lateral malleolus DTI, right lateral malleolus DTI, left elbow evolving DTI, left plantar foot evolving DTI  Edema: 2+ left hand; right hand  GI: Last BM: (8/13) X 2.

## 2021-08-13 NOTE — DIETITIAN INITIAL EVALUATION ADULT. - ORAL INTAKE PTA/DIET HISTORY
Pt dependent on PEG feeds at home. Spoke with son ( Pt dependent on PEG feeds at home. Spoke with son who explained that pt had been receiving Glucerna at home (specific formulary unclear; at most, received 40ml/hr), however had recently started to give bolus feeds of homemade soups, as they believed that Glucerna was causing diarrhea/loose stools. Amenable to trial Vital in-house (elemental formula, low fiber).

## 2021-08-13 NOTE — PROGRESS NOTE ADULT - PROBLEM SELECTOR PLAN 4
Under care  by  Dr Chavis as  an  outpatient  Home  hospice Chest x-ray on 8/11/21showed Multifocal bilateral patchy opacities involving the right lung and left lower lung. No pleural effusions or pneumothorax.  Doxycycline 100mg IVPB X5   Zosyn 3.375gram IVPB q8hr   Vancomycin 1000mg IVPB q12hr ( held today, trough is 27.2)

## 2021-08-13 NOTE — PROGRESS NOTE ADULT - SUBJECTIVE AND OBJECTIVE BOX
GAP TEAM PALLIATIVE CARE UNIT PROGRESS NOTE:      [  ] Patient on hospice program.    INDICATION FOR PALLIATIVE CARE UNIT SERVICES: Symptom management in the setting of a 71y M presenting from home hospice ([facility]). PMH SIADH, CMV infection, glioblastoma multiforme, DM, seizures, HLD, HTN. Brought in by family  for low oxygen. Admitted with pneumonia.    INTERVAL HPI/OVERNIGHT EVENTS: Chart reviewed. The patient is seen and examined at the bedside. The patient required PRN Dilaudid 0.4mg IV X2 within the last 24hrs(8am-8am)    DNR on chart:   Allergies    No Known Allergies    Intolerances    MEDICATIONS  (STANDING):  AQUAPHOR (petrolatum Ointment) 1 Application(s) Topical every 12 hours  doxycycline IVPB 100 milliGRAM(s) IV Intermittent every 12 hours  piperacillin/tazobactam IVPB.. 3.375 Gram(s) IV Intermittent every 8 hours  sodium chloride 0.9%. 1000 milliLiter(s) (10 mL/Hr) IV Continuous <Continuous>  vancomycin  IVPB 1000 milliGRAM(s) IV Intermittent every 12 hours    MEDICATIONS  (PRN):  acetaminophen  Suppository .. 650 milliGRAM(s) Rectal every 6 hours PRN Temp greater or equal to 38C (100.4F)  bisacodyl Suppository 10 milliGRAM(s) Rectal daily PRN Constipation  HYDROmorphone  Injectable 0.4 milliGRAM(s) IV Push every 1 hour PRN moderate-severe pain  HYDROmorphone  Injectable 0.4 milliGRAM(s) IV Push every 1 hour PRN dyspnea    ITEMS UNCHECKED ARE NOT PRESENT    PRESENT SYMPTOMS: [ X]Unable to obtain due to poor mentation   Source if other than patient:  [ ]Family   [ X]Team     Pain: [ x] yes [ ] no see pain ad score  QOL impact -   Location -                    Aggravating factors -  Quality -  Radiation -  Timing-  Severity (0-10 scale):  Minimal acceptable level (0-10 scale):     Dyspnea:                           [ ]Mild [ ]Moderate [ ]Severe  Anxiety:                             [ ]Mild [ ]Moderate [ ]Severe  Fatigue:                             [ ]Mild [ ]Moderate [ ]Severe  Nausea:                             [ ]Mild [ ]Moderate [ ]Severe  Loss of appetite:              [ ]Mild [ ]Moderate [ ]Severe  Constipation:                    [ ]Mild [ ]Moderate [ ]Severe    PAINAD Score: A score of 5&6 within the last 24hrs(8am-8am)    [email or web address] (Ctrl +  left click to view)  		  Other Symptoms:  [X]All other review of systems negative     Palliative Performance Status Version 2:  10%         [email or web address]  PHYSICAL EXAM:  Vital Signs Last 24 Hrs  T(C): 37.1 (13 Aug 2021 09:02), Max: 37.1 (13 Aug 2021 09:02)  T(F): 98.7 (13 Aug 2021 09:02), Max: 98.7 (13 Aug 2021 09:02)  HR: 99 (13 Aug 2021 09:02) (99 - 99)  BP: 102/66 (13 Aug 2021 09:02) (102/66 - 102/66)  BP(mean): --  RR: 18 (13 Aug 2021 10:51) (18 - 18)  SpO2: 99% (13 Aug 2021 10:51) (99% - 100%) I&O's Summary    GENERAL:  [ ]Alert  [ ]Oriented x   [X ]Lethargic  [ ]Cachexia  [ ]Unarousable  [ ]Verbal  [X ]Non-Verbal  Behavioral:   [ ] Anxiety  [ ] Delirium [ ] Agitation [ ] Other  HEENT:  [ ]Normal   [X ]Dry mouth   [ ]ET Tube/Trach  [ ]Oral lesions  PULMONARY:   [ ]Clear [ ]Tachypnea  [ ]Audible excessive secretions   [ ]Rhonchi        [ ]Right [ ]Left [ ]Bilateral  [X ]Crackles        [ ]Right [ ]Left [ X]Bilateral  [ ]Wheezing     [ ]Right [ ]Left [ ]Bilateral  [ ]Diminshed BS [ ]Right [ ]Left [ ]Bilateral    CARDIOVASCULAR:    [ ]Regular [ ]Irregular [X ]Tachy  [ ]Edison [ ]Murmur [ ]Other  GASTROINTESTINAL:  [ X]Soft  [ ]Distended   [ ]+BS  [ ]Non tender [ ]Tender  [ X]PEG [ ]OGT/ NGT   Last BM: unknown   GENITOURINARY:  [ ]Normal [X ] Incontinent   [ ]Oliguria/Anuria   [ ]Franks  MUSCULOSKELETAL:   [ ]Normal   [X ]Weakness  [X ]Bed/Wheelchair bound [ ]Edema  NEUROLOGIC:   [ ]No focal deficits  [ X] Cognitive impairment  [ ] Dysphagia [ ]Dysarthria [ ] Paresis [ ]Other   SKIN:   [ ]Normal  [ ]Rash     [X ]Pressure ulcer(s)  [ X]y [ ]n  Present on admission  Multiple pressure ulcers. Please see nursing assessment for further details for which I have reviewed.     CRITICAL CARE:  [ ] Shock Present  [ ]Septic [ ]Cardiogenic [ ]Neurologic [ ]Hypovolemic  [ ]  Vasopressors [ ]  Inotropes   [ ] Respiratory failure present [ ] Mechanical Ventilation [ ] Non-invasive ventilatory support [ ] High-Flow  [ ] Acute  [ ] Chronic [ ] Hypoxic  [ ] Hypercarbic [ ] Other  [X ] Other organ failure- Skin      LABS: None new    RADIOLOGY & ADDITIONAL STUDIES: None new    PROTEIN CALORIE MALNUTRITION: [ ] mild [ ] moderate [ ] severe  [ ] underweight [ ] morbid obesity    https://www.andeal.org/vault/2440/web/files/ONC/Table_Clinical%20Characteristics%20to%20Document%20Malnutrition-White%20JV%20et%20al%727179.pdf    Height (cm): 180.3 (08-11-21 @ 00:53), 180.3 (07-09-21 @ 14:42), 180.3 (06-04-21 @ 22:21)  Weight (kg): 51 (07-12-21 @ 11:27), 50 (06-05-21 @ 09:57)  BMI (kg/m2): 15.7 (08-11-21 @ 00:53), 15.7 (07-12-21 @ 11:27), 15.4 (07-09-21 @ 14:42)    [ X] PPSV2 < or = 30% [ ] significant weight loss [ ] poor nutritional intake [ ] anasarca   Artificial Nutrition [ ]     REFERRALS:   [ ]Chaplaincy  [X ] Hospice  [ ]Child Life  [X ]Social Work  [ ]Case management [ ]Holistic Therapy [ ] Physical Therapy [X ] Dietary   Goals of Care Document:    GAP TEAM PALLIATIVE CARE UNIT PROGRESS NOTE:      [  ] Patient on hospice program.    INDICATION FOR PALLIATIVE CARE UNIT SERVICES: Symptom management in the setting of a 71y M presenting from home hospice ([facility]). PMH SIADH, CMV infection, glioblastoma multiforme, DM, seizures, HLD, HTN. Brought in by family  for low oxygen. Admitted with pneumonia.    INTERVAL HPI/OVERNIGHT EVENTS: Chart reviewed. The patient is seen and examined at the bedside. The patient required PRN Dilaudid 0.4mg IV X2 within the last 24hrs(8am-8am)    DNR on chart:   Allergies    No Known Allergies    Intolerances    MEDICATIONS  (STANDING):  AQUAPHOR (petrolatum Ointment) 1 Application(s) Topical every 12 hours  doxycycline IVPB 100 milliGRAM(s) IV Intermittent every 12 hours  piperacillin/tazobactam IVPB.. 3.375 Gram(s) IV Intermittent every 8 hours  sodium chloride 0.9%. 1000 milliLiter(s) (10 mL/Hr) IV Continuous <Continuous>  vancomycin  IVPB 1000 milliGRAM(s) IV Intermittent every 12 hours    MEDICATIONS  (PRN):  acetaminophen  Suppository .. 650 milliGRAM(s) Rectal every 6 hours PRN Temp greater or equal to 38C (100.4F)  bisacodyl Suppository 10 milliGRAM(s) Rectal daily PRN Constipation  HYDROmorphone  Injectable 0.4 milliGRAM(s) IV Push every 1 hour PRN moderate-severe pain  HYDROmorphone  Injectable 0.4 milliGRAM(s) IV Push every 1 hour PRN dyspnea    ITEMS UNCHECKED ARE NOT PRESENT    PRESENT SYMPTOMS: [ X]Unable to obtain due to poor mentation   Source if other than patient:  [ ]Family   [ X]Team     Pain: [ x] yes [ ] no see pain ad score  QOL impact -   Location -                    Aggravating factors -  Quality -  Radiation -  Timing-  Severity (0-10 scale):  Minimal acceptable level (0-10 scale):     Dyspnea:                           [ ]Mild [ ]Moderate [ ]Severe  Anxiety:                             [ ]Mild [ ]Moderate [ ]Severe  Fatigue:                             [ ]Mild [ ]Moderate [ ]Severe  Nausea:                             [ ]Mild [ ]Moderate [ ]Severe  Loss of appetite:              [ ]Mild [ ]Moderate [ ]Severe  Constipation:                    [ ]Mild [ ]Moderate [ ]Severe    PAINAD Score: A score of 5&6 within the last 24hrs(8am-8am)    [email or web address] (Ctrl +  left click to view)  		  Other Symptoms:  [X]All other review of systems negative     Palliative Performance Status Version 2:  10%         [email or web address]  PHYSICAL EXAM:  Vital Signs Last 24 Hrs  T(C): 37.1 (13 Aug 2021 09:02), Max: 37.1 (13 Aug 2021 09:02)  T(F): 98.7 (13 Aug 2021 09:02), Max: 98.7 (13 Aug 2021 09:02)  HR: 99 (13 Aug 2021 09:02) (99 - 99)  BP: 102/66 (13 Aug 2021 09:02) (102/66 - 102/66)  BP(mean): --  RR: 18 (13 Aug 2021 10:51) (18 - 18)  SpO2: 99% (13 Aug 2021 10:51) (99% - 100%) I&O's Summary    GENERAL:  [ ]Alert  [ ]Oriented x   [X ]Lethargic  [ ]Cachexia  [ ]Unarousable  [ ]Verbal  [X ]Non-Verbal  Behavioral:   [ ] Anxiety  [ ] Delirium [ ] Agitation [x ] Other calm  HEENT:  [ ]Normal   [X ]Dry mouth   [ ]ET Tube/Trach  [ ]Oral lesions  PULMONARY:   [ ]Clear [ ]Tachypnea  [ ]Audible excessive secretions   [ ]Rhonchi        [ ]Right [ ]Left [ ]Bilateral  [X ]Crackles        [ ]Right [ ]Left [ X]Bilateral  [ ]Wheezing     [ ]Right [ ]Left [ ]Bilateral  [ ]Diminshed BS [ ]Right [ ]Left [ ]Bilateral    CARDIOVASCULAR:    [ ]Regular [ ]Irregular [X ]Tachy  [ ]Edison [ ]Murmur [ ]Other  GASTROINTESTINAL:  [ X]Soft  [ ]Distended   [ ]+BS  [ ]Non tender [ ]Tender  [ X]PEG [ ]OGT/ NGT   Last BM: unknown   GENITOURINARY:  [ ]Normal [X ] Incontinent   [ ]Oliguria/Anuria   [ ]Franks  MUSCULOSKELETAL:   [ ]Normal   [X ]Weakness  [X ]Bed/Wheelchair bound [ ]Edema  NEUROLOGIC:   [ ]No focal deficits  [ X] Cognitive impairment  [ ] Dysphagia [ ]Dysarthria [ ] Paresis [ ]Other   SKIN:   [ ]Normal  [ ]Rash     [X ]Pressure ulcer(s)  [ X]y [ ]n  Present on admission  Multiple pressure ulcers. Please see nursing assessment for further details for which I have reviewed.     CRITICAL CARE:  [ ] Shock Present  [ ]Septic [ ]Cardiogenic [ ]Neurologic [ ]Hypovolemic  [ ]  Vasopressors [ ]  Inotropes   [ ] Respiratory failure present [ ] Mechanical Ventilation [ ] Non-invasive ventilatory support [ ] High-Flow  [ ] Acute  [ ] Chronic [ ] Hypoxic  [ ] Hypercarbic [ ] Other  [X ] Other organ failure- Skin      LABS: None new    RADIOLOGY & ADDITIONAL STUDIES: None new    PROTEIN CALORIE MALNUTRITION: [ ] mild [ ] moderate [ ] severe  [ ] underweight [ ] morbid obesity    https://www.andeal.org/vault/2440/web/files/ONC/Table_Clinical%20Characteristics%20to%20Document%20Malnutrition-White%20JV%20et%20al%834302.pdf    Height (cm): 180.3 (08-11-21 @ 00:53), 180.3 (07-09-21 @ 14:42), 180.3 (06-04-21 @ 22:21)  Weight (kg): 51 (07-12-21 @ 11:27), 50 (06-05-21 @ 09:57)  BMI (kg/m2): 15.7 (08-11-21 @ 00:53), 15.7 (07-12-21 @ 11:27), 15.4 (07-09-21 @ 14:42)    [ X] PPSV2 < or = 30% [ ] significant weight loss [ ] poor nutritional intake [ ] anasarca   Artificial Nutrition [ ]     REFERRALS:   [ ]Chaplaincy  [X ] Hospice  [ ]Child Life  [X ]Social Work  [ ]Case management [ ]Holistic Therapy [ ] Physical Therapy [X ] Dietary   Goals of Care Document:

## 2021-08-13 NOTE — PROGRESS NOTE ADULT - SUBJECTIVE AND OBJECTIVE BOX
Patient is a 71y old  Male who presents with a chief complaint of sob (12 Aug 2021 11:52)  Patient seen today, no change in status      MEDICATIONS  (STANDING):  AQUAPHOR (petrolatum Ointment) 1 Application(s) Topical every 12 hours  doxycycline IVPB 100 milliGRAM(s) IV Intermittent every 12 hours  piperacillin/tazobactam IVPB.. 3.375 Gram(s) IV Intermittent every 8 hours  sodium chloride 0.9%. 1000 milliLiter(s) (10 mL/Hr) IV Continuous <Continuous>  vancomycin  IVPB 1000 milliGRAM(s) IV Intermittent every 12 hours    MEDICATIONS  (PRN):  acetaminophen  Suppository .. 650 milliGRAM(s) Rectal every 6 hours PRN Temp greater or equal to 38C (100.4F)  bisacodyl Suppository 10 milliGRAM(s) Rectal daily PRN Constipation  HYDROmorphone  Injectable 0.4 milliGRAM(s) IV Push every 1 hour PRN moderate-severe pain  HYDROmorphone  Injectable 0.4 milliGRAM(s) IV Push every 1 hour PRN dyspnea      Vital Signs Last 24 Hrs  T(C): 37.2 (12 Aug 2021 09:16), Max: 37.2 (12 Aug 2021 09:16)  T(F): 98.9 (12 Aug 2021 09:16), Max: 98.9 (12 Aug 2021 09:16)  HR: 111 (12 Aug 2021 09:16) (111 - 111)  BP: 97/56 (12 Aug 2021 09:16) (97/56 - 97/56)  BP(mean): --  RR: 18 (12 Aug 2021 09:16) (18 - 18)  SpO2: 97% (12 Aug 2021 09:16) (97% - 97%)    PE  NAD  Awake, alert  Anicteric, MMM  No c/c/e  No rash grossly  FROM                 Patient is a 71y old  Male who presents with a chief complaint of sob (12 Aug 2021 11:52)  Patient seen today, no change in status      MEDICATIONS  (STANDING):  AQUAPHOR (petrolatum Ointment) 1 Application(s) Topical every 12 hours  doxycycline IVPB 100 milliGRAM(s) IV Intermittent every 12 hours  piperacillin/tazobactam IVPB.. 3.375 Gram(s) IV Intermittent every 8 hours  sodium chloride 0.9%. 1000 milliLiter(s) (10 mL/Hr) IV Continuous <Continuous>  vancomycin  IVPB 1000 milliGRAM(s) IV Intermittent every 12 hours    MEDICATIONS  (PRN):  acetaminophen  Suppository .. 650 milliGRAM(s) Rectal every 6 hours PRN Temp greater or equal to 38C (100.4F)  bisacodyl Suppository 10 milliGRAM(s) Rectal daily PRN Constipation  HYDROmorphone  Injectable 0.4 milliGRAM(s) IV Push every 1 hour PRN moderate-severe pain  HYDROmorphone  Injectable 0.4 milliGRAM(s) IV Push every 1 hour PRN dyspnea      Vital Signs Last 24 Hrs  T(C): 37.2 (12 Aug 2021 09:16), Max: 37.2 (12 Aug 2021 09:16)  T(F): 98.9 (12 Aug 2021 09:16), Max: 98.9 (12 Aug 2021 09:16)  HR: 111 (12 Aug 2021 09:16) (111 - 111)  BP: 97/56 (12 Aug 2021 09:16) (97/56 - 97/56)  BP(mean): --  RR: 18 (12 Aug 2021 09:16) (18 - 18)  SpO2: 97% (12 Aug 2021 09:16) (97% - 97%)    PE  NAD  Asleep  Anicteric, MMM  No c/c/e  No rash grossly  FROM

## 2021-08-13 NOTE — PROGRESS NOTE ADULT - ATTENDING COMMENTS
Agree with above, continue with current care in PCU, c/w abx, possible transfer to [facility] if stable dependent on d/w hospice and family    ______________  Chris Villalobos MD   of Geriatric and Palliative Medicine  [facility]     Please page the following number for clinical matters between the hours of 9AM and 5PM   from Monday through Friday : [phone]    After 5PM and on weekends, please page: [phone]. The Geriatric and Palliative Medicine consult service has 24/7 coverage for medical recommendations, including for symptom management needs.

## 2021-08-13 NOTE — DIETITIAN INITIAL EVALUATION ADULT. - ADD RECOMMEND
1. Monitor GI tolerance. RD to remain available to adjust EN formulary, volume/rate PRN.  2. Consider multivitamin, thiamine to optimize nutrition status. 3. Defer goals of care to medical team. No blood draws at this time unless otherwise indicated by medical team.

## 2021-08-13 NOTE — DIETITIAN INITIAL EVALUATION ADULT. - CHIEF COMPLAINT
The patient is a 72 yo M with PMH: SIADH, CMV infection, glioblastoma, multiforme, DM, seizures, HLD, HTN. Brought in by family for low oxygen. Admitted with pneumonia. The patient is a 70 yo M with PMH: SIADH, CMV infection, glioblastoma, multiforme, DM, seizures, HLD, HTN. Brought in by family for low oxygen. Admitted with pneumonia. Continues on antibiotics as ordered. On home hospice.

## 2021-08-13 NOTE — PROGRESS NOTE ADULT - PROBLEM SELECTOR PLAN 6
Spoke to the patient's son Jim.  educated as to what to expect.  Questions answered.  Emotional support provided.   Spoke about  transferring the patient to the [facility] where he would complete his course of IV antibiotics. Jim expressed that he does not want his father to be transferred to the [facility] because he is not familiar with the facility. He wants his father to stay in the PCU and when ready to be discharged back home with hospice.   Updates provided to Stefanie (hospice nurse)  Continue with symptom management in the PCU

## 2021-08-13 NOTE — PROGRESS NOTE ADULT - PROBLEM SELECTOR PLAN 5
Hospice patient  Team called the patient's son but no answer. Will continue to follow up  Hospice RN and social work continues to follow  Continue with symptom management in the PCU Under care  by  Dr Chavis as  an  outpatient  Home  hospice

## 2021-08-13 NOTE — PROVIDER CONTACT NOTE (OTHER) - ACTION/TREATMENT ORDERED:
MD made aware, no need for labs at this time, son verbalized understanding. No further intervention at this time. Will continue to monitor for pt safety.

## 2021-08-14 PROCEDURE — 49465 FLUORO EXAM OF G/COLON TUBE: CPT

## 2021-08-14 PROCEDURE — 99233 SBSQ HOSP IP/OBS HIGH 50: CPT

## 2021-08-14 RX ORDER — LEVETIRACETAM 250 MG/1
1500 TABLET, FILM COATED ORAL EVERY 12 HOURS
Refills: 0 | Status: DISCONTINUED | OUTPATIENT
Start: 2021-08-14 | End: 2021-08-18

## 2021-08-14 RX ORDER — LACOSAMIDE 50 MG/1
200 TABLET ORAL EVERY 12 HOURS
Refills: 0 | Status: DISCONTINUED | OUTPATIENT
Start: 2021-08-14 | End: 2021-08-18

## 2021-08-14 RX ADMIN — HYDROMORPHONE HYDROCHLORIDE 0.4 MILLIGRAM(S): 2 INJECTION INTRAMUSCULAR; INTRAVENOUS; SUBCUTANEOUS at 05:43

## 2021-08-14 RX ADMIN — Medication 110 MILLIGRAM(S): at 05:17

## 2021-08-14 RX ADMIN — LEVETIRACETAM 400 MILLIGRAM(S): 250 TABLET, FILM COATED ORAL at 18:52

## 2021-08-14 RX ADMIN — Medication 1 APPLICATION(S): at 17:30

## 2021-08-14 RX ADMIN — SODIUM CHLORIDE 10 MILLILITER(S): 9 INJECTION INTRAMUSCULAR; INTRAVENOUS; SUBCUTANEOUS at 10:26

## 2021-08-14 RX ADMIN — Medication 1 MILLIGRAM(S): at 17:33

## 2021-08-14 RX ADMIN — Medication 250 MILLIGRAM(S): at 10:25

## 2021-08-14 RX ADMIN — PIPERACILLIN AND TAZOBACTAM 25 GRAM(S): 4; .5 INJECTION, POWDER, LYOPHILIZED, FOR SOLUTION INTRAVENOUS at 06:29

## 2021-08-14 RX ADMIN — Medication 1 APPLICATION(S): at 05:17

## 2021-08-14 RX ADMIN — PIPERACILLIN AND TAZOBACTAM 25 GRAM(S): 4; .5 INJECTION, POWDER, LYOPHILIZED, FOR SOLUTION INTRAVENOUS at 22:33

## 2021-08-14 RX ADMIN — HYDROMORPHONE HYDROCHLORIDE 0.4 MILLIGRAM(S): 2 INJECTION INTRAMUSCULAR; INTRAVENOUS; SUBCUTANEOUS at 05:28

## 2021-08-14 RX ADMIN — LACOSAMIDE 140 MILLIGRAM(S): 50 TABLET ORAL at 19:57

## 2021-08-14 RX ADMIN — PIPERACILLIN AND TAZOBACTAM 25 GRAM(S): 4; .5 INJECTION, POWDER, LYOPHILIZED, FOR SOLUTION INTRAVENOUS at 14:48

## 2021-08-14 RX ADMIN — Medication 110 MILLIGRAM(S): at 17:28

## 2021-08-14 NOTE — PROGRESS NOTE ADULT - PROBLEM SELECTOR PLAN 4
Chest x-ray on 8/11/21showed Multifocal bilateral patchy opacities involving the right lung and left lower lung. No pleural effusions or pneumothorax.  Doxycycline 100mg IVPB X5   Zosyn 3.375gram IVPB q8hr   Vancomycin 1000mg IVPB q12hr ( held today, trough is 27.2)

## 2021-08-14 NOTE — PROGRESS NOTE ADULT - PROBLEM SELECTOR PLAN 3
Patient with PEG tube.   Son would like for us to restart her on her feeds  Dietitian following. Waiting on recommendations  PEG study pending to confirm placement Patient with PEG tube.   Son would like for us to restart him on her feeds  Dietitian following. Waiting on recommendations  PEG study confirmed placement

## 2021-08-14 NOTE — PROGRESS NOTE ADULT - PROBLEM SELECTOR PLAN 6
- As per last note: Spoke to the patient's son Jim.  educated as to what to expect.  Questions answered.  Emotional support provided.   Spoke about  transferring the patient to the [facility]osp[facility] where he would complete his course of IV antibiotics. Jim expressed that he does not want his father to be transferred to the [facility] because he is not familiar with the facility. He wants his father to stay in the PCU and when ready to be discharged back home with hospice.   Updates provided to Stefanie (hospice nurse)  Continue with symptom management in the PCU

## 2021-08-14 NOTE — PROGRESS NOTE ADULT - SUBJECTIVE AND OBJECTIVE BOX
GAP TEAM PALLIATIVE CARE UNIT PROGRESS NOTE:      [x  ] Patient on hospice program.    INDICATION FOR PALLIATIVE CARE UNIT SERVICES: Symptom management    INTERVAL HPI/OVERNIGHT EVENTS: No acute events    DNR on chart:   Allergies    No Known Allergies    Intolerances    MEDICATIONS  (STANDING):  AQUAPHOR (petrolatum Ointment) 1 Application(s) Topical every 12 hours  doxycycline IVPB 100 milliGRAM(s) IV Intermittent every 12 hours  piperacillin/tazobactam IVPB.. 3.375 Gram(s) IV Intermittent every 8 hours  sodium chloride 0.9%. 1000 milliLiter(s) (10 mL/Hr) IV Continuous <Continuous>  vancomycin  IVPB 1000 milliGRAM(s) IV Intermittent every 12 hours    MEDICATIONS  (PRN):  acetaminophen  Suppository .. 650 milliGRAM(s) Rectal every 6 hours PRN Temp greater or equal to 38C (100.4F)  bisacodyl Suppository 10 milliGRAM(s) Rectal daily PRN Constipation  HYDROmorphone  Injectable 0.4 milliGRAM(s) IV Push every 1 hour PRN moderate-severe pain  HYDROmorphone  Injectable 0.4 milliGRAM(s) IV Push every 1 hour PRN dyspnea    ITEMS UNCHECKED ARE NOT PRESENT    PRESENT SYMPTOMS: [x ]Unable to obtain due to poor mentation   Source if other than patient:  [ ]Family   [ ]Team     Pain: [ ] yes [ ] no  QOL impact -   Location -                    Aggravating factors -  Quality -  Radiation -  Timing-  Severity (0-10 scale):  Minimal acceptable level (0-10 scale):     Dyspnea:                           [ ]Mild [ ]Moderate [ ]Severe  Anxiety:                             [ ]Mild [ ]Moderate [ ]Severe  Fatigue:                             [ ]Mild [ ]Moderate [ ]Severe  Nausea:                             [ ]Mild [ ]Moderate [ ]Severe  Loss of appetite:              [ ]Mild [ ]Moderate [ ]Severe  Constipation:                    [ ]Mild [ ]Moderate [ ]Severe    PAINAD Score:  PAIN ASSESSMENT SCALE IN ADVANCED DEMENTIA (PAINAD)			  	                         0	                           1	                                              2	                                        Score  -----------------------------------------------------------------------------------------------------------------------------------------------------------------  Breathing                * Normal            * Occasional labored breathing.           * Noisy labored breathing.  independent                                       Short period of hyperventilation.          Long period of hyperventilation.             [0  ]  of vocalization         	                                                                              Cheyne-Mcmahan respirations.  ------------------------------------------------------------------------------------------------------------------------------------------------------------------  Negative                  * None               * Occasional moan or groan.               * Repeated troubled calling out.  vocalization		                       Low-level speech with a negative         Loud moaning or groaning.                  [ 0 ]                                                             or disapproving quality. 	   	        Crying.  ------------------------------------------------------------------------------------------------------------------------------------------------------------------  Facial expression     * Smiling or          * Sad.                                                 * Facial grimacing.                                  inexpressive         Frightened.                                                                                                   [0  ]                                                             Frown.   -------------------------------------------------------------------------------------------------------------------------------------------------------------------  Body language	       * Relaxed            * Tense.   	                                           * Rigid.  Fists clenched.                                                              Distressed pacing.                                Knees pulled up.  Pulling                    [  2]                                                             Fidgeting.                                            or pushing away.  Striking out.	  --------------------------------------------------------------------------------------------------------------------------------------------------------------------  Consolability	       * No need to      * Distracted or reassured 	                   * Unable to console, distract                                    console              by voice or touch.                                 or reassure.                                       [0  ]  --------------------------------------------------------------------------------------------------------------------------------------------------------------------	  			                                                                                                                           Total	      [  2]    [email or web address] (Ctrl +  left click to view)  		  Other Symptoms:  [ ]All other review of systems negative     Palliative Performance Status Version 2:      10   %         [email or web address]  PHYSICAL EXAM:  Vital Signs Last 24 Hrs  T(C): 37.1 (14 Aug 2021 07:33), Max: 37.1 (14 Aug 2021 07:33)  T(F): 98.8 (14 Aug 2021 07:33), Max: 98.8 (14 Aug 2021 07:33)  HR: 101 (14 Aug 2021 07:33) (101 - 101)  BP: 112/73 (14 Aug 2021 07:33) (112/73 - 112/73)  BP(mean): --  RR: 16 (14 Aug 2021 07:33) (16 - 18)  SpO2: 96% (14 Aug 2021 07:33) (96% - 99%) I&O's Summary  GENERAL:  [ ]Alert  [ ]Oriented x   [x ]Lethargic  [ ]Cachexia  [ ]Unarousable  [ ]Verbal  [ ]Non-Verbal  Behavioral:   [ ] Anxiety  [ ] Delirium [ ] Agitation [ ] Other  HEENT:  [x ]Normal   [ ]Dry mouth   [ ]ET Tube/Trach  [ ]Oral lesions  PULMONARY:   [x ]Clear [ ]Tachypnea  [ ]Audible excessive secretions   [ ]Rhonchi        [ ]Right [ ]Left [ ]Bilateral  [ ]Crackles        [ ]Right [ ]Left [ ]Bilateral  [ ]Wheezing     [ ]Right [ ]Left [ ]Bilateral  [ ]Diminished BS [ ]Right [ ]Left [ ]Bilateral    CARDIOVASCULAR:    [x ]Regular [ ]Irregular [ ]Tachy  [ ]Edison [ ]Murmur [ ]Other  GASTROINTESTINAL:  [x ]Soft  [ ]Distended   [ ]+BS  [ ]Non tender [ ]Tender  [ ]PEG [ ]OGT/ NGT   Last BM:     GENITOURINARY:  [ ]Normal [ x] Incontinent   [ ]Oliguria/Anuria   [ ]Franks  MUSCULOSKELETAL:   [ ]Normal   [ ]Weakness  [ x]Bed/Wheelchair bound [ ]Edema  NEUROLOGIC:   [ ]No focal deficits  [ ] Cognitive impairment  [x ] Dysphagia [ ]Dysarthria [ ] Paresis [ ]Other   SKIN:   [ ]Normal  [ ]Rash     [ ]Pressure ulcer(s)  [ ]y [ ]n  Present on admission      CRITICAL CARE:  [ ] Shock Present  [ ]Septic [ ]Cardiogenic [ ]Neurologic [ ]Hypovolemic  [ ]  Vasopressors [ ]  Inotropes   [ ] Respiratory failure present [ ] Mechanical Ventilation [ ] Non-invasive ventilatory support [ ] High-Flow  [ ] Acute  [ ] Chronic [ ] Hypoxic  [ ] Hypercarbic [ ] Other  [ ] Other organ failure     LABS:  no new labs          RADIOLOGY & ADDITIONAL STUDIES: no new imaging studies    PROTEIN CALORIE MALNUTRITION: [ ] mild [x ] moderate [ ] severe  [ ] underweight [ ] morbid obesity    https://www.andeal.org/vault/2440/web/files/ONC/Table_Clinical%20Characteristics%20to%20Document%20Malnutrition-White%20JV%20et%20al%222626.pdf    Height (cm): 180.3 (08-11-21 @ 00:53), 180.3 (07-09-21 @ 14:42), 180.3 (06-04-21 @ 22:21)  Weight (kg): 51 (07-12-21 @ 11:27), 50 (06-05-21 @ 09:57)  BMI (kg/m2): 15.7 (08-11-21 @ 00:53), 15.7 (07-12-21 @ 11:27), 15.4 (07-09-21 @ 14:42)    [ ] PPSV2 < or = 30% [ ] significant weight loss [ x] poor nutritional intake [ ] anasarca   Artificial Nutrition [x ]     REFERRALS:   [ ]Chaplaincy  [ ] Hospice  [ ]Child Life  [ ]Social Work  [ ]Case management [ ]Holistic Therapy [ ] Physical Therapy [ ] Dietary   Goals of Care Document: Goals of Care Conversation - Advanced Care Planning [KRISTA Brandon] (07-13-21 @ 15:33)  Goals of Care Conversation - Advanced Care Planning [CARMINA Garcia] (06-15-21 @ 13:35)  Goals of Care Conversation - Advanced Care Planning [RIANA Mauro] (06-08-21 @ 16:07)     GAP TEAM PALLIATIVE CARE UNIT PROGRESS NOTE:      [x  ] Patient on hospice program.    INDICATION FOR PALLIATIVE CARE UNIT SERVICES: Symptom management    INTERVAL HPI/OVERNIGHT EVENTS: No acute events    DNR on chart:   Allergies    No Known Allergies    Intolerances    MEDICATIONS  (STANDING):  AQUAPHOR (petrolatum Ointment) 1 Application(s) Topical every 12 hours  doxycycline IVPB 100 milliGRAM(s) IV Intermittent every 12 hours  piperacillin/tazobactam IVPB.. 3.375 Gram(s) IV Intermittent every 8 hours  sodium chloride 0.9%. 1000 milliLiter(s) (10 mL/Hr) IV Continuous <Continuous>  vancomycin  IVPB 1000 milliGRAM(s) IV Intermittent every 12 hours    MEDICATIONS  (PRN):  acetaminophen  Suppository .. 650 milliGRAM(s) Rectal every 6 hours PRN Temp greater or equal to 38C (100.4F)  bisacodyl Suppository 10 milliGRAM(s) Rectal daily PRN Constipation  HYDROmorphone  Injectable 0.4 milliGRAM(s) IV Push every 1 hour PRN moderate-severe pain  HYDROmorphone  Injectable 0.4 milliGRAM(s) IV Push every 1 hour PRN dyspnea    ITEMS UNCHECKED ARE NOT PRESENT    PRESENT SYMPTOMS: [x ]Unable to obtain due to poor mentation   Source if other than patient:  [ ]Family   [ ]Team     Pain: [ ] yes [ ] no  QOL impact -   Location -                    Aggravating factors -  Quality -  Radiation -  Timing-  Severity (0-10 scale):  Minimal acceptable level (0-10 scale):     Dyspnea:                           [ ]Mild [ ]Moderate [ ]Severe  Anxiety:                             [ ]Mild [ ]Moderate [ ]Severe  Fatigue:                             [ ]Mild [ ]Moderate [ ]Severe  Nausea:                             [ ]Mild [ ]Moderate [ ]Severe  Loss of appetite:              [ ]Mild [ ]Moderate [ ]Severe  Constipation:                    [ ]Mild [ ]Moderate [ ]Severe    PAINAD Score:2  PAIN ASSESSMENT SCALE IN ADVANCED DEMENTIA (PAINAD)			  	                         0	                           1	                                              2	                                        Score  -----------------------------------------------------------------------------------------------------------------------------------------------------------------  Breathing                * Normal            * Occasional labored breathing.           * Noisy labored breathing.  independent                                       Short period of hyperventilation.          Long period of hyperventilation.             [0  ]  of vocalization         	                                                                              Cheyne-Mcmahan respirations.  ------------------------------------------------------------------------------------------------------------------------------------------------------------------  Negative                  * None               * Occasional moan or groan.               * Repeated troubled calling out.  vocalization		                       Low-level speech with a negative         Loud moaning or groaning.                  [ 0 ]                                                             or disapproving quality. 	   	        Crying.  ------------------------------------------------------------------------------------------------------------------------------------------------------------------  Facial expression     * Smiling or          * Sad.                                                 * Facial grimacing.                                  inexpressive         Frightened.                                                                                                   [0  ]                                                             Frown.   -------------------------------------------------------------------------------------------------------------------------------------------------------------------  Body language	       * Relaxed            * Tense.   	                                           * Rigid.  Fists clenched.                                                              Distressed pacing.                                Knees pulled up.  Pulling                    [  2]                                                             Fidgeting.                                            or pushing away.  Striking out.	  --------------------------------------------------------------------------------------------------------------------------------------------------------------------  Consolability	       * No need to      * Distracted or reassured 	                   * Unable to console, distract                                    console              by voice or touch.                                 or reassure.                                       [0  ]  --------------------------------------------------------------------------------------------------------------------------------------------------------------------	  			                                                                                                                           Total	      [  2]    [email or web address] (Ctrl +  left click to view)  		  Other Symptoms:  [ ]All other review of systems negative     Palliative Performance Status Version 2:      10   %         [email or web address]  PHYSICAL EXAM:  Vital Signs Last 24 Hrs  T(C): 37.1 (14 Aug 2021 07:33), Max: 37.1 (14 Aug 2021 07:33)  T(F): 98.8 (14 Aug 2021 07:33), Max: 98.8 (14 Aug 2021 07:33)  HR: 101 (14 Aug 2021 07:33) (101 - 101)  BP: 112/73 (14 Aug 2021 07:33) (112/73 - 112/73)  BP(mean): --  RR: 16 (14 Aug 2021 07:33) (16 - 18)  SpO2: 96% (14 Aug 2021 07:33) (96% - 99%) I&O's Summary  GENERAL:  [ ]Alert  [ ]Oriented x   [x ]Lethargic  [ ]Cachexia  [ ]Unarousable  [ ]Verbal  [ ]Non-Verbal  Behavioral:   [ ] Anxiety  [ ] Delirium [ ] Agitation [ ] Other  HEENT:  [x ]Normal   [ ]Dry mouth   [ ]ET Tube/Trach  [ ]Oral lesions  PULMONARY:   [x ]Clear [ ]Tachypnea  [ ]Audible excessive secretions   [ ]Rhonchi        [ ]Right [ ]Left [ ]Bilateral  [ ]Crackles        [ ]Right [ ]Left [ ]Bilateral  [ ]Wheezing     [ ]Right [ ]Left [ ]Bilateral  [ ]Diminished BS [ ]Right [ ]Left [ ]Bilateral    CARDIOVASCULAR:    [x ]Regular [ ]Irregular [ ]Tachy  [ ]Edison [ ]Murmur [ ]Other  GASTROINTESTINAL:  [x ]Soft  [ ]Distended   [ ]+BS  [ ]Non tender [ ]Tender  [ ]PEG [ ]OGT/ NGT   Last BM:     GENITOURINARY:  [ ]Normal [ x] Incontinent   [ ]Oliguria/Anuria   [ ]Franks  MUSCULOSKELETAL:   [ ]Normal   [ ]Weakness  [ x]Bed/Wheelchair bound [ ]Edema  NEUROLOGIC:   [ ]No focal deficits  [x ] Cognitive impairment  [x ] Dysphagia [ ]Dysarthria [ ] Paresis [ ]Other   SKIN:   [ ]Normal  [ ]Rash     [ ]Pressure ulcer(s)  [ ]y [ ]n  Present on admission      CRITICAL CARE:  [ ] Shock Present  [ ]Septic [ ]Cardiogenic [ ]Neurologic [ ]Hypovolemic  [ ]  Vasopressors [ ]  Inotropes   [ ] Respiratory failure present [ ] Mechanical Ventilation [ ] Non-invasive ventilatory support [ ] High-Flow  [ ] Acute  [ ] Chronic [ ] Hypoxic  [ ] Hypercarbic [ ] Other  [ ] Other organ failure     LABS:  no new labs          RADIOLOGY & ADDITIONAL STUDIES: no new imaging studies    PROTEIN CALORIE MALNUTRITION: [ ] mild [x ] moderate [ ] severe  [ ] underweight [ ] morbid obesity    https://www.andeal.org/vault/2440/web/files/ONC/Table_Clinical%20Characteristics%20to%20Document%20Malnutrition-White%20JV%20et%20al%662756.pdf    Height (cm): 180.3 (08-11-21 @ 00:53), 180.3 (07-09-21 @ 14:42), 180.3 (06-04-21 @ 22:21)  Weight (kg): 51 (07-12-21 @ 11:27), 50 (06-05-21 @ 09:57)  BMI (kg/m2): 15.7 (08-11-21 @ 00:53), 15.7 (07-12-21 @ 11:27), 15.4 (07-09-21 @ 14:42)    [ ] PPSV2 < or = 30% [ ] significant weight loss [ x] poor nutritional intake [ ] anasarca   Artificial Nutrition [x ]     REFERRALS:   [ ]Chaplaincy  [ ] Hospice  [ ]Child Life  [ ]Social Work  [ ]Case management [ ]Holistic Therapy [ ] Physical Therapy [ ] Dietary   Goals of Care Document: Goals of Care Conversation - Advanced Care Planning [KRISTA Brandon] (07-13-21 @ 15:33)  Goals of Care Conversation - Advanced Care Planning [CARMINA Garcia] (06-15-21 @ 13:35)  Goals of Care Conversation - Advanced Care Planning [RIANA Mauro] (06-08-21 @ 16:07)

## 2021-08-14 NOTE — PROGRESS NOTE ADULT - ATTENDING COMMENTS
Agree with above, restart feeds, check vanco trough tomorrow, c/w abx    ______________  Chris Villalobos MD   of Geriatric and Palliative Medicine  [facility]     Please page the following number for clinical matters between the hours of 9AM and 5PM   from Monday through Friday : [phone]    After 5PM and on weekends, please page: [phone]. The Geriatric and Palliative Medicine consult service has 24/7 coverage for medical recommendations, including for symptom management needs.

## 2021-08-14 NOTE — CHART NOTE - NSCHARTNOTEFT_GEN_A_CORE
Informed by RN that, patient's son does not want patient to be continued on vancomycin. Will discontinue at this time. Also requested labs to be done in AM.

## 2021-08-15 LAB
HCT VFR BLD CALC: 25.2 % — LOW (ref 39–50)
HGB BLD-MCNC: 7.8 G/DL — LOW (ref 13–17)
MCHC RBC-ENTMCNC: 25.7 PG — LOW (ref 27–34)
MCHC RBC-ENTMCNC: 31 GM/DL — LOW (ref 32–36)
MCV RBC AUTO: 83.2 FL — SIGNIFICANT CHANGE UP (ref 80–100)
NRBC # BLD: 0 /100 WBCS — SIGNIFICANT CHANGE UP (ref 0–0)
PLATELET # BLD AUTO: 145 K/UL — LOW (ref 150–400)
RBC # BLD: 3.03 M/UL — LOW (ref 4.2–5.8)
RBC # FLD: 18 % — HIGH (ref 10.3–14.5)
VANCOMYCIN TROUGH SERPL-MCNC: 40 UG/ML — CRITICAL HIGH (ref 10–20)
WBC # BLD: 9.88 K/UL — SIGNIFICANT CHANGE UP (ref 3.8–10.5)
WBC # FLD AUTO: 9.88 K/UL — SIGNIFICANT CHANGE UP (ref 3.8–10.5)

## 2021-08-15 PROCEDURE — 99233 SBSQ HOSP IP/OBS HIGH 50: CPT

## 2021-08-15 RX ORDER — ROBINUL 0.2 MG/ML
0.4 INJECTION INTRAMUSCULAR; INTRAVENOUS EVERY 6 HOURS
Refills: 0 | Status: DISCONTINUED | OUTPATIENT
Start: 2021-08-15 | End: 2021-08-19

## 2021-08-15 RX ADMIN — PIPERACILLIN AND TAZOBACTAM 25 GRAM(S): 4; .5 INJECTION, POWDER, LYOPHILIZED, FOR SOLUTION INTRAVENOUS at 21:55

## 2021-08-15 RX ADMIN — LACOSAMIDE 140 MILLIGRAM(S): 50 TABLET ORAL at 17:13

## 2021-08-15 RX ADMIN — LACOSAMIDE 140 MILLIGRAM(S): 50 TABLET ORAL at 05:54

## 2021-08-15 RX ADMIN — HYDROMORPHONE HYDROCHLORIDE 0.4 MILLIGRAM(S): 2 INJECTION INTRAMUSCULAR; INTRAVENOUS; SUBCUTANEOUS at 21:55

## 2021-08-15 RX ADMIN — LEVETIRACETAM 400 MILLIGRAM(S): 250 TABLET, FILM COATED ORAL at 17:12

## 2021-08-15 RX ADMIN — Medication 110 MILLIGRAM(S): at 05:16

## 2021-08-15 RX ADMIN — SODIUM CHLORIDE 10 MILLILITER(S): 9 INJECTION INTRAMUSCULAR; INTRAVENOUS; SUBCUTANEOUS at 13:54

## 2021-08-15 RX ADMIN — HYDROMORPHONE HYDROCHLORIDE 0.4 MILLIGRAM(S): 2 INJECTION INTRAMUSCULAR; INTRAVENOUS; SUBCUTANEOUS at 20:18

## 2021-08-15 RX ADMIN — ROBINUL 0.4 MILLIGRAM(S): 0.2 INJECTION INTRAMUSCULAR; INTRAVENOUS at 22:30

## 2021-08-15 RX ADMIN — PIPERACILLIN AND TAZOBACTAM 25 GRAM(S): 4; .5 INJECTION, POWDER, LYOPHILIZED, FOR SOLUTION INTRAVENOUS at 05:16

## 2021-08-15 RX ADMIN — Medication 1 APPLICATION(S): at 05:16

## 2021-08-15 RX ADMIN — PIPERACILLIN AND TAZOBACTAM 25 GRAM(S): 4; .5 INJECTION, POWDER, LYOPHILIZED, FOR SOLUTION INTRAVENOUS at 13:54

## 2021-08-15 RX ADMIN — LEVETIRACETAM 400 MILLIGRAM(S): 250 TABLET, FILM COATED ORAL at 05:16

## 2021-08-15 RX ADMIN — Medication 1 MILLIGRAM(S): at 21:55

## 2021-08-15 RX ADMIN — Medication 110 MILLIGRAM(S): at 17:12

## 2021-08-15 RX ADMIN — Medication 1 APPLICATION(S): at 17:13

## 2021-08-15 NOTE — PROGRESS NOTE ADULT - SUBJECTIVE AND OBJECTIVE BOX
GAP TEAM PALLIATIVE CARE UNIT PROGRESS NOTE:      [ x ] Patient on hospice program.    INDICATION FOR PALLIATIVE CARE UNIT SERVICES: sym[ptom management in setting of GBM    INTERVAL HPI/OVERNIGHT EVENTS: 2 episodes of diarrhea after initiating feeds. 1 episode of seizures, ativan 1 mg given, keppra and vimpat resumed    DNR on chart:   Allergies    No Known Allergies    Intolerances    MEDICATIONS  (STANDING):  AQUAPHOR (petrolatum Ointment) 1 Application(s) Topical every 12 hours  doxycycline IVPB 100 milliGRAM(s) IV Intermittent every 12 hours  lacosamide IVPB 200 milliGRAM(s) IV Intermittent every 12 hours  levETIRAcetam  IVPB 1500 milliGRAM(s) IV Intermittent every 12 hours  piperacillin/tazobactam IVPB.. 3.375 Gram(s) IV Intermittent every 8 hours  sodium chloride 0.9%. 1000 milliLiter(s) (10 mL/Hr) IV Continuous <Continuous>    MEDICATIONS  (PRN):  acetaminophen  Suppository .. 650 milliGRAM(s) Rectal every 6 hours PRN Temp greater or equal to 38C (100.4F)  bisacodyl Suppository 10 milliGRAM(s) Rectal daily PRN Constipation  HYDROmorphone  Injectable 0.4 milliGRAM(s) IV Push every 1 hour PRN moderate-severe pain  HYDROmorphone  Injectable 0.4 milliGRAM(s) IV Push every 1 hour PRN dyspnea  LORazepam   Injectable 1 milliGRAM(s) IV Push every 2 hours PRN agitation, anxiety and seizures    ITEMS UNCHECKED ARE NOT PRESENT    PRESENT SYMPTOMS: [x ]Unable to obtain due to poor mentation   Source if other than patient:  [ ]Family   [ ]Team     Pain: [ ] yes [ ] no  QOL impact -   Location -                    Aggravating factors -  Quality -  Radiation -  Timing-  Severity (0-10 scale):  Minimal acceptable level (0-10 scale):     Dyspnea:                           [ ]Mild [ ]Moderate [ ]Severe  Anxiety:                             [ ]Mild [ ]Moderate [ ]Severe  Fatigue:                             [ ]Mild [ ]Moderate [ ]Severe  Nausea:                             [ ]Mild [ ]Moderate [ ]Severe  Loss of appetite:              [ ]Mild [ ]Moderate [ ]Severe  Constipation:                    [ ]Mild [ ]Moderate [ ]Severe    PAINAD Score:    [email or web address] (Ctrl +  left click to view)  		  Other Symptoms:  [ ]All other review of systems negative     Palliative Performance Status Version 2:     10    %         http://npcrc.org/files/news/palliative_performance_scale_ppsv2.pdf  PHYSICAL EXAM:  Vital Signs Last 24 Hrs  T(C): 36.5 (15 Aug 2021 08:32), Max: 36.5 (15 Aug 2021 08:32)  T(F): 97.7 (15 Aug 2021 08:32), Max: 97.7 (15 Aug 2021 08:32)  HR: 86 (15 Aug 2021 08:32) (86 - 86)  BP: 95/64 (15 Aug 2021 08:32) (95/64 - 95/64)  BP(mean): --  RR: 16 (15 Aug 2021 08:32) (16 - 16)  SpO2: 96% (15 Aug 2021 08:32) (96% - 96%) I&O's Summary  GENERAL:  [ ]Alert  [ ]Oriented x   [ x]Lethargic  [ ]Cachexia  [ ]Unarousable  [ ]Verbal  [x ]Non-Verbal  Behavioral:   [ ] Anxiety  [ ] Delirium [ ] Agitation [ ] Other  HEENT:  [ x]Normal   [ ]Dry mouth   [ ]ET Tube/Trach  [ ]Oral lesions  PULMONARY:   [x ]Clear [ ]Tachypnea  [ ]Audible excessive secretions   [ ]Rhonchi        [ ]Right [ ]Left [ ]Bilateral  [ ]Crackles        [ ]Right [ ]Left [ ]Bilateral  [ ]Wheezing     [ ]Right [ ]Left [ ]Bilateral  [ ]Diminished BS [ ]Right [ ]Left [ ]Bilateral    CARDIOVASCULAR:    [x ]Regular [ ]Irregular [ ]Tachy  [ ]Edison [ ]Murmur [ ]Other  GASTROINTESTINAL:  [ ]Soft  [ ]Distended   [ ]+BS  [ ]Non tender [ ]Tender  [x ]PEG [ ]OGT/ NGT   Last BM:     GENITOURINARY:  [ ]Normal [x ] Incontinent   [ ]Oliguria/Anuria   [ ]Franks  MUSCULOSKELETAL:   [ ]Normal   [ ]Weakness  [x ]Bed/Wheelchair bound [ ]Edema  NEUROLOGIC:   [ ]No focal deficits  [ ] Cognitive impairment  [x ] Dysphagia [ ]Dysarthria [ ] Paresis [ ]Other   SKIN:   [ ]Normal  [ ]Rash     [ ]Pressure ulcer(s)  [ ]y [ ]n  Present on admission      CRITICAL CARE:  [ ] Shock Present  [ ]Septic [ ]Cardiogenic [ ]Neurologic [ ]Hypovolemic  [ ]  Vasopressors [ ]  Inotropes   [ ] Respiratory failure present [ ] Mechanical Ventilation [ ] Non-invasive ventilatory support [ ] High-Flow  [ ] Acute  [ ] Chronic [ ] Hypoxic  [ ] Hypercarbic [ ] Other  [ ] Other organ failure     LABS:                        7.8    9.88  )-----------( 145      ( 15 Aug 2021 07:45 )             25.2             RADIOLOGY & ADDITIONAL STUDIES:  < from: Xray Feeding Tube Check SI (08.14.21 @ 10:22) >  Percutaneous gastrostomy tube check: Oral contrast was injected through gastrostomy tube. There is opacification of the stomach, duodenum, small bowel and colon without extravasation.      PROTEIN CALORIE MALNUTRITION: [ ] mild [ ] moderate [ ] severe  [ ] underweight [ ] morbid obesity    https://www.andeal.org/vault/2440/web/files/ONC/Table_Clinical%20Characteristics%20to%20Document%20Malnutrition-White%20JV%20et%20al%771596.pdf    Height (cm): 180.3 (08-11-21 @ 00:53), 180.3 (07-09-21 @ 14:42), 180.3 (06-04-21 @ 22:21)  Weight (kg): 51 (07-12-21 @ 11:27), 50 (06-05-21 @ 09:57)  BMI (kg/m2): 15.7 (08-11-21 @ 00:53), 15.7 (07-12-21 @ 11:27), 15.4 (07-09-21 @ 14:42)    [ ] PPSV2 < or = 30% [ ] significant weight loss [ ] poor nutritional intake [ ] anasarca   Artificial Nutrition [ ]     REFERRALS:   [ ]Chaplaincy  [ ] Hospice  [ ]Child Life  [ ]Social Work  [ ]Case management [ ]Holistic Therapy [ ] Physical Therapy [ ] Dietary   Goals of Care Document: Goals of Care Conversation - Advanced Care Planning [KRISTA Brandon] (07-13-21 @ 15:33)  Goals of Care Conversation - Advanced Care Planning [CARMINA Garcia] (06-15-21 @ 13:35)  Goals of Care Conversation - Advanced Care Planning [RIANA Mauro] (06-08-21 @ 16:07)     GAP TEAM PALLIATIVE CARE UNIT PROGRESS NOTE:      [ x ] Patient on hospice program.    INDICATION FOR PALLIATIVE CARE UNIT SERVICES: symptom management in setting of GBM    INTERVAL HPI/OVERNIGHT EVENTS: 2 episodes of diarrhea after initiating feeds. 1 episode of seizures, ativan 1 mg given, keppra and vimpat resumed    DNR on chart:   Allergies    No Known Allergies    Intolerances    MEDICATIONS  (STANDING):  AQUAPHOR (petrolatum Ointment) 1 Application(s) Topical every 12 hours  doxycycline IVPB 100 milliGRAM(s) IV Intermittent every 12 hours  lacosamide IVPB 200 milliGRAM(s) IV Intermittent every 12 hours  levETIRAcetam  IVPB 1500 milliGRAM(s) IV Intermittent every 12 hours  piperacillin/tazobactam IVPB.. 3.375 Gram(s) IV Intermittent every 8 hours  sodium chloride 0.9%. 1000 milliLiter(s) (10 mL/Hr) IV Continuous <Continuous>    MEDICATIONS  (PRN):  acetaminophen  Suppository .. 650 milliGRAM(s) Rectal every 6 hours PRN Temp greater or equal to 38C (100.4F)  bisacodyl Suppository 10 milliGRAM(s) Rectal daily PRN Constipation  HYDROmorphone  Injectable 0.4 milliGRAM(s) IV Push every 1 hour PRN moderate-severe pain  HYDROmorphone  Injectable 0.4 milliGRAM(s) IV Push every 1 hour PRN dyspnea  LORazepam   Injectable 1 milliGRAM(s) IV Push every 2 hours PRN agitation, anxiety and seizures    ITEMS UNCHECKED ARE NOT PRESENT    PRESENT SYMPTOMS: [x ]Unable to obtain due to poor mentation   Source if other than patient:  [ ]Family   [ ]Team     Pain: [ ] yes [ ] no  QOL impact -   Location -                    Aggravating factors -  Quality -  Radiation -  Timing-  Severity (0-10 scale):  Minimal acceptable level (0-10 scale):     Dyspnea:                           [ ]Mild [ ]Moderate [ ]Severe  Anxiety:                             [ ]Mild [ ]Moderate [ ]Severe  Fatigue:                             [ ]Mild [ ]Moderate [ ]Severe  Nausea:                             [ ]Mild [ ]Moderate [ ]Severe  Loss of appetite:              [ ]Mild [ ]Moderate [ ]Severe  Constipation:                    [ ]Mild [ ]Moderate [ ]Severe    PAINAD Score:    [email or web address].[email or web address]/cog/painad.pdf (Ctrl +  left click to view)  		  Other Symptoms:  [ ]All other review of systems negative     Palliative Performance Status Version 2:     10    %         http://[facility].org/files/news/palliative_performance_scale_ppsv2.pdf  PHYSICAL EXAM:  Vital Signs Last 24 Hrs  T(C): 36.5 (15 Aug 2021 08:32), Max: 36.5 (15 Aug 2021 08:32)  T(F): 97.7 (15 Aug 2021 08:32), Max: 97.7 (15 Aug 2021 08:32)  HR: 86 (15 Aug 2021 08:32) (86 - 86)  BP: 95/64 (15 Aug 2021 08:32) (95/64 - 95/64)  BP(mean): --  RR: 16 (15 Aug 2021 08:32) (16 - 16)  SpO2: 96% (15 Aug 2021 08:32) (96% - 96%) I&O's Summary  GENERAL:  [ ]Alert  [ ]Oriented x   [ x]Lethargic  [ ]Cachexia  [ ]Unarousable  [ ]Verbal  [x ]Non-Verbal  Behavioral:   [ ] Anxiety  [ ] Delirium [ ] Agitation [ ] Other  HEENT:  [ x]Normal   [ ]Dry mouth   [ ]ET Tube/Trach  [ ]Oral lesions  PULMONARY:   [x ]Clear [ ]Tachypnea  [ ]Audible excessive secretions   [ ]Rhonchi        [ ]Right [ ]Left [ ]Bilateral  [ ]Crackles        [ ]Right [ ]Left [ ]Bilateral  [ ]Wheezing     [ ]Right [ ]Left [ ]Bilateral  [ ]Diminished BS [ ]Right [ ]Left [ ]Bilateral    CARDIOVASCULAR:    [x ]Regular [ ]Irregular [ ]Tachy  [ ]Edison [ ]Murmur [ ]Other  GASTROINTESTINAL:  [ ]Soft  [ ]Distended   [ ]+BS  [ ]Non tender [ ]Tender  [x ]PEG [ ]OGT/ NGT   Last BM:     GENITOURINARY:  [ ]Normal [x ] Incontinent   [ ]Oliguria/Anuria   [ ]Franks  MUSCULOSKELETAL:   [ ]Normal   [ ]Weakness  [x ]Bed/Wheelchair bound [ ]Edema  NEUROLOGIC:   [ ]No focal deficits  [ ] Cognitive impairment  [x ] Dysphagia [ ]Dysarthria [ ] Paresis [ ]Other   SKIN:   [ ]Normal  [ ]Rash     [ ]Pressure ulcer(s)  [ ]y [ ]n  Present on admission      CRITICAL CARE:  [ ] Shock Present  [ ]Septic [ ]Cardiogenic [ ]Neurologic [ ]Hypovolemic  [ ]  Vasopressors [ ]  Inotropes   [ ] Respiratory failure present [ ] Mechanical Ventilation [ ] Non-invasive ventilatory support [ ] High-Flow  [ ] Acute  [ ] Chronic [ ] Hypoxic  [ ] Hypercarbic [ ] Other  [ ] Other organ failure     LABS:                        7.8    9.88  )-----------( 145      ( 15 Aug 2021 07:45 )             25.2             RADIOLOGY & ADDITIONAL STUDIES:  < from: Xray Feeding Tube Check SI (08.14.21 @ 10:22) >  Percutaneous gastrostomy tube check: Oral contrast was injected through gastrostomy tube. There is opacification of the stomach, duodenum, small bowel and colon without extravasation.      PROTEIN CALORIE MALNUTRITION: [ ] mild [ ] moderate [ ] severe  [ ] underweight [ ] morbid obesity    https://www.andeal.org/vault/2440/web/files/ONC/Table_Clinical%20Characteristics%20to%20Document%20Malnutrition-White%20JV%20et%20al%446683.pdf    Height (cm): 180.3 (08-11-21 @ 00:53), 180.3 (07-09-21 @ 14:42), 180.3 (06-04-21 @ 22:21)  Weight (kg): 51 (07-12-21 @ 11:27), 50 (06-05-21 @ 09:57)  BMI (kg/m2): 15.7 (08-11-21 @ 00:53), 15.7 (07-12-21 @ 11:27), 15.4 (07-09-21 @ 14:42)    [ ] PPSV2 < or = 30% [ ] significant weight loss [ ] poor nutritional intake [ ] anasarca   Artificial Nutrition [ ]     REFERRALS:   [ ]Chaplaincy  [ ] Hospice  [ ]Child Life  [ ]Social Work  [ ]Case management [ ]Holistic Therapy [ ] Physical Therapy [ ] Dietary   Goals of Care Document: Goals of Care Conversation - Advanced Care Planning [KRISTA Brandon] (07-13-21 @ 15:33)  Goals of Care Conversation - Advanced Care Planning [CARMINA Garcia] (06-15-21 @ 13:35)  Goals of Care Conversation - Advanced Care Planning [RIANA Mauro] (06-08-21 @ 16:07)

## 2021-08-15 NOTE — PROGRESS NOTE ADULT - PROBLEM SELECTOR PLAN 5
Under care  by  Dr Chavis as  an  outpatient  Home  hospice  resume home dose of antiepileptics IV  continue keppra IV 1500 mg BID  continue vimpat  mg BID  ativan 1 mg q2h PRN for breakthrough seizures

## 2021-08-15 NOTE — PROGRESS NOTE ADULT - PROBLEM SELECTOR PLAN 4
Chest x-ray on 8/11/21showed Multifocal bilateral patchy opacities involving the right lung and left lower lung. No pleural effusions or pneumothorax.  Doxycycline 100mg IVPB X5 (last day 8/16)  Zosyn 3.375gram IVPB q8hr   vancomycin discontinued as per son request  WBC count trended down  vanc trough reviewed

## 2021-08-15 NOTE — PROGRESS NOTE ADULT - NUTRITIONAL ASSESSMENT
Nutrition Diagnosis:  Nutrition diagnosis no... Current medical/surgical condition precludes nutrition intervention at this time. Patient followed by Palliative Care.    Nutrition Recommendations:   Nutrition Recommendations:    Nutrition Recommendations:   · Enteral Recommendations  Recommend initiating TRICKLE feeds of Vital 1.5 at 10ml/hr. Monitor tolerance to feeds. As able, increase by 10ml q 8-12 hrs to GOAL rate 45ml/hr x 24 hrs. At goal rate (based on previous wt of 51kg), provides: 1080ml, 1620kcal (~31.7kcal/kg) and 73g protein (1.4g protein/kg).  · RD To Remain Available  yes   · RD Name and Phone # Left With Pt/Family for Necessary Followup  Alison Kleiner, RD, [facility] Pager # [phone]  · Additional Recommendations  1. Monitor GI tolerance. RD to remain available to adjust EN formulary, volume/rate PRN.  2. Consider multivitamin, thiamine to optimize nutrition status. 3. Defer goals of care to medical team. No blood draws at this time unless otherwise indicated by medical team.

## 2021-08-15 NOTE — CHART NOTE - NSCHARTNOTEFT_GEN_A_CORE
Nutrition consult received for tube feeds    Interim events noted, chart reviewed. Noted pt c diarrhea with start of trickle feeds of Vital 1.5 at 10ml/hr x24 hours. Noted pt has been on antimicrobial agents as well which may have caused diarrhea.     Diet: Diet, NPO with Tube Feed:   Tube Feeding Modality: Gastrostomy  Jevity 1.5 Davey (JEVITY1.5RTH)  Total Volume for 24 Hours (mL): 960  Bolus  Total Volume of Bolus (mL):  240  Total # of Feeds: 1  Tube Feed Frequency: Every 6 hours   Tube Feed Start Time: 15:00  Bolus Feed Rate (mL per Hour): 240   Bolus Feed Duration (in Hours): 1  Free Water Flush   Total Volume per Flush (mL): 200   Frequency: Every 6 Hours   Total Daily Volume of Flush (mL): 800    Start Time: 15:00 (08-15-21 @ 14:51)    Current tube feed regimen: each bolus: 385cal, 15 Gm Prot; if provided 4 times daily: 1540cal, 61 Gm Prot     Meds/labs reviewed. Wt reviewed, no new wt at this time.     Skin: evolving DTI on: sacrum, left elboa and left plantar foot; DTI on right hip, left lateral malleolus and right medial malleolus   Edema: +1 left arm     Source: EMR      Plan:  1. Noted feeds recently changed. Would continue c trial of current feeds. If pt continues to have diarrhea would consider Banatrol TF 1 packet daily and increase as needed.   2. If plan to continue c Jevity 1.5, would consider total of 3 bolus feeds only to promote tolerance and better meet needs considering GOC (1155cal, 45 Gm Prot).       RD remains available.  Liliana Overton MS RD CDN  [facility],  #[phone] Nutrition consult received for tube feeds    Interim events noted, chart reviewed. Noted pt c diarrhea with start of trickle feeds of Vital 1.5 at 10ml/hr x24 hours. Noted pt has been on antimicrobial agents as well which may have caused diarrhea.     Diet: Diet, NPO with Tube Feed:   Tube Feeding Modality: Gastrostomy  Jevity 1.5 Davey (JEVITY1.5RTH)  Total Volume for 24 Hours (mL): 960  Bolus  Total Volume of Bolus (mL):  240  Total # of Feeds: 1  Tube Feed Frequency: Every 6 hours   Tube Feed Start Time: 15:00  Bolus Feed Rate (mL per Hour): 240   Bolus Feed Duration (in Hours): 1  Free Water Flush   Total Volume per Flush (mL): 200   Frequency: Every 6 Hours   Total Daily Volume of Flush (mL): 800    Start Time: 15:00 (08-15-21 @ 14:51)    Current tube feed regimen: each bolus: 385cal, 15 Gm Prot; if provided 4 times daily: 1540cal, 61 Gm Prot     Meds/labs reviewed. Wt reviewed, no new wt at this time.     Skin: evolving DTI on: sacrum, left elboa and left plantar foot; DTI on right hip, left lateral malleolus and right medial malleolus   Edema: +1 left arm     Source: EMR    Noted per previous RD notes from last admission, pt had been tolerating Glucerna and Vital HP without any diarrhea. Questionable if current diarrhea truly feed related.     Plan:  1. Noted feeds recently changed. Would continue c trial of current feeds. If pt continues to have diarrhea would consider Banatrol TF 1 packet daily and increase as needed.   2. If plan to continue c Jevity 1.5, would consider total of 3 bolus feeds only to promote tolerance and better meet needs and considering GOC (1155cal, 45 Gm Prot).       RD remains available.  Liliana Overton, MS RD CDN  [facility],  #[phone]

## 2021-08-15 NOTE — PROGRESS NOTE ADULT - ATTENDING COMMENTS
Agree with above, will need change in feeds given diarrhea, c/w antiepileptics, appears comfortable today    ______________  Chris Villalobos MD   of Geriatric and Palliative Medicine  [facility]     Please page the following number for clinical matters between the hours of 9AM and 5PM   from Monday through Friday : [phone]    After 5PM and on weekends, please page: [phone]. The Geriatric and Palliative Medicine consult service has 24/7 coverage for medical recommendations, including for symptom management needs.

## 2021-08-15 NOTE — PROGRESS NOTE ADULT - PROBLEM SELECTOR PLAN 3
Patient with PEG tube.   Son would like for us to restart him on her feeds  Dietitian following. Waiting on recommendations for new feeds  patient unable to tolerate vital AF  consider bolus feeds of different formulation  PEG study confirmed placement

## 2021-08-15 NOTE — PROGRESS NOTE ADULT - PROBLEM SELECTOR PLAN 6
- spoke with son via phone, stated he would like patient to be continued on antiseizure medications Informed that due to diarrhea and intolerance to feeds, feeding held. He requested vancomycin to be stopped and for labs to be drawn

## 2021-08-16 LAB
ANION GAP SERPL CALC-SCNC: 15 MMOL/L — SIGNIFICANT CHANGE UP (ref 5–17)
BUN SERPL-MCNC: 27 MG/DL — HIGH (ref 7–23)
CALCIUM SERPL-MCNC: 8 MG/DL — LOW (ref 8.4–10.5)
CHLORIDE SERPL-SCNC: 93 MMOL/L — LOW (ref 96–108)
CO2 SERPL-SCNC: 24 MMOL/L — SIGNIFICANT CHANGE UP (ref 22–31)
CREAT SERPL-MCNC: 2.03 MG/DL — HIGH (ref 0.5–1.3)
CULTURE RESULTS: SIGNIFICANT CHANGE UP
CULTURE RESULTS: SIGNIFICANT CHANGE UP
GLUCOSE SERPL-MCNC: 185 MG/DL — HIGH (ref 70–99)
POTASSIUM SERPL-MCNC: 3.1 MMOL/L — LOW (ref 3.5–5.3)
POTASSIUM SERPL-SCNC: 3.1 MMOL/L — LOW (ref 3.5–5.3)
SODIUM SERPL-SCNC: 132 MMOL/L — LOW (ref 135–145)
SPECIMEN SOURCE: SIGNIFICANT CHANGE UP
SPECIMEN SOURCE: SIGNIFICANT CHANGE UP

## 2021-08-16 PROCEDURE — 99232 SBSQ HOSP IP/OBS MODERATE 35: CPT | Mod: GC

## 2021-08-16 RX ORDER — HYDROMORPHONE HYDROCHLORIDE 2 MG/ML
0.4 INJECTION INTRAMUSCULAR; INTRAVENOUS; SUBCUTANEOUS
Refills: 0 | Status: DISCONTINUED | OUTPATIENT
Start: 2021-08-16 | End: 2021-08-19

## 2021-08-16 RX ORDER — POTASSIUM CHLORIDE 20 MEQ
40 PACKET (EA) ORAL ONCE
Refills: 0 | Status: COMPLETED | OUTPATIENT
Start: 2021-08-16 | End: 2021-08-16

## 2021-08-16 RX ADMIN — HYDROMORPHONE HYDROCHLORIDE 0.4 MILLIGRAM(S): 2 INJECTION INTRAMUSCULAR; INTRAVENOUS; SUBCUTANEOUS at 10:43

## 2021-08-16 RX ADMIN — PIPERACILLIN AND TAZOBACTAM 25 GRAM(S): 4; .5 INJECTION, POWDER, LYOPHILIZED, FOR SOLUTION INTRAVENOUS at 13:29

## 2021-08-16 RX ADMIN — LACOSAMIDE 140 MILLIGRAM(S): 50 TABLET ORAL at 18:04

## 2021-08-16 RX ADMIN — PIPERACILLIN AND TAZOBACTAM 25 GRAM(S): 4; .5 INJECTION, POWDER, LYOPHILIZED, FOR SOLUTION INTRAVENOUS at 05:35

## 2021-08-16 RX ADMIN — LEVETIRACETAM 400 MILLIGRAM(S): 250 TABLET, FILM COATED ORAL at 17:12

## 2021-08-16 RX ADMIN — LACOSAMIDE 140 MILLIGRAM(S): 50 TABLET ORAL at 05:30

## 2021-08-16 RX ADMIN — SODIUM CHLORIDE 10 MILLILITER(S): 9 INJECTION INTRAMUSCULAR; INTRAVENOUS; SUBCUTANEOUS at 13:29

## 2021-08-16 RX ADMIN — Medication 1 APPLICATION(S): at 18:28

## 2021-08-16 RX ADMIN — Medication 40 MILLIEQUIVALENT(S): at 17:12

## 2021-08-16 RX ADMIN — Medication 110 MILLIGRAM(S): at 05:33

## 2021-08-16 RX ADMIN — PIPERACILLIN AND TAZOBACTAM 25 GRAM(S): 4; .5 INJECTION, POWDER, LYOPHILIZED, FOR SOLUTION INTRAVENOUS at 21:20

## 2021-08-16 RX ADMIN — Medication 1 MILLIGRAM(S): at 17:25

## 2021-08-16 RX ADMIN — Medication 1 APPLICATION(S): at 05:40

## 2021-08-16 RX ADMIN — LEVETIRACETAM 400 MILLIGRAM(S): 250 TABLET, FILM COATED ORAL at 05:31

## 2021-08-16 NOTE — PROGRESS NOTE ADULT - PROBLEM SELECTOR PLAN 6
- spoke with son via phone, stated he would like patient to be continued on antiseizure medications Informed that due to diarrhea and intolerance to feeds, feeding held, to be restarted at trickle when tolerating.

## 2021-08-16 NOTE — PROGRESS NOTE ADULT - PROBLEM SELECTOR PLAN 3
Patient with PEG tube. Son would like for us to restart him on her feeds  Dietitian following. patient unable to tolerate vital AF, switched to Jevity, noted TF on oral suction, TF held. PEG study confirmed placement. To restart at trickle when able. Diarrhea improved while TF held.

## 2021-08-16 NOTE — PROGRESS NOTE ADULT - NUTRITIONAL ASSESSMENT
Nutrition Diagnosis:  Nutrition diagnosis no... Current medical/surgical condition precludes nutrition intervention at this time. Patient followed by Palliative Care.    Nutrition Recommendations:   Nutrition Recommendations:    Nutrition Recommendations:   · Enteral Recommendations  Recommend initiating TRICKLE feeds of Vital 1.5 at 10ml/hr. Monitor tolerance to feeds. As able, increase by 10ml q 8-12 hrs to GOAL rate 45ml/hr x 24 hrs. At goal rate (based on previous wt of 51kg), provides: 1080ml, 1620kcal (~31.7kcal/kg) and 73g protein (1.4g protein/kg).  · RD To Remain Available  yes   · RD Name and Phone # Left With Pt/Family for Necessary Followup  Alison Kleiner, RD, [facility] Pager # [phone]  · Additional Recommendations  1. Monitor GI tolerance. RD to remain available to adjust EN formulary, volume/rate PRN.  2. Consider multivitamin, thiamine to optimize nutrition status. 3. Defer goals of care to medical team. No blood draws at this time unless otherwise indicated by medical team. Nutrition Diagnosis:  Nutrition diagnosis no... Current medical/surgical condition precludes nutrition intervention at this time. Patient followed by Palliative Care.      Nutrition Recommendations:   · Enteral Recommendations  Recommend initiating TRICKLE feeds of Vital 1.5 at 10ml/hr. Monitor tolerance to feeds. As able, increase by 10ml q 8-12 hrs to GOAL rate 45ml/hr x 24 hrs. At goal rate (based on previous wt of 51kg), provides: 1080ml, 1620kcal (~31.7kcal/kg) and 73g protein (1.4g protein/kg).  · RD To Remain Available  yes   · RD Name and Phone # Left With Pt/Family for Necessary Followup  Alison Kleiner, RD, [facility] Pager # [phone]  · Additional Recommendations  1. Monitor GI tolerance. RD to remain available to adjust EN formulary, volume/rate PRN.  2. Consider multivitamin, thiamine to optimize nutrition status. 3. Defer goals of care to medical team. No blood draws at this time unless otherwise indicated by medical team.

## 2021-08-16 NOTE — HOSPICE CARE NOTE - CONVESATION DETAILS
Pt pending restart of Tube Feedings, as per Pt's sons request.    - Pt has not been tolerating feeds well.    - Increased secretions.    HCN RN will continue to follow.  Stefanie Shah RN  [phone]

## 2021-08-16 NOTE — PROGRESS NOTE ADULT - PROBLEM SELECTOR PLAN 5
Under care  by  Dr Chavis as  an  outpatient  Home  hospice  resume home dose of antiepileptics IV  continue keppra IV 1500 mg BID (home 1000mg TID)  continue vimpat  mg BID  ativan 1 mg q2h PRN for breakthrough seizures

## 2021-08-16 NOTE — PROGRESS NOTE ADULT - SUBJECTIVE AND OBJECTIVE BOX
GAP TEAM PALLIATIVE CARE UNIT PROGRESS NOTE:      [X] Patient on hospice program.    INDICATION FOR PALLIATIVE CARE UNIT SERVICES: symptom management, finishing IV Antibiotics on 8/18 AM    INTERVAL HPI/OVERNIGHT EVENTS: Diarrhea last on 8/15 evening, held Jevity tube feed. Suctioned tube feed from mouth this morning. Patient's son wants to continue feed, will hold and restart at trickle when able. Remains unresponsive. Appears comfortable.      DNR on chart: Yes  Allergies    No Known Allergies    Intolerances    MEDICATIONS  (STANDING):  AQUAPHOR (petrolatum Ointment) 1 Application(s) Topical every 12 hours  lacosamide IVPB 200 milliGRAM(s) IV Intermittent every 12 hours  levETIRAcetam  IVPB 1500 milliGRAM(s) IV Intermittent every 12 hours  piperacillin/tazobactam IVPB.. 3.375 Gram(s) IV Intermittent every 8 hours  potassium chloride   Solution 40 milliEquivalent(s) Enteral Tube once  sodium chloride 0.9%. 1000 milliLiter(s) (10 mL/Hr) IV Continuous <Continuous>    MEDICATIONS  (PRN):  acetaminophen  Suppository .. 650 milliGRAM(s) Rectal every 6 hours PRN Temp greater or equal to 38C (100.4F)  bisacodyl Suppository 10 milliGRAM(s) Rectal daily PRN Constipation  glycopyrrolate Injectable 0.4 milliGRAM(s) IV Push every 6 hours PRN oral secretions  HYDROmorphone  Injectable 0.4 milliGRAM(s) IV Push every 1 hour PRN moderate-severe pain  HYDROmorphone  Injectable 0.4 milliGRAM(s) IV Push every 1 hour PRN dyspnea  LORazepam   Injectable 1 milliGRAM(s) IV Push every 2 hours PRN agitation, anxiety and seizures    ITEMS UNCHECKED ARE NOT PRESENT    PRESENT SYMPTOMS: [X]Unable to obtain due to poor mentation   Source if other than patient:  [ ]Family   [X]Team     Pain: [ ] yes [ ] no  QOL impact -   Location -                    Aggravating factors -  Quality -  Radiation -  Timing-  Severity (0-10 scale):  Minimal acceptable level (0-10 scale):     Dyspnea:                           [ ]Mild [ ]Moderate [ ]Severe  Anxiety:                             [ ]Mild [ ]Moderate [ ]Severe  Fatigue:                             [ ]Mild [ ]Moderate [ ]Severe  Nausea:                             [ ]Mild [ ]Moderate [ ]Severe  Loss of appetite:              [ ]Mild [ ]Moderate [ ]Severe  Constipation:                    [ ]Mild [ ]Moderate [ ]Severe    PAINAD Score:    [email or web address]/[email or web address] (Ctrl +  left click to view)  		  Other Symptoms:  [ ]All other review of systems negative     Palliative Performance Status Version 2:   10 %         [email or web address]  PHYSICAL EXAM:  Vital Signs Last 24 Hrs  T(C): 36.6 (16 Aug 2021 08:20), Max: 36.6 (16 Aug 2021 08:20)  T(F): 97.8 (16 Aug 2021 08:20), Max: 97.8 (16 Aug 2021 08:20)  HR: 91 (16 Aug 2021 08:20) (91 - 91)  BP: 128/72 (16 Aug 2021 08:20) (128/72 - 128/72)  BP(mean): --  RR: 18 (16 Aug 2021 08:20) (18 - 18)  SpO2: 96% (16 Aug 2021 08:20) (96% - 96%) I&O's Summary    GENERAL:  [ ]Alert  [ ]Oriented x   [x ]Lethargic  [ ]Cachexia  [ ]Unarousable  [ ]Verbal  [ ]Non-Verbal  Behavioral:   [ ] Anxiety  [ ] Delirium [ ] Agitation [ ] Other  HEENT:  [x ]Normal   [ ]Dry mouth   [ ]ET Tube/Trach  [ ]Oral lesions  PULMONARY:   [x ]Clear [ ]Tachypnea  [ ]Audible excessive secretions   [ ]Rhonchi        [ ]Right [ ]Left [ ]Bilateral  [ ]Crackles        [ ]Right [ ]Left [ ]Bilateral  [ ]Wheezing     [ ]Right [ ]Left [ ]Bilateral  [ ]Diminished BS [ ]Right [ ]Left [ ]Bilateral    CARDIOVASCULAR:    [x ]Regular [ ]Irregular [ ]Tachy  [ ]Edison [ ]Murmur [ ]Other  GASTROINTESTINAL:  [x ]Soft  [ ]Distended   [ ]+BS  [ ]Non tender [ ]Tender  [ ]PEG [ ]OGT/ NGT   Last BM:     GENITOURINARY:  [ ]Normal [ x] Incontinent   [ ]Oliguria/Anuria   [ ]Franks  MUSCULOSKELETAL:   [ ]Normal   [ ]Weakness  [ x]Bed/Wheelchair bound [ ]Edema  NEUROLOGIC:   [ ]No focal deficits  [x ] Cognitive impairment  [x ] Dysphagia [ ]Dysarthria [ ] Paresis [ ]Other   SKIN:   [ ]Normal  [ ]Rash     [ ]Pressure ulcer(s)  [ ]y [ ]n  Present on admission      CRITICAL CARE:  [ ] Shock Present  [ ]Septic [ ]Cardiogenic [ ]Neurologic [ ]Hypovolemic  [ ]  Vasopressors [ ]  Inotropes   [ ] Respiratory failure present [ ] Mechanical Ventilation [ ] Non-invasive ventilatory support [ ] High-Flow  [ ] Acute  [ ] Chronic [ ] Hypoxic  [ ] Hypercarbic [ ] Other  [ ] Other organ failure     LABS:                        7.8    9.88  )-----------( 145      ( 15 Aug 2021 07:45 )             25.2   08-16    132<L>  |  93<L>  |  27<H>  ----------------------------<  185<H>  3.1<L>   |  24  |  2.03<H>    Ca    8.0<L>      16 Aug 2021 06:58          RADIOLOGY & ADDITIONAL STUDIES: n/a    PROTEIN CALORIE MALNUTRITION: [ ] mild [ ] moderate [ ] severe  [ ] underweight [ ] morbid obesity    https://www.andeal.org/vault/2440/web/files/ONC/Table_Clinical%20Characteristics%20to%20Document%20Malnutrition-White%20JV%20et%20al%202012.pdf    Height (cm): 180.3 (08-11-21 @ 00:53), 180.3 (07-09-21 @ 14:42), 180.3 (06-04-21 @ 22:21)  Weight (kg): 51 (07-12-21 @ 11:27), 50 (06-05-21 @ 09:57)  BMI (kg/m2): 15.7 (08-11-21 @ 00:53), 15.7 (07-12-21 @ 11:27), 15.4 (07-09-21 @ 14:42)    [X] PPSV2 < or = 30% [ ] significant weight loss [ ] poor nutritional intake [ ] anasarca   Artificial Nutrition [X]     REFERRALS:   [ ]Chaplaincy  [X] Hospice  [ ]Child Life  [ ]Social Work  [ ]Case management [ ]Holistic Therapy [ ] Physical Therapy [ ] Dietary   Goals of Care Document: Goals of Care Conversation - Advanced Care Planning [KRISTA Brandon] (07-13-21 @ 15:33)  Goals of Care Conversation - Advanced Care Planning [CARMINA Garcia] (06-15-21 @ 13:35)  Goals of Care Conversation - Advanced Care Planning [RIANA Mauro] (06-08-21 @ 16:07)     GAP TEAM PALLIATIVE CARE UNIT PROGRESS NOTE:      [X] Patient on hospice program.    INDICATION FOR PALLIATIVE CARE UNIT SERVICES: symptom management, finishing IV Antibiotics on 8/18 AM, GBM    INTERVAL HPI/OVERNIGHT EVENTS: Diarrhea last on 8/15 evening, held Jevity tube feed. Suctioned tube feed from mouth this morning. Patient's son wants to continue feed, will hold and restart at trickle when able. Remains unresponsive. Appears comfortable.      DNR on chart: Yes  Allergies    No Known Allergies    Intolerances    MEDICATIONS  (STANDING):  AQUAPHOR (petrolatum Ointment) 1 Application(s) Topical every 12 hours  lacosamide IVPB 200 milliGRAM(s) IV Intermittent every 12 hours  levETIRAcetam  IVPB 1500 milliGRAM(s) IV Intermittent every 12 hours  piperacillin/tazobactam IVPB.. 3.375 Gram(s) IV Intermittent every 8 hours  potassium chloride   Solution 40 milliEquivalent(s) Enteral Tube once  sodium chloride 0.9%. 1000 milliLiter(s) (10 mL/Hr) IV Continuous <Continuous>    MEDICATIONS  (PRN):  acetaminophen  Suppository .. 650 milliGRAM(s) Rectal every 6 hours PRN Temp greater or equal to 38C (100.4F)  bisacodyl Suppository 10 milliGRAM(s) Rectal daily PRN Constipation  glycopyrrolate Injectable 0.4 milliGRAM(s) IV Push every 6 hours PRN oral secretions  HYDROmorphone  Injectable 0.4 milliGRAM(s) IV Push every 1 hour PRN moderate-severe pain  HYDROmorphone  Injectable 0.4 milliGRAM(s) IV Push every 1 hour PRN dyspnea  LORazepam   Injectable 1 milliGRAM(s) IV Push every 2 hours PRN agitation, anxiety and seizures    ITEMS UNCHECKED ARE NOT PRESENT    PRESENT SYMPTOMS: [X]Unable to obtain due to poor mentation   Source if other than patient:  [ ]Family   [X]Team     Pain: [ ] yes [x] no  see pain ads score  QOL impact -   Location -                    Aggravating factors -  Quality -  Radiation -  Timing-  Severity (0-10 scale):  Minimal acceptable level (0-10 scale):     Dyspnea:                           [ ]Mild [ ]Moderate [ ]Severe  Anxiety:                             [ ]Mild [ ]Moderate [ ]Severe  Fatigue:                             [ ]Mild [ ]Moderate [ ]Severe  Nausea:                             [ ]Mild [ ]Moderate [ ]Severe  Loss of appetite:              [ ]Mild [ ]Moderate [ ]Severe  Constipation:                    [ ]Mild [ ]Moderate [ ]Severe    PAINAD Score: 0 x 24 hours    [email or web address]/[email or web address] (Ctrl +  left click to view)  		  Other Symptoms:  [x ]All other review of systems negative     Palliative Performance Status Version 2:   10 %         [email or web address]  PHYSICAL EXAM:  Vital Signs Last 24 Hrs  T(C): 36.6 (16 Aug 2021 08:20), Max: 36.6 (16 Aug 2021 08:20)  T(F): 97.8 (16 Aug 2021 08:20), Max: 97.8 (16 Aug 2021 08:20)  HR: 91 (16 Aug 2021 08:20) (91 - 91)  BP: 128/72 (16 Aug 2021 08:20) (128/72 - 128/72)  BP(mean): --  RR: 18 (16 Aug 2021 08:20) (18 - 18)  SpO2: 96% (16 Aug 2021 08:20) (96% - 96%) I&O's Summary    GENERAL:  [ ]Alert  [ ]Oriented x   [x ]Lethargic  [ ]Cachexia  [ ]Unarousable  [ ]Verbal  [x ]Non-Verbal  Behavioral:   [ ] Anxiety  [ ] Delirium [ ] Agitation [ ] Other  HEENT:  [x ]Normal   [ ]Dry mouth   [ ]ET Tube/Trach  [ ]Oral lesions  PULMONARY:   [x ]Clear [ ]Tachypnea  [ ]Audible excessive secretions   [ ]Rhonchi        [ ]Right [ ]Left [ ]Bilateral  [ ]Crackles        [ ]Right [ ]Left [ ]Bilateral  [ ]Wheezing     [ ]Right [ ]Left [ ]Bilateral  [ ]Diminished BS [ ]Right [ ]Left [ ]Bilateral    CARDIOVASCULAR:    [x ]Regular [ ]Irregular [ ]Tachy  [ ]Edison [ ]Murmur [ ]Other  GASTROINTESTINAL:  [x ]Soft  [ ]Distended   [x ]+BS  [x ]Non tender [ ]Tender  [ ]PEG [ ]OGT/ NGT   Last BM:   8/15/2021  GENITOURINARY:  [ ]Normal [ x] Incontinent   [ ]Oliguria/Anuria   [ ]Franks  MUSCULOSKELETAL:   [ ]Normal   [ ]Weakness  [ x]Bed/Wheelchair bound [ ]Edema  NEUROLOGIC:   [ ]No focal deficits  [x ] Cognitive impairment  [x ] Dysphagia [ ]Dysarthria [ ] Paresis [ ]Other   SKIN:   [ ]Normal  [ ]Rash     [x ]Pressure ulcer(s)  [x ]y [ ]n  Present on admission  multiple, For more information please see RN documentation which I have reviewed.     CRITICAL CARE:  [ ] Shock Present  [ ]Septic [ ]Cardiogenic [ ]Neurologic [ ]Hypovolemic  [ ]  Vasopressors [ ]  Inotropes   [ ] Respiratory failure present [ ] Mechanical Ventilation [ ] Non-invasive ventilatory support [ ] High-Flow  [ ] Acute  [ ] Chronic [ ] Hypoxic  [ ] Hypercarbic [ ] Other  [x ] Other organ failure skin, brain, renal insufficiency    LABS:                        7.8    9.88  )-----------( 145      ( 15 Aug 2021 07:45 )             25.2   08-16    132<L>  |  93<L>  |  27<H>  ----------------------------<  185<H>  3.1<L>   |  24  |  2.03<H>    Ca    8.0<L>      16 Aug 2021 06:58          RADIOLOGY & ADDITIONAL STUDIES: n/a prior imaging reviewed    PROTEIN CALORIE MALNUTRITION: [ ] mild [ ] moderate [x ] severe  [ ] underweight [ ] morbid obesity    https://www.andeal.org/vault/2440/web/files/ONC/Table_Clinical%20Characteristics%20to%20Document%20Malnutrition-White%20JV%20et%20al%903563.pdf    Height (cm): 180.3 (08-11-21 @ 00:53), 180.3 (07-09-21 @ 14:42), 180.3 (06-04-21 @ 22:21)  Weight (kg): 51 (07-12-21 @ 11:27), 50 (06-05-21 @ 09:57)  BMI (kg/m2): 15.7 (08-11-21 @ 00:53), 15.7 (07-12-21 @ 11:27), 15.4 (07-09-21 @ 14:42)    [X] PPSV2 < or = 30% [x ] significant weight loss [ ] poor nutritional intake [ ] anasarca   Artificial Nutrition [X]     REFERRALS:   [ ]Chaplaincy  [X] Hospice  [ ]Child Life  [ ]Social Work  [ ]Case management [ ]Holistic Therapy [ ] Physical Therapy [ ] Dietary   Goals of Care Document: Goals of Care Conversation - Advanced Care Planning [KRISTA Brandon] (07-13-21 @ 15:33)  Goals of Care Conversation - Advanced Care Planning [CARMINA Garcia] (06-15-21 @ 13:35)  Goals of Care Conversation - Advanced Care Planning [RIANA Mauro] (06-08-21 @ 16:07)

## 2021-08-16 NOTE — PROGRESS NOTE ADULT - ATTENDING COMMENTS
71 year old male with significant debility (ppsv2 30%) with GBM on home hospice care admitted with multifocal PNA likely due to aspiration.  Completing course antibiotics tomorrow.  Course complicated by diarrhea which looks to be related to tube feeds.  Son who is a physician is very involved in care and care plan while here in the hospital, updated daily.  Anticipate dc once antibiotics completed - tomorrow/thursday.  Patient assessment and plan discussed on interdisciplinary team rounds today.

## 2021-08-17 PROCEDURE — 99232 SBSQ HOSP IP/OBS MODERATE 35: CPT

## 2021-08-17 RX ADMIN — LEVETIRACETAM 400 MILLIGRAM(S): 250 TABLET, FILM COATED ORAL at 05:43

## 2021-08-17 RX ADMIN — Medication 1 APPLICATION(S): at 18:51

## 2021-08-17 RX ADMIN — PIPERACILLIN AND TAZOBACTAM 25 GRAM(S): 4; .5 INJECTION, POWDER, LYOPHILIZED, FOR SOLUTION INTRAVENOUS at 23:01

## 2021-08-17 RX ADMIN — Medication 1 MILLIGRAM(S): at 02:56

## 2021-08-17 RX ADMIN — Medication 0.5 MILLIGRAM(S): at 14:02

## 2021-08-17 RX ADMIN — PIPERACILLIN AND TAZOBACTAM 25 GRAM(S): 4; .5 INJECTION, POWDER, LYOPHILIZED, FOR SOLUTION INTRAVENOUS at 05:43

## 2021-08-17 RX ADMIN — HYDROMORPHONE HYDROCHLORIDE 0.4 MILLIGRAM(S): 2 INJECTION INTRAMUSCULAR; INTRAVENOUS; SUBCUTANEOUS at 02:56

## 2021-08-17 RX ADMIN — LACOSAMIDE 140 MILLIGRAM(S): 50 TABLET ORAL at 05:43

## 2021-08-17 RX ADMIN — SODIUM CHLORIDE 10 MILLILITER(S): 9 INJECTION INTRAMUSCULAR; INTRAVENOUS; SUBCUTANEOUS at 07:35

## 2021-08-17 RX ADMIN — LACOSAMIDE 140 MILLIGRAM(S): 50 TABLET ORAL at 18:50

## 2021-08-17 RX ADMIN — LEVETIRACETAM 400 MILLIGRAM(S): 250 TABLET, FILM COATED ORAL at 18:01

## 2021-08-17 RX ADMIN — HYDROMORPHONE HYDROCHLORIDE 0.4 MILLIGRAM(S): 2 INJECTION INTRAMUSCULAR; INTRAVENOUS; SUBCUTANEOUS at 13:43

## 2021-08-17 RX ADMIN — PIPERACILLIN AND TAZOBACTAM 25 GRAM(S): 4; .5 INJECTION, POWDER, LYOPHILIZED, FOR SOLUTION INTRAVENOUS at 13:44

## 2021-08-17 RX ADMIN — Medication 1 APPLICATION(S): at 05:42

## 2021-08-17 NOTE — PROGRESS NOTE ADULT - PROBLEM SELECTOR PLAN 3
Patient with PEG tube. Vital AF feed c/b diarrhea, switched to Jevity. Was held 8/15 for high residuals and diarrhea, restarted as trickle on 8/17 after discussion with son. PEG study confirmed placement.

## 2021-08-17 NOTE — PROGRESS NOTE ADULT - PROBLEM SELECTOR PLAN 6
spoke with son via phone, stated he would like patient to be continued on antiseizure medications Informed that due to diarrhea and intolerance to feeds, feeding held and to be restarted at trickle.

## 2021-08-17 NOTE — PROGRESS NOTE ADULT - SUBJECTIVE AND OBJECTIVE BOX
GAP TEAM PALLIATIVE CARE UNIT PROGRESS NOTE:      [X] Patient on hospice program.    INDICATION FOR PALLIATIVE CARE UNIT SERVICES: Symptom Management, Finishing IV antibiotics on 8/18 AM    INTERVAL HPI/OVERNIGHT EVENTS:  Patient with eyes open looking at ceiling, not alert, pupils not reactive to light, breathing comfortably. Tube Feeds was held yesterday given diarrhea and high residuals, restarted at trickle rate today per GOC discussion with son. Some focal seizure activity noted last night, given PRN Ativan which resolved. PRN Ativan 1mg given 2x overnight, PRN Dilautid 0.1mg given 2x overnight. Continuing Zosyn 7 days until 8/18 AM. Last BM 8/15.    DNR on chart: Yes  Allergies    No Known Allergies    Intolerances    MEDICATIONS  (STANDING):  AQUAPHOR (petrolatum Ointment) 1 Application(s) Topical every 12 hours  lacosamide IVPB 200 milliGRAM(s) IV Intermittent every 12 hours  levETIRAcetam  IVPB 1500 milliGRAM(s) IV Intermittent every 12 hours  piperacillin/tazobactam IVPB.. 3.375 Gram(s) IV Intermittent every 8 hours  sodium chloride 0.9%. 1000 milliLiter(s) (10 mL/Hr) IV Continuous <Continuous>    MEDICATIONS  (PRN):  acetaminophen  Suppository .. 650 milliGRAM(s) Rectal every 6 hours PRN Temp greater or equal to 38C (100.4F)  bisacodyl Suppository 10 milliGRAM(s) Rectal daily PRN Constipation  glycopyrrolate Injectable 0.4 milliGRAM(s) IV Push every 6 hours PRN oral secretions  HYDROmorphone  Injectable 0.4 milliGRAM(s) IV Push every 1 hour PRN moderate-severe pain  HYDROmorphone  Injectable 0.4 milliGRAM(s) IV Push every 1 hour PRN dyspnea  LORazepam   Injectable 0.5 milliGRAM(s) IV Push every 1 hour PRN Anxiety  LORazepam   Injectable 1 milliGRAM(s) IV Push every 15 minutes PRN seizures    ITEMS UNCHECKED ARE NOT PRESENT    PRESENT SYMPTOMS: [X]Unable to obtain due to poor mentation   Source if other than patient:  [ ]Family   [X]Team     Pain: [ ] yes [X] no  QOL impact -   Location -                    Aggravating factors -  Quality -  Radiation -  Timing-  Severity (0-10 scale):  Minimal acceptable level (0-10 scale):     Dyspnea:                           [ ]Mild [ ]Moderate [ ]Severe  Anxiety:                             [x]Mild [ ]Moderate [ ]Severe  Fatigue:                             [ ]Mild [ ]Moderate [x]Severe  Nausea:                             [ ]Mild [ ]Moderate [ ]Severe  Loss of appetite:              [ ]Mild [ ]Moderate [x]Severe  Constipation:                    [ ]Mild [ ]Moderate [ ]Severe    PAINAD Score: 0 in past 24 hours    [email or web address] (Ctrl +  left click to view)  		  Other Symptoms:  [x]All other review of systems negative     Palliative Performance Status Version 2:     10  %         [email or web address]  PHYSICAL EXAM:  Vital Signs Last 24 Hrs  T(C): 37.1 (17 Aug 2021 08:15), Max: 37.1 (17 Aug 2021 08:15)  T(F): 98.7 (17 Aug 2021 08:15), Max: 98.7 (17 Aug 2021 08:15)  HR: 92 (17 Aug 2021 08:15) (92 - 92)  BP: 124/72 (17 Aug 2021 08:15) (124/72 - 124/72)  BP(mean): --  RR: 18 (17 Aug 2021 08:15) (18 - 18)  SpO2: 96% (17 Aug 2021 08:15) (96% - 96%) I&O's Summary    GENERAL:  [ ]Alert  [ ]Oriented x   [x ]Lethargic  [ ]Cachexia  [ ]Unarousable  [ ]Verbal  [x ]Non-Verbal  Behavioral:   [ ] Anxiety  [ ] Delirium [ ] Agitation [ ] Other  HEENT:  [x ]Normal   [ ]Dry mouth   [ ]ET Tube/Trach  [ ]Oral lesions  PULMONARY:   [x ]Clear [ ]Tachypnea  [ ]Audible excessive secretions   [ ]Rhonchi        [ ]Right [ ]Left [ ]Bilateral  [ ]Crackles        [ ]Right [ ]Left [ ]Bilateral  [ ]Wheezing     [ ]Right [ ]Left [ ]Bilateral  [ ]Diminished BS [ ]Right [ ]Left [ ]Bilateral    CARDIOVASCULAR:    [x ]Regular [ ]Irregular [ ]Tachy  [ ]Edison [ ]Murmur [ ]Other  GASTROINTESTINAL:  [x ]Soft  [ ]Distended   [x ]+BS  [x ]Non tender [ ]Tender  [ ]PEG [ ]OGT/ NGT   Last BM:   8/15/2021  GENITOURINARY:  [ ]Normal [ x] Incontinent   [ ]Oliguria/Anuria   [ ]Franks  MUSCULOSKELETAL:   [ ]Normal   [ ]Weakness  [ x]Bed/Wheelchair bound [ ]Edema  NEUROLOGIC:   [ ]No focal deficits  [x ] Cognitive impairment  [x ] Dysphagia [ ]Dysarthria [ ] Paresis [ ]Other   SKIN:   [ ]Normal  [ ]Rash     [x ]Pressure ulcer(s)  [x ]y [ ]n  Present on admission  multiple, For more information please see RN documentation which I have reviewed.     CRITICAL CARE:  [ ] Shock Present  [ ]Septic [ ]Cardiogenic [ ]Neurologic [ ]Hypovolemic  [ ]  Vasopressors [ ]  Inotropes   [ ] Respiratory failure present [ ] Mechanical Ventilation [ ] Non-invasive ventilatory support [ ] High-Flow  [ ] Acute  [ ] Chronic [ ] Hypoxic  [ ] Hypercarbic [ ] Other  [x ] Other organ failure skin, brain, renal insufficiency    LABS:  08-16    132<L>  |  93<L>  |  27<H>  ----------------------------<  185<H>  3.1<L>   |  24  |  2.03<H>    Ca    8.0<L>      16 Aug 2021 06:58          RADIOLOGY & ADDITIONAL STUDIES: n/a, prior imaging reviewed    PROTEIN CALORIE MALNUTRITION: [ ] mild [ ] moderate [x] severe  [ ] underweight [ ] morbid obesity    https://www.andeal.org/vault/2020/web/files/ONC/Table_Clinical%20Characteristics%20to%20Document%20Malnutrition-White%20JV%20et%20al%202012.pdf    Height (cm): 180.3 (08-11-21 @ 00:53), 180.3 (07-09-21 @ 14:42), 180.3 (06-04-21 @ 22:21)  Weight (kg): 51 (07-12-21 @ 11:27), 50 (06-05-21 @ 09:57)  BMI (kg/m2): 15.7 (08-11-21 @ 00:53), 15.7 (07-12-21 @ 11:27), 15.4 (07-09-21 @ 14:42)    [X] PPSV2 < or = 30% [ ] significant weight loss [ ] poor nutritional intake [ ] anasarca   Artificial Nutrition [X]     REFERRALS:   [ ]Chaplaincy  [X] Hospice  [ ]Child Life  [ ]Social Work  [ ]Case management [ ]Holistic Therapy [ ] Physical Therapy [ ] Dietary   Goals of Care Document: Goals of Care Conversation - Advanced Care Planning [KRISTA Brandon] (07-13-21 @ 15:33)  Goals of Care Conversation - Advanced Care Planning [CARMINA Garcia] (06-15-21 @ 13:35)  Goals of Care Conversation - Advanced Care Planning [RIANA Mauro] (06-08-21 @ 16:07)     GAP TEAM PALLIATIVE CARE UNIT PROGRESS NOTE:      [X] Patient on hospice program.    INDICATION FOR PALLIATIVE CARE UNIT SERVICES: Symptom Management, Finishing IV antibiotics on 8/18 AM in the setting of GBM. seizures    INTERVAL HPI/OVERNIGHT EVENTS:  Patient with eyes open looking at ceiling, not alert, pupils not reactive to light, breathing comfortably. Tube Feeds was held yesterday given diarrhea and high residuals, restarted at trickle rate today per GOC discussion with son. Some focal seizure activity noted last night, given PRN Ativan which resolved. PRN Ativan 1mg given 2x overnight, PRN Dilautid 0.1mg given 2x overnight. Continuing Zosyn 7 days until 8/18 AM. Last BM 8/15.    DNR on chart: Yes  Allergies    No Known Allergies    Intolerances    MEDICATIONS  (STANDING):  AQUAPHOR (petrolatum Ointment) 1 Application(s) Topical every 12 hours  lacosamide IVPB 200 milliGRAM(s) IV Intermittent every 12 hours  levETIRAcetam  IVPB 1500 milliGRAM(s) IV Intermittent every 12 hours  piperacillin/tazobactam IVPB.. 3.375 Gram(s) IV Intermittent every 8 hours  sodium chloride 0.9%. 1000 milliLiter(s) (10 mL/Hr) IV Continuous <Continuous>    MEDICATIONS  (PRN):  acetaminophen  Suppository .. 650 milliGRAM(s) Rectal every 6 hours PRN Temp greater or equal to 38C (100.4F)  bisacodyl Suppository 10 milliGRAM(s) Rectal daily PRN Constipation  glycopyrrolate Injectable 0.4 milliGRAM(s) IV Push every 6 hours PRN oral secretions  HYDROmorphone  Injectable 0.4 milliGRAM(s) IV Push every 1 hour PRN moderate-severe pain  HYDROmorphone  Injectable 0.4 milliGRAM(s) IV Push every 1 hour PRN dyspnea  LORazepam   Injectable 0.5 milliGRAM(s) IV Push every 1 hour PRN Anxiety  LORazepam   Injectable 1 milliGRAM(s) IV Push every 15 minutes PRN seizures    ITEMS UNCHECKED ARE NOT PRESENT    PRESENT SYMPTOMS: [X]Unable to obtain due to poor mentation   Source if other than patient:  [ ]Family   [X]Team     Pain: [ ] yes [X] no  QOL impact -   Location -                    Aggravating factors -  Quality -  Radiation -  Timing-  Severity (0-10 scale):  Minimal acceptable level (0-10 scale):     Dyspnea:                           [ ]Mild [ ]Moderate [ ]Severe  Anxiety:                             [x]Mild [ ]Moderate [ ]Severe  Fatigue:                             [ ]Mild [ ]Moderate [x]Severe  Nausea:                             [ ]Mild [ ]Moderate [ ]Severe  Loss of appetite:              [ ]Mild [ ]Moderate [x]Severe  Constipation:                    [ ]Mild [ ]Moderate [ ]Severe    PAINAD Score: 0 in past 24 hours    [email or web address] (Ctrl +  left click to view)  		  Other Symptoms:  [x]All other review of systems negative     Palliative Performance Status Version 2:     10  %         [email or web address]  PHYSICAL EXAM:  Vital Signs Last 24 Hrs  T(C): 37.1 (17 Aug 2021 08:15), Max: 37.1 (17 Aug 2021 08:15)  T(F): 98.7 (17 Aug 2021 08:15), Max: 98.7 (17 Aug 2021 08:15)  HR: 92 (17 Aug 2021 08:15) (92 - 92)  BP: 124/72 (17 Aug 2021 08:15) (124/72 - 124/72)  BP(mean): --  RR: 18 (17 Aug 2021 08:15) (18 - 18)  SpO2: 96% (17 Aug 2021 08:15) (96% - 96%) I&O's Summary    GENERAL:  [ ]Alert  [ ]Oriented x   [x ]Lethargic  [ ]Cachexia  [ ]Unarousable  [ ]Verbal  [x ]Non-Verbal  Behavioral:   [ ] Anxiety  [ ] Delirium [ ] Agitation [ ] Other  HEENT:  [x ]Normal   [ ]Dry mouth   [ ]ET Tube/Trach  [ ]Oral lesions  PULMONARY:   [x ]Clear [ ]Tachypnea  [ ]Audible excessive secretions   [ ]Rhonchi        [ ]Right [ ]Left [ ]Bilateral  [ ]Crackles        [ ]Right [ ]Left [ ]Bilateral  [ ]Wheezing     [ ]Right [ ]Left [ ]Bilateral  [ ]Diminished BS [ ]Right [ ]Left [ ]Bilateral    CARDIOVASCULAR:    [x ]Regular [ ]Irregular [ ]Tachy  [ ]Edison [ ]Murmur [ ]Other  GASTROINTESTINAL:  [x ]Soft  [ ]Distended   [x ]+BS  [x ]Non tender [ ]Tender  [ ]PEG [ ]OGT/ NGT   Last BM:   8/15/2021  GENITOURINARY:  [ ]Normal [ x] Incontinent   [ ]Oliguria/Anuria   [ ]Franks  MUSCULOSKELETAL:   [ ]Normal   [ ]Weakness  [ x]Bed/Wheelchair bound [ ]Edema  NEUROLOGIC:   [ ]No focal deficits  [x ] Cognitive impairment  [x ] Dysphagia [ ]Dysarthria [ ] Paresis [ ]Other   SKIN:   [ ]Normal  [ ]Rash     [x ]Pressure ulcer(s)  [x ]y [ ]n  Present on admission  multiple, For more information please see RN documentation which I have reviewed.     CRITICAL CARE:  [ ] Shock Present  [ ]Septic [ ]Cardiogenic [ ]Neurologic [ ]Hypovolemic  [ ]  Vasopressors [ ]  Inotropes   [ ] Respiratory failure present [ ] Mechanical Ventilation [ ] Non-invasive ventilatory support [ ] High-Flow  [ ] Acute  [ ] Chronic [ ] Hypoxic  [ ] Hypercarbic [ ] Other  [x ] Other organ failure skin, brain, renal insufficiency    LABS:  08-16    132<L>  |  93<L>  |  27<H>  ----------------------------<  185<H>  3.1<L>   |  24  |  2.03<H>    Ca    8.0<L>      16 Aug 2021 06:58          RADIOLOGY & ADDITIONAL STUDIES: n/a, prior imaging reviewed    PROTEIN CALORIE MALNUTRITION: [ ] mild [ ] moderate [x] severe  [ ] underweight [ ] morbid obesity    https://www.andeal.org/vault/2440/web/files/ONC/Table_Clinical%20Characteristics%20to%20Document%20Malnutrition-White%20JV%20et%20al%202012.pdf    Height (cm): 180.3 (08-11-21 @ 00:53), 180.3 (07-09-21 @ 14:42), 180.3 (06-04-21 @ 22:21)  Weight (kg): 51 (07-12-21 @ 11:27), 50 (06-05-21 @ 09:57)  BMI (kg/m2): 15.7 (08-11-21 @ 00:53), 15.7 (07-12-21 @ 11:27), 15.4 (07-09-21 @ 14:42)    [X] PPSV2 < or = 30% [ ] significant weight loss [ ] poor nutritional intake [ ] anasarca   Artificial Nutrition [X]     REFERRALS:   [ ]Chaplaincy  [X] Hospice  [ ]Child Life  [ ]Social Work  [ ]Case management [ ]Holistic Therapy [ ] Physical Therapy [ ] Dietary   Goals of Care Document: Goals of Care Conversation - Advanced Care Planning [KRISTA Brandon] (07-13-21 @ 15:33)  Goals of Care Conversation - Advanced Care Planning [CARMINA Garcia] (06-15-21 @ 13:35)  Goals of Care Conversation - Advanced Care Planning [RIANA Mauro] (06-08-21 @ 16:07)

## 2021-08-17 NOTE — PROGRESS NOTE ADULT - PROBLEM SELECTOR PLAN 4
Chest x-ray on 8/11/21showed Multifocal bilateral patchy opacities involving the right lung and left lower lung. No pleural effusions or pneumothorax.  s/p 5d Doxycycline 100mg IVPB X5 (8/12-8/16)  To finish 7d Zosyn 3.375gram IVPB q8hr  (8/12 - 8/18)  vancomycin discontinued as per son request  WBC count trended down

## 2021-08-17 NOTE — PROGRESS NOTE ADULT - PROBLEM SELECTOR PLAN 5
Under care  by  Dr Chavis as  an  outpatient  Home  hospice  resumed home dose of antiepileptics IV  continue keppra IV 1500 mg BID (home 1000mg TID)  continue vimpat  mg BID  ativan 1 mg q2h PRN for breakthrough seizures (8/17: used 2x over past 24 hours)

## 2021-08-17 NOTE — PROGRESS NOTE ADULT - NUTRITIONAL ASSESSMENT
Nutrition Diagnosis:  Nutrition diagnosis no... Current medical/surgical condition precludes nutrition intervention at this time. Patient followed by Palliative Care.      Nutrition Recommendations:   · Enteral Recommendations  Recommend initiating TRICKLE feeds of Vital 1.5 at 10ml/hr. Monitor tolerance to feeds. As able, increase by 10ml q 8-12 hrs to GOAL rate 45ml/hr x 24 hrs. At goal rate (based on previous wt of 51kg), provides: 1080ml, 1620kcal (~31.7kcal/kg) and 73g protein (1.4g protein/kg).  · RD To Remain Available  yes   · RD Name and Phone # Left With Pt/Family for Necessary Followup  Alison Kleiner, RD, [facility] Pager # [phone]  · Additional Recommendations  1. Monitor GI tolerance. RD to remain available to adjust EN formulary, volume/rate PRN.  2. Consider multivitamin, thiamine to optimize nutrition status. 3. Defer goals of care to medical team. No blood draws at this time unless otherwise indicated by medical team.

## 2021-08-17 NOTE — PROGRESS NOTE ADULT - ATTENDING COMMENTS
71 year old male with significant debility (ppsv2 30%) with GBM on home hospice care admitted with multifocal PNA likely due to aspiration.  Completing course antibiotics.  Having high residuals.  Son who is a physician is very involved in care and and requesting reglan for this.  We are hesitant due to diarrhea, to discuss care plan with son.  Patient assessment and plan discussed on interdisciplinary team rounds today. 71 year old male with significant debility (ppsv2 30%) with GBM on home hospice care admitted with multifocal PNA likely due to aspiration.  Completing course antibiotics.  Having high residuals.  Son who is a physician is very involved in care and and requesting reglan for this.  We are hesitant due to diarrhea, discussed care plan with son.  Patient assessment and plan discussed on interdisciplinary team rounds today.

## 2021-08-18 DIAGNOSIS — R64 CACHEXIA: ICD-10-CM

## 2021-08-18 PROCEDURE — 99233 SBSQ HOSP IP/OBS HIGH 50: CPT

## 2021-08-18 RX ORDER — LEVETIRACETAM 250 MG/1
1500 TABLET, FILM COATED ORAL
Qty: 420 | Refills: 0
Start: 2021-08-18 | End: 2021-08-31

## 2021-08-18 RX ORDER — LACOSAMIDE 50 MG/1
200 TABLET ORAL EVERY 12 HOURS
Refills: 0 | Status: DISCONTINUED | OUTPATIENT
Start: 2021-08-18 | End: 2021-08-19

## 2021-08-18 RX ORDER — LACOSAMIDE 50 MG/1
200 TABLET ORAL
Qty: 560 | Refills: 0
Start: 2021-08-18 | End: 2021-08-31

## 2021-08-18 RX ORDER — LEVETIRACETAM 250 MG/1
1500 TABLET, FILM COATED ORAL EVERY 12 HOURS
Refills: 0 | Status: DISCONTINUED | OUTPATIENT
Start: 2021-08-18 | End: 2021-08-19

## 2021-08-18 RX ADMIN — LEVETIRACETAM 400 MILLIGRAM(S): 250 TABLET, FILM COATED ORAL at 05:15

## 2021-08-18 RX ADMIN — PIPERACILLIN AND TAZOBACTAM 25 GRAM(S): 4; .5 INJECTION, POWDER, LYOPHILIZED, FOR SOLUTION INTRAVENOUS at 05:42

## 2021-08-18 RX ADMIN — Medication 1 APPLICATION(S): at 17:56

## 2021-08-18 RX ADMIN — LEVETIRACETAM 1500 MILLIGRAM(S): 250 TABLET, FILM COATED ORAL at 18:00

## 2021-08-18 RX ADMIN — LACOSAMIDE 200 MILLIGRAM(S): 50 TABLET ORAL at 18:00

## 2021-08-18 RX ADMIN — Medication 1 APPLICATION(S): at 05:15

## 2021-08-18 RX ADMIN — LACOSAMIDE 140 MILLIGRAM(S): 50 TABLET ORAL at 05:41

## 2021-08-18 RX ADMIN — SODIUM CHLORIDE 10 MILLILITER(S): 9 INJECTION INTRAMUSCULAR; INTRAVENOUS; SUBCUTANEOUS at 05:42

## 2021-08-18 NOTE — PROGRESS NOTE ADULT - PROBLEM SELECTOR PLAN 6
Palliative and Hospice Team following. Under care  by  Dr Chavis as  an  outpatient  Home  hospice  resumed home dose of antiepileptics IV  continue keppra IV 1500 mg BID (home 1000mg TID)  continue vimpat  mg BID  ativan 1 mg q2h PRN for breakthrough seizures    --> 8/18: Transitioned from IV back to Oral solution formulation via enteral tube. To monitor overnight.

## 2021-08-18 NOTE — PROGRESS NOTE ADULT - SUBJECTIVE AND OBJECTIVE BOX
GAP TEAM PALLIATIVE CARE UNIT PROGRESS NOTE:      [X] Patient on hospice program.    INDICATION FOR PALLIATIVE CARE UNIT SERVICES: Symptom Management, Finished IV antibiotics on 8/18, GBM, seizures    INTERVAL HPI/OVERNIGHT EVENTS: Remains not alert, pupils minimally reactive to light, eyes open, appears comfortable. Did not tolerate trickle feeds yesterday, stopped tube feeds after discussion with son  Jim Cardenas given high risk of aspiration outweighs benefits. Transitioned IV Keppra and Vimpat to oral solution via PEG, to monitor for 1 day before planned discharge to home hospice 8/19.      PRNs used: Dilautid 0.4mg q1h x1, Ativan 0.5mg q1h x1  Last BM: 8/15      DNR on chart: Yes  Allergies    No Known Allergies    Intolerances    MEDICATIONS  (STANDING):  AQUAPHOR (petrolatum Ointment) 1 Application(s) Topical every 12 hours  lacosamide Solution 200 milliGRAM(s) Oral every 12 hours  levETIRAcetam  Solution 1500 milliGRAM(s) Enteral Tube every 12 hours  sodium chloride 0.9%. 1000 milliLiter(s) (10 mL/Hr) IV Continuous <Continuous>    MEDICATIONS  (PRN):  acetaminophen  Suppository .. 650 milliGRAM(s) Rectal every 6 hours PRN Temp greater or equal to 38C (100.4F)  bisacodyl Suppository 10 milliGRAM(s) Rectal daily PRN Constipation  glycopyrrolate Injectable 0.4 milliGRAM(s) IV Push every 6 hours PRN oral secretions  HYDROmorphone  Injectable 0.4 milliGRAM(s) IV Push every 1 hour PRN moderate-severe pain  HYDROmorphone  Injectable 0.4 milliGRAM(s) IV Push every 1 hour PRN dyspnea  LORazepam   Injectable 0.5 milliGRAM(s) IV Push every 1 hour PRN Anxiety  LORazepam   Injectable 1 milliGRAM(s) IV Push every 15 minutes PRN seizures    ITEMS UNCHECKED ARE NOT PRESENT    PRESENT SYMPTOMS: [X]Unable to obtain due to poor mentation   Source if other than patient:  [ ]Family   [x]Team     Pain: [ ] yes [X] no  QOL impact -   Location -                    Aggravating factors -  Quality -  Radiation -  Timing-  Severity (0-10 scale):  Minimal acceptable level (0-10 scale):     Dyspnea:                           [ ]Mild [ ]Moderate [ ]Severe  Anxiety:                             [x]Mild [ ]Moderate [ ]Severe  Fatigue:                             [ ]Mild [ ]Moderate [x]Severe  Nausea:                             [ ]Mild [ ]Moderate [ ]Severe  Loss of appetite:              [ ]Mild [ ]Moderate [x]Severe  Constipation:                    [ ]Mild [ ]Moderate [ ]Severe    PAINAD Score: 0 in past 24 hours    [email or web address] (Ctrl +  left click to view)  		  Other Symptoms:  [x]All other review of systems negative     Palliative Performance Status Version 2:     10  %         [email or web address]      PHYSICAL EXAM:  Vital Signs Last 24 Hrs  T(C): 37.1 (18 Aug 2021 08:39), Max: 37.1 (18 Aug 2021 08:39)  T(F): 98.7 (18 Aug 2021 08:39), Max: 98.7 (18 Aug 2021 08:39)  HR: 98 (18 Aug 2021 08:39) (98 - 98)  BP: 118/69 (18 Aug 2021 08:39) (118/69 - 118/69)  BP(mean): --  RR: 18 (18 Aug 2021 08:39) (18 - 18)  SpO2: 96% (18 Aug 2021 08:39) (96% - 96%) I&O's Summary      GENERAL:  [ ]Alert  [ ]Oriented x   [x ]Lethargic  [ ]Cachexia  [X]Unarousable  [ ]Verbal  [x ]Non-Verbal  Behavioral:   [ ] Anxiety  [ ] Delirium [ ] Agitation [ ] Other  HEENT:  [x ]Normal   [ ]Dry mouth   [ ]ET Tube/Trach  [ ]Oral lesions  PULMONARY:   [x ]Clear [ ]Tachypnea  [ ]Audible excessive secretions   [ ]Rhonchi        [ ]Right [ ]Left [ ]Bilateral  [ ]Crackles        [ ]Right [ ]Left [ ]Bilateral  [ ]Wheezing     [ ]Right [ ]Left [ ]Bilateral  [ ]Diminished BS [ ]Right [ ]Left [ ]Bilateral    CARDIOVASCULAR:    [x ]Regular [ ]Irregular [ ]Tachy  [ ]Edison [ ]Murmur [ ]Other  GASTROINTESTINAL:  [x ]Soft  [ ]Distended   [x ]+BS  [x ]Non tender [ ]Tender  [ ]PEG [ ]OGT/ NGT   Last BM:   8/15/2021  GENITOURINARY:  [ ]Normal [ x] Incontinent   [ ]Oliguria/Anuria   [ ]Franks  MUSCULOSKELETAL:   [ ]Normal   [ ]Weakness  [ x]Bed/Wheelchair bound [ ]Edema  NEUROLOGIC:   [ ]No focal deficits  [x ] Cognitive impairment  [x ] Dysphagia [ ]Dysarthria [ ] Paresis [ ]Other   SKIN:   [ ]Normal  [ ]Rash     [x ]Pressure ulcer(s)  [x ]y [ ]n  Present on admission  multiple, For more information please see RN documentation which I have reviewed.     CRITICAL CARE:  [ ] Shock Present  [ ]Septic [ ]Cardiogenic [ ]Neurologic [ ]Hypovolemic  [ ]  Vasopressors [ ]  Inotropes   [ ] Respiratory failure present [ ] Mechanical Ventilation [ ] Non-invasive ventilatory support [ ] High-Flow  [ ] Acute  [ ] Chronic [ ] Hypoxic  [ ] Hypercarbic [ ] Other  [x ] Other organ failure skin, brain, renal insufficiency    LABS:            RADIOLOGY & ADDITIONAL STUDIES:    PROTEIN CALORIE MALNUTRITION: [ ] mild [ ] moderate [ ] severe  [ ] underweight [ ] morbid obesity    https://www.andeal.org/vault/2440/web/files/ONC/Table_Clinical%20Characteristics%20to%20Document%20Malnutrition-White%20JV%20et%20al%066943.pdf    Height (cm): 180.3 (08-11-21 @ 00:53), 180.3 (07-09-21 @ 14:42), 180.3 (06-04-21 @ 22:21)  Weight (kg): 51 (07-12-21 @ 11:27), 50 (06-05-21 @ 09:57)  BMI (kg/m2): 15.7 (08-11-21 @ 00:53), 15.7 (07-12-21 @ 11:27), 15.4 (07-09-21 @ 14:42)    [ ] PPSV2 < or = 30% [ ] significant weight loss [ ] poor nutritional intake [ ] anasarca   Artificial Nutrition [ ]     REFERRALS:   [ ]Chaplaincy  [ ] Hospice  [ ]Child Life  [ ]Social Work  [ ]Case management [ ]Holistic Therapy [ ] Physical Therapy [ ] Dietary   Goals of Care Document: Goals of Care Conversation - Advanced Care Planning [KRISTA Brandon] (07-13-21 @ 15:33)  Goals of Care Conversation - Advanced Care Planning [CARMINA Garcia] (06-15-21 @ 13:35)  Goals of Care Conversation - Advanced Care Planning [RIANA Mauro] (06-08-21 @ 16:07)     GAP TEAM PALLIATIVE CARE UNIT PROGRESS NOTE:      [X] Patient on hospice program.    INDICATION FOR PALLIATIVE CARE UNIT SERVICES: Symptom Management, Finished IV antibiotics on 8/18, GBM, seizures    INTERVAL HPI/OVERNIGHT EVENTS: Remains not alert, pupils minimally reactive to light, eyes open, appears comfortable. Did not tolerate trickle feeds yesterday, stopped tube feeds after discussion with son  Jim Cardenas given high risk of aspiration outweighs benefits. Transitioned IV Keppra and Vimpat to oral solution via PEG, to monitor for 1 day before planned discharge to home hospice 8/19.      PRNs used: Dilautid 0.4mg q1h x1, Ativan 0.5mg q1h x1  Last BM: 8/15      DNR on chart: Yes  Allergies    No Known Allergies    Intolerances    MEDICATIONS  (STANDING):  AQUAPHOR (petrolatum Ointment) 1 Application(s) Topical every 12 hours  lacosamide Solution 200 milliGRAM(s) Oral every 12 hours  levETIRAcetam  Solution 1500 milliGRAM(s) Enteral Tube every 12 hours  sodium chloride 0.9%. 1000 milliLiter(s) (10 mL/Hr) IV Continuous <Continuous>    MEDICATIONS  (PRN):  acetaminophen  Suppository .. 650 milliGRAM(s) Rectal every 6 hours PRN Temp greater or equal to 38C (100.4F)  bisacodyl Suppository 10 milliGRAM(s) Rectal daily PRN Constipation  glycopyrrolate Injectable 0.4 milliGRAM(s) IV Push every 6 hours PRN oral secretions  HYDROmorphone  Injectable 0.4 milliGRAM(s) IV Push every 1 hour PRN moderate-severe pain  HYDROmorphone  Injectable 0.4 milliGRAM(s) IV Push every 1 hour PRN dyspnea  LORazepam   Injectable 0.5 milliGRAM(s) IV Push every 1 hour PRN Anxiety  LORazepam   Injectable 1 milliGRAM(s) IV Push every 15 minutes PRN seizures    ITEMS UNCHECKED ARE NOT PRESENT    PRESENT SYMPTOMS: [X]Unable to obtain due to poor mentation   Source if other than patient:  [ ]Family   [x]Team     Pain: [ ] yes [X] no  QOL impact -   Location -                    Aggravating factors -  Quality -  Radiation -  Timing-  Severity (0-10 scale):  Minimal acceptable level (0-10 scale):     Dyspnea:                           [ ]Mild [ ]Moderate [ ]Severe  Anxiety:                             [x]Mild [ ]Moderate [ ]Severe  Fatigue:                             [ ]Mild [ ]Moderate [x]Severe  Nausea:                             [ ]Mild [ ]Moderate [ ]Severe  Loss of appetite:              [ ]Mild [ ]Moderate [x]Severe  Constipation:                    [ ]Mild [ ]Moderate [ ]Severe    PAINAD Score: 0 in past 24 hours    [email or web address] (Ctrl +  left click to view)  		  Other Symptoms:  [x]All other review of systems negative     Palliative Performance Status Version 2:     10  %         [email or web address]      PHYSICAL EXAM:  Vital Signs Last 24 Hrs  T(C): 37.1 (18 Aug 2021 08:39), Max: 37.1 (18 Aug 2021 08:39)  T(F): 98.7 (18 Aug 2021 08:39), Max: 98.7 (18 Aug 2021 08:39)  HR: 98 (18 Aug 2021 08:39) (98 - 98)  BP: 118/69 (18 Aug 2021 08:39) (118/69 - 118/69)  BP(mean): --  RR: 18 (18 Aug 2021 08:39) (18 - 18)  SpO2: 96% (18 Aug 2021 08:39) (96% - 96%) I&O's Summary      GENERAL:  [ ]Alert  [ ]Oriented x   [x ]Lethargic  [x ]Cachexia  [X]Unarousable  [ ]Verbal  [x ]Non-Verbal  Behavioral:   [ ] Anxiety  [ ] Delirium [ ] Agitation [ ] Other  HEENT:  [x ]Normal   [ ]Dry mouth   [ ]ET Tube/Trach  [ ]Oral lesions  PULMONARY:   [x ]Clear [ ]Tachypnea  [ ]Audible excessive secretions   [ ]Rhonchi        [ ]Right [ ]Left [ ]Bilateral  [ ]Crackles        [ ]Right [ ]Left [ ]Bilateral  [ ]Wheezing     [ ]Right [ ]Left [ ]Bilateral  [ ]Diminished BS [ ]Right [ ]Left [ ]Bilateral    CARDIOVASCULAR:    [x ]Regular [ ]Irregular [ ]Tachy  [ ]Edison [ ]Murmur [ ]Other  GASTROINTESTINAL:  [x ]Soft  [ ]Distended   [x ]+BS  [x ]Non tender [ ]Tender  [ ]PEG [ ]OGT/ NGT   Last BM:   8/15/2021  GENITOURINARY:  [ ]Normal [ x] Incontinent   [ ]Oliguria/Anuria   [ ]Franks  MUSCULOSKELETAL:   [ ]Normal   [ ]Weakness  [ x]Bed/Wheelchair bound [ ]Edema  NEUROLOGIC:   [ ]No focal deficits  [x ] Cognitive impairment  [x ] Dysphagia [ ]Dysarthria [ ] Paresis [ ]Other   SKIN:   [ ]Normal  [ ]Rash     [x ]Pressure ulcer(s)  [x ]y [ ]n  Present on admission  multiple, For more information please see RN documentation which I have reviewed.     CRITICAL CARE:  [ ] Shock Present  [ ]Septic [ ]Cardiogenic [ ]Neurologic [ ]Hypovolemic  [ ]  Vasopressors [ ]  Inotropes   [ ] Respiratory failure present [ ] Mechanical Ventilation [ ] Non-invasive ventilatory support [ ] High-Flow  [ ] Acute  [ ] Chronic [ ] Hypoxic  [ ] Hypercarbic [ ] Other  [x ] Other organ failure skin, brain, renal insufficiency    LABS: None new.             RADIOLOGY & ADDITIONAL STUDIES: None new.     PROTEIN CALORIE MALNUTRITION: [ ] mild [ ] moderate [x ] severe  [ ] underweight [ ] morbid obesity    https://www.andeal.org/vault/2440/web/files/ONC/Table_Clinical%20Characteristics%20to%20Document%20Malnutrition-White%20JV%20et%20al%049699.pdf    Height (cm): 180.3 (08-11-21 @ 00:53), 180.3 (07-09-21 @ 14:42), 180.3 (06-04-21 @ 22:21)  Weight (kg): 51 (07-12-21 @ 11:27), 50 (06-05-21 @ 09:57)  BMI (kg/m2): 15.7 (08-11-21 @ 00:53), 15.7 (07-12-21 @ 11:27), 15.4 (07-09-21 @ 14:42)    [x ] PPSV2 < or = 30% [x ] significant weight loss [x ] poor nutritional intake [ ] anasarca   Artificial Nutrition [x ]     REFERRALS:   [ ]Chaplaincy  [ ] Hospice  [ ]Child Life  [ ]Social Work  [ ]Case management [ ]Holistic Therapy [ ] Physical Therapy [ ] Dietary   Goals of Care Document: Goals of Care Conversation - Advanced Care Planning [KRISTA Brandon] (07-13-21 @ 15:33)  Goals of Care Conversation - Advanced Care Planning [CARMINA Garcia] (06-15-21 @ 13:35)  Goals of Care Conversation - Advanced Care Planning [RIANA Mauro] (06-08-21 @ 16:07)

## 2021-08-18 NOTE — PROGRESS NOTE ADULT - ATTENDING COMMENTS
71 year old male with significant debility (ppsv2 30%) with GBM on home hospice care admitted with multifocal PNA likely due to aspiration.  Completed course antibiotics.  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  Daily discussions held with son who is beginning to understand that this is an imminent death situation.  Nutritional support at this time will hasten a process that is already underway.  Patient assessment and plan discussed on interdisciplinary team rounds today.   Continued support to family.

## 2021-08-18 NOTE — PROGRESS NOTE ADULT - PROBLEM SELECTOR PLAN 1
Continue with Dilaudid 0.4mg IV q1hr PRN (8/18: Used 1x within the last 24hr)  Continue Bowel regimen: Last BM 8/15
Continue with Dilaudid 0.4mg IV q1hr PRN (none required within the last 24hr)  Bowel regimen
Continue with Dilaudid 0.4mg IV q1hr PRN (two required within the last 24hr)  Bowel regimen
Continue with Dilaudid 0.4mg IV q1hr PRN (8/17: Used 2x within the last 24hr)  Continue Bowel regimen: Last BM 8/15
Continue with Dilaudid 0.4mg IV q1hr PRN (one required within the last 24hr)  Bowel regimen
Continue with Dilaudid 0.4mg IV q1hr PRN (one required within the last 24hr)  Bowel regimen
Continue with Dilaudid 0.4mg IV q1hr PRN (none required within the last 24hr)  Bowel regimen

## 2021-08-18 NOTE — PROGRESS NOTE ADULT - PROBLEM SELECTOR PLAN 4
Chest x-ray on 8/11/21showed Multifocal bilateral patchy opacities involving the right lung and left lower lung. No pleural effusions or pneumothorax.  s/p 5d Doxycycline 100mg IVPB X5 (8/12-8/16)  s/p 7d Zosyn 3.375gram IVPB q8hr  (8/12 - 8/18) Patient with PEG tube. Vital AF feed c/b diarrhea, switched to Jevity. Was held 8/15 for high residuals and diarrhea. PEG study confirmed placement. Restarted as trickle on 8/17 but did not tolerate. Tube feeds discontinued after discussion with son, as risks of aspiration/dyspnea/nausea/diarrhea/hypervolemia outweighs anticipated benefit at this time.

## 2021-08-18 NOTE — PROGRESS NOTE ADULT - PROBLEM SELECTOR PLAN 3
Patient with PEG tube. Vital AF feed c/b diarrhea, switched to Jevity. Was held 8/15 for high residuals and diarrhea. PEG study confirmed placement. Restarted as trickle on 8/17 but did not tolerate. Tube feeds discontinued after discussion with son, as risks of aspiration/dyspnea/nausea/diarrhea/hypervolemia outweighs anticipated benefit at this time. Global wasting syndrome with BMI 15 and not tolerating enteral nutrition.  Part of the dying process.  Daily discussions held with son who is a physician.

## 2021-08-18 NOTE — PROGRESS NOTE ADULT - PROBLEM SELECTOR PROBLEM 1
Pain, unspecified

## 2021-08-18 NOTE — PROGRESS NOTE ADULT - PROBLEM SELECTOR PLAN 5
Under care  by  Dr Chavis as  an  outpatient  Home  hospice  resumed home dose of antiepileptics IV  continue keppra IV 1500 mg BID (home 1000mg TID)  continue vimpat  mg BID  ativan 1 mg q2h PRN for breakthrough seizures    --> 8/18: Transitioned from IV back to Oral solution formulation via enteral tube. To monitor overnight. Chest x-ray on 8/11/21showed Multifocal bilateral patchy opacities involving the right lung and left lower lung. No pleural effusions or pneumothorax.  s/p 5d Doxycycline 100mg IVPB X5 (8/12-8/16)  s/p 7d Zosyn 3.375gram IVPB q8hr  (8/12 - 8/18)

## 2021-08-18 NOTE — PROGRESS NOTE ADULT - ASSESSMENT
71y M presenting from home hospice ([facility]). PMH SIADH, CMV infection, glioblastoma multiforme, DM, seizures, HLD, HTN. Has PEG tube. Family called EMS for patient increased moaning and nonverbal indicators of pain. Family also found pt spo2 in 80's at home on 5L NC O2. (pt not on home O2 at baseline but has O2 at home PRN). Admitted with pneumonia. Patient transferred to the PCU for symptom management.      
Reason for consult: GBM    HPI: 70 yo M DNR/DNI, hospice care at home(son at bedside - ok with IV placement, IV meds, IVF), GBM presents with 1 d of moaning and low pulse ox, cough. Pt is unable to verbalize his needs at baseline. Has PEG tube for feeding. Condom cath.      Hematology/Oncology consulted on this gentleman with history of GBM who presented to the ED today with shortness of breath, CXR confirmed Pneumonia, Patient with white count 19.57, sepsis work up in progress        Glioblastoma Multiforme  --Under care at [facility] [place]. Lyle Chavis  --Home hospice  --Dilaudid for pain      Pneumonia  --CXR with multifocal pneumonia  --on Zosyn/Vancomycin/Doxycyline   --Blood/Urine cultures NGTD  --Rec ID and Pulmonary consults    Anemia   --H&H on admission 8.9/29.0  --Likely related to malignancy  --Please transfuse if hgb <7.0    Nutrition  --TF via peg    GOC  --Patient on home Hospice  --Palliative following during admission      We will follow patient daily while admitted and continue to coordinate care with [facility]    Kaia Rivas NP  Hematology/Oncology  [facility]  [phone] (Cell)  [phone] (Office)  [phone] (Alt office)  Evenings and weekends please call MD on call or office        
Reason for consult: GBM    HPI: 72 yo M DNR/DNI, hospice care at home(son at bedside - ok with IV placement, IV meds, IVF), GBM presents with 1 d of moaning and low pulse ox, cough. Pt is unable to verbalize his needs at baseline. Has PEG tube for feeding. Condom cath.      Hematology/Oncology consulted on this gentleman with history of GBM who presented to the ED today with shortness of breath, CXR confirmed Pneumonia, Patient with white count 19.57, sepsis work up in progress        Glioblastoma Multiforme  --Under care at [facility] Dr. Lyle Chavis  --Home hospice  --Dilaudid for pain      Pneumonia  --WBC 19  --CXR with multifocal pneumonia  --on Zosyn/Vancomycin/Doxycyline   --Blood/Urine cultures NGTD  --Rec ID and Pulmonary consults    Anemia   --H&H on admission 8.9/29.0  --Likely related to malignancy  --Please transfuse if hgb <7.0    Nutrition  --TF via peg    GOC  --Patient on home Hospice  --Palliative following during admission      We will follow patient daily while admitted and continue to coordinate care with [facility]    Kaia Rivas NP  Hematology/Oncology  [facility]  [phone] (Cell)  [phone] (Office)  [phone] (Alt office)  Evenings and weekends please call MD on call or office        
71y M presenting from home hospice ([facility]). PMH SIADH, CMV infection, glioblastoma multiforme, DM, seizures, HLD, HTN. Has PEG tube. Family called EMS for patient increased moaning and nonverbal indicators of pain. Family also found pt spo2 in 80's at home on 5L NC O2. (pt not on home O2 at baseline but has O2 at home PRN). Admitted with pneumonia. Patient transferred to the PCU for symptom management.      

## 2021-08-18 NOTE — PROGRESS NOTE ADULT - PROBLEM SELECTOR PLAN 2
PPSV 10%. The patient requires nursing assistance with all ADLs  Supportive care

## 2021-08-18 NOTE — PROGRESS NOTE ADULT - NUTRITIONAL ASSESSMENT
Nutrition Diagnosis:  Nutrition diagnosis no... Current medical/surgical condition precludes nutrition intervention at this time. Patient followed by Palliative Care.      Nutrition Recommendations:   · Enteral Recommendations  Recommend initiating TRICKLE feeds of Vital 1.5 at 10ml/hr. Monitor tolerance to feeds. As able, increase by 10ml q 8-12 hrs to GOAL rate 45ml/hr x 24 hrs. At goal rate (based on previous wt of 51kg), provides: 1080ml, 1620kcal (~31.7kcal/kg) and 73g protein (1.4g protein/kg).  · RD To Remain Available  yes   · RD Name and Phone # Left With Pt/Family for Necessary Followup  Alison Kleiner, RD, [facility] Pager # [phone]  · Additional Recommendations  1. Monitor GI tolerance. RD to remain available to adjust EN formulary, volume/rate PRN.  2. Consider multivitamin, thiamine to optimize nutrition status. 3. Defer goals of care to medical team. No blood draws at this time unless otherwise indicated by medical team.

## 2021-08-19 ENCOUNTER — TRANSCRIPTION ENCOUNTER (OUTPATIENT)
Age: 71
End: 2021-08-19

## 2021-08-19 VITALS
SYSTOLIC BLOOD PRESSURE: 141 MMHG | OXYGEN SATURATION: 97 % | TEMPERATURE: 98 F | RESPIRATION RATE: 18 BRPM | DIASTOLIC BLOOD PRESSURE: 80 MMHG | HEART RATE: 100 BPM

## 2021-08-19 PROCEDURE — 84132 ASSAY OF SERUM POTASSIUM: CPT

## 2021-08-19 PROCEDURE — 85014 HEMATOCRIT: CPT

## 2021-08-19 PROCEDURE — C9254: CPT

## 2021-08-19 PROCEDURE — 86769 SARS-COV-2 COVID-19 ANTIBODY: CPT

## 2021-08-19 PROCEDURE — 87086 URINE CULTURE/COLONY COUNT: CPT

## 2021-08-19 PROCEDURE — 80202 ASSAY OF VANCOMYCIN: CPT

## 2021-08-19 PROCEDURE — 85610 PROTHROMBIN TIME: CPT

## 2021-08-19 PROCEDURE — 85730 THROMBOPLASTIN TIME PARTIAL: CPT

## 2021-08-19 PROCEDURE — 85025 COMPLETE CBC W/AUTO DIFF WBC: CPT

## 2021-08-19 PROCEDURE — 82330 ASSAY OF CALCIUM: CPT

## 2021-08-19 PROCEDURE — 82435 ASSAY OF BLOOD CHLORIDE: CPT

## 2021-08-19 PROCEDURE — 87040 BLOOD CULTURE FOR BACTERIA: CPT

## 2021-08-19 PROCEDURE — 82803 BLOOD GASES ANY COMBINATION: CPT

## 2021-08-19 PROCEDURE — 96365 THER/PROPH/DIAG IV INF INIT: CPT

## 2021-08-19 PROCEDURE — 85018 HEMOGLOBIN: CPT

## 2021-08-19 PROCEDURE — 80053 COMPREHEN METABOLIC PANEL: CPT

## 2021-08-19 PROCEDURE — 99239 HOSP IP/OBS DSCHRG MGMT >30: CPT

## 2021-08-19 PROCEDURE — 82565 ASSAY OF CREATININE: CPT

## 2021-08-19 PROCEDURE — 81001 URINALYSIS AUTO W/SCOPE: CPT

## 2021-08-19 PROCEDURE — 99285 EMERGENCY DEPT VISIT HI MDM: CPT

## 2021-08-19 PROCEDURE — 96375 TX/PRO/DX INJ NEW DRUG ADDON: CPT

## 2021-08-19 PROCEDURE — 74018 RADEX ABDOMEN 1 VIEW: CPT

## 2021-08-19 PROCEDURE — 82947 ASSAY GLUCOSE BLOOD QUANT: CPT

## 2021-08-19 PROCEDURE — 71045 X-RAY EXAM CHEST 1 VIEW: CPT

## 2021-08-19 PROCEDURE — 84295 ASSAY OF SERUM SODIUM: CPT

## 2021-08-19 PROCEDURE — 0225U NFCT DS DNA&RNA 21 SARSCOV2: CPT

## 2021-08-19 PROCEDURE — 49465 FLUORO EXAM OF G/COLON TUBE: CPT

## 2021-08-19 PROCEDURE — 85027 COMPLETE CBC AUTOMATED: CPT

## 2021-08-19 PROCEDURE — 83605 ASSAY OF LACTIC ACID: CPT

## 2021-08-19 PROCEDURE — 80048 BASIC METABOLIC PNL TOTAL CA: CPT

## 2021-08-19 RX ORDER — LACOSAMIDE 50 MG/1
20 TABLET ORAL
Qty: 0 | Refills: 0 | DISCHARGE
Start: 2021-08-19

## 2021-08-19 RX ORDER — LEVETIRACETAM 250 MG/1
15 TABLET, FILM COATED ORAL
Qty: 0 | Refills: 0 | DISCHARGE
Start: 2021-08-19

## 2021-08-19 RX ORDER — ACETAMINOPHEN 500 MG
1 TABLET ORAL
Qty: 0 | Refills: 0 | DISCHARGE
Start: 2021-08-19

## 2021-08-19 RX ORDER — HYDROMORPHONE HYDROCHLORIDE 2 MG/ML
2 INJECTION INTRAMUSCULAR; INTRAVENOUS; SUBCUTANEOUS
Qty: 224 | Refills: 0
Start: 2021-08-19 | End: 2021-09-01

## 2021-08-19 RX ORDER — ROBINUL 0.2 MG/ML
5 INJECTION INTRAMUSCULAR; INTRAVENOUS
Qty: 280 | Refills: 0
Start: 2021-08-19 | End: 2021-09-01

## 2021-08-19 RX ADMIN — LACOSAMIDE 200 MILLIGRAM(S): 50 TABLET ORAL at 06:15

## 2021-08-19 RX ADMIN — Medication 1 DROP(S): at 17:44

## 2021-08-19 RX ADMIN — Medication 1 APPLICATION(S): at 06:14

## 2021-08-19 RX ADMIN — Medication 1 APPLICATION(S): at 17:43

## 2021-08-19 RX ADMIN — LEVETIRACETAM 1500 MILLIGRAM(S): 250 TABLET, FILM COATED ORAL at 06:15

## 2021-08-19 RX ADMIN — LACOSAMIDE 200 MILLIGRAM(S): 50 TABLET ORAL at 17:43

## 2021-08-19 RX ADMIN — Medication 1 DROP(S): at 10:23

## 2021-08-19 RX ADMIN — LEVETIRACETAM 1500 MILLIGRAM(S): 250 TABLET, FILM COATED ORAL at 17:43

## 2021-08-19 NOTE — DISCHARGE NOTE PROVIDER - CARE PROVIDER_API CALL
Home Hospice,   [facility]  [phone]  Phone: (   )    -  Fax: (   )    -  Established Patient  Follow Up Time: Routine

## 2021-08-19 NOTE — DISCHARGE NOTE NURSING/CASE MANAGEMENT/SOCIAL WORK - NSDCPEFALRISK_GEN_ALL_CORE
For information on Fall & injury Prevention, visit [email or web address]

## 2021-08-19 NOTE — PROVIDER CONTACT NOTE (OTHER) - REASON
Pt. son had request to start him on D5
CDiff sample was send for watery stool to rule out c-diff
Son requesting AM labs

## 2021-08-19 NOTE — PROVIDER CONTACT NOTE (OTHER) - SITUATION
Pt is nonverbal. Pt admitted for pneumonia due to organism and has glioblastoma multiforme.
Son requesting AM labs for WBC count, also requesting update on ABX regimen
Pt. had two watery liquidly stools green in color. Sample send to lab to rule out cdiff. Pt is nonverbal.

## 2021-08-19 NOTE — DISCHARGE NOTE PROVIDER - NSDCCPCAREPLAN_GEN_ALL_CORE_FT
PRINCIPAL DISCHARGE DIAGNOSIS  Diagnosis: Pneumonia  Assessment and Plan of Treatment: Treated with 7 day course IV antibiotics for aspiration pneumonia. Sepsis improved.

## 2021-08-19 NOTE — PROVIDER CONTACT NOTE (OTHER) - ASSESSMENT
Pt A+Ox0 VSS afebrile during shift
Pt. has no temperature and is nonverbal. No s/s of abdominal pain.
Pt has no signs or symptoms of dyspnea or SOB. Fluids will cause pt to fluid overloud.

## 2021-08-19 NOTE — PROVIDER CONTACT NOTE (OTHER) - RECOMMENDATIONS
Continue to monitor pt and stool count.
Pt has no signs or symptoms of dyspnea or SOB. Fluids will cause pt to fluid overloud.  Educate son on condition and not to fluid overload pt.
Notify MD for further recommendation

## 2021-08-19 NOTE — DISCHARGE NOTE NURSING/CASE MANAGEMENT/SOCIAL WORK - NSDCVIVACCINE_GEN_ALL_CORE_FT
Tdap; 08-Dec-2014 12:30; Roxana Mora); [employer]; [id number]; IntraMuscular; Deltoid Left.; 0.5 milliLiter(s);

## 2021-08-19 NOTE — DISCHARGE NOTE PROVIDER - HOSPITAL COURSE
Mr. Rider is a 70yo male who presented from home hospice ([facility]) on 8/11 with Hx Glioblastoma multiforme, DM, seizures, HLD, HTN, and Tube Feeds by PEG tube. Family called EMS for patient increased moaning and nonverbal indicators of pain. Family also found pt spo2 in 80's at home on 5L NC O2. Admitted with aspiration pneumonia, s/p 7 day total IV Antibiotics (8/12 - 8/18). Hospital course c/b intolerance to tube feeds with high residuals, and tube feeds were not continued after discussion with patient’s son. Patient had noted seizure activity on 8/14, and seizure medications were transitioned to IV formulation. On 8/18, patient’s seizure medications (Keppra and Vimpat) were transitioned back to oral solution via PEG with no noted seizure activity. On 8/19, patient is stable for discharge back to home with home hospice services via [facility].          Medications:     Dilautid 0.4mg IV --> 2mg q3h solution via PEG PRN Severe Pain or Dyspnea     Ativan 0.5mg IV --> 1mg q4h solution via PEG PRN Anxiety     Ativan 1mg IV --> 2mg q15min solution via PEG PRN Seizure     Glycopyrrolate 0.4mg IV --> 1mg q6h solution via PEG PRN oral secretions     Keppra (Levetiracetam) 1500mg q12h solution via PEG     Vimpat (Lacosamide) 200mg q12h solution via PEG       Acetominophen 650mg Rectal Suppository q6h PRN Fever     Dulcolax 10mg Rectal Suppository Daily PRN constipation

## 2021-08-19 NOTE — PROVIDER CONTACT NOTE (OTHER) - BACKGROUND
Pt admitted for pna, currently on hospice
Pt is nonverbal. Pt admitted for pneumonia due to organism and has glioblastoma multiforme. Son is requesting to start son on dextrose 5 percent.
Pt. had two watery liquidly stools green in color.  Pt  is nonverbal. Pt recently completed antibiotic treatment. Medicine team wanted to rule out cdiff. Cdiff sample was not liquidly enough.

## 2021-08-19 NOTE — DISCHARGE NOTE NURSING/CASE MANAGEMENT/SOCIAL WORK - PATIENT PORTAL LINK FT
You can access the FollowMyHealth Patient Portal offered by [facility] by registering at the following website: [email or web address]. By joining [facility]’s FollowMyHealth portal, you will also be able to view your health information using other applications (apps) compatible with our system.

## 2021-08-19 NOTE — DISCHARGE NOTE PROVIDER - NSDCMRMEDTOKEN_GEN_ALL_CORE_FT
acetaminophen 650 mg rectal suppository: 1 suppository(ies) rectal every 6 hours, As needed, Temp greater or equal to 38C (100.4F)  Dulcolax Laxative 10 mg rectal suppository: 1 suppository(ies) rectal once a day (at bedtime), As needed, Constipation  glycopyrrolate 1 mg/5 mL oral solution: 5 milliliter(s) by gastrostomy tube every 6 hours, As Needed  Home bedside suctioning kit: Home bedside suctioning kit  HYDROmorphone 1 mg/mL oral liquid: 2 milliliter(s) by gastrostomy tube every 3 hours, As Needed - for severe pain or dyspnea RR &gt; 22. MDD:16mL  Keppra 100 mg/mL oral solution: 15 milliliter(s) orally every 12 hours  Lorazepam Intensol 2 mg/mL oral concentrate: 1 milliliter(s) by gastrostomy tube every 15 minutes, As Needed MDD:3mL  Lorazepam Intensol 2 mg/mL oral concentrate: 0.5 milliliter(s) by gastrostomy tube every 4 hours, As Needed -for anxiety MDD:3mL  ocular lubricant ophthalmic solution: 1 drop(s) to each affected eye 2 times a day  Vimpat 10 mg/mL oral solution: 20 milliliter(s) orally every 12 hours

## 2021-08-19 NOTE — DISCHARGE NOTE PROVIDER - PROVIDER TOKENS
FREE:[LAST:[Home Hosp[facility]],PHONE:[(   )    -],FAX:[(   )    -],ADDRESS:[[facility]  [phone]],FOLLOWUP:[Routine],ESTABLISHEDPATIENT:[T]]

## 2021-09-02 LAB
HOMOCYSTEINE LEVEL: SIGNIFICANT CHANGE UP
METHYLMALONIC ACID LEVEL: 96 — SIGNIFICANT CHANGE UP

## 2021-11-24 NOTE — PROGRESS NOTE ADULT - PROBLEM SELECTOR PLAN 6
[General Appearance - Well Developed] : well developed [General Appearance - Well Nourished] : well nourished [Heart Rate And Rhythm] : heart rate and rhythm were normal [Heart Sounds] : normal S1 and S2 [Murmurs] : no murmurs present [] : no respiratory distress - Not on any current BP meds, SBPs mostly in 110s-120s mmHg, had an episode in 180s possibly in the setting of ?seizure

## 2022-11-16 NOTE — ED ADULT NURSE NOTE - CHIEF COMPLAINT
[de-identified] : elderly male, pleasant, NAD [de-identified] : unlabored breathing [de-identified] : KINJALR [de-identified] : Face - left preauricular and cheek incision healing well, c/d/i, no active drainage, minimal bruising and swelling as expected,  The patient is a 69y Male complaining of

## 2022-11-17 NOTE — SWALLOW BEDSIDE ASSESSMENT ADULT - ASR SWALLOW RECOMMEND DIAG
Follow up with your primary care provider in 2 days. Return to the emergency department for any increase in symptoms or for any other new or worrisome symptoms.      Pt is not appropriate for objective evaluation of swallow given positioning constraints (severely contracted).

## 2023-01-01 NOTE — DIETITIAN INITIAL EVALUATION ADULT. - PROBLEM SELECTOR PROBLEM 7
Thin layer of Hydrocortisone to areas of dry, red skin.  Then apply a thick layer of moisturizing cream over the Hydrocortisone.    Good moisturizing creams - CeraVe and Vanicream   Need for prophylactic measure

## 2023-08-07 NOTE — ED ADULT NURSE NOTE - NS ED NURSE RECORD ANOTHER HT AND WT
Problem: Plan of Care - These are the overarching goals to be used throughout the patient stay.    Goal: Plan of Care Review  Description: The Plan of Care Review/Shift note should be completed every shift.  The Outcome Evaluation is a brief statement about your assessment that the patient is improving, declining, or no change.  This information will be displayed automatically on your shift note.  Outcome: Progressing  Flowsheets (Taken 8/7/2023 7382)  Plan of Care Reviewed With: patient     Problem: Risk for Delirium  Goal: Optimal Coping  Outcome: Progressing   Goal Outcome Evaluation:alert and oriented - continue to monitor  Problem: Pain Acute  Goal: Optimal Pain Control and Function  Outcome: Progressing-un derstands pain scale (0-10) and medicate as per mar  Problem: Malnutrition  Goal: Improved Nutritional Intake  Outcome: Progressing-patient eating meals and taking supplements  Problem: Chronic Kidney Disease  Goal: Optimal Coping with Chronic Illness  Outcome: Progressing-monitoring lab results - dialysis as per orders/last on 8/4               Plan of Care Reviewed With: patient                  No

## 2023-09-18 NOTE — DIETITIAN INITIAL EVALUATION ADULT. - BUCCAL DEPLETION IS
Caller: MADDISON CARRERAE    Relationship: Emergency Contact    Best call back number: [phone]     Requested Prescriptions:   Requested Prescriptions     Pending Prescriptions Disp Refills    pregabalin (LYRICA) 150 MG capsule 60 capsule 1     Sig: Take 1 capsule by mouth 2 (Two) Times a Day.        Pharmacy where request should be sent:  [facility]     Last office visit with prescribing clinician: 9/15/2023   Last telemedicine visit with prescribing clinician: Visit date not found   Next office visit with prescribing clinician: Visit date not found     Additional details provided by patient: PATIENT LOTTIE ROY WOULD LIKE A CALL BACK TO CONFIRM.     Does the patient have less than a 3 day supply:  [x] Yes  [] No    Would you like a call back once the refill request has been completed: [x] Yes [] No    If the office needs to give you a call back, can they leave a voicemail: [x] Yes [] No    Kevon Chino Rep   09/18/23 14:16 EDT          severe

## 2024-05-23 NOTE — H&P PST ADULT - ADMIT DATE
Pt called to request a refill on Lisinopril to be sent to [facility] in [facility]  
25-Sep-2019

## 2024-08-05 NOTE — PATIENT PROFILE ADULT - NSPROPOAPRESSUREINJURY_GEN_A_NUR
Consent: The patient's consent was obtained including but not limited to risks of crusting, scabbing, blistering, scarring, darker or lighter pigmentary change, recurrence, incomplete removal and infection.
Show Aperture Variable?: Yes
Post-Care Instructions: I reviewed with the patient in detail post-care instructions. Patient is to wear sunprotection, and avoid picking at any of the treated lesions. Pt may apply Vaseline to crusted or scabbing areas.
Duration Of Freeze Thaw-Cycle (Seconds): 0
Render Note In Bullet Format When Appropriate: No
Detail Level: Detailed
Medical Necessity Information: It is in your best interest to select a reason for this procedure from the list below. All of these items fulfill various CMS LCD requirements except the new and changing color options.
Medical Necessity Clause: This procedure was medically necessary because the lesions that were treated were:
Spray Paint Text: The liquid nitrogen was applied to the skin utilizing a spray paint frosting technique.
no

## 2025-05-19 NOTE — DISCHARGE NOTE NURSING/CASE MANAGEMENT/SOCIAL WORK - NSDPACMPNY_GEN_ALL_CORE
LFT dysfunction, likely related to chronic alcohol dependence.  Fortunately no signs of anemia, leukopenia or thrombocytopenia.  No electrolyte derangements.  Strongly urged patient to stop alcohol consumption. Traveling alone